# Patient Record
Sex: FEMALE | Race: WHITE | NOT HISPANIC OR LATINO | URBAN - METROPOLITAN AREA
[De-identification: names, ages, dates, MRNs, and addresses within clinical notes are randomized per-mention and may not be internally consistent; named-entity substitution may affect disease eponyms.]

---

## 2016-11-04 RX ADMIN — HYDROMORPHONE HYDROCHLORIDE 0.5 MILLIGRAM(S): 2 INJECTION INTRAMUSCULAR; INTRAVENOUS; SUBCUTANEOUS at 09:30

## 2017-03-28 ENCOUNTER — INPATIENT (INPATIENT)
Facility: HOSPITAL | Age: 32
LOS: 0 days | Discharge: HOME | DRG: 103 | End: 2017-03-29
Attending: FAMILY MEDICINE | Admitting: HOSPITALIST
Payer: COMMERCIAL

## 2017-03-28 VITALS
SYSTOLIC BLOOD PRESSURE: 118 MMHG | TEMPERATURE: 98 F | OXYGEN SATURATION: 98 % | RESPIRATION RATE: 12 BRPM | DIASTOLIC BLOOD PRESSURE: 87 MMHG | HEART RATE: 106 BPM | HEIGHT: 71 IN | WEIGHT: 197.98 LBS

## 2017-03-28 DIAGNOSIS — R20.0 ANESTHESIA OF SKIN: ICD-10-CM

## 2017-03-28 DIAGNOSIS — E03.9 HYPOTHYROIDISM, UNSPECIFIED: ICD-10-CM

## 2017-03-28 DIAGNOSIS — D72.829 ELEVATED WHITE BLOOD CELL COUNT, UNSPECIFIED: ICD-10-CM

## 2017-03-28 DIAGNOSIS — Z98.89 OTHER SPECIFIED POSTPROCEDURAL STATES: Chronic | ICD-10-CM

## 2017-03-28 DIAGNOSIS — Z98.1 ARTHRODESIS STATUS: Chronic | ICD-10-CM

## 2017-03-28 DIAGNOSIS — F31.9 BIPOLAR DISORDER, UNSPECIFIED: ICD-10-CM

## 2017-03-28 DIAGNOSIS — M54.9 DORSALGIA, UNSPECIFIED: ICD-10-CM

## 2017-03-28 DIAGNOSIS — R20.2 PARESTHESIA OF SKIN: ICD-10-CM

## 2017-03-28 DIAGNOSIS — Z41.8 ENCOUNTER FOR OTHER PROCEDURES FOR PURPOSES OTHER THAN REMEDYING HEALTH STATE: ICD-10-CM

## 2017-03-28 LAB
ALBUMIN SERPL ELPH-MCNC: 3.5 G/DL — SIGNIFICANT CHANGE UP (ref 3.3–5)
ALP SERPL-CCNC: 40 U/L — SIGNIFICANT CHANGE UP (ref 40–120)
ALT FLD-CCNC: 62 U/L — SIGNIFICANT CHANGE UP (ref 12–78)
AMYLASE P1 CFR SERPL: 28 U/L — SIGNIFICANT CHANGE UP (ref 25–115)
ANION GAP SERPL CALC-SCNC: 13 MMOL/L — SIGNIFICANT CHANGE UP (ref 5–17)
AST SERPL-CCNC: 77 U/L — HIGH (ref 15–37)
BASOPHILS # BLD AUTO: 0 K/UL — SIGNIFICANT CHANGE UP (ref 0–0.2)
BASOPHILS NFR BLD AUTO: 0.2 % — SIGNIFICANT CHANGE UP (ref 0–2)
BILIRUB SERPL-MCNC: 0.4 MG/DL — SIGNIFICANT CHANGE UP (ref 0.2–1.2)
BUN SERPL-MCNC: 15 MG/DL — SIGNIFICANT CHANGE UP (ref 7–23)
CALCIUM SERPL-MCNC: 9.4 MG/DL — SIGNIFICANT CHANGE UP (ref 8.5–10.1)
CHLORIDE SERPL-SCNC: 113 MMOL/L — HIGH (ref 96–108)
CO2 SERPL-SCNC: 15 MMOL/L — LOW (ref 22–31)
CREAT SERPL-MCNC: 0.85 MG/DL — SIGNIFICANT CHANGE UP (ref 0.5–1.3)
EOSINOPHIL # BLD AUTO: 0 K/UL — SIGNIFICANT CHANGE UP (ref 0–0.5)
EOSINOPHIL NFR BLD AUTO: 0 % — SIGNIFICANT CHANGE UP (ref 0–6)
ERYTHROCYTE [SEDIMENTATION RATE] IN BLOOD: 13 MM/HR — SIGNIFICANT CHANGE UP (ref 0–15)
GLUCOSE SERPL-MCNC: 110 MG/DL — HIGH (ref 70–99)
HCG SERPL-ACNC: <1 MIU/ML — SIGNIFICANT CHANGE UP
HCT VFR BLD CALC: 40.7 % — SIGNIFICANT CHANGE UP (ref 34.5–45)
HGB BLD-MCNC: 13.7 G/DL — SIGNIFICANT CHANGE UP (ref 11.5–15.5)
LACTATE SERPL-SCNC: 1.5 MMOL/L — SIGNIFICANT CHANGE UP (ref 0.7–2)
LIDOCAIN IGE QN: 91 U/L — SIGNIFICANT CHANGE UP (ref 73–393)
LYMPHOCYTES # BLD AUTO: 1.6 K/UL — SIGNIFICANT CHANGE UP (ref 1–3.3)
LYMPHOCYTES # BLD AUTO: 8 % — LOW (ref 13–44)
MCHC RBC-ENTMCNC: 30.4 PG — SIGNIFICANT CHANGE UP (ref 27–34)
MCHC RBC-ENTMCNC: 33.7 GM/DL — SIGNIFICANT CHANGE UP (ref 32–36)
MCV RBC AUTO: 90 FL — SIGNIFICANT CHANGE UP (ref 80–100)
MONOCYTES # BLD AUTO: 1.2 K/UL — HIGH (ref 0–0.9)
MONOCYTES NFR BLD AUTO: 6.2 % — SIGNIFICANT CHANGE UP (ref 1–9)
NEUTROPHILS # BLD AUTO: 17 K/UL — HIGH (ref 1.8–7.4)
NEUTROPHILS NFR BLD AUTO: 85.6 % — HIGH (ref 43–77)
PLATELET # BLD AUTO: 208 K/UL — SIGNIFICANT CHANGE UP (ref 150–400)
POTASSIUM SERPL-MCNC: 3.7 MMOL/L — SIGNIFICANT CHANGE UP (ref 3.5–5.3)
POTASSIUM SERPL-SCNC: 3.7 MMOL/L — SIGNIFICANT CHANGE UP (ref 3.5–5.3)
PROT SERPL-MCNC: 7.2 G/DL — SIGNIFICANT CHANGE UP (ref 6–8.3)
RBC # BLD: 4.52 M/UL — SIGNIFICANT CHANGE UP (ref 3.8–5.2)
RBC # FLD: 12.6 % — SIGNIFICANT CHANGE UP (ref 10.3–14.5)
SODIUM SERPL-SCNC: 141 MMOL/L — SIGNIFICANT CHANGE UP (ref 135–145)
WBC # BLD: 19.9 K/UL — HIGH (ref 3.8–10.5)
WBC # FLD AUTO: 19.9 K/UL — HIGH (ref 3.8–10.5)

## 2017-03-28 PROCEDURE — 99285 EMERGENCY DEPT VISIT HI MDM: CPT

## 2017-03-28 PROCEDURE — 99223 1ST HOSP IP/OBS HIGH 75: CPT | Mod: AI,GC

## 2017-03-28 RX ORDER — ASPIRIN/CALCIUM CARB/MAGNESIUM 324 MG
325 TABLET ORAL ONCE
Qty: 0 | Refills: 0 | Status: COMPLETED | OUTPATIENT
Start: 2017-03-28 | End: 2017-03-28

## 2017-03-28 RX ORDER — VILAZODONE HYDROCHLORIDE 20 MG/1
40 TABLET, FILM COATED ORAL DAILY
Qty: 0 | Refills: 0 | Status: DISCONTINUED | OUTPATIENT
Start: 2017-03-28 | End: 2017-03-29

## 2017-03-28 RX ORDER — FLUTICASONE PROPIONATE AND SALMETEROL 50; 250 UG/1; UG/1
1 POWDER ORAL; RESPIRATORY (INHALATION)
Qty: 0 | Refills: 0 | Status: DISCONTINUED | OUTPATIENT
Start: 2017-03-28 | End: 2017-03-29

## 2017-03-28 RX ORDER — ASPIRIN/CALCIUM CARB/MAGNESIUM 324 MG
81 TABLET ORAL DAILY
Qty: 0 | Refills: 0 | Status: DISCONTINUED | OUTPATIENT
Start: 2017-03-29 | End: 2017-03-29

## 2017-03-28 RX ORDER — SODIUM CHLORIDE 9 MG/ML
1000 INJECTION INTRAMUSCULAR; INTRAVENOUS; SUBCUTANEOUS
Qty: 0 | Refills: 0 | Status: DISCONTINUED | OUTPATIENT
Start: 2017-03-28 | End: 2017-03-29

## 2017-03-28 RX ORDER — TOPIRAMATE 25 MG
200 TABLET ORAL
Qty: 0 | Refills: 0 | Status: DISCONTINUED | OUTPATIENT
Start: 2017-03-28 | End: 2017-03-29

## 2017-03-28 RX ORDER — HYDROMORPHONE HYDROCHLORIDE 2 MG/ML
8 INJECTION INTRAMUSCULAR; INTRAVENOUS; SUBCUTANEOUS EVERY 4 HOURS
Qty: 0 | Refills: 0 | Status: DISCONTINUED | OUTPATIENT
Start: 2017-03-28 | End: 2017-03-29

## 2017-03-28 RX ORDER — METOCLOPRAMIDE HCL 10 MG
10 TABLET ORAL ONCE
Qty: 0 | Refills: 0 | Status: DISCONTINUED | OUTPATIENT
Start: 2017-03-28 | End: 2017-03-28

## 2017-03-28 RX ORDER — CLONAZEPAM 1 MG
2 TABLET ORAL
Qty: 0 | Refills: 0 | Status: DISCONTINUED | OUTPATIENT
Start: 2017-03-28 | End: 2017-03-29

## 2017-03-28 RX ORDER — TOPIRAMATE 25 MG
400 TABLET ORAL
Qty: 0 | Refills: 0 | Status: DISCONTINUED | OUTPATIENT
Start: 2017-03-28 | End: 2017-03-28

## 2017-03-28 RX ORDER — SODIUM CHLORIDE 9 MG/ML
3 INJECTION INTRAMUSCULAR; INTRAVENOUS; SUBCUTANEOUS ONCE
Qty: 0 | Refills: 0 | Status: COMPLETED | OUTPATIENT
Start: 2017-03-28 | End: 2017-03-28

## 2017-03-28 RX ORDER — ENOXAPARIN SODIUM 100 MG/ML
40 INJECTION SUBCUTANEOUS DAILY
Qty: 0 | Refills: 0 | Status: DISCONTINUED | OUTPATIENT
Start: 2017-03-28 | End: 2017-03-29

## 2017-03-28 RX ORDER — BUPROPION HYDROCHLORIDE 150 MG/1
200 TABLET, EXTENDED RELEASE ORAL
Qty: 0 | Refills: 0 | Status: DISCONTINUED | OUTPATIENT
Start: 2017-03-28 | End: 2017-03-29

## 2017-03-28 RX ORDER — LEVOTHYROXINE SODIUM 125 MCG
150 TABLET ORAL DAILY
Qty: 0 | Refills: 0 | Status: DISCONTINUED | OUTPATIENT
Start: 2017-03-28 | End: 2017-03-29

## 2017-03-28 RX ORDER — SODIUM CHLORIDE 9 MG/ML
1000 INJECTION INTRAMUSCULAR; INTRAVENOUS; SUBCUTANEOUS ONCE
Qty: 0 | Refills: 0 | Status: COMPLETED | OUTPATIENT
Start: 2017-03-28 | End: 2017-03-28

## 2017-03-28 RX ORDER — MORPHINE SULFATE 50 MG/1
100 CAPSULE, EXTENDED RELEASE ORAL EVERY 8 HOURS
Qty: 0 | Refills: 0 | Status: DISCONTINUED | OUTPATIENT
Start: 2017-03-28 | End: 2017-03-29

## 2017-03-28 RX ORDER — METOCLOPRAMIDE HCL 10 MG
10 TABLET ORAL ONCE
Qty: 0 | Refills: 0 | Status: COMPLETED | OUTPATIENT
Start: 2017-03-28 | End: 2017-03-28

## 2017-03-28 RX ORDER — LAMOTRIGINE 25 MG/1
100 TABLET, ORALLY DISINTEGRATING ORAL EVERY 8 HOURS
Qty: 0 | Refills: 0 | Status: DISCONTINUED | OUTPATIENT
Start: 2017-03-28 | End: 2017-03-29

## 2017-03-28 RX ORDER — CYCLOBENZAPRINE HYDROCHLORIDE 10 MG/1
10 TABLET, FILM COATED ORAL THREE TIMES A DAY
Qty: 0 | Refills: 0 | Status: DISCONTINUED | OUTPATIENT
Start: 2017-03-28 | End: 2017-03-29

## 2017-03-28 RX ORDER — SERTRALINE 25 MG/1
50 TABLET, FILM COATED ORAL DAILY
Qty: 0 | Refills: 0 | Status: DISCONTINUED | OUTPATIENT
Start: 2017-03-28 | End: 2017-03-29

## 2017-03-28 RX ORDER — DIPHENHYDRAMINE HCL 50 MG
50 CAPSULE ORAL ONCE
Qty: 0 | Refills: 0 | Status: COMPLETED | OUTPATIENT
Start: 2017-03-28 | End: 2017-03-28

## 2017-03-28 RX ORDER — RISPERIDONE 4 MG/1
4 TABLET ORAL AT BEDTIME
Qty: 0 | Refills: 0 | Status: DISCONTINUED | OUTPATIENT
Start: 2017-03-28 | End: 2017-03-29

## 2017-03-28 RX ADMIN — Medication 325 MILLIGRAM(S): at 18:27

## 2017-03-28 RX ADMIN — LAMOTRIGINE 100 MILLIGRAM(S): 25 TABLET, ORALLY DISINTEGRATING ORAL at 22:49

## 2017-03-28 RX ADMIN — Medication 10 MILLIGRAM(S): at 16:38

## 2017-03-28 RX ADMIN — SODIUM CHLORIDE 1000 MILLILITER(S): 9 INJECTION INTRAMUSCULAR; INTRAVENOUS; SUBCUTANEOUS at 16:42

## 2017-03-28 RX ADMIN — Medication 50 MILLIGRAM(S): at 16:38

## 2017-03-28 RX ADMIN — SODIUM CHLORIDE 50 MILLILITER(S): 9 INJECTION INTRAMUSCULAR; INTRAVENOUS; SUBCUTANEOUS at 21:45

## 2017-03-28 RX ADMIN — RISPERIDONE 4 MILLIGRAM(S): 4 TABLET ORAL at 22:49

## 2017-03-28 RX ADMIN — SODIUM CHLORIDE 3 MILLILITER(S): 9 INJECTION INTRAMUSCULAR; INTRAVENOUS; SUBCUTANEOUS at 16:46

## 2017-03-28 RX ADMIN — Medication 2 MILLIGRAM(S): at 23:12

## 2017-03-28 RX ADMIN — Medication 400 MILLIGRAM(S): at 22:49

## 2017-03-28 NOTE — H&P ADULT. - NEGATIVE GENERAL GENITOURINARY SYMPTOMS
no flank pain L/no renal colic/no urine discoloration/no bladder infections/no incontinence/no hematuria/no flank pain R

## 2017-03-28 NOTE — H&P ADULT. - HISTORY OF PRESENT ILLNESS
Pt is a 33 yo female with PMHX of Bipolar disorder, hypothyroidism s/p leminectomy and spinal fusion in 2016 presents to the ED with a complaining  of left sided numbness. Pt reports that symptoms first began on Saturday while she was sleeping.  She reports that at that time she had diffuse facial, UE, and LE left sided numbness with associated weakneness. Patients admits to severe weakness that she needed to use a crutch to aid her in walking.  She reports some improvement in her lower ext numbness but none in her facial region or UE.  She called her Dr. Bowden at that time and was told to follow up at the office. Yesterday she followed with Dr. Bowden in his office and was started on Medrol-dose pack and flexeril . Pt has underwent a lumbar fusion in the past and a cervical fusion as well.  Pt reports that Dr. Bowden believed that symptoms were related to a spasm and gave her several steroid injections in her cervical region and gluteal region.  Pt reports no improvement in symptoms. Pt denies similar symptoms in the past.  Denies fever, chills, N/V/D/C, CP, SOB, abd pain.  Denies loss of bowel or bladder function.  Pt denies HA, visual changes.     In the ED, WBC 19.9, Ct Cervical spine showed Normal alignment in the sagittal plane. Convexity to the right may reflect muscle spasm. Status post ACDF surgery at C3-C5. Hardware intact. No evidence of hardware failure. No fracture or subluxation. Mild chronic/degenerative loss of height of C3-C5. Facet joints intact. Foramen magnum region unremarkable .Paraspinal soft tissues unremarkable. CT head showed on contrast cranial CT, routine fashion, skull base to vertex. The ventricles normal in size and configuration for age.  No attenuation abnormalities noted. There is no bleed,  infarct, edema or pathologic calcification.  No mass effect. The visualized portions sinuses are clear.  Bony calvarium intact. Ashleigh was evaluated by Dr. Phillips in the ED.

## 2017-03-28 NOTE — H&P ADULT. - NEUROLOGICAL DETAILS
alert and oriented x 3/strength decreased/responds to verbal commands/no spontaneous movement/responds to pain

## 2017-03-28 NOTE — H&P ADULT. - ASSESSMENT
Pt is a 33 yo female with PMHX of Bipolar disorder, hypothyroidism s/p laminectomy and spinal fusion in 2016 presents to the ED with a complaining  of left sided numbness admittted for left sided numbness, r/o CVA.

## 2017-03-28 NOTE — H&P ADULT. - NEGATIVE NEUROLOGICAL SYMPTOMS
no headache/no transient paralysis/no paresthesias/no generalized seizures/no vertigo/no syncope/no tremors

## 2017-03-28 NOTE — ED ADULT NURSE NOTE - ED STAT RN HANDOFF DETAILS
hand off report received from Ed KEENE RN. pt awaiting admitting orders. VSS. denies pain and discomfort at this time.

## 2017-03-28 NOTE — H&P ADULT. - ATTENDING COMMENTS
pt is 32 female s/p c and l spine fusion sugery was admitted sec to lt sided numbness and weakness evaluated by neuro  in er   CT head show no acute events   plan is for MRI   frequent neuro checks   fall precaution   dvt ppx

## 2017-03-28 NOTE — H&P ADULT. - PROBLEM SELECTOR PLAN 3
-Continue Medrol dose pack. Patient was stared on on 3/25 by dr. Bowden, continue Percocet PRN for pain, Hold dilaudid, morphine and baclofen

## 2017-03-28 NOTE — ED PROVIDER NOTE - CHPI ED SYMPTOMS NEG
no loss of consciousness/no confusion/no vomiting/no nausea/no change in level of consciousness/no dizziness/no fever/no blurred vision

## 2017-03-28 NOTE — H&P ADULT. - PROBLEM SELECTOR PLAN 1
- with mild weakness of L upper extremity, r/o CVA  -admit to [facility]  - f/u MRI brain and C-spine,  neuro check Q6hrs  -f/u Neuro Dr. Alan juarez appreciated. - with mild weakness of L upper extremity, r/o CVA  -admit to [facility]  - f/u MRI brain and C-spine,  neuro check Q6hrs  start asa 81 mg qd   -f/u Neuro Dr. Phillips recs appreciated.

## 2017-03-28 NOTE — H&P ADULT. - NEGATIVE ENMT SYMPTOMS
no nasal obstruction/no hearing difficulty/no ear pain/no nasal congestion/no tinnitus/no vertigo/no sinus symptoms/no nasal discharge

## 2017-03-28 NOTE — ED PROVIDER NOTE - CARE PLAN
Principal Discharge DX:	Paresthesia  Instructions for follow-up, activity and diet:	admit  Secondary Diagnosis:	Bipolar 1 disorder  Secondary Diagnosis:	Weakness

## 2017-03-28 NOTE — H&P ADULT. - RS GEN PE MLT RESP DETAILS PC
respirations non-labored/no chest wall tenderness/good air movement/no intercostal retractions/no rales/no rhonchi/no wheezes/clear to auscultation bilaterally

## 2017-03-28 NOTE — ED PROVIDER NOTE - OBJECTIVE STATEMENT
Pt is a 31 yo female who presents to the ED with a cc of left sided numbness.  Pt reports that symptoms first began on Saturday.  She reports that at that time she had diffuse facial, UE, and LE left sided numbness.  it was so severe she needed to use a crutch to aid her in walking.  She reports some improvement in her lower ext numbness but none in her facial region or UE.  She did not see anyone at the time for this.  Yesterday she followed with Dr. Bowden in his office.  Pt has underwent a lumbar fusion in the past and a cervical fusion as well.  Pt reports that Dr. Bowden believed that symptoms were related to a spasm and gave her several steroid injections in her cervical region and gluteal region.  Pt reports no improvement in symptoms. Pt denies similar symptoms in the past.  Denies fever, chills, N/V/D/C, CP, SOB, abd pain.  Denies loss of bowel or bladder function.  Pt denies HA, visual changes

## 2017-03-28 NOTE — H&P ADULT. - NEGATIVE GASTROINTESTINAL SYMPTOMS
no abdominal pain/no change in bowel habits/no flatulence/no nausea/no constipation/no diarrhea/no vomiting

## 2017-03-29 VITALS
OXYGEN SATURATION: 98 % | RESPIRATION RATE: 17 BRPM | SYSTOLIC BLOOD PRESSURE: 104 MMHG | HEART RATE: 83 BPM | DIASTOLIC BLOOD PRESSURE: 70 MMHG | TEMPERATURE: 98 F

## 2017-03-29 DIAGNOSIS — D72.829 ELEVATED WHITE BLOOD CELL COUNT, UNSPECIFIED: ICD-10-CM

## 2017-03-29 DIAGNOSIS — E03.9 HYPOTHYROIDISM, UNSPECIFIED: ICD-10-CM

## 2017-03-29 DIAGNOSIS — M54.9 DORSALGIA, UNSPECIFIED: ICD-10-CM

## 2017-03-29 DIAGNOSIS — R20.2 PARESTHESIA OF SKIN: ICD-10-CM

## 2017-03-29 LAB
ALBUMIN SERPL ELPH-MCNC: 3.3 G/DL — SIGNIFICANT CHANGE UP (ref 3.3–5)
ALP SERPL-CCNC: 37 U/L — LOW (ref 40–120)
ALT FLD-CCNC: 48 U/L — SIGNIFICANT CHANGE UP (ref 12–78)
ANION GAP SERPL CALC-SCNC: 13 MMOL/L — SIGNIFICANT CHANGE UP (ref 5–17)
AST SERPL-CCNC: 39 U/L — HIGH (ref 15–37)
BILIRUB SERPL-MCNC: 0.4 MG/DL — SIGNIFICANT CHANGE UP (ref 0.2–1.2)
BUN SERPL-MCNC: 14 MG/DL — SIGNIFICANT CHANGE UP (ref 7–23)
CALCIUM SERPL-MCNC: 8.5 MG/DL — SIGNIFICANT CHANGE UP (ref 8.5–10.1)
CHLORIDE SERPL-SCNC: 112 MMOL/L — HIGH (ref 96–108)
CO2 SERPL-SCNC: 16 MMOL/L — LOW (ref 22–31)
CREAT SERPL-MCNC: 0.79 MG/DL — SIGNIFICANT CHANGE UP (ref 0.5–1.3)
CRP SERPL-MCNC: 0.24 MG/DL — SIGNIFICANT CHANGE UP (ref 0–0.4)
GLUCOSE SERPL-MCNC: 117 MG/DL — HIGH (ref 70–99)
HCT VFR BLD CALC: 40 % — SIGNIFICANT CHANGE UP (ref 34.5–45)
HGB BLD-MCNC: 13.2 G/DL — SIGNIFICANT CHANGE UP (ref 11.5–15.5)
MCHC RBC-ENTMCNC: 29.8 PG — SIGNIFICANT CHANGE UP (ref 27–34)
MCHC RBC-ENTMCNC: 32.9 GM/DL — SIGNIFICANT CHANGE UP (ref 32–36)
MCV RBC AUTO: 90.3 FL — SIGNIFICANT CHANGE UP (ref 80–100)
PLATELET # BLD AUTO: 172 K/UL — SIGNIFICANT CHANGE UP (ref 150–400)
POTASSIUM SERPL-MCNC: 3.4 MMOL/L — LOW (ref 3.5–5.3)
POTASSIUM SERPL-SCNC: 3.4 MMOL/L — LOW (ref 3.5–5.3)
PROT SERPL-MCNC: 6.1 G/DL — SIGNIFICANT CHANGE UP (ref 6–8.3)
RBC # BLD: 4.43 M/UL — SIGNIFICANT CHANGE UP (ref 3.8–5.2)
RBC # FLD: 12.5 % — SIGNIFICANT CHANGE UP (ref 10.3–14.5)
SODIUM SERPL-SCNC: 141 MMOL/L — SIGNIFICANT CHANGE UP (ref 135–145)
WBC # BLD: 12.6 K/UL — HIGH (ref 3.8–10.5)
WBC # FLD AUTO: 12.6 K/UL — HIGH (ref 3.8–10.5)

## 2017-03-29 PROCEDURE — 82150 ASSAY OF AMYLASE: CPT

## 2017-03-29 PROCEDURE — 70450 CT HEAD/BRAIN W/O DYE: CPT

## 2017-03-29 PROCEDURE — 96372 THER/PROPH/DIAG INJ SC/IM: CPT | Mod: 59

## 2017-03-29 PROCEDURE — 83690 ASSAY OF LIPASE: CPT

## 2017-03-29 PROCEDURE — 80053 COMPREHEN METABOLIC PANEL: CPT

## 2017-03-29 PROCEDURE — 83605 ASSAY OF LACTIC ACID: CPT

## 2017-03-29 PROCEDURE — 99239 HOSP IP/OBS DSCHRG MGMT >30: CPT

## 2017-03-29 PROCEDURE — 86140 C-REACTIVE PROTEIN: CPT

## 2017-03-29 PROCEDURE — 72156 MRI NECK SPINE W/O & W/DYE: CPT

## 2017-03-29 PROCEDURE — 96374 THER/PROPH/DIAG INJ IV PUSH: CPT | Mod: 59

## 2017-03-29 PROCEDURE — 84702 CHORIONIC GONADOTROPIN TEST: CPT

## 2017-03-29 PROCEDURE — 96375 TX/PRO/DX INJ NEW DRUG ADDON: CPT

## 2017-03-29 PROCEDURE — 70553 MRI BRAIN STEM W/O & W/DYE: CPT

## 2017-03-29 PROCEDURE — 90792 PSYCH DIAG EVAL W/MED SRVCS: CPT

## 2017-03-29 PROCEDURE — 85652 RBC SED RATE AUTOMATED: CPT

## 2017-03-29 PROCEDURE — 85027 COMPLETE CBC AUTOMATED: CPT

## 2017-03-29 PROCEDURE — 70553 MRI BRAIN STEM W/O & W/DYE: CPT | Mod: 26

## 2017-03-29 PROCEDURE — 72156 MRI NECK SPINE W/O & W/DYE: CPT | Mod: 26

## 2017-03-29 PROCEDURE — 84443 ASSAY THYROID STIM HORMONE: CPT

## 2017-03-29 PROCEDURE — 72125 CT NECK SPINE W/O DYE: CPT

## 2017-03-29 PROCEDURE — 99285 EMERGENCY DEPT VISIT HI MDM: CPT | Mod: 25

## 2017-03-29 PROCEDURE — A9579: CPT

## 2017-03-29 RX ORDER — MORPHINE SULFATE 50 MG/1
30 CAPSULE, EXTENDED RELEASE ORAL ONCE
Qty: 0 | Refills: 0 | Status: DISCONTINUED | OUTPATIENT
Start: 2017-03-29 | End: 2017-03-29

## 2017-03-29 RX ADMIN — MORPHINE SULFATE 30 MILLIGRAM(S): 50 CAPSULE, EXTENDED RELEASE ORAL at 01:24

## 2017-03-29 RX ADMIN — MORPHINE SULFATE 30 MILLIGRAM(S): 50 CAPSULE, EXTENDED RELEASE ORAL at 02:24

## 2017-03-29 RX ADMIN — Medication 2 MILLIGRAM(S): at 05:57

## 2017-03-29 RX ADMIN — CYCLOBENZAPRINE HYDROCHLORIDE 10 MILLIGRAM(S): 10 TABLET, FILM COATED ORAL at 11:46

## 2017-03-29 RX ADMIN — Medication 4 MILLIGRAM(S): at 05:57

## 2017-03-29 RX ADMIN — Medication 4 MILLIGRAM(S): at 12:44

## 2017-03-29 RX ADMIN — Medication 150 MICROGRAM(S): at 05:57

## 2017-03-29 RX ADMIN — CYCLOBENZAPRINE HYDROCHLORIDE 10 MILLIGRAM(S): 10 TABLET, FILM COATED ORAL at 01:25

## 2017-03-29 RX ADMIN — SERTRALINE 50 MILLIGRAM(S): 25 TABLET, FILM COATED ORAL at 11:46

## 2017-03-29 RX ADMIN — Medication 200 MILLIGRAM(S): at 05:57

## 2017-03-29 RX ADMIN — Medication 2 MILLIGRAM(S): at 11:46

## 2017-03-29 RX ADMIN — BUPROPION HYDROCHLORIDE 200 MILLIGRAM(S): 150 TABLET, EXTENDED RELEASE ORAL at 05:58

## 2017-03-29 RX ADMIN — LAMOTRIGINE 100 MILLIGRAM(S): 25 TABLET, ORALLY DISINTEGRATING ORAL at 05:57

## 2017-03-29 RX ADMIN — Medication 81 MILLIGRAM(S): at 11:46

## 2017-03-29 RX ADMIN — VILAZODONE HYDROCHLORIDE 40 MILLIGRAM(S): 20 TABLET, FILM COATED ORAL at 11:46

## 2017-03-29 RX ADMIN — ENOXAPARIN SODIUM 40 MILLIGRAM(S): 100 INJECTION SUBCUTANEOUS at 11:48

## 2017-03-29 RX ADMIN — LAMOTRIGINE 100 MILLIGRAM(S): 25 TABLET, ORALLY DISINTEGRATING ORAL at 12:44

## 2017-03-29 NOTE — PROGRESS NOTE ADULT - SUBJECTIVE AND OBJECTIVE BOX
Neurology Follow up note    ABELARDO [id number]    HPI:  Pt is a 33 yo female with PMHX of Bipolar disorder, hypothyroidism s/p leminectomy and spinal fusion in 2016 presents to the ED with a complaining  of left sided numbness. Pt reports that symptoms first began on Saturday while she was sleeping.  She reports that at that time she had diffuse facial, UE, and LE left sided numbness with associated weakneness. Patients admits to severe weakness that she needed to use a crutch to aid her in walking.  She reports some improvement in her lower ext numbness but none in her facial region or UE.  She called her Dr. Bowden at that time and was told to follow up at the office. Yesterday she followed with Dr. Bowden in his office and was started on Medrol-dose pack and flexeril . Pt has underwent a lumbar fusion in the past and a cervical fusion as well.  Pt reports that Dr. Bowden believed that symptoms were related to a spasm and gave her several steroid injections in her cervical region and gluteal region.  Pt reports no improvement in symptoms. Pt denies similar symptoms in the past.  Denies fever, chills, N/V/D/C, CP, SOB, abd pain.  Denies loss of bowel or bladder function.  Pt denies HA, visual changes.     In the ED, WBC 19.9, Ct Cervical spine showed Normal alignment in the sagittal plane. Convexity to the right may reflect muscle spasm. Status post ACDF surgery at C3-C5. Hardware intact. No evidence of hardware failure. No fracture or subluxation. Mild chronic/degenerative loss of height of C3-C5. Facet joints intact. Foramen magnum region unremarkable .Paraspinal soft tissues unremarkable. CT head showed on contrast cranial CT, routine fashion, skull base to vertex. The ventricles normal in size and configuration for age.  No attenuation abnormalities noted. There is no bleed,  infarct, edema or pathologic calcification.  No mass effect. The visualized portions sinuses are clear.  Bony calvarium intact. Patietn was evaluated by Dr. Phillips in the ED. (28 Mar 2017 19:12)      Interval History - She is still c/o numbness and weakness left arm.  HA.    Patient is seen, chart was reviewed and case was discussed with the treatment team.  Pt is not in any distress.   Lying on bed comfortably.   No events reported overnight.   No clinical seizure was reported.  Sitting on chair bed comfortably.    is at bedside.    Vital Signs Last 24 Hrs  T(C): 36.8, Max: 37.4 (03-28 @ 19:10)  T(F): 98.2, Max: 99.3 (03-28 @ 19:10)  HR: 99 (65 - 106)  BP: 101/69 (100/64 - 118/87)  BP(mean): --  RR: 16 (12 - 16)  SpO2: 96% (96% - 100%)    Height (cm): 180.3 (03-28 @ 15:28)  Weight (kg): 89.8 (03-28 @ 15:28)  BMI (kg/m2): 27.6 (03-28 @ 15:28)    REVIEW OF SYSTEMS:    Constitutional: No fever, weight loss or fatigue  Eyes: No eye pain, visual disturbances, or discharge  ENT:  No difficulty hearing, tinnitus, vertigo; No sinus or throat pain  Neck: No pain or stiffness  Respiratory: No cough, wheezing, chills or hemoptysis  Cardiovascular: No chest pain, palpitations, shortness of breath, dizziness or leg swelling  Gastrointestinal: No abdominal or epigastric pain. No nausea, vomiting or hematemesis; No diarrhea or constipation. No melena or hematochezia.  Genitourinary: No dysuria, frequency, hematuria or incontinence  Neurological:, memory loss, loss of strength, numbness or tremors  Psychiatric: No depression, anxiety, mood swings or difficulty sleeping  Musculoskeletal: No joint pain or swelling; No muscle, back or extremity pain  Skin: No itching, burning, rashes or lesions   Lymph Nodes: No enlarged glands  Endocrine: No heat or cold intolerance; No hair loss, No h/o diabetes or thyroid dysfunction  Allergy and Immunologic: No hives or eczema    On Neurological Examination:    Mental Status - Pt is alert, awake, oriented X3. Higher functions are intact Follows commands well and able to answer questions appropriately.Mood and affect  normal    Speech -  Normal. Slurred. Pt has no aphasia.    Cranial Nerves - Pupils 3 mm equal and reactive to light, extraocular eye movements intact. Pt has no visual field deficit.  Pt has no right left facial asymmetry. Facial sensation is intact.Tongue - is in midline.    Motor Exam - 5/5 all over,except left arm 3/5. No drift. No shaking or tremors.  Muscle tone - is normal all over. Moves all extremities equally. No asymmetry is seen.      Sensory Exam - subjective dec. sensation of left arm joint position and vibration are intact on either side. Pt withdraws all extremities equally on stimulation. No asymmetry seen. No complaints of tingling, numbness.    Gait - Able to stand and walk unassisted. Pt is able to stand up with holding my hands and is able to walk for few feet around the bed. Not falling to either side.    Deep tendon Reflexes - 2 plus all over.    Coordination - Fine finger movements are normal on both sides. Finger to nose is also normal on both sides.       Romberg - Negative.    Neck Supple -  Yes.     MEDICATIONS    aspirin enteric coated 81milliGRAM(s) Oral daily  enoxaparin Injectable 40milliGRAM(s) SubCutaneous daily  sodium chloride 0.9%. 1000milliLiter(s) IV Continuous <Continuous>  clonazePAM Tablet 2milliGRAM(s) Oral four times a day  lamoTRIgine 100milliGRAM(s) Oral every 8 hours  sertraline 50milliGRAM(s) Oral daily  risperiDONE   Tablet 4milliGRAM(s) Oral at bedtime  buPROPion SR 200milliGRAM(s) Oral two times a day  levothyroxine 150MICROGram(s) Oral daily  fluticasone / salmeterol 250-50 MICROgram(s) Diskus 1Dose(s) Inhalation two times a day  methylPREDNISolone 4milliGRAM(s) Oral before breakfast  methylPREDNISolone 4milliGRAM(s) Oral after lunch  methylPREDNISolone 4milliGRAM(s) Oral at bedtime  vilaZODone 40milliGRAM(s) Oral daily  cyclobenzaprine 10milliGRAM(s) Oral three times a day PRN  topiramate 200milliGRAM(s) Oral two times a day      Allergies    penicillin (Unknown)  sulfa drugs (Unknown)    Intolerances        LABS:  CBC Full  -  ( 29 Mar 2017 10:59 )  WBC Count : 12.6 K/uL  Hemoglobin : 13.2 g/dL  Hematocrit : 40.0 %  Platelet Count - Automated : 172 K/uL  Mean Cell Volume : 90.3 fl  Mean Cell Hemoglobin : 29.8 pg  Mean Cell Hemoglobin Concentration : 32.9 gm/dL  Auto Neutrophil # : x  Auto Lymphocyte # : x  Auto Monocyte # : x  Auto Eosinophil # : x  Auto Basophil # : x  Auto Neutrophil % : x  Auto Lymphocyte % : x  Auto Monocyte % : x  Auto Eosinophil % : x  Auto Basophil % : x      29 Mar 2017 10:59    141    |  112    |  14     ----------------------------<  117    3.4     |  16     |  0.79   FINDINGS:MRI OF HEAD.  No abnormal leptomeningeal or parenchymal enhancement.  There is no abnormal restricted diffusion to suggest acute infarction.   Normal signal is demonstrated throughout the brain parenchyma. Normal T2   flow-voids are seen within  the intracranial vasculature. The lateral   ventricles and cortical sulci are normal in size and configuration. There   is no mass, mass effect, or extra-axial fluid collection. There is no   susceptibility artifact to suggest hemorrhage. Midline structures are   normal.  The visualized paranasal sinuses, mastoid air cells and orbits   are unremarkable.      IMPRESSION: No acute infarction.          Ca    8.5        29 Mar 2017 10:59    TPro  6.1    /  Alb  3.3    /  TBili  0.4    /  DBili  x      /  AST  39     /  ALT  48     /  AlkPhos  37     29 Mar 2017 10:59    Hemoglobin A1C:     Vitamin B12   Noncontrast CT cervical spine.        Normal alignment in the sagittal plane. Convexity to the right may   reflect muscle spasm. Status post ACDF surgery at C3-C5. Hardware intact.   No evidence of hardware failure. No fracture or subluxation. Mild   chronic/degenerative loss of height of C3-C5. Facet joints intact.   Foramen magnum region unremarkable.  Paraspinal soft tissues unremarkable.    Impression: Status post ACDF. No fracture or traumatic listhesis.        RADIOLOGY    ASSESSMENT AND PLAN:          Physical therapy evaluation.  OOB to chair/ambulation with assistance only.  Advanced care planning was discussed with family.  Pain is accessed and addressed.  Pt was screened for signs of clinical depression. Appropriate referral made.  Risk of falls accessed. Fall prevention and plan of care was discussed with family.  Pt is screened for tobacco and alcohol use. Counseling was done for smoking and alcohol cessation.  Use of narcotic pain meds was dicussed. Pt is advised to use narcotic meds wisely and to refrain from over using them.  Plan of care was discussed with family. Questions answered.  Would continue to follow.    Rec; spine surgery and psych evaluation.

## 2017-03-29 NOTE — PROGRESS NOTE ADULT - ASSESSMENT
31 yo female with PMHX of Bipolar disorder, hypothyroidism s/p leminectomy and spinal fusion in 2016 presents to the ED with a complaining  of left sided numbness. Admitted for left sided paresthesias r/o CVA. 33 yo female with PMHX of Bipolar disorder, hypothyroidism s/p leminectomy and spinal fusion in 2016 presents to the ED with a complaining of left sided numbness. Admitted for left sided paresthesias r/o CVA.

## 2017-03-29 NOTE — CONSULT NOTE ADULT - ASSESSMENT
31 yo F w/ Atypical migraines and LUE Numbness  pain control  steroid pack  follow up with Dr. Bowden  No acute orthopedic intervention neededa t this time.

## 2017-03-29 NOTE — PROGRESS NOTE ADULT - ATTENDING COMMENTS
Patient with slight improvement in symptoms, but persistent parasthesia. MRI head negative, f/u MRI cervical spine. Psych consulted to r/o possible conversion disorder/other psychiatric component to this presentation.

## 2017-03-29 NOTE — PROGRESS NOTE ADULT - PROBLEM SELECTOR PLAN 3
Continue Medrol dose pack  Patient was stared on on 3/25 by Dr. Bowden  Continue Percocet PRN for pain  Continue flexeril Continue Medrol dose pack  Patient was stared on 3/25 by Dr. Bowden  Continue Percocet PRN for pain  Continue flexeril

## 2017-03-29 NOTE — DISCHARGE NOTE ADULT - CARE PLAN
Principal Discharge DX:	Atypical migraine  Goal:	Prevent recurrence  Instructions for follow-up, activity and diet:	CT head/MRI head and cspine negative for structural cause for paresthesias/weakness.  Please follow up with Neurologist Dr. Phillips within 1 week of discharge.  Fall prevention and plan of care was discussed with patient.  Use of narcotic pain meds was dicussed, advised to use narcotic meds wisely and to refrain from over using them.  Follow up with PCP within 1 week of discharge.  Secondary Diagnosis:	Bipolar 1 disorder  Instructions for follow-up, activity and diet:	Continue home medications.  Please follow up with your PCP within 1 week of discharge.  Secondary Diagnosis:	Hypothyroidism, unspecified type  Instructions for follow-up, activity and diet:	Continue home medications.  Please follow up with your PCP within 1 week of discharge.  Secondary Diagnosis:	Back pain, unspecified back location, unspecified back pain laterality, unspecified chronicity  Instructions for follow-up, activity and diet:	Continue home medications.  Please follow up with your PCP within 1 week of discharge. Principal Discharge DX:	Atypical migraine  Goal:	Prevent recurrence  Instructions for follow-up, activity and diet:	CT head/MRI head and c-spine negative for structural cause for paresthesias/weakness.  Please follow up with Neurologist Dr. Phillips within 1 week of discharge.  Fall prevention and plan of care was discussed with patient.  Use of narcotic pain meds was dicussed, advised to use narcotic meds wisely and to refrain from over using them.  Follow up with PCP within 1 week of discharge.  Secondary Diagnosis:	Bipolar 1 disorder  Instructions for follow-up, activity and diet:	Continue home medications.  Please follow up with your psychiatrist within 1 week of discharge.  Secondary Diagnosis:	Hypothyroidism, unspecified type  Instructions for follow-up, activity and diet:	Continue home medications.  Please follow up with your PCP within 1 week of discharge.  Secondary Diagnosis:	Back pain, unspecified back location, unspecified back pain laterality, unspecified chronicity  Instructions for follow-up, activity and diet:	Continue home medications.  Complete medrol dose pack as prescribed by Dr. Bowden.  Follow up with Dr. Dupont Friday as already scheduled.

## 2017-03-29 NOTE — DISCHARGE NOTE ADULT - MEDICATION SUMMARY - MEDICATIONS TO TAKE
I will START or STAY ON the medications listed below when I get home from the hospital:    Medrol Dosepak 4 mg oral tablet  -- 4 milligram(s) by mouth once a day  -- Indication: For Back pain    morphine 100 mg/12 hours oral tablet, extended release  -- 1 tab(s) by mouth every 8 hours  -- Indication: For Back pain    Dilaudid 8 mg oral tablet  -- 1 tab(s) by mouth every 4 hours  -- Indication: For Back pain    LaMICtal 100 mg oral tablet  --  by mouth 3 times a day  -- Indication: For Bipolar 1 disorder    KlonoPIN 2 mg oral tablet  -- 1 tab(s) by mouth 4 times a day  -- Indication: For Bipolar 1 disorder    Topamax 200 mg oral tablet  -- 1 tab(s) by mouth 2 times a day  -- Indication: For Bipolar 1 disorder    Viibryd 40 mg oral tablet  -- 1 tab(s) by mouth once a day (in the morning)  -- Indication: For Bipolar 1 disorder    sertraline 50 mg oral tablet  -- 1 tab(s) by mouth once a day  -- Indication: For Bipolar 1 disorder    Depo-Provera Contraceptive 150 mg/mL intramuscular suspension  --  intramuscular once every 3month   -- Indication: For contraception    RisperDAL 4 mg oral tablet  -- 2 tab(s) by mouth once (at bedtime)  -- Indication: For Bipolar 1 disorder    Symbicort 160 mcg-4.5 mcg/inh inhalation aerosol  -- 2 puff(s) inhaled 2 times a day  -- Indication: For asthma    Flexeril  -- 10 milligram(s) by mouth 3 times a day  -- Indication: For Back pain    buPROPion 24 hour extended release  -- 200 milligram(s) by mouth 2 times a day  -- Indication: For smoking cessation    levothyroxine 150 mcg (0.15 mg) oral tablet  -- 1 tab(s) by mouth once a day  -- Indication: For Hypothyroid

## 2017-03-29 NOTE — DISCHARGE NOTE ADULT - PROVIDER TOKENS
TOKEN:'[id number]' TOKEN:'[id number]:'[id number]' TOKEN:'[id number]:'[id number]

## 2017-03-29 NOTE — PROGRESS NOTE ADULT - SUBJECTIVE AND OBJECTIVE BOX
HPI:  Pt is a 33 yo female with PMHX of Bipolar disorder, hypothyroidism s/p leminectomy and spinal fusion in 2016 presents to the ED with a complaining  of left sided numbness. Admitted for left sided parasthesias r/o CVA. Parasthesias persisting. No acute events overnight.     REVIEW OF SYSTEMS:    CONSTITUTIONAL: No weakness, fevers or chills  EYES/ENT: No visual changes, no throat pain   RESPIRATORY: No cough, wheezing, hemoptysis; No shortness of breath  CARDIOVASCULAR: No chest pain or palpitations  GASTROINTESTINAL: No abdominal, nausea, vomiting, or hematemesis; No diarrhea or constipation. No melena or hematochezia.  GENITOURINARY: No dysuria, frequency or hematuria  NEUROLOGICAL: Still reporting decreased sensation of left face/lips, and decreased strength of left upper ext.  SKIN: No itching, burning, rashes, or lesions   All other review of systems is negative unless indicated above.  T(C): 36.8, Max: 37.4 (03-28 @ 19:10)  HR: 99 (65 - 106)  BP: 101/69 (100/64 - 118/87)  RR: 16 (12 - 16)  SpO2: 96% (96% - 100%)  Wt(kg): --    PHYSICAL EXAM:     GENERAL: no acute distress  HEENT: NC/AT, EOMI, neck supple, MMM  RESPIRATORY: LCTAB/L, no rhonchi, rales, or wheezing  CARDIOVASCULAR: RRR, no murmurs, gallops, rubs  ABDOMINAL: soft, non-tender, non-distended, positive bowel sounds   EXTREMITIES: no clubbing, cyanosis, or edema  NEUROLOGICAL: alert and oriented x 3, non-focal  SKIN: no rashes or lesions   MUSCULOSKELETAL: decreased motor strength leg upper exteriority 4/5  decreased sensation of left upper lips and lower lips.                             13.7   19.9  )-----------( 208      ( 28 Mar 2017 16:11 )             40.7     28 Mar 2017 16:11    141    |  113    |  15     ----------------------------<  110    3.7     |  15     |  0.85     Ca    9.4        28 Mar 2017 16:11    TPro  7.2    /  Alb  3.5    /  TBili  0.4    /  DBili  x      /  AST  77     /  ALT  62     /  AlkPhos  40     28 Mar 2017 16:11        MEDICATIONS  (STANDING):  aspirin enteric coated 81milliGRAM(s) Oral daily  enoxaparin Injectable 40milliGRAM(s) SubCutaneous daily  sodium chloride 0.9%. 1000milliLiter(s) IV Continuous <Continuous>  clonazePAM Tablet 2milliGRAM(s) Oral four times a day  lamoTRIgine 100milliGRAM(s) Oral every 8 hours  sertraline 50milliGRAM(s) Oral daily  risperiDONE   Tablet 4milliGRAM(s) Oral at bedtime  buPROPion SR 200milliGRAM(s) Oral two times a day  levothyroxine 150MICROGram(s) Oral daily  fluticasone / salmeterol 250-50 MICROgram(s) Diskus 1Dose(s) Inhalation two times a day  methylPREDNISolone 4milliGRAM(s) Oral before breakfast  methylPREDNISolone 4milliGRAM(s) Oral after lunch  methylPREDNISolone 4milliGRAM(s) Oral at bedtime  vilaZODone 40milliGRAM(s) Oral daily  topiramate 200milliGRAM(s) Oral two times a day HPI:  Pt is a 31 yo female with PMHX of Bipolar disorder, hypothyroidism s/p leminectomy and spinal fusion in 2016 presents to the ED with a complaining of left sided numbness. Admitted for left sided parasthesias r/o CVA. Parasthesia and left arm weakness persisting. No acute events overnight. No other complaints.    REVIEW OF SYSTEMS:    CONSTITUTIONAL: No weakness, fevers or chills  EYES/ENT: No visual changes, no throat pain   RESPIRATORY: No cough, wheezing, hemoptysis; No shortness of breath  CARDIOVASCULAR: No chest pain or palpitations  GASTROINTESTINAL: No abdominal, nausea, vomiting, or hematemesis; No diarrhea or constipation. No melena or hematochezia.  GENITOURINARY: No dysuria, frequency or hematuria  NEUROLOGICAL: Still reporting decreased sensation of left face/lips, and decreased strength of left upper ext.  SKIN: No itching, burning, rashes, or lesions   All other review of systems is negative unless indicated above.    VITAL SIGNS:     T(C): 36.8, Max: 37.4 (03-28 @ 19:10)  HR: 99 (65 - 106)  BP: 101/69 (100/64 - 118/87)  RR: 16 (12 - 16)  SpO2: 96% (96% - 100%)  Wt(kg): --    PHYSICAL EXAM:     GENERAL: no acute distress  HEENT: NC/AT, EOMI, neck supple, MMM  RESPIRATORY: LCTAB/L, no rhonchi, rales, or wheezing  CARDIOVASCULAR: RRR, no murmurs, gallops, rubs  ABDOMINAL: soft, non-tender, non-distended, positive bowel sounds   EXTREMITIES: no clubbing, cyanosis, or edema  NEUROLOGICAL: alert and oriented x 3, non-focal  SKIN: no rashes or lesions   MUSCULOSKELETAL: decreased motor strength leg upper exteriority 3/5  decreased sensation of left upper lips and lower lips.     LABS:                           13.2   12.6  )-----------( 172      ( 29 Mar 2017 10:59 )             40.0   29 Mar 2017 10:59    141    |  112    |  14     ----------------------------<  117    3.4     |  16     |  0.79     Ca    8.5        29 Mar 2017 10:59    TPro  6.1    /  Alb  3.3    /  TBili  0.4    /  DBili  x      /  AST  39     /  ALT  48     /  AlkPhos  37     29 Mar 2017 10:59                              MEDICATIONS  (STANDING):  aspirin enteric coated 81milliGRAM(s) Oral daily  enoxaparin Injectable 40milliGRAM(s) SubCutaneous daily  sodium chloride 0.9%. 1000milliLiter(s) IV Continuous <Continuous>  clonazePAM Tablet 2milliGRAM(s) Oral four times a day  lamoTRIgine 100milliGRAM(s) Oral every 8 hours  sertraline 50milliGRAM(s) Oral daily  risperiDONE   Tablet 4milliGRAM(s) Oral at bedtime  buPROPion SR 200milliGRAM(s) Oral two times a day  levothyroxine 150MICROGram(s) Oral daily  fluticasone / salmeterol 250-50 MICROgram(s) Diskus 1Dose(s) Inhalation two times a day  methylPREDNISolone 4milliGRAM(s) Oral before breakfast  methylPREDNISolone 4milliGRAM(s) Oral after lunch  methylPREDNISolone 4milliGRAM(s) Oral at bedtime  vilaZODone 40milliGRAM(s) Oral daily  topiramate 200milliGRAM(s) Oral two times a day

## 2017-03-29 NOTE — PROGRESS NOTE ADULT - PROBLEM SELECTOR PLAN 2
likely reactive, patient is also recent started on Medrol pack  Trend WBC  Afebrile Likely reactive, patient is also recent started on Medrol pack  Trend WBC  Afebrile

## 2017-03-29 NOTE — DISCHARGE NOTE ADULT - HOSPITAL COURSE
Patient was a 31 yo female with PMHX of Bipolar disorder, hypothyroidism s/p leminectomy and spinal fusion in 2016 presents to the ED with a complaining  of left sided numbness. Patient reported that symptoms first began on Saturday while she was sleeping.  She reported that at that time she had diffuse facial, UE, and LE left sided numbness with associated weakneness. Patients admitted to severe weakness that she needed to use a crutch to aid her in walking.  She reported some improvement in her lower ext numbness but none in her facial region or UE.  She called her Dr. Bowden at that time and was told to follow up at the office. Yesterday she followed with Dr. Bowden in his office and was started on Medrol-dose pack and flexeril . Pt had underwent a lumbar fusion in the past and a cervical fusion as well.  Pt reported that Dr. Bowden believed that symptoms were related to a spasm and gave her several steroid injections in her cervical region and gluteal region.  Pt reported no improvement in symptoms. Pt denied similar symptoms in the past.  Denies fever, chills, N/V/D/C, CP, SOB, abd pain.  Denied loss of bowel or bladder function.  Pt denied HA, visual changes.     In the ED, WBC 19.9, CT  Cervical spine showed Normal alignment in the sagittal plane. Convexity to the right may reflect muscle spasm. Status post ACDF surgery at C3-C5. Hardware intact. No evidence of hardware failure. No fracture or subluxation. Mild chronic/degenerative loss of height of C3-C5. Facet joints intact. Foramen magnum region unremarkable .Paraspinal soft tissues unremarkable. CT head showed on contrast cranial CT, routine fashion, skull base to vertex. The ventricles normal in size and configuration for age.  No attenuation abnormalities noted. There is no bleed,  infarct, edema or pathologic calcification.  No mass effect. The visualized portions sinuses are clear.  Bony calvarium intact. Patient was evaluated by Dr. Phillips in the ED.     MRI brain negative for acute infarct. Psych to see patient to evaluate for psychogenic cause for paresthesias.    MRI cervical spine: Postoperative changes related to anterior cervical discectomy, fusion instrumentation C4-5 and C5-6 without pseudoarthrosis. Mild bulge and endplate hypertrophy and uncovertebral arthrosis and mild bilateral foraminal stenosis.    As per neurology, paresthesias are 2/2 to atypical migraines. Patient wants to go home as there was no structural cause for paresthesias found on both MRI and CT imaging studies. Patient eligible for discharge on all home medications and outpatient neurology follow up. Patient was a 31 yo female with PMHX of Bipolar disorder, hypothyroidism s/p leminectomy and spinal fusion in 2016 presents to the ED with a complaining  of left sided numbness. Patient reported that symptoms first began on Saturday while she was sleeping.  She reported that at that time she had diffuse facial, UE, and LE left sided numbness with associated weakneness. Patients admitted to severe weakness that she needed to use a crutch to aid her in walking.  She reported some improvement in her lower ext numbness but none in her facial region or UE.  She called her Dr. Bowden at that time and was told to follow up at the office. Yesterday she followed with Dr. Bowden in his office and was started on Medrol-dose pack and flexeril . Pt had underwent a lumbar fusion in the past and a cervical fusion as well.  Pt reported that Dr. Bowden believed that symptoms were related to a spasm and gave her several steroid injections in her cervical region and gluteal region.  Pt reported no improvement in symptoms. Pt denied similar symptoms in the past.  Denies fever, chills, N/V/D/C, CP, SOB, abd pain.  Denied loss of bowel or bladder function.  Pt denied HA, visual changes.     In the ED, WBC 19.9, CT  Cervical spine showed Normal alignment in the sagittal plane. Convexity to the right may reflect muscle spasm. Status post ACDF surgery at C3-C5. Hardware intact. No evidence of hardware failure. No fracture or subluxation. Mild chronic/degenerative loss of height of C3-C5. Facet joints intact. Foramen magnum region unremarkable .Paraspinal soft tissues unremarkable. CT head showed on contrast cranial CT, routine fashion, skull base to vertex. The ventricles normal in size and configuration for age.  No attenuation abnormalities noted. There is no bleed,  infarct, edema or pathologic calcification.  No mass effect. The visualized portions sinuses are clear.  Bony calvarium intact. Patient was evaluated by Dr. Phillips in the ED.     MRI brain negative for acute infarct. Psych to see patient to evaluate for psychogenic cause for paresthesias.    MRI cervical spine: Postoperative changes related to anterior cervical discectomy, fusion instrumentation C4-5 and C5-6 without pseudoarthrosis. Mild bulge and endplate hypertrophy and uncovertebral arthrosis and mild bilateral foraminal stenosis.    As per neurology, paresthesias are likely 2/2 to atypical migraines. Patient wants to go home as there was no structural cause for paresthesias found on both MRI and CT imaging studies. Patient was seen and evaluated by Ortho and case was discussed with Dr. Bowden via telephone by Orthopedics resident.     Patient cleared for discharge on all home medications and outpatient neurology follow up, orthopedics follow up, and follow up with her psychiatrist.     PCP office notified and d/c summary to be faxed over.

## 2017-03-29 NOTE — PROGRESS NOTE ADULT - PROBLEM SELECTOR PLAN 1
Paresthesia with mild weakness of L upper extremity, r/o CVA  f/u MRI brain and C-spine,  neuro check Q6hrs  Continue ASA 81 mg daily  f/u Neuro Dr. Alan juarez Paresthesia with mild weakness of L upper extremity, r/o CVA  MRI brain negative for acute infarct.  Neuro check Q6hrs  Continue ASA 81 mg daily  f/u Neuro Dr. Alan juarez  f/u AM labs Paresthesia with weakness of L upper extremity, r/o CVA  MRI brain negative for acute infarct.  Neuro check Q6hrs  Continue ASA 81 mg daily  f/u Neuro Dr. Phillips recs  f/u AM labs  Psych consult to r/o possible psychiatric component of presentation

## 2017-03-29 NOTE — CONSULT NOTE ADULT - CONSULT REASON
facial/upper extremity numbness
Patient has a history of bipolar and has atypical neurological symptoms

## 2017-03-29 NOTE — DISCHARGE NOTE ADULT - CARE PROVIDER_API CALL
Rachel Phillips ([facility]), Neurology  [address] [place] [address]  Phone: [phone]  Fax: [phone] Rachel Phillips ([facility]), Neurology  [address] [place] [address]  Phone: [phone]  Fax: [phone]    Dinesh Thomas (), Family Medicine  [address] [place] [address]  Phone: [phone]  Fax: [phone] Rachel Phillips ([facility]), Neurology  [address] [place] [address]  Phone: [phone]  Fax: [phone]    Dinesh Thomas (), Family Medicine  [address] [facility] [address]  [place] [address]  Phone: [phone]  Fax: [phone]    Preet Bowden), Orthopaedic Surgery  [address] [place] [address]  Phone: [phone]  Fax: [phone]

## 2017-03-29 NOTE — DISCHARGE NOTE ADULT - PLAN OF CARE
Prevent recurrence CT head/MRI head and cspine negative for structural cause for paresthesias/weakness.  Please follow up with Neurologist Dr. Phillips within 1 week of discharge.  Fall prevention and plan of care was discussed with patient.  Use of narcotic pain meds was dicussed, advised to use narcotic meds wisely and to refrain from over using them.  Follow up with PCP within 1 week of discharge. Continue home medications.  Please follow up with your PCP within 1 week of discharge. CT head/MRI head and c-spine negative for structural cause for paresthesias/weakness.  Please follow up with Neurologist Dr. Phillips within 1 week of discharge.  Fall prevention and plan of care was discussed with patient.  Use of narcotic pain meds was dicussed, advised to use narcotic meds wisely and to refrain from over using them.  Follow up with PCP within 1 week of discharge. Continue home medications.  Please follow up with your psychiatrist within 1 week of discharge. Continue home medications.  Complete medrol dose pack as prescribed by Dr. Bowden.  Follow up with Dr. Dupont Friday as already scheduled.

## 2017-03-29 NOTE — DISCHARGE NOTE ADULT - SECONDARY DIAGNOSIS.
Bipolar 1 disorder Hypothyroidism, unspecified type Back pain, unspecified back location, unspecified back pain laterality, unspecified chronicity

## 2017-03-29 NOTE — DISCHARGE NOTE ADULT - PATIENT PORTAL LINK FT
“You can access the Follow[facility] Patient Portal, offered by [facility], by registering with the following website: [email or web address]”

## 2017-03-29 NOTE — CONSULT NOTE ADULT - SUBJECTIVE AND OBJECTIVE BOX
32yFemale admitted to med/surg with following history:  HPI:  Pt is a 33 yo female with PMHX of Bipolar disorder, hypothyroidism s/p leminectomy and spinal fusion in 2016 presents to the ED with a complaining  of left sided numbness. Pt reports that symptoms first began on Saturday while she was sleeping.  She reports that at that time she had diffuse facial, UE, and LE left sided numbness with associated weakneness. Patients admits to severe weakness that she needed to use a crutch to aid her in walking.  She reports some improvement in her lower ext numbness but none in her facial region or UE.  She called her Dr. Bowden at that time and was told to follow up at the office. Yesterday she followed with Dr. Bowden in his office and was started on Medrol-dose pack and flexeril . Pt has underwent a lumbar fusion in the past and a cervical fusion as well.  Pt reports that Dr. Bowden believed that symptoms were related to a spasm and gave her several steroid injections in her cervical region and gluteal region.  Pt reports no improvement in symptoms. Pt denies similar symptoms in the past.  Denies fever, chills, N/V/D/C, CP, SOB, abd pain.  Denies loss of bowel or bladder function.  Pt denies HA, visual changes.     In the ED, WBC 19.9, Ct Cervical spine showed Normal alignment in the sagittal plane. Convexity to the right may reflect muscle spasm. Status post ACDF surgery at C3-C5. Hardware intact. No evidence of hardware failure. No fracture or subluxation. Mild chronic/degenerative loss of height of C3-C5. Facet joints intact. Foramen magnum region unremarkable .Paraspinal soft tissues unremarkable. CT head showed on contrast cranial CT, routine fashion, skull base to vertex. The ventricles normal in size and configuration for age.  No attenuation abnormalities noted. There is no bleed,  infarct, edema or pathologic calcification.  No mass effect. The visualized portions sinuses are clear.  Bony calvarium intact. Patietn was evaluated by Dr. Phillips in the ED. (28 Mar 2017 19:12)      Psych HPI: Patient at this time denies acute symptoms of psychosis, candie or depression. She denies recent stressors.    Past Psych Hx: Two hospitalizations for "candie" in 2000 and 2002. Currently follows-up with Dr. Whittaker at [facility].    PAST MEDICAL & SURGICAL HISTORY:  Hypothyroid  Bipolar 1 disorder  History of spinal fusion  History of laminectomy      Allergies    penicillin (Unknown)  sulfa drugs (Unknown)    Intolerances      MEDICATIONS  (STANDING):  aspirin enteric coated 81milliGRAM(s) Oral daily  enoxaparin Injectable 40milliGRAM(s) SubCutaneous daily  sodium chloride 0.9%. 1000milliLiter(s) IV Continuous <Continuous>  clonazePAM Tablet 2milliGRAM(s) Oral four times a day  lamoTRIgine 100milliGRAM(s) Oral every 8 hours  sertraline 50milliGRAM(s) Oral daily  risperiDONE   Tablet 4milliGRAM(s) Oral at bedtime  buPROPion SR 200milliGRAM(s) Oral two times a day  levothyroxine 150MICROGram(s) Oral daily  fluticasone / salmeterol 250-50 MICROgram(s) Diskus 1Dose(s) Inhalation two times a day  methylPREDNISolone 4milliGRAM(s) Oral before breakfast  methylPREDNISolone 4milliGRAM(s) Oral at bedtime  vilaZODone 40milliGRAM(s) Oral daily  topiramate 200milliGRAM(s) Oral two times a day    MEDICATIONS  (PRN):  cyclobenzaprine 10milliGRAM(s) Oral three times a day PRN Muscle Spasm      SH: Lives in [place] with both biological parents, disabled for the last 3 years due to back pain    Sub Abuse Hx: Denies    FPH: Denies    ROS: Psych: See HPI.  All other systems negative.                          13.2   12.6  )-----------( 172      ( 29 Mar 2017 10:59 )             40.0   29 Mar 2017 10:59    141    |  112    |  14     ----------------------------<  117    3.4     |  16     |  0.79     Ca    8.5        29 Mar 2017 10:59    TPro  6.1    /  Alb  3.3    /  TBili  0.4    /  DBili  x      /  AST  39     /  ALT  48     /  AlkPhos  37     29 Mar 2017 10:59    TSH:     Utox:  Imaging:  Other Tests:    Old Records reviewed: Yes    Collateral: Mother present at bedside    EXAM:  Vital Signs Last 24 Hrs  T(C): 36.8, Max: 37.4 (03-28 @ 19:10)  T(F): 98.3, Max: 99.3 (03-28 @ 19:10)  HR: 83 (65 - 106)  BP: 104/70 (100/64 - 118/87)  BP(mean): --  RR: 17 (12 - 17)  SpO2: 98% (96% - 100%)  Gen Appearance: Well groomed, good hygiene  Gait/Station/Muscle Tone: WNL  Abnl Movements: None  Speech: Normoproductive, relevant, goal-oriented  TP; No abnormalitites  Associations: WNL  TC: WNL  Mood: Somewhat anxious  Affect: Congruent  Consciousness/orientation: WNL  Memory:   Recent: Intact    Remote: Intact  Attention/Concentration: WNL  Language: WNL  Fund of Knowledge: Average  Insight: Fair  Judgment: Fair    Suicide Risk Assessment: Patient is monitored by her mother at home    IMP:  Borderline female struggling with limb pain  DX: Borderline personality disorder, Bipolar Disorder    REC: Continue current treatment  Observation Status:  Additional Work-up:  Medication Recs:  Follow-up: Follow-up at outpatient clinic [facility]
33 yo Female with Left upper extremity subjective numbness and left sided facial numbness x 3 days. Pt seen in office by Dr. Bowden on 3/27 and was started on medrol dose pack. Patient has had multiple surgical operations on her lumbar spine in 2015 and had a cervical fusion in 2016. Pt Denieshas chronic neck and back pain. Denies trauma or accidents, denies changes in bowel or bladder function. States pain is non radiating. Denies Fever/Chills/night sweats. Deneis neck pain or upper extremity issues at this time, no other orthopedic complaints at this time.    PMHx: Bipolar, Herniated Disc, Migrains   PSHx: ACDF, Lumbar PSF  Allergies: Penicillin, Sulfa  Imaging: CT C Spine: Convexity, likely muscle spasms, CTH: negative    AVSS  Gen: NAD, AAOx3    Spine:  negative SLR test, able to SLR B/L, negative log roll, no TTP along axial spine    [employer]  R: C5- 5/5  C6- 5/5  C7-5/5  C8-5/5    L: C5- 4/5  C6- 5/5  C7-5/5  C8-5/5      [employer]  R: L2- 5/5  L3- 5/5  L4-5/5  L5-5/5  S1-5/5   L: L2- 5/5  L3- 5/5  L4-5/5  L5-5/5  S1-5/5     Numbness LUE globally per patient, SILT L2-3 B/L  DTR 2/4, no clonus, no UMN signs  +DP/PT Pulses  Compartments soft  No calf TTP B/L

## 2017-03-31 DIAGNOSIS — F31.9 BIPOLAR DISORDER, UNSPECIFIED: ICD-10-CM

## 2017-03-31 DIAGNOSIS — M48.00 SPINAL STENOSIS, SITE UNSPECIFIED: ICD-10-CM

## 2017-03-31 DIAGNOSIS — Z88.2 ALLERGY STATUS TO SULFONAMIDES: ICD-10-CM

## 2017-03-31 DIAGNOSIS — Z98.1 ARTHRODESIS STATUS: ICD-10-CM

## 2017-03-31 DIAGNOSIS — R53.1 WEAKNESS: ICD-10-CM

## 2017-03-31 DIAGNOSIS — D72.829 ELEVATED WHITE BLOOD CELL COUNT, UNSPECIFIED: ICD-10-CM

## 2017-03-31 DIAGNOSIS — M54.9 DORSALGIA, UNSPECIFIED: ICD-10-CM

## 2017-03-31 DIAGNOSIS — F60.3 BORDERLINE PERSONALITY DISORDER: ICD-10-CM

## 2017-03-31 DIAGNOSIS — G43.909 MIGRAINE, UNSPECIFIED, NOT INTRACTABLE, WITHOUT STATUS MIGRAINOSUS: ICD-10-CM

## 2017-03-31 DIAGNOSIS — Z88.0 ALLERGY STATUS TO PENICILLIN: ICD-10-CM

## 2017-03-31 DIAGNOSIS — E03.9 HYPOTHYROIDISM, UNSPECIFIED: ICD-10-CM

## 2017-03-31 DIAGNOSIS — E66.9 OBESITY, UNSPECIFIED: ICD-10-CM

## 2017-04-03 ENCOUNTER — OUTPATIENT (OUTPATIENT)
Dept: OUTPATIENT SERVICES | Facility: HOSPITAL | Age: 32
LOS: 1 days | End: 2017-04-03

## 2017-04-03 VITALS
DIASTOLIC BLOOD PRESSURE: 72 MMHG | HEART RATE: 108 BPM | RESPIRATION RATE: 16 BRPM | TEMPERATURE: 98 F | HEIGHT: 71 IN | WEIGHT: 210.98 LBS | SYSTOLIC BLOOD PRESSURE: 102 MMHG

## 2017-04-03 DIAGNOSIS — M48.02 SPINAL STENOSIS, CERVICAL REGION: ICD-10-CM

## 2017-04-03 DIAGNOSIS — Z98.89 OTHER SPECIFIED POSTPROCEDURAL STATES: Chronic | ICD-10-CM

## 2017-04-03 DIAGNOSIS — Z01.818 ENCOUNTER FOR OTHER PREPROCEDURAL EXAMINATION: ICD-10-CM

## 2017-04-03 DIAGNOSIS — Z98.1 ARTHRODESIS STATUS: Chronic | ICD-10-CM

## 2017-04-03 LAB
ANION GAP SERPL CALC-SCNC: 11 MMOL/L — SIGNIFICANT CHANGE UP (ref 5–17)
APTT BLD: 31.5 SEC — SIGNIFICANT CHANGE UP (ref 27.5–37.4)
BUN SERPL-MCNC: 18 MG/DL — SIGNIFICANT CHANGE UP (ref 7–23)
CALCIUM SERPL-MCNC: 9 MG/DL — SIGNIFICANT CHANGE UP (ref 8.5–10.1)
CHLORIDE SERPL-SCNC: 111 MMOL/L — HIGH (ref 96–108)
CO2 SERPL-SCNC: 18 MMOL/L — LOW (ref 22–31)
CREAT SERPL-MCNC: 0.84 MG/DL — SIGNIFICANT CHANGE UP (ref 0.5–1.3)
GLUCOSE SERPL-MCNC: 81 MG/DL — SIGNIFICANT CHANGE UP (ref 70–99)
HCG UR QL: NEGATIVE — SIGNIFICANT CHANGE UP
HCT VFR BLD CALC: 44.1 % — SIGNIFICANT CHANGE UP (ref 34.5–45)
HGB BLD-MCNC: 14.5 G/DL — SIGNIFICANT CHANGE UP (ref 11.5–15.5)
INR BLD: 1 RATIO — SIGNIFICANT CHANGE UP (ref 0.88–1.16)
MCHC RBC-ENTMCNC: 30.1 PG — SIGNIFICANT CHANGE UP (ref 27–34)
MCHC RBC-ENTMCNC: 32.8 GM/DL — SIGNIFICANT CHANGE UP (ref 32–36)
MCV RBC AUTO: 91.8 FL — SIGNIFICANT CHANGE UP (ref 80–100)
PLATELET # BLD AUTO: 185 K/UL — SIGNIFICANT CHANGE UP (ref 150–400)
POTASSIUM SERPL-MCNC: 3.4 MMOL/L — LOW (ref 3.5–5.3)
POTASSIUM SERPL-SCNC: 3.4 MMOL/L — LOW (ref 3.5–5.3)
PROTHROM AB SERPL-ACNC: 10.9 SEC — SIGNIFICANT CHANGE UP (ref 9.8–12.7)
RBC # BLD: 4.81 M/UL — SIGNIFICANT CHANGE UP (ref 3.8–5.2)
RBC # FLD: 12.7 % — SIGNIFICANT CHANGE UP (ref 10.3–14.5)
SODIUM SERPL-SCNC: 140 MMOL/L — SIGNIFICANT CHANGE UP (ref 135–145)
WBC # BLD: 14.3 K/UL — HIGH (ref 3.8–10.5)
WBC # FLD AUTO: 14.3 K/UL — HIGH (ref 3.8–10.5)

## 2017-04-03 NOTE — H&P PST ADULT - PSYCHIATRIC COMMENTS
gets anxious while in the hospital , -118, refused EKG. "says it happens when she is in the hospital. also forgot to take klonopin today. bipolar disorder, on multiple meds, c/o anxiety while in the hospital or ER

## 2017-04-03 NOTE — H&P PST ADULT - NSANTHOSAYNRD_GEN_A_CORE
No. LAURYN screening performed.  STOP BANG Legend: 0-2 = LOW Risk; 3-4 = INTERMEDIATE Risk; 5-8 = HIGH Risk

## 2017-04-03 NOTE — H&P PST ADULT - ASSESSMENT
31 y/o female with PMH of bipolar disorder and hypothyroidism, with neck and back injuries, back and neck surgery done in the past, now for C4-6 posterior cervical laminecetomy and instrumented fusion utilizing fluro. pre op testing today. medical eval advised. Gets anxious while in the hospital setting.

## 2017-04-03 NOTE — H&P PST ADULT - HISTORY OF PRESENT ILLNESS
Pt is a 33 yo female with PMHX of Bipolar disorder, hypothyroidism s/p leminectomy and spinal fusion in 2016 presents to PST with neck pain and left arm decreased mobility for few weeks, had steroid injections in the past. Was seen by Dr Jain and was in the ER a week ago for pain in the neck and left side arm numbness. Had a shoulder MRI , also going for an EMG today. Had low back and neck surgery in the past. Is scheduled for 2nd neck surgery next week. Pre op testing today.

## 2017-04-05 RX ORDER — SENNA PLUS 8.6 MG/1
2 TABLET ORAL AT BEDTIME
Qty: 0 | Refills: 0 | Status: DISCONTINUED | OUTPATIENT
Start: 2017-04-06 | End: 2017-04-08

## 2017-04-05 RX ORDER — DOCUSATE SODIUM 100 MG
100 CAPSULE ORAL THREE TIMES A DAY
Qty: 0 | Refills: 0 | Status: DISCONTINUED | OUTPATIENT
Start: 2017-04-06 | End: 2017-04-08

## 2017-04-05 RX ORDER — ONDANSETRON 8 MG/1
4 TABLET, FILM COATED ORAL EVERY 6 HOURS
Qty: 0 | Refills: 0 | Status: DISCONTINUED | OUTPATIENT
Start: 2017-04-06 | End: 2017-04-08

## 2017-04-05 RX ORDER — CYCLOBENZAPRINE HYDROCHLORIDE 10 MG/1
10 TABLET, FILM COATED ORAL EVERY 8 HOURS
Qty: 0 | Refills: 0 | Status: DISCONTINUED | OUTPATIENT
Start: 2017-04-06 | End: 2017-04-08

## 2017-04-05 RX ORDER — SODIUM CHLORIDE 9 MG/ML
1000 INJECTION, SOLUTION INTRAVENOUS
Qty: 0 | Refills: 0 | Status: DISCONTINUED | OUTPATIENT
Start: 2017-04-06 | End: 2017-04-07

## 2017-04-05 RX ORDER — MAGNESIUM HYDROXIDE 400 MG/1
30 TABLET, CHEWABLE ORAL EVERY 12 HOURS
Qty: 0 | Refills: 0 | Status: DISCONTINUED | OUTPATIENT
Start: 2017-04-06 | End: 2017-04-08

## 2017-04-05 RX ORDER — ACETAMINOPHEN 500 MG
650 TABLET ORAL EVERY 6 HOURS
Qty: 0 | Refills: 0 | Status: DISCONTINUED | OUTPATIENT
Start: 2017-04-06 | End: 2017-04-08

## 2017-04-06 ENCOUNTER — TRANSCRIPTION ENCOUNTER (OUTPATIENT)
Age: 32
End: 2017-04-06

## 2017-04-06 ENCOUNTER — INPATIENT (INPATIENT)
Facility: HOSPITAL | Age: 32
LOS: 1 days | Discharge: HOME | DRG: 472 | End: 2017-04-08
Attending: ORTHOPAEDIC SURGERY | Admitting: ORTHOPAEDIC SURGERY
Payer: MEDICARE

## 2017-04-06 VITALS
TEMPERATURE: 98 F | DIASTOLIC BLOOD PRESSURE: 78 MMHG | WEIGHT: 210.98 LBS | HEART RATE: 112 BPM | HEIGHT: 70.98 IN | SYSTOLIC BLOOD PRESSURE: 131 MMHG | OXYGEN SATURATION: 98 % | RESPIRATION RATE: 16 BRPM

## 2017-04-06 DIAGNOSIS — Z98.1 ARTHRODESIS STATUS: ICD-10-CM

## 2017-04-06 DIAGNOSIS — M48.02 SPINAL STENOSIS, CERVICAL REGION: ICD-10-CM

## 2017-04-06 DIAGNOSIS — J45.909 UNSPECIFIED ASTHMA, UNCOMPLICATED: ICD-10-CM

## 2017-04-06 DIAGNOSIS — Z98.1 ARTHRODESIS STATUS: Chronic | ICD-10-CM

## 2017-04-06 DIAGNOSIS — F31.9 BIPOLAR DISORDER, UNSPECIFIED: ICD-10-CM

## 2017-04-06 DIAGNOSIS — Z98.89 OTHER SPECIFIED POSTPROCEDURAL STATES: Chronic | ICD-10-CM

## 2017-04-06 DIAGNOSIS — E03.9 HYPOTHYROIDISM, UNSPECIFIED: ICD-10-CM

## 2017-04-06 LAB
ANION GAP SERPL CALC-SCNC: 12 MMOL/L — SIGNIFICANT CHANGE UP (ref 5–17)
BASOPHILS # BLD AUTO: 0 K/UL — SIGNIFICANT CHANGE UP (ref 0–0.2)
BASOPHILS NFR BLD AUTO: 0.2 % — SIGNIFICANT CHANGE UP (ref 0–2)
BUN SERPL-MCNC: 15 MG/DL — SIGNIFICANT CHANGE UP (ref 7–23)
CALCIUM SERPL-MCNC: 8.4 MG/DL — LOW (ref 8.5–10.1)
CHLORIDE SERPL-SCNC: 111 MMOL/L — HIGH (ref 96–108)
CO2 SERPL-SCNC: 18 MMOL/L — LOW (ref 22–31)
CREAT SERPL-MCNC: 0.83 MG/DL — SIGNIFICANT CHANGE UP (ref 0.5–1.3)
EOSINOPHIL # BLD AUTO: 0 K/UL — SIGNIFICANT CHANGE UP (ref 0–0.5)
EOSINOPHIL NFR BLD AUTO: 0 % — SIGNIFICANT CHANGE UP (ref 0–6)
GLUCOSE SERPL-MCNC: 134 MG/DL — HIGH (ref 70–99)
HCT VFR BLD CALC: 39.3 % — SIGNIFICANT CHANGE UP (ref 34.5–45)
HGB BLD-MCNC: 13.2 G/DL — SIGNIFICANT CHANGE UP (ref 11.5–15.5)
LYMPHOCYTES # BLD AUTO: 0.5 K/UL — LOW (ref 1–3.3)
LYMPHOCYTES # BLD AUTO: 3.8 % — LOW (ref 13–44)
MCHC RBC-ENTMCNC: 31.3 PG — SIGNIFICANT CHANGE UP (ref 27–34)
MCHC RBC-ENTMCNC: 33.5 GM/DL — SIGNIFICANT CHANGE UP (ref 32–36)
MCV RBC AUTO: 93.5 FL — SIGNIFICANT CHANGE UP (ref 80–100)
MONOCYTES # BLD AUTO: 0.6 K/UL — SIGNIFICANT CHANGE UP (ref 0–0.9)
MONOCYTES NFR BLD AUTO: 4.4 % — SIGNIFICANT CHANGE UP (ref 1–9)
NEUTROPHILS # BLD AUTO: 13.2 K/UL — HIGH (ref 1.8–7.4)
NEUTROPHILS NFR BLD AUTO: 91.6 % — HIGH (ref 43–77)
PLATELET # BLD AUTO: 148 K/UL — LOW (ref 150–400)
POTASSIUM SERPL-MCNC: 3.8 MMOL/L — SIGNIFICANT CHANGE UP (ref 3.5–5.3)
POTASSIUM SERPL-SCNC: 3.8 MMOL/L — SIGNIFICANT CHANGE UP (ref 3.5–5.3)
RBC # BLD: 4.2 M/UL — SIGNIFICANT CHANGE UP (ref 3.8–5.2)
RBC # FLD: 12.3 % — SIGNIFICANT CHANGE UP (ref 10.3–14.5)
SODIUM SERPL-SCNC: 141 MMOL/L — SIGNIFICANT CHANGE UP (ref 135–145)
WBC # BLD: 14.4 K/UL — HIGH (ref 3.8–10.5)
WBC # FLD AUTO: 14.4 K/UL — HIGH (ref 3.8–10.5)

## 2017-04-06 RX ORDER — HYDROMORPHONE HYDROCHLORIDE 2 MG/ML
30 INJECTION INTRAMUSCULAR; INTRAVENOUS; SUBCUTANEOUS
Qty: 0 | Refills: 0 | Status: DISCONTINUED | OUTPATIENT
Start: 2017-04-06 | End: 2017-04-08

## 2017-04-06 RX ORDER — HYDROMORPHONE HYDROCHLORIDE 2 MG/ML
1 INJECTION INTRAMUSCULAR; INTRAVENOUS; SUBCUTANEOUS
Qty: 0 | Refills: 0 | Status: DISCONTINUED | OUTPATIENT
Start: 2017-04-06 | End: 2017-04-08

## 2017-04-06 RX ORDER — BUPROPION HYDROCHLORIDE 150 MG/1
400 TABLET, EXTENDED RELEASE ORAL DAILY
Qty: 0 | Refills: 0 | Status: DISCONTINUED | OUTPATIENT
Start: 2017-04-06 | End: 2017-04-08

## 2017-04-06 RX ORDER — SODIUM CHLORIDE 9 MG/ML
1000 INJECTION, SOLUTION INTRAVENOUS
Qty: 0 | Refills: 0 | Status: DISCONTINUED | OUTPATIENT
Start: 2017-04-06 | End: 2017-04-06

## 2017-04-06 RX ORDER — ONDANSETRON 8 MG/1
4 TABLET, FILM COATED ORAL ONCE
Qty: 0 | Refills: 0 | Status: DISCONTINUED | OUTPATIENT
Start: 2017-04-06 | End: 2017-04-06

## 2017-04-06 RX ORDER — RISPERIDONE 4 MG/1
4 TABLET ORAL AT BEDTIME
Qty: 0 | Refills: 0 | Status: DISCONTINUED | OUTPATIENT
Start: 2017-04-06 | End: 2017-04-06

## 2017-04-06 RX ORDER — LAMOTRIGINE 25 MG/1
100 TABLET, ORALLY DISINTEGRATING ORAL EVERY 8 HOURS
Qty: 0 | Refills: 0 | Status: DISCONTINUED | OUTPATIENT
Start: 2017-04-06 | End: 2017-04-08

## 2017-04-06 RX ORDER — BUDESONIDE AND FORMOTEROL FUMARATE DIHYDRATE 160; 4.5 UG/1; UG/1
1 AEROSOL RESPIRATORY (INHALATION)
Qty: 0 | Refills: 0 | Status: DISCONTINUED | OUTPATIENT
Start: 2017-04-06 | End: 2017-04-08

## 2017-04-06 RX ORDER — TIOTROPIUM BROMIDE 18 UG/1
1 CAPSULE ORAL; RESPIRATORY (INHALATION) DAILY
Qty: 0 | Refills: 0 | Status: DISCONTINUED | OUTPATIENT
Start: 2017-04-06 | End: 2017-04-08

## 2017-04-06 RX ORDER — ALBUTEROL 90 UG/1
2 AEROSOL, METERED ORAL EVERY 6 HOURS
Qty: 0 | Refills: 0 | Status: DISCONTINUED | OUTPATIENT
Start: 2017-04-06 | End: 2017-04-08

## 2017-04-06 RX ORDER — DEXAMETHASONE 0.5 MG/5ML
10 ELIXIR ORAL EVERY 8 HOURS
Qty: 0 | Refills: 0 | Status: DISCONTINUED | OUTPATIENT
Start: 2017-04-06 | End: 2017-04-06

## 2017-04-06 RX ORDER — LEVOTHYROXINE SODIUM 125 MCG
150 TABLET ORAL DAILY
Qty: 0 | Refills: 0 | Status: DISCONTINUED | OUTPATIENT
Start: 2017-04-06 | End: 2017-04-08

## 2017-04-06 RX ORDER — TOPIRAMATE 25 MG
200 TABLET ORAL
Qty: 0 | Refills: 0 | Status: DISCONTINUED | OUTPATIENT
Start: 2017-04-06 | End: 2017-04-08

## 2017-04-06 RX ORDER — DEXAMETHASONE 0.5 MG/5ML
10 ELIXIR ORAL EVERY 8 HOURS
Qty: 0 | Refills: 0 | Status: COMPLETED | OUTPATIENT
Start: 2017-04-06 | End: 2017-04-07

## 2017-04-06 RX ORDER — VILAZODONE HYDROCHLORIDE 20 MG/1
40 TABLET, FILM COATED ORAL DAILY
Qty: 0 | Refills: 0 | Status: DISCONTINUED | OUTPATIENT
Start: 2017-04-06 | End: 2017-04-08

## 2017-04-06 RX ORDER — CLONAZEPAM 1 MG
2 TABLET ORAL
Qty: 0 | Refills: 0 | Status: DISCONTINUED | OUTPATIENT
Start: 2017-04-06 | End: 2017-04-08

## 2017-04-06 RX ORDER — RISPERIDONE 4 MG/1
8 TABLET ORAL AT BEDTIME
Qty: 0 | Refills: 0 | Status: DISCONTINUED | OUTPATIENT
Start: 2017-04-06 | End: 2017-04-08

## 2017-04-06 RX ORDER — HYDROMORPHONE HYDROCHLORIDE 2 MG/ML
0.5 INJECTION INTRAMUSCULAR; INTRAVENOUS; SUBCUTANEOUS
Qty: 0 | Refills: 0 | Status: DISCONTINUED | OUTPATIENT
Start: 2017-04-06 | End: 2017-04-06

## 2017-04-06 RX ORDER — SERTRALINE 25 MG/1
50 TABLET, FILM COATED ORAL DAILY
Qty: 0 | Refills: 0 | Status: DISCONTINUED | OUTPATIENT
Start: 2017-04-06 | End: 2017-04-08

## 2017-04-06 RX ORDER — FLUTICASONE PROPIONATE 50 MCG
1 SPRAY, SUSPENSION NASAL
Qty: 0 | Refills: 0 | Status: DISCONTINUED | OUTPATIENT
Start: 2017-04-06 | End: 2017-04-08

## 2017-04-06 RX ORDER — HYDROMORPHONE HYDROCHLORIDE 2 MG/ML
30 INJECTION INTRAMUSCULAR; INTRAVENOUS; SUBCUTANEOUS
Qty: 0 | Refills: 0 | Status: DISCONTINUED | OUTPATIENT
Start: 2017-04-06 | End: 2017-04-06

## 2017-04-06 RX ORDER — BUPIVACAINE 13.3 MG/ML
20 INJECTION, SUSPENSION, LIPOSOMAL INFILTRATION ONCE
Qty: 0 | Refills: 0 | Status: COMPLETED | OUTPATIENT
Start: 2017-04-06 | End: 2017-04-06

## 2017-04-06 RX ADMIN — SERTRALINE 50 MILLIGRAM(S): 25 TABLET, FILM COATED ORAL at 16:49

## 2017-04-06 RX ADMIN — HYDROMORPHONE HYDROCHLORIDE 1 MILLIGRAM(S): 2 INJECTION INTRAMUSCULAR; INTRAVENOUS; SUBCUTANEOUS at 13:29

## 2017-04-06 RX ADMIN — Medication 200 MILLIGRAM(S): at 18:22

## 2017-04-06 RX ADMIN — Medication 10 MILLIGRAM(S): at 16:49

## 2017-04-06 RX ADMIN — HYDROMORPHONE HYDROCHLORIDE 1 MILLIGRAM(S): 2 INJECTION INTRAMUSCULAR; INTRAVENOUS; SUBCUTANEOUS at 12:49

## 2017-04-06 RX ADMIN — HYDROMORPHONE HYDROCHLORIDE 0.5 MILLIGRAM(S): 2 INJECTION INTRAMUSCULAR; INTRAVENOUS; SUBCUTANEOUS at 12:06

## 2017-04-06 RX ADMIN — HYDROMORPHONE HYDROCHLORIDE 30 MILLILITER(S): 2 INJECTION INTRAMUSCULAR; INTRAVENOUS; SUBCUTANEOUS at 14:29

## 2017-04-06 RX ADMIN — HYDROMORPHONE HYDROCHLORIDE 0.5 MILLIGRAM(S): 2 INJECTION INTRAMUSCULAR; INTRAVENOUS; SUBCUTANEOUS at 12:16

## 2017-04-06 RX ADMIN — CYCLOBENZAPRINE HYDROCHLORIDE 10 MILLIGRAM(S): 10 TABLET, FILM COATED ORAL at 21:45

## 2017-04-06 RX ADMIN — SODIUM CHLORIDE 75 MILLILITER(S): 9 INJECTION, SOLUTION INTRAVENOUS at 11:54

## 2017-04-06 RX ADMIN — LAMOTRIGINE 100 MILLIGRAM(S): 25 TABLET, ORALLY DISINTEGRATING ORAL at 16:49

## 2017-04-06 RX ADMIN — HYDROMORPHONE HYDROCHLORIDE 30 MILLILITER(S): 2 INJECTION INTRAMUSCULAR; INTRAVENOUS; SUBCUTANEOUS at 19:39

## 2017-04-06 RX ADMIN — HYDROMORPHONE HYDROCHLORIDE 0.5 MILLIGRAM(S): 2 INJECTION INTRAMUSCULAR; INTRAVENOUS; SUBCUTANEOUS at 12:27

## 2017-04-06 RX ADMIN — Medication 100 MILLIGRAM(S): at 08:50

## 2017-04-06 RX ADMIN — HYDROMORPHONE HYDROCHLORIDE 0.5 MILLIGRAM(S): 2 INJECTION INTRAMUSCULAR; INTRAVENOUS; SUBCUTANEOUS at 12:04

## 2017-04-06 RX ADMIN — Medication 10 MILLIGRAM(S): at 21:45

## 2017-04-06 RX ADMIN — Medication 200 MILLIGRAM(S): at 11:54

## 2017-04-06 RX ADMIN — HYDROMORPHONE HYDROCHLORIDE 0.5 MILLIGRAM(S): 2 INJECTION INTRAMUSCULAR; INTRAVENOUS; SUBCUTANEOUS at 12:17

## 2017-04-06 RX ADMIN — RISPERIDONE 8 MILLIGRAM(S): 4 TABLET ORAL at 21:45

## 2017-04-06 RX ADMIN — Medication 2 MILLIGRAM(S): at 18:22

## 2017-04-06 RX ADMIN — HYDROMORPHONE HYDROCHLORIDE 1 MILLIGRAM(S): 2 INJECTION INTRAMUSCULAR; INTRAVENOUS; SUBCUTANEOUS at 13:04

## 2017-04-06 RX ADMIN — SENNA PLUS 2 TABLET(S): 8.6 TABLET ORAL at 21:45

## 2017-04-06 RX ADMIN — HYDROMORPHONE HYDROCHLORIDE 0.5 MILLIGRAM(S): 2 INJECTION INTRAMUSCULAR; INTRAVENOUS; SUBCUTANEOUS at 11:54

## 2017-04-06 RX ADMIN — LAMOTRIGINE 100 MILLIGRAM(S): 25 TABLET, ORALLY DISINTEGRATING ORAL at 21:45

## 2017-04-06 RX ADMIN — Medication 100 MILLIGRAM(S): at 17:30

## 2017-04-06 RX ADMIN — HYDROMORPHONE HYDROCHLORIDE 30 MILLILITER(S): 2 INJECTION INTRAMUSCULAR; INTRAVENOUS; SUBCUTANEOUS at 17:38

## 2017-04-06 RX ADMIN — HYDROMORPHONE HYDROCHLORIDE 0.5 MILLIGRAM(S): 2 INJECTION INTRAMUSCULAR; INTRAVENOUS; SUBCUTANEOUS at 12:37

## 2017-04-06 RX ADMIN — HYDROMORPHONE HYDROCHLORIDE 1 MILLIGRAM(S): 2 INJECTION INTRAMUSCULAR; INTRAVENOUS; SUBCUTANEOUS at 13:14

## 2017-04-06 RX ADMIN — Medication 100 MILLIGRAM(S): at 21:45

## 2017-04-06 RX ADMIN — HYDROMORPHONE HYDROCHLORIDE 30 MILLILITER(S): 2 INJECTION INTRAMUSCULAR; INTRAVENOUS; SUBCUTANEOUS at 14:04

## 2017-04-06 NOTE — DISCHARGE NOTE ADULT - CARE PLAN
Principal Discharge DX:	History of spinal fusion  Goal:	reduce pain, return to ADLs  Instructions for follow-up, activity and diet:	1.	Pain Control  2.	Walking with full weight bearing as tolerated, with assistive devices (walker/Cane as Needed)  3.	PT as needed  4.	Follow up with Dr. Bowden as outpatient in 7-10 days after discharge from the hospital or rehab. Call office for appointment.  5.	Keep dressing clean and dry. Remove on Post Op Day three.  6.	No baths/hot tubs or soaks.

## 2017-04-06 NOTE — DISCHARGE NOTE ADULT - NS AS ACTIVITY OBS
Showering allowed/No Heavy lifting/straining/Stairs allowed/Walking-Outdoors allowed/Walking-Indoors allowed/no bending, lifting, or twisting

## 2017-04-06 NOTE — PROGRESS NOTE ADULT - ASSESSMENT
32F s/p PSF C4-6 POD0  Analgesia - PCA per anesthesia  DVT ppx  WBAT  PT/OT  Abx while drains in place  DC planning

## 2017-04-06 NOTE — CONSULT NOTE ADULT - SUBJECTIVE AND OBJECTIVE BOX
Patient is a 32y old  Female who presents with a chief complaint of PSF C4-6 (06 Apr 2017 18:21)  feeling well, currently on PCA      INTERVAL HPI/OVERNIGHT EVENTS: no other complaints  T(C): 36.7, Max: 36.7 (04-06 @ 11:31)  HR: 105 (80 - 112)  BP: 100/64 (100/64 - 132/60)  RR: 17 (16 - 18)  SpO2: 100% (96% - 100%)  Wt(kg): --  I&O's Summary    I & Os for current day (as of 06 Apr 2017 18:27)  =============================================  IN: 224 ml / OUT: 200 ml / NET: 24 ml      PAST MEDICAL & SURGICAL HISTORY:  Low back pain  Hypothyroid  Bipolar 1 disorder  History of spinal fusion  History of laminectomy      SOCIAL HISTORY  Alcohol:  Tobacco:  Illicit substance use:      FAMILY HISTORY: non contrib      LABS:                        13.2   14.4  )-----------( 148      ( 06 Apr 2017 12:22 )             39.3     04-06    141  |  111<H>  |  15  ----------------------------<  134<H>  3.8   |  18<L>  |  0.83    Ca    8.4<L>      06 Apr 2017 12:22            MEDICATIONS  (STANDING):  lactated ringers. 1000milliLiter(s) IV Continuous <Continuous>  lactated ringers. 1000milliLiter(s) IV Continuous <Continuous>  cyclobenzaprine 10milliGRAM(s) Oral every 8 hours  docusate sodium 100milliGRAM(s) Oral three times a day  senna 2Tablet(s) Oral at bedtime  clindamycin IVPB  IV Intermittent   clindamycin IVPB 900milliGRAM(s) IV Intermittent every 8 hours  clonazePAM Tablet 2milliGRAM(s) Oral four times a day  topiramate 200milliGRAM(s) Oral two times a day  sertraline 50milliGRAM(s) Oral daily  risperiDONE   Tablet 4milliGRAM(s) Oral at bedtime  lamoTRIgine 100milliGRAM(s) Oral every 8 hours  dexamethasone   Concentrate 10milliGRAM(s) Oral every 8 hours  HYDROmorphone PCA (1 mG/mL) 30milliLiter(s) PCA Continuous PCA Continuous  fluticasone propionate 50 MICROgram(s)/spray Nasal Spray 1Spray(s) Both Nostrils two times a day  tiotropium 18 MICROgram(s) Capsule 1Capsule(s) Inhalation daily  buDESOnide 160 MICROgram(s)/formoterol 4.5 MICROgram(s) Inhaler 1Puff(s) Inhalation two times a day    MEDICATIONS  (PRN):  acetaminophen   Tablet 650milliGRAM(s) Oral every 6 hours PRN For Temp greater than 38 C (100.4 F) and headache  ondansetron Injectable 4milliGRAM(s) IV Push every 6 hours PRN Nausea  magnesium hydroxide Suspension 30milliLiter(s) Oral every 12 hours PRN Constipation  ALBUTerol    90 MICROgram(s) HFA Inhaler 2Puff(s) Inhalation every 6 hours PRN Shortness of Breath  HYDROmorphone  Injectable 1milliGRAM(s) IV Push every 10 minutes PRN Severe Pain (7 - 10)      REVIEW OF SYSTEMS:  CONSTITUTIONAL: No fever, weight loss, or fatigue  EYES: No eye pain, visual disturbances, or discharge  ENMT:  No difficulty hearing, tinnitus, vertigo; No sinus or throat pain  NECK: No pain or stiffness  RESPIRATORY: No cough, wheezing, chills or hemoptysis; No shortness of breath  CARDIOVASCULAR: No chest pain, palpitations, dizziness, or leg swelling  GASTROINTESTINAL: No abdominal or epigastric pain. No nausea, vomiting, or hematemesis; No diarrhea or constipation. No melena or hematochezia.  GENITOURINARY: No dysuria, frequency, hematuria, or incontinence  NEUROLOGICAL: No headaches, memory loss, loss of strength, numbness, or tremors  SKIN: No itching, burning, rashes, or lesions   LYMPH NODES: No enlarged glands  ENDOCRINE: No heat or cold intolerance; No hair loss  MUSCULOSKELETAL: No joint pain or swelling; No muscle, back, or extremity pain  PSYCHIATRIC: No depression, anxiety, mood swings, or difficulty sleeping  HEME/LYMPH: No easy bruising, or bleeding gums  ALLERY AND IMMUNOLOGIC: No hives or eczema    RADIOLOGY & ADDITIONAL TESTS:    Imaging Personally Reviewed:  [ ] YES  [ ] NO    Consultant(s) Notes Reviewed:  [ ] YES  [ ] NO    PHYSICAL EXAM:  GENERAL: NAD, well-groomed, well-developed  HEAD:  Atraumatic, Normocephalic  EYES: EOMI, PERRLA, conjunctiva and sclera clear  ENMT: No tonsillar erythema, exudates, or enlargement; Moist mucous membranes, Good dentition, No lesions  NECK: Supple, No JVD, Normal thyroid  NERVOUS SYSTEM:  Alert & Oriented X3, Good concentration; Motor Strength 5/5 B/L upper and lower extremities; DTRs 2+ intact and symmetric  CHEST/LUNG: Clear to percussion bilaterally; No rales, rhonchi, wheezing, or rubs  HEART: Regular rate and rhythm; No murmurs, rubs, or gallops  ABDOMEN: Soft, Nontender, Nondistended; Bowel sounds present  EXTREMITIES:  2+ Peripheral Pulses, No clubbing, cyanosis, or edema  LYMPH: No lymphadenopathy noted  SKIN: No rashes or lesions    Care Discussed with Consultants/Other Providers [ ] YES  [ ] NO

## 2017-04-06 NOTE — DISCHARGE NOTE ADULT - PATIENT PORTAL LINK FT
“You can access the [facility] Patient Portal, offered by [facility], by registering with the following website: [email or web address]”

## 2017-04-06 NOTE — PROGRESS NOTE ADULT - SUBJECTIVE AND OBJECTIVE BOX
Pt S&E. Pt states pain is poorly controlled w/ PCA.  AVSS  Gen: NAD  Spine:  Dsg c/d/i  SILT C5-T1 & L2-S1, w/ paresthesia to L C8  Moving all extremities  Distal pulses intact  Soft compartments, - calf ttp

## 2017-04-06 NOTE — DISCHARGE NOTE ADULT - HOSPITAL COURSE
The patient is a 32 year old female status post PSF of  C4-6 after failing outpatient non-operative conservative management for intractable back pain/neck pain with radiculopathy. The patient presented to [facility] after being medically optimized for an elective surgical procedure. The patient was taken to the operating room on date mentioned above. Prophylactic antibiotics were started before the procedure and while his drains remained in place.  There were no complications during the procedure and patient tolerated the procedure well.  The patient was transferred to recovery room in stable condition and subsequently to the surgical floor.  Patient was given SCD’s for DVT prophylaxis.  All necessary home medications were continued.  The patient received physical therapy daily and daily labs were followed.  The dressing was kept clean, dry, and intact. The drain was removed when output was appropriately decreased. The rest of the hospital stay was unremarkable.

## 2017-04-06 NOTE — DISCHARGE NOTE ADULT - PLAN OF CARE
reduce pain, return to ADLs 1.	Pain Control  2.	Walking with full weight bearing as tolerated, with assistive devices (walker/Cane as Needed)  3.	PT as needed  4.	Follow up with Dr. Bowden as outpatient in 7-10 days after discharge from the hospital or rehab. Call office for appointment.  5.	Keep dressing clean and dry. Remove on Post Op Day three.  6.	No baths/hot tubs or soaks.

## 2017-04-06 NOTE — DISCHARGE NOTE ADULT - CARE PROVIDER_API CALL
Preet Bowden), Orthopaedic Surgery  [address] [place] [address]  Phone: [phone]  Fax: [phone]

## 2017-04-07 LAB
ANION GAP SERPL CALC-SCNC: 11 MMOL/L — SIGNIFICANT CHANGE UP (ref 5–17)
BASOPHILS # BLD AUTO: 0 K/UL — SIGNIFICANT CHANGE UP (ref 0–0.2)
BASOPHILS NFR BLD AUTO: 0.3 % — SIGNIFICANT CHANGE UP (ref 0–2)
BUN SERPL-MCNC: 9 MG/DL — SIGNIFICANT CHANGE UP (ref 7–23)
CALCIUM SERPL-MCNC: 8.5 MG/DL — SIGNIFICANT CHANGE UP (ref 8.5–10.1)
CHLORIDE SERPL-SCNC: 107 MMOL/L — SIGNIFICANT CHANGE UP (ref 96–108)
CO2 SERPL-SCNC: 22 MMOL/L — SIGNIFICANT CHANGE UP (ref 22–31)
CREAT SERPL-MCNC: 0.78 MG/DL — SIGNIFICANT CHANGE UP (ref 0.5–1.3)
EOSINOPHIL # BLD AUTO: 0 K/UL — SIGNIFICANT CHANGE UP (ref 0–0.5)
EOSINOPHIL NFR BLD AUTO: 0 % — SIGNIFICANT CHANGE UP (ref 0–6)
GLUCOSE SERPL-MCNC: 135 MG/DL — HIGH (ref 70–99)
HCT VFR BLD CALC: 38.1 % — SIGNIFICANT CHANGE UP (ref 34.5–45)
HGB BLD-MCNC: 12.8 G/DL — SIGNIFICANT CHANGE UP (ref 11.5–15.5)
LYMPHOCYTES # BLD AUTO: 0.6 K/UL — LOW (ref 1–3.3)
LYMPHOCYTES # BLD AUTO: 3.6 % — LOW (ref 13–44)
MCHC RBC-ENTMCNC: 31.3 PG — SIGNIFICANT CHANGE UP (ref 27–34)
MCHC RBC-ENTMCNC: 33.5 GM/DL — SIGNIFICANT CHANGE UP (ref 32–36)
MCV RBC AUTO: 93.4 FL — SIGNIFICANT CHANGE UP (ref 80–100)
MONOCYTES # BLD AUTO: 1.2 K/UL — HIGH (ref 0–0.9)
MONOCYTES NFR BLD AUTO: 6.9 % — SIGNIFICANT CHANGE UP (ref 1–9)
NEUTROPHILS # BLD AUTO: 15.8 K/UL — HIGH (ref 1.8–7.4)
NEUTROPHILS NFR BLD AUTO: 89.3 % — HIGH (ref 43–77)
PLATELET # BLD AUTO: 128 K/UL — LOW (ref 150–400)
POTASSIUM SERPL-MCNC: 3.8 MMOL/L — SIGNIFICANT CHANGE UP (ref 3.5–5.3)
POTASSIUM SERPL-SCNC: 3.8 MMOL/L — SIGNIFICANT CHANGE UP (ref 3.5–5.3)
RBC # BLD: 4.08 M/UL — SIGNIFICANT CHANGE UP (ref 3.8–5.2)
RBC # FLD: 12.4 % — SIGNIFICANT CHANGE UP (ref 10.3–14.5)
SODIUM SERPL-SCNC: 140 MMOL/L — SIGNIFICANT CHANGE UP (ref 135–145)
WBC # BLD: 17.7 K/UL — HIGH (ref 3.8–10.5)
WBC # FLD AUTO: 17.7 K/UL — HIGH (ref 3.8–10.5)

## 2017-04-07 PROCEDURE — 93010 ELECTROCARDIOGRAM REPORT: CPT

## 2017-04-07 RX ORDER — SODIUM CHLORIDE 9 MG/ML
1000 INJECTION, SOLUTION INTRAVENOUS
Qty: 0 | Refills: 0 | Status: DISCONTINUED | OUTPATIENT
Start: 2017-04-07 | End: 2017-04-08

## 2017-04-07 RX ORDER — HYDROMORPHONE HYDROCHLORIDE 2 MG/ML
0.5 INJECTION INTRAMUSCULAR; INTRAVENOUS; SUBCUTANEOUS
Qty: 0 | Refills: 0 | Status: DISCONTINUED | OUTPATIENT
Start: 2017-04-07 | End: 2017-04-08

## 2017-04-07 RX ORDER — METOPROLOL TARTRATE 50 MG
25 TABLET ORAL
Qty: 0 | Refills: 0 | Status: DISCONTINUED | OUTPATIENT
Start: 2017-04-07 | End: 2017-04-08

## 2017-04-07 RX ORDER — NALOXONE HYDROCHLORIDE 4 MG/.1ML
0.1 SPRAY NASAL
Qty: 0 | Refills: 0 | Status: DISCONTINUED | OUTPATIENT
Start: 2017-04-07 | End: 2017-04-08

## 2017-04-07 RX ORDER — METOPROLOL TARTRATE 50 MG
25 TABLET ORAL
Qty: 0 | Refills: 0 | Status: DISCONTINUED | OUTPATIENT
Start: 2017-04-07 | End: 2017-04-07

## 2017-04-07 RX ADMIN — Medication 10 MILLIGRAM(S): at 05:19

## 2017-04-07 RX ADMIN — HYDROMORPHONE HYDROCHLORIDE 30 MILLILITER(S): 2 INJECTION INTRAMUSCULAR; INTRAVENOUS; SUBCUTANEOUS at 16:02

## 2017-04-07 RX ADMIN — BUDESONIDE AND FORMOTEROL FUMARATE DIHYDRATE 1 PUFF(S): 160; 4.5 AEROSOL RESPIRATORY (INHALATION) at 06:55

## 2017-04-07 RX ADMIN — HYDROMORPHONE HYDROCHLORIDE 0.5 MILLIGRAM(S): 2 INJECTION INTRAMUSCULAR; INTRAVENOUS; SUBCUTANEOUS at 17:20

## 2017-04-07 RX ADMIN — BUPROPION HYDROCHLORIDE 400 MILLIGRAM(S): 150 TABLET, EXTENDED RELEASE ORAL at 11:23

## 2017-04-07 RX ADMIN — HYDROMORPHONE HYDROCHLORIDE 0.5 MILLIGRAM(S): 2 INJECTION INTRAMUSCULAR; INTRAVENOUS; SUBCUTANEOUS at 11:18

## 2017-04-07 RX ADMIN — Medication 2 MILLIGRAM(S): at 11:31

## 2017-04-07 RX ADMIN — RISPERIDONE 8 MILLIGRAM(S): 4 TABLET ORAL at 21:31

## 2017-04-07 RX ADMIN — Medication 100 MILLIGRAM(S): at 07:36

## 2017-04-07 RX ADMIN — HYDROMORPHONE HYDROCHLORIDE 30 MILLILITER(S): 2 INJECTION INTRAMUSCULAR; INTRAVENOUS; SUBCUTANEOUS at 07:15

## 2017-04-07 RX ADMIN — Medication 100 MILLIGRAM(S): at 21:30

## 2017-04-07 RX ADMIN — CYCLOBENZAPRINE HYDROCHLORIDE 10 MILLIGRAM(S): 10 TABLET, FILM COATED ORAL at 21:30

## 2017-04-07 RX ADMIN — Medication 2 MILLIGRAM(S): at 00:00

## 2017-04-07 RX ADMIN — Medication 100 MILLIGRAM(S): at 05:20

## 2017-04-07 RX ADMIN — Medication 100 MILLIGRAM(S): at 17:22

## 2017-04-07 RX ADMIN — Medication 100 MILLIGRAM(S): at 14:01

## 2017-04-07 RX ADMIN — LAMOTRIGINE 100 MILLIGRAM(S): 25 TABLET, ORALLY DISINTEGRATING ORAL at 21:30

## 2017-04-07 RX ADMIN — LAMOTRIGINE 100 MILLIGRAM(S): 25 TABLET, ORALLY DISINTEGRATING ORAL at 05:20

## 2017-04-07 RX ADMIN — HYDROMORPHONE HYDROCHLORIDE 0.5 MILLIGRAM(S): 2 INJECTION INTRAMUSCULAR; INTRAVENOUS; SUBCUTANEOUS at 23:50

## 2017-04-07 RX ADMIN — HYDROMORPHONE HYDROCHLORIDE 0.5 MILLIGRAM(S): 2 INJECTION INTRAMUSCULAR; INTRAVENOUS; SUBCUTANEOUS at 20:40

## 2017-04-07 RX ADMIN — Medication 200 MILLIGRAM(S): at 05:20

## 2017-04-07 RX ADMIN — HYDROMORPHONE HYDROCHLORIDE 0.5 MILLIGRAM(S): 2 INJECTION INTRAMUSCULAR; INTRAVENOUS; SUBCUTANEOUS at 08:52

## 2017-04-07 RX ADMIN — Medication 25 MILLIGRAM(S): at 18:55

## 2017-04-07 RX ADMIN — Medication 200 MILLIGRAM(S): at 17:23

## 2017-04-07 RX ADMIN — Medication 1 SPRAY(S): at 17:23

## 2017-04-07 RX ADMIN — Medication 150 MICROGRAM(S): at 05:20

## 2017-04-07 RX ADMIN — SENNA PLUS 2 TABLET(S): 8.6 TABLET ORAL at 21:30

## 2017-04-07 RX ADMIN — CYCLOBENZAPRINE HYDROCHLORIDE 10 MILLIGRAM(S): 10 TABLET, FILM COATED ORAL at 14:01

## 2017-04-07 RX ADMIN — CYCLOBENZAPRINE HYDROCHLORIDE 10 MILLIGRAM(S): 10 TABLET, FILM COATED ORAL at 05:20

## 2017-04-07 RX ADMIN — Medication 2 MILLIGRAM(S): at 05:20

## 2017-04-07 RX ADMIN — TIOTROPIUM BROMIDE 1 CAPSULE(S): 18 CAPSULE ORAL; RESPIRATORY (INHALATION) at 06:55

## 2017-04-07 RX ADMIN — Medication 100 MILLIGRAM(S): at 00:02

## 2017-04-07 RX ADMIN — Medication 2 MILLIGRAM(S): at 17:23

## 2017-04-07 RX ADMIN — LAMOTRIGINE 100 MILLIGRAM(S): 25 TABLET, ORALLY DISINTEGRATING ORAL at 14:01

## 2017-04-07 RX ADMIN — HYDROMORPHONE HYDROCHLORIDE 30 MILLILITER(S): 2 INJECTION INTRAMUSCULAR; INTRAVENOUS; SUBCUTANEOUS at 19:37

## 2017-04-07 RX ADMIN — SERTRALINE 50 MILLIGRAM(S): 25 TABLET, FILM COATED ORAL at 11:22

## 2017-04-07 RX ADMIN — Medication 1 SPRAY(S): at 05:19

## 2017-04-07 NOTE — PROGRESS NOTE ADULT - SUBJECTIVE AND OBJECTIVE BOX
Post Op Day _1__ of ANESTHESIA PAIN MANAGEMENT    SUBJECTIVE: Patient complains of significant pain.  Demand dose PCA increased yesterday to 0.5mg.  		  OBJECTIVE:   Pain Score:  10 /10  Therapy:	[X ] IV PCA	[ ] Epidural   [ ] s/p Spinal Opioid	[ ] Peripheral nerve block  	  Vital Signs Last 24 Hrs  T(C): 36.9, Max: 37.1 (04-06 @ 20:21)  T(F): 98.4, Max: 98.7 (04-06 @ 20:21)  HR: 92 (80 - 122)  BP: 102/64 (100/64 - 132/60)  BP(mean): --  RR: 17 (16 - 18)  SpO2: 99% (96% - 100%)    (X ) Alert & Oriented     ( ) No motor/sensory block     ( ) Nausea     ( ) Pruritis     ( ) Headache    ( ) Catheter Site Unremarkable    Assessment of Catheter Site:	[ ] Intact		[ ] Other:    ( ) Further Pain Rx Plan:  Breakthrough bolus dose ordered prn for PCA.    COMMENTS:

## 2017-04-07 NOTE — PROGRESS NOTE ADULT - SUBJECTIVE AND OBJECTIVE BOX
pt seen and examined  AVSS NAD    Cspine; Dressing CDI. Flat, no palpable collections.  drains ss    A/P: Continue to monitor drain outputs  antibiotics while drains in place  will follow

## 2017-04-07 NOTE — PROGRESS NOTE ADULT - SUBJECTIVE AND OBJECTIVE BOX
pt seen and examined heart rate rapiod 120's   pt statesd the pain is extenxive and us requesting a bolus for the pca.  pt s/p spinmal surgery.  will addd lopressor to regimen.

## 2017-04-07 NOTE — PROVIDER CONTACT NOTE (OTHER) - SITUATION
pt HR is 118 to 120bpm at rest and pain is  7 1/2. No complaints of chest or shortness of breath and pt stated that her regular heart rate is usually 125bpm.

## 2017-04-07 NOTE — PROGRESS NOTE ADULT - SUBJECTIVE AND OBJECTIVE BOX
Pt S&E. Pain controlled. No acute events overnight.  AVSS  Gen: NAD  Spine:  Dsg c/d/i, +HMV/LILIANA  SILT C5-T1 & L2-S1  Moving all extremities  Distal pulses intact  Soft compartments, - calf ttp

## 2017-04-07 NOTE — PROGRESS NOTE ADULT - SUBJECTIVE AND OBJECTIVE BOX
DEPARTMENT OF ANESTHESIA  POST ANESTHETIC EVALUATION    The Patient was interviewed and evaluated    Vital Signs Last 24 Hrs  T(C): 36.9, Max: 37.1 (04-06 @ 20:21)  T(F): 98.4, Max: 98.7 (04-06 @ 20:21)  HR: 92 (80 - 122)  BP: 102/64 (100/64 - 132/60)  BP(mean): --  RR: 17 (16 - 18)  SpO2: 99% (96% - 100%)    Evaluation:      (X ) No apparent complications or complaints regarding anesthesia care at this time  (X ) All questions were answered    Condition:  (X ) Stable      ( ) Guarded      ( ) Critical    Recommendations:  (X ) None     ( ) Other:

## 2017-04-07 NOTE — CHART NOTE - NSCHARTNOTEFT_GEN_A_CORE
Called by RN for HR in 120's.Patient s/p anterior cervical fusion surgery yesterday. POD #1. Patient seen and examined bedside. Denies chest pain , SOB. Complains of pain 8/10, On PCA dialudid pump. Repeat vitals - 112/78 , HR-118 , RR- 18 , So2- 94 RA. EKG ordered- sinus tachycardia @112.  Remote tele with pulse ox ordered. will monitor. Dr.Perlman aware. Called by RN for HR in 120's.Patient s/p anterior cervical fusion surgery yesterday. POD #1. Patient seen and examined bedside. Denies chest pain , SOB, palpitations, nausea , vomiting, calf pain, abdominal pain. Complains of neck pain 8/10, On PCA dialudid pump.   Repeat vitals - 112/78 , HR-118 , RR- 18 , So2- 94 RA. EKG ordered- sinus tachycardia @112.  Remote tele with pulse ox ordered. Will monitor. Dr.Perlman aware. Called by RN for HR in 120's.Patient s/p  cervical fusion surgery yesterday. POD #1. Patient seen and examined bedside. Denies chest pain , SOB, palpitations, nausea , vomiting, calf pain, abdominal pain. Complains of neck pain 8/10, On PCA dialudid pump.   Repeat vitals - 112/78 , HR-118 , RR- 18 , So2- 94 RA. EKG ordered- sinus tachycardia @112.  Neuro- Patient drowsy with the pain medication but AAO*3  HEENT- on cervical collar  Lung clear   CVS - s1s2 normal  Remote tele with pulse ox ordered. Will monitor. Dr.Perlman aware.

## 2017-04-08 VITALS
HEART RATE: 93 BPM | SYSTOLIC BLOOD PRESSURE: 101 MMHG | OXYGEN SATURATION: 96 % | RESPIRATION RATE: 16 BRPM | DIASTOLIC BLOOD PRESSURE: 68 MMHG | TEMPERATURE: 99 F

## 2017-04-08 DIAGNOSIS — R09.02 HYPOXEMIA: ICD-10-CM

## 2017-04-08 DIAGNOSIS — R00.0 TACHYCARDIA, UNSPECIFIED: ICD-10-CM

## 2017-04-08 LAB
ANION GAP SERPL CALC-SCNC: 9 MMOL/L — SIGNIFICANT CHANGE UP (ref 5–17)
BASOPHILS # BLD AUTO: 0 K/UL — SIGNIFICANT CHANGE UP (ref 0–0.2)
BASOPHILS NFR BLD AUTO: 0.3 % — SIGNIFICANT CHANGE UP (ref 0–2)
BUN SERPL-MCNC: 10 MG/DL — SIGNIFICANT CHANGE UP (ref 7–23)
CALCIUM SERPL-MCNC: 8.4 MG/DL — LOW (ref 8.5–10.1)
CHLORIDE SERPL-SCNC: 103 MMOL/L — SIGNIFICANT CHANGE UP (ref 96–108)
CO2 SERPL-SCNC: 25 MMOL/L — SIGNIFICANT CHANGE UP (ref 22–31)
CREAT SERPL-MCNC: 0.75 MG/DL — SIGNIFICANT CHANGE UP (ref 0.5–1.3)
EOSINOPHIL # BLD AUTO: 0.1 K/UL — SIGNIFICANT CHANGE UP (ref 0–0.5)
EOSINOPHIL NFR BLD AUTO: 0.7 % — SIGNIFICANT CHANGE UP (ref 0–6)
GLUCOSE SERPL-MCNC: 88 MG/DL — SIGNIFICANT CHANGE UP (ref 70–99)
HCT VFR BLD CALC: 37.1 % — SIGNIFICANT CHANGE UP (ref 34.5–45)
HGB BLD-MCNC: 12.2 G/DL — SIGNIFICANT CHANGE UP (ref 11.5–15.5)
LYMPHOCYTES # BLD AUTO: 1.2 K/UL — SIGNIFICANT CHANGE UP (ref 1–3.3)
LYMPHOCYTES # BLD AUTO: 12.4 % — LOW (ref 13–44)
MCHC RBC-ENTMCNC: 31.3 PG — SIGNIFICANT CHANGE UP (ref 27–34)
MCHC RBC-ENTMCNC: 33 GM/DL — SIGNIFICANT CHANGE UP (ref 32–36)
MCV RBC AUTO: 95 FL — SIGNIFICANT CHANGE UP (ref 80–100)
MONOCYTES # BLD AUTO: 1 K/UL — HIGH (ref 0–0.9)
MONOCYTES NFR BLD AUTO: 9.7 % — HIGH (ref 1–9)
NEUTROPHILS # BLD AUTO: 7.6 K/UL — HIGH (ref 1.8–7.4)
NEUTROPHILS NFR BLD AUTO: 76.9 % — SIGNIFICANT CHANGE UP (ref 43–77)
PLATELET # BLD AUTO: 92 K/UL — LOW (ref 150–400)
POTASSIUM SERPL-MCNC: 3.5 MMOL/L — SIGNIFICANT CHANGE UP (ref 3.5–5.3)
POTASSIUM SERPL-SCNC: 3.5 MMOL/L — SIGNIFICANT CHANGE UP (ref 3.5–5.3)
RBC # BLD: 3.9 M/UL — SIGNIFICANT CHANGE UP (ref 3.8–5.2)
RBC # FLD: 13.1 % — SIGNIFICANT CHANGE UP (ref 10.3–14.5)
SODIUM SERPL-SCNC: 137 MMOL/L — SIGNIFICANT CHANGE UP (ref 135–145)
WBC # BLD: 9.9 K/UL — SIGNIFICANT CHANGE UP (ref 3.8–10.5)
WBC # FLD AUTO: 9.9 K/UL — SIGNIFICANT CHANGE UP (ref 3.8–10.5)

## 2017-04-08 PROCEDURE — 81025 URINE PREGNANCY TEST: CPT

## 2017-04-08 PROCEDURE — 85027 COMPLETE CBC AUTOMATED: CPT

## 2017-04-08 PROCEDURE — 71275 CT ANGIOGRAPHY CHEST: CPT

## 2017-04-08 PROCEDURE — 80048 BASIC METABOLIC PNL TOTAL CA: CPT

## 2017-04-08 PROCEDURE — C1889: CPT

## 2017-04-08 PROCEDURE — C1713: CPT

## 2017-04-08 PROCEDURE — 76000 FLUOROSCOPY <1 HR PHYS/QHP: CPT

## 2017-04-08 PROCEDURE — 85730 THROMBOPLASTIN TIME PARTIAL: CPT

## 2017-04-08 PROCEDURE — 85610 PROTHROMBIN TIME: CPT

## 2017-04-08 PROCEDURE — G0463: CPT

## 2017-04-08 PROCEDURE — 93005 ELECTROCARDIOGRAM TRACING: CPT

## 2017-04-08 PROCEDURE — 86900 BLOOD TYPING SEROLOGIC ABO: CPT

## 2017-04-08 PROCEDURE — 86901 BLOOD TYPING SEROLOGIC RH(D): CPT

## 2017-04-08 PROCEDURE — 86850 RBC ANTIBODY SCREEN: CPT

## 2017-04-08 PROCEDURE — 94640 AIRWAY INHALATION TREATMENT: CPT

## 2017-04-08 PROCEDURE — 97162 PT EVAL MOD COMPLEX 30 MIN: CPT

## 2017-04-08 PROCEDURE — 86920 COMPATIBILITY TEST SPIN: CPT

## 2017-04-08 PROCEDURE — 71275 CT ANGIOGRAPHY CHEST: CPT | Mod: 26

## 2017-04-08 RX ORDER — CLONAZEPAM 1 MG
2 TABLET ORAL ONCE
Qty: 0 | Refills: 0 | Status: DISCONTINUED | OUTPATIENT
Start: 2017-04-08 | End: 2017-04-08

## 2017-04-08 RX ORDER — IPRATROPIUM/ALBUTEROL SULFATE 18-103MCG
3 AEROSOL WITH ADAPTER (GRAM) INHALATION EVERY 6 HOURS
Qty: 0 | Refills: 0 | Status: DISCONTINUED | OUTPATIENT
Start: 2017-04-08 | End: 2017-04-08

## 2017-04-08 RX ORDER — ALBUTEROL 90 UG/1
2.5 AEROSOL, METERED ORAL
Qty: 0 | Refills: 0 | Status: DISCONTINUED | OUTPATIENT
Start: 2017-04-08 | End: 2017-04-08

## 2017-04-08 RX ORDER — DOCUSATE SODIUM 100 MG
1 CAPSULE ORAL
Qty: 21 | Refills: 0 | OUTPATIENT
Start: 2017-04-08 | End: 2017-04-15

## 2017-04-08 RX ORDER — MORPHINE SULFATE 50 MG/1
100 CAPSULE, EXTENDED RELEASE ORAL THREE TIMES A DAY
Qty: 0 | Refills: 0 | Status: DISCONTINUED | OUTPATIENT
Start: 2017-04-08 | End: 2017-04-08

## 2017-04-08 RX ORDER — MORPHINE SULFATE 50 MG/1
105 CAPSULE, EXTENDED RELEASE ORAL THREE TIMES A DAY
Qty: 0 | Refills: 0 | Status: DISCONTINUED | OUTPATIENT
Start: 2017-04-08 | End: 2017-04-08

## 2017-04-08 RX ORDER — FENTANYL CITRATE 50 UG/ML
1 INJECTION INTRAVENOUS
Qty: 0 | Refills: 0 | Status: DISCONTINUED | OUTPATIENT
Start: 2017-04-08 | End: 2017-04-08

## 2017-04-08 RX ORDER — ACETAMINOPHEN 500 MG
1000 TABLET ORAL ONCE
Qty: 0 | Refills: 0 | Status: COMPLETED | OUTPATIENT
Start: 2017-04-08 | End: 2017-04-08

## 2017-04-08 RX ORDER — CYCLOBENZAPRINE HYDROCHLORIDE 10 MG/1
1 TABLET, FILM COATED ORAL
Qty: 21 | Refills: 0 | OUTPATIENT
Start: 2017-04-08 | End: 2017-04-15

## 2017-04-08 RX ORDER — MONTELUKAST 4 MG/1
10 TABLET, CHEWABLE ORAL AT BEDTIME
Qty: 0 | Refills: 0 | Status: DISCONTINUED | OUTPATIENT
Start: 2017-04-08 | End: 2017-04-08

## 2017-04-08 RX ADMIN — Medication 1 SPRAY(S): at 17:32

## 2017-04-08 RX ADMIN — Medication 100 MILLIGRAM(S): at 14:25

## 2017-04-08 RX ADMIN — CYCLOBENZAPRINE HYDROCHLORIDE 10 MILLIGRAM(S): 10 TABLET, FILM COATED ORAL at 14:25

## 2017-04-08 RX ADMIN — Medication 400 MILLIGRAM(S): at 15:52

## 2017-04-08 RX ADMIN — Medication 2 MILLIGRAM(S): at 12:05

## 2017-04-08 RX ADMIN — Medication 100 MILLIGRAM(S): at 00:20

## 2017-04-08 RX ADMIN — Medication 25 MILLIGRAM(S): at 06:43

## 2017-04-08 RX ADMIN — CYCLOBENZAPRINE HYDROCHLORIDE 10 MILLIGRAM(S): 10 TABLET, FILM COATED ORAL at 06:42

## 2017-04-08 RX ADMIN — Medication 1000 MILLIGRAM(S): at 12:00

## 2017-04-08 RX ADMIN — TIOTROPIUM BROMIDE 1 CAPSULE(S): 18 CAPSULE ORAL; RESPIRATORY (INHALATION) at 10:28

## 2017-04-08 RX ADMIN — Medication 2 MILLIGRAM(S): at 03:05

## 2017-04-08 RX ADMIN — HYDROMORPHONE HYDROCHLORIDE 30 MILLILITER(S): 2 INJECTION INTRAMUSCULAR; INTRAVENOUS; SUBCUTANEOUS at 07:19

## 2017-04-08 RX ADMIN — Medication 150 MICROGRAM(S): at 06:43

## 2017-04-08 RX ADMIN — Medication 2 MILLIGRAM(S): at 17:32

## 2017-04-08 RX ADMIN — LAMOTRIGINE 100 MILLIGRAM(S): 25 TABLET, ORALLY DISINTEGRATING ORAL at 06:43

## 2017-04-08 RX ADMIN — MORPHINE SULFATE 100 MILLIGRAM(S): 50 CAPSULE, EXTENDED RELEASE ORAL at 14:25

## 2017-04-08 RX ADMIN — FENTANYL CITRATE 1 PATCH: 50 INJECTION INTRAVENOUS at 09:16

## 2017-04-08 RX ADMIN — Medication 1000 MILLIGRAM(S): at 16:26

## 2017-04-08 RX ADMIN — Medication 1 SPRAY(S): at 06:43

## 2017-04-08 RX ADMIN — Medication 200 MILLIGRAM(S): at 06:43

## 2017-04-08 RX ADMIN — BUDESONIDE AND FORMOTEROL FUMARATE DIHYDRATE 1 PUFF(S): 160; 4.5 AEROSOL RESPIRATORY (INHALATION) at 10:28

## 2017-04-08 RX ADMIN — Medication 100 MILLIGRAM(S): at 08:49

## 2017-04-08 RX ADMIN — HYDROMORPHONE HYDROCHLORIDE 0.5 MILLIGRAM(S): 2 INJECTION INTRAMUSCULAR; INTRAVENOUS; SUBCUTANEOUS at 07:21

## 2017-04-08 RX ADMIN — Medication 100 MILLIGRAM(S): at 06:43

## 2017-04-08 RX ADMIN — BUPROPION HYDROCHLORIDE 400 MILLIGRAM(S): 150 TABLET, EXTENDED RELEASE ORAL at 12:05

## 2017-04-08 RX ADMIN — Medication 100 MILLIGRAM(S): at 16:28

## 2017-04-08 RX ADMIN — Medication 400 MILLIGRAM(S): at 10:59

## 2017-04-08 NOTE — PROGRESS NOTE ADULT - PROBLEM SELECTOR PLAN 5
mulitfactorial pain post op   dw dr prado not likely pe mulitfactorial pain post op, mild asthma  dw dr prado not likely pe  will defer vq scan

## 2017-04-08 NOTE — PROGRESS NOTE ADULT - SUBJECTIVE AND OBJECTIVE BOX
Post Op Day 2 of Anesthesia Pain Management Service    SUBJECTIVE: No complaints. Mother present  		  OBJECTIVE:   Pain Score: <5  /10  Therapy:	[x ] IV PCA	[ ] Epidural   [ ] s/p Spinal Opioid	[ ] Peripheral nerve block  	  Vital Signs Last 24 Hrs  T(C): 37.1, Max: 37.3 (04-07 @ 16:05)  T(F): 98.7, Max: 99.2 (04-08 @ 04:30)  HR: 93 (87 - 101)  BP: 101/68 (101/68 - 115/74)  BP(mean): --  RR: 16 (16 - 17)  SpO2: 96% (96% - 97%)    ( x) Alert & Oriented     ( x) No motor/sensory block     ( -) Nausea     (- ) Pruritis     (- ) Headache    ( ) Catheter Site Unremarkable    Assessment of Catheter Site:	[ ] Intact		[ ] Other:    ( ) Further Pain Rx Plan:  Oral Pain Medications    COMMENTS: Patient was sleepy this morning. Discussed with nurse and resident. IV PCA discontinued. Patient awake and alert at present.      ANTICOAGULATION STATUS:

## 2017-04-08 NOTE — CHART NOTE - NSCHARTNOTEFT_GEN_A_CORE
Consult dictated    Impression:    Moderate Intermittent Asthma with allergic component  Hypoxemia probably related to post-op atelectasis  Bipolar Disorder  S/P Cervical Spine Surgery  No evidence of thromboembolic disease    Plan:    Incentive Spirometry  Albuterol by nebulizer PRN  Advair  Pain Control   DVT Prophyaxis

## 2017-04-08 NOTE — PROGRESS NOTE ADULT - SUBJECTIVE AND OBJECTIVE BOX
Pt S&E. Pain controlled. Patient desatted over night, on O2 right now, patient appears very sedated on exam and can barely keep her eyes open. Belive 2/2 over medicating. CTA was Negative overnight.       Vital Signs Last 24 Hrs  T(C): 37.3, Max: 37.3 (04-07 @ 16:05)  T(F): 99.2, Max: 99.2 (04-08 @ 04:30)  HR: 87 (87 - 120)  BP: 105/71 (103/71 - 128/82)  BP(mean): --  RR: 16 (16 - 18)  SpO2: 97% (95% - 97%)      Spine:  Dsg c/d/i, +HMV/LILIANA  SILT C5-T1 & L2-S1  Moving all extremities  Distal pulses intact  Soft compartments, - calf ttp

## 2017-04-08 NOTE — PROGRESS NOTE ADULT - PROBLEM SELECTOR PLAN 4
secondary to dilaudid. secondary to dilaudid and atelectasis, clearly splinting in pain  less likely due to asthma  continue o2

## 2017-04-08 NOTE — PROGRESS NOTE ADULT - SUBJECTIVE AND OBJECTIVE BOX
Patient is a 32y old  Female who presents with a chief complaint of PSF C4-6 (06 Apr 2017 18:21)      INTERVAL HPI/OVERNIGHT EVENTS: hypoxic and tachycardic cta poor study. reviewed with radiologist no central pe    MEDICATIONS  (STANDING):  cyclobenzaprine 10milliGRAM(s) Oral every 8 hours  docusate sodium 100milliGRAM(s) Oral three times a day  senna 2Tablet(s) Oral at bedtime  clindamycin IVPB  IV Intermittent   clindamycin IVPB 900milliGRAM(s) IV Intermittent every 8 hours  clonazePAM Tablet 2milliGRAM(s) Oral four times a day  topiramate 200milliGRAM(s) Oral two times a day  sertraline 50milliGRAM(s) Oral daily  lamoTRIgine 100milliGRAM(s) Oral every 8 hours  fluticasone propionate 50 MICROgram(s)/spray Nasal Spray 1Spray(s) Both Nostrils two times a day  tiotropium 18 MICROgram(s) Capsule 1Capsule(s) Inhalation daily  buDESOnide 160 MICROgram(s)/formoterol 4.5 MICROgram(s) Inhaler 1Puff(s) Inhalation two times a day  risperiDONE   Tablet 8milliGRAM(s) Oral at bedtime  buPROPion SR 400milliGRAM(s) Oral daily  levothyroxine 150MICROGram(s) Oral daily  vilaZODone 40milliGRAM(s) Oral daily  lactated ringers. 1000milliLiter(s) IV Continuous <Continuous>  metoprolol 25milliGRAM(s) Oral two times a day  morphine ER Tablet 100milliGRAM(s) Oral three times a day  fentaNYL   Patch  25 MICROgram(s)/Hr. 1Patch Transdermal every 48 hours  montelukast 10milliGRAM(s) Oral at bedtime    MEDICATIONS  (PRN):  acetaminophen   Tablet 650milliGRAM(s) Oral every 6 hours PRN For Temp greater than 38 C (100.4 F) and headache  ondansetron Injectable 4milliGRAM(s) IV Push every 6 hours PRN Nausea  magnesium hydroxide Suspension 30milliLiter(s) Oral every 12 hours PRN Constipation  ALBUTerol    90 MICROgram(s) HFA Inhaler 2Puff(s) Inhalation every 6 hours PRN Shortness of Breath  HYDROmorphone  Injectable 1milliGRAM(s) IV Push every 10 minutes PRN Severe Pain (7 - 10)  ALBUTerol    0.083% 2.5milliGRAM(s) Nebulizer four times a day PRN Shortness of Breath      Allergies    latex (Urticaria; Rash)  penicillin (Unknown)  sulfa drugs (Unknown)            REVIEW OF SYSTEMS:  CONSTITUTIONAL: No fever, No chills,No fatigue,No myalgia,No Body ache  EYES: No eye pain, visual disturbances, or discharge  ENMT:  No ear pain, No nose bleed, No vertigo; No sinus or throat pain  NECK:post op pain  RESPIRATORY: No cough, wheezing, No  hemoptysis; No shortness of breath  CARDIOVASCULAR: No chest pain, palpitations, leg swelling  GASTROINTESTINAL: No abdominal or epigastric pain. No nausea, No vomiting; No diarrhea or constipation. [ ] BM  GENITOURINARY: No dysuria, No frequency, No urgency, No hematuria, or incontinence  NEUROLOGICAL: alert and oriented x 3,  No headaches, No dizziness, No numbness,  SKIN:   No itching, burning, rashes, or lesions   MUSCULOSKELETAL: No joint pain or swelling; No muscle pain, No back pain, No extremity pain  PSYCHIATRIC: No depression, anxiety, mood swings, or difficulty sleeping  ROS  [ ] Unable to obtain   REST OF REVIEW Of SYSTEM - [ ] Normal     Height (cm): 180.3 (04-06 @ 06:44), 180.3 (04-03 @ 11:29), 180.3 (03-28 @ 15:28)  Weight (kg): 95.7 (04-06 @ 06:44), 95.7 (04-03 @ 11:29), 89.8 (03-28 @ 15:28)  BMI (kg/m2): 29.4 (04-06 @ 06:44), 29.4 (04-03 @ 11:29), 27.6 (03-28 @ 15:28)  BSA (m2): 2.16 (04-06 @ 06:44), 2.16 (04-03 @ 11:29), 2.1 (03-28 @ 15:28)  Vital Signs Last 24 Hrs  T(C): 37.1, Max: 37.3 (04-07 @ 16:05)  T(F): 98.7, Max: 99.2 (04-08 @ 04:30)  HR: 93 (87 - 101)  BP: 101/68 (101/68 - 115/74)  BP(mean): --  RR: 16 (16 - 17)  SpO2: 96% (96% - 97%)  [ ] room air   [ ] 02    PHYSICAL EXAM:  GENERAL:  No acute distresss, well appearing, [ ] Agitated, [ ] Lethargy, [ ] confused   HEAD:  normal  ENMT: normal  NECK:  normal    NERVOUS SYSTEM:  Alert & Oriented X3, no focal deficits [ ]Confusion  [ ] Encephalopathic [ ] Sedated [ ] Other  CHEST/LUNG: Clear to auscultation upper lung fields [x ] decreased breath sounds at bases  [ ] wheezing   [ ] rhonchi  [ ] crackles  HEART:  Regular rate and rhythm, No murmurs, rubs, or gallops,  [ ] irregular   ABDOMEN:  soft, nontender, nondistended, positive bowel sounds   [ ] obese  EXTREMITIES: No clubbing, cyanosis or edema  SKIN: [ ] venous stasis skin changes    LABS:                        12.2   9.9   )-----------( 92       ( 08 Apr 2017 08:36 )             37.1     08 Apr 2017 08:36    137    |  103    |  10     ----------------------------<  88     3.5     |  25     |  0.75     Ca    8.4        08 Apr 2017 08:36            CAPILLARY BLOOD GLUCOSE    Cultures          RADIOLOGY & ADDITIONAL TESTS:      Care Discussed with [X] Consultants  [ ] Patient  [ ] Family  [X]   /   [ ] Other; RN  DVT prophylaxis [ ] lovenox   [ ] subq heparin  [ ] coumadin  [ ] venodynes [ ] ambulating frequently at how risk for vte and no pharm         or  mechanical prophylaxis required    [ ] other   Advanced directive:    [ ]pt has hcp     [ ] pt declined to assign hcp  Discussed with pt @ bedside Patient is a 32y old  Female who presents with a chief complaint of PSF C4-6 (06 Apr 2017 18:21)      INTERVAL HPI/OVERNIGHT EVENTS: hypoxic and tachycardic cta poor study. reviewed with radiologist no central pe. Pt is in pain and sedated with pain meds confused earlier difficult to arouse. now mentating more clearly after dilaudid drip d/c. denies chest pain or sob but does feel tightness in her chest same sensation she has when she has asthma exacerbation. has only seen a pulmonologist once and did not follow up.    MEDICATIONS  (STANDING):  cyclobenzaprine 10milliGRAM(s) Oral every 8 hours  docusate sodium 100milliGRAM(s) Oral three times a day  senna 2Tablet(s) Oral at bedtime  clindamycin IVPB  IV Intermittent   clindamycin IVPB 900milliGRAM(s) IV Intermittent every 8 hours  clonazePAM Tablet 2milliGRAM(s) Oral four times a day  topiramate 200milliGRAM(s) Oral two times a day  sertraline 50milliGRAM(s) Oral daily  lamoTRIgine 100milliGRAM(s) Oral every 8 hours  fluticasone propionate 50 MICROgram(s)/spray Nasal Spray 1Spray(s) Both Nostrils two times a day  tiotropium 18 MICROgram(s) Capsule 1Capsule(s) Inhalation daily  buDESOnide 160 MICROgram(s)/formoterol 4.5 MICROgram(s) Inhaler 1Puff(s) Inhalation two times a day  risperiDONE   Tablet 8milliGRAM(s) Oral at bedtime  buPROPion SR 400milliGRAM(s) Oral daily  levothyroxine 150MICROGram(s) Oral daily  vilaZODone 40milliGRAM(s) Oral daily  lactated ringers. 1000milliLiter(s) IV Continuous <Continuous>  metoprolol 25milliGRAM(s) Oral two times a day  morphine ER Tablet 100milliGRAM(s) Oral three times a day  fentaNYL   Patch  25 MICROgram(s)/Hr. 1Patch Transdermal every 48 hours  montelukast 10milliGRAM(s) Oral at bedtime    MEDICATIONS  (PRN):  acetaminophen   Tablet 650milliGRAM(s) Oral every 6 hours PRN For Temp greater than 38 C (100.4 F) and headache  ondansetron Injectable 4milliGRAM(s) IV Push every 6 hours PRN Nausea  magnesium hydroxide Suspension 30milliLiter(s) Oral every 12 hours PRN Constipation  ALBUTerol    90 MICROgram(s) HFA Inhaler 2Puff(s) Inhalation every 6 hours PRN Shortness of Breath  HYDROmorphone  Injectable 1milliGRAM(s) IV Push every 10 minutes PRN Severe Pain (7 - 10)  ALBUTerol    0.083% 2.5milliGRAM(s) Nebulizer four times a day PRN Shortness of Breath      Allergies    latex (Urticaria; Rash)  penicillin (Unknown)  sulfa drugs (Unknown)            REVIEW OF SYSTEMS:  CONSTITUTIONAL: No fever, No chills,No fatigue,No myalgia,No Body ache  EYES: No eye pain, visual disturbances, or discharge  ENMT:  No ear pain, No nose bleed, No vertigo; No sinus or throat pain  NECK:post op pain   RESPIRATORY: No cough, wheezing, No  hemoptysis; No shortness of breath chest tightness  CARDIOVASCULAR: No chest pain, palpitations, leg swelling   GASTROINTESTINAL: No abdominal or epigastric pain. No nausea, No vomiting; No diarrhea or constipation. [ ] BM  GENITOURINARY: No dysuria, No frequency, No urgency, No hematuria, or incontinence  NEUROLOGICAL: alert and oriented x 3,  No headaches, No dizziness, No numbness,  SKIN:   No itching, burning, rashes, or lesions   MUSCULOSKELETAL: No joint pain or swelling; No muscle pain, No back pain, No extremity pain  PSYCHIATRIC: No depression, anxiety, mood swings, or difficulty sleeping  ROS  [ ] Unable to obtain   REST OF REVIEW Of SYSTEM - see above    Height (cm): 180.3 (04-06 @ 06:44), 180.3 (04-03 @ 11:29), 180.3 (03-28 @ 15:28)  Weight (kg): 95.7 (04-06 @ 06:44), 95.7 (04-03 @ 11:29), 89.8 (03-28 @ 15:28)  BMI (kg/m2): 29.4 (04-06 @ 06:44), 29.4 (04-03 @ 11:29), 27.6 (03-28 @ 15:28)  BSA (m2): 2.16 (04-06 @ 06:44), 2.16 (04-03 @ 11:29), 2.1 (03-28 @ 15:28)  Vital Signs Last 24 Hrs  T(C): 37.1, Max: 37.3 (04-07 @ 16:05)  T(F): 98.7, Max: 99.2 (04-08 @ 04:30)  HR: 93 (87 - 101)  BP: 101/68 (101/68 - 115/74)  BP(mean): --  RR: 16 (16 - 17)  SpO2: 96% (96% - 97%)  [ ] room air   [ x] 02 3 liters    PHYSICAL EXAM:  GENERAL:  No acute distresss, well appearing, [ ] Agitated, [ ] Lethargy, [ ] confused   HEAD:  normal  ENMT: normal  NECK:  normal    NERVOUS SYSTEM:  Alert & Oriented X3, no focal deficits [ ]Confusion  [ ] Encephalopathic [ ] Sedated [ ] Other  CHEST/LUNG: Clear to auscultation upper lung fields [x ] decreased breath sounds at bases  [ ] wheezing   [ ] rhonchi  [ ] crackles  HEART:  Regular rate and rhythm, No murmurs, rubs, or gallops,  [ ] irregular   ABDOMEN:  soft, nontender, nondistended, positive bowel sounds   [ ] obese  EXTREMITIES: No clubbing, cyanosis or edema  SKIN: [ ] venous stasis skin changes    LABS:                        12.2   9.9   )-----------( 92       ( 08 Apr 2017 08:36 )             37.1     08 Apr 2017 08:36    137    |  103    |  10     ----------------------------<  88     3.5     |  25     |  0.75     Ca    8.4        08 Apr 2017 08:36            CAPILLARY BLOOD GLUCOSE    Cultures          RADIOLOGY & ADDITIONAL TESTS:      Care Discussed with [X] Consultants  [ ] Patient  [ ] Family  []   /   [ ] Other; RN  DVT prophylaxis [ ] lovenox   [ ] subq heparin  [ ] coumadin  [ ] venodynes [ ] ambulating frequently at how risk for vte and no pharm         or  mechanical prophylaxis required    [ ] other   Advanced directive:    [ ]pt has hcp     [ ] pt declined to assign hcp  Discussed with pt @ bedside

## 2017-04-08 NOTE — CHART NOTE - NSCHARTNOTEFT_GEN_A_CORE
Called by RN as telemetry technician informed her that when patient is off of 2L NC O2 she desaturates to 85% on RA. Patient seen and examined at beside. She only complains of post op pain and occasional cough. She denies any CP, SOB, abd pain, N/V.    Per chart review, patient was having episodes of tachycardia throughout the day. EKG was done showing sinus tachycardia. Patient states that she has been using oxygen throughout the day but denies every having episodes of SOB. Called and spoke with telemetry technician who states that she was not having such episodes earlier in the day.    T(C): 37.2, Max: 37.3 (04-07 @ 16:05)  HR: 92 (92 - 120)  BP: 108/74 (102/64 - 128/82)  RR: 16 (16 - 18)  SpO2: 97% (95% - 99%)  Wt(kg): --    Gen: AAO x3, NAD, cervical brace in place  Cardiac: RRR  Resp: CTA b/l, no w/r/r, non labored respirations  Abd: soft, NT/ND  Ext: no c/c/e    A/P: 31yo F s/p posterior spinal fusion on 4/6/16, now with episodes of tachycardia and desaturation on RA.   -discussed case with Dr Sylvester (medicine), will order STAT CTA chest to r/o PE  -spoke with patient and she is aware and amenable to test  -will follow up results Called by RN as telemetry technician informed her that when patient is off of 2L NC O2 she desaturates to 85% on RA. Patient seen and examined at beside. She only complains of post op pain and occasional cough. She denies any CP, SOB, abd pain, N/V.    Per chart review, patient was having episodes of tachycardia throughout the day. EKG was done showing sinus tachycardia. Patient states that she has been using oxygen throughout the day but denies every having episodes of SOB. Called and spoke with telemetry technician who states that she was not having such episodes earlier in the day.    T(C): 37.2, Max: 37.3 (04-07 @ 16:05)  HR: 92 (92 - 120)  BP: 108/74 (102/64 - 128/82)  RR: 16 (16 - 18)  SpO2: 97% (95% - 99%)  Wt(kg): --    Gen: AAO x3, NAD, cervical brace in place  Cardiac: RRR  Resp: CTA b/l, no w/r/r, non labored respirations  Abd: soft, NT/ND  Ext: no c/c/e    A/P: 33yo F s/p posterior spinal fusion on 4/6/16, now with episodes of tachycardia and desaturation on RA.   -discussed case with Dr Sylvester (medicine), will order STAT CTA chest to r/o PE  -spoke with patient and she is aware and amenable to test, she denies any history of DVT or PE in the past  -will follow up results Called by RN as telemetry technician informed her that when patient is off of 2L NC O2 she desaturates to 85% on RA. Patient seen and examined at beside. She only complains of post op pain and occasional cough. She denies any CP, SOB, abd pain, N/V.    Per chart review, patient was having episodes of tachycardia throughout the day. EKG was done showing sinus tachycardia. Patient states that she has been using oxygen throughout the day but denies every having episodes of SOB. Called and spoke with telemetry technician who states that she was not having such episodes earlier in the day.    T(C): 37.2, Max: 37.3 (04-07 @ 16:05)  HR: 92 (92 - 120)  BP: 108/74 (102/64 - 128/82)  RR: 16 (16 - 18)  SpO2: 97% (95% - 99%)  Wt(kg): --    Gen: AAO x3, NAD, cervical brace in place  Cardiac: RRR  Resp: CTA b/l, no w/r/r, non labored respirations  Abd: soft, NT/ND  Ext: no c/c/e    A/P: 31yo F s/p posterior spinal fusion on 4/6/16, now with episodes of tachycardia and desaturation on RA.   -discussed case with Dr Sylvester (medicine), will order STAT CTA chest to r/o PE  -spoke with patient and she is aware and amenable to test, she denies any history of DVT or PE in the past  -will follow up results    Addendum (4:16am): Called and discussed results of CTA with [facility] radiologist. Radiologist states that it was a poor study and she cannot rule out PE, recommends V/Q scan in AM. Called and spoke with Dr Sylvester (medicine), informed him of CTA results. Patient cannot receive anticoagulation at this time as she is POD#2 from posterior spinal fusion. Will order V/Q scan for AM and continue to follow. Call placed to Dr Bowden's service. Called by RN as telemetry technician informed her that when patient is off of 2L NC O2 she desaturates to 85% on RA. Patient seen and examined at beside. She only complains of post op pain and occasional cough. She denies any CP, SOB, abd pain, N/V.    Per chart review, patient was having episodes of tachycardia throughout the day. EKG was done showing sinus tachycardia. Patient states that she has been using oxygen throughout the day but denies every having episodes of SOB. Called and spoke with telemetry technician who states that she was not having such episodes earlier in the day.    T(C): 37.2, Max: 37.3 (04-07 @ 16:05)  HR: 92 (92 - 120)  BP: 108/74 (102/64 - 128/82)  RR: 16 (16 - 18)  SpO2: 97% (95% - 99%)  Wt(kg): --    Gen: AAO x3, NAD, cervical brace in place  Cardiac: RRR  Resp: CTA b/l, no w/r/r, non labored respirations  Abd: soft, NT/ND  Ext: no c/c/e    A/P: 31yo F s/p posterior spinal fusion on 4/6/16, now with episodes of tachycardia and desaturation on RA.   -discussed case with Dr Sylvester (medicine), will order STAT CTA chest to r/o PE  -spoke with patient and she is aware and amenable to test, she denies any history of DVT or PE in the past  -will follow up results    Addendum (4:16am): Called and discussed results of CTA with [facility] radiologist. Radiologist states that it was a poor study and she cannot rule out PE, recommends V/Q scan in AM. Called and spoke with Dr Sylvester (medicine), informed him of CTA results. Patient cannot receive anticoagulation at this time as she is POD#2 from posterior spinal fusion. Will order V/Q scan for AM and continue to follow. SCDs ordered. Call placed to Dr Bowden's service.

## 2017-04-08 NOTE — PROGRESS NOTE ADULT - ASSESSMENT
32F s/p PSF C4-6 POD 2  DC PCA, likely causing her desaturations patient appears over medicated at this time, will start on home dose of ms contin and add fentanyl patch per Dr. Bowden's Request  Abx while drains in place, HMV/LILIANA, Being followed by Plastic Surgery Team  Analgesia  DVT ppx  WBAT  PT/OT  DC planning

## 2017-04-08 NOTE — PROGRESS NOTE ADULT - PROBLEM SELECTOR PLAN 3
with mild exacerbation  added pulmicort, albuteraol and singulair  pulmonary consult appreciated and otto prado with exacerbation  added pulmicort, albuteraol and singulair  pulmonary consult appreciated and discussed at length with dr prado

## 2017-04-11 DIAGNOSIS — R09.02 HYPOXEMIA: ICD-10-CM

## 2017-04-11 DIAGNOSIS — F31.9 BIPOLAR DISORDER, UNSPECIFIED: ICD-10-CM

## 2017-04-11 DIAGNOSIS — Z91.040 LATEX ALLERGY STATUS: ICD-10-CM

## 2017-04-11 DIAGNOSIS — Z79.891 LONG TERM (CURRENT) USE OF OPIATE ANALGESIC: ICD-10-CM

## 2017-04-11 DIAGNOSIS — R00.0 TACHYCARDIA, UNSPECIFIED: ICD-10-CM

## 2017-04-11 DIAGNOSIS — Z79.51 LONG TERM (CURRENT) USE OF INHALED STEROIDS: ICD-10-CM
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
## 2017-04-11 DIAGNOSIS — E03.9 HYPOTHYROIDISM, UNSPECIFIED: ICD-10-CM

## 2017-04-11 DIAGNOSIS — J45.909 UNSPECIFIED ASTHMA, UNCOMPLICATED: ICD-10-CM
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
## 2017-04-11 DIAGNOSIS — M54.5 LOW BACK PAIN: ICD-10-CM

## 2017-04-11 DIAGNOSIS — Z88.2 ALLERGY STATUS TO SULFONAMIDES: ICD-10-CM

## 2017-04-11 DIAGNOSIS — M48.02 SPINAL STENOSIS, CERVICAL REGION: ICD-10-CM
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
## 2017-04-11 DIAGNOSIS — S12.9XXA FRACTURE OF NECK, UNSPECIFIED, INITIAL ENCOUNTER: ICD-10-CM

## 2017-04-11 DIAGNOSIS — Z88.0 ALLERGY STATUS TO PENICILLIN: ICD-10-CM

## 2017-06-02 PROBLEM — Z00.00 ENCOUNTER FOR PREVENTIVE HEALTH EXAMINATION: Status: ACTIVE | Noted: 2017-06-02

## 2017-06-22 ENCOUNTER — APPOINTMENT (OUTPATIENT)
Dept: CT IMAGING | Facility: CLINIC | Age: 32
End: 2017-06-22

## 2017-07-10 ENCOUNTER — APPOINTMENT (OUTPATIENT)
Dept: CT IMAGING | Facility: CLINIC | Age: 32
End: 2017-07-10

## 2017-08-08 ENCOUNTER — APPOINTMENT (OUTPATIENT)
Dept: CT IMAGING | Facility: IMAGING CENTER | Age: 32
End: 2017-08-08

## 2017-10-03 ENCOUNTER — INPATIENT (INPATIENT)
Facility: HOSPITAL | Age: 32
LOS: 37 days | Discharge: ANOTHER HOSPITAL | DRG: 901 | End: 2017-11-10
Attending: HOSPITALIST | Admitting: HOSPITALIST
Payer: MEDICARE

## 2017-10-03 VITALS
DIASTOLIC BLOOD PRESSURE: 91 MMHG | RESPIRATION RATE: 20 BRPM | HEART RATE: 144 BPM | SYSTOLIC BLOOD PRESSURE: 136 MMHG | HEIGHT: 71 IN | OXYGEN SATURATION: 93 % | TEMPERATURE: 101 F | WEIGHT: 199.96 LBS

## 2017-10-03 DIAGNOSIS — R60.0 LOCALIZED EDEMA: ICD-10-CM

## 2017-10-03 DIAGNOSIS — N17.0 ACUTE KIDNEY FAILURE WITH TUBULAR NECROSIS: ICD-10-CM
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
## 2017-10-03 DIAGNOSIS — J45.909 UNSPECIFIED ASTHMA, UNCOMPLICATED: ICD-10-CM

## 2017-10-03 DIAGNOSIS — T79.A21A TRAUMATIC COMPARTMENT SYNDROME OF RIGHT LOWER EXTREMITY, INITIAL ENCOUNTER: ICD-10-CM

## 2017-10-03 DIAGNOSIS — Z98.1 ARTHRODESIS STATUS: Chronic | ICD-10-CM

## 2017-10-03 DIAGNOSIS — T14.8XXA OTHER INJURY OF UNSPECIFIED BODY REGION, INITIAL ENCOUNTER: ICD-10-CM

## 2017-10-03 DIAGNOSIS — F19.19 OTHER PSYCHOACTIVE SUBSTANCE ABUSE WITH UNSPECIFIED PSYCHOACTIVE SUBSTANCE-INDUCED DISORDER: ICD-10-CM
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
## 2017-10-03 DIAGNOSIS — F31.9 BIPOLAR DISORDER, UNSPECIFIED: ICD-10-CM

## 2017-10-03 DIAGNOSIS — J95.821 ACUTE POSTPROCEDURAL RESPIRATORY FAILURE: ICD-10-CM

## 2017-10-03 DIAGNOSIS — Z98.89 OTHER SPECIFIED POSTPROCEDURAL STATES: Chronic | ICD-10-CM

## 2017-10-03 DIAGNOSIS — R50.9 FEVER, UNSPECIFIED: ICD-10-CM

## 2017-10-03 DIAGNOSIS — D64.9 ANEMIA, UNSPECIFIED: ICD-10-CM

## 2017-10-03 DIAGNOSIS — R59.9 ENLARGED LYMPH NODES, UNSPECIFIED: ICD-10-CM

## 2017-10-03 DIAGNOSIS — N17.9 ACUTE KIDNEY FAILURE, UNSPECIFIED: ICD-10-CM

## 2017-10-03 DIAGNOSIS — S81.801A UNSPECIFIED OPEN WOUND, RIGHT LOWER LEG, INITIAL ENCOUNTER: ICD-10-CM

## 2017-10-03 DIAGNOSIS — E03.9 HYPOTHYROIDISM, UNSPECIFIED: ICD-10-CM

## 2017-10-03 DIAGNOSIS — M62.82 RHABDOMYOLYSIS: ICD-10-CM

## 2017-10-03 DIAGNOSIS — S81.001A UNSPECIFIED OPEN WOUND, RIGHT KNEE, INITIAL ENCOUNTER: ICD-10-CM

## 2017-10-03 LAB
ALBUMIN SERPL ELPH-MCNC: 3.2 G/DL — LOW (ref 3.3–5)
ALP SERPL-CCNC: 58 U/L — SIGNIFICANT CHANGE UP (ref 40–120)
ALT FLD-CCNC: 304 U/L — HIGH (ref 12–78)
ANION GAP SERPL CALC-SCNC: 11 MMOL/L — SIGNIFICANT CHANGE UP (ref 5–17)
APTT BLD: 31.9 SEC — SIGNIFICANT CHANGE UP (ref 27.5–37.4)
AST SERPL-CCNC: 1377 U/L — HIGH (ref 15–37)
BILIRUB SERPL-MCNC: 0.9 MG/DL — SIGNIFICANT CHANGE UP (ref 0.2–1.2)
BUN SERPL-MCNC: 30 MG/DL — HIGH (ref 7–23)
CALCIUM SERPL-MCNC: 8.3 MG/DL — LOW (ref 8.5–10.1)
CHLORIDE SERPL-SCNC: 102 MMOL/L — SIGNIFICANT CHANGE UP (ref 96–108)
CO2 SERPL-SCNC: 18 MMOL/L — LOW (ref 22–31)
CREAT SERPL-MCNC: 2.3 MG/DL — HIGH (ref 0.5–1.3)
GLUCOSE SERPL-MCNC: 152 MG/DL — HIGH (ref 70–99)
HCG SERPL-ACNC: <1 MIU/ML — SIGNIFICANT CHANGE UP
HCT VFR BLD CALC: 47.6 % — HIGH (ref 34.5–45)
HGB BLD-MCNC: 15.6 G/DL — HIGH (ref 11.5–15.5)
INR BLD: 1.13 RATIO — SIGNIFICANT CHANGE UP (ref 0.88–1.16)
LACTATE SERPL-SCNC: 1.7 MMOL/L — SIGNIFICANT CHANGE UP (ref 0.7–2)
LYMPHOCYTES # BLD AUTO: 2 % — LOW (ref 13–44)
MCHC RBC-ENTMCNC: 30.1 PG — SIGNIFICANT CHANGE UP (ref 27–34)
MCHC RBC-ENTMCNC: 32.7 GM/DL — SIGNIFICANT CHANGE UP (ref 32–36)
MCV RBC AUTO: 92.1 FL — SIGNIFICANT CHANGE UP (ref 80–100)
MONOCYTES NFR BLD AUTO: 1 % — SIGNIFICANT CHANGE UP (ref 1–9)
NEUTROPHILS NFR BLD AUTO: 93 % — HIGH (ref 43–77)
NEUTS BAND # BLD: 4 % — SIGNIFICANT CHANGE UP (ref 0–8)
PLAT MORPH BLD: NORMAL — SIGNIFICANT CHANGE UP
PLATELET # BLD AUTO: 118 K/UL — LOW (ref 150–400)
POTASSIUM SERPL-MCNC: 4.6 MMOL/L — SIGNIFICANT CHANGE UP (ref 3.5–5.3)
POTASSIUM SERPL-SCNC: 4.6 MMOL/L — SIGNIFICANT CHANGE UP (ref 3.5–5.3)
PROT SERPL-MCNC: 6.5 G/DL — SIGNIFICANT CHANGE UP (ref 6–8.3)
PROTHROM AB SERPL-ACNC: 12.4 SEC — SIGNIFICANT CHANGE UP (ref 9.8–12.7)
RBC # BLD: 5.17 M/UL — SIGNIFICANT CHANGE UP (ref 3.8–5.2)
RBC # FLD: 13.1 % — SIGNIFICANT CHANGE UP (ref 10.3–14.5)
RBC BLD AUTO: SIGNIFICANT CHANGE UP
SODIUM SERPL-SCNC: 131 MMOL/L — LOW (ref 135–145)
WBC # BLD: 24.3 K/UL — HIGH (ref 3.8–10.5)
WBC # FLD AUTO: 24.3 K/UL — HIGH (ref 3.8–10.5)

## 2017-10-03 PROCEDURE — 93010 ELECTROCARDIOGRAM REPORT: CPT

## 2017-10-03 PROCEDURE — 93926 LOWER EXTREMITY STUDY: CPT | Mod: 26,RT

## 2017-10-03 PROCEDURE — 71010: CPT | Mod: 26

## 2017-10-03 PROCEDURE — 73590 X-RAY EXAM OF LOWER LEG: CPT | Mod: 26,RT

## 2017-10-03 PROCEDURE — 93970 EXTREMITY STUDY: CPT | Mod: 26

## 2017-10-03 PROCEDURE — 73552 X-RAY EXAM OF FEMUR 2/>: CPT | Mod: 26,RT

## 2017-10-03 RX ORDER — AZTREONAM 2 G
1000 VIAL (EA) INJECTION ONCE
Qty: 0 | Refills: 0 | Status: COMPLETED | OUTPATIENT
Start: 2017-10-03 | End: 2017-10-03

## 2017-10-03 RX ORDER — ACETAMINOPHEN 500 MG
650 TABLET ORAL ONCE
Qty: 0 | Refills: 0 | Status: COMPLETED | OUTPATIENT
Start: 2017-10-03 | End: 2017-10-03

## 2017-10-03 RX ORDER — SODIUM CHLORIDE 9 MG/ML
1000 INJECTION INTRAMUSCULAR; INTRAVENOUS; SUBCUTANEOUS
Qty: 0 | Refills: 0 | Status: COMPLETED | OUTPATIENT
Start: 2017-10-03 | End: 2017-10-03

## 2017-10-03 RX ADMIN — SODIUM CHLORIDE 1000 MILLILITER(S): 9 INJECTION INTRAMUSCULAR; INTRAVENOUS; SUBCUTANEOUS at 01:18

## 2017-10-03 RX ADMIN — Medication 50 MILLIGRAM(S): at 22:19

## 2017-10-03 RX ADMIN — Medication 650 MILLIGRAM(S): at 22:12

## 2017-10-03 RX ADMIN — SODIUM CHLORIDE 1000 MILLILITER(S): 9 INJECTION INTRAMUSCULAR; INTRAVENOUS; SUBCUTANEOUS at 22:12

## 2017-10-03 NOTE — ED PROVIDER NOTE - OBJECTIVE STATEMENT
pt bib ems for rle edema, numbness today. no ha, cp, sob, abd pain, d/n/v.  pmd - [facility]

## 2017-10-03 NOTE — ED PROVIDER NOTE - MUSCULOSKELETAL, MLM
RLE edema, tenderness, no palpable pulse + discoloration to foot RLE edema, firmness, no palpable pulse + cyanosis/discoloration to foot

## 2017-10-03 NOTE — ED PROVIDER NOTE - PROGRESS NOTE DETAILS
zohaib ([facility]) to see pt zohaib ([facility]) to take pt to or, requests admit pt to med. christine/w tres (med on call) and terese (hospitalist), they agree terese will admit pt

## 2017-10-03 NOTE — ED ADULT TRIAGE NOTE - CHIEF COMPLAINT QUOTE
patient c/o inability to feel right foot. Foot is cold to touch with purplish color to top of foot and toes. Unable to obtain pulses including with handheld doppler. Dr. Vega made aware at triage

## 2017-10-03 NOTE — ED PROVIDER NOTE - CARE PLAN
Principal Discharge DX:	Fever  Secondary Diagnosis:	Edema of right lower extremity Principal Discharge DX:	Fever  Secondary Diagnosis:	Edema of right lower extremity  Secondary Diagnosis:	Non-traumatic compartment syndrome of right lower extremity

## 2017-10-03 NOTE — CONSULT NOTE ADULT - SUBJECTIVE AND OBJECTIVE BOX
32y Female community ambulator presents c/o R leg swelling since this am. She took too much morphine and passed out, when she woke up this morning she had a swollen calf and a cool foot to the touch. She complains that her leg is numb, but does not have pain out of proportion at this time. However the reliability of the patient is questionable with regard to pain. Denies overt orthopedic trauma to the RLE.    PAST MEDICAL & SURGICAL HISTORY:  Low back pain  Hypothyroid  Bipolar 1 disorder  History of spinal fusion  History of laminectomy    MEDICATIONS  (STANDING):  aztreonam  IVPB 1000 milliGRAM(s) IV Intermittent once  sodium chloride 0.9% Bolus 1000 milliLiter(s) IV Bolus every 1 hour    Allergies    latex (Urticaria; Rash)  penicillin (Unknown)  sulfa drugs (Unknown)    Intolerances                          15.6   24.3  )-----------( 118      ( 03 Oct 2017 22:16 )             47.6     03 Oct 2017 22:16    131    |  102    |  30     ----------------------------<  152    4.6     |  18     |  2.30     Ca    8.3        03 Oct 2017 22:16    TPro  6.5    /  Alb  3.2    /  TBili  0.9    /  DBili  x      /  AST  1377   /  ALT  304    /  AlkPhos  58     03 Oct 2017 22:16    PT/INR - ( 03 Oct 2017 22:16 )   PT: 12.4 sec;   INR: 1.13 ratio         PTT - ( 03 Oct 2017 22:16 )  PTT:31.9 sec  Vital Signs Last 24 Hrs  T(C): 38.6 (10-03-17 @ 21:29), Max: 38.6 (10-03-17 @ 21:29)  T(F): 101.4 (10-03-17 @ 21:29), Max: 101.4 (10-03-17 @ 21:29)  HR: 144 (10-03-17 @ 21:29) (144 - 144)  BP: 136/91 (10-03-17 @ 21:29) (136/91 - 136/91)  BP(mean): --  RR: 20 (10-03-17 @ 21:29) (20 - 20)  SpO2: 93% (10-03-17 @ 21:29) (93% - 93%)    Imaging: XR R Femur/Tib/Fib/Ankle:     Physical Exam  Gen: NAD, AAOx3  RLE: Skin intact, +swelling over calf and distal thigh, DP/PT Pulses not palpable, foot is cold, cap refill >2seconds, no sensation distal to middle of calf, sensation intact proximal to knee    Secondary Survey: Full ROM of unaffected extremities, SILT globally, compartments soft, no bony TTP over bony prominences, no calf TTP, able to SLR with contralateral leg, no TTP along axial spine    A/P: 32y Female with _______  - 32y Female community ambulator presents c/o R leg swelling since this am. She took too much morphine and passed out, when she woke up this morning she had a swollen calf and a cool foot to the touch. She complains that her leg is numb, but does not have pain out of proportion at this time. However the reliability of the patient is questionable with regard to pain. Denies overt orthopedic trauma to the RLE.    PAST MEDICAL & SURGICAL HISTORY:  Low back pain  Hypothyroid  Bipolar 1 disorder  History of spinal fusion  History of laminectomy    MEDICATIONS  (STANDING):  aztreonam  IVPB 1000 milliGRAM(s) IV Intermittent once  sodium chloride 0.9% Bolus 1000 milliLiter(s) IV Bolus every 1 hour    Allergies    latex (Urticaria; Rash)  penicillin (Unknown)  sulfa drugs (Unknown)    Intolerances                          15.6   24.3  )-----------( 118      ( 03 Oct 2017 22:16 )             47.6     03 Oct 2017 22:16    131    |  102    |  30     ----------------------------<  152    4.6     |  18     |  2.30     Ca    8.3        03 Oct 2017 22:16    TPro  6.5    /  Alb  3.2    /  TBili  0.9    /  DBili  x      /  AST  1377   /  ALT  304    /  AlkPhos  58     03 Oct 2017 22:16    PT/INR - ( 03 Oct 2017 22:16 )   PT: 12.4 sec;   INR: 1.13 ratio         PTT - ( 03 Oct 2017 22:16 )  PTT:31.9 sec  Vital Signs Last 24 Hrs  T(C): 38.6 (10-03-17 @ 21:29), Max: 38.6 (10-03-17 @ 21:29)  T(F): 101.4 (10-03-17 @ 21:29), Max: 101.4 (10-03-17 @ 21:29)  HR: 144 (10-03-17 @ 21:29) (144 - 144)  BP: 136/91 (10-03-17 @ 21:29) (136/91 - 136/91)  BP(mean): --  RR: 20 (10-03-17 @ 21:29) (20 - 20)  SpO2: 93% (10-03-17 @ 21:29) (93% - 93%)    Imaging: XR R Femur/Tib/Fib/Ankle:     Physical Exam  Gen: NAD, AAOx3  RLE: Skin intact, +swelling over calf and distal thigh, DP/PT Pulses not palpable, foot is cold, cap refill >2seconds, no sensation distal to middle of calf, sensation intact proximal to knee    Secondary Survey: Full ROM of unaffected extremities, SILT globally, compartments soft, no bony TTP over bony prominences, no calf TTP, able to SLR with contralateral leg, no TTP along axial spine    A/P: 32y Female with possible compartment syndrome  -XRs negative, no orthopedic underlying etiology  -vascular notified, Dr Martines aware  -bety needle unavailable   -will discuss with attending 32y Female community ambulator presents c/o R leg swelling since this am. She took too much morphine and passed out, when she woke up this morning she had a swollen calf and a cool foot to the touch. She complains that her leg is numb, but does not have pain out of proportion at this time. However the reliability of the patient is questionable with regard to pain. Denies overt orthopedic trauma to the RLE.    PAST MEDICAL & SURGICAL HISTORY:  Low back pain  Hypothyroid  Bipolar 1 disorder  History of spinal fusion  History of laminectomy    MEDICATIONS  (STANDING):  aztreonam  IVPB 1000 milliGRAM(s) IV Intermittent once  sodium chloride 0.9% Bolus 1000 milliLiter(s) IV Bolus every 1 hour    Allergies    latex (Urticaria; Rash)  penicillin (Unknown)  sulfa drugs (Unknown)    Intolerances                          15.6   24.3  )-----------( 118      ( 03 Oct 2017 22:16 )             47.6     03 Oct 2017 22:16    131    |  102    |  30     ----------------------------<  152    4.6     |  18     |  2.30     Ca    8.3        03 Oct 2017 22:16    TPro  6.5    /  Alb  3.2    /  TBili  0.9    /  DBili  x      /  AST  1377   /  ALT  304    /  AlkPhos  58     03 Oct 2017 22:16    PT/INR - ( 03 Oct 2017 22:16 )   PT: 12.4 sec;   INR: 1.13 ratio         PTT - ( 03 Oct 2017 22:16 )  PTT:31.9 sec  Vital Signs Last 24 Hrs  T(C): 38.6 (10-03-17 @ 21:29), Max: 38.6 (10-03-17 @ 21:29)  T(F): 101.4 (10-03-17 @ 21:29), Max: 101.4 (10-03-17 @ 21:29)  HR: 144 (10-03-17 @ 21:29) (144 - 144)  BP: 136/91 (10-03-17 @ 21:29) (136/91 - 136/91)  BP(mean): --  RR: 20 (10-03-17 @ 21:29) (20 - 20)  SpO2: 93% (10-03-17 @ 21:29) (93% - 93%)    Imaging: XR R Femur/Tib/Fib/Ankle:     Physical Exam  Gen: NAD, AAOx3  RLE: Skin intact, +swelling over calf and distal thigh, DP/PT Pulses not palpable, foot is cold, cap refill >2seconds, no sensation distal to middle of calf, sensation intact proximal to knee    Secondary Survey: Full ROM of unaffected extremities, SILT globally, compartments soft, no bony TTP over bony prominences, no calf TTP, able to SLR with contralateral leg, no TTP along axial spine    A/P: 32y Female with likely compartment syndrome w/o underlying orthopedic injury  -XRs negative, no orthopedic underlying etiology  -vascular notified, Dr Martines aware  -bety needle unavailable   -Dr Martines to asses patient  -will discuss with attending 32y Female community ambulator presents c/o R leg swelling since this am. She took too much morphine and passed out, when she woke up this morning she had a swollen calf and a cool foot to the touch. She complains that her leg is numb, but does not have pain out of proportion at this time. However the reliability of the patient is questionable with regard to pain. Denies overt orthopedic trauma to the RLE.    PAST MEDICAL & SURGICAL HISTORY:  Low back pain  Hypothyroid  Bipolar 1 disorder  History of spinal fusion  History of laminectomy    MEDICATIONS  (STANDING):  aztreonam  IVPB 1000 milliGRAM(s) IV Intermittent once  sodium chloride 0.9% Bolus 1000 milliLiter(s) IV Bolus every 1 hour    Allergies    latex (Urticaria; Rash)  penicillin (Unknown)  sulfa drugs (Unknown)    Intolerances                          15.6   24.3  )-----------( 118      ( 03 Oct 2017 22:16 )             47.6     03 Oct 2017 22:16    131    |  102    |  30     ----------------------------<  152    4.6     |  18     |  2.30     Ca    8.3        03 Oct 2017 22:16    TPro  6.5    /  Alb  3.2    /  TBili  0.9    /  DBili  x      /  AST  1377   /  ALT  304    /  AlkPhos  58     03 Oct 2017 22:16    PT/INR - ( 03 Oct 2017 22:16 )   PT: 12.4 sec;   INR: 1.13 ratio         PTT - ( 03 Oct 2017 22:16 )  PTT:31.9 sec  Vital Signs Last 24 Hrs  T(C): 38.6 (10-03-17 @ 21:29), Max: 38.6 (10-03-17 @ 21:29)  T(F): 101.4 (10-03-17 @ 21:29), Max: 101.4 (10-03-17 @ 21:29)  HR: 144 (10-03-17 @ 21:29) (144 - 144)  BP: 136/91 (10-03-17 @ 21:29) (136/91 - 136/91)  BP(mean): --  RR: 20 (10-03-17 @ 21:29) (20 - 20)  SpO2: 93% (10-03-17 @ 21:29) (93% - 93%)    Imaging: XR R Femur/Tib/Fib/Ankle:     Physical Exam  Gen: NAD, AAOx3  RLE: Skin intact, +swelling over calf and distal thigh, DP/PT Pulses not palpable, foot is cold, cap refill >2seconds, no sensation distal to middle of calf, sensation intact proximal to knee    Secondary Survey: Full ROM of unaffected extremities, SILT globally, compartments soft, no bony TTP over bony prominences, no calf TTP, able to SLR with contralateral leg, no TTP along axial spine    A/P: 32y Female with likely compartment syndrome w/o underlying orthopedic injury  -XRs negative, no orthopedic underlying etiology  -vascular notified, Dr Martines aware  -bety needle unavailable at [facility]  -Dr Martines to asses patient  -will discuss with attending 32y Female community ambulator presents c/o R leg swelling since this am. She took too much morphine and passed out, when she woke up this morning she had a swollen calf and a cool foot to the touch. She complains that her leg is numb, but does not have pain out of proportion at this time. However the reliability of the patient is questionable with regard to pain. Denies overt orthopedic trauma to the RLE.    PAST MEDICAL & SURGICAL HISTORY:  Low back pain  Hypothyroid  Bipolar 1 disorder  History of spinal fusion  History of laminectomy    MEDICATIONS  (STANDING):  aztreonam  IVPB 1000 milliGRAM(s) IV Intermittent once  sodium chloride 0.9% Bolus 1000 milliLiter(s) IV Bolus every 1 hour    Allergies    latex (Urticaria; Rash)  penicillin (Unknown)  sulfa drugs (Unknown)    Intolerances                          15.6   24.3  )-----------( 118      ( 03 Oct 2017 22:16 )             47.6     03 Oct 2017 22:16    131    |  102    |  30     ----------------------------<  152    4.6     |  18     |  2.30     Ca    8.3        03 Oct 2017 22:16    TPro  6.5    /  Alb  3.2    /  TBili  0.9    /  DBili  x      /  AST  1377   /  ALT  304    /  AlkPhos  58     03 Oct 2017 22:16    PT/INR - ( 03 Oct 2017 22:16 )   PT: 12.4 sec;   INR: 1.13 ratio         PTT - ( 03 Oct 2017 22:16 )  PTT:31.9 sec  Vital Signs Last 24 Hrs  T(C): 38.6 (10-03-17 @ 21:29), Max: 38.6 (10-03-17 @ 21:29)  T(F): 101.4 (10-03-17 @ 21:29), Max: 101.4 (10-03-17 @ 21:29)  HR: 144 (10-03-17 @ 21:29) (144 - 144)  BP: 136/91 (10-03-17 @ 21:29) (136/91 - 136/91)  BP(mean): --  RR: 20 (10-03-17 @ 21:29) (20 - 20)  SpO2: 93% (10-03-17 @ 21:29) (93% - 93%)    Imaging: XR R Femur/Tib/Fib/Ankle:     Physical Exam  Gen: NAD, AAOx3  RLE: Skin intact, +swelling over calf and distal thigh, DP/PT Pulses not palpable, foot is cold, cap refill >2seconds, no sensation distal to middle of calf, sensation intact proximal to knee    Secondary Survey: In boot RLE, Full ROM of unaffected extremities, SILT globally, compartments soft, no bony TTP over bony prominences, no calf TTP, able to SLR with contralateral leg, no TTP along axial spine    A/P: 32y Female with likely compartment syndrome w/o underlying orthopedic injury  -XRs negative, no orthopedic underlying etiology  -vascular notified, Dr Martines aware  -bety needle unavailable at [facility]  -Dr Martines to asses patient  -will discuss with attending

## 2017-10-04 ENCOUNTER — TRANSCRIPTION ENCOUNTER (OUTPATIENT)
Age: 32
End: 2017-10-04

## 2017-10-04 DIAGNOSIS — R50.9 FEVER, UNSPECIFIED: ICD-10-CM

## 2017-10-04 DIAGNOSIS — J95.821 ACUTE POSTPROCEDURAL RESPIRATORY FAILURE: ICD-10-CM

## 2017-10-04 DIAGNOSIS — F31.9 BIPOLAR DISORDER, UNSPECIFIED: ICD-10-CM

## 2017-10-04 DIAGNOSIS — M62.82 RHABDOMYOLYSIS: ICD-10-CM

## 2017-10-04 DIAGNOSIS — T79.A21A TRAUMATIC COMPARTMENT SYNDROME OF RIGHT LOWER EXTREMITY, INITIAL ENCOUNTER: ICD-10-CM

## 2017-10-04 DIAGNOSIS — M54.9 DORSALGIA, UNSPECIFIED: ICD-10-CM

## 2017-10-04 DIAGNOSIS — R74.0 NONSPECIFIC ELEVATION OF LEVELS OF TRANSAMINASE AND LACTIC ACID DEHYDROGENASE [LDH]: ICD-10-CM

## 2017-10-04 DIAGNOSIS — E03.9 HYPOTHYROIDISM, UNSPECIFIED: ICD-10-CM

## 2017-10-04 LAB
ANION GAP SERPL CALC-SCNC: 10 MMOL/L — SIGNIFICANT CHANGE UP (ref 5–17)
ANION GAP SERPL CALC-SCNC: 13 MMOL/L — SIGNIFICANT CHANGE UP (ref 5–17)
APAP SERPL-MCNC: 7 UG/ML — LOW (ref 10–30)
APPEARANCE UR: CLEAR — SIGNIFICANT CHANGE UP
BACTERIA # UR AUTO: ABNORMAL
BASE EXCESS BLDA CALC-SCNC: -4.1 MMOL/L — LOW (ref -2–2)
BASE EXCESS BLDA CALC-SCNC: -9.2 MMOL/L — LOW (ref -2–2)
BILIRUB UR-MCNC: NEGATIVE — SIGNIFICANT CHANGE UP
BLOOD GAS COMMENTS ARTERIAL: SIGNIFICANT CHANGE UP
BUN SERPL-MCNC: 38 MG/DL — HIGH (ref 7–23)
BUN SERPL-MCNC: 38 MG/DL — HIGH (ref 7–23)
CALCIUM SERPL-MCNC: 7.1 MG/DL — LOW (ref 8.5–10.1)
CALCIUM SERPL-MCNC: 7.3 MG/DL — LOW (ref 8.5–10.1)
CHLORIDE SERPL-SCNC: 101 MMOL/L — SIGNIFICANT CHANGE UP (ref 96–108)
CHLORIDE SERPL-SCNC: 102 MMOL/L — SIGNIFICANT CHANGE UP (ref 96–108)
CK SERPL-CCNC: SIGNIFICANT CHANGE UP U/L (ref 26–192)
CK SERPL-CCNC: SIGNIFICANT CHANGE UP U/L (ref 26–192)
CO2 SERPL-SCNC: 20 MMOL/L — LOW (ref 22–31)
CO2 SERPL-SCNC: 23 MMOL/L — SIGNIFICANT CHANGE UP (ref 22–31)
COLOR SPEC: YELLOW — SIGNIFICANT CHANGE UP
COMMENT - URINE: SIGNIFICANT CHANGE UP
CREAT SERPL-MCNC: 3.7 MG/DL — HIGH (ref 0.5–1.3)
CREAT SERPL-MCNC: 4 MG/DL — HIGH (ref 0.5–1.3)
DIFF PNL FLD: ABNORMAL
EPI CELLS # UR: SIGNIFICANT CHANGE UP
GLUCOSE SERPL-MCNC: 174 MG/DL — HIGH (ref 70–99)
GLUCOSE SERPL-MCNC: 195 MG/DL — HIGH (ref 70–99)
GLUCOSE UR QL: 50 MG/DL
HCO3 BLDA-SCNC: 17 MMOL/L — LOW (ref 23–27)
HCO3 BLDA-SCNC: 22 MMOL/L — LOW (ref 23–27)
HCT VFR BLD CALC: 38.4 % — SIGNIFICANT CHANGE UP (ref 34.5–45)
HGB BLD-MCNC: 12.5 G/DL — SIGNIFICANT CHANGE UP (ref 11.5–15.5)
HOROWITZ INDEX BLDA+IHG-RTO: 30 — SIGNIFICANT CHANGE UP
HOROWITZ INDEX BLDA+IHG-RTO: 50 — SIGNIFICANT CHANGE UP
KETONES UR-MCNC: NEGATIVE — SIGNIFICANT CHANGE UP
LACTATE SERPL-SCNC: 1.7 MMOL/L — SIGNIFICANT CHANGE UP (ref 0.7–2)
LEUKOCYTE ESTERASE UR-ACNC: NEGATIVE — SIGNIFICANT CHANGE UP
MAGNESIUM SERPL-MCNC: 2 MG/DL — SIGNIFICANT CHANGE UP (ref 1.6–2.6)
MAGNESIUM SERPL-MCNC: 2.1 MG/DL — SIGNIFICANT CHANGE UP (ref 1.6–2.6)
MCHC RBC-ENTMCNC: 29.9 PG — SIGNIFICANT CHANGE UP (ref 27–34)
MCHC RBC-ENTMCNC: 32.6 GM/DL — SIGNIFICANT CHANGE UP (ref 32–36)
MCV RBC AUTO: 91.6 FL — SIGNIFICANT CHANGE UP (ref 80–100)
MONOCYTES NFR BLD AUTO: 6 % — SIGNIFICANT CHANGE UP (ref 1–9)
NEUTROPHILS NFR BLD AUTO: 80 % — HIGH (ref 43–77)
NITRITE UR-MCNC: NEGATIVE — SIGNIFICANT CHANGE UP
PCO2 BLDA: 27 MMHG — LOW (ref 32–46)
PCO2 BLDA: 34 MMHG — SIGNIFICANT CHANGE UP (ref 32–46)
PCP SPEC-MCNC: SIGNIFICANT CHANGE UP
PH BLDA: 7.29 — LOW (ref 7.35–7.45)
PH BLDA: 7.46 — HIGH (ref 7.35–7.45)
PH UR: 5 — SIGNIFICANT CHANGE UP (ref 5–8)
PHOSPHATE SERPL-MCNC: 4 MG/DL — SIGNIFICANT CHANGE UP (ref 2.5–4.5)
PHOSPHATE SERPL-MCNC: 6.2 MG/DL — HIGH (ref 2.5–4.5)
PLATELET # BLD AUTO: 107 K/UL — LOW (ref 150–400)
PO2 BLDA: 153 MMHG — HIGH (ref 74–108)
PO2 BLDA: 99 MMHG — SIGNIFICANT CHANGE UP (ref 74–108)
POTASSIUM SERPL-MCNC: 4.5 MMOL/L — SIGNIFICANT CHANGE UP (ref 3.5–5.3)
POTASSIUM SERPL-MCNC: 4.6 MMOL/L — SIGNIFICANT CHANGE UP (ref 3.5–5.3)
POTASSIUM SERPL-SCNC: 4.5 MMOL/L — SIGNIFICANT CHANGE UP (ref 3.5–5.3)
POTASSIUM SERPL-SCNC: 4.6 MMOL/L — SIGNIFICANT CHANGE UP (ref 3.5–5.3)
PROT UR-MCNC: 75 MG/DL
RBC # BLD: 4.19 M/UL — SIGNIFICANT CHANGE UP (ref 3.8–5.2)
RBC # FLD: 13.1 % — SIGNIFICANT CHANGE UP (ref 10.3–14.5)
RBC CASTS # UR COMP ASSIST: ABNORMAL /HPF (ref 0–4)
SALICYLATES SERPL-MCNC: <1.7 MG/DL — LOW (ref 2.8–20)
SAO2 % BLDA: 98 % — HIGH (ref 92–96)
SAO2 % BLDA: 98 % — HIGH (ref 92–96)
SODIUM SERPL-SCNC: 134 MMOL/L — LOW (ref 135–145)
SODIUM SERPL-SCNC: 135 MMOL/L — SIGNIFICANT CHANGE UP (ref 135–145)
SP GR SPEC: 1.01 — SIGNIFICANT CHANGE UP (ref 1.01–1.02)
UROBILINOGEN FLD QL: NEGATIVE — SIGNIFICANT CHANGE UP
WBC # BLD: 26.4 K/UL — HIGH (ref 3.8–10.5)
WBC # FLD AUTO: 26.4 K/UL — HIGH (ref 3.8–10.5)
WBC UR QL: SIGNIFICANT CHANGE UP

## 2017-10-04 PROCEDURE — 71010: CPT | Mod: 26,77

## 2017-10-04 PROCEDURE — 99223 1ST HOSP IP/OBS HIGH 75: CPT | Mod: AI,GC

## 2017-10-04 PROCEDURE — 71010: CPT | Mod: 26

## 2017-10-04 PROCEDURE — 99291 CRITICAL CARE FIRST HOUR: CPT

## 2017-10-04 PROCEDURE — 93010 ELECTROCARDIOGRAM REPORT: CPT

## 2017-10-04 PROCEDURE — 12345: CPT | Mod: NC

## 2017-10-04 RX ORDER — SODIUM BICARBONATE 1 MEQ/ML
50 SYRINGE (ML) INTRAVENOUS
Qty: 0 | Refills: 0 | Status: COMPLETED | OUTPATIENT
Start: 2017-10-04 | End: 2017-10-04

## 2017-10-04 RX ORDER — PROPOFOL 10 MG/ML
30 INJECTION, EMULSION INTRAVENOUS
Qty: 1000 | Refills: 0 | Status: DISCONTINUED | OUTPATIENT
Start: 2017-10-04 | End: 2017-10-04

## 2017-10-04 RX ORDER — RISPERIDONE 4 MG/1
4 TABLET ORAL AT BEDTIME
Qty: 0 | Refills: 0 | Status: DISCONTINUED | OUTPATIENT
Start: 2017-10-04 | End: 2017-10-04

## 2017-10-04 RX ORDER — AZTREONAM 2 G
1000 VIAL (EA) INJECTION EVERY 6 HOURS
Qty: 0 | Refills: 0 | Status: DISCONTINUED | OUTPATIENT
Start: 2017-10-04 | End: 2017-10-04

## 2017-10-04 RX ORDER — SODIUM CHLORIDE 9 MG/ML
1000 INJECTION, SOLUTION INTRAVENOUS
Qty: 0 | Refills: 0 | Status: DISCONTINUED | OUTPATIENT
Start: 2017-10-04 | End: 2017-10-04

## 2017-10-04 RX ORDER — BUDESONIDE AND FORMOTEROL FUMARATE DIHYDRATE 160; 4.5 UG/1; UG/1
2 AEROSOL RESPIRATORY (INHALATION)
Qty: 0 | Refills: 0 | Status: DISCONTINUED | OUTPATIENT
Start: 2017-10-04 | End: 2017-10-04

## 2017-10-04 RX ORDER — RISPERIDONE 4 MG/1
8 TABLET ORAL AT BEDTIME
Qty: 0 | Refills: 0 | Status: DISCONTINUED | OUTPATIENT
Start: 2017-10-04 | End: 2017-10-06

## 2017-10-04 RX ORDER — AZTREONAM 2 G
500 VIAL (EA) INJECTION ONCE
Qty: 0 | Refills: 0 | Status: COMPLETED | OUTPATIENT
Start: 2017-10-04 | End: 2017-10-04

## 2017-10-04 RX ORDER — TOPIRAMATE 25 MG
200 TABLET ORAL
Qty: 0 | Refills: 0 | Status: DISCONTINUED | OUTPATIENT
Start: 2017-10-04 | End: 2017-10-04

## 2017-10-04 RX ORDER — CLONAZEPAM 1 MG
2 TABLET ORAL EVERY 6 HOURS
Qty: 0 | Refills: 0 | Status: DISCONTINUED | OUTPATIENT
Start: 2017-10-04 | End: 2017-10-06

## 2017-10-04 RX ORDER — BUPROPION HYDROCHLORIDE 150 MG/1
300 TABLET, EXTENDED RELEASE ORAL DAILY
Qty: 0 | Refills: 0 | Status: DISCONTINUED | OUTPATIENT
Start: 2017-10-04 | End: 2017-10-06

## 2017-10-04 RX ORDER — ACETAMINOPHEN 500 MG
650 TABLET ORAL EVERY 6 HOURS
Qty: 0 | Refills: 0 | Status: DISCONTINUED | OUTPATIENT
Start: 2017-10-06 | End: 2017-10-27

## 2017-10-04 RX ORDER — ALBUMIN HUMAN 25 %
50 VIAL (ML) INTRAVENOUS EVERY 6 HOURS
Qty: 0 | Refills: 0 | Status: COMPLETED | OUTPATIENT
Start: 2017-10-04 | End: 2017-10-04

## 2017-10-04 RX ORDER — INSULIN HUMAN 100 [IU]/ML
10 INJECTION, SOLUTION SUBCUTANEOUS ONCE
Qty: 0 | Refills: 0 | Status: DISCONTINUED | OUTPATIENT
Start: 2017-10-04 | End: 2017-10-04

## 2017-10-04 RX ORDER — HYDROMORPHONE HYDROCHLORIDE 2 MG/ML
2 INJECTION INTRAMUSCULAR; INTRAVENOUS; SUBCUTANEOUS ONCE
Qty: 0 | Refills: 0 | Status: DISCONTINUED | OUTPATIENT
Start: 2017-10-04 | End: 2017-10-04

## 2017-10-04 RX ORDER — CHLORHEXIDINE GLUCONATE 213 G/1000ML
15 SOLUTION TOPICAL EVERY 12 HOURS
Qty: 0 | Refills: 0 | Status: DISCONTINUED | OUTPATIENT
Start: 2017-10-04 | End: 2017-10-05

## 2017-10-04 RX ORDER — INSULIN HUMAN 100 [IU]/ML
10 INJECTION, SOLUTION SUBCUTANEOUS ONCE
Qty: 0 | Refills: 0 | Status: COMPLETED | OUTPATIENT
Start: 2017-10-04 | End: 2017-10-04

## 2017-10-04 RX ORDER — CLONAZEPAM 1 MG
2 TABLET ORAL EVERY 6 HOURS
Qty: 0 | Refills: 0 | Status: DISCONTINUED | OUTPATIENT
Start: 2017-10-04 | End: 2017-10-04

## 2017-10-04 RX ORDER — AZTREONAM 2 G
VIAL (EA) INJECTION
Qty: 0 | Refills: 0 | Status: DISCONTINUED | OUTPATIENT
Start: 2017-10-04 | End: 2017-10-04

## 2017-10-04 RX ORDER — DEXTROSE 50 % IN WATER 50 %
50 SYRINGE (ML) INTRAVENOUS ONCE
Qty: 0 | Refills: 0 | Status: COMPLETED | OUTPATIENT
Start: 2017-10-04 | End: 2017-10-04

## 2017-10-04 RX ORDER — ALBUTEROL 90 UG/1
2.5 AEROSOL, METERED ORAL ONCE
Qty: 0 | Refills: 0 | Status: COMPLETED | OUTPATIENT
Start: 2017-10-04 | End: 2017-10-04

## 2017-10-04 RX ORDER — LEVOTHYROXINE SODIUM 125 MCG
150 TABLET ORAL DAILY
Qty: 0 | Refills: 0 | Status: DISCONTINUED | OUTPATIENT
Start: 2017-10-04 | End: 2017-10-04

## 2017-10-04 RX ORDER — FENTANYL CITRATE 50 UG/ML
50 INJECTION INTRAVENOUS
Qty: 0 | Refills: 0 | Status: DISCONTINUED | OUTPATIENT
Start: 2017-10-04 | End: 2017-10-04

## 2017-10-04 RX ORDER — LAMOTRIGINE 25 MG/1
100 TABLET, ORALLY DISINTEGRATING ORAL EVERY 8 HOURS
Qty: 0 | Refills: 0 | Status: DISCONTINUED | OUTPATIENT
Start: 2017-10-04 | End: 2017-10-06

## 2017-10-04 RX ORDER — SERTRALINE 25 MG/1
50 TABLET, FILM COATED ORAL DAILY
Qty: 0 | Refills: 0 | Status: DISCONTINUED | OUTPATIENT
Start: 2017-10-04 | End: 2017-10-04

## 2017-10-04 RX ORDER — SERTRALINE 25 MG/1
50 TABLET, FILM COATED ORAL DAILY
Qty: 0 | Refills: 0 | Status: DISCONTINUED | OUTPATIENT
Start: 2017-10-04 | End: 2017-10-06

## 2017-10-04 RX ORDER — FUROSEMIDE 40 MG
60 TABLET ORAL ONCE
Qty: 0 | Refills: 0 | Status: COMPLETED | OUTPATIENT
Start: 2017-10-04 | End: 2017-10-04

## 2017-10-04 RX ORDER — SODIUM CHLORIDE 9 MG/ML
1000 INJECTION, SOLUTION INTRAVENOUS
Qty: 0 | Refills: 0 | Status: DISCONTINUED | OUTPATIENT
Start: 2017-10-04 | End: 2017-10-06

## 2017-10-04 RX ORDER — RISPERIDONE 4 MG/1
4 TABLET ORAL
Qty: 0 | Refills: 0 | Status: DISCONTINUED | OUTPATIENT
Start: 2017-10-04 | End: 2017-10-06

## 2017-10-04 RX ORDER — FENTANYL CITRATE 50 UG/ML
100 INJECTION INTRAVENOUS ONCE
Qty: 0 | Refills: 0 | Status: DISCONTINUED | OUTPATIENT
Start: 2017-10-04 | End: 2017-10-04

## 2017-10-04 RX ORDER — AZTREONAM 2 G
500 VIAL (EA) INJECTION EVERY 8 HOURS
Qty: 0 | Refills: 0 | Status: DISCONTINUED | OUTPATIENT
Start: 2017-10-04 | End: 2017-10-06

## 2017-10-04 RX ORDER — LAMOTRIGINE 25 MG/1
100 TABLET, ORALLY DISINTEGRATING ORAL EVERY 8 HOURS
Qty: 0 | Refills: 0 | Status: DISCONTINUED | OUTPATIENT
Start: 2017-10-04 | End: 2017-10-04

## 2017-10-04 RX ORDER — ALBUTEROL 90 UG/1
2 AEROSOL, METERED ORAL EVERY 6 HOURS
Qty: 0 | Refills: 0 | Status: DISCONTINUED | OUTPATIENT
Start: 2017-10-04 | End: 2017-10-04

## 2017-10-04 RX ORDER — LEVOTHYROXINE SODIUM 125 MCG
75 TABLET ORAL DAILY
Qty: 0 | Refills: 0 | Status: DISCONTINUED | OUTPATIENT
Start: 2017-10-04 | End: 2017-10-05

## 2017-10-04 RX ORDER — HYDROMORPHONE HYDROCHLORIDE 2 MG/ML
2 INJECTION INTRAMUSCULAR; INTRAVENOUS; SUBCUTANEOUS EVERY 4 HOURS
Qty: 0 | Refills: 0 | Status: DISCONTINUED | OUTPATIENT
Start: 2017-10-04 | End: 2017-10-06

## 2017-10-04 RX ORDER — TOPIRAMATE 25 MG
400 TABLET ORAL
Qty: 0 | Refills: 0 | Status: DISCONTINUED | OUTPATIENT
Start: 2017-10-04 | End: 2017-10-06

## 2017-10-04 RX ADMIN — Medication 60 MILLIGRAM(S): at 16:10

## 2017-10-04 RX ADMIN — Medication 50 MILLILITER(S): at 15:19

## 2017-10-04 RX ADMIN — HYDROMORPHONE HYDROCHLORIDE 2 MILLIGRAM(S): 2 INJECTION INTRAMUSCULAR; INTRAVENOUS; SUBCUTANEOUS at 21:55

## 2017-10-04 RX ADMIN — HYDROMORPHONE HYDROCHLORIDE 2 MILLIGRAM(S): 2 INJECTION INTRAMUSCULAR; INTRAVENOUS; SUBCUTANEOUS at 05:49

## 2017-10-04 RX ADMIN — INSULIN HUMAN 10 UNIT(S): 100 INJECTION, SOLUTION SUBCUTANEOUS at 09:10

## 2017-10-04 RX ADMIN — FENTANYL CITRATE 50 MICROGRAM(S): 50 INJECTION INTRAVENOUS at 15:59

## 2017-10-04 RX ADMIN — FENTANYL CITRATE 50 MICROGRAM(S): 50 INJECTION INTRAVENOUS at 16:19

## 2017-10-04 RX ADMIN — LAMOTRIGINE 100 MILLIGRAM(S): 25 TABLET, ORALLY DISINTEGRATING ORAL at 21:32

## 2017-10-04 RX ADMIN — Medication 50 MILLILITER(S): at 18:16

## 2017-10-04 RX ADMIN — Medication 400 MILLIGRAM(S): at 18:33

## 2017-10-04 RX ADMIN — Medication 50 MILLILITER(S): at 08:56

## 2017-10-04 RX ADMIN — SODIUM CHLORIDE 150 MILLILITER(S): 9 INJECTION, SOLUTION INTRAVENOUS at 05:50

## 2017-10-04 RX ADMIN — Medication 75 MICROGRAM(S): at 05:49

## 2017-10-04 RX ADMIN — Medication 2 MILLIGRAM(S): at 19:10

## 2017-10-04 RX ADMIN — Medication 50 MILLIEQUIVALENT(S): at 09:12

## 2017-10-04 RX ADMIN — FENTANYL CITRATE 50 MICROGRAM(S): 50 INJECTION INTRAVENOUS at 16:45

## 2017-10-04 RX ADMIN — HYDROMORPHONE HYDROCHLORIDE 2 MILLIGRAM(S): 2 INJECTION INTRAMUSCULAR; INTRAVENOUS; SUBCUTANEOUS at 17:40

## 2017-10-04 RX ADMIN — Medication 100 MILLIGRAM(S): at 09:12

## 2017-10-04 RX ADMIN — SODIUM CHLORIDE 125 MILLILITER(S): 9 INJECTION, SOLUTION INTRAVENOUS at 15:38

## 2017-10-04 RX ADMIN — BUPROPION HYDROCHLORIDE 300 MILLIGRAM(S): 150 TABLET, EXTENDED RELEASE ORAL at 18:14

## 2017-10-04 RX ADMIN — Medication 50 MILLIGRAM(S): at 16:58

## 2017-10-04 RX ADMIN — Medication 50 MILLIEQUIVALENT(S): at 12:05

## 2017-10-04 RX ADMIN — ALBUTEROL 2.5 MILLIGRAM(S): 90 AEROSOL, METERED ORAL at 09:24

## 2017-10-04 RX ADMIN — HYDROMORPHONE HYDROCHLORIDE 2 MILLIGRAM(S): 2 INJECTION INTRAMUSCULAR; INTRAVENOUS; SUBCUTANEOUS at 06:36

## 2017-10-04 RX ADMIN — SODIUM CHLORIDE 75 MILLILITER(S): 9 INJECTION, SOLUTION INTRAVENOUS at 09:12

## 2017-10-04 RX ADMIN — FENTANYL CITRATE 50 MICROGRAM(S): 50 INJECTION INTRAVENOUS at 16:15

## 2017-10-04 RX ADMIN — Medication 50 MILLIGRAM(S): at 21:32

## 2017-10-04 RX ADMIN — PROPOFOL 16.33 MICROGRAM(S)/KG/MIN: 10 INJECTION, EMULSION INTRAVENOUS at 10:30

## 2017-10-04 RX ADMIN — HYDROMORPHONE HYDROCHLORIDE 2 MILLIGRAM(S): 2 INJECTION INTRAMUSCULAR; INTRAVENOUS; SUBCUTANEOUS at 17:25

## 2017-10-04 RX ADMIN — PROPOFOL 16.33 MICROGRAM(S)/KG/MIN: 10 INJECTION, EMULSION INTRAVENOUS at 05:50

## 2017-10-04 RX ADMIN — SERTRALINE 50 MILLIGRAM(S): 25 TABLET, FILM COATED ORAL at 18:14

## 2017-10-04 RX ADMIN — Medication 100 MILLIGRAM(S): at 18:00

## 2017-10-04 RX ADMIN — RISPERIDONE 4 MILLIGRAM(S): 4 TABLET ORAL at 18:14

## 2017-10-04 RX ADMIN — RISPERIDONE 8 MILLIGRAM(S): 4 TABLET ORAL at 21:32

## 2017-10-04 RX ADMIN — SODIUM CHLORIDE 150 MILLILITER(S): 9 INJECTION, SOLUTION INTRAVENOUS at 04:23

## 2017-10-04 RX ADMIN — CHLORHEXIDINE GLUCONATE 15 MILLILITER(S): 213 SOLUTION TOPICAL at 05:49

## 2017-10-04 RX ADMIN — HYDROMORPHONE HYDROCHLORIDE 2 MILLIGRAM(S): 2 INJECTION INTRAMUSCULAR; INTRAVENOUS; SUBCUTANEOUS at 21:39

## 2017-10-04 NOTE — PROGRESS NOTE ADULT - PROBLEM SELECTOR PLAN 3
- Worsening GHAZAL 2/2 ATN from rhabdomyolysis, strict I/O's, on bicarb gtt,     monitor BMP/Mg/Phos, resolved hyperkalemia, f/up renal

## 2017-10-04 NOTE — PROGRESS NOTE ADULT - SUBJECTIVE AND OBJECTIVE BOX
Interval events: admitted overnight with right leg compartment syndrome and ischemic leg, acute rhabdomyolysis, GHAZAL 2/2 ATN, and leukocoytosis with bandemia s/p emergent fasciotomy of the 4 leg compartments (anterior, lateral, superficial posterior, and deep posterior) and the lateral thigh muscles. The muscles within all 4 compartments noted to be edematous and ischemic.  Upon opening compartments, perfusion visibly improved through all muscles of the lower leg. Right lateral thigh incision 17cm, right lateral calf incision 28 cm, right medial calf incision 45 cm. She has pulses on bedside ultrasound today.    Review of Systems: unable to obtain given intubated and sedated    T(F): 100 (10-04-17 @ 12:13), Max: 101.4 (10-03-17 @ 21:29)  HR: 130 (10-04-17 @ 15:53) (118 - 144)  BP: 95/60 (10-04-17 @ 15:30) (94/60 - 150/87)  RR: 25 (10-04-17 @ 15:30) (10 - 51)  SpO2: 98% (10-04-17 @ 15:53) (93% - 100%)    Mode: CPAP with PS, FiO2: 30, PEEP: 5    I&O's Summary    03 Oct 2017 07:01  -  04 Oct 2017 07:00  --------------------------------------------------------  IN: 644.2 mL / OUT: 100 mL / NET: 544.2 mL    Physical Exam:     Gen: intubated and sedated  Neuro: opens eyes, follows commands, PERRL, moving all extremities  HEENT: NC/AT; EOMI; MMM, +ET tube  CV: normal S1 & S2; regular rhythm, tachycardic  Pulm: clear to auscultation bilaterally   GI: soft; NT/ND  Ext: right leg wrapped, edema of foot, +pulses with ultrasound; L leg with trace edema, +cast for achilles tendonitis  Skin: warm, well perfused    Meds:  clindamycin IVPB 900 milliGRAM(s) IV Intermittent every 8 hours  furosemide   Injectable 60 milliGRAM(s) IV Push once  levothyroxine Injectable 75 MICROGram(s) IV Push daily  clonazePAM Tablet 2 milliGRAM(s) Oral every 6 hours  HYDROmorphone  Injectable 2 milliGRAM(s) IV Push every 4 hours PRN  lamoTRIgine 100 milliGRAM(s) Oral every 8 hours  propofol Infusion 30 MICROgram(s)/kG/Min IV Continuous <Continuous>  sertraline 50 milliGRAM(s) Oral daily  albumin human 25% IVPB 50 milliLiter(s) IV Intermittent every 6 hours  dextrose 5% + sodium chloride 0.45% 1000 milliLiter(s) IV Continuous <Continuous>  chlorhexidine 0.12% Liquid 15 milliLiter(s) Swish and Spit every 12 hours                        12.5   26.4  )-----------( 107      ( 04 Oct 2017 07:36 )             38.4     Bands 14.0    135  |  102  |  38  ----------------------<  195  4.6   |  23  |  3.70    Ca    7.1<L>      04 Oct 2017 12:46  Phos  6.2     10-  Mg     2.0     10-    TPro  4.8<L>  /  Alb  2.2<L>  /  TBili  1.0  /  DBili  x   /  AST  968<H>  /  ALT  245<H>  /  AlkPhos  42  10-    Lactate 1.7           10- @ 12:46    Lactate 1.7           10 @ 22:16      CARDIAC MARKERS ( 04 Oct 2017 12:46 )  x     / x     / 61062 U/L / x     / x      CARDIAC MARKERS ( 04 Oct 2017 06:41 )  x     / x     / 33410 U/L / x     / x          PT/INR - ( 03 Oct 2017 22:16 )   PT: 12.4 sec;   INR: 1.13 ratio         PTT - ( 03 Oct 2017 22:16 )  PTT:31.9 sec  Urinalysis Basic - ( 04 Oct 2017 06:51 )    Color: Yellow / Appearance: Clear / S.010 / pH: x  Gluc: x / Ketone: Negative  / Bili: Negative / Urobili: Negative   Blood: x / Protein: 75 mg/dL / Nitrite: Negative   Leuk Esterase: Negative / RBC: 3-5 /HPF / WBC 0-2   Sq Epi: x / Non Sq Epi: Few / Bacteria: Occasional    ABG - ( 04 Oct 2017 05:35 )  pH: 7.29  /  pCO2: 34    /  pO2: 153   / HCO3: 17    / Base Excess: -9.2  /  SaO2: 98        CXR: clear lungs, no pleff, no consolidation, no ptx, +OG tube in stomach, ET tube above gaurang    CENTRAL LINE: N    COLE: Y     (Remove: N)    A-LINE: Y     DATE INSERTED: 10/4)    REMOVE: N    GLOBAL ISSUE/BEST PRACTICE:  Analgesia: yes  Sedation: yes  HOB elevation: yes  Stress ulcer prophylaxis: n/a  VTE prophylaxis: scd  Glycemic control:  yes  Nutrition: yes    CODE STATUS: full

## 2017-10-04 NOTE — PROGRESS NOTE ADULT - PROBLEM SELECTOR PLAN 2
Maintain CMV with lung protective setting   Vent bundle in place   Poss OR return Maintain CMV with lung protective setting   Vent bundle in place   Post op pain Mgt will likely be an issue given high degree of narc dependance   takes dilaudid 8mg po QID and Morphine as well

## 2017-10-04 NOTE — PROGRESS NOTE ADULT - SUBJECTIVE AND OBJECTIVE BOX
Patient is a 32y old  Female who presents with a chief complaint of RLE edema and numbness (04 Oct 2017 01:13) s/p Rt leg fasciotomy 2/2 compartment syndrome. Extubated today and clinically doing well on NC. Not on pressors.    PAST MEDICAL & SURGICAL HISTORY:  Low back pain  Hypothyroid  Bipolar 1 disorder  History of spinal fusion  History of laminectomy      BRIEF HOSPITAL COURSE:    Review of Systems:                                                    All other ROS are negative.    ICU Vital Signs Last 24 Hrs  T(C): 37.2 (04 Oct 2017 20:01), Max: 37.8 (04 Oct 2017 11:47)  T(F): 99 (04 Oct 2017 20:01), Max: 100 (04 Oct 2017 11:47)  HR: 123 (04 Oct 2017 21:00) (118 - 137)  BP: 108/56 (04 Oct 2017 21:00) (91/55 - 150/87)  BP(mean): 76 (04 Oct 2017 21:00) (68 - 91)  ABP: 107/72 (04 Oct 2017 16:30) (98/70 - 156/93)  ABP(mean): 84 (04 Oct 2017 16:30) (81 - 112)  RR: 26 (04 Oct 2017 21:00) (10 - 51)  SpO2: 99% (04 Oct 2017 21:00) (95% - 100%)    Physical Examination:    General: alert, awake, good spirits    HEENT: atraumatic, pupils equal and reactive     PULM: CTA b/l    CVS: s1s2 RRR    ABD: soft +BS nt nd     EXT: Rt LE dressing drainage noted on pillow, pink and warm,  DP/PT pulses palpated, moves toes on command    Neuro:    ABG - ( 04 Oct 2017 16:48 )  pH: 7.46  /  pCO2: 27    /  pO2: 99    / HCO3: 22    / Base Excess: -4.1  /  SaO2: 98                LABS:                        12.5   26.4  )-----------( 107      ( 04 Oct 2017 07:36 )             38.4     10-04    134<L>  |  101  |  38<H>  ----------------------------<  174<H>  4.5   |  20<L>  |  4.00<H>    Ca    7.3<L>      04 Oct 2017 17:13  Phos  4.0     10-  Mg     2.1     10-04    TPro  4.8<L>  /  Alb  2.2<L>  /  TBili  1.0  /  DBili  x   /  AST  968<H>  /  ALT  245<H>  /  AlkPhos  42  10-04      CARDIAC MARKERS ( 04 Oct 2017 12:46 )  x     / x     / 00032 U/L / x     / x      CARDIAC MARKERS ( 04 Oct 2017 06:41 )  x     / x     / 74365 U/L / x     / x          CAPILLARY BLOOD GLUCOSE          PT/INR - ( 03 Oct 2017 22:16 )   PT: 12.4 sec;   INR: 1.13 ratio         PTT - ( 03 Oct 2017 22:16 )  PTT:31.9 sec  Urinalysis Basic - ( 04 Oct 2017 06:51 )    Color: Yellow / Appearance: Clear / S.010 / pH: x  Gluc: x / Ketone: Negative  / Bili: Negative / Urobili: Negative   Blood: x / Protein: 75 mg/dL / Nitrite: Negative   Leuk Esterase: Negative / RBC: 3-5 /HPF / WBC 0-2   Sq Epi: x / Non Sq Epi: Few / Bacteria: Occasional      CULTURES:      Medications:  aztreonam  IVPB 500 milliGRAM(s) IV Intermittent every 8 hours  clindamycin IVPB 900 milliGRAM(s) IV Intermittent every 8 hours        buPROPion XL . 300 milliGRAM(s) Oral daily  clonazePAM Tablet 2 milliGRAM(s) Oral every 6 hours PRN  HYDROmorphone  Injectable 2 milliGRAM(s) IV Push every 4 hours PRN  lamoTRIgine 100 milliGRAM(s) Oral every 8 hours  risperiDONE   Tablet 4 milliGRAM(s) Oral <User Schedule>  risperiDONE   Tablet 8 milliGRAM(s) Oral at bedtime  sertraline 50 milliGRAM(s) Oral daily  topiramate 400 milliGRAM(s) Oral two times a day            levothyroxine Injectable 75 MICROGram(s) IV Push daily    dextrose 5% + sodium chloride 0.45% 1000 milliLiter(s) IV Continuous <Continuous>      chlorhexidine 0.12% Liquid 15 milliLiter(s) Swish and Spit every 12 hours        RADIOLOGY/IMAGING/ECHO    Critical care point of care ultrasound:    Assessment/Plan:  32F PMH Asthma, Bipolar disorder, hypothyroidism, chronic low back pain s/p laminectomy and spinal fusion, opioid-dependent found to have ischemic leg due to acute compartment syndrome of right leg with acute rhabdomyolysis, GHAZAL 2/2 ATN, and leukocytosis with bandemia s/p emergent fasciotomy  edematous and ischemic with improved perfusion upon opening the compartments. Extubated today and with oliguric renal failure.    Cv-BP stable, not on vasopressors   Pulm-s/p extubation today, stable on nc, keep o2S>94%  GI-tolerating reg diet   Renal-GHAZAL 2/2 rhabdo, strict I/O's, cont Bicarb drip, hyperkalemia improved, Renal following   ID-cont Aztreonam and Clinda   ICU-DVT prophylaxis on left leg, cautious w/ AC due drainage of rt leg              Critical Care time: 35min   (Reviewing data, imaging, discussing with multidisciplinary team, non inclusive of procedures, discussing goals of care with patient/family)

## 2017-10-04 NOTE — PROGRESS NOTE ADULT - SUBJECTIVE AND OBJECTIVE BOX
The patient was interviewed and evaluated  32y Female    Vital Signs Last 24 Hrs  T(C): 37.1 (04 Oct 2017 07:59), Max: 38.6 (03 Oct 2017 21:29)  T(F): 98.7 (04 Oct 2017 07:59), Max: 101.4 (03 Oct 2017 21:29)  HR: 122 (04 Oct 2017 08:00) (118 - 144)  BP: 98/69 (04 Oct 2017 08:00) (95/65 - 150/87)  BP(mean): 77 (04 Oct 2017 08:00) (76 - 91)  RR: 22 (04 Oct 2017 08:00) (10 - 51)  SpO2: 100% (04 Oct 2017 08:00) (93% - 100%)    Pt seen, doing well, no anesthesia complications or complaints noted or reported.   No Nausea Patient intubated, considering extubation today    No additional recommendations.     Pain well controlled

## 2017-10-04 NOTE — H&P ADULT - ATTENDING COMMENTS
Pt is a 33 yo female with PMHX of Bipolar disorder, hypothyroidism s/p laminectomy and spinal fusion in 2016 admitted for Right LE compartment syndrome.  Vascular consulted, emergent OR.  Possible ICU management afterwards.  Hospitalists will continue to follow patient after procedure.  Continue IV abx.

## 2017-10-04 NOTE — PROGRESS NOTE ADULT - SUBJECTIVE AND OBJECTIVE BOX
Patient is a 32y old  Female who presents with a chief complaint of RLE edema and numbness (04 Oct 2017 01:13)      BRIEF HOSPITAL COURSE: 32 year old female PMHx.  Bipolar Do, Hypothyroid, Asthma, Chronic Back pain, Narc dependant.  S/P laminectomy and spinal fusion 2016, recent epidural injection.  Presented to ED via ambulance for right leg swelling, and numbness.  Per pt's mother the patient had taken a large dose of morphine for back pain and had been lethergic and poorly arrousable for some time and was seen kneeling at her bed in the morning then noted lying on bathroom floor for an undetermined amount of time.  Suymptoms began poss 18 hours PTA and the mother finially convinced her to go to the hospital.    Events last 24 hours: In ED R LE noted to have, No pain, but pallor,  pulselessness,  poikilothermia, and parasthenia.  Now s/p emergent Lower extremity fasciotomy for compartment syndrome.  Also noted to have GHAZAL, transaminitis and leukocytosis     PAST MEDICAL & SURGICAL HISTORY:  Low back pain  Hypothyroid  Bipolar 1 disorder  History of spinal fusion  History of laminectomy      Review of Systems: unable to access       Medications:  clindamycin IVPB 900 milliGRAM(s) IV Intermittent every 8 hours  HYDROmorphone  Injectable 2 milliGRAM(s) IV Push every 4 hours PRN  propofol Infusion 30 MICROgram(s)/kG/Min IV Continuous <Continuous>  levothyroxine Injectable 75 MICROGram(s) IV Push daily  lactated ringers. 1000 milliLiter(s) IV Continuous <Continuous>  chlorhexidine 0.12% Liquid 15 milliLiter(s) Swish and Spit every 12 hours        Mode: AC/ CMV (Assist Control/ Continuous Mandatory Ventilation)  RR (machine): 12  TV (machine): 450  FiO2: 50  PEEP: 5  ITime: 1  MAP: 12  PIP: 21      ICU Vital Signs Last 24 Hrs  T(C): 37.4 (04 Oct 2017 04:35), Max: 38.6 (03 Oct 2017 21:29)  T(F): 99.3 (04 Oct 2017 04:35), Max: 101.4 (03 Oct 2017 21:29)  HR: 121 (04 Oct 2017 05:00) (118 - 144)  BP: 112/68 (04 Oct 2017 05:00) (110/65 - 150/87)  BP(mean): 77 (04 Oct 2017 05:00) (77 - 91)  RR: 51 (04 Oct 2017 05:00) (10 - 51)  SpO2: 100% (04 Oct 2017 05:00) (93% - 100%)      ABG - ( 04 Oct 2017 05:35 )  pH: 7.29  /  pCO2: 34    /  pO2: 153   / HCO3: 17    / Base Excess: -9.2  /  SaO2: 98                  I&O's Detail    03 Oct 2017 07:01  -  04 Oct 2017 05:53  --------------------------------------------------------  IN:    lactated ringers.: 150 mL  Total IN: 150 mL    OUT:    Indwelling Catheter - Urethral: 50 mL  Total OUT: 50 mL    Total NET: 100 mL            LABS:                        15.6   24.3  )-----------( 118      ( 03 Oct 2017 22:16 )             47.6     10-03    131<L>  |  102  |  30<H>  ----------------------------<  152<H>  4.6   |  18<L>  |  2.30<H>    Ca    8.3<L>      03 Oct 2017 22:16    TPro  6.5  /  Alb  3.2<L>  /  TBili  0.9  /  DBili  x   /  AST  1377<H>  /  ALT  304<H>  /  AlkPhos  58  10-03          CAPILLARY BLOOD GLUCOSE        PT/INR - ( 03 Oct 2017 22:16 )   PT: 12.4 sec;   INR: 1.13 ratio         PTT - ( 03 Oct 2017 22:16 )  PTT:31.9 sec    CULTURES:          Physical Examination:    General: Sedated on Vent RASS -2,     HEENT: Pupils equal, reactive to light.  Symmetric. No JVD    PULM: Clear to auscultation bilaterally, no significant sputum production    CVS: Regular rate and rhythm, no murmurs, rubs, or gallops    ABD: Soft, nondistended, nontender, normoactive bowel sounds, no masses    EXT: R LE dressing wrap thigh to ankle,  R heel dusky with plantar aspect also dusky,  Cap refil 4 sec at  Hallux.  L Foot in cam boot ( recent injury Unk Type)  No Pulses to R LE,  L Bounding pulsed AT/DP    SKIN: Warm and well perfused, no rashes noted.          RADIOLOGY:   < from: US Duplex Venous Lower Ext Complete, Bilateral (10.03.17 @ 23:04) >  EXAM:  US DPLX LWR EXT VEINS COMPL BI                            PROCEDURE DATE:  10/03/2017          INTERPRETATION:  CLINICAL INFORMATION: Right lower extremity edema. Cold   right lower extremity. Rule out DVT    COMPARISON: Bilateral lower extremity duplex of 5/4/2016.    TECHNIQUE: Duplex sonography of the BILATERAL LOWER extremities with   color and spectral Doppler, with and without compression.      FINDINGS:    Right lower extremity:    Limited evaluation of the right lower extremity secondary to overlying   edema. The right common femoral and proximal and mid femoral veins are   patent and compressible demonstrating spontaneous and phasic venous blood   flow. The remaining distal venous structures within the right leg are not   visualized.    Left lower extremity:    There is normal compressibility of the left common femoral, femoral and   popliteal veins. No calf vein thrombosis is detected.    Doppler examination shows normal spontaneous and phasic flow.    IMPRESSION:     Extremely limited evaluation of the right lower extremity with   nonvisualization of the venous structures beyond the mid right femoral   vein. No evidence of DVT within the right common femoral, proximal and   mid femoral veins.    No evidence of leftlower extremity deep venous thrombosis.    Repeat examination may be obtained as clinically warranted.    The vascular technologist discussed the limitations of the examination   with Dr. Tyler at the time of completion.     < end of copied text >        CRITICAL CARE TIME SPENT: 45 minutes   (Reviewing data, imaging, discussing with multidisciplinary team, non inclusive of procedures, discussing goals of care with patient/family)

## 2017-10-04 NOTE — H&P ADULT - PROBLEM SELECTOR PLAN 2
-Most likely 2/2 compartment syndrome, Tylenol given in ED and pt became afebrile  -Tylenol prn for fevers

## 2017-10-04 NOTE — PROGRESS NOTE ADULT - ASSESSMENT
32F PMH Asthma, Bipolar disorder, hypothyroidism, chronic low back pain s/p laminectomy and spinal fusion, opioid-dependent presents with right leg swelling, numbness, pallor, poikilothermia, and pulselessness, found to have ischemic leg due to acute compartment syndrome of right leg with acute rhabdomyolysis, GHAZAL 2/2 ATN, and leukocytosis with bandemia s/p emergent fasciotomy of the 4 leg compartments (anterior, lateral, superficial posterior, and deep posterior) and the lateral thigh muscles. The muscles were noted to be edematous and ischemic with improved perfusion upon opening the compartments. Now with worsening GHAZAL, hyperkalemia, hyperphosphatemia, oliguria, and sinus tachycardia.    - Strong pulse present on ultrasound in DP & PT arteries on right  - F/up vascular surgery  - Wean off propofol, continue with pain control with dilaudid prn, klonopin prn, lamictal, risperdal, sertraline, topamax  - Will need psychiatry and pan management consults once extubated  - HD stable, can d/c a-line  - Doing well on SBT, will extubate once more awake and alert, off propofol  - Keep NPO x meds until extubation, then assess swallow  - GHAZAL 2/2 ATN from rhabdomyolysis, strict I/O's, on bicarb gtt, monitor BMP/Mg/Phos, resolved hyperkalemia, f/up renal  - Continue with clindamycin and aztreonam for now  - DVT ppx with SCD's  - C/w synthroid  - Prognosis guarded, needs plastics eval as may need skin graft for right leg, discussed with mother at bedside  CC time spent: 50 min

## 2017-10-04 NOTE — PROGRESS NOTE ADULT - PROBLEM SELECTOR PLAN 1
S/p emergent Fasciotomy   Surgery following S/p emergent Fasciotomy   Surgery following may require re-op AMP ?

## 2017-10-04 NOTE — PROGRESS NOTE ADULT - ASSESSMENT
32 year old female PMHx.  Bipolar Do, Hypothyroid, Asthma, Chronic Back pain, Narc dependant.  1) Compartment Syndrome requiring emergent fasciotomy, 2) Post-op respiratory failure 3) GHAZAL from presumed Rhabdomyolysis 4) Transaminitis likely from same 5) Leukocytosis probably reactive

## 2017-10-04 NOTE — PROGRESS NOTE ADULT - SUBJECTIVE AND OBJECTIVE BOX
Patient is a 32y old  Female who presents with a chief complaint of RLE edema and numbness (04 Oct 2017 01:13)      INTERVAL HPI: Pt seen and examined in ICU. vented and sedated  OVERNIGHT EVENTS:  T(F): 100 (10-04-17 @ 12:13), Max: 101.4 (10-03-17 @ 21:29)  HR: 125 (10-04-17 @ 17:00) (118 - 144)  BP: 95/60 (10-04-17 @ 15:30) (94/60 - 150/87)  RR: 25 (10-04-17 @ 15:30) (10 - 51)  SpO2: 98% (10-04-17 @ 17:00) (93% - 100%)  Wt(kg): --  I&O's Summary    03 Oct 2017 07:01  -  04 Oct 2017 07:00  --------------------------------------------------------  IN: 644.2 mL / OUT: 100 mL / NET: 544.2 mL    04 Oct 2017 07:01  -  04 Oct 2017 17:32  --------------------------------------------------------  IN: 836 mL / OUT: 240 mL / NET: 596 mL        REVIEW OF SYSTEM:    vented and sedated    PHYSICAL EXAM:  GENERAL: NAD, well-developed, on vent  HEAD:  Atraumatic, Normocephalic  HEART: Regular rate and rhythm; No murmurs, rubs, or gallops  RESPIRATORY: CTA B/L, No wheezing / rhonchi  ABDOMEN: Soft, Nontender, Nondistended; Bowel sounds present  NEUROLOGY: sedated  EXTREMITIES:  2+ Peripheral Pulses, RLE wrapped with ace bandage, tender.  No clubbing, cyanosis, or edema  SKIN: warm, dry, normal color, no rash or abnormal lesions        LABS:                        12.5   26.4  )-----------( 107      ( 04 Oct 2017 07:36 )             38.4     10-04    135  |  102  |  38<H>  ----------------------------<  195<H>  4.6   |  23  |  3.70<H>    Ca    7.1<L>      04 Oct 2017 12:46  Phos  6.2     10-04  Mg     2.0     10-04    TPro  4.8<L>  /  Alb  2.2<L>  /  TBili  1.0  /  DBili  x   /  AST  968<H>  /  ALT  245<H>  /  AlkPhos  42  10-04    PT/INR - ( 03 Oct 2017 22:16 )   PT: 12.4 sec;   INR: 1.13 ratio         PTT - ( 03 Oct 2017 22:16 )  PTT:31.9 sec  Urinalysis Basic - ( 04 Oct 2017 06:51 )    Color: Yellow / Appearance: Clear / S.010 / pH: x  Gluc: x / Ketone: Negative  / Bili: Negative / Urobili: Negative   Blood: x / Protein: 75 mg/dL / Nitrite: Negative   Leuk Esterase: Negative / RBC: 3-5 /HPF / WBC 0-2   Sq Epi: x / Non Sq Epi: Few / Bacteria: Occasional      CAPILLARY BLOOD GLUCOSE        ABG - ( 04 Oct 2017 16:48 )  pH: 7.46  /  pCO2: 27    /  pO2: 99    / HCO3: 22    / Base Excess: -4.1  /  SaO2: 98                        MEDICATIONS  (STANDING):  albumin human 25% IVPB 50 milliLiter(s) IV Intermittent every 6 hours  aztreonam  IVPB 500 milliGRAM(s) IV Intermittent every 8 hours  buPROPion XL . 300 milliGRAM(s) Oral daily  chlorhexidine 0.12% Liquid 15 milliLiter(s) Swish and Spit every 12 hours  clindamycin IVPB 900 milliGRAM(s) IV Intermittent every 8 hours  dextrose 5% + sodium chloride 0.45% 1000 milliLiter(s) (125 mL/Hr) IV Continuous <Continuous>  lamoTRIgine 100 milliGRAM(s) Oral every 8 hours  levothyroxine Injectable 75 MICROGram(s) IV Push daily  risperiDONE   Tablet 4 milliGRAM(s) Oral <User Schedule>  risperiDONE   Tablet 8 milliGRAM(s) Oral at bedtime  sertraline 50 milliGRAM(s) Oral daily  topiramate 200 milliGRAM(s) Oral two times a day    MEDICATIONS  (PRN):  clonazePAM Tablet 2 milliGRAM(s) Oral every 6 hours PRN anxiety  HYDROmorphone  Injectable 2 milliGRAM(s) IV Push every 4 hours PRN Moderate Pain (4 - 6)

## 2017-10-04 NOTE — CONSULT NOTE ADULT - SUBJECTIVE AND OBJECTIVE BOX
[facility] Consultation    CHIEF COMPLAINT: Patient is a 32y old  Female who presents with a chief complaint of RLE edema and numbness (04 Oct 2017 01:13)      HPI:  Pt is a 33 yo female with PMHX of Bipolar disorder, hypothyroidism, chronic low back pain on morphine s/p laminectomy and spinal fusion in 2016 who is BIB EMS for right leg swelling and numbness. Patient is a poor historian as she is on many pain medicines and is currently lethargic, therefore much info provided based on hospital staff and charts. She states that 18 hours prior to arrival, she fell on the floor at her house after taking too much morphine and was lying on floor for 8 hours. She woke up to numbness, swelling, and cool temperature of her right leg, from below the knee onwards. Patient denies any current SOB, CP, f/c, denies head trauma.     In the ED, vitals were Temp 101.4F /91  RR 20  SpO2 93% RA. Significant labs are WBS 24.3, Hb 15.6, Hmt 47.6,  Platelets 118, neutr 93%, sodium 131, BUN 30,  Cr 2.30, glucose 152 AST 1377, , GFR 27, lactate 1.7. Doppler US RLE showed Extremely limited evaluation of the right lower extremity with nonvisualization of the venous structures beyond the mid right femoral vein. No evidence of DVT within the right common femoral, proximal and   mid femoral veins. No evidence of left lower extremity deep venous thrombosis. XR of right tibia and XR of R femur showed no acute fractures. Arterial Doppler of RLE pending. EKG showed sinus tach of 140, with right superior axis deviation. Patient started on aztreonam, Tylenol given for fever, patient made NPO, Dr. Martines consulted for emergent surgery. (04 Oct 2017 01:13)    She is now intubated in the intensive care unit status post fasciotomy for compartment syndrome. Her mother is in attendance. She has had no new cardiac events overnight.      PAST MEDICAL & SURGICAL HISTORY:  Low back pain  Hypothyroid  Bipolar 1 disorder  History of spinal fusion  History of laminectomy      SOCIAL HISTORY: no tobacco    FAMILY HISTORY: no premature CAD  No pertinent family history in first degree relatives      MEDICATIONS  (STANDING):  ALBUTerol    0.083%. 2.5 milliGRAM(s) Nebulizer once  ALBUTerol    0.083%. 2.5 milliGRAM(s) Nebulizer once  ALBUTerol    0.083%. 2.5 milliGRAM(s) Nebulizer once  chlorhexidine 0.12% Liquid 15 milliLiter(s) Swish and Spit every 12 hours  clindamycin IVPB 900 milliGRAM(s) IV Intermittent every 8 hours  dextrose 5% 1000 milliLiter(s) (75 mL/Hr) IV Continuous <Continuous>  dextrose 50% Injectable 50 milliLiter(s) IV Push once  insulin regular  human recombinant. 10 Unit(s) SubCutaneous once  levothyroxine Injectable 75 MICROGram(s) IV Push daily  propofol Infusion 30 MICROgram(s)/kG/Min (16.326 mL/Hr) IV Continuous <Continuous>  sodium bicarbonate  Injectable 50 milliEquivalent(s) IV Push every 2 hours    MEDICATIONS  (PRN):  HYDROmorphone  Injectable 2 milliGRAM(s) IV Push every 4 hours PRN Moderate Pain (4 - 6)      Allergies    latex (Urticaria; Rash)  penicillin (Unknown)  sulfa drugs (Unknown)    Intolerances        REVIEW OF SYSTEMS: unobtainable    VITAL SIGNS:   Vital Signs Last 24 Hrs  T(C): 37.1 (04 Oct 2017 07:59), Max: 38.6 (03 Oct 2017 21:29)  T(F): 98.7 (04 Oct 2017 07:59), Max: 101.4 (03 Oct 2017 21:29)  HR: 122 (04 Oct 2017 08:00) (118 - 144)  BP: 98/69 (04 Oct 2017 08:00) (95/65 - 150/87)  BP(mean): 77 (04 Oct 2017 08:00) (76 - 91)  RR: 22 (04 Oct 2017 08:00) (10 - 51)  SpO2: 100% (04 Oct 2017 08:00) (93% - 100%)    I&O's Summary    03 Oct 2017 07:01  -  04 Oct 2017 07:00  --------------------------------------------------------  IN: 644.2 mL / OUT: 100 mL / NET: 544.2 mL    04 Oct 2017 07:01  -  04 Oct 2017 08:31  --------------------------------------------------------  IN: 167 mL / OUT: 25 mL / NET: 142 mL        PHYSICAL EXAM:    Constitutional: sedated  Eyes:   Pupils round, no lesions  ENMT: no exudate or erythema  Pulmonary: breath sounds are clear bilaterally, No wheezing, rales or rhonchi  Cardiovascular: PMI not palpable non-displaced Regular S1 and S2, no murmurs, rubs, gallops or clicks  Gastrointestinal: Bowel Sounds present, soft, nontender.   Lymph: No peripheral edema. No cervical lymphadenopathy.  Neurological:  no focal deficits  Skin: No rashes No cyanosis.  Psych:  cannot assess    LABS: All Labs Reviewed:                        12.5   26.4  )-----------( 107      ( 04 Oct 2017 07:36 )             38.4                         15.6   24.3  )-----------( 118      ( 03 Oct 2017 22:16 )             47.6     04 Oct 2017 06:41    132    |  104    |  33     ----------------------------<  217    6.1     |  16     |  2.80   03 Oct 2017 22:16    131    |  102    |  30     ----------------------------<  152    4.6     |  18     |  2.30     Ca    7.0        04 Oct 2017 06:41  Ca    8.3        03 Oct 2017 22:16  Phos  6.1       04 Oct 2017 06:41  Mg     2.0       04 Oct 2017 06:41    TPro  4.8    /  Alb  2.2    /  TBili  1.0    /  DBili  x      /  AST  968    /  ALT  245    /  AlkPhos  42     04 Oct 2017 06:41  TPro  6.5    /  Alb  3.2    /  TBili  0.9    /  DBili  x      /  AST  1377   /  ALT  304    /  AlkPhos  58     03 Oct 2017 22:16    PT/INR - ( 03 Oct 2017 22:16 )   PT: 12.4 sec;   INR: 1.13 ratio         PTT - ( 03 Oct 2017 22:16 )  PTT:31.9 sec              RADIOLOGY/EKG:    Electrocardiography reveals sinus rhythm with no significant abnormalities    < from: Xray Chest 1 View AP/PA (10.04.17 @ 04:28) >    EXAM:  CHEST 1 VIEW                            PROCEDURE DATE:  10/04/2017          INTERPRETATION:  AP supine chest on October 4 4:11 AM. Patient was   intubated from the OR.    Poor inspiration crowds the chest. Heart is magnified by technique. Clips   in the right upper quadrant noted.    There are atelectatic areas off the right hilum and off the left heart   border which are new since October 3.    In addition endotracheal tube is now in good position.    IMPRESSION: Bilateral atelectasis.Intubation. Patient has fever.                ROBERTA HENDRICKS M.D., ATTENDING RADIOLOGIST  This document has been electronically signed. Oct  4 2017  8:08AM                < end of copied text >

## 2017-10-04 NOTE — ED ADULT NURSE REASSESSMENT NOTE - NS ED NURSE REASSESS COMMENT FT1
pt reavaluated and admitted to tele pt transfer to OR and report given to nurse  and transfer  in no distress

## 2017-10-04 NOTE — PROGRESS NOTE ADULT - SUBJECTIVE AND OBJECTIVE BOX
Patient is a 32y old  Female who presents with a chief complaint of RLE edema and numbness (04 Oct 2017 01:13)      Vascular Surgery Progress Note    Interval HPI: S/P fasciotomies of right leg. The patient remains intubated,. The muscle groups of the right calf and thigh remain viable. The right foot is cool but much warmer than her preop state with a right DP doppler signal.    Medications:  clindamycin IVPB 900      Allergies:  Allergies    latex (Urticaria; Rash)  penicillin (Unknown)  sulfa drugs (Unknown)    Intolerances        Vital Signs Last 24 Hrs  T(C): 37.4 (04 Oct 2017 04:35), Max: 38.6 (03 Oct 2017 21:29)  T(F): 99.3 (04 Oct 2017 04:35), Max: 101.4 (03 Oct 2017 21:29)  HR: 120 (04 Oct 2017 07:00) (118 - 144)  BP: 111/72 (04 Oct 2017 06:00) (110/65 - 150/87)  BP(mean): 85 (04 Oct 2017 06:00) (77 - 91)  RR: 22 (04 Oct 2017 07:00) (10 - 51)  SpO2: 100% (04 Oct 2017 07:00) (93% - 100%)  I&O's Summary    03 Oct 2017 07:01  -  04 Oct 2017 07:00  --------------------------------------------------------  IN: 627.2 mL / OUT: 100 mL / NET: 527.2 mL        Physical Exam:  Gen: remains intubated  Heart: regular at 120/ min.  Lungs: clear  Right leg wounds are viable with a cool right foot but a right DP doppler signal       LABS:                        15.6   24.3  )-----------( 118      ( 03 Oct 2017 22:16 )             47.6     10-03    131<L>  |  102  |  30<H>  ----------------------------<  152<H>  4.6   |  18<L>  |  2.30<H>    Ca    8.3<L>      03 Oct 2017 22:16    TPro  6.5  /  Alb  3.2<L>  /  TBili  0.9  /  DBili  x   /  AST  1377<H>  /  ALT  304<H>  /  AlkPhos  58  10-03    PT/INR - ( 03 Oct 2017 22:16 )   PT: 12.4 sec;   INR: 1.13 ratio         PTT - ( 03 Oct 2017 22:16 )  PTT:31.9 sec

## 2017-10-04 NOTE — H&P ADULT - ASSESSMENT
Pt is a 33 yo female with PMHX of Bipolar disorder, hypothyroidism s/p laminectomy and spinal fusion in 2016 who is BIB EMS for right leg swelling and numbness. Admitted for Right LE compartment syndrome.

## 2017-10-04 NOTE — PROGRESS NOTE ADULT - ASSESSMENT
S/p right lower  extremity fasciotomy. Will attempt to wean from respirator today. Hold AC therapy today. Will obtain a plastic consult with Dr. Culver  She will probably need stsg skin graft in 1-2 weeks.

## 2017-10-04 NOTE — H&P ADULT - NSHPPHYSICALEXAM_GEN_ALL_CORE
Vitals: Temp 99.6F /80  RR 18 SpO2 95% RA  Physical Exam:  General: crying, very anxious, severe distress, obese white female, lethargic  HEENT: NCAT, PERRLA, EOMI bl, moist mucous membranes   Neck: Supple, nontender, no mass  Neurology: lethargic  Respiratory: CTA B/L, No W/R/R  CV: RRR, +S1/S2, no murmurs, rubs or gallops  Abdominal: Soft, NT, ND +BSx4, large panus  Extremities: + loss of sensation and motor function below the right knee, no DP palpable on right, + cold RLE below the knee, 0/5 MS of Right L4,L5,S1  MSK:  0/5 MS of Right L4, L5, S1,Normal ROM of LLE, +boot in place for left, , 2+ non pitting edema of RLE  Skin: cool to touch of RLE below the knee, + taunt skin RLE below knee

## 2017-10-04 NOTE — BRIEF OPERATIVE NOTE - PROCEDURE
<<-----Click on this checkbox to enter Procedure Fasciotomy of extremity  10/04/2017  Right lower extremity (lower leg 4-compartment fasciotomy) and exploration of right lateral thigh muscle  Active  MTAURIELLO

## 2017-10-04 NOTE — H&P ADULT - PROBLEM SELECTOR PLAN 1
-Admit to Tele   -Most likely compartment syndrome of the right lower extremity below the knee without any orthopedic injury  -Vascular surgery (Bobbi) consulted and will take patient to OR emergently for fasciotomy and possible RLE amputation  -Ortho saw patient and stated patient has no acute fractures at this time  -Possible ICU admission, to be discussed after surgery for placement.   -Hold all home meds for OR procedure  -Med rec was updated after verifying all meds with [facility] pharmacy

## 2017-10-04 NOTE — H&P ADULT - HISTORY OF PRESENT ILLNESS
Pt is a 33 yo female with PMHX of Bipolar disorder, hypothyroidism s/p laminectomy and spinal fusion in 2016 who is BIB EMS for right leg swelling and numbness. Patient is a poor historian as she is on many pain medicines and is currently lethargic, therefore much info provided based on hospital staff and charts. She states that 18 hours prior to arrival, she fell on the floor at her house after taking too much morphine. She woke up to numbness, swelling, and cool temperature of her right leg, from below the knee onwards. Patient denies any current SOB, CP, f/c.     In the ED, vitals were Temp 101.4F /91  RR 20  SpO2 93% RA. Significant labs are WBS 24.3, Hb 15.6, Hmt 47.6,  Platelets 118, neutr 93%, sodium 131, BUN 30,  Cr 2.30, glucose 152 AST 1377, , GFR 27, lactate 1.7. Doppler US RLE showed Extremely limited evaluation of the right lower extremity with nonvisualization of the venous structures beyond the mid right femoral vein. No evidence of DVT within the right common femoral, proximal and   mid femoral veins. No evidence of left lower extremity deep venous thrombosis. XR of right tibia and XR of R femur showed no acute fractures. Arterial Doppler of RLE pending. EKG showed sinus tach of 140, with right superior axis deviation. Patient started on aztreonam, Tylenol given for fever, patient made NPO, Dr. Martines consulted for emergent surgery. Pt is a 33 yo female with PMHX of Bipolar disorder, hypothyroidism, chronic low back pain on morphine s/p laminectomy and spinal fusion in 2016 who is BIB EMS for right leg swelling and numbness. Patient is a poor historian as she is on many pain medicines and is currently lethargic, therefore much info provided based on hospital staff and charts. She states that 18 hours prior to arrival, she fell on the floor at her house after taking too much morphine and was lying on floor for 8 hours. She woke up to numbness, swelling, and cool temperature of her right leg, from below the knee onwards. Patient denies any current SOB, CP, f/c, denies head trauma.     In the ED, vitals were Temp 101.4F /91  RR 20  SpO2 93% RA. Significant labs are WBS 24.3, Hb 15.6, Hmt 47.6,  Platelets 118, neutr 93%, sodium 131, BUN 30,  Cr 2.30, glucose 152 AST 1377, , GFR 27, lactate 1.7. Doppler US RLE showed Extremely limited evaluation of the right lower extremity with nonvisualization of the venous structures beyond the mid right femoral vein. No evidence of DVT within the right common femoral, proximal and   mid femoral veins. No evidence of left lower extremity deep venous thrombosis. XR of right tibia and XR of R femur showed no acute fractures. Arterial Doppler of RLE pending. EKG showed sinus tach of 140, with right superior axis deviation. Patient started on aztreonam, Tylenol given for fever, patient made NPO, Dr. Martines consulted for emergent surgery.

## 2017-10-04 NOTE — BRIEF OPERATIVE NOTE - PRE-OP DX
Compartment syndrome of right lower extremity, initial encounter  10/04/2017    Active  Manfred Delcid  Ischemic leg  10/04/2017  right leg and foot  Active  Manfred Delcid

## 2017-10-04 NOTE — CONSULT NOTE ADULT - SUBJECTIVE AND OBJECTIVE BOX
Patient is a 32y old  Female who presents with a chief complaint of RLE edema and numbness (04 Oct 2017 01:13)    HPI:  Pt is a 33 yo female with PMHX of Bipolar disorder, hypothyroidism, chronic low back pain on morphine s/p laminectomy and spinal fusion in 2016 who is BIB EMS for right leg swelling and numbness. Patient is a poor historian as she is on many pain medicines and is currently lethargic, therefore much info provided based on hospital staff and charts. She states that 18 hours prior to arrival, she fell on the floor at her house after taking too much morphine and was lying on floor for 8 hours. She woke up to numbness, swelling, and cool temperature of her right leg, from below the knee onwards. Patient denies any current SOB, CP, f/c, denies head trauma.     In the ED, vitals were Temp 101.4F /91  RR 20  SpO2 93% RA. Significant labs are WBS 24.3, Hb 15.6, Hmt 47.6,  Platelets 118, neutr 93%, sodium 131, BUN 30,  Cr 2.30, glucose 152 AST 1377, , GFR 27, lactate 1.7. Doppler US RLE showed Extremely limited evaluation of the right lower extremity with nonvisualization of the venous structures beyond the mid right femoral vein. No evidence of DVT within the right common femoral, proximal and   mid femoral veins. No evidence of left lower extremity deep venous thrombosis. XR of right tibia and XR of R femur showed no acute fractures. Arterial Doppler of RLE pending. EKG showed sinus tach of 140, with right superior axis deviation. Patient started on aztreonam, Tylenol given for fever, patient made NPO, Dr. Martines consulted for emergent surgery. (04 Oct 2017 01:13)    Renal consult called for GHAZAL. Pt now s/p surgery for compartment syndrome. on Vent support. On IVF with bicarb for rhabdo. Poor urine output.       PAST MEDICAL HISTORY:  Low back pain  Hypothyroid  Bipolar 1 disorder      PAST SURGICAL HISTORY:  History of spinal fusion  History of laminectomy      FAMILY HISTORY:  No pertinent family history in first degree relatives      SOCIAL HISTORY: unable to obtain    Allergies    latex (Urticaria; Rash)  penicillin (Unknown)  sulfa drugs (Unknown)    Home Medications:   * Patient Currently Takes Medications as of 03-Oct-2017 21:40 documented in Structured Notes  · 	Commode: 1 Commode, MYRIAM: 99, ICD 10: M96.0 Height: 180 cm, Weight: 95.7kg., Last Dose Taken:    · 	Rolling Walker: 1 Rolling Walker, MYRIAM: 99, ICD 10: M96.0 Height: 180 cm, Weight: 95.7kg., Last Dose Taken:    · 	LaMICtal 100 mg oral tablet:  orally 3 times a day, Last Dose Taken:    · 	Dilaudid 8 mg oral tablet: 1 tab(s) orally every 4 hours, Last Dose Taken:    · 	levothyroxine 150 mcg (0.15 mg) oral tablet: 1 tab(s) orally once a day, Last Dose Taken:    · 	Spiriva Respimat 2.5 mcg/inh inhalation aerosol: 2 puff(s) inhaled once a day, Last Dose Taken:    · 	Flonase 50 mcg/inh nasal spray:  nasal twice daily, Last Dose Taken:    · 	ipratropium:  inhaled , nasal solution daily, Last Dose Taken:    · 	ProAir HFA 90 mcg/inh inhalation aerosol: 2 puff(s) inhaled 4 times a day, Last Dose Taken:    · 	buPROPion 24 hour extended release: 200 milligram(s) 2 pill am, Last Dose Taken:    · 	RisperDAL 4 mg oral tablet: 2 tab(s) orally once (at bedtime), Last Dose Taken:    · 	sertraline 50 mg oral tablet: 1 tab(s) orally once a day, Last Dose Taken:    · 	Symbicort 160 mcg-4.5 mcg/inh inhalation aerosol: 2 puff(s) inhaled 2 times a day, Last Dose Taken:    · 	Depo-Provera Contraceptive 150 mg/mL intramuscular suspension:  intramuscular once every 3month , Last Dose Taken:    · 	KlonoPIN 2 mg oral tablet: 1 tab(s) orally 4 times a day, Last Dose Taken:    · 	morphine 100 mg/12 hours oral tablet, extended release: 1 tab(s) orally every 8 hours, Last Dose Taken:    · 	Viibryd 40 mg oral tablet: 1 tab(s) orally once a day (in the morning), Last Dose Taken:    · 	Topamax 200 mg oral tablet: 1 tab(s) orally 2 times a day, Last Dose Taken:        MEDICATIONS  (STANDING):  chlorhexidine 0.12% Liquid 15 milliLiter(s) Swish and Spit every 12 hours  clindamycin IVPB 900 milliGRAM(s) IV Intermittent every 8 hours  dextrose 5% 1000 milliLiter(s) (75 mL/Hr) IV Continuous <Continuous>  levothyroxine Injectable 75 MICROGram(s) IV Push daily  propofol Infusion 30 MICROgram(s)/kG/Min (16.326 mL/Hr) IV Continuous <Continuous>    MEDICATIONS  (PRN):  HYDROmorphone  Injectable 2 milliGRAM(s) IV Push every 4 hours PRN Moderate Pain (4 - 6)      REVIEW OF SYSTEMS:  unable to obtain, on vent    T(F): 100 (10-04-17 @ 12:13), Max: 101.4 (10-03-17 @ 21:29)  HR: 125 (10-04-17 @ 14:03) (118 - 144)  BP: 106/63 (10-04-17 @ 14:00) (94/60 - 150/87)  RR: 23 (10-04-17 @ 14:00) (10 - 51)  SpO2: 98% (10-04-17 @ 14:03) (93% - 100%)  Wt(kg): --    PHYSICAL EXAM:  General: on vent  Respiratory: b/l air entry  Cardiovascular: S1 S2  Gastrointestinal: soft  Extremities: Rt leg surgery, Left leg boot    Mode: AC/ CMV (Assist Control/ Continuous Mandatory Ventilation)  RR (machine): 12  TV (machine): 450  FiO2: 30  PEEP: 5  ITime: 1  MAP: 9  PIP: 21      10-04    135  |  102  |  38<H>  ----------------------------<  195<H>  4.6   |  23  |  3.70<H>    Ca    7.1<L>      04 Oct 2017 12:46  Phos  6.2     10-04  Mg     2.0     10-04    TPro  4.8<L>  /  Alb  2.2<L>  /  TBili  1.0  /  DBili  x   /  AST  968<H>  /  ALT  245<H>  /  AlkPhos  42  10-04                          12.5   26.4  )-----------( 107      ( 04 Oct 2017 07:36 )             38.4       Potassium, Serum: 4.6 mmol/L (10-04 @ 12:46)  Blood Urea Nitrogen, Serum: 38 mg/dL (10-04 @ 12:46)  Calcium, Total Serum: 7.1 mg/dL (10-04 @ 12:46)  Hemoglobin: 12.5 g/dL (10-04 @ 07:36)      Creatinine, Serum: 3.70 (10-04 @ 12:46)  Creatinine, Serum: 2.80 (10-04 @ 06:41)  Creatinine, Serum: 2.30 (10-03 @ 22:16)      Urinalysis Basic - ( 04 Oct 2017 06:51 )    Color: Yellow / Appearance: Clear / S.010 / pH: x  Gluc: x / Ketone: Negative  / Bili: Negative / Urobili: Negative   Blood: x / Protein: 75 mg/dL / Nitrite: Negative   Leuk Esterase: Negative / RBC: 3-5 /HPF / WBC 0-2   Sq Epi: x / Non Sq Epi: Few / Bacteria: Occasional      LIVER FUNCTIONS - ( 04 Oct 2017 06:41 )  Alb: 2.2 g/dL / Pro: 4.8 g/dL / ALK PHOS: 42 U/L / ALT: 245 U/L / AST: 968 U/L / GGT: x           CARDIAC MARKERS ( 04 Oct 2017 06:41 )  x     / x     / 24094 U/L / x     / x          Creatine Kinase, Serum: 01310 U/L (10-04-17 @ 06:41)          ABG - ( 04 Oct 2017 05:35 )  pH: 7.29  /  pCO2: 34    /  pO2: 153   / HCO3: 17    / Base Excess: -9.2  /  SaO2: 98                I&O's Detail    03 Oct 2017 07:01  -  04 Oct 2017 07:00  --------------------------------------------------------  IN:    lactated ringers.: 150 mL    lactated ringers.: 450 mL    propofol Infusion: 44.2 mL  Total IN: 644.2 mL    OUT:    Indwelling Catheter - Urethral: 100 mL  Total OUT: 100 mL    Total NET: 544.2 mL      04 Oct 2017 07:01  -  04 Oct 2017 14:10  --------------------------------------------------------  IN:    dextrose 5%: 450 mL    lactated ringers.: 150 mL    propofol Infusion: 119 mL    Solution: 50 mL  Total IN: 769 mL    OUT:    Indwelling Catheter - Urethral: 200 mL  Total OUT: 200 mL    Total NET: 569 mL    Creatine Kinase, Serum (10.04.17 @ 06:41)    Creatine Kinase, Serum: 13848 U/L

## 2017-10-04 NOTE — CONSULT NOTE ADULT - SUBJECTIVE AND OBJECTIVE BOX
History of Present Illness:  32y Female with a history of chronic low back pain on Morphine presents to the ER with a cold, numb markedly swollen (tense) right leg that started 18 hours ago after sleeping on her leg for 8 hours. In the ER she was found to have a temp of 102 coupled with a leukocytosis and elevated creatinine and elevated LFTS. Venous doppler showed no DVT above the right knee.  Arterial doppler showed no flow below the right knee. I was requested to evaluate her LE circulation. She gives no prior hx of claudication or pedal rest pain.    PAST MEDICAL & SURGICAL HISTORY:  Low back pain  Hypothyroid  Bipolar 1 disorder  History of spinal fusion  History of laminectomy      Allergies    latex (Urticaria; Rash)  penicillin (Unknown)  sulfa drugs (Unknown)    Intolerances        MEDICATIONS  (STANDING):  aztreonam  IVPB 1000 milliGRAM(s) IV Intermittent once  sodium chloride 0.9% Bolus 1000 milliLiter(s) IV Bolus every 1 hour    MEDICATIONS  (PRN):      Social History:  Smoking History: denies  Alcohol Use: denies.    REVIEW OF SYSTEMS:  CONSTITUTIONAL: +++ fever  EYES/ENT: No visual changes;  No vertigo or throat pain   NECK: No pain or stiffness  RESPIRATORY: No cough, wheezing, hemoptysis; No shortness of breath  CARDIOVASCULAR: No chest pain or palpitations  GASTROINTESTINAL: No abdominal or epigastric pain. No nausea, vomiting, or hematemesis; No diarrhea or constipation. No melena or hematochezia.  GENITOURINARY: No dysuria, frequency or hematuria  NEUROLOGICAL:  +++ numbness right foot and right leg  SKIN:  erythema and blisters of right posterior calf and popliteal space   Vascular:  No lower extremity claudication, pedal rest pain or digital ulcers  All other review of systems is negative unless indicated above.    PHYSICAL EXAM:  General:  On exam, the patient is alert  Female  with a tense ,numb right foot and leg  Vital Signs Last 24 Hrs  T(C): 38.6 (03 Oct 2017 21:29), Max: 38.6 (03 Oct 2017 21:29)  T(F): 101.4 (03 Oct 2017 21:29), Max: 101.4 (03 Oct 2017 21:29)  HR: 144 (03 Oct 2017 21:29) (144 - 144)  BP: 136/91 (03 Oct 2017 21:29) (136/91 - 136/91)  BP(mean): --  RR: 20 (03 Oct 2017 21:29) (20 - 20)  SpO2: 93% (03 Oct 2017 21:29) (93% - 93%)    Neck:  4+/4+ bilateral carotid pulses; no carotid bruit, no palpable cervical masses.  Heart:  Regular, no murmurs, rubs or gallops.    Lungs:  Clear to auscultation.    Chest:  No chest wall deformities  Symmetrical chest expansion.   Abdomen: Soft and nontender.  No palpable masses.  No rebound, guarding or rigidity.  Extremities: right foot and calf are cold, numb and tense with absent infrainguinal pulses. There are no digital ulcers or heel decubiti.  The calf and thigh muscles of the left leg are soft and nontender.  There are no palpable cords. .  Nitin's sign  is negative in left leg and equivocal in the right leg.  There is marked edema and tension of the right calf  coupled with erythema and bullae of the right posterior calf and popliteal space.   On examination of the peripheral pulses:  Left leg femoral pulse is 4/4   , popliteal pulse is 4/4   ,PT Pulse is 4/4   , DP Pulse is 4/4   Right leg femoral pulse is 4/4   ,popliteal pulse is 0   , PT Pulse is 0  , DP Pulse is 0  Neurological:  The right foot is insensate with decreased motor function ,                          15.6   24.3  )-----------( 118      ( 03 Oct 2017 22:16 )             47.6     10-03    131<L>  |  102  |  30<H>  ----------------------------<  152<H>  4.6   |  18<L>  |  2.30<H>    Ca    8.3<L>      03 Oct 2017 22:16    TPro  6.5  /  Alb  3.2<L>  /  TBili  0.9  /  DBili  x   /  AST  1377<H>  /  ALT  304<H>  /  AlkPhos  58  10-03        PT/INR - ( 03 Oct 2017 22:16 )   PT: 12.4 sec;   INR: 1.13 ratio         PTT - ( 03 Oct 2017 22:16 )  PTT:31.9 sec    Radiology:

## 2017-10-04 NOTE — DIETITIAN INITIAL EVALUATION ADULT. - PROBLEM SELECTOR PLAN 1
-Admit to Tele   -Most likely compartment syndrome of the right lower extremity below the knee without any orthopedic injury  -Vascular surgery (Bobbi) consulted and will take patient to OR emergently for fasciotomy and possible RLE amputation  -Ortho saw patient and stated patient has no acute fractures at this time  -Possible ICU admission, to be discussed after surgery for placement.   -Hold all home meds for OR procedure  -Med rec was updated after verifying all meds with [facility] pharmacy

## 2017-10-04 NOTE — PROGRESS NOTE ADULT - ASSESSMENT
32F PMH Asthma, Bipolar disorder, hypothyroidism, chronic low back pain s/p laminectomy and spinal fusion, opioid-dependent presents with right leg swelling, numbness, pallor, poikilothermia, and pulselessness, found to have ischemic leg due to acute compartment syndrome of right leg with acute rhabdomyolysis, GHAZAL 2/2 ATN, and leukocytosis with bandemia s/p emergent fasciotomy of the 4 leg compartments (anterior, lateral, superficial posterior, and deep posterior) and the lateral thigh muscles

## 2017-10-04 NOTE — H&P ADULT - NSHPREVIEWOFSYSTEMS_GEN_ALL_CORE
Constitutional: denies fever, chills, diaphoresis   HEENT: denies blurry vision, difficulty hearing  Respiratory: denies SOB, BENNETT, cough, sputum production, wheezing, hemoptysis  Cardiovascular: denies CP, palpitations, edema  Gastrointestinal: denies nausea, vomiting, diarrhea, constipation, abdominal pain, melena, hematochezia   Genitourinary: denies dysuria, frequency, urgency, hematuria   Skin/Breast: denies rash, itching  Musculoskeletal: admits to loss of sensation of RLE below knee, + swelling of RLE, + loss of motor fx of RLE  Neurologic: denies headache, weakness, dizziness, +RLE paresthesias   Psychiatric: admits feeling anxious

## 2017-10-05 LAB
ALBUMIN SERPL ELPH-MCNC: 2.1 G/DL — LOW (ref 3.3–5)
ALP SERPL-CCNC: 34 U/L — LOW (ref 40–120)
ALT FLD-CCNC: 146 U/L — HIGH (ref 12–78)
ANION GAP SERPL CALC-SCNC: 12 MMOL/L — SIGNIFICANT CHANGE UP (ref 5–17)
ANION GAP SERPL CALC-SCNC: 13 MMOL/L — SIGNIFICANT CHANGE UP (ref 5–17)
AST SERPL-CCNC: 365 U/L — HIGH (ref 15–37)
BILIRUB SERPL-MCNC: 0.5 MG/DL — SIGNIFICANT CHANGE UP (ref 0.2–1.2)
BUN SERPL-MCNC: 47 MG/DL — HIGH (ref 7–23)
BUN SERPL-MCNC: 49 MG/DL — HIGH (ref 7–23)
CALCIUM SERPL-MCNC: 7 MG/DL — LOW (ref 8.5–10.1)
CALCIUM SERPL-MCNC: 7.3 MG/DL — LOW (ref 8.5–10.1)
CHLORIDE SERPL-SCNC: 95 MMOL/L — LOW (ref 96–108)
CHLORIDE SERPL-SCNC: 95 MMOL/L — LOW (ref 96–108)
CK SERPL-CCNC: CRITICAL HIGH U/L (ref 26–192)
CO2 SERPL-SCNC: 23 MMOL/L — SIGNIFICANT CHANGE UP (ref 22–31)
CO2 SERPL-SCNC: 24 MMOL/L — SIGNIFICANT CHANGE UP (ref 22–31)
CREAT SERPL-MCNC: 5.2 MG/DL — HIGH (ref 0.5–1.3)
CREAT SERPL-MCNC: 5.6 MG/DL — HIGH (ref 0.5–1.3)
CULTURE RESULTS: NO GROWTH — SIGNIFICANT CHANGE UP
GLUCOSE SERPL-MCNC: 153 MG/DL — HIGH (ref 70–99)
GLUCOSE SERPL-MCNC: 155 MG/DL — HIGH (ref 70–99)
HCT VFR BLD CALC: 30.9 % — LOW (ref 34.5–45)
HGB BLD-MCNC: 10 G/DL — LOW (ref 11.5–15.5)
MAGNESIUM SERPL-MCNC: 2.2 MG/DL — SIGNIFICANT CHANGE UP (ref 1.6–2.6)
MAGNESIUM SERPL-MCNC: 2.2 MG/DL — SIGNIFICANT CHANGE UP (ref 1.6–2.6)
MCHC RBC-ENTMCNC: 30 PG — SIGNIFICANT CHANGE UP (ref 27–34)
MCHC RBC-ENTMCNC: 32.5 GM/DL — SIGNIFICANT CHANGE UP (ref 32–36)
MCV RBC AUTO: 92.4 FL — SIGNIFICANT CHANGE UP (ref 80–100)
PHOSPHATE SERPL-MCNC: 7.9 MG/DL — HIGH (ref 2.5–4.5)
PHOSPHATE SERPL-MCNC: 8.2 MG/DL — HIGH (ref 2.5–4.5)
PLATELET # BLD AUTO: 88 K/UL — LOW (ref 150–400)
POTASSIUM SERPL-MCNC: 4.6 MMOL/L — SIGNIFICANT CHANGE UP (ref 3.5–5.3)
POTASSIUM SERPL-MCNC: 5 MMOL/L — SIGNIFICANT CHANGE UP (ref 3.5–5.3)
POTASSIUM SERPL-SCNC: 4.6 MMOL/L — SIGNIFICANT CHANGE UP (ref 3.5–5.3)
POTASSIUM SERPL-SCNC: 5 MMOL/L — SIGNIFICANT CHANGE UP (ref 3.5–5.3)
PROT SERPL-MCNC: 4.7 G/DL — LOW (ref 6–8.3)
RBC # BLD: 3.34 M/UL — LOW (ref 3.8–5.2)
RBC # FLD: 13.4 % — SIGNIFICANT CHANGE UP (ref 10.3–14.5)
SODIUM SERPL-SCNC: 130 MMOL/L — LOW (ref 135–145)
SODIUM SERPL-SCNC: 132 MMOL/L — LOW (ref 135–145)
SPECIMEN SOURCE: SIGNIFICANT CHANGE UP
WBC # BLD: 17.5 K/UL — HIGH (ref 3.8–10.5)
WBC # FLD AUTO: 17.5 K/UL — HIGH (ref 3.8–10.5)

## 2017-10-05 PROCEDURE — 99233 SBSQ HOSP IP/OBS HIGH 50: CPT | Mod: GC

## 2017-10-05 PROCEDURE — 99233 SBSQ HOSP IP/OBS HIGH 50: CPT

## 2017-10-05 PROCEDURE — 93306 TTE W/DOPPLER COMPLETE: CPT | Mod: 26

## 2017-10-05 PROCEDURE — 76775 US EXAM ABDO BACK WALL LIM: CPT | Mod: 26

## 2017-10-05 PROCEDURE — 99291 CRITICAL CARE FIRST HOUR: CPT

## 2017-10-05 RX ORDER — HEPARIN SODIUM 5000 [USP'U]/ML
5000 INJECTION INTRAVENOUS; SUBCUTANEOUS EVERY 8 HOURS
Qty: 0 | Refills: 0 | Status: DISCONTINUED | OUTPATIENT
Start: 2017-10-05 | End: 2017-10-06

## 2017-10-05 RX ORDER — SEVELAMER CARBONATE 2400 MG/1
800 POWDER, FOR SUSPENSION ORAL
Qty: 0 | Refills: 0 | Status: DISCONTINUED | OUTPATIENT
Start: 2017-10-05 | End: 2017-10-05

## 2017-10-05 RX ORDER — FLUTICASONE PROPIONATE 50 MCG
1 SPRAY, SUSPENSION NASAL
Qty: 0 | Refills: 0 | Status: DISCONTINUED | OUTPATIENT
Start: 2017-10-05 | End: 2017-10-06

## 2017-10-05 RX ORDER — SEVELAMER CARBONATE 2400 MG/1
800 POWDER, FOR SUSPENSION ORAL
Qty: 0 | Refills: 0 | Status: DISCONTINUED | OUTPATIENT
Start: 2017-10-05 | End: 2017-10-06

## 2017-10-05 RX ORDER — HYDROMORPHONE HYDROCHLORIDE 2 MG/ML
1 INJECTION INTRAMUSCULAR; INTRAVENOUS; SUBCUTANEOUS
Qty: 0 | Refills: 0 | Status: DISCONTINUED | OUTPATIENT
Start: 2017-10-05 | End: 2017-10-06

## 2017-10-05 RX ORDER — LEVOTHYROXINE SODIUM 125 MCG
150 TABLET ORAL DAILY
Qty: 0 | Refills: 0 | Status: DISCONTINUED | OUTPATIENT
Start: 2017-10-05 | End: 2017-10-06

## 2017-10-05 RX ADMIN — SODIUM CHLORIDE 75 MILLILITER(S): 9 INJECTION, SOLUTION INTRAVENOUS at 17:00

## 2017-10-05 RX ADMIN — Medication 100 MILLIGRAM(S): at 02:02

## 2017-10-05 RX ADMIN — HYDROMORPHONE HYDROCHLORIDE 2 MILLIGRAM(S): 2 INJECTION INTRAMUSCULAR; INTRAVENOUS; SUBCUTANEOUS at 06:55

## 2017-10-05 RX ADMIN — Medication 1 SPRAY(S): at 18:32

## 2017-10-05 RX ADMIN — Medication 2 MILLIGRAM(S): at 02:54

## 2017-10-05 RX ADMIN — HYDROMORPHONE HYDROCHLORIDE 2 MILLIGRAM(S): 2 INJECTION INTRAMUSCULAR; INTRAVENOUS; SUBCUTANEOUS at 21:00

## 2017-10-05 RX ADMIN — BUPROPION HYDROCHLORIDE 300 MILLIGRAM(S): 150 TABLET, EXTENDED RELEASE ORAL at 11:09

## 2017-10-05 RX ADMIN — SERTRALINE 50 MILLIGRAM(S): 25 TABLET, FILM COATED ORAL at 11:09

## 2017-10-05 RX ADMIN — SEVELAMER CARBONATE 800 MILLIGRAM(S): 2400 POWDER, FOR SUSPENSION ORAL at 17:05

## 2017-10-05 RX ADMIN — LAMOTRIGINE 100 MILLIGRAM(S): 25 TABLET, ORALLY DISINTEGRATING ORAL at 06:13

## 2017-10-05 RX ADMIN — HYDROMORPHONE HYDROCHLORIDE 2 MILLIGRAM(S): 2 INJECTION INTRAMUSCULAR; INTRAVENOUS; SUBCUTANEOUS at 15:30

## 2017-10-05 RX ADMIN — HYDROMORPHONE HYDROCHLORIDE 2 MILLIGRAM(S): 2 INJECTION INTRAMUSCULAR; INTRAVENOUS; SUBCUTANEOUS at 02:37

## 2017-10-05 RX ADMIN — Medication 2 MILLIGRAM(S): at 18:31

## 2017-10-05 RX ADMIN — HYDROMORPHONE HYDROCHLORIDE 2 MILLIGRAM(S): 2 INJECTION INTRAMUSCULAR; INTRAVENOUS; SUBCUTANEOUS at 02:45

## 2017-10-05 RX ADMIN — Medication 400 MILLIGRAM(S): at 06:13

## 2017-10-05 RX ADMIN — HEPARIN SODIUM 5000 UNIT(S): 5000 INJECTION INTRAVENOUS; SUBCUTANEOUS at 21:02

## 2017-10-05 RX ADMIN — Medication 50 MILLIGRAM(S): at 13:00

## 2017-10-05 RX ADMIN — SEVELAMER CARBONATE 800 MILLIGRAM(S): 2400 POWDER, FOR SUSPENSION ORAL at 12:17

## 2017-10-05 RX ADMIN — HYDROMORPHONE HYDROCHLORIDE 2 MILLIGRAM(S): 2 INJECTION INTRAMUSCULAR; INTRAVENOUS; SUBCUTANEOUS at 15:18

## 2017-10-05 RX ADMIN — HYDROMORPHONE HYDROCHLORIDE 2 MILLIGRAM(S): 2 INJECTION INTRAMUSCULAR; INTRAVENOUS; SUBCUTANEOUS at 20:44

## 2017-10-05 RX ADMIN — LAMOTRIGINE 100 MILLIGRAM(S): 25 TABLET, ORALLY DISINTEGRATING ORAL at 13:00

## 2017-10-05 RX ADMIN — RISPERIDONE 4 MILLIGRAM(S): 4 TABLET ORAL at 09:22

## 2017-10-05 RX ADMIN — Medication 50 MILLIGRAM(S): at 06:13

## 2017-10-05 RX ADMIN — HYDROMORPHONE HYDROCHLORIDE 2 MILLIGRAM(S): 2 INJECTION INTRAMUSCULAR; INTRAVENOUS; SUBCUTANEOUS at 11:20

## 2017-10-05 RX ADMIN — Medication 75 MICROGRAM(S): at 06:13

## 2017-10-05 RX ADMIN — HYDROMORPHONE HYDROCHLORIDE 2 MILLIGRAM(S): 2 INJECTION INTRAMUSCULAR; INTRAVENOUS; SUBCUTANEOUS at 06:34

## 2017-10-05 RX ADMIN — HYDROMORPHONE HYDROCHLORIDE 2 MILLIGRAM(S): 2 INJECTION INTRAMUSCULAR; INTRAVENOUS; SUBCUTANEOUS at 11:09

## 2017-10-05 RX ADMIN — Medication 50 MILLIGRAM(S): at 21:01

## 2017-10-05 RX ADMIN — LAMOTRIGINE 100 MILLIGRAM(S): 25 TABLET, ORALLY DISINTEGRATING ORAL at 21:02

## 2017-10-05 RX ADMIN — RISPERIDONE 8 MILLIGRAM(S): 4 TABLET ORAL at 21:02

## 2017-10-05 RX ADMIN — HYDROMORPHONE HYDROCHLORIDE 1 MILLIGRAM(S): 2 INJECTION INTRAMUSCULAR; INTRAVENOUS; SUBCUTANEOUS at 17:07

## 2017-10-05 RX ADMIN — Medication 100 MILLIGRAM(S): at 17:07

## 2017-10-05 RX ADMIN — HYDROMORPHONE HYDROCHLORIDE 1 MILLIGRAM(S): 2 INJECTION INTRAMUSCULAR; INTRAVENOUS; SUBCUTANEOUS at 17:20

## 2017-10-05 RX ADMIN — Medication 100 MILLIGRAM(S): at 09:23

## 2017-10-05 RX ADMIN — Medication 400 MILLIGRAM(S): at 17:05

## 2017-10-05 RX ADMIN — Medication 2 MILLIGRAM(S): at 12:17

## 2017-10-05 RX ADMIN — SODIUM CHLORIDE 125 MILLILITER(S): 9 INJECTION, SOLUTION INTRAVENOUS at 02:02

## 2017-10-05 NOTE — PROGRESS NOTE ADULT - PROBLEM SELECTOR PLAN 1
Related to compression of leg for extended period of time. S/p emergent fasciotomy in all 4 quadrants. DP and PT pulses have returned. Neurovascular checks. Pain control PRN. Will go to OR for wound vac placement tomorrow. Continue antibiotic therapy with aztreonam and clindamycin.

## 2017-10-05 NOTE — PROGRESS NOTE ADULT - SUBJECTIVE AND OBJECTIVE BOX
[facility]    Bonnie Hernandez, Keena, April, Ale, Alejandro, Carissa      [phone]    CHIEF COMPLAINT: Patient is a 32y old  Female who presents with a chief complaint of RLE edema and numbness (04 Oct 2017 01:13)      Follow Up: s/p fasciotomy for compartment syndrome, persistent st    Interim history: events noted, no new compl    MEDICATIONS  (STANDING):  aztreonam  IVPB 500 milliGRAM(s) IV Intermittent every 8 hours  buPROPion XL . 300 milliGRAM(s) Oral daily  chlorhexidine 0.12% Liquid 15 milliLiter(s) Swish and Spit every 12 hours  clindamycin IVPB 900 milliGRAM(s) IV Intermittent every 8 hours  dextrose 5% + sodium chloride 0.45% 1000 milliLiter(s) (125 mL/Hr) IV Continuous <Continuous>  lamoTRIgine 100 milliGRAM(s) Oral every 8 hours  levothyroxine Injectable 75 MICROGram(s) IV Push daily  risperiDONE   Tablet 4 milliGRAM(s) Oral <User Schedule>  risperiDONE   Tablet 8 milliGRAM(s) Oral at bedtime  sertraline 50 milliGRAM(s) Oral daily  topiramate 400 milliGRAM(s) Oral two times a day    MEDICATIONS  (PRN):  clonazePAM Tablet 2 milliGRAM(s) Oral every 6 hours PRN anxiety  HYDROmorphone  Injectable 2 milliGRAM(s) IV Push every 4 hours PRN Moderate Pain (4 - 6)      REVIEW OF SYSTEMS:  eye, ent, GI, , allergic, dermatologic, musculoskeletal and neurologic are negative except as described above    Vital Signs Last 24 Hrs  T(C): 36.6 (05 Oct 2017 05:14), Max: 37.8 (04 Oct 2017 11:47)  T(F): 97.8 (05 Oct 2017 05:14), Max: 100 (04 Oct 2017 11:47)  HR: 111 (05 Oct 2017 06:00) (111 - 137)  BP: 112/63 (05 Oct 2017 06:00) (91/55 - 120/53)  BP(mean): 81 (05 Oct 2017 06:00) (68 - 94)  RR: 26 (05 Oct 2017 06:00) (19 - 30)  SpO2: 99% (05 Oct 2017 06:00) (96% - 100%)    I&O's Summary    03 Oct 2017 07:01  -  04 Oct 2017 07:00  --------------------------------------------------------  IN: 644.2 mL / OUT: 100 mL / NET: 544.2 mL    04 Oct 2017 07:01  -  05 Oct 2017 06:12  --------------------------------------------------------  IN: 4768 mL / OUT: 990 mL / NET: 3778 mL        Telemetry past 24h: st    PHYSICAL EXAM:    Constitutional: well-nourished, well-developed, NAD   HEENT:  MMM, sclerae anicteric, conjunctivae clear, no oral cyanosis.  Pulmonary: Non-labored, breath sounds are clear bilaterally, No wheezing, rales or rhonchi  Cardiovascular: Regular, S1 and S2. tachy,  No murmur.  No rubs, gallops or clicks  Gastrointestinal: Bowel Sounds present, soft, nontender.   Lymph: No peripheral edema.   Neurological: Alert, no focal deficits  Skin: No rashes.  Psych:  Mood & affect appropriate    LABS: All Labs Reviewed:                        12.5   26.4  )-----------( 107      ( 04 Oct 2017 07:36 )             38.4                         15.6   24.3  )-----------( 118      ( 03 Oct 2017 22:16 )             47.6     04 Oct 2017 17:13    134    |  101    |  38     ----------------------------<  174    4.5     |  20     |  4.00   04 Oct 2017 12:46    135    |  102    |  38     ----------------------------<  195    4.6     |  23     |  3.70   04 Oct 2017 06:41    132    |  104    |  33     ----------------------------<  217    6.1     |  16     |  2.80     Ca    7.3        04 Oct 2017 17:13  Ca    7.1        04 Oct 2017 12:46  Ca    7.0        04 Oct 2017 06:41  Phos  4.0       04 Oct 2017 17:13  Phos  6.2       04 Oct 2017 12:46  Phos  6.1       04 Oct 2017 06:41  Mg     2.1       04 Oct 2017 17:13  Mg     2.0       04 Oct 2017 12:46  Mg     2.0       04 Oct 2017 06:41    TPro  4.8    /  Alb  2.2    /  TBili  1.0    /  DBili  x      /  AST  968    /  ALT  245    /  AlkPhos  42     04 Oct 2017 06:41  TPro  6.5    /  Alb  3.2    /  TBili  0.9    /  DBili  x      /  AST  1377   /  ALT  304    /  AlkPhos  58     03 Oct 2017 22:16    PT/INR - ( 03 Oct 2017 22:16 )   PT: 12.4 sec;   INR: 1.13 ratio         PTT - ( 03 Oct 2017 22:16 )  PTT:31.9 sec  CARDIAC MARKERS ( 04 Oct 2017 12:46 )  x     / x     / 14498 U/L / x     / x      CARDIAC MARKERS ( 04 Oct 2017 06:41 )  x     / x     / 14246 U/L / x     / x          Blood Culture:         RADIOLOGY:    EKG:    Echo:

## 2017-10-05 NOTE — PROGRESS NOTE ADULT - ASSESSMENT
32-year-old female now status post right lower extremity  fasciotomy for compartment syndrome. Pt on vent support.     ·	GHAZAL, ATN Septic / ischemic, Rhabdomyolysis  ·	s/p surgery for compartment syndrome  ·	Hyperphosphatemia    Continue IV hydration and bicarb. Improving CPK trend. Get renal sonogram. Will add phoslo x 1-2 days. Avoid nephrotoxic meds as possible. Will follow electrolytes and renal function trend. Monitor UO. D/w ICU staff. Suregry follow up. Further recommendations pending clinical course.

## 2017-10-05 NOTE — PROGRESS NOTE ADULT - SUBJECTIVE AND OBJECTIVE BOX
Patient is a 32y old  Female who presents with a chief complaint of RLE edema and numbness (04 Oct 2017 01:13)  POD #1 s/p 4 compartment fasciotomies of right leg    Vascular Surgery Progress Note    Interval HPI:  Patient was extubated yesterday. Her right leg and foot remains viable however she has a insensate foot with severe motor weakness. This is unchanged from preop state. She denies pain.    Medications:  aztreonam  IVPB 500  clindamycin IVPB 900      Allergies:  Allergies    latex (Urticaria; Rash)  penicillin (Unknown)  sulfa drugs (Unknown)    Intolerances        Vital Signs Last 24 Hrs  T(C): 37.3 (05 Oct 2017 15:40), Max: 37.3 (05 Oct 2017 15:40)  T(F): 99.1 (05 Oct 2017 15:40), Max: 99.1 (05 Oct 2017 15:40)  HR: 120 (05 Oct 2017 15:00) (111 - 137)  BP: 127/60 (05 Oct 2017 15:00) (91/55 - 127/60)  BP(mean): 87 (05 Oct 2017 15:00) (68 - 94)  RR: 24 (05 Oct 2017 15:00) (18 - 30)  SpO2: 99% (05 Oct 2017 15:00) (96% - 100%)    I&O's Summary    04 Oct 2017 07:01  -  05 Oct 2017 07:00  --------------------------------------------------------  IN: 5058 mL / OUT: 1030 mL / NET: 4028 mL    05 Oct 2017 07:01  -  05 Oct 2017 16:16  --------------------------------------------------------  IN: 2050 mL / OUT: 330 mL / NET: 1720 mL        Physical Exam:  Gen: NAD, A&Ox3  HEART: regular  LUNGS: clear  Right leg: The open right leg wounds are clean with all muscle groups remaining  viable. The right foot is warm, pink and viable with a strong right DP doppler signal. Moderate postop edema of right foot.  Neurological: insensate right foot with motor paralysis     LABS:                        10.0   17.5  )-----------( 88       ( 05 Oct 2017 07:08 )             30.9     10-05    132<L>  |  95<L>  |  47<H>  ----------------------------<  155<H>  5.0   |  24  |  5.20<H>    Ca    7.3<L>      05 Oct 2017 07:08  Phos  7.9     10-05  Mg     2.2     10-05    TPro  4.7<L>  /  Alb  2.1<L>  /  TBili  0.5  /  DBili  x   /  AST  365<H>  /  ALT  146<H>  /  AlkPhos  34<L>  10-05    PT/INR - ( 03 Oct 2017 22:16 )   PT: 12.4 sec;   INR: 1.13 ratio         PTT - ( 03 Oct 2017 22:16 )  PTT:31.9 sec

## 2017-10-05 NOTE — PROGRESS NOTE ADULT - SUBJECTIVE AND OBJECTIVE BOX
Patient is a 32y old  Female who presents with a chief complaint of RLE edema and numbness (04 Oct 2017 01:13)      BRIEF HOSPITAL COURSE: ***    Events last 24 hours: ***    PAST MEDICAL & SURGICAL HISTORY:  Low back pain  Hypothyroid  Bipolar 1 disorder  History of spinal fusion  History of laminectomy      Review of Systems:  CONSTITUTIONAL: No fever, chills, or fatigue  EYES: No eye pain, visual disturbances, or discharge  ENMT:  No difficulty hearing, tinnitus, vertigo; No sinus or throat pain  NECK: No pain or stiffness  RESPIRATORY: No cough, wheezing, chills or hemoptysis; No shortness of breath  CARDIOVASCULAR: No chest pain, palpitations, dizziness, or leg swelling  GASTROINTESTINAL: No abdominal or epigastric pain. No nausea, vomiting, or hematemesis; No diarrhea or constipation. No melena or hematochezia.  GENITOURINARY: No dysuria, frequency, hematuria, or incontinence  NEUROLOGICAL: No headaches, memory loss, loss of strength, numbness, or tremors  SKIN: No itching, burning, rashes, or lesions   MUSCULOSKELETAL: No joint pain or swelling; No muscle, back, or extremity pain  PSYCHIATRIC: No depression, anxiety, mood swings, or difficulty sleeping      Medications:  aztreonam  IVPB 500 milliGRAM(s) IV Intermittent every 8 hours  clindamycin IVPB 900 milliGRAM(s) IV Intermittent every 8 hours        buPROPion XL . 300 milliGRAM(s) Oral daily  clonazePAM Tablet 2 milliGRAM(s) Oral every 6 hours PRN  HYDROmorphone  Injectable 1 milliGRAM(s) IV Push every 3 hours PRN  HYDROmorphone  Injectable 2 milliGRAM(s) IV Push every 4 hours PRN  lamoTRIgine 100 milliGRAM(s) Oral every 8 hours  risperiDONE   Tablet 4 milliGRAM(s) Oral <User Schedule>  risperiDONE   Tablet 8 milliGRAM(s) Oral at bedtime  sertraline 50 milliGRAM(s) Oral daily  topiramate 400 milliGRAM(s) Oral two times a day      heparin  Injectable 5000 Unit(s) SubCutaneous every 8 hours        levothyroxine 150 MICROGram(s) Oral daily    dextrose 5% + sodium chloride 0.45% 1000 milliLiter(s) IV Continuous <Continuous>      fluticasone propionate 50 MICROgram(s)/spray Nasal Spray 1 Spray(s) Both Nostrils two times a day    sevelamer carbonate 800 milliGRAM(s) Oral three times a day with meals          ICU Vital Signs Last 24 Hrs  T(C): 36.9 (05 Oct 2017 20:33), Max: 37.3 (05 Oct 2017 15:40)  T(F): 98.5 (05 Oct 2017 20:33), Max: 99.1 (05 Oct 2017 15:40)  HR: 100 (05 Oct 2017 22:00) (100 - 128)  BP: 115/57 (05 Oct 2017 22:00) (93/75 - 140/67)  BP(mean): 70 (05 Oct 2017 22:00) (70 - 96)  ABP: --  ABP(mean): --  RR: 26 (05 Oct 2017 22:00) (14 - 27)  SpO2: 93% (05 Oct 2017 22:00) (93% - 100%)      ABG - ( 04 Oct 2017 16:48 )  pH: 7.46  /  pCO2: 27    /  pO2: 99    / HCO3: 22    / Base Excess: -4.1  /  SaO2: 98                  I&O's Detail    04 Oct 2017 07:01  -  05 Oct 2017 07:00  --------------------------------------------------------  IN:    Albumin 25%: 100 mL    dextrose 5%: 450 mL    dextrose 5% + sodium chloride 0.45%: 2125 mL    lactated ringers.: 150 mL    Oral Fluid: 1780 mL    propofol Infusion: 153 mL    Solution: 150 mL    Solution: 150 mL  Total IN: 5058 mL    OUT:    Indwelling Catheter - Urethral: 1030 mL  Total OUT: 1030 mL    Total NET: 4028 mL      05 Oct 2017 07:01  -  05 Oct 2017 23:24  --------------------------------------------------------  IN:    dextrose 5% + sodium chloride 0.45%: 1600 mL    Oral Fluid: 1440 mL    Solution: 100 mL    Solution: 100 mL  Total IN: 3240 mL    OUT:    Indwelling Catheter - Urethral: 1120 mL  Total OUT: 1120 mL    Total NET: 2120 mL            LABS:                        10.0   17.5  )-----------( 88       ( 05 Oct 2017 07:08 )             30.9     10-05    130<L>  |  95<L>  |  49<H>  ----------------------------<  153<H>  4.6   |  23  |  5.60<H>    Ca    7.0<L>      05 Oct 2017 17:09  Phos  8.2     10-05  Mg     2.2     10-05    TPro  4.7<L>  /  Alb  2.1<L>  /  TBili  0.5  /  DBili  x   /  AST  365<H>  /  ALT  146<H>  /  AlkPhos  34<L>  10-05      CARDIAC MARKERS ( 05 Oct 2017 07:08 )  x     / x     / 75416 U/L / x     / x      CARDIAC MARKERS ( 04 Oct 2017 12:46 )  x     / x     / 25821 U/L / x     / x      CARDIAC MARKERS ( 04 Oct 2017 06:41 )  x     / x     / 49698 U/L / x     / x          CAPILLARY BLOOD GLUCOSE          Urinalysis Basic - ( 04 Oct 2017 06:51 )    Color: Yellow / Appearance: Clear / S.010 / pH: x  Gluc: x / Ketone: Negative  / Bili: Negative / Urobili: Negative   Blood: x / Protein: 75 mg/dL / Nitrite: Negative   Leuk Esterase: Negative / RBC: 3-5 /HPF / WBC 0-2   Sq Epi: x / Non Sq Epi: Few / Bacteria: Occasional      CULTURES:  Culture Results:   No growth to date. (10-04-17 @ 10:15)  Culture Results:   No growth to date. (10-04-17 @ 10:15)  Culture Results:   No growth (10-04-17 @ 09:59)      Physical Examination:    General: No acute distress.  Alert, oriented, interactive, nonfocal    HEENT: Pupils equal, reactive to light.  Symmetric.    PULM: Clear to auscultation bilaterally, no significant sputum production    CVS: Regular rate and rhythm, no murmurs, rubs, or gallops    ABD: Soft, nondistended, nontender, normoactive bowel sounds, no masses    EXT: No edema, nontender    SKIN: Warm and well perfused, no rashes noted.    NEURO: A&Ox3, strength 5/5 all extremities, cranial nerves grossly intact, no focal deficits    RADIOLOGY: ***    CRITICAL CARE TIME SPENT: ***  Evaluating/treating patient, reviewing data/labs/imaging, discussing case with multidisciplinary team, discussing plan/goals of care with patient/family. Non-inclusive of procedure time. Patient is a 32y old  Female who presents with a chief complaint of RLE edema and numbness (04 Oct 2017 01:13)      BRIEF HOSPITAL COURSE: 31 y/o F with a h/o bipolar disorder, hypothyroid, chronic low back pain (s/p spinal fusion, laminectomy, on extensive pain management regimen), admitted on  with lethargy, right leg swelling and numbness. As per report, patient had become lethargic from morphine, fell onto floor at home and remained there for 8 hours. When she awoke she noticed numbness, swelling, and cool temperature of her right leg, from below the knee onwards. Noted to lack DP and PT pulses. Found to have acute compartment syndrome and taken to OR for emergent fasciotomy in all 4 quadrants of right LE. Hospital course complicated by post-op respiratory failure, GHAZAL from rhabdomyolysis (initial CPK: 39K), transaminitis. Extubated on .    Events last 24 hours: Patient resting comfortably, c/o of some right LE pain earlier. (+)DP, PT pulses. Hemodynamically stable. SaO2: 95% on room air. Scheduled for wound vac placement tomorrow in OR. Urine output improving.    PAST MEDICAL & SURGICAL HISTORY:  Low back pain  Hypothyroid  Bipolar 1 disorder  History of spinal fusion  History of laminectomy      Review of Systems:  CONSTITUTIONAL: No fever, chills, or (+)fatigue  EYES: No eye pain, visual disturbances, or discharge  ENMT:  No difficulty hearing, tinnitus, vertigo; No sinus or throat pain  NECK: No pain or stiffness  RESPIRATORY: No cough, wheezing, chills or hemoptysis; No shortness of breath  CARDIOVASCULAR: No chest pain, palpitations, dizziness, or leg swelling  GASTROINTESTINAL: No abdominal or epigastric pain. No nausea, vomiting, or hematemesis; No diarrhea or constipation. No melena or hematochezia.  GENITOURINARY: No dysuria, frequency, hematuria, or incontinence  NEUROLOGICAL: No headaches, memory loss, loss of strength, numbness, or tremors  SKIN: No itching, burning, rashes, or lesions   MUSCULOSKELETAL: No joint pain or swelling; No muscle, back pain, (+) RLE pain  PSYCHIATRIC: No depression, anxiety, mood swings, or difficulty sleeping      Medications:  aztreonam  IVPB 500 milliGRAM(s) IV Intermittent every 8 hours  clindamycin IVPB 900 milliGRAM(s) IV Intermittent every 8 hours  buPROPion XL . 300 milliGRAM(s) Oral daily  clonazePAM Tablet 2 milliGRAM(s) Oral every 6 hours PRN  HYDROmorphone  Injectable 1 milliGRAM(s) IV Push every 3 hours PRN  HYDROmorphone  Injectable 2 milliGRAM(s) IV Push every 4 hours PRN  lamoTRIgine 100 milliGRAM(s) Oral every 8 hours  risperiDONE   Tablet 4 milliGRAM(s) Oral <User Schedule>  risperiDONE   Tablet 8 milliGRAM(s) Oral at bedtime  sertraline 50 milliGRAM(s) Oral daily  topiramate 400 milliGRAM(s) Oral two times a day  heparin  Injectable 5000 Unit(s) SubCutaneous every 8 hours  levothyroxine 150 MICROGram(s) Oral daily  dextrose 5% + sodium chloride 0.45% 1000 milliLiter(s) IV Continuous <Continuous>  fluticasone propionate 50 MICROgram(s)/spray Nasal Spray 1 Spray(s) Both Nostrils two times a day  sevelamer carbonate 800 milliGRAM(s) Oral three times a day with meals          ICU Vital Signs Last 24 Hrs  T(C): 36.9 (05 Oct 2017 20:33), Max: 37.3 (05 Oct 2017 15:40)  T(F): 98.5 (05 Oct 2017 20:33), Max: 99.1 (05 Oct 2017 15:40)  HR: 100 (05 Oct 2017 22:00) (100 - 128)  BP: 115/57 (05 Oct 2017 22:00) (93/75 - 140/67)  BP(mean): 70 (05 Oct 2017 22:00) (70 - 96)  ABP: --  ABP(mean): --  RR: 26 (05 Oct 2017 22:00) (14 - 27)  SpO2: 93% (05 Oct 2017 22:00) (93% - 100%)      ABG - ( 04 Oct 2017 16:48 )  pH: 7.46  /  pCO2: 27    /  pO2: 99    / HCO3: 22    / Base Excess: -4.1  /  SaO2: 98                  I&O's Detail    04 Oct 2017 07:01  -  05 Oct 2017 07:00  --------------------------------------------------------  IN:    Albumin 25%: 100 mL    dextrose 5%: 450 mL    dextrose 5% + sodium chloride 0.45%: 2125 mL    lactated ringers.: 150 mL    Oral Fluid: 1780 mL    propofol Infusion: 153 mL    Solution: 150 mL    Solution: 150 mL  Total IN: 5058 mL    OUT:    Indwelling Catheter - Urethral: 1030 mL  Total OUT: 1030 mL    Total NET: 4028 mL      05 Oct 2017 07:01  -  05 Oct 2017 23:24  --------------------------------------------------------  IN:    dextrose 5% + sodium chloride 0.45%: 1600 mL    Oral Fluid: 1440 mL    Solution: 100 mL    Solution: 100 mL  Total IN: 3240 mL    OUT:    Indwelling Catheter - Urethral: 1120 mL  Total OUT: 1120 mL    Total NET: 2120 mL            LABS:                        10.0   17.5  )-----------( 88       ( 05 Oct 2017 07:08 )             30.9     10-05    130<L>  |  95<L>  |  49<H>  ----------------------------<  153<H>  4.6   |  23  |  5.60<H>    Ca    7.0<L>      05 Oct 2017 17:09  Phos  8.2     10-05  Mg     2.2     10-05    TPro  4.7<L>  /  Alb  2.1<L>  /  TBili  0.5  /  DBili  x   /  AST  365<H>  /  ALT  146<H>  /  AlkPhos  34<L>  10-05      CARDIAC MARKERS ( 05 Oct 2017 07:08 )  x     / x     / 08247 U/L / x     / x      CARDIAC MARKERS ( 04 Oct 2017 12:46 )  x     / x     / 00736 U/L / x     / x      CARDIAC MARKERS ( 04 Oct 2017 06:41 )  x     / x     / 06153 U/L / x     / x          CAPILLARY BLOOD GLUCOSE          Urinalysis Basic - ( 04 Oct 2017 06:51 )    Color: Yellow / Appearance: Clear / S.010 / pH: x  Gluc: x / Ketone: Negative  / Bili: Negative / Urobili: Negative   Blood: x / Protein: 75 mg/dL / Nitrite: Negative   Leuk Esterase: Negative / RBC: 3-5 /HPF / WBC 0-2   Sq Epi: x / Non Sq Epi: Few / Bacteria: Occasional      CULTURES:  Culture Results:   No growth to date. (10-04-17 @ 10:15)  Culture Results:   No growth to date. (10-04-17 @ 10:15)  Culture Results:   No growth (10-04-17 @ 09:59)      Physical Examination:    General: No acute distress.  Alert, oriented, interactive, nonfocal    HEENT: Pupils equal, reactive to light.  Symmetric.    PULM: Clear to auscultation bilaterally, no significant sputum production    CVS: Regular rate and rhythm, no murmurs, rubs, or gallops    ABD: Soft, nondistended, nontender, normoactive bowel sounds, no masses    EXT: trace right foot edema, (+)DP,PT pulses b/l, RLE dressing clean and intact, 1/5 strength    SKIN: Warm and well perfused, no rashes noted.    NEURO: A&Ox3, cranial nerves grossly intact, no focal deficits    RADIOLOGY: < from: US Renal (10.05.17 @ 15:15) >    IMPRESSION:    No focal renal pathology.  Nondiagnostic evaluation of the nondistended bladder      CRITICAL CARE TIME SPENT: 37 mins  Evaluating/treating patient, reviewing data/labs/imaging, discussing case with multidisciplinary team, discussing plan/goals of care with patient/family. Non-inclusive of procedure time. Patient is a 32y old  Female who presents with a chief complaint of RLE edema and numbness (04 Oct 2017 01:13)      BRIEF HOSPITAL COURSE: 31 y/o F with a h/o bipolar disorder, hypothyroid, chronic low back pain (s/p spinal fusion, laminectomy, on extensive pain management regimen), admitted on  with lethargy, right leg swelling and numbness. As per report, patient had become lethargic from morphine, fell onto floor at home and remained there for 8 hours. When she awoke she noticed numbness, swelling, and cool temperature of her right leg, from below the knee onwards. Noted to lack DP and PT pulses. Found to have acute compartment syndrome and taken to OR for emergent fasciotomy in all 4 quadrants of right LE. Hospital course complicated by post-op respiratory failure, GHAZAL from rhabdomyolysis (initial CPK: 39K), transaminitis. Extubated on .    Events last 24 hours: Patient resting comfortably, c/o of some right LE pain earlier. (+)DP, PT pulses. Hemodynamically stable. SaO2: 95% on room air. Scheduled for wound vac placement tomorrow in OR. Urine output improving.    PAST MEDICAL & SURGICAL HISTORY:  Low back pain  Hypothyroid  Bipolar 1 disorder  History of spinal fusion  History of laminectomy      Review of Systems:  CONSTITUTIONAL: No fever, chills, or (+)fatigue  EYES: No eye pain, visual disturbances, or discharge  ENMT:  No difficulty hearing, tinnitus, vertigo; No sinus or throat pain  NECK: No pain or stiffness  RESPIRATORY: No cough, wheezing, chills or hemoptysis; No shortness of breath  CARDIOVASCULAR: No chest pain, palpitations, dizziness, or leg swelling  GASTROINTESTINAL: No abdominal or epigastric pain. No nausea, vomiting, or hematemesis; No diarrhea or constipation. No melena or hematochezia.  GENITOURINARY: No dysuria, frequency, hematuria, or incontinence  NEUROLOGICAL: No headaches, memory loss, loss of strength, numbness, or tremors  SKIN: No itching, burning, rashes, or lesions   MUSCULOSKELETAL: No joint pain or swelling; No muscle, back pain, (+) RLE pain  PSYCHIATRIC: No depression, anxiety, mood swings, or difficulty sleeping      Medications:  aztreonam  IVPB 500 milliGRAM(s) IV Intermittent every 8 hours  clindamycin IVPB 900 milliGRAM(s) IV Intermittent every 8 hours  buPROPion XL . 300 milliGRAM(s) Oral daily  clonazePAM Tablet 2 milliGRAM(s) Oral every 6 hours PRN  HYDROmorphone  Injectable 1 milliGRAM(s) IV Push every 3 hours PRN  HYDROmorphone  Injectable 2 milliGRAM(s) IV Push every 4 hours PRN  lamoTRIgine 100 milliGRAM(s) Oral every 8 hours  risperiDONE   Tablet 4 milliGRAM(s) Oral <User Schedule>  risperiDONE   Tablet 8 milliGRAM(s) Oral at bedtime  sertraline 50 milliGRAM(s) Oral daily  topiramate 400 milliGRAM(s) Oral two times a day  heparin  Injectable 5000 Unit(s) SubCutaneous every 8 hours  levothyroxine 150 MICROGram(s) Oral daily  dextrose 5% + sodium chloride 0.45% 1000 milliLiter(s) IV Continuous <Continuous>  fluticasone propionate 50 MICROgram(s)/spray Nasal Spray 1 Spray(s) Both Nostrils two times a day  sevelamer carbonate 800 milliGRAM(s) Oral three times a day with meals          ICU Vital Signs Last 24 Hrs  T(C): 36.9 (05 Oct 2017 20:33), Max: 37.3 (05 Oct 2017 15:40)  T(F): 98.5 (05 Oct 2017 20:33), Max: 99.1 (05 Oct 2017 15:40)  HR: 100 (05 Oct 2017 22:00) (100 - 128)  BP: 115/57 (05 Oct 2017 22:00) (93/75 - 140/67)  BP(mean): 70 (05 Oct 2017 22:00) (70 - 96)  ABP: --  ABP(mean): --  RR: 26 (05 Oct 2017 22:00) (14 - 27)  SpO2: 93% (05 Oct 2017 22:00) (93% - 100%)      ABG - ( 04 Oct 2017 16:48 )  pH: 7.46  /  pCO2: 27    /  pO2: 99    / HCO3: 22    / Base Excess: -4.1  /  SaO2: 98                  I&O's Detail    04 Oct 2017 07:01  -  05 Oct 2017 07:00  --------------------------------------------------------  IN:    Albumin 25%: 100 mL    dextrose 5%: 450 mL    dextrose 5% + sodium chloride 0.45%: 2125 mL    lactated ringers.: 150 mL    Oral Fluid: 1780 mL    propofol Infusion: 153 mL    Solution: 150 mL    Solution: 150 mL  Total IN: 5058 mL    OUT:    Indwelling Catheter - Urethral: 1030 mL  Total OUT: 1030 mL    Total NET: 4028 mL      05 Oct 2017 07:01  -  05 Oct 2017 23:24  --------------------------------------------------------  IN:    dextrose 5% + sodium chloride 0.45%: 1600 mL    Oral Fluid: 1440 mL    Solution: 100 mL    Solution: 100 mL  Total IN: 3240 mL    OUT:    Indwelling Catheter - Urethral: 1120 mL  Total OUT: 1120 mL    Total NET: 2120 mL            LABS:                        10.0   17.5  )-----------( 88       ( 05 Oct 2017 07:08 )             30.9     10-05    130<L>  |  95<L>  |  49<H>  ----------------------------<  153<H>  4.6   |  23  |  5.60<H>    Ca    7.0<L>      05 Oct 2017 17:09  Phos  8.2     10-05  Mg     2.2     10-05    TPro  4.7<L>  /  Alb  2.1<L>  /  TBili  0.5  /  DBili  x   /  AST  365<H>  /  ALT  146<H>  /  AlkPhos  34<L>  10-05      CARDIAC MARKERS ( 05 Oct 2017 07:08 )  x     / x     / 03558 U/L / x     / x      CARDIAC MARKERS ( 04 Oct 2017 12:46 )  x     / x     / 27988 U/L / x     / x      CARDIAC MARKERS ( 04 Oct 2017 06:41 )  x     / x     / 44278 U/L / x     / x          CAPILLARY BLOOD GLUCOSE          Urinalysis Basic - ( 04 Oct 2017 06:51 )    Color: Yellow / Appearance: Clear / S.010 / pH: x  Gluc: x / Ketone: Negative  / Bili: Negative / Urobili: Negative   Blood: x / Protein: 75 mg/dL / Nitrite: Negative   Leuk Esterase: Negative / RBC: 3-5 /HPF / WBC 0-2   Sq Epi: x / Non Sq Epi: Few / Bacteria: Occasional      CULTURES:  Culture Results:   No growth to date. (10-04-17 @ 10:15)  Culture Results:   No growth to date. (10-04-17 @ 10:15)  Culture Results:   No growth (10-04-17 @ 09:59)      Physical Examination:    General: No acute distress.  Alert, oriented, interactive, nonfocal    HEENT: Pupils equal, reactive to light.  Symmetric.    PULM: Clear to auscultation bilaterally, no significant sputum production    CVS: Regular rate and rhythm, no murmurs, rubs, or gallops    ABD: Soft, nondistended, nontender, normoactive bowel sounds, no masses    EXT: trace right foot edema, (+)DP,PT pulses b/l, RLE dressing clean and intact- serosanguinous drainage, 1/5 strength    SKIN: Warm and well perfused, no rashes noted.    NEURO: A&Ox3, cranial nerves grossly intact, no focal deficits    RADIOLOGY: < from: US Renal (10.05.17 @ 15:15) >    IMPRESSION:    No focal renal pathology.  Nondiagnostic evaluation of the nondistended bladder      CRITICAL CARE TIME SPENT: 37 mins  Evaluating/treating patient, reviewing data/labs/imaging, discussing case with multidisciplinary team, discussing plan/goals of care with patient/family. Non-inclusive of procedure time.

## 2017-10-05 NOTE — PROGRESS NOTE ADULT - PROBLEM SELECTOR PLAN 2
Resolved, now s/p extubation. Tolerating room air. No work of breathing, SaO2: 95%. Will apply supplemental O2 PRN to maintain SaO2 > 92%. Keep HOB elevated > 30 degrees. Incentive spirometer.

## 2017-10-05 NOTE — PROGRESS NOTE ADULT - ASSESSMENT
33 y/o F with a h/o bipolar disorder, hypothyroid, chronic low back pain (s/p spinal fusion, laminectomy, on extensive pain management regimen), with acute compartment syndrome of right LE,  post-op respiratory failure, GHAZAL from rhabdomyolysis, transaminitis.

## 2017-10-05 NOTE — PROGRESS NOTE ADULT - ASSESSMENT
The patient is scheduled to go to OR tomorrow for application of wound VACS by Dr. Isabel.   May start DVT prophylaxis with low dose Lovenox. Dr. Tavares to see patient for rhabdo induced renal dysfunction. The patient and mother were given informed consent.

## 2017-10-05 NOTE — PROGRESS NOTE ADULT - PROBLEM SELECTOR PLAN 3
- Worsening GHAZAL 2/2 ATN from rhabdomyolysis, strict I/O's,     monitor BMP/Mg/Phos, resolved hyperkalemia, f/up renal

## 2017-10-05 NOTE — PROGRESS NOTE ADULT - ASSESSMENT
32-year-old female now status post right lower extremity  fasciotomy for prolonged compression after a fall with lethargy. There is no evidence for an acute atherothrombotic or embolic process. No history of cardiac disease and remains in sinus rhythm.      -there is no evidence of acute ischemia.  -there is no evidence for meaningful  volume overload.  -remains persistently tachycardic, likely reactive in the setting of severe illness  -follow creatinine, likely aris  -monitor electrolytes, keep k>4, Mg>2  -for echo  -cardiovascular status is otherwise without acute change.  -abx  -surgical followup  -will follow    The patient is at risk of abrupt decompensation.  35 minutes of critical care time was spent with this patient.

## 2017-10-05 NOTE — PROGRESS NOTE ADULT - PROBLEM SELECTOR PLAN 3
Likely related to rhabdo. BUN/Cr continuing to increase, however, urine output is improving. Continue to trend BUN/Cr, monitor lytes. Continue IVF hydration with bicarb additive. Phosphorus level continues to increase as well- continue sevelamer. Avoid nephrotoxic agents. Likely related to rhabdo. BUN/Cr continuing to increase, however, urine output is improving. Continue to trend BUN/Cr, monitor lytes. Continue IVF hydration with bicarb additive. Phosphorus level continues to increase as well- continue sevelamer. Avoid nephrotoxic agents. Renal US normal.

## 2017-10-05 NOTE — PROGRESS NOTE ADULT - ATTENDING COMMENTS
32F PMH Asthma, Bipolar disorder, hypothyroidism, chronic low back pain s/p laminectomy and spinal fusion, opioid-dependent presents with right leg swelling, numbness, pallor, poikilothermia, and pulselessness, found to have ischemic right leg due to acute compartment syndrome with acute rhabdomyolysis, GHAZAL 2/2 ATN, and leukocytosis with bandemia s/p emergent fasciotomy of the 4 leg compartments (anterior, lateral, superficial posterior, and deep posterior) and the lateral thigh muscles. The muscles were noted to be edematous and ischemic with improved perfusion upon opening the compartments. Now with worsening GHAZAL, hyperkalemia, hyperphosphatemia,  and sinus tachycardia.    - Continues to have a strong pulse present on ultrasound in DP & PT arteries on right  - F/up vascular surgery, s/p plastic surgery eval today, plan for wound vac placement in OR tomorrow with conscious sedation  - Continue with dilaudid for pain control, hold morphine while in GHAZAL  - Continue Clonazepam prn, lamotrigine, risperidone, risperidone, sertraline, and topiramata  - HD stable, reports history of sinus tachycardia  - On room air, no hypoxia, extubated yesterday without difficulty  - Regular diet as tolerated  - GHAZAL 2/2 ATN from rhabdomyolysis, strict I/O's, on bicarb gtt, monitor BMP/Mg/Phos, resolved hyperkalemia, f/up renal, start sevelamer  - Continue with clindamycin and aztreonam for now until wound vac in place  - DVT ppx with SCD's  - C/w synthroid  - Discussed with mother at bedside, full code  CC time spent: 35 min

## 2017-10-05 NOTE — PROGRESS NOTE ADULT - PROBLEM SELECTOR PLAN 1
compartment syndrome of the right lower extremity   -s/p emergent fasciotomy by Vascular surgery (Bobbi)   -pt is extubated and doing well.  -has leucocytosis with bandemia. f/u with blood cx.  C/w IV aztreonam and clinadamycin.  -monitor labs

## 2017-10-05 NOTE — PROGRESS NOTE ADULT - SUBJECTIVE AND OBJECTIVE BOX
24 hour events:     Review of Systems:  Constitutional: No fever, chills, fatigue  Neuro: No headache, numbness, weakness  Resp: No cough, wheezing, shortness of breath  CVS: No chest pain, palpitations, leg swelling  GI: No abdominal pain, nausea, vomiting, diarrhea   : No dysuria, frequency, incontinence  Skin: No itching, burning, rashes, or lesions   Msk: No joint pain or swelling  Psych: No depression, anxiety, mood swings    T(F): 97.8 (10-05-17 @ 05:14), Max: 100 (10-04-17 @ 11:47)  HR: 117 (10-05-17 @ 07:00) (111 - 137)  BP: 108/65 (10-05-17 @ 07:00) (91/55 - 120/53)  RR: 26 (10-05-17 @ 07:00) (19 - 30)  SpO2: 99% (10-05-17 @ 07:00) (96% - 100%)  Wt(kg): --    Mode: CPAP with PS, FiO2: 30, PEEP: 5    CAPILLARY BLOOD GLUCOSE          I&O's Summary    04 Oct 2017 07:01  -  05 Oct 2017 07:00  --------------------------------------------------------  IN: 5058 mL / OUT: 1030 mL / NET: 4028 mL        Physical Exam:   Gen:  Neuro:  HEENT:  Resp:  CVS:  Abd:  Ext:  Skin:    Meds:  aztreonam  IVPB IV Intermittent  clindamycin IVPB IV Intermittent      levothyroxine Injectable IV Push      buPROPion XL . Oral  clonazePAM Tablet Oral PRN  HYDROmorphone  Injectable IV Push PRN  lamoTRIgine Oral  risperiDONE   Tablet Oral  risperiDONE   Tablet Oral  sertraline Oral  topiramate Oral            dextrose 5% + sodium chloride 0.45% IV Continuous                                  10.0   17.5  )-----------( 88       ( 05 Oct 2017 07:08 )             30.9       10-05    132<L>  |  95<L>  |  47<H>  ----------------------------<  155<H>  5.0   |  24  |  5.20<H>    Ca    7.3<L>      05 Oct 2017 07:08  Phos  4.0     10-  Mg     2.1     10-    TPro  4.7<L>  /  Alb  2.1<L>  /  TBili  0.5  /  DBili  x   /  AST  365<H>  /  ALT  146<H>  /  AlkPhos  34<L>  10-    Lactate 1.7           10-04 @ 12:46      CARDIAC MARKERS ( 04 Oct 2017 12:46 )  x     / x     / 97733 U/L / x     / x      CARDIAC MARKERS ( 04 Oct 2017 06:41 )  x     / x     / 42762 U/L / x     / x          PT/INR - ( 03 Oct 2017 22:16 )   PT: 12.4 sec;   INR: 1.13 ratio         PTT - ( 03 Oct 2017 22:16 )  PTT:31.9 sec  Urinalysis Basic - ( 04 Oct 2017 06:51 )    Color: Yellow / Appearance: Clear / S.010 / pH: x  Gluc: x / Ketone: Negative  / Bili: Negative / Urobili: Negative   Blood: x / Protein: 75 mg/dL / Nitrite: Negative   Leuk Esterase: Negative / RBC: 3-5 /HPF / WBC 0-2   Sq Epi: x / Non Sq Epi: Few / Bacteria: Occasional                  Radiology: ***    Bedside ultrasound: ***    CENTRAL LINE: N/Y          DATE INSERTED:              REMOVE: Y/N  COLE: N/Y                       DATE INSERTED:              REMOVE: Y/N  A-LINE: N/Y                       DATE INSERTED:              REMOVE: Y/N    GLOBAL ISSUE/BEST PRACTICE:  Analgesia:  Sedation:  CAM-ICU:   HOB elevation: yes  Stress ulcer prophylaxis:  VTE prophylaxis:  Glycemic control:  Nutrition:    CODE STATUS: *** 24 hour events: Pt extubated yesterday, tolerated well. No acute overnight events.    Review of Systems:  Constitutional: No fever, chills, fatigue  Neuro: No headache, numbness, weakness  Resp: No cough, wheezing, shortness of breath  CVS: No chest pain, palpitations, leg swelling  GI: No abdominal pain, nausea, vomiting, diarrhea   : No dysuria, frequency, incontinence  Skin: No itching, burning, rashes, or lesions   Msk: Mild pain RLE, no numbness/tingling  Psych: No depression, anxiety, mood swings    T(F): 97.8 (10-05-17 @ 05:14), Max: 100 (10-04-17 @ 11:47)  HR: 117 (10-05-17 @ 07:00) (111 - 137)  BP: 108/65 (10-05-17 @ 07:00) (91/55 - 120/53)  RR: 26 (10-05-17 @ 07:00) (19 - 30)  SpO2: 99% (10-05-17 @ 07:00) (96% - 100%)  Wt(kg): --    Mode: CPAP with PS, FiO2: 30, PEEP: 5    CAPILLARY BLOOD GLUCOSE          I&O's Summary    04 Oct 2017 07:01  -  05 Oct 2017 07:00  --------------------------------------------------------  IN: 5058 mL / OUT: 1030 mL / NET: 4028 mL        Physical Exam:   Gen: NAD, resting comfortably  Neuro: AAOx3  HEENT: NC/AT  Resp: CTA b/l  CVS: Tachycardic, +s1/s2  Abd: Soft, nt/nd, +bs  Ext: +pulses RLE with doppler, mild edema R foot, SILT, 1/5 strength EHL/FHL/GS/TA RLE  Skin: warm, well perfused    Meds:  aztreonam  IVPB IV Intermittent  clindamycin IVPB IV Intermittent      levothyroxine Injectable IV Push      buPROPion XL . Oral  clonazePAM Tablet Oral PRN  HYDROmorphone  Injectable IV Push PRN  lamoTRIgine Oral  risperiDONE   Tablet Oral  risperiDONE   Tablet Oral  sertraline Oral  topiramate Oral            dextrose 5% + sodium chloride 0.45% IV Continuous                                  10.0   17.5  )-----------( 88       ( 05 Oct 2017 07:08 )             30.9       10-    132<L>  |  95<L>  |  47<H>  ----------------------------<  155<H>  5.0   |  24  |  5.20<H>    Ca    7.3<L>      05 Oct 2017 07:08  Phos  4.0     10-  Mg     2.1     10    TPro  4.7<L>  /  Alb  2.1<L>  /  TBili  0.5  /  DBili  x   /  AST  365<H>  /  ALT  146<H>  /  AlkPhos  34<L>  10    Lactate 1.7           10 @ 12:46      CARDIAC MARKERS ( 04 Oct 2017 12:46 )  x     / x     / 20672 U/L / x     / x      CARDIAC MARKERS ( 04 Oct 2017 06:41 )  x     / x     / 15744 U/L / x     / x          PT/INR - ( 03 Oct 2017 22:16 )   PT: 12.4 sec;   INR: 1.13 ratio         PTT - ( 03 Oct 2017 22:16 )  PTT:31.9 sec  Urinalysis Basic - ( 04 Oct 2017 06:51 )    Color: Yellow / Appearance: Clear / S.010 / pH: x  Gluc: x / Ketone: Negative  / Bili: Negative / Urobili: Negative   Blood: x / Protein: 75 mg/dL / Nitrite: Negative   Leuk Esterase: Negative / RBC: 3-5 /HPF / WBC 0-2   Sq Epi: x / Non Sq Epi: Few / Bacteria: Occasional                CENTRAL LINE: N  COLE: Y                       REMOVE: N  A-LINE: N    GLOBAL ISSUE/BEST PRACTICE:  Analgesia:Y  Sedation:N  CAM-ICU:   HOB elevation: yes  Stress ulcer prophylaxis:Y  VTE prophylaxis:Y  Glycemic control:Y  Nutrition:Y    CODE STATUS: Full 24 hour events: Pt extubated yesterday, tolerated well. No acute overnight events.    Review of Systems:  Constitutional: No fever, chills, fatigue  Neuro: No headache, numbness, weakness  Resp: No cough, wheezing, shortness of breath  CVS: No chest pain, palpitations, leg swelling  GI: No abdominal pain, nausea, vomiting, diarrhea   : No dysuria, frequency, incontinence  Skin: No itching, burning, rashes, or lesions   Msk: Mild pain RLE, no numbness/tingling  Psych: No depression, anxiety, mood swings    T(F): 97.8 (10-05-17 @ 05:14), Max: 100 (10-04-17 @ 11:47)  HR: 117 (10-05-17 @ 07:00) (111 - 137)  BP: 108/65 (10-05-17 @ 07:00) (91/55 - 120/53)  RR: 26 (10-05-17 @ 07:00) (19 - 30)  SpO2: 99% (10-05-17 @ 07:00) (96% - 100%)    I&O's Summary    04 Oct 2017 07:01  -  05 Oct 2017 07:00  --------------------------------------------------------  IN: 5058 mL / OUT: 1030 mL / NET: 4028 mL    Physical Exam:   Gen: NAD, resting comfortably  Neuro: AAOx3  HEENT: NC/AT  Resp: CTA b/l  CVS: Tachycardic, +s1/s2  Abd: Soft, nt/nd, +bs  Ext: +pulses RLE with doppler, mild edema R foot, SILT, 1/5 strength EHL/FHL/GS/TA RLE  Skin: warm, well perfused    Meds:  aztreonam  IVPB IV Intermittent  clindamycin IVPB IV Intermittent  levothyroxine Injectable IV Push  buPROPion XL . Oral  clonazePAM Tablet Oral PRN  HYDROmorphone  Injectable IV Push PRN  lamoTRIgine Oral  risperiDONE   Tablet Oral  risperiDONE   Tablet Oral  sertraline Oral  topiramate Oral  dextrose 5% + sodium chloride 0.45% IV Continuous                          10.0   17.5  )-----------( 88       ( 05 Oct 2017 07:08 )             30.9     132  |  95  |  47<H>  ----------------------------<  155<H>  5.0   |  24  |  5.20<H>    Ca    7.3<L>      05 Oct 2017 07:08  Phos  4.0     10-  Mg     2.1     10-    TPro  4.7<L>  /  Alb  2.1<L>  /  TBili  0.5  /  DBili  x   /  AST  365<H>  /  ALT  146<H>  /  AlkPhos  34<L>  10-    Lactate 1.7           10 @ 12:46      CARDIAC MARKERS ( 04 Oct 2017 12:46 )  x     / x     / 65848 U/L / x     / x      CARDIAC MARKERS ( 04 Oct 2017 06:41 )  x     / x     / 06016 U/L / x     / x          PT/INR - ( 03 Oct 2017 22:16 )   PT: 12.4 sec;   INR: 1.13 ratio         PTT - ( 03 Oct 2017 22:16 )  PTT:31.9 sec  Urinalysis Basic - ( 04 Oct 2017 06:51 )    Color: Yellow / Appearance: Clear / S.010 / pH: x  Gluc: x / Ketone: Negative  / Bili: Negative / Urobili: Negative   Blood: x / Protein: 75 mg/dL / Nitrite: Negative   Leuk Esterase: Negative / RBC: 3-5 /HPF / WBC 0-2   Sq Epi: x / Non Sq Epi: Few / Bacteria: Occasional      CENTRAL LINE: N  COLE: Y                       REMOVE: N  A-LINE: N    GLOBAL ISSUE/BEST PRACTICE:  Analgesia:Y  Sedation:N  CAM-ICU:   HOB elevation: yes  Stress ulcer prophylaxis:Y  VTE prophylaxis:Y  Glycemic control:Y  Nutrition:Y    CODE STATUS: Full

## 2017-10-05 NOTE — PROGRESS NOTE ADULT - SUBJECTIVE AND OBJECTIVE BOX
The patient was interviewed and evaluated  32y Female    T(C): 36.9 (10-05-17 @ 08:01), Max: 37.8 (10-04-17 @ 11:47)  HR: 118 (10-05-17 @ 08:00) (111 - 137)  BP: 116/71 (10-05-17 @ 08:00) (91/55 - 120/53)  RR: 24 (10-05-17 @ 08:00) (19 - 30)  SpO2: 99% (10-05-17 @ 08:00) (96% - 100%)  Wt(kg): --    Pt seen, doing well, no anesthesia complications or complaints noted or reported.   No Nausea    All questions answered    No additional recommendations.     Pain well controlled

## 2017-10-05 NOTE — PROGRESS NOTE ADULT - SUBJECTIVE AND OBJECTIVE BOX
Patient is a 32y old  Female who presents with a chief complaint of RLE edema and numbness (04 Oct 2017 01:13)      Patient seen in follow up for GHAZAL, ATN, Rhabdomyolysis. Pt off vent. UO improved.     PAST MEDICAL HISTORY:  Low back pain  Hypothyroid  Bipolar 1 disorder    MEDICATIONS  (STANDING):  aztreonam  IVPB 500 milliGRAM(s) IV Intermittent every 8 hours  buPROPion XL . 300 milliGRAM(s) Oral daily  clindamycin IVPB 900 milliGRAM(s) IV Intermittent every 8 hours  dextrose 5% + sodium chloride 0.45% 1000 milliLiter(s) (125 mL/Hr) IV Continuous <Continuous>  lamoTRIgine 100 milliGRAM(s) Oral every 8 hours  levothyroxine Injectable 75 MICROGram(s) IV Push daily  risperiDONE   Tablet 4 milliGRAM(s) Oral <User Schedule>  risperiDONE   Tablet 8 milliGRAM(s) Oral at bedtime  sertraline 50 milliGRAM(s) Oral daily  topiramate 400 milliGRAM(s) Oral two times a day    MEDICATIONS  (PRN):  clonazePAM Tablet 2 milliGRAM(s) Oral every 6 hours PRN anxiety  HYDROmorphone  Injectable 2 milliGRAM(s) IV Push every 4 hours PRN Moderate Pain (4 - 6)    T(C): 36.9 (10-05-17 @ 08:01), Max: 37.8 (10-04-17 @ 11:47)  HR: 128 (10-05-17 @ 09:00) (111 - 137)  BP: 104/76 (10-05-17 @ 09:00) (91/55 - 150/87)  RR: 19 (10-05-17 @ 09:00) (10 - 51)  SpO2: 100% (10-05-17 @ 09:00) (95% - 100%)  Wt(kg): --  I&O's Detail    04 Oct 2017 07:01  -  05 Oct 2017 07:00  --------------------------------------------------------  IN:    Albumin 25%: 100 mL    dextrose 5%: 450 mL    dextrose 5% + sodium chloride 0.45%: 2125 mL    lactated ringers.: 150 mL    Oral Fluid: 1780 mL    propofol Infusion: 153 mL    Solution: 150 mL    Solution: 150 mL  Total IN: 5058 mL    OUT:    Indwelling Catheter - Urethral: 1030 mL  Total OUT: 1030 mL    Total NET: 4028 mL      05 Oct 2017 07:01  -  05 Oct 2017 10:01  --------------------------------------------------------  IN:    dextrose 5% + sodium chloride 0.45%: 250 mL  Total IN: 250 mL    OUT:    Indwelling Catheter - Urethral: 100 mL  Total OUT: 100 mL    Total NET: 150 mL          PHYSICAL EXAM:  General: NAD  Respiratory: b/l air entry  Cardiovascular: S1 S2  Gastrointestinal: soft  Extremities:  Rt leg dressing                          10.0   17.5  )-----------( 88       ( 05 Oct 2017 07:08 )             30.9     10-05    132<L>  |  95<L>  |  47<H>  ----------------------------<  155<H>  5.0   |  24  |  5.20<H>    Ca    7.3<L>      05 Oct 2017 07:08  Phos  7.9     10-05  Mg     2.2     10-05    TPro  4.7<L>  /  Alb  2.1<L>  /  TBili  0.5  /  DBili  x   /  AST  365<H>  /  ALT  146<H>  /  AlkPhos  34<L>  10-05    CARDIAC MARKERS ( 05 Oct 2017 07:08 )  x     / x     / 17182 U/L / x     / x      CARDIAC MARKERS ( 04 Oct 2017 12:46 )  x     / x     / 51824 U/L / x     / x      CARDIAC MARKERS ( 04 Oct 2017 06:41 )  x     / x     / 86568 U/L / x     / x          LIVER FUNCTIONS - ( 05 Oct 2017 07:08 )  Alb: 2.1 g/dL / Pro: 4.7 g/dL / ALK PHOS: 34 U/L / ALT: 146 U/L / AST: 365 U/L / GGT: x           Urinalysis Basic - ( 04 Oct 2017 06:51 )    Color: Yellow / Appearance: Clear / S.010 / pH: x  Gluc: x / Ketone: Negative  / Bili: Negative / Urobili: Negative   Blood: x / Protein: 75 mg/dL / Nitrite: Negative   Leuk Esterase: Negative / RBC: 3-5 /HPF / WBC 0-2   Sq Epi: x / Non Sq Epi: Few / Bacteria: Occasional      ABG - ( 04 Oct 2017 16:48 )  pH: 7.46  /  pCO2: 27    /  pO2: 99    / HCO3: 22    / Base Excess: -4.1  /  SaO2: 98                Sodium, Serum: 132 (10-05 @ 07:08)  Sodium, Serum: 134 (10-04 @ 17:13)  Sodium, Serum: 135 (10-04 @ 12:46)  Sodium, Serum: 132 (10-04 @ 06:41)    Creatinine, Serum: 5.20 (10-05 @ 07:08)  Creatinine, Serum: 4.00 (10-04 @ 17:13)  Creatinine, Serum: 3.70 (10-04 @ 12:46)  Creatinine, Serum: 2.80 (10-04 @ 06:41)  Creatinine, Serum: 2.30 (10-03 @ 22:16)    Potassium, Serum: 5.0 (10-05 @ 07:08)  Potassium, Serum: 4.5 (10-04 @ 17:13)  Potassium, Serum: 4.6 (10-04 @ 12:46)  Potassium, Serum: 6.1 (10-04 @ 06:41)    Hemoglobin: 10.0 (10-05 @ 07:08)  Hemoglobin: 12.5 (10-04 @ 07:36)  Hemoglobin: 15.6 (10-03 @ 22:16)    < from: Xray Chest 1 View AP/PA (10.04.17 @ 04:28) >    Creatine Kinase, Serum in AM (10.05.17 @ 07:08)    Creatine Kinase, Serum: 32556 U/L    Creatine Kinase, Serum (10.04.17 @ 12:46)    Creatine Kinase, Serum: 03556 U/L        EXAM:  CHEST 1 VIEW                            PROCEDURE DATE:  10/04/2017          INTERPRETATION:  AP supine chest on  4:11 AM. Patient was   intubated from the OR.    Poor inspiration crowds the chest. Heart is magnified by technique. Clips   in the right upper quadrant noted.    There are atelectatic areas off the right hilum and off the left heart   border which are new since October 3.    In addition endotracheal tube is now in good position.    IMPRESSION: Bilateral atelectasis.Intubation. Patient has fever.    < end of copied text >

## 2017-10-05 NOTE — PROGRESS NOTE ADULT - ASSESSMENT
32F PMH Asthma, Bipolar disorder, hypothyroidism, chronic low back pain s/p laminectomy and spinal fusion, opioid-dependent presents with right leg swelling, numbness, pallor, poikilothermia, and pulselessness, found to have ischemic leg due to acute compartment syndrome of right leg with acute rhabdomyolysis, GHAZAL 2/2 ATN, and leukocytosis with bandemia s/p emergent fasciotomy of the 4 leg compartments (anterior, lateral, superficial posterior, and deep posterior) and the lateral thigh muscles. The muscles were noted to be edematous and ischemic with improved perfusion upon opening the compartments. Now with worsening GHAZAL, hyperkalemia, hyperphosphatemia, oliguria, and sinus tachycardia.    Neuro - Cont pain control with dilaudid PRN, cont klonopin, lamictal, risperdal, sertraline, topamax  CV - Sinus tachycardia, HD stable  Resp - Tolerated extubation yesterday, O2 sat good on nasal cannula  GI - Tolerating diet  Renal - BUN/Cr increasing 2/2 ATN from rhabdomyolysis, monitor I/Os, hyperkalemia resolved 32F PMH Asthma, Bipolar disorder, hypothyroidism, chronic low back pain s/p laminectomy and spinal fusion, opioid-dependent presents with right leg swelling, numbness, pallor, poikilothermia, and pulselessness, found to have ischemic leg due to acute compartment syndrome of right leg with acute rhabdomyolysis, GHAZAL 2/2 ATN, and leukocytosis with bandemia s/p emergent fasciotomy of the 4 leg compartments (anterior, lateral, superficial posterior, and deep posterior) and the lateral thigh muscles. The muscles were noted to be edematous and ischemic with improved perfusion upon opening the compartments. Now with worsening GHAZAL, hyperkalemia, hyperphosphatemia, oliguria, and sinus tachycardia. Dressing RLE was changed today with Plastics (Dr. Isabel)    Neuro - Cont pain control with dilaudid PRN, cont klonopin, lamictal, risperdal, sertraline, topamax  CV - Sinus tachycardia, HD stable  Resp - Tolerated extubation yesterday, O2 sat good on nasal cannula  GI - Tolerating diet  Renal - BUN/Cr increasing 2/2 ATN from rhabdomyolysis, monitor I/Os, hyperkalemia resolved  Endo - Cont synthroid  ID - Cont clindamycin/aztreonam  Heme - DVT ppx with SCDs  Dispo - Play for OR tomorrow for wound vac placement with plastics, NPO after midnight

## 2017-10-05 NOTE — PROGRESS NOTE ADULT - SUBJECTIVE AND OBJECTIVE BOX
Patient is a 32y old  Female who presents with a chief complaint of RLE edema and numbness (04 Oct 2017 01:13)Admitted for Compartment syndrome, s/p   Fasciotomy    INTERVAL HPI: Pt extubated yesterday, tolerated well. No acute overnight events. Pt seen and examined, c/o pain in RLE. eating well.    OVERNIGHT EVENTS: none  T(F): 99.1 (10-05-17 @ 15:40), Max: 99.1 (10-05-17 @ 15:40)  HR: 120 (10-05-17 @ 15:00) (111 - 137)  BP: 127/60 (10-05-17 @ 15:00) (91/55 - 127/60)  RR: 24 (10-05-17 @ 15:00) (18 - 30)  SpO2: 99% (10-05-17 @ 15:00) (96% - 100%)  Wt(kg): --  I&O's Summary    04 Oct 2017 07:  -  05 Oct 2017 07:00  --------------------------------------------------------  IN: 5058 mL / OUT: 1030 mL / NET: 4028 mL    05 Oct 2017 07:  -  05 Oct 2017 16:25  --------------------------------------------------------  IN: 2050 mL / OUT: 330 mL / NET: 1720 mL        REVIEW OF SYSTEM:    Constitutional: No fever, chills, fatigue  Neuro: No headache, numbness, weakness  Resp: No cough, wheezing, shortness of breath  CVS: No chest pain, palpitations, leg swelling  GI: No abdominal pain, nausea, vomiting, diarrhea   : No dysuria, frequency, incontinence  Skin: No itching, burning, rashes, or lesions   Msk: pain in RLE  Psych: No depression, anxiety, mood swings          PHYSICAL EXAM:  GENERAL: NAD, well-developed  HEAD:  Atraumatic, Normocephalic  EYES: EOMI, PERRLA, conjunctiva and sclera clear  ENMT: No tonsillar erythema, exudates, or enlargement; Moist mucous membranes,   NECK: Supple, No JVD, Normal thyroid  HEART: Regular rate and rhythm; No murmurs, rubs, or gallops  RESPIRATORY: CTA B/L, No wheezing / rhonchi  ABDOMEN: Soft, Nontender, Nondistended; Bowel sounds present  NEUROLOGY: A&Ox3, nonfocal, not able to move RLE  EXTREMITIES:  +pulses RLE with doppler, mild edema R foot, LLE is moving + pulse  Skin: warm, well perfused    SKIN: warm, dry, normal color, no rash or abnormal lesions        LABS:                        10.0   17.5  )-----------( 88       ( 05 Oct 2017 07:08 )             30.9     10-05    132<L>  |  95<L>  |  47<H>  ----------------------------<  155<H>  5.0   |  24  |  5.20<H>    Ca    7.3<L>      05 Oct 2017 07:08  Phos  7.9     10-05  Mg     2.2     10-05    TPro  4.7<L>  /  Alb  2.1<L>  /  TBili  0.5  /  DBili  x   /  AST  365<H>  /  ALT  146<H>  /  AlkPhos  34<L>  10-05    PT/INR - ( 03 Oct 2017 22:16 )   PT: 12.4 sec;   INR: 1.13 ratio         PTT - ( 03 Oct 2017 22:16 )  PTT:31.9 sec  Urinalysis Basic - ( 04 Oct 2017 06:51 )    Color: Yellow / Appearance: Clear / S.010 / pH: x  Gluc: x / Ketone: Negative  / Bili: Negative / Urobili: Negative   Blood: x / Protein: 75 mg/dL / Nitrite: Negative   Leuk Esterase: Negative / RBC: 3-5 /HPF / WBC 0-2   Sq Epi: x / Non Sq Epi: Few / Bacteria: Occasional      CAPILLARY BLOOD GLUCOSE        ABG - ( 04 Oct 2017 16:48 )  pH: 7.46  /  pCO2: 27    /  pO2: 99    / HCO3: 22    / Base Excess: -4.1  /  SaO2: 98                10-04 @ 10:15   No growth to date.  --  --  10-04 @ 09:59   No growth  --  --          MEDICATIONS  (STANDING):  aztreonam  IVPB 500 milliGRAM(s) IV Intermittent every 8 hours  buPROPion XL . 300 milliGRAM(s) Oral daily  clindamycin IVPB 900 milliGRAM(s) IV Intermittent every 8 hours  dextrose 5% + sodium chloride 0.45% 1000 milliLiter(s) (125 mL/Hr) IV Continuous <Continuous>  lamoTRIgine 100 milliGRAM(s) Oral every 8 hours  levothyroxine Injectable 75 MICROGram(s) IV Push daily  risperiDONE   Tablet 4 milliGRAM(s) Oral <User Schedule>  risperiDONE   Tablet 8 milliGRAM(s) Oral at bedtime  sertraline 50 milliGRAM(s) Oral daily  sevelamer carbonate 800 milliGRAM(s) Oral three times a day with meals  topiramate 400 milliGRAM(s) Oral two times a day    MEDICATIONS  (PRN):  clonazePAM Tablet 2 milliGRAM(s) Oral every 6 hours PRN anxiety  HYDROmorphone  Injectable 2 milliGRAM(s) IV Push every 4 hours PRN Moderate Pain (4 - 6)  HYDROmorphone  Injectable 1 milliGRAM(s) IV Push every 3 hours PRN breakthrough pain

## 2017-10-06 DIAGNOSIS — M62.82 RHABDOMYOLYSIS: ICD-10-CM

## 2017-10-06 DIAGNOSIS — E03.9 HYPOTHYROIDISM, UNSPECIFIED: ICD-10-CM

## 2017-10-06 DIAGNOSIS — N17.9 ACUTE KIDNEY FAILURE, UNSPECIFIED: ICD-10-CM

## 2017-10-06 LAB
ALBUMIN SERPL ELPH-MCNC: 1.7 G/DL — LOW (ref 3.3–5)
ALP SERPL-CCNC: 37 U/L — LOW (ref 40–120)
ALT FLD-CCNC: 100 U/L — HIGH (ref 12–78)
ANION GAP SERPL CALC-SCNC: 11 MMOL/L — SIGNIFICANT CHANGE UP (ref 5–17)
APTT BLD: 28.8 SEC — SIGNIFICANT CHANGE UP (ref 27.5–37.4)
AST SERPL-CCNC: 215 U/L — HIGH (ref 15–37)
BILIRUB SERPL-MCNC: 0.3 MG/DL — SIGNIFICANT CHANGE UP (ref 0.2–1.2)
BUN SERPL-MCNC: 58 MG/DL — HIGH (ref 7–23)
CALCIUM SERPL-MCNC: 7.2 MG/DL — LOW (ref 8.5–10.1)
CHLORIDE SERPL-SCNC: 95 MMOL/L — LOW (ref 96–108)
CK SERPL-CCNC: 7889 U/L — HIGH (ref 26–192)
CO2 SERPL-SCNC: 25 MMOL/L — SIGNIFICANT CHANGE UP (ref 22–31)
CREAT SERPL-MCNC: 6.2 MG/DL — HIGH (ref 0.5–1.3)
GLUCOSE SERPL-MCNC: 123 MG/DL — HIGH (ref 70–99)
HCT VFR BLD CALC: 27.5 % — LOW (ref 34.5–45)
HGB BLD-MCNC: 8.8 G/DL — LOW (ref 11.5–15.5)
INR BLD: 1.13 RATIO — SIGNIFICANT CHANGE UP (ref 0.88–1.16)
MAGNESIUM SERPL-MCNC: 2.2 MG/DL — SIGNIFICANT CHANGE UP (ref 1.6–2.6)
MCHC RBC-ENTMCNC: 29.6 PG — SIGNIFICANT CHANGE UP (ref 27–34)
MCHC RBC-ENTMCNC: 32.2 GM/DL — SIGNIFICANT CHANGE UP (ref 32–36)
MCV RBC AUTO: 92.1 FL — SIGNIFICANT CHANGE UP (ref 80–100)
PHOSPHATE SERPL-MCNC: 8.3 MG/DL — SIGNIFICANT CHANGE UP (ref 2.5–4.5)
PLATELET # BLD AUTO: 91 K/UL — LOW (ref 150–400)
POTASSIUM SERPL-MCNC: 4.8 MMOL/L — SIGNIFICANT CHANGE UP (ref 3.5–5.3)
POTASSIUM SERPL-SCNC: 4.8 MMOL/L — SIGNIFICANT CHANGE UP (ref 3.5–5.3)
PROT SERPL-MCNC: 4.7 G/DL — LOW (ref 6–8.3)
PROTHROM AB SERPL-ACNC: 12.4 SEC — SIGNIFICANT CHANGE UP (ref 9.8–12.7)
RBC # BLD: 2.98 M/UL — LOW (ref 3.8–5.2)
RBC # FLD: 13.5 % — SIGNIFICANT CHANGE UP (ref 10.3–14.5)
SODIUM SERPL-SCNC: 131 MMOL/L — LOW (ref 135–145)
WBC # BLD: 11.3 K/UL — HIGH (ref 3.8–10.5)
WBC # FLD AUTO: 11.3 K/UL — HIGH (ref 3.8–10.5)

## 2017-10-06 PROCEDURE — 99291 CRITICAL CARE FIRST HOUR: CPT

## 2017-10-06 PROCEDURE — 99233 SBSQ HOSP IP/OBS HIGH 50: CPT

## 2017-10-06 PROCEDURE — 99233 SBSQ HOSP IP/OBS HIGH 50: CPT | Mod: GC

## 2017-10-06 RX ORDER — FLUTICASONE PROPIONATE 50 MCG
1 SPRAY, SUSPENSION NASAL
Qty: 0 | Refills: 0 | Status: DISCONTINUED | OUTPATIENT
Start: 2017-10-06 | End: 2017-10-27

## 2017-10-06 RX ORDER — CLONAZEPAM 1 MG
2 TABLET ORAL EVERY 6 HOURS
Qty: 0 | Refills: 0 | Status: DISCONTINUED | OUTPATIENT
Start: 2017-10-06 | End: 2017-10-10

## 2017-10-06 RX ORDER — HEPARIN SODIUM 5000 [USP'U]/ML
5000 INJECTION INTRAVENOUS; SUBCUTANEOUS EVERY 8 HOURS
Qty: 0 | Refills: 0 | Status: DISCONTINUED | OUTPATIENT
Start: 2017-10-06 | End: 2017-10-26

## 2017-10-06 RX ORDER — HYDROMORPHONE HYDROCHLORIDE 2 MG/ML
1 INJECTION INTRAMUSCULAR; INTRAVENOUS; SUBCUTANEOUS
Qty: 0 | Refills: 0 | Status: DISCONTINUED | OUTPATIENT
Start: 2017-10-06 | End: 2017-10-06

## 2017-10-06 RX ORDER — OXYCODONE HYDROCHLORIDE 5 MG/1
60 TABLET ORAL EVERY 8 HOURS
Qty: 0 | Refills: 0 | Status: DISCONTINUED | OUTPATIENT
Start: 2017-10-06 | End: 2017-10-06

## 2017-10-06 RX ORDER — SODIUM CHLORIDE 9 MG/ML
1000 INJECTION, SOLUTION INTRAVENOUS
Qty: 0 | Refills: 0 | Status: DISCONTINUED | OUTPATIENT
Start: 2017-10-06 | End: 2017-10-06

## 2017-10-06 RX ORDER — LAMOTRIGINE 25 MG/1
100 TABLET, ORALLY DISINTEGRATING ORAL EVERY 8 HOURS
Qty: 0 | Refills: 0 | Status: DISCONTINUED | OUTPATIENT
Start: 2017-10-06 | End: 2017-10-27

## 2017-10-06 RX ORDER — TOPIRAMATE 25 MG
400 TABLET ORAL
Qty: 0 | Refills: 0 | Status: DISCONTINUED | OUTPATIENT
Start: 2017-10-06 | End: 2017-10-27

## 2017-10-06 RX ORDER — SEVELAMER CARBONATE 2400 MG/1
800 POWDER, FOR SUSPENSION ORAL
Qty: 0 | Refills: 0 | Status: DISCONTINUED | OUTPATIENT
Start: 2017-10-06 | End: 2017-10-08

## 2017-10-06 RX ORDER — LEVOTHYROXINE SODIUM 125 MCG
150 TABLET ORAL DAILY
Qty: 0 | Refills: 0 | Status: DISCONTINUED | OUTPATIENT
Start: 2017-10-06 | End: 2017-10-27

## 2017-10-06 RX ORDER — SENNA PLUS 8.6 MG/1
2 TABLET ORAL AT BEDTIME
Qty: 0 | Refills: 0 | Status: DISCONTINUED | OUTPATIENT
Start: 2017-10-06 | End: 2017-10-27

## 2017-10-06 RX ORDER — POLYETHYLENE GLYCOL 3350 17 G/17G
17 POWDER, FOR SOLUTION ORAL DAILY
Qty: 0 | Refills: 0 | Status: DISCONTINUED | OUTPATIENT
Start: 2017-10-06 | End: 2017-10-07

## 2017-10-06 RX ORDER — HYDROMORPHONE HYDROCHLORIDE 2 MG/ML
2 INJECTION INTRAMUSCULAR; INTRAVENOUS; SUBCUTANEOUS EVERY 4 HOURS
Qty: 0 | Refills: 0 | Status: DISCONTINUED | OUTPATIENT
Start: 2017-10-06 | End: 2017-10-06

## 2017-10-06 RX ORDER — AZTREONAM 2 G
500 VIAL (EA) INJECTION EVERY 8 HOURS
Qty: 0 | Refills: 0 | Status: DISCONTINUED | OUTPATIENT
Start: 2017-10-06 | End: 2017-10-07

## 2017-10-06 RX ORDER — ONDANSETRON 8 MG/1
4 TABLET, FILM COATED ORAL ONCE
Qty: 0 | Refills: 0 | Status: COMPLETED | OUTPATIENT
Start: 2017-10-06 | End: 2017-10-13

## 2017-10-06 RX ORDER — DOCUSATE SODIUM 100 MG
100 CAPSULE ORAL
Qty: 0 | Refills: 0 | Status: DISCONTINUED | OUTPATIENT
Start: 2017-10-06 | End: 2017-10-19

## 2017-10-06 RX ORDER — RISPERIDONE 4 MG/1
4 TABLET ORAL
Qty: 0 | Refills: 0 | Status: DISCONTINUED | OUTPATIENT
Start: 2017-10-06 | End: 2017-10-10

## 2017-10-06 RX ORDER — OXYCODONE HYDROCHLORIDE 5 MG/1
60 TABLET ORAL EVERY 8 HOURS
Qty: 0 | Refills: 0 | Status: DISCONTINUED | OUTPATIENT
Start: 2017-10-06 | End: 2017-10-13

## 2017-10-06 RX ORDER — HYDROMORPHONE HYDROCHLORIDE 2 MG/ML
2 INJECTION INTRAMUSCULAR; INTRAVENOUS; SUBCUTANEOUS
Qty: 0 | Refills: 0 | Status: DISCONTINUED | OUTPATIENT
Start: 2017-10-06 | End: 2017-10-08

## 2017-10-06 RX ORDER — ACETAMINOPHEN 500 MG
650 TABLET ORAL EVERY 6 HOURS
Qty: 0 | Refills: 0 | Status: DISCONTINUED | OUTPATIENT
Start: 2017-10-06 | End: 2017-10-08

## 2017-10-06 RX ORDER — HYDROMORPHONE HYDROCHLORIDE 2 MG/ML
3 INJECTION INTRAMUSCULAR; INTRAVENOUS; SUBCUTANEOUS
Qty: 0 | Refills: 0 | Status: DISCONTINUED | OUTPATIENT
Start: 2017-10-06 | End: 2017-10-08

## 2017-10-06 RX ORDER — BUPROPION HYDROCHLORIDE 150 MG/1
300 TABLET, EXTENDED RELEASE ORAL DAILY
Qty: 0 | Refills: 0 | Status: DISCONTINUED | OUTPATIENT
Start: 2017-10-06 | End: 2017-10-27

## 2017-10-06 RX ORDER — SERTRALINE 25 MG/1
50 TABLET, FILM COATED ORAL DAILY
Qty: 0 | Refills: 0 | Status: DISCONTINUED | OUTPATIENT
Start: 2017-10-06 | End: 2017-10-27

## 2017-10-06 RX ORDER — RISPERIDONE 4 MG/1
8 TABLET ORAL AT BEDTIME
Qty: 0 | Refills: 0 | Status: DISCONTINUED | OUTPATIENT
Start: 2017-10-06 | End: 2017-10-27

## 2017-10-06 RX ORDER — SODIUM CHLORIDE 9 MG/ML
1000 INJECTION, SOLUTION INTRAVENOUS
Qty: 0 | Refills: 0 | Status: DISCONTINUED | OUTPATIENT
Start: 2017-10-06 | End: 2017-10-07

## 2017-10-06 RX ADMIN — HYDROMORPHONE HYDROCHLORIDE 2 MILLIGRAM(S): 2 INJECTION INTRAMUSCULAR; INTRAVENOUS; SUBCUTANEOUS at 03:30

## 2017-10-06 RX ADMIN — Medication 400 MILLIGRAM(S): at 05:30

## 2017-10-06 RX ADMIN — Medication 1 SPRAY(S): at 05:30

## 2017-10-06 RX ADMIN — Medication 50 MILLIGRAM(S): at 13:51

## 2017-10-06 RX ADMIN — SODIUM CHLORIDE 75 MILLILITER(S): 9 INJECTION, SOLUTION INTRAVENOUS at 11:00

## 2017-10-06 RX ADMIN — Medication 2 MILLIGRAM(S): at 03:09

## 2017-10-06 RX ADMIN — HYDROMORPHONE HYDROCHLORIDE 1 MILLIGRAM(S): 2 INJECTION INTRAMUSCULAR; INTRAVENOUS; SUBCUTANEOUS at 04:20

## 2017-10-06 RX ADMIN — HYDROMORPHONE HYDROCHLORIDE 2 MILLIGRAM(S): 2 INJECTION INTRAMUSCULAR; INTRAVENOUS; SUBCUTANEOUS at 10:55

## 2017-10-06 RX ADMIN — HYDROMORPHONE HYDROCHLORIDE 1 MILLIGRAM(S): 2 INJECTION INTRAMUSCULAR; INTRAVENOUS; SUBCUTANEOUS at 17:53

## 2017-10-06 RX ADMIN — OXYCODONE HYDROCHLORIDE 60 MILLIGRAM(S): 5 TABLET ORAL at 13:50

## 2017-10-06 RX ADMIN — OXYCODONE HYDROCHLORIDE 60 MILLIGRAM(S): 5 TABLET ORAL at 14:50

## 2017-10-06 RX ADMIN — OXYCODONE HYDROCHLORIDE 60 MILLIGRAM(S): 5 TABLET ORAL at 22:08

## 2017-10-06 RX ADMIN — HYDROMORPHONE HYDROCHLORIDE 2 MILLIGRAM(S): 2 INJECTION INTRAMUSCULAR; INTRAVENOUS; SUBCUTANEOUS at 07:40

## 2017-10-06 RX ADMIN — HYDROMORPHONE HYDROCHLORIDE 2 MILLIGRAM(S): 2 INJECTION INTRAMUSCULAR; INTRAVENOUS; SUBCUTANEOUS at 20:02

## 2017-10-06 RX ADMIN — HEPARIN SODIUM 5000 UNIT(S): 5000 INJECTION INTRAVENOUS; SUBCUTANEOUS at 23:09

## 2017-10-06 RX ADMIN — OXYCODONE HYDROCHLORIDE 60 MILLIGRAM(S): 5 TABLET ORAL at 23:00

## 2017-10-06 RX ADMIN — SEVELAMER CARBONATE 800 MILLIGRAM(S): 2400 POWDER, FOR SUSPENSION ORAL at 18:53

## 2017-10-06 RX ADMIN — LAMOTRIGINE 100 MILLIGRAM(S): 25 TABLET, ORALLY DISINTEGRATING ORAL at 13:51

## 2017-10-06 RX ADMIN — Medication 400 MILLIGRAM(S): at 18:53

## 2017-10-06 RX ADMIN — HYDROMORPHONE HYDROCHLORIDE 1 MILLIGRAM(S): 2 INJECTION INTRAMUSCULAR; INTRAVENOUS; SUBCUTANEOUS at 14:39

## 2017-10-06 RX ADMIN — HYDROMORPHONE HYDROCHLORIDE 1 MILLIGRAM(S): 2 INJECTION INTRAMUSCULAR; INTRAVENOUS; SUBCUTANEOUS at 04:03

## 2017-10-06 RX ADMIN — RISPERIDONE 4 MILLIGRAM(S): 4 TABLET ORAL at 08:28

## 2017-10-06 RX ADMIN — HYDROMORPHONE HYDROCHLORIDE 2 MILLIGRAM(S): 2 INJECTION INTRAMUSCULAR; INTRAVENOUS; SUBCUTANEOUS at 21:00

## 2017-10-06 RX ADMIN — HYDROMORPHONE HYDROCHLORIDE 1 MILLIGRAM(S): 2 INJECTION INTRAMUSCULAR; INTRAVENOUS; SUBCUTANEOUS at 18:10

## 2017-10-06 RX ADMIN — LAMOTRIGINE 100 MILLIGRAM(S): 25 TABLET, ORALLY DISINTEGRATING ORAL at 23:09

## 2017-10-06 RX ADMIN — HYDROMORPHONE HYDROCHLORIDE 2 MILLIGRAM(S): 2 INJECTION INTRAMUSCULAR; INTRAVENOUS; SUBCUTANEOUS at 07:23

## 2017-10-06 RX ADMIN — BUPROPION HYDROCHLORIDE 300 MILLIGRAM(S): 150 TABLET, EXTENDED RELEASE ORAL at 12:10

## 2017-10-06 RX ADMIN — RISPERIDONE 8 MILLIGRAM(S): 4 TABLET ORAL at 23:08

## 2017-10-06 RX ADMIN — Medication 150 MICROGRAM(S): at 05:30

## 2017-10-06 RX ADMIN — HYDROMORPHONE HYDROCHLORIDE 2 MILLIGRAM(S): 2 INJECTION INTRAMUSCULAR; INTRAVENOUS; SUBCUTANEOUS at 10:38

## 2017-10-06 RX ADMIN — Medication 100 MILLIGRAM(S): at 01:20

## 2017-10-06 RX ADMIN — Medication 2 MILLIGRAM(S): at 09:36

## 2017-10-06 RX ADMIN — HYDROMORPHONE HYDROCHLORIDE 1 MILLIGRAM(S): 2 INJECTION INTRAMUSCULAR; INTRAVENOUS; SUBCUTANEOUS at 08:45

## 2017-10-06 RX ADMIN — HYDROMORPHONE HYDROCHLORIDE 1 MILLIGRAM(S): 2 INJECTION INTRAMUSCULAR; INTRAVENOUS; SUBCUTANEOUS at 08:28

## 2017-10-06 RX ADMIN — HYDROMORPHONE HYDROCHLORIDE 2 MILLIGRAM(S): 2 INJECTION INTRAMUSCULAR; INTRAVENOUS; SUBCUTANEOUS at 23:30

## 2017-10-06 RX ADMIN — Medication 100 MILLIGRAM(S): at 22:07

## 2017-10-06 RX ADMIN — Medication 100 MILLIGRAM(S): at 10:08

## 2017-10-06 RX ADMIN — Medication 50 MILLIGRAM(S): at 05:31

## 2017-10-06 RX ADMIN — Medication 1 SPRAY(S): at 18:56

## 2017-10-06 RX ADMIN — Medication 50 MILLIGRAM(S): at 22:07

## 2017-10-06 RX ADMIN — HYDROMORPHONE HYDROCHLORIDE 1 MILLIGRAM(S): 2 INJECTION INTRAMUSCULAR; INTRAVENOUS; SUBCUTANEOUS at 15:00

## 2017-10-06 RX ADMIN — LAMOTRIGINE 100 MILLIGRAM(S): 25 TABLET, ORALLY DISINTEGRATING ORAL at 05:30

## 2017-10-06 RX ADMIN — SERTRALINE 50 MILLIGRAM(S): 25 TABLET, FILM COATED ORAL at 12:10

## 2017-10-06 RX ADMIN — HYDROMORPHONE HYDROCHLORIDE 2 MILLIGRAM(S): 2 INJECTION INTRAMUSCULAR; INTRAVENOUS; SUBCUTANEOUS at 03:11

## 2017-10-06 NOTE — PROGRESS NOTE ADULT - SUBJECTIVE AND OBJECTIVE BOX
Patient is a 32y old  Female who presents with a chief complaint of RLE edema and numbness (04 Oct 2017 01:13)Admitted for Compartment syndrome, s/p Fasciotomy    INTERVAL HPI: Pt seen and examined in ICU. No acute overnight events. c/o pain in RLE. eating well. for wound vac placement today in OR    OVERNIGHT EVENTS: none  ICU Vital Signs Last 24 Hrs  T(C): 37.2 (06 Oct 2017 20:22), Max: 38 (06 Oct 2017 04:01)  T(F): 98.9 (06 Oct 2017 20:22), Max: 100.4 (06 Oct 2017 04:01)  HR: 114 (06 Oct 2017 20:00) (89 - 117)  BP: 117/69 (06 Oct 2017 20:00) (94/54 - 140/76)  BP(mean): 85 (06 Oct 2017 20:00) (67 - 94)  ABP: --  ABP(mean): --  RR: 24 (06 Oct 2017 20:00) (14 - 31)  SpO2: 96% (06 Oct 2017 20:00) (93% - 100%)        REVIEW OF SYSTEM:    Constitutional: No fever, chills, fatigue  Neuro: No headache, numbness, weakness  Resp: No cough, wheezing, shortness of breath  CVS: No chest pain, palpitations, leg swelling  GI: No abdominal pain, nausea, vomiting, diarrhea   : No dysuria, frequency, incontinence  Skin: No itching, burning, rashes, or lesions   Msk: pain in RLE  Psych: No depression, anxiety, mood swings          PHYSICAL EXAM:  GENERAL: NAD, well-developed  HEAD:  Atraumatic, Normocephalic  EYES: EOMI, PERRLA, conjunctiva and sclera clear  ENMT: No tonsillar erythema, exudates, or enlargement; Moist mucous membranes,   NECK: Supple, No JVD, Normal thyroid  HEART: Regular rate and rhythm; No murmurs, rubs, or gallops  RESPIRATORY: CTA B/L, No wheezing / rhonchi  ABDOMEN: Soft, Nontender, Nondistended; Bowel sounds present  NEUROLOGY: A&Ox3, nonfocal, not able to move RLE  EXTREMITIES:  +pulses RLE with doppler, mild edema R foot, LLE is moving + pulse  Skin: warm, well perfused         LABS:                        8.8    11.3  )-----------( 91       ( 06 Oct 2017 05:32 )             27.5     06 Oct 2017 05:32    131    |  95     |  58     ----------------------------<  123    4.8     |  25     |  6.20     Ca    7.2        06 Oct 2017 05:32  Phos  8.3       06 Oct 2017 05:32  Mg     2.2       06 Oct 2017 05:32    TPro  4.7    /  Alb  1.7    /  TBili  0.3    /  DBili  x      /  AST  215    /  ALT  100    /  AlkPhos  37     06 Oct 2017 05:32    PT/INR - ( 06 Oct 2017 05:32 )   PT: 12.4 sec;   INR: 1.13 ratio         PTT - ( 06 Oct 2017 05:32 )  PTT:28.8 sec    CAPILLARY BLOOD GLUCOSE        UCx       RADIOLOGY & ADDITIONAL TESTS:            10-04 @ 10:15   No growth to date.  --  --  10-04 @ 09:59   No growth  --  --          MEDICATIONS  (STANDING):  aztreonam  IVPB 500 milliGRAM(s) IV Intermittent every 8 hours  buPROPion XL . 300 milliGRAM(s) Oral daily  clindamycin IVPB 900 milliGRAM(s) IV Intermittent every 8 hours  dextrose 5% + sodium chloride 0.45% 1000 milliLiter(s) (125 mL/Hr) IV Continuous <Continuous>  lamoTRIgine 100 milliGRAM(s) Oral every 8 hours  levothyroxine Injectable 75 MICROGram(s) IV Push daily  risperiDONE   Tablet 4 milliGRAM(s) Oral <User Schedule>  risperiDONE   Tablet 8 milliGRAM(s) Oral at bedtime  sertraline 50 milliGRAM(s) Oral daily  sevelamer carbonate 800 milliGRAM(s) Oral three times a day with meals  topiramate 400 milliGRAM(s) Oral two times a day    MEDICATIONS  (PRN):  clonazePAM Tablet 2 milliGRAM(s) Oral every 6 hours PRN anxiety  HYDROmorphone  Injectable 2 milliGRAM(s) IV Push every 4 hours PRN Moderate Pain (4 - 6)  HYDROmorphone  Injectable 1 milliGRAM(s) IV Push every 3 hours PRN breakthrough pain

## 2017-10-06 NOTE — PROGRESS NOTE ADULT - SUBJECTIVE AND OBJECTIVE BOX
24 hour events:     Review of Systems:  Constitutional: No fever, chills, fatigue  Neuro: No headache, numbness, weakness  Resp: No cough, wheezing, shortness of breath  CVS: No chest pain, palpitations, leg swelling  GI: No abdominal pain, nausea, vomiting, diarrhea   : No dysuria, frequency, incontinence  Skin: No itching, burning, rashes, or lesions   Msk: No joint pain or swelling  Psych: No depression, anxiety, mood swings    T(F): 99.3 (10-06-17 @ 07:26), Max: 100.4 (10-06-17 @ 04:01)  HR: 107 (10-06-17 @ 10:00) (99 - 127)  BP: 105/69 (10-06-17 @ 10:00) (93/75 - 140/67)  RR: 26 (10-06-17 @ 10:00) (14 - 31)  SpO2: 98% (10-06-17 @ 10:00) (93% - 99%)  Wt(kg): --        CAPILLARY BLOOD GLUCOSE          I&O's Summary    05 Oct 2017 07:01  -  06 Oct 2017 07:00  --------------------------------------------------------  IN: 3865 mL / OUT: 1580 mL / NET: 2285 mL    06 Oct 2017 07:01  -  06 Oct 2017 10:14  --------------------------------------------------------  IN: 150 mL / OUT: 220 mL / NET: -70 mL        Physical Exam:   Gen:  Neuro:  HEENT:  Resp:  CVS:  Abd:  Ext:  Skin:    Meds:  aztreonam  IVPB IV Intermittent  clindamycin IVPB IV Intermittent      levothyroxine Oral      buPROPion XL . Oral  clonazePAM Tablet Oral PRN  HYDROmorphone  Injectable IV Push PRN  HYDROmorphone  Injectable IV Push PRN  lamoTRIgine Oral  risperiDONE   Tablet Oral  risperiDONE   Tablet Oral  sertraline Oral  topiramate Oral      heparin  Injectable SubCutaneous        dextrose 5% + sodium chloride 0.45% IV Continuous      fluticasone propionate 50 MICROgram(s)/spray Nasal Spray Both Nostrils    sevelamer carbonate Oral                            8.8    11.3  )-----------( 91       ( 06 Oct 2017 05:32 )             27.5       10-06    131<L>  |  95<L>  |  58<H>  ----------------------------<  123<H>  4.8   |  25  |  6.20<H>    Ca    7.2<L>      06 Oct 2017 05:32  Phos  8.3     10-06  Mg     2.2     10-06    TPro  4.7<L>  /  Alb  1.7<L>  /  TBili  0.3  /  DBili  x   /  AST  215<H>  /  ALT  100<H>  /  AlkPhos  37<L>  10-06      CARDIAC MARKERS ( 06 Oct 2017 05:32 )  x     / x     / 7889 U/L / x     / x      CARDIAC MARKERS ( 05 Oct 2017 07:08 )  x     / x     / 68402 U/L / x     / x      CARDIAC MARKERS ( 04 Oct 2017 12:46 )  x     / x     / 03934 U/L / x     / x          PT/INR - ( 06 Oct 2017 05:32 )   PT: 12.4 sec;   INR: 1.13 ratio         PTT - ( 06 Oct 2017 05:32 )  PTT:28.8 sec    .Blood Blood-Peripheral   No growth to date. -- 10-04 @ 10:15  .Urine Clean Catch (Midstream)   No growth -- 10-04 @ 09:59              Radiology: ***    Bedside ultrasound: ***    CENTRAL LINE: N/Y          DATE INSERTED:              REMOVE: Y/N  COLE: N/Y                       DATE INSERTED:              REMOVE: Y/N  A-LINE: N/Y                       DATE INSERTED:              REMOVE: Y/N    GLOBAL ISSUE/BEST PRACTICE:  Analgesia:  Sedation:  CAM-ICU:   HOB elevation: yes  Stress ulcer prophylaxis:  VTE prophylaxis:  Glycemic control:  Nutrition:    CODE STATUS: *** 24 hour events: No overnight events, afebrile. Still tachycardic at her baseline per Pt.     Review of Systems:  Constitutional: No fever, chills, fatigue  Neuro: No headache, numbness, weakness  Resp: No cough, wheezing, shortness of breath  CVS: No chest pain, palpitations, leg swelling  GI: No abdominal pain, nausea, vomiting, diarrhea   : No dysuria, frequency, incontinence  Skin: No itching, burning, rashes, or lesions   Msk: + RLE pain  Psych: No depression, anxiety, mood swings    T(F): 99.3 (10-06-17 @ 07:26), Max: 100.4 (10-06-17 @ 04:01)  HR: 107 (10-06-17 @ 10:00) (99 - 127)  BP: 105/69 (10-06-17 @ 10:00) (93/75 - 140/67)  RR: 26 (10-06-17 @ 10:00) (14 - 31)  SpO2: 98% (10-06-17 @ 10:00) (93% - 99%)  Wt(kg): --        CAPILLARY BLOOD GLUCOSE          I&O's Summary    05 Oct 2017 07:01  -  06 Oct 2017 07:00  --------------------------------------------------------  IN: 3865 mL / OUT: 1580 mL / NET: 2285 mL    06 Oct 2017 07:01  -  06 Oct 2017 10:14  --------------------------------------------------------  IN: 150 mL / OUT: 220 mL / NET: -70 mL        Physical Exam:   Gen: NAD, resting comfortably  Neuro: AAOx3, groggy  HEENT: NC/AT  Resp: CTA b/l  CVS: RRR, tachycardic, +s1/s2  Abd: Soft, NT/ND, +BS  Ext: RLE dressing c/d/i, + pulses by doppler R foot, warm, well perfused R foot, diminished sensation to light touch, unable to move R foot  Skin: Warm, well perfused    Meds:  aztreonam  IVPB IV Intermittent  clindamycin IVPB IV Intermittent      levothyroxine Oral      buPROPion XL . Oral  clonazePAM Tablet Oral PRN  HYDROmorphone  Injectable IV Push PRN  HYDROmorphone  Injectable IV Push PRN  lamoTRIgine Oral  risperiDONE   Tablet Oral  risperiDONE   Tablet Oral  sertraline Oral  topiramate Oral      heparin  Injectable SubCutaneous        dextrose 5% + sodium chloride 0.45% IV Continuous      fluticasone propionate 50 MICROgram(s)/spray Nasal Spray Both Nostrils    sevelamer carbonate Oral                            8.8    11.3  )-----------( 91       ( 06 Oct 2017 05:32 )             27.5       10-06    131<L>  |  95<L>  |  58<H>  ----------------------------<  123<H>  4.8   |  25  |  6.20<H>    Ca    7.2<L>      06 Oct 2017 05:32  Phos  8.3     10-06  Mg     2.2     10-06    TPro  4.7<L>  /  Alb  1.7<L>  /  TBili  0.3  /  DBili  x   /  AST  215<H>  /  ALT  100<H>  /  AlkPhos  37<L>  10-06      CARDIAC MARKERS ( 06 Oct 2017 05:32 )  x     / x     / 7889 U/L / x     / x      CARDIAC MARKERS ( 05 Oct 2017 07:08 )  x     / x     / 78242 U/L / x     / x      CARDIAC MARKERS ( 04 Oct 2017 12:46 )  x     / x     / 24303 U/L / x     / x          PT/INR - ( 06 Oct 2017 05:32 )   PT: 12.4 sec;   INR: 1.13 ratio         PTT - ( 06 Oct 2017 05:32 )  PTT:28.8 sec    .Blood Blood-Peripheral   No growth to date. -- 10-04 @ 10:15  .Urine Clean Catch (Midstream)   No growth -- 10-04 @ 09:59        CENTRAL LINE: N  COLE: Y           REMOVE: N  A-LINE: N    GLOBAL ISSUE/BEST PRACTICE:  Analgesia:Y  Sedation:N  CAM-ICU: N  HOB elevation: yes  Stress ulcer prophylaxis:Y  VTE prophylaxis:Y  Glycemic control:Y  Nutrition:Y    CODE STATUS: Full 24 hour events: No overnight events, afebrile. Still tachycardic at her baseline per Pt.     Review of Systems:  Constitutional: No fever, chills, fatigue  Neuro: No headache, numbness, weakness  Resp: No cough, wheezing, shortness of breath  CVS: No chest pain, palpitations, leg swelling  GI: No abdominal pain, nausea, vomiting, diarrhea   : No dysuria, frequency, incontinence  Skin: No itching, burning, rashes, or lesions   Msk: + RLE pain  Psych: No depression, anxiety, mood swings    T(F): 99.3 (10-06-17 @ 07:26), Max: 100.4 (10-06-17 @ 04:01)  HR: 107 (10-06-17 @ 10:00) (99 - 127)  BP: 105/69 (10-06-17 @ 10:00) (93/75 - 140/67)  RR: 26 (10-06-17 @ 10:00) (14 - 31)  SpO2: 98% (10-06-17 @ 10:00) (93% - 99%)  Wt(kg): --        CAPILLARY BLOOD GLUCOSE          I&O's Summary    05 Oct 2017 07:01  -  06 Oct 2017 07:00  --------------------------------------------------------  IN: 3865 mL / OUT: 1580 mL / NET: 2285 mL      Physical Exam:   Gen: NAD, resting comfortably  Neuro: AAOx3, groggy  HEENT: NC/AT  Resp: CTA b/l  CVS: RRR, tachycardic, +s1/s2  Abd: Soft, NT/ND, +BS  Ext: RLE dressing c/d/i, + pulses by doppler R foot, warm, well perfused R foot, diminished sensation to light touch, unable to move R foot  Skin: Warm, well perfused    Meds:  aztreonam  IVPB IV Intermittent  clindamycin IVPB IV Intermittent      levothyroxine Oral      buPROPion XL . Oral  clonazePAM Tablet Oral PRN  HYDROmorphone  Injectable IV Push PRN  HYDROmorphone  Injectable IV Push PRN  lamoTRIgine Oral  risperiDONE   Tablet Oral  risperiDONE   Tablet Oral  sertraline Oral  topiramate Oral      heparin  Injectable SubCutaneous        dextrose 5% + sodium chloride 0.45% IV Continuous      fluticasone propionate 50 MICROgram(s)/spray Nasal Spray Both Nostrils    sevelamer carbonate Oral                            8.8    11.3  )-----------( 91       ( 06 Oct 2017 05:32 )             27.5       10-06    131<L>  |  95<L>  |  58<H>  ----------------------------<  123<H>  4.8   |  25  |  6.20<H>    Ca    7.2<L>      06 Oct 2017 05:32  Phos  8.3     10-06  Mg     2.2     10-06    TPro  4.7<L>  /  Alb  1.7<L>  /  TBili  0.3  /  DBili  x   /  AST  215<H>  /  ALT  100<H>  /  AlkPhos  37<L>  10-06      CARDIAC MARKERS ( 06 Oct 2017 05:32 )  x     / x     / 7889 U/L / x     / x      CARDIAC MARKERS ( 05 Oct 2017 07:08 )  x     / x     / 06317 U/L / x     / x      CARDIAC MARKERS ( 04 Oct 2017 12:46 )  x     / x     / 77789 U/L / x     / x          PT/INR - ( 06 Oct 2017 05:32 )   PT: 12.4 sec;   INR: 1.13 ratio         PTT - ( 06 Oct 2017 05:32 )  PTT:28.8 sec    .Blood Blood-Peripheral   No growth to date. -- 10-04 @ 10:15  .Urine Clean Catch (Midstream)   No growth -- 10-04 @ 09:59        CENTRAL LINE: N  COLE: Y           REMOVE: N  A-LINE: N    GLOBAL ISSUE/BEST PRACTICE:  Analgesia:Y  Sedation:N  CAM-ICU: N  HOB elevation: yes  Stress ulcer prophylaxis:Y  VTE prophylaxis:Y  Glycemic control:Y  Nutrition:Y    CODE STATUS: Full

## 2017-10-06 NOTE — BRIEF OPERATIVE NOTE - PRE-OP DX
Compartment syndrome of right lower extremity, initial encounter  10/04/2017    Active  Manfred Delcid  Ischemic leg  10/04/2017  right leg and foot  Active  Manfred Delcid  Open wound of lower extremity  10/06/2017  RIGHT MEDIAL AND LATERAL CALF AND RIGHT LATERAL LOWER THIGH  Active  Janet Munson

## 2017-10-06 NOTE — PROGRESS NOTE ADULT - SUBJECTIVE AND OBJECTIVE BOX
Patient is a 32y old  Female who presents with a chief complaint of RLE edema and numbness (04 Oct 2017 01:13)      Patient seen in follow up for GHAZAL, ATN, Rhabdomyolysis. UO improved. For OR today.     PAST MEDICAL HISTORY:  Low back pain  Hypothyroid  Bipolar 1 disorder      MEDICATIONS  (STANDING):  aztreonam  IVPB 500 milliGRAM(s) IV Intermittent every 8 hours  buPROPion XL . 300 milliGRAM(s) Oral daily  clindamycin IVPB 900 milliGRAM(s) IV Intermittent every 8 hours  dextrose 5% + sodium chloride 0.45% 1000 milliLiter(s) (75 mL/Hr) IV Continuous <Continuous>  fluticasone propionate 50 MICROgram(s)/spray Nasal Spray 1 Spray(s) Both Nostrils two times a day  heparin  Injectable 5000 Unit(s) SubCutaneous every 8 hours  lamoTRIgine 100 milliGRAM(s) Oral every 8 hours  levothyroxine 150 MICROGram(s) Oral daily  oxyCODONE  ER Tablet 60 milliGRAM(s) Oral every 8 hours  risperiDONE   Tablet 4 milliGRAM(s) Oral <User Schedule>  risperiDONE   Tablet 8 milliGRAM(s) Oral at bedtime  sertraline 50 milliGRAM(s) Oral daily  sevelamer carbonate 800 milliGRAM(s) Oral three times a day with meals  topiramate 400 milliGRAM(s) Oral two times a day    MEDICATIONS  (PRN):  clonazePAM Tablet 2 milliGRAM(s) Oral every 6 hours PRN anxiety  HYDROmorphone  Injectable 2 milliGRAM(s) IV Push every 4 hours PRN Severe Pain (7 - 10)  HYDROmorphone  Injectable 1 milliGRAM(s) IV Push every 3 hours PRN Moderate Pain (4 - 6)    T(C): 37.1 (10-06-17 @ 11:43), Max: 38 (10-06-17 @ 04:01)  HR: 100 (10-06-17 @ 11:00) (99 - 137)  BP: 94/54 (10-06-17 @ 11:00) (91/55 - 140/67)  RR: 23 (10-06-17 @ 11:00)  SpO2: 93% (10-06-17 @ 11:00)  Wt(kg): --  I&O's Detail    05 Oct 2017 07:01  -  06 Oct 2017 07:00  --------------------------------------------------------  IN:    dextrose 5% + sodium chloride 0.45%: 2125 mL    Oral Fluid: 1440 mL    Solution: 150 mL    Solution: 150 mL  Total IN: 3865 mL    OUT:    Indwelling Catheter - Urethral: 1580 mL  Total OUT: 1580 mL    Total NET: 2285 mL      06 Oct 2017 07:01  -  06 Oct 2017 12:11  --------------------------------------------------------  IN:    dextrose 5% + sodium chloride 0.45%: 225 mL    Solution: 50 mL  Total IN: 275 mL    OUT:    Indwelling Catheter - Urethral: 300 mL  Total OUT: 300 mL    Total NET: -25 mL          PHYSICAL EXAM:  General: NAD  Respiratory: b/l air entry  Cardiovascular: S1 S2  Gastrointestinal: soft  Extremities:  Rt leg dressing                     LABORATORY:                        8.8    11.3  )-----------( 91       ( 06 Oct 2017 05:32 )             27.5     10-06    131<L>  |  95<L>  |  58<H>  ----------------------------<  123<H>  4.8   |  25  |  6.20<H>    Ca    7.2<L>      06 Oct 2017 05:32  Phos  8.3     10-06  Mg     2.2     10-06    TPro  4.7<L>  /  Alb  1.7<L>  /  TBili  0.3  /  DBili  x   /  AST  215<H>  /  ALT  100<H>  /  AlkPhos  37<L>  10-06    Sodium, Serum: 131 mmol/L (10-06 @ 05:32)  Sodium, Serum: 130 mmol/L (10-05 @ 17:09)  Sodium, Serum: 132 mmol/L (10-05 @ 07:08)  Sodium, Serum: 134 mmol/L (10-04 @ 17:13)    Potassium, Serum: 4.8 mmol/L (10-06 @ 05:32)  Potassium, Serum: 4.6 mmol/L (10-05 @ 17:09)  Potassium, Serum: 5.0 mmol/L (10-05 @ 07:08)  Potassium, Serum: 4.5 mmol/L (10-04 @ 17:13)    Hemoglobin: 8.8 g/dL (10-06 @ 05:32)  Hemoglobin: 10.0 g/dL (10-05 @ 07:08)  Hemoglobin: 12.5 g/dL (10-04 @ 07:36)  Hemoglobin: 15.6 g/dL (10-03 @ 22:16)    Creatinine, Serum 6.20 (10-06 @ 05:32)  Creatinine, Serum 5.60 (10-05 @ 17:09)  Creatinine, Serum 5.20 (10-05 @ 07:08)  Creatinine, Serum 4.00 (10-04 @ 17:13)    CARDIAC MARKERS ( 06 Oct 2017 05:32 )  x     / x     / 7889 U/L / x     / x      CARDIAC MARKERS ( 05 Oct 2017 07:08 )  x     / x     / 58349 U/L / x     / x      CARDIAC MARKERS ( 04 Oct 2017 12:46 )  x     / x     / 44681 U/L / x     / x          LIVER FUNCTIONS - ( 06 Oct 2017 05:32 )  Alb: 1.7 g/dL / Pro: 4.7 g/dL / ALK PHOS: 37 U/L / ALT: 100 U/L / AST: 215 U/L / GGT: x             ABG - ( 04 Oct 2017 16:48 )  pH: 7.46  /  pCO2: 27    /  pO2: 99    / HCO3: 22    / Base Excess: -4.1  /  SaO2: 98        Creatine Kinase, Serum (10.06.17 @ 05:32)    Creatine Kinase, Serum: 7889 U/L    Creatine Kinase, Serum in AM (10.05.17 @ 07:08)    Creatine Kinase, Serum: 36217 U/L

## 2017-10-06 NOTE — PROGRESS NOTE ADULT - ASSESSMENT
32-year-old female now status post right lower extremity fasciotomy for prolonged compression after a fall with lethargy. There is no evidence for an acute atherothrombotic or embolic process. No history of cardiac disease and remains in sinus rhythm.    - plan to return to OR today  -there is no evidence of acute ischemia.  -there is no evidence for meaningful volume overload.  -remains persistently tachycardic, likely reactive in the setting of severe illness  -follow creatinine, likely GHAZAL  -monitor electrolytes, keep k>4, Mg>2  -TTE with hyperdynamic LV function, no valvular issues  -cardiovascular status is otherwise without acute change.  -Antibiotics  -surgical followup  -will follow    The patient is at risk of abrupt decompensation.  35 minutes of critical care time was spent with this patient.

## 2017-10-06 NOTE — PROGRESS NOTE ADULT - PROBLEM SELECTOR PLAN 1
compartment syndrome of the right lower extremity   -s/p emergent fasciotomy by Vascular surgery (Bobbi)   -pt is extubated and doing well.  For wound vac placement in OR  -has leucocytosis with bandemia. f/u with blood cx.  C/w IV aztreonam and clinadamycin.  -monitor labs

## 2017-10-06 NOTE — PROGRESS NOTE ADULT - ASSESSMENT
32-year-old female now status post right lower extremity  fasciotomy for compartment syndrome. Pt on vent support.     ·	GHAZAL, ATN Septic / ischemic, Rhabdomyolysis  ·	s/p surgery for compartment syndrome  ·	Hyperphosphatemia  ·	Anemia    Continue IV hydration and bicarb. Improving CPK trend. Hopefully creatinine will plateau soon. No indication for dialysis at this time. For OR today. Avoid nephrotoxic meds as possible. Will follow electrolytes and renal function trend. Monitor UO. D/w ICU staff. Surgery follow up.

## 2017-10-06 NOTE — PROGRESS NOTE ADULT - SUBJECTIVE AND OBJECTIVE BOX
[facility] - Bonnie Hernandez, Keena, April, Ale, Carissa Allen  Office Number:  [phone]    Patient resting comfortably in bed in NAD.  Laying flat with no respiratory distress.  No complaints of chest pain, dyspnea, palpitations, PND, or orthopnea.  c/o leg pain    ROS: negative unless otherwise mentioned.    Telemetry:  ST to 130 bpm    MEDICATIONS  (STANDING):  aztreonam  IVPB 500 milliGRAM(s) IV Intermittent every 8 hours  buPROPion XL . 300 milliGRAM(s) Oral daily  clindamycin IVPB 900 milliGRAM(s) IV Intermittent every 8 hours  dextrose 5% + sodium chloride 0.45% 1000 milliLiter(s) (75 mL/Hr) IV Continuous <Continuous>  fluticasone propionate 50 MICROgram(s)/spray Nasal Spray 1 Spray(s) Both Nostrils two times a day  heparin  Injectable 5000 Unit(s) SubCutaneous every 8 hours  lamoTRIgine 100 milliGRAM(s) Oral every 8 hours  levothyroxine 150 MICROGram(s) Oral daily  risperiDONE   Tablet 4 milliGRAM(s) Oral <User Schedule>  risperiDONE   Tablet 8 milliGRAM(s) Oral at bedtime  sertraline 50 milliGRAM(s) Oral daily  sevelamer carbonate 800 milliGRAM(s) Oral three times a day with meals  topiramate 400 milliGRAM(s) Oral two times a day    MEDICATIONS  (PRN):  clonazePAM Tablet 2 milliGRAM(s) Oral every 6 hours PRN anxiety  HYDROmorphone  Injectable 1 milliGRAM(s) IV Push every 3 hours PRN breakthrough pain  HYDROmorphone  Injectable 2 milliGRAM(s) IV Push every 4 hours PRN Moderate Pain (4 - 6)      Allergies    latex (Urticaria; Rash)  penicillin (Unknown)  sulfa drugs (Unknown)    Intolerances        Vital Signs Last 24 Hrs  T(C): 37.4 (06 Oct 2017 07:26), Max: 38 (06 Oct 2017 04:01)  T(F): 99.3 (06 Oct 2017 07:26), Max: 100.4 (06 Oct 2017 04:01)  HR: 113 (06 Oct 2017 07:00) (99 - 128)  BP: 107/68 (06 Oct 2017 06:00) (93/75 - 140/67)  BP(mean): 83 (06 Oct 2017 06:00) (70 - 96)  RR: 18 (06 Oct 2017 07:00) (14 - 26)  SpO2: 98% (06 Oct 2017 07:00) (93% - 100%)    I&O's Summary    05 Oct 2017 07:01  -  06 Oct 2017 07:00  --------------------------------------------------------  IN: 3865 mL / OUT: 1580 mL / NET: 2285 mL        ON EXAM:    Constitutional: well-nourished, well-developed, NAD   HEENT:  MMM, sclerae anicteric, conjunctivae clear, no oral cyanosis.  Pulmonary: Non-labored, breath sounds are clear bilaterally, No wheezing, rales or rhonchi  Cardiovascular: Regular, S1 and S2. tachy,  No murmur.  No rubs, gallops or clicks  Gastrointestinal: Bowel Sounds present, soft, nontender.   Lymph: No peripheral edema.   Neurological: Alert, no focal deficits  Skin: No rashes.  Psych:  Mood & affect appropriate    LABS: All Labs Reviewed:                        8.8    11.3  )-----------( 91       ( 06 Oct 2017 05:32 )             27.5                         10.0   17.5  )-----------( 88       ( 05 Oct 2017 07:08 )             30.9                         12.5   26.4  )-----------( 107      ( 04 Oct 2017 07:36 )             38.4     06 Oct 2017 05:32    131    |  95     |  58     ----------------------------<  123    4.8     |  25     |  6.20   05 Oct 2017 17:09    130    |  95     |  49     ----------------------------<  153    4.6     |  23     |  5.60   05 Oct 2017 07:08    132    |  95     |  47     ----------------------------<  155    5.0     |  24     |  5.20     Ca    7.2        06 Oct 2017 05:32  Ca    7.0        05 Oct 2017 17:09  Ca    7.3        05 Oct 2017 07:08  Phos  8.3       06 Oct 2017 05:32  Phos  8.2       05 Oct 2017 17:09  Phos  7.9       05 Oct 2017 07:08  Mg     2.2       06 Oct 2017 05:32  Mg     2.2       05 Oct 2017 17:09  Mg     2.2       05 Oct 2017 07:08    TPro  4.7    /  Alb  1.7    /  TBili  0.3    /  DBili  x      /  AST  215    /  ALT  100    /  AlkPhos  37     06 Oct 2017 05:32  TPro  4.7    /  Alb  2.1    /  TBili  0.5    /  DBili  x      /  AST  365    /  ALT  146    /  AlkPhos  34     05 Oct 2017 07:08  TPro  4.8    /  Alb  2.2    /  TBili  1.0    /  DBili  x      /  AST  968    /  ALT  245    /  AlkPhos  42     04 Oct 2017 06:41    PT/INR - ( 06 Oct 2017 05:32 )   PT: 12.4 sec;   INR: 1.13 ratio         PTT - ( 06 Oct 2017 05:32 )  PTT:28.8 sec  CARDIAC MARKERS ( 06 Oct 2017 05:32 )  x     / x     / 7889 U/L / x     / x      CARDIAC MARKERS ( 05 Oct 2017 07:08 )  x     / x     / 81455 U/L / x     / x      CARDIAC MARKERS ( 04 Oct 2017 12:46 )  x     / x     / 27208 U/L / x     / x          Blood Culture: Organism --  Gram Stain Blood -- Gram Stain --  Specimen Source .Blood Blood-Peripheral  Culture-Blood --    Organism --  Gram Stain Blood -- Gram Stain --  Specimen Source .Urine Clean Catch (Midstream)  Culture-Blood --

## 2017-10-06 NOTE — PROGRESS NOTE ADULT - ASSESSMENT
32F PMH Asthma, Bipolar disorder, hypothyroidism, chronic low back pain s/p laminectomy and spinal fusion, opioid-dependent presents with right leg swelling, numbness, pallor, poikilothermia, and pulselessness, found to have ischemic leg due to acute compartment syndrome of right leg with acute rhabdomyolysis, GHAZAL 2/2 ATN, and leukocytosis with bandemia s/p emergent fasciotomy of the 4 leg compartments (anterior, lateral, superficial posterior, and deep posterior) and the lateral thigh muscles. The muscles were noted to be edematous and ischemic with improved perfusion upon opening the compartments. Now with worsening GHAZAL, hyperkalemia, hyperphosphatemia, oliguria, and sinus tachycardia. Dressing RLE was changed yesterday with Plastics (Dr. Isabel). Pt NPO for OR today for wound vac placement with plastics.    Neuro - Cont pain control with dilaudid PRN, cont klonopin, lamictal, risperdal, sertraline, topamax  CV - Sinus tachycardia, HD stable  Resp - No issues, stable  GI - Tolerating diet, NPO for OR today  Renal - BUN/Cr increasing 2/2 ATN from rhabdomyolysis, monitor I/Os, hyperkalemia resolved, cont gale  Endo - Cont synthroid  ID - Cont clindamycin/aztreonam  Heme - DVT ppx with SCDs  Dispo - Plan for OR today for wound vac placement with plastics, NPO 32F PMH Asthma, Bipolar disorder, hypothyroidism, chronic low back pain s/p laminectomy and spinal fusion, opioid-dependent presents with right leg swelling, numbness, pallor, poikilothermia, and pulselessness, found to have ischemic leg due to acute compartment syndrome of right leg with acute rhabdomyolysis, GHAZAL 2/2 ATN, and leukocytosis with bandemia s/p emergent fasciotomy of the 4 leg compartments (anterior, lateral, superficial posterior, and deep posterior) and the lateral thigh muscles. The muscles were noted to be edematous and ischemic with improved perfusion upon opening the compartments. Now with worsening GHAZAL, hyperkalemia, hyperphosphatemia, oliguria, and sinus tachycardia. Dressing RLE was changed yesterday with Plastics (Dr. Isaebl). Pt NPO for OR today for wound vac placement with plastics.    Neuro - Cont pain control with dilaudid PRN, cont klonopin, lamictal, risperdal, sertraline, topamax  CV - Sinus tachycardia, HD stable  Resp - No issues, stable  GI - Tolerating diet, NPO for OR today  Renal - BUN/Cr increasing 2/2 ATN from rhabdomyolysis, monitor I/Os, hyperkalemia resolved, cont gale  Endo - Cont synthroid  ID - Cont clindamycin/aztreonam  Heme - DVT ppx with SCDs  Dispo - Plan for OR today for wound vac placement with plastics, NPO, IStop report printed and placed in Pt's chart

## 2017-10-06 NOTE — PROGRESS NOTE ADULT - ATTENDING COMMENTS
pt seen and examined on rounds this am, and all data reviewed.  Pt is optimized for planned procedure 32F PMH Asthma, Bipolar disorder, hypothyroidism, chronic low back pain s/p laminectomy and spinal fusion, chronic opioid dependence with acute RLE ischemia from compartment syndrome and rhabdomyolysis, complicated by GHAZAL due to ATN, POD 2 s/p emergent fasciotomy of 4 compartments     --continue current psych regimen for bipolar disorder  --high chronic pain requirements, verified through iStop (in chart)  add oxycontin to replace baseline morphine requirement  pain remains uncontrolled, increase dilaudid to 2mg IV for moderate pain, 3mg IV for severe  --sinus tachycardia persistent, per pt is baseline  --to OR today for wound vac placement  empiric Aztreonam and clinda for now  neurovascular checks per protocol  --GHAZAL due to ATN and rhabdo  continue bicarb gtt  no HD indication at this time  UOP improving, expect Cr to plateau soon  daily CPK  --plan discussed with pt and parents, including rationale for pain meds

## 2017-10-06 NOTE — BRIEF OPERATIVE NOTE - PROCEDURE
<<-----Click on this checkbox to enter Procedure Wound VAC placement  10/06/2017  RIGHT MEDIAL AND LATERAL CALF AND RIGHT LATERAL LOWER THIGH WOUND AND APPLICATION OF A CELL  [facility]  [facility]

## 2017-10-07 LAB
ALBUMIN SERPL ELPH-MCNC: 1.5 G/DL — LOW (ref 3.3–5)
ALP SERPL-CCNC: 35 U/L — LOW (ref 40–120)
ALT FLD-CCNC: 70 U/L — SIGNIFICANT CHANGE UP (ref 12–78)
ANION GAP SERPL CALC-SCNC: 12 MMOL/L — SIGNIFICANT CHANGE UP (ref 5–17)
AST SERPL-CCNC: 133 U/L — HIGH (ref 15–37)
BILIRUB SERPL-MCNC: 0.2 MG/DL — SIGNIFICANT CHANGE UP (ref 0.2–1.2)
BUN SERPL-MCNC: 67 MG/DL — HIGH (ref 7–23)
CALCIUM SERPL-MCNC: 7.3 MG/DL — LOW (ref 8.5–10.1)
CHLORIDE SERPL-SCNC: 96 MMOL/L — SIGNIFICANT CHANGE UP (ref 96–108)
CO2 SERPL-SCNC: 25 MMOL/L — SIGNIFICANT CHANGE UP (ref 22–31)
CREAT SERPL-MCNC: 6.6 MG/DL — HIGH (ref 0.5–1.3)
GLUCOSE SERPL-MCNC: 109 MG/DL — HIGH (ref 70–99)
HCT VFR BLD CALC: 23.3 % — LOW (ref 34.5–45)
HGB BLD-MCNC: 7.4 G/DL — LOW (ref 11.5–15.5)
MAGNESIUM SERPL-MCNC: 2.5 MG/DL — SIGNIFICANT CHANGE UP (ref 1.6–2.6)
MCHC RBC-ENTMCNC: 29.6 PG — SIGNIFICANT CHANGE UP (ref 27–34)
MCHC RBC-ENTMCNC: 31.7 GM/DL — LOW (ref 32–36)
MCV RBC AUTO: 93.4 FL — SIGNIFICANT CHANGE UP (ref 80–100)
PHOSPHATE SERPL-MCNC: 9.3 MG/DL — SIGNIFICANT CHANGE UP (ref 2.5–4.5)
PLATELET # BLD AUTO: 95 K/UL — LOW (ref 150–400)
POTASSIUM SERPL-MCNC: 4.1 MMOL/L — SIGNIFICANT CHANGE UP (ref 3.5–5.3)
POTASSIUM SERPL-SCNC: 4.1 MMOL/L — SIGNIFICANT CHANGE UP (ref 3.5–5.3)
PROT SERPL-MCNC: 4.6 G/DL — LOW (ref 6–8.3)
RBC # BLD: 2.49 M/UL — LOW (ref 3.8–5.2)
RBC # FLD: 13.6 % — SIGNIFICANT CHANGE UP (ref 10.3–14.5)
SODIUM SERPL-SCNC: 133 MMOL/L — LOW (ref 135–145)
WBC # BLD: 7.4 K/UL — SIGNIFICANT CHANGE UP (ref 3.8–10.5)
WBC # FLD AUTO: 7.4 K/UL — SIGNIFICANT CHANGE UP (ref 3.8–10.5)

## 2017-10-07 PROCEDURE — 99233 SBSQ HOSP IP/OBS HIGH 50: CPT

## 2017-10-07 PROCEDURE — 99291 CRITICAL CARE FIRST HOUR: CPT

## 2017-10-07 RX ORDER — SODIUM CHLORIDE 9 MG/ML
1000 INJECTION INTRAMUSCULAR; INTRAVENOUS; SUBCUTANEOUS
Qty: 0 | Refills: 0 | Status: DISCONTINUED | OUTPATIENT
Start: 2017-10-07 | End: 2017-10-13

## 2017-10-07 RX ORDER — PANTOPRAZOLE SODIUM 20 MG/1
40 TABLET, DELAYED RELEASE ORAL
Qty: 0 | Refills: 0 | Status: DISCONTINUED | OUTPATIENT
Start: 2017-10-07 | End: 2017-10-27

## 2017-10-07 RX ADMIN — LAMOTRIGINE 100 MILLIGRAM(S): 25 TABLET, ORALLY DISINTEGRATING ORAL at 14:38

## 2017-10-07 RX ADMIN — HYDROMORPHONE HYDROCHLORIDE 2 MILLIGRAM(S): 2 INJECTION INTRAMUSCULAR; INTRAVENOUS; SUBCUTANEOUS at 11:32

## 2017-10-07 RX ADMIN — BUPROPION HYDROCHLORIDE 300 MILLIGRAM(S): 150 TABLET, EXTENDED RELEASE ORAL at 12:52

## 2017-10-07 RX ADMIN — Medication 50 MILLIGRAM(S): at 06:00

## 2017-10-07 RX ADMIN — HEPARIN SODIUM 5000 UNIT(S): 5000 INJECTION INTRAVENOUS; SUBCUTANEOUS at 21:07

## 2017-10-07 RX ADMIN — HYDROMORPHONE HYDROCHLORIDE 2 MILLIGRAM(S): 2 INJECTION INTRAMUSCULAR; INTRAVENOUS; SUBCUTANEOUS at 22:59

## 2017-10-07 RX ADMIN — HYDROMORPHONE HYDROCHLORIDE 2 MILLIGRAM(S): 2 INJECTION INTRAMUSCULAR; INTRAVENOUS; SUBCUTANEOUS at 23:30

## 2017-10-07 RX ADMIN — SEVELAMER CARBONATE 800 MILLIGRAM(S): 2400 POWDER, FOR SUSPENSION ORAL at 12:51

## 2017-10-07 RX ADMIN — HYDROMORPHONE HYDROCHLORIDE 2 MILLIGRAM(S): 2 INJECTION INTRAMUSCULAR; INTRAVENOUS; SUBCUTANEOUS at 02:15

## 2017-10-07 RX ADMIN — Medication 400 MILLIGRAM(S): at 06:53

## 2017-10-07 RX ADMIN — HYDROMORPHONE HYDROCHLORIDE 2 MILLIGRAM(S): 2 INJECTION INTRAMUSCULAR; INTRAVENOUS; SUBCUTANEOUS at 20:58

## 2017-10-07 RX ADMIN — Medication 2 MILLIGRAM(S): at 00:17

## 2017-10-07 RX ADMIN — Medication 150 MICROGRAM(S): at 06:32

## 2017-10-07 RX ADMIN — SEVELAMER CARBONATE 800 MILLIGRAM(S): 2400 POWDER, FOR SUSPENSION ORAL at 17:27

## 2017-10-07 RX ADMIN — HYDROMORPHONE HYDROCHLORIDE 2 MILLIGRAM(S): 2 INJECTION INTRAMUSCULAR; INTRAVENOUS; SUBCUTANEOUS at 17:27

## 2017-10-07 RX ADMIN — LAMOTRIGINE 100 MILLIGRAM(S): 25 TABLET, ORALLY DISINTEGRATING ORAL at 21:07

## 2017-10-07 RX ADMIN — PANTOPRAZOLE SODIUM 40 MILLIGRAM(S): 20 TABLET, DELAYED RELEASE ORAL at 17:27

## 2017-10-07 RX ADMIN — Medication 100 MILLIGRAM(S): at 14:38

## 2017-10-07 RX ADMIN — Medication 2 MILLIGRAM(S): at 10:32

## 2017-10-07 RX ADMIN — Medication 100 MILLIGRAM(S): at 05:59

## 2017-10-07 RX ADMIN — SERTRALINE 50 MILLIGRAM(S): 25 TABLET, FILM COATED ORAL at 12:52

## 2017-10-07 RX ADMIN — RISPERIDONE 4 MILLIGRAM(S): 4 TABLET ORAL at 12:30

## 2017-10-07 RX ADMIN — OXYCODONE HYDROCHLORIDE 60 MILLIGRAM(S): 5 TABLET ORAL at 06:31

## 2017-10-07 RX ADMIN — RISPERIDONE 8 MILLIGRAM(S): 4 TABLET ORAL at 21:10

## 2017-10-07 RX ADMIN — Medication 50 MILLIGRAM(S): at 14:38

## 2017-10-07 RX ADMIN — Medication 400 MILLIGRAM(S): at 17:27

## 2017-10-07 RX ADMIN — POLYETHYLENE GLYCOL 3350 17 GRAM(S): 17 POWDER, FOR SOLUTION ORAL at 12:52

## 2017-10-07 RX ADMIN — HYDROMORPHONE HYDROCHLORIDE 2 MILLIGRAM(S): 2 INJECTION INTRAMUSCULAR; INTRAVENOUS; SUBCUTANEOUS at 01:43

## 2017-10-07 RX ADMIN — OXYCODONE HYDROCHLORIDE 60 MILLIGRAM(S): 5 TABLET ORAL at 14:37

## 2017-10-07 RX ADMIN — Medication 2 MILLIGRAM(S): at 23:00

## 2017-10-07 RX ADMIN — SEVELAMER CARBONATE 800 MILLIGRAM(S): 2400 POWDER, FOR SUSPENSION ORAL at 10:32

## 2017-10-07 RX ADMIN — HEPARIN SODIUM 5000 UNIT(S): 5000 INJECTION INTRAVENOUS; SUBCUTANEOUS at 14:38

## 2017-10-07 RX ADMIN — OXYCODONE HYDROCHLORIDE 60 MILLIGRAM(S): 5 TABLET ORAL at 15:02

## 2017-10-07 RX ADMIN — LAMOTRIGINE 100 MILLIGRAM(S): 25 TABLET, ORALLY DISINTEGRATING ORAL at 06:33

## 2017-10-07 RX ADMIN — HYDROMORPHONE HYDROCHLORIDE 2 MILLIGRAM(S): 2 INJECTION INTRAMUSCULAR; INTRAVENOUS; SUBCUTANEOUS at 10:32

## 2017-10-07 RX ADMIN — Medication 1 SPRAY(S): at 12:55

## 2017-10-07 RX ADMIN — HEPARIN SODIUM 5000 UNIT(S): 5000 INJECTION INTRAVENOUS; SUBCUTANEOUS at 06:34

## 2017-10-07 RX ADMIN — HYDROMORPHONE HYDROCHLORIDE 2 MILLIGRAM(S): 2 INJECTION INTRAMUSCULAR; INTRAVENOUS; SUBCUTANEOUS at 00:17

## 2017-10-07 RX ADMIN — HYDROMORPHONE HYDROCHLORIDE 2 MILLIGRAM(S): 2 INJECTION INTRAMUSCULAR; INTRAVENOUS; SUBCUTANEOUS at 07:00

## 2017-10-07 RX ADMIN — Medication 1 SPRAY(S): at 21:07

## 2017-10-07 RX ADMIN — Medication 100 MILLIGRAM(S): at 06:32

## 2017-10-07 RX ADMIN — HYDROMORPHONE HYDROCHLORIDE 2 MILLIGRAM(S): 2 INJECTION INTRAMUSCULAR; INTRAVENOUS; SUBCUTANEOUS at 06:54

## 2017-10-07 NOTE — PROGRESS NOTE ADULT - SUBJECTIVE AND OBJECTIVE BOX
24 hour events:   pain well controlled      T(F): 98.5 (10-07-17 @ 15:25), Max: 98.9 (10-06-17 @ 20:22)  HR: 115 (10-07-17 @ 15:00) (93 - 116)  BP: 126/80 (10-07-17 @ 15:00) (100/62 - 139/79)  RR: 33 (10-07-17 @ 15:00) (14 - 33)  SpO2: 90% (10-07-17 @ 15:00) (90% - 100%)      I&O's Summary    06 Oct 2017 07:01  -  07 Oct 2017 07:00  --------------------------------------------------------  IN: 2725 mL / OUT: 2890 mL / NET: -165 mL      Physical Exam:   Gen: comfortable  Neuro: awake but slightly drowsy  Resp: CTABL  CVS: tachy, regular  Abd: soft, NTND  Ext: absent sensation to light touch in R foot, hip flexion 3+/5, not able to wiggle toes    Meds:      levothyroxine Oral      acetaminophen   Tablet Oral PRN  acetaminophen   Tablet. Oral PRN  buPROPion XL . Oral  clonazePAM Tablet Oral PRN  HYDROmorphone  Injectable IV Push PRN  HYDROmorphone  Injectable IV Push PRN  lamoTRIgine Oral  ondansetron Injectable IV Push PRN  oxyCODONE  ER Tablet Oral  risperiDONE   Tablet Oral  risperiDONE   Tablet Oral  sertraline Oral  topiramate Oral      heparin  Injectable SubCutaneous    docusate sodium Oral  senna Oral PRN      dextrose 5% + sodium chloride 0.45% IV Continuous      fluticasone propionate 50 MICROgram(s)/spray Nasal Spray Both Nostrils    sevelamer carbonate Oral                            7.4    7.4   )-----------( 95       ( 07 Oct 2017 06:43 )             23.3       10-07    133<L>  |  96  |  67<H>  ----------------------------<  109<H>  4.1   |  25  |  6.60<H>    Ca    7.3<L>      07 Oct 2017 06:43  Phos  9.3     10-07  Mg     2.5     10-07    TPro  4.6<L>  /  Alb  1.5<L>  /  TBili  0.2  /  DBili  x   /  AST  133<H>  /  ALT  70  /  AlkPhos  35<L>  10-07      CARDIAC MARKERS ( 07 Oct 2017 06:43 )  x     / x     / 4359 U/L / x     / x      CARDIAC MARKERS ( 06 Oct 2017 05:32 )  x     / x     / 7889 U/L / x     / x          PT/INR - ( 06 Oct 2017 05:32 )   PT: 12.4 sec;   INR: 1.13 ratio         PTT - ( 06 Oct 2017 05:32 )  PTT:28.8 sec    .Blood Blood-Peripheral   No growth to date. -- 10-04 @ 10:15  .Urine Clean Catch (Midstream)   No growth -- 10-04 @ 09:59      CENTRAL LINE: N  COLE: Y  A-LINE: N    GLOBAL ISSUE/BEST PRACTICE:  Analgesia: Y  Sedation:N  CAM-ICU: NEG  HOB elevation: yes  Stress ulcer prophylaxis: N  VTE prophylaxis: Y  Glycemic control: Y  Nutrition: Y    CODE STATUS: FULL

## 2017-10-07 NOTE — PROGRESS NOTE ADULT - SUBJECTIVE AND OBJECTIVE BOX
INTERVAL HPI: 32y old  Female who presents with a chief complaint of RLE edema and numbness. Patient admitted for Compartment syndrome, s/p Fasciotomy. Patient seen and examined in ICU. Wound vac placed, draining. Patient complains of minimal pain this morning    MEDICATIONS  (STANDING):  aztreonam  IVPB 500 milliGRAM(s) IV Intermittent every 8 hours  buPROPion XL . 300 milliGRAM(s) Oral daily  clindamycin IVPB 900 milliGRAM(s) IV Intermittent every 8 hours  dextrose 5% + sodium chloride 0.45% 1000 milliLiter(s) (75 mL/Hr) IV Continuous <Continuous>  docusate sodium 100 milliGRAM(s) Oral two times a day  fluticasone propionate 50 MICROgram(s)/spray Nasal Spray 1 Spray(s) Both Nostrils two times a day  heparin  Injectable 5000 Unit(s) SubCutaneous every 8 hours  lamoTRIgine 100 milliGRAM(s) Oral every 8 hours  levothyroxine 150 MICROGram(s) Oral daily  oxyCODONE  ER Tablet 60 milliGRAM(s) Oral every 8 hours  polyethylene glycol 3350 17 Gram(s) Oral daily  risperiDONE   Tablet 8 milliGRAM(s) Oral at bedtime  risperiDONE   Tablet 4 milliGRAM(s) Oral <User Schedule>  sertraline 50 milliGRAM(s) Oral daily  sevelamer carbonate 800 milliGRAM(s) Oral three times a day with meals  topiramate 400 milliGRAM(s) Oral two times a day    MEDICATIONS  (PRN):  acetaminophen   Tablet 650 milliGRAM(s) Oral every 6 hours PRN For Temp greater than 38 C (100.4 F)  acetaminophen   Tablet. 650 milliGRAM(s) Oral every 6 hours PRN Mild Pain (1 - 3)  clonazePAM Tablet 2 milliGRAM(s) Oral every 6 hours PRN anxiety  HYDROmorphone  Injectable 2 milliGRAM(s) IV Push every 2 hours PRN Moderate Pain (4 - 6)  HYDROmorphone  Injectable 3 milliGRAM(s) IV Push every 2 hours PRN Severe Pain (7 - 10)  ondansetron Injectable 4 milliGRAM(s) IV Push once PRN Nausea and/or Vomiting  senna 2 Tablet(s) Oral at bedtime PRN Constipation      REVIEW OF SYSTEM:    Constitutional: No fever, chills, fatigue  Neuro: No headache, numbness, weakness  Resp: No cough, wheezing, shortness of breath  CVS: No chest pain, palpitations, leg swelling  GI: No abdominal pain, nausea, vomiting, diarrhea   : No dysuria, frequency, incontinence  Skin: No itching, burning, rashes, or lesions   Msk: pain in RLE  Psych: No depression, anxiety, mood swings        ICU Vital Signs Last 24 Hrs  T(C): 37.2 (07 Oct 2017 12:00), Max: 37.4 (06 Oct 2017 15:45)  T(F): 98.9 (07 Oct 2017 12:00), Max: 99.4 (06 Oct 2017 15:45)  HR: 96 (07 Oct 2017 12:00) (93 - 116)  BP: 103/53 (07 Oct 2017 12:00) (102/57 - 140/76)  BP(mean): 73 (07 Oct 2017 12:00) (73 - 102)  ABP: --  ABP(mean): --  RR: 22 (07 Oct 2017 12:00) (14 - 33)  SpO2: 93% (07 Oct 2017 12:00) (90% - 100%)    PHYSICAL EXAM:  GENERAL: NAD, well-developed  HEART: Regular rate and rhythm; No murmurs, rubs, or gallops  RESPIRATORY: CTA B/L, No wheezing / rhonchi  ABDOMEN: Soft, Nontender, Nondistended; Bowel sounds present  NEUROLOGY: A&Ox3, nonfocal, not able to move RLE, sensation diminished RLE  EXTREMITIES:  +pulses mild edema R foot, LLE is moving + pulse  Skin: warm, well perfused                 10-04 @ 10:15   No growth to date.  --  --  10-04 @ 09:59   No growth  --  --          LABS:                        7.4    7.4   )-----------( 95       ( 07 Oct 2017 06:43 )             23.3     10-07    133<L>  |  96  |  67<H>  ----------------------------<  109<H>  4.1   |  25  |  6.60<H>    Ca    7.3<L>      07 Oct 2017 06:43  Phos  9.3     10-07  Mg     2.5     10-07    TPro  4.6<L>  /  Alb  1.5<L>  /  TBili  0.2  /  DBili  x   /  AST  133<H>  /  ALT  70  /  AlkPhos  35<L>  10-07    PT/INR - ( 06 Oct 2017 05:32 )   PT: 12.4 sec;   INR: 1.13 ratio         PTT - ( 06 Oct 2017 05:32 )  PTT:28.8 sec

## 2017-10-07 NOTE — PROGRESS NOTE ADULT - PROBLEM SELECTOR PLAN 1
compartment syndrome of the right lower extremity   -s/p emergent fasciotomy by Vascular surgery (Bobbi)   -s/p wound vac placement   -F/u with blood cx.  -C/w IV aztreonam and clindamycin.  -monitor labs

## 2017-10-07 NOTE — PROGRESS NOTE ADULT - ASSESSMENT
32-year-old female now status post right lower extremity fasciotomy for prolonged compression after a fall with lethargy. There is no evidence for an acute atherothrombotic or embolic process. No history of cardiac disease and remains in sinus rhythm.    -there is no evidence of acute ischemia.  -there is no evidence for meaningful volume overload.  -remains persistently tachycardic, likely reactive in the setting of severe illness  - follow creatinine, slightly worse  - CK improving with IVF  - Bicarb drip for acidosis  -monitor electrolytes, keep k>4, Mg>2  -TTE with hyperdynamic LV function, no valvular issues  -cardiovascular status is otherwise without acute change.  -Antibiotics  -surgical followup  -will follow    The patient is at risk of abrupt decompensation.  35 minutes of critical care time was spent with this patient. 32-year-old female now status post right lower extremity fasciotomy for prolonged compression after a fall with lethargy. There is no evidence for an acute atherothrombotic or embolic process. No history of cardiac disease and remains in sinus rhythm. EF preserved on recent echocardiogram    -there is no evidence of acute ischemia.  -there is no evidence for meaningful volume overload.  -remains persistently tachycardic, likely reactive in the setting of severe illness  - follow creatinine, slightly worse  - CK improving with IVF  - Bicarb drip for acidosis  -monitor electrolytes, keep k>4, Mg>2  -TTE with hyperdynamic LV function, no valvular issues  -cardiovascular status is otherwise without acute change.  -Antibiotics  -surgical followup  -will follow    The patient is at risk of abrupt decompensation.  35 minutes of critical care time was spent with this patient.

## 2017-10-07 NOTE — PROGRESS NOTE ADULT - ASSESSMENT
32F PMH Asthma, Bipolar disorder, hypothyroidism, chronic low back pain s/p laminectomy and spinal fusion, opioid-dependent presents with right leg swelling, numbness, pallor, poikilothermia, and pulselessness, found to have ischemic leg due to acute compartment syndrome of right leg with acute rhabdomyolysis, GHAZAL 2/2 ATN, and leukocytosis with bandemia s/p emergent fasciotomy of the 4 leg compartments (anterior, lateral, superficial posterior, and deep posterior) and the lateral thigh muscles. Wound vac in place.

## 2017-10-07 NOTE — PROGRESS NOTE ADULT - SUBJECTIVE AND OBJECTIVE BOX
Seen in ICU, awake, alert, denies complaints    acetaminophen   Tablet 650 milliGRAM(s) Oral every 6 hours PRN  acetaminophen   Tablet. 650 milliGRAM(s) Oral every 6 hours PRN  buPROPion XL . 300 milliGRAM(s) Oral daily  clonazePAM Tablet 2 milliGRAM(s) Oral every 6 hours PRN  dextrose 5% + sodium chloride 0.45% 1000 milliLiter(s) IV Continuous <Continuous>  docusate sodium 100 milliGRAM(s) Oral two times a day  fluticasone propionate 50 MICROgram(s)/spray Nasal Spray 1 Spray(s) Both Nostrils two times a day  heparin  Injectable 5000 Unit(s) SubCutaneous every 8 hours  HYDROmorphone  Injectable 2 milliGRAM(s) IV Push every 2 hours PRN  HYDROmorphone  Injectable 3 milliGRAM(s) IV Push every 2 hours PRN  lamoTRIgine 100 milliGRAM(s) Oral every 8 hours  levothyroxine 150 MICROGram(s) Oral daily  ondansetron Injectable 4 milliGRAM(s) IV Push once PRN  oxyCODONE  ER Tablet 60 milliGRAM(s) Oral every 8 hours  pantoprazole    Tablet 40 milliGRAM(s) Oral before breakfast  risperiDONE   Tablet 8 milliGRAM(s) Oral at bedtime  risperiDONE   Tablet 4 milliGRAM(s) Oral <User Schedule>  senna 2 Tablet(s) Oral at bedtime PRN  sertraline 50 milliGRAM(s) Oral daily  sevelamer carbonate 800 milliGRAM(s) Oral three times a day with meals  topiramate 400 milliGRAM(s) Oral two times a day    Vital Signs Last 24 Hrs  T(C): 37.1 (10-07-17 @ 19:25), Max: 37.2 (10-07-17 @ 12:00)  T(F): 98.7 (10-07-17 @ 19:25), Max: 98.9 (10-07-17 @ 12:00)  HR: 104 (10-07-17 @ 21:00) (96 - 115)  BP: 119/55 (10-07-17 @ 21:00) (100/62 - 139/79)  BP(mean): 78 (10-07-17 @ 21:00) (73 - 102)  RR: 23 (10-07-17 @ 21:00) (18 - 37)  SpO2: 81% (10-07-17 @ 21:00) (81% - 100%)    PHYSICAL EXAM:  General: NAD  Respiratory: b/l air entry  Cardiovascular: S1 S2  Gastrointestinal: soft  Extremities:  Rt LE dressed, sm edema LE                           7.4    7.4   )-----------( 95       ( 07 Oct 2017 06:43 )             23.3     07 Oct 2017 06:43    133    |  96     |  67     ----------------------------<  109    4.1     |  25     |  6.60     Ca    7.3        07 Oct 2017 06:43  Phos  9.3       07 Oct 2017 06:43  Mg     2.5       07 Oct 2017 06:43    TPro  4.6    /  Alb  1.5    /  TBili  0.2    /  DBili  x      /  AST  133    /  ALT  70     /  AlkPhos  35     07 Oct 2017 06:43    LIVER FUNCTIONS - ( 07 Oct 2017 06:43 )  Alb: 1.5 g/dL / Pro: 4.6 g/dL / ALK PHOS: 35 U/L / ALT: 70 U/L / AST: 133 U/L / GGT: x           PT/INR - ( 06 Oct 2017 05:32 )   PT: 12.4 sec;   INR: 1.13 ratio          Assessment and Plan:   		  32-year-old female status post right lower extremity  fasciotomy for compartment syndrome.   GHAZAL due to septic / ischemic ATN, rhabdomyolysis  CPK trending down  Good UO - 975cc/12hr  Cr still increasing, but lytes stable and no indication for RRT at this time  Continue IV hydration  No nephrotoxins  Will f/u UO, renal function, CPK  ICU care

## 2017-10-07 NOTE — PROGRESS NOTE ADULT - PROBLEM SELECTOR PLAN 3
- Worsening GHAZAL 2/2 ATN from rhabdomyolysis  - Avoid nephrotixic agents and renally dose all meds  - strict I/O's  -monitor BMP/Mg/Phos, resolved hyperkalemia  -No dialysis at this time as per renal

## 2017-10-07 NOTE — PROGRESS NOTE ADULT - SUBJECTIVE AND OBJECTIVE BOX
[facility] - Bonnie Hernandez, April Brink, Alejandro Aguilera Savella  Office Number:  [phone]    Patient resting comfortably in bed in NAD.  Awakes to name. Says his breathing is slightly better    ROS: negative unless otherwise mentioned.    Telemetry:      MEDICATIONS  (STANDING):  aztreonam  IVPB 500 milliGRAM(s) IV Intermittent every 8 hours  buPROPion XL . 300 milliGRAM(s) Oral daily  clindamycin IVPB 900 milliGRAM(s) IV Intermittent every 8 hours  dextrose 5% + sodium chloride 0.45% 1000 milliLiter(s) (75 mL/Hr) IV Continuous <Continuous>  docusate sodium 100 milliGRAM(s) Oral two times a day  fluticasone propionate 50 MICROgram(s)/spray Nasal Spray 1 Spray(s) Both Nostrils two times a day  heparin  Injectable 5000 Unit(s) SubCutaneous every 8 hours  lamoTRIgine 100 milliGRAM(s) Oral every 8 hours  levothyroxine 150 MICROGram(s) Oral daily  oxyCODONE  ER Tablet 60 milliGRAM(s) Oral every 8 hours  polyethylene glycol 3350 17 Gram(s) Oral daily  risperiDONE   Tablet 8 milliGRAM(s) Oral at bedtime  risperiDONE   Tablet 4 milliGRAM(s) Oral <User Schedule>  sertraline 50 milliGRAM(s) Oral daily  sevelamer carbonate 800 milliGRAM(s) Oral three times a day with meals  topiramate 400 milliGRAM(s) Oral two times a day    MEDICATIONS  (PRN):  acetaminophen   Tablet 650 milliGRAM(s) Oral every 6 hours PRN For Temp greater than 38 C (100.4 F)  acetaminophen   Tablet. 650 milliGRAM(s) Oral every 6 hours PRN Mild Pain (1 - 3)  clonazePAM Tablet 2 milliGRAM(s) Oral every 6 hours PRN anxiety  HYDROmorphone  Injectable 2 milliGRAM(s) IV Push every 2 hours PRN Moderate Pain (4 - 6)  HYDROmorphone  Injectable 3 milliGRAM(s) IV Push every 2 hours PRN Severe Pain (7 - 10)  ondansetron Injectable 4 milliGRAM(s) IV Push once PRN Nausea and/or Vomiting  senna 2 Tablet(s) Oral at bedtime PRN Constipation      Allergies    avocado (Unknown)  latex (Urticaria; Rash)  penicillin (Unknown)  sulfa drugs (Unknown)    Intolerances        Vital Signs Last 24 Hrs  T(C): 37 (07 Oct 2017 07:51), Max: 37.4 (06 Oct 2017 15:45)  T(F): 98.6 (07 Oct 2017 07:51), Max: 99.4 (06 Oct 2017 15:45)  HR: 107 (07 Oct 2017 07:00) (89 - 116)  BP: 114/57 (07 Oct 2017 07:00) (94/54 - 140/76)  BP(mean): 79 (07 Oct 2017 07:00) (67 - 102)  RR: 21 (07 Oct 2017 07:00) (14 - 26)  SpO2: 95% (07 Oct 2017 07:00) (90% - 100%)    I&O's Summary    06 Oct 2017 07:01  -  07 Oct 2017 07:00  --------------------------------------------------------  IN: 2725 mL / OUT: 2890 mL / NET: -165 mL        ON EXAM:    Constitutional: well-nourished, well-developed, NAD   HEENT:  MMM, sclerae anicteric, conjunctivae clear, no oral cyanosis.  Pulmonary: Non-labored, breath sounds are clear bilaterally, No wheezing, rales or rhonchi  Cardiovascular: Regular, S1 and S2. tachy,  No murmur.  No rubs, gallops or clicks  Gastrointestinal: Bowel Sounds present, soft, nontender.   Lymph: No peripheral edema.   Neurological: Alert, no focal deficits  Skin: No rashes.  Psych:  On pain medications so slightly confused    LABS: All Labs Reviewed:                        8.8    11.3  )-----------( 91       ( 06 Oct 2017 05:32 )             27.5                         10.0   17.5  )-----------( 88       ( 05 Oct 2017 07:08 )             30.9     07 Oct 2017 06:43    133    |  96     |  67     ----------------------------<  109    4.1     |  25     |  6.60   06 Oct 2017 05:32    131    |  95     |  58     ----------------------------<  123    4.8     |  25     |  6.20   05 Oct 2017 17:09    130    |  95     |  49     ----------------------------<  153    4.6     |  23     |  5.60     Ca    7.3        07 Oct 2017 06:43  Ca    7.2        06 Oct 2017 05:32  Ca    7.0        05 Oct 2017 17:09  Phos  9.3       07 Oct 2017 06:43  Phos  8.3       06 Oct 2017 05:32  Phos  8.2       05 Oct 2017 17:09  Mg     2.5       07 Oct 2017 06:43  Mg     2.2       06 Oct 2017 05:32  Mg     2.2       05 Oct 2017 17:09    TPro  4.6    /  Alb  1.5    /  TBili  0.2    /  DBili  x      /  AST  133    /  ALT  70     /  AlkPhos  35     07 Oct 2017 06:43  TPro  4.7    /  Alb  1.7    /  TBili  0.3    /  DBili  x      /  AST  215    /  ALT  100    /  AlkPhos  37     06 Oct 2017 05:32  TPro  4.7    /  Alb  2.1    /  TBili  0.5    /  DBili  x      /  AST  365    /  ALT  146    /  AlkPhos  34     05 Oct 2017 07:08    PT/INR - ( 06 Oct 2017 05:32 )   PT: 12.4 sec;   INR: 1.13 ratio         PTT - ( 06 Oct 2017 05:32 )  PTT:28.8 sec  CARDIAC MARKERS ( 06 Oct 2017 05:32 )  x     / x     / 7889 U/L / x     / x          Blood Culture: Organism --  Gram Stain Blood -- Gram Stain --  Specimen Source .Blood Blood-Peripheral  Culture-Blood --    Organism --  Gram Stain Blood -- Gram Stain --  Specimen Source .Urine Clean Catch (Midstream)  Culture-Blood -- [facility] - Bonnie Hernandez, April Brink, Alejandro Aguilera Savella  Office Number:  [phone]    Patient resting comfortably in bed in NAD.  Awakes to name. Says his breathing is slightly better    ROS: negative unless otherwise mentioned.    Telemetry:      MEDICATIONS  (STANDING):  aztreonam  IVPB 500 milliGRAM(s) IV Intermittent every 8 hours  buPROPion XL . 300 milliGRAM(s) Oral daily  clindamycin IVPB 900 milliGRAM(s) IV Intermittent every 8 hours  dextrose 5% + sodium chloride 0.45% 1000 milliLiter(s) (75 mL/Hr) IV Continuous <Continuous>  docusate sodium 100 milliGRAM(s) Oral two times a day  fluticasone propionate 50 MICROgram(s)/spray Nasal Spray 1 Spray(s) Both Nostrils two times a day  heparin  Injectable 5000 Unit(s) SubCutaneous every 8 hours  lamoTRIgine 100 milliGRAM(s) Oral every 8 hours  levothyroxine 150 MICROGram(s) Oral daily  oxyCODONE  ER Tablet 60 milliGRAM(s) Oral every 8 hours  polyethylene glycol 3350 17 Gram(s) Oral daily  risperiDONE   Tablet 8 milliGRAM(s) Oral at bedtime  risperiDONE   Tablet 4 milliGRAM(s) Oral <User Schedule>  sertraline 50 milliGRAM(s) Oral daily  sevelamer carbonate 800 milliGRAM(s) Oral three times a day with meals  topiramate 400 milliGRAM(s) Oral two times a day    MEDICATIONS  (PRN):  acetaminophen   Tablet 650 milliGRAM(s) Oral every 6 hours PRN For Temp greater than 38 C (100.4 F)  acetaminophen   Tablet. 650 milliGRAM(s) Oral every 6 hours PRN Mild Pain (1 - 3)  clonazePAM Tablet 2 milliGRAM(s) Oral every 6 hours PRN anxiety  HYDROmorphone  Injectable 2 milliGRAM(s) IV Push every 2 hours PRN Moderate Pain (4 - 6)  HYDROmorphone  Injectable 3 milliGRAM(s) IV Push every 2 hours PRN Severe Pain (7 - 10)  ondansetron Injectable 4 milliGRAM(s) IV Push once PRN Nausea and/or Vomiting  senna 2 Tablet(s) Oral at bedtime PRN Constipation      Allergies    avocado (Unknown)  latex (Urticaria; Rash)  penicillin (Unknown)  sulfa drugs (Unknown)    Intolerances        Vital Signs Last 24 Hrs  T(C): 37 (07 Oct 2017 07:51), Max: 37.4 (06 Oct 2017 15:45)  T(F): 98.6 (07 Oct 2017 07:51), Max: 99.4 (06 Oct 2017 15:45)  HR: 107 (07 Oct 2017 07:00) (89 - 116)  BP: 114/57 (07 Oct 2017 07:00) (94/54 - 140/76)  BP(mean): 79 (07 Oct 2017 07:00) (67 - 102)  RR: 21 (07 Oct 2017 07:00) (14 - 26)  SpO2: 95% (07 Oct 2017 07:00) (90% - 100%)    I&O's Summary    06 Oct 2017 07:01  -  07 Oct 2017 07:00  --------------------------------------------------------  IN: 2725 mL / OUT: 2890 mL / NET: -165 mL        ON EXAM:    Constitutional: well-nourished, well-developed, NAD   HEENT:  MMM, sclerae anicteric, conjunctivae clear, no oral cyanosis.  Pulmonary: Non-labored, breath sounds are clear bilaterally, No wheezing, rales or rhonchi  Cardiovascular: Regular, S1 and S2. tachycardic,  No murmur.  No rubs, gallops or clicks  Gastrointestinal: Bowel Sounds present, soft, nontender.   Lymph: + peripheral edema.   Neurological: Alert, no focal deficits  Skin: No rashes.  Psych:  On pain medications so slightly confused    LABS: All Labs Reviewed:                        8.8    11.3  )-----------( 91       ( 06 Oct 2017 05:32 )             27.5                         10.0   17.5  )-----------( 88       ( 05 Oct 2017 07:08 )             30.9     07 Oct 2017 06:43    133    |  96     |  67     ----------------------------<  109    4.1     |  25     |  6.60   06 Oct 2017 05:32    131    |  95     |  58     ----------------------------<  123    4.8     |  25     |  6.20   05 Oct 2017 17:09    130    |  95     |  49     ----------------------------<  153    4.6     |  23     |  5.60     Ca    7.3        07 Oct 2017 06:43  Ca    7.2        06 Oct 2017 05:32  Ca    7.0        05 Oct 2017 17:09  Phos  9.3       07 Oct 2017 06:43  Phos  8.3       06 Oct 2017 05:32  Phos  8.2       05 Oct 2017 17:09  Mg     2.5       07 Oct 2017 06:43  Mg     2.2       06 Oct 2017 05:32  Mg     2.2       05 Oct 2017 17:09    TPro  4.6    /  Alb  1.5    /  TBili  0.2    /  DBili  x      /  AST  133    /  ALT  70     /  AlkPhos  35     07 Oct 2017 06:43  TPro  4.7    /  Alb  1.7    /  TBili  0.3    /  DBili  x      /  AST  215    /  ALT  100    /  AlkPhos  37     06 Oct 2017 05:32  TPro  4.7    /  Alb  2.1    /  TBili  0.5    /  DBili  x      /  AST  365    /  ALT  146    /  AlkPhos  34     05 Oct 2017 07:08    PT/INR - ( 06 Oct 2017 05:32 )   PT: 12.4 sec;   INR: 1.13 ratio         PTT - ( 06 Oct 2017 05:32 )  PTT:28.8 sec  CARDIAC MARKERS ( 06 Oct 2017 05:32 )  x     / x     / 7889 U/L / x     / x          Blood Culture: Organism --  Gram Stain Blood -- Gram Stain --  Specimen Source .Blood Blood-Peripheral  Culture-Blood --    Organism --  Gram Stain Blood -- Gram Stain --  Specimen Source .Urine Clean Catch (Midstream)  Culture-Blood -- [facility] - Bonnie Hernandez, Keena, April, Ale, Carissa Allen  Office Number:  [phone]    Patient resting comfortably in bed in NAD.  s/p OR yesterday. Remains tachycardic but slightly improved    ROS: negative unless otherwise mentioned.    Telemetry:  ST to 115bpm    MEDICATIONS  (STANDING):  aztreonam  IVPB 500 milliGRAM(s) IV Intermittent every 8 hours  buPROPion XL . 300 milliGRAM(s) Oral daily  clindamycin IVPB 900 milliGRAM(s) IV Intermittent every 8 hours  dextrose 5% + sodium chloride 0.45% 1000 milliLiter(s) (75 mL/Hr) IV Continuous <Continuous>  docusate sodium 100 milliGRAM(s) Oral two times a day  fluticasone propionate 50 MICROgram(s)/spray Nasal Spray 1 Spray(s) Both Nostrils two times a day  heparin  Injectable 5000 Unit(s) SubCutaneous every 8 hours  lamoTRIgine 100 milliGRAM(s) Oral every 8 hours  levothyroxine 150 MICROGram(s) Oral daily  oxyCODONE  ER Tablet 60 milliGRAM(s) Oral every 8 hours  polyethylene glycol 3350 17 Gram(s) Oral daily  risperiDONE   Tablet 8 milliGRAM(s) Oral at bedtime  risperiDONE   Tablet 4 milliGRAM(s) Oral <User Schedule>  sertraline 50 milliGRAM(s) Oral daily  sevelamer carbonate 800 milliGRAM(s) Oral three times a day with meals  topiramate 400 milliGRAM(s) Oral two times a day    MEDICATIONS  (PRN):  acetaminophen   Tablet 650 milliGRAM(s) Oral every 6 hours PRN For Temp greater than 38 C (100.4 F)  acetaminophen   Tablet. 650 milliGRAM(s) Oral every 6 hours PRN Mild Pain (1 - 3)  clonazePAM Tablet 2 milliGRAM(s) Oral every 6 hours PRN anxiety  HYDROmorphone  Injectable 2 milliGRAM(s) IV Push every 2 hours PRN Moderate Pain (4 - 6)  HYDROmorphone  Injectable 3 milliGRAM(s) IV Push every 2 hours PRN Severe Pain (7 - 10)  ondansetron Injectable 4 milliGRAM(s) IV Push once PRN Nausea and/or Vomiting  senna 2 Tablet(s) Oral at bedtime PRN Constipation      Allergies    avocado (Unknown)  latex (Urticaria; Rash)  penicillin (Unknown)  sulfa drugs (Unknown)    Intolerances        Vital Signs Last 24 Hrs  T(C): 37 (07 Oct 2017 07:51), Max: 37.4 (06 Oct 2017 15:45)  T(F): 98.6 (07 Oct 2017 07:51), Max: 99.4 (06 Oct 2017 15:45)  HR: 107 (07 Oct 2017 07:00) (89 - 116)  BP: 114/57 (07 Oct 2017 07:00) (94/54 - 140/76)  BP(mean): 79 (07 Oct 2017 07:00) (67 - 102)  RR: 21 (07 Oct 2017 07:00) (14 - 26)  SpO2: 95% (07 Oct 2017 07:00) (90% - 100%)    I&O's Summary    06 Oct 2017 07:01  -  07 Oct 2017 07:00  --------------------------------------------------------  IN: 2725 mL / OUT: 2890 mL / NET: -165 mL        ON EXAM:    Constitutional: well-nourished, well-developed, NAD   HEENT:  MMM, sclerae anicteric, conjunctivae clear, no oral cyanosis.  Pulmonary: Non-labored, breath sounds are clear bilaterally, No wheezing, rales or rhonchi  Cardiovascular: Regular, S1 and S2. tachycardic,  No murmur.  No rubs, gallops or clicks  Gastrointestinal: Bowel Sounds present, soft, nontender.   Lymph: + peripheral edema.   Neurological: Alert, no focal deficits  Skin: No rashes.  Psych:  On pain medications so slightly confused    LABS: All Labs Reviewed:                        8.8    11.3  )-----------( 91       ( 06 Oct 2017 05:32 )             27.5                         10.0   17.5  )-----------( 88       ( 05 Oct 2017 07:08 )             30.9     07 Oct 2017 06:43    133    |  96     |  67     ----------------------------<  109    4.1     |  25     |  6.60   06 Oct 2017 05:32    131    |  95     |  58     ----------------------------<  123    4.8     |  25     |  6.20   05 Oct 2017 17:09    130    |  95     |  49     ----------------------------<  153    4.6     |  23     |  5.60     Ca    7.3        07 Oct 2017 06:43  Ca    7.2        06 Oct 2017 05:32  Ca    7.0        05 Oct 2017 17:09  Phos  9.3       07 Oct 2017 06:43  Phos  8.3       06 Oct 2017 05:32  Phos  8.2       05 Oct 2017 17:09  Mg     2.5       07 Oct 2017 06:43  Mg     2.2       06 Oct 2017 05:32  Mg     2.2       05 Oct 2017 17:09    TPro  4.6    /  Alb  1.5    /  TBili  0.2    /  DBili  x      /  AST  133    /  ALT  70     /  AlkPhos  35     07 Oct 2017 06:43  TPro  4.7    /  Alb  1.7    /  TBili  0.3    /  DBili  x      /  AST  215    /  ALT  100    /  AlkPhos  37     06 Oct 2017 05:32  TPro  4.7    /  Alb  2.1    /  TBili  0.5    /  DBili  x      /  AST  365    /  ALT  146    /  AlkPhos  34     05 Oct 2017 07:08    PT/INR - ( 06 Oct 2017 05:32 )   PT: 12.4 sec;   INR: 1.13 ratio         PTT - ( 06 Oct 2017 05:32 )  PTT:28.8 sec  CARDIAC MARKERS ( 06 Oct 2017 05:32 )  x     / x     / 7889 U/L / x     / x          Blood Culture: Organism --  Gram Stain Blood -- Gram Stain --  Specimen Source .Blood Blood-Peripheral  Culture-Blood --    Organism --  Gram Stain Blood -- Gram Stain --  Specimen Source .Urine Clean Catch (Midstream)  Culture-Blood --

## 2017-10-07 NOTE — PROGRESS NOTE ADULT - ATTENDING COMMENTS
32F PMH Asthma, Bipolar disorder, hypothyroidism, chronic low back pain s/p laminectomy and spinal fusion, chronic opioid dependence with acute RLE ischemia from compartment syndrome and rhabdomyolysis, complicated by GHAZAL due to ATN, POD 3 s/p emergent fasciotomy of 4 compartments, now with wound vacs in place.    --continue current psych regimen for bipolar disorder  --high chronic pain requirements, verified through iStop (in chart)  continue current pain regimen  --sinus tachycardia persistent, per pt is baseline  --d/c Abx  --GHAZAL due to ATN and rhabdo, improving  continue bicarb gtt  no HD indication at this time  UOP improving, anticipate renal recovery  daily CPK  --anemia, suspect dilutional, no evidence of bleeding, no transfusion at this time  --plan discussed with pt and parents  --stable for surgical floor

## 2017-10-07 NOTE — PROGRESS NOTE ADULT - SUBJECTIVE AND OBJECTIVE BOX
Patient is a 32y old  Female who presents with a chief complaint of RLE edema and numbness (04 Oct 2017 01:13)      Vascular Surgery Progress Note    Interval HPI:  POD #3 s/p fasciotomies of right leg. No complaints. 	    Medications:      Allergies:  Allergies    avocado (Unknown)  latex (Urticaria; Rash)  penicillin (Unknown)  sulfa drugs (Unknown)    Intolerances        Vital Signs Last 24 Hrs  T(C): 36.9 (07 Oct 2017 15:25), Max: 37.2 (06 Oct 2017 20:22)  T(F): 98.5 (07 Oct 2017 15:25), Max: 98.9 (06 Oct 2017 20:22)  HR: 111 (07 Oct 2017 18:00) (96 - 116)  BP: 116/60 (07 Oct 2017 18:00) (100/62 - 139/79)  BP(mean): 82 (07 Oct 2017 18:00) (73 - 102)  RR: 25 (07 Oct 2017 18:00) (18 - 37)  SpO2: 94% (07 Oct 2017 18:00) (90% - 100%)  I&O's Summary    06 Oct 2017 07:01  -  07 Oct 2017 07:00  --------------------------------------------------------  IN: 2725 mL / OUT: 2890 mL / NET: -165 mL    07 Oct 2017 07:01  -  07 Oct 2017 18:50  --------------------------------------------------------  IN: 1660 mL / OUT: 2575 mL / NET: -915 mL        Physical Exam:  Gen: NAD, A&Ox3  Heart: regular  Lungs: clear  Right leg wounds are clean with viable muscle groups of right calf. No infections.    Pulses: palpable right femoral ,popliteal and DP pulses and graded 4/4   Neurological:  no change  LABS:                        7.4    7.4   )-----------( 95       ( 07 Oct 2017 06:43 )             23.3     10-07    133<L>  |  96  |  67<H>  ----------------------------<  109<H>  4.1   |  25  |  6.60<H>    Ca    7.3<L>      07 Oct 2017 06:43  Phos  9.3     10-07  Mg     2.5     10-07    TPro  4.6<L>  /  Alb  1.5<L>  /  TBili  0.2  /  DBili  x   /  AST  133<H>  /  ALT  70  /  AlkPhos  35<L>  10-07    PT/INR - ( 06 Oct 2017 05:32 )   PT: 12.4 sec;   INR: 1.13 ratio         PTT - ( 06 Oct 2017 05:32 )  PTT:28.8 sec

## 2017-10-08 DIAGNOSIS — G89.29 OTHER CHRONIC PAIN: ICD-10-CM

## 2017-10-08 DIAGNOSIS — R52 PAIN, UNSPECIFIED: ICD-10-CM

## 2017-10-08 DIAGNOSIS — Z29.9 ENCOUNTER FOR PROPHYLACTIC MEASURES, UNSPECIFIED: ICD-10-CM

## 2017-10-08 DIAGNOSIS — F11.10 OPIOID ABUSE, UNCOMPLICATED: ICD-10-CM

## 2017-10-08 LAB
ANION GAP SERPL CALC-SCNC: 12 MMOL/L — SIGNIFICANT CHANGE UP (ref 5–17)
BUN SERPL-MCNC: 80 MG/DL — HIGH (ref 7–23)
CALCIUM SERPL-MCNC: 7.9 MG/DL — LOW (ref 8.5–10.1)
CHLORIDE SERPL-SCNC: 97 MMOL/L — SIGNIFICANT CHANGE UP (ref 96–108)
CK SERPL-CCNC: 2800 U/L — HIGH (ref 26–192)
CO2 SERPL-SCNC: 23 MMOL/L — SIGNIFICANT CHANGE UP (ref 22–31)
CREAT SERPL-MCNC: 6.2 MG/DL — HIGH (ref 0.5–1.3)
GLUCOSE SERPL-MCNC: 100 MG/DL — HIGH (ref 70–99)
HCT VFR BLD CALC: 24.7 % — LOW (ref 34.5–45)
HGB BLD-MCNC: 7.9 G/DL — LOW (ref 11.5–15.5)
MAGNESIUM SERPL-MCNC: 2.5 MG/DL — SIGNIFICANT CHANGE UP (ref 1.6–2.6)
MCHC RBC-ENTMCNC: 29.8 PG — SIGNIFICANT CHANGE UP (ref 27–34)
MCHC RBC-ENTMCNC: 32 GM/DL — SIGNIFICANT CHANGE UP (ref 32–36)
MCV RBC AUTO: 93.2 FL — SIGNIFICANT CHANGE UP (ref 80–100)
PHOSPHATE SERPL-MCNC: 8.1 MG/DL — HIGH (ref 2.5–4.5)
PLATELET # BLD AUTO: 112 K/UL — LOW (ref 150–400)
POTASSIUM SERPL-MCNC: 3.9 MMOL/L — SIGNIFICANT CHANGE UP (ref 3.5–5.3)
POTASSIUM SERPL-SCNC: 3.9 MMOL/L — SIGNIFICANT CHANGE UP (ref 3.5–5.3)
RBC # BLD: 2.66 M/UL — LOW (ref 3.8–5.2)
RBC # FLD: 13.2 % — SIGNIFICANT CHANGE UP (ref 10.3–14.5)
SODIUM SERPL-SCNC: 132 MMOL/L — LOW (ref 135–145)
WBC # BLD: 6.4 K/UL — SIGNIFICANT CHANGE UP (ref 3.8–10.5)
WBC # FLD AUTO: 6.4 K/UL — SIGNIFICANT CHANGE UP (ref 3.8–10.5)

## 2017-10-08 PROCEDURE — 99233 SBSQ HOSP IP/OBS HIGH 50: CPT

## 2017-10-08 RX ORDER — HYDROMORPHONE HYDROCHLORIDE 2 MG/ML
2 INJECTION INTRAMUSCULAR; INTRAVENOUS; SUBCUTANEOUS
Qty: 0 | Refills: 0 | Status: DISCONTINUED | OUTPATIENT
Start: 2017-10-08 | End: 2017-10-11

## 2017-10-08 RX ORDER — SEVELAMER CARBONATE 2400 MG/1
800 POWDER, FOR SUSPENSION ORAL
Qty: 0 | Refills: 0 | Status: DISCONTINUED | OUTPATIENT
Start: 2017-10-08 | End: 2017-10-08

## 2017-10-08 RX ORDER — FENTANYL CITRATE 50 UG/ML
1 INJECTION INTRAVENOUS
Qty: 0 | Refills: 0 | Status: DISCONTINUED | OUTPATIENT
Start: 2017-10-08 | End: 2017-10-13

## 2017-10-08 RX ORDER — ACETAMINOPHEN 500 MG
650 TABLET ORAL EVERY 8 HOURS
Qty: 0 | Refills: 0 | Status: DISCONTINUED | OUTPATIENT
Start: 2017-10-08 | End: 2017-10-08

## 2017-10-08 RX ORDER — HYDROMORPHONE HYDROCHLORIDE 2 MG/ML
1 INJECTION INTRAMUSCULAR; INTRAVENOUS; SUBCUTANEOUS
Qty: 0 | Refills: 0 | Status: DISCONTINUED | OUTPATIENT
Start: 2017-10-08 | End: 2017-10-10

## 2017-10-08 RX ORDER — SEVELAMER CARBONATE 2400 MG/1
800 POWDER, FOR SUSPENSION ORAL
Qty: 0 | Refills: 0 | Status: DISCONTINUED | OUTPATIENT
Start: 2017-10-08 | End: 2017-10-09

## 2017-10-08 RX ADMIN — OXYCODONE HYDROCHLORIDE 60 MILLIGRAM(S): 5 TABLET ORAL at 14:07

## 2017-10-08 RX ADMIN — HEPARIN SODIUM 5000 UNIT(S): 5000 INJECTION INTRAVENOUS; SUBCUTANEOUS at 22:19

## 2017-10-08 RX ADMIN — HEPARIN SODIUM 5000 UNIT(S): 5000 INJECTION INTRAVENOUS; SUBCUTANEOUS at 06:48

## 2017-10-08 RX ADMIN — RISPERIDONE 4 MILLIGRAM(S): 4 TABLET ORAL at 08:47

## 2017-10-08 RX ADMIN — SEVELAMER CARBONATE 800 MILLIGRAM(S): 2400 POWDER, FOR SUSPENSION ORAL at 17:06

## 2017-10-08 RX ADMIN — LAMOTRIGINE 100 MILLIGRAM(S): 25 TABLET, ORALLY DISINTEGRATING ORAL at 06:50

## 2017-10-08 RX ADMIN — HYDROMORPHONE HYDROCHLORIDE 1 MILLIGRAM(S): 2 INJECTION INTRAMUSCULAR; INTRAVENOUS; SUBCUTANEOUS at 18:40

## 2017-10-08 RX ADMIN — HYDROMORPHONE HYDROCHLORIDE 1 MILLIGRAM(S): 2 INJECTION INTRAMUSCULAR; INTRAVENOUS; SUBCUTANEOUS at 14:15

## 2017-10-08 RX ADMIN — Medication 400 MILLIGRAM(S): at 06:50

## 2017-10-08 RX ADMIN — HYDROMORPHONE HYDROCHLORIDE 1 MILLIGRAM(S): 2 INJECTION INTRAMUSCULAR; INTRAVENOUS; SUBCUTANEOUS at 17:06

## 2017-10-08 RX ADMIN — HYDROMORPHONE HYDROCHLORIDE 3 MILLIGRAM(S): 2 INJECTION INTRAMUSCULAR; INTRAVENOUS; SUBCUTANEOUS at 07:03

## 2017-10-08 RX ADMIN — Medication 1 SPRAY(S): at 07:09

## 2017-10-08 RX ADMIN — Medication 400 MILLIGRAM(S): at 18:37

## 2017-10-08 RX ADMIN — HYDROMORPHONE HYDROCHLORIDE 1 MILLIGRAM(S): 2 INJECTION INTRAMUSCULAR; INTRAVENOUS; SUBCUTANEOUS at 12:07

## 2017-10-08 RX ADMIN — HYDROMORPHONE HYDROCHLORIDE 1 MILLIGRAM(S): 2 INJECTION INTRAMUSCULAR; INTRAVENOUS; SUBCUTANEOUS at 13:48

## 2017-10-08 RX ADMIN — LAMOTRIGINE 100 MILLIGRAM(S): 25 TABLET, ORALLY DISINTEGRATING ORAL at 22:19

## 2017-10-08 RX ADMIN — RISPERIDONE 8 MILLIGRAM(S): 4 TABLET ORAL at 22:20

## 2017-10-08 RX ADMIN — OXYCODONE HYDROCHLORIDE 60 MILLIGRAM(S): 5 TABLET ORAL at 23:39

## 2017-10-08 RX ADMIN — HYDROMORPHONE HYDROCHLORIDE 1 MILLIGRAM(S): 2 INJECTION INTRAMUSCULAR; INTRAVENOUS; SUBCUTANEOUS at 10:12

## 2017-10-08 RX ADMIN — FENTANYL CITRATE 1 PATCH: 50 INJECTION INTRAVENOUS at 10:12

## 2017-10-08 RX ADMIN — Medication 2 MILLIGRAM(S): at 07:45

## 2017-10-08 RX ADMIN — BUPROPION HYDROCHLORIDE 300 MILLIGRAM(S): 150 TABLET, EXTENDED RELEASE ORAL at 12:11

## 2017-10-08 RX ADMIN — Medication 1 SPRAY(S): at 18:38

## 2017-10-08 RX ADMIN — SERTRALINE 50 MILLIGRAM(S): 25 TABLET, FILM COATED ORAL at 12:11

## 2017-10-08 RX ADMIN — SEVELAMER CARBONATE 800 MILLIGRAM(S): 2400 POWDER, FOR SUSPENSION ORAL at 12:11

## 2017-10-08 RX ADMIN — Medication 150 MICROGRAM(S): at 06:46

## 2017-10-08 RX ADMIN — Medication 100 MILLIGRAM(S): at 18:38

## 2017-10-08 RX ADMIN — OXYCODONE HYDROCHLORIDE 60 MILLIGRAM(S): 5 TABLET ORAL at 15:00

## 2017-10-08 RX ADMIN — LAMOTRIGINE 100 MILLIGRAM(S): 25 TABLET, ORALLY DISINTEGRATING ORAL at 14:07

## 2017-10-08 RX ADMIN — SEVELAMER CARBONATE 800 MILLIGRAM(S): 2400 POWDER, FOR SUSPENSION ORAL at 08:46

## 2017-10-08 RX ADMIN — HYDROMORPHONE HYDROCHLORIDE 1 MILLIGRAM(S): 2 INJECTION INTRAMUSCULAR; INTRAVENOUS; SUBCUTANEOUS at 22:00

## 2017-10-08 RX ADMIN — HYDROMORPHONE HYDROCHLORIDE 1 MILLIGRAM(S): 2 INJECTION INTRAMUSCULAR; INTRAVENOUS; SUBCUTANEOUS at 20:30

## 2017-10-08 RX ADMIN — SODIUM CHLORIDE 100 MILLILITER(S): 9 INJECTION INTRAMUSCULAR; INTRAVENOUS; SUBCUTANEOUS at 07:07

## 2017-10-08 RX ADMIN — Medication 100 MILLIGRAM(S): at 06:50

## 2017-10-08 RX ADMIN — PANTOPRAZOLE SODIUM 40 MILLIGRAM(S): 20 TABLET, DELAYED RELEASE ORAL at 06:50

## 2017-10-08 RX ADMIN — Medication 2 MILLIGRAM(S): at 18:37

## 2017-10-08 RX ADMIN — HEPARIN SODIUM 5000 UNIT(S): 5000 INJECTION INTRAVENOUS; SUBCUTANEOUS at 14:08

## 2017-10-08 NOTE — PROGRESS NOTE ADULT - ASSESSMENT
32-year-old female now status post right lower extremity fasciotomy for prolonged compression after a fall with lethargy. There is no evidence for an acute atherothrombotic or embolic process. No history of cardiac disease and remains in sinus rhythm. EF preserved on recent echocardiogram    - there is no evidence of acute ischemia.  - there is no evidence for meaningful volume overload.  - remains persistently tachycardic, likely reactive in the setting of severe illness  - follow creatinine, slightly improved, but BUN trending upward  - CK improving with IVF, I would continue to keep her hydrated  - Bicarb drip for acidosis now off. Renal follow up.  - monitor electrolytes, keep k>4, Mg>2  - TTE with hyperdynamic LV function, no valvular issues  - cardiovascular status is otherwise without acute change.  - Antibiotics  - surgical followup  - pain control will help with tachycardia

## 2017-10-08 NOTE — PROGRESS NOTE ADULT - SUBJECTIVE AND OBJECTIVE BOX
Patient is a 32y old  Female who presents with a chief complaint of RLE edema and numbness (04 Oct 2017 01:13)      Vascular Surgery Progress Note    Interval HPI: POD#4 s/p fasciotomies of right leg. Some incisional pain. Patient is addicted to narcotics . No ischemic pedal pain at rest.    Medications:      Allergies:  Allergies    avocado (Unknown)  latex (Urticaria; Rash)  penicillin (Unknown)  sulfa drugs (Unknown)    Intolerances        Vital Signs Last 24 Hrs  T(C): 37 (08 Oct 2017 08:00), Max: 37.3 (08 Oct 2017 06:46)  T(F): 98.6 (08 Oct 2017 08:00), Max: 99.1 (08 Oct 2017 06:46)  HR: 116 (08 Oct 2017 08:00) (96 - 116)  BP: 128/76 (08 Oct 2017 08:00) (100/62 - 137/76)  BP(mean): 97 (07 Oct 2017 21:16) (73 - 97)  RR: 17 (08 Oct 2017 08:00) (16 - 37)  SpO2: 96% (08 Oct 2017 08:00) (90% - 99%)  I&O's Summary    07 Oct 2017 07:01  -  08 Oct 2017 07:00  --------------------------------------------------------  IN: 3090 mL / OUT: 5725 mL / NET: -2635 mL        Physical Exam:  Gen: NAD, A&Ox3  Right leg and foot remain viable with a strong palpable right DP pulse. Neurologically the right foot remains insensate with motor paralysis.       LABS:                        7.9    6.4   )-----------( 112      ( 08 Oct 2017 08:05 )             24.7     10-08    132<L>  |  97  |  80<H>  ----------------------------<  100<H>  3.9   |  23  |  6.20<H>    Ca    7.9<L>      08 Oct 2017 08:05  Phos  8.1     10-08  Mg     2.5     10-08    TPro  4.6<L>  /  Alb  1.5<L>  /  TBili  0.2  /  DBili  x   /  AST  133<H>  /  ALT  70  /  AlkPhos  35<L>  10-07

## 2017-10-08 NOTE — CONSULT NOTE ADULT - PROBLEM SELECTOR RECOMMENDATION 6
fasciotomy in the setting of compartment syndrome and acute illness  most likely opioid addiction, discussed with mom, pt has the signs and symptoms of opioid dependence and misuse,   will cont oxycontin TID at current dose  will add fentanyl patch 25 mcg every 72 hours  will change dilaudid to 1 mg IVP every 3 hrs PRN for acute breakthrough pain  will add dilaudid PO 2 mg every 6 hours for moderate to severe pain  cont Senna and Colace for bowel regimen in pt who is on heavy narcotic doses  unfortunately cannot utilize NSAID or ACAP ( elevated LFT)  will follow  will need social work and psych eval  Klonopin PRN

## 2017-10-08 NOTE — PROGRESS NOTE ADULT - SUBJECTIVE AND OBJECTIVE BOX
[facility] - Bonnie Hernandez, Keena, April, Ale, Carissa Allen  Office Number:  [phone]    Patient resting comfortably in bed in NAD.  Laying flat with no respiratory distress.  No complaints of chest pain, dyspnea, palpitations, PND, or orthopnea.  + leg pain. Remains tachycardic to 110 bpm    ROS: negative unless otherwise mentioned.    Telemetry:  Not on tele    MEDICATIONS  (STANDING):  buPROPion XL . 300 milliGRAM(s) Oral daily  docusate sodium 100 milliGRAM(s) Oral two times a day  fentaNYL   Patch  25 MICROgram(s)/Hr 1 Patch Transdermal every 72 hours  fluticasone propionate 50 MICROgram(s)/spray Nasal Spray 1 Spray(s) Both Nostrils two times a day  heparin  Injectable 5000 Unit(s) SubCutaneous every 8 hours  lamoTRIgine 100 milliGRAM(s) Oral every 8 hours  levothyroxine 150 MICROGram(s) Oral daily  oxyCODONE  ER Tablet 60 milliGRAM(s) Oral every 8 hours  pantoprazole    Tablet 40 milliGRAM(s) Oral before breakfast  risperiDONE   Tablet 8 milliGRAM(s) Oral at bedtime  risperiDONE   Tablet 4 milliGRAM(s) Oral <User Schedule>  sertraline 50 milliGRAM(s) Oral daily  sevelamer carbonate 800 milliGRAM(s) Oral three times a day with meals  sodium chloride 0.9%. 1000 milliLiter(s) (100 mL/Hr) IV Continuous <Continuous>  topiramate 400 milliGRAM(s) Oral two times a day    MEDICATIONS  (PRN):  acetaminophen   Tablet 650 milliGRAM(s) Oral every 6 hours PRN For Temp greater than 38 C (100.4 F)  clonazePAM Tablet 2 milliGRAM(s) Oral every 6 hours PRN anxiety  HYDROmorphone   Tablet 2 milliGRAM(s) Oral four times a day PRN moderate to severe pain  HYDROmorphone  Injectable 1 milliGRAM(s) IV Push every 3 hours PRN Severe Pain (7 - 10)  ondansetron Injectable 4 milliGRAM(s) IV Push once PRN Nausea and/or Vomiting  senna 2 Tablet(s) Oral at bedtime PRN Constipation      Allergies    avocado (Unknown)  latex (Urticaria; Rash)  penicillin (Unknown)  sulfa drugs (Unknown)    Intolerances        Vital Signs Last 24 Hrs  T(C): 37 (08 Oct 2017 08:00), Max: 37.3 (08 Oct 2017 06:46)  T(F): 98.6 (08 Oct 2017 08:00), Max: 99.1 (08 Oct 2017 06:46)  HR: 116 (08 Oct 2017 08:00) (99 - 116)  BP: 128/76 (08 Oct 2017 08:00) (100/62 - 137/76)  BP(mean): 97 (07 Oct 2017 21:16) (76 - 97)  RR: 17 (08 Oct 2017 08:00) (16 - 37)  SpO2: 96% (08 Oct 2017 08:00) (90% - 99%)    I&O's Summary    07 Oct 2017 07:01  -  08 Oct 2017 07:00  --------------------------------------------------------  IN: 3090 mL / OUT: 5725 mL / NET: -2635 mL    08 Oct 2017 07:01  -  08 Oct 2017 12:31  --------------------------------------------------------  IN: 680 mL / OUT: 0 mL / NET: 680 mL        ON EXAM:    Constitutional: well-nourished, well-developed, NAD   HEENT:  MMM, sclerae anicteric, conjunctivae clear, no oral cyanosis.  Pulmonary: Non-labored, breath sounds are clear bilaterally, No wheezing, rales or rhonchi  Cardiovascular: Regular, S1 and S2. tachycardic,  No murmur.  No rubs, gallops or clicks  Gastrointestinal: Bowel Sounds present, soft, nontender.   Lymph: + peripheral edema, right leg wrapped  Neurological: Alert, no focal deficits  Skin: No rashes.  Psych:  On pain medications so slightly confused    LABS: All Labs Reviewed:                        7.9    6.4   )-----------( 112      ( 08 Oct 2017 08:05 )             24.7                         7.4    7.4   )-----------( 95       ( 07 Oct 2017 06:43 )             23.3                         8.8    11.3  )-----------( 91       ( 06 Oct 2017 05:32 )             27.5     08 Oct 2017 08:05    132    |  97     |  80     ----------------------------<  100    3.9     |  23     |  6.20   07 Oct 2017 06:43    133    |  96     |  67     ----------------------------<  109    4.1     |  25     |  6.60   06 Oct 2017 05:32    131    |  95     |  58     ----------------------------<  123    4.8     |  25     |  6.20     Ca    7.9        08 Oct 2017 08:05  Ca    7.3        07 Oct 2017 06:43  Ca    7.2        06 Oct 2017 05:32  Phos  8.1       08 Oct 2017 08:05  Phos  9.3       07 Oct 2017 06:43  Phos  8.3       06 Oct 2017 05:32  Mg     2.5       08 Oct 2017 08:05  Mg     2.5       07 Oct 2017 06:43  Mg     2.2       06 Oct 2017 05:32    TPro  4.6    /  Alb  1.5    /  TBili  0.2    /  DBili  x      /  AST  133    /  ALT  70     /  AlkPhos  35     07 Oct 2017 06:43  TPro  4.7    /  Alb  1.7    /  TBili  0.3    /  DBili  x      /  AST  215    /  ALT  100    /  AlkPhos  37     06 Oct 2017 05:32      CARDIAC MARKERS ( 08 Oct 2017 08:05 )  x     / x     / 2800 U/L / x     / x      CARDIAC MARKERS ( 07 Oct 2017 06:43 )  x     / x     / 4359 U/L / x     / x          Blood Culture: Organism --  Gram Stain Blood -- Gram Stain --  Specimen Source .Blood Blood-Peripheral  Culture-Blood --    Organism --  Gram Stain Blood -- Gram Stain --  Specimen Source .Urine Clean Catch (Midstream)  Culture-Blood --

## 2017-10-08 NOTE — CONSULT NOTE ADULT - SUBJECTIVE AND OBJECTIVE BOX
Date/Time Patient Seen:  		  Referring MD:   Data Reviewed	       Patient is a 32y old  Female who presents with a chief complaint of RLE edema and numbness (04 Oct 2017 01:13)      Subjective/HPI    seen and examined  eval for pain regimen  pt s/p fasciotomy  has chronic pain issues, has hx of spine surgery back and neck, sees Dr. Manfred Galaviz - Pain Management   now s/p fall and compartment syndrome, surgical care, on wound vac, seeing vascular surgery for follow up  complicated by GHAZAL, elevated LFT    32F PMH Asthma, Bipolar disorder, hypothyroidism, chronic low back pain s/p laminectomy and spinal fusion, chronic opioid dependence with acute RLE ischemia from compartment syndrome and rhabdomyolysis, complicated by GHAZAL due to ATN, POD 3 s/p emergent fasciotomy of 4 compartments, now with wound vacs in place.       PAST MEDICAL & SURGICAL HISTORY:  Low back pain  Hypothyroid  Bipolar 1 disorder  History of spinal fusion  History of laminectomy        Medication list         MEDICATIONS  (STANDING):  buPROPion XL . 300 milliGRAM(s) Oral daily  docusate sodium 100 milliGRAM(s) Oral two times a day  fentaNYL   Patch  25 MICROgram(s)/Hr 1 Patch Transdermal every 72 hours  fluticasone propionate 50 MICROgram(s)/spray Nasal Spray 1 Spray(s) Both Nostrils two times a day  heparin  Injectable 5000 Unit(s) SubCutaneous every 8 hours  lamoTRIgine 100 milliGRAM(s) Oral every 8 hours  levothyroxine 150 MICROGram(s) Oral daily  oxyCODONE  ER Tablet 60 milliGRAM(s) Oral every 8 hours  pantoprazole    Tablet 40 milliGRAM(s) Oral before breakfast  risperiDONE   Tablet 8 milliGRAM(s) Oral at bedtime  risperiDONE   Tablet 4 milliGRAM(s) Oral <User Schedule>  sertraline 50 milliGRAM(s) Oral daily  sevelamer carbonate 800 milliGRAM(s) Oral three times a day with meals  sodium chloride 0.9%. 1000 milliLiter(s) (100 mL/Hr) IV Continuous <Continuous>  topiramate 400 milliGRAM(s) Oral two times a day    MEDICATIONS  (PRN):  acetaminophen   Tablet 650 milliGRAM(s) Oral every 6 hours PRN For Temp greater than 38 C (100.4 F)  clonazePAM Tablet 2 milliGRAM(s) Oral every 6 hours PRN anxiety  HYDROmorphone   Tablet 2 milliGRAM(s) Oral four times a day PRN moderate to severe pain  HYDROmorphone  Injectable 1 milliGRAM(s) IV Push every 3 hours PRN Severe Pain (7 - 10)  ondansetron Injectable 4 milliGRAM(s) IV Push once PRN Nausea and/or Vomiting  senna 2 Tablet(s) Oral at bedtime PRN Constipation         Vitals log        ICU Vital Signs Last 24 Hrs  T(C): 37 (08 Oct 2017 08:00), Max: 37.3 (08 Oct 2017 06:46)  T(F): 98.6 (08 Oct 2017 08:00), Max: 99.1 (08 Oct 2017 06:46)  HR: 116 (08 Oct 2017 08:00) (96 - 116)  BP: 128/76 (08 Oct 2017 08:00) (100/62 - 137/76)  BP(mean): 97 (07 Oct 2017 21:16) (73 - 97)  ABP: --  ABP(mean): --  RR: 17 (08 Oct 2017 08:00) (16 - 37)  SpO2: 96% (08 Oct 2017 08:00) (90% - 99%)           Input and Output:  I&O's Detail    07 Oct 2017 07:01  -  08 Oct 2017 07:00  --------------------------------------------------------  IN:    dextrose 5% + sodium chloride 0.45%: 1050 mL    Oral Fluid: 940 mL    sodium chloride 0.9%.: 900 mL    Solution: 100 mL    Solution: 100 mL  Total IN: 3090 mL    OUT:    Indwelling Catheter - Urethral: 4250 mL    VAC (Vacuum Assisted Closure) System: 1475 mL  Total OUT: 5725 mL    Total NET: -2635 mL          Lab Data                        7.9    6.4   )-----------( 112      ( 08 Oct 2017 08:05 )             24.7     10-08    132<L>  |  97  |  80<H>  ----------------------------<  100<H>  3.9   |  23  |  6.20<H>    Ca    7.9<L>      08 Oct 2017 08:05  Phos  8.1     10-08  Mg     2.5     10-08    TPro  4.6<L>  /  Alb  1.5<L>  /  TBili  0.2  /  DBili  x   /  AST  133<H>  /  ALT  70  /  AlkPhos  35<L>  10-07      CARDIAC MARKERS ( 08 Oct 2017 08:05 )  x     / x     / 2800 U/L / x     / x      CARDIAC MARKERS ( 07 Oct 2017 06:43 )  x     / x     / 4359 U/L / x     / x          non smoker  non drinker  lives with family at home  on disability      Review of Systems	      Objective     Physical Examination  obese  lethargic but easily arousable  head at  RLE dressed and wound vac  lungs - dec BS  abd - soft  cn grossly int        Pertinent Lab findings & Imaging      Coffey:  NO   Adequate UO     I&O's Detail    07 Oct 2017 07:01  -  08 Oct 2017 07:00  --------------------------------------------------------  IN:    dextrose 5% + sodium chloride 0.45%: 1050 mL    Oral Fluid: 940 mL    sodium chloride 0.9%.: 900 mL    Solution: 100 mL    Solution: 100 mL  Total IN: 3090 mL    OUT:    Indwelling Catheter - Urethral: 4250 mL    VAC (Vacuum Assisted Closure) System: 1475 mL  Total OUT: 5725 mL    Total NET: -2635 mL               Discussed with:     Cultures:	        Radiology

## 2017-10-08 NOTE — PROGRESS NOTE ADULT - PROBLEM SELECTOR PLAN 2
Pain management Dr. Damico consulted  Stop Dilaudid 3mg IV q2h   Started fentanyl patch   Start dilaudid 1mg IVP prn q3hrs and 2mg PO prn based on pain severity

## 2017-10-08 NOTE — PROGRESS NOTE ADULT - PROBLEM SELECTOR PLAN 1
compartment syndrome of the right lower extremity   -s/p emergent fasciotomy by Vascular surgery (Bobbi)   -s/p wound vac placement   -F/u with blood cx.  -Stopped aztreonam and clindamycin in ICU  -monitor labs

## 2017-10-08 NOTE — PROGRESS NOTE ADULT - SUBJECTIVE AND OBJECTIVE BOX
INTERVAL HPI: 32y old  Female who presents with a chief complaint of RLE edema and numbness. Patient admitted for Compartment syndrome, s/p Fasciotomy. Patient seen and examined in ICU. Wound vac placed, overnight issues wind drainage, nurse at bedside.    This morning patient asking for 4mg of Dilaudid IV every 2 hours. Dr. Damico consulted for pain management and concern for opoid dependence and abuse. Team spoke with patients mother and in agreement with pain management to be on board.   Patient persistently ask for pain medication to be increased but aware pain management will be tailored to pain control avoiding  lethary/sedation. Patient addicted to opioids and aware no negotiation with how much dilaudid she can get.  Patient evaluated periodically, laying comfortable in bed.  Nurse aware of pain management plan.    MEDICATIONS  (STANDING):  buPROPion XL . 300 milliGRAM(s) Oral daily  docusate sodium 100 milliGRAM(s) Oral two times a day  fentaNYL   Patch  25 MICROgram(s)/Hr 1 Patch Transdermal every 72 hours  fluticasone propionate 50 MICROgram(s)/spray Nasal Spray 1 Spray(s) Both Nostrils two times a day  heparin  Injectable 5000 Unit(s) SubCutaneous every 8 hours  lamoTRIgine 100 milliGRAM(s) Oral every 8 hours  levothyroxine 150 MICROGram(s) Oral daily  oxyCODONE  ER Tablet 60 milliGRAM(s) Oral every 8 hours  pantoprazole    Tablet 40 milliGRAM(s) Oral before breakfast  risperiDONE   Tablet 8 milliGRAM(s) Oral at bedtime  risperiDONE   Tablet 4 milliGRAM(s) Oral <User Schedule>  sertraline 50 milliGRAM(s) Oral daily  sevelamer carbonate 800 milliGRAM(s) Oral three times a day with meals  sodium chloride 0.9%. 1000 milliLiter(s) (100 mL/Hr) IV Continuous <Continuous>  topiramate 400 milliGRAM(s) Oral two times a day    MEDICATIONS  (PRN):  acetaminophen   Tablet 650 milliGRAM(s) Oral every 6 hours PRN For Temp greater than 38 C (100.4 F)  clonazePAM Tablet 2 milliGRAM(s) Oral every 6 hours PRN anxiety  HYDROmorphone   Tablet 2 milliGRAM(s) Oral four times a day PRN moderate to severe pain  HYDROmorphone  Injectable 1 milliGRAM(s) IV Push every 3 hours PRN Severe Pain (7 - 10)  ondansetron Injectable 4 milliGRAM(s) IV Push once PRN Nausea and/or Vomiting  senna 2 Tablet(s) Oral at bedtime PRN Constipation        REVIEW OF SYSTEM:    Constitutional: No fever, chills, fatigue  Neuro: No headache, numbness, weakness  Resp: No cough, wheezing, shortness of breath  CVS: No chest pain, palpitations, leg swelling  GI: No abdominal pain, nausea, vomiting, diarrhea   : No dysuria, frequency, incontinence  Skin: No itching, burning, rashes, or lesions   Msk: +pain in RLE  Psych: No depression, anxiety, mood swings        Vital Signs Last 24 Hrs  T(C): 37 (08 Oct 2017 08:00), Max: 37.3 (08 Oct 2017 06:46)  T(F): 98.6 (08 Oct 2017 08:00), Max: 99.1 (08 Oct 2017 06:46)  HR: 116 (08 Oct 2017 08:00) (96 - 116)  BP: 128/76 (08 Oct 2017 08:00) (100/62 - 137/76)  BP(mean): 97 (07 Oct 2017 21:16) (73 - 97)  RR: 17 (08 Oct 2017 08:00) (16 - 37)  SpO2: 96% (08 Oct 2017 08:00) (90% - 99%)    PHYSICAL EXAM:  GENERAL: NAD, well-developed  HEART: Regular rate and rhythm; No murmurs, rubs, or gallops  RESPIRATORY: CTA B/L, No wheezing / rhonchi  ABDOMEN: Soft, Nontender, Nondistended; Bowel sounds present  NEUROLOGY: A&Ox3, nonfocal, not able to move RLE, sensation diminished RLE  EXTREMITIES:  +pulses mild edema R foot, LLE is moving + pulse  Skin: warm, well perfused       LABS:                        7.9    6.4   )-----------( 112      ( 08 Oct 2017 08:05 )             24.7     10-08    132<L>  |  97  |  80<H>  ----------------------------<  100<H>  3.9   |  23  |  6.20<H>    Ca    7.9<L>      08 Oct 2017 08:05  Phos  8.1     10-08  Mg     2.5     10-08    TPro  4.6<L>  /  Alb  1.5<L>  /  TBili  0.2  /  DBili  x   /  AST  133<H>  /  ALT  70  /  AlkPhos  35<L>  10-07                10-04 @ 10:15   No growth to date.  --  --  10-04 @ 09:59   No growth  --  --

## 2017-10-08 NOTE — PROGRESS NOTE ADULT - SUBJECTIVE AND OBJECTIVE BOX
Awake, alert, denies complaints    acetaminophen   Tablet 650 milliGRAM(s) Oral every 6 hours PRN  buPROPion XL . 300 milliGRAM(s) Oral daily  clonazePAM Tablet 2 milliGRAM(s) Oral every 6 hours PRN  docusate sodium 100 milliGRAM(s) Oral two times a day  fentaNYL   Patch  25 MICROgram(s)/Hr 1 Patch Transdermal every 72 hours  fluticasone propionate 50 MICROgram(s)/spray Nasal Spray 1 Spray(s) Both Nostrils two times a day  heparin  Injectable 5000 Unit(s) SubCutaneous every 8 hours  HYDROmorphone   Tablet 2 milliGRAM(s) Oral four times a day PRN  HYDROmorphone  Injectable 1 milliGRAM(s) IV Push every 3 hours PRN  lamoTRIgine 100 milliGRAM(s) Oral every 8 hours  levothyroxine 150 MICROGram(s) Oral daily  ondansetron Injectable 4 milliGRAM(s) IV Push once PRN  oxyCODONE  ER Tablet 60 milliGRAM(s) Oral every 8 hours  pantoprazole    Tablet 40 milliGRAM(s) Oral before breakfast  risperiDONE   Tablet 8 milliGRAM(s) Oral at bedtime  risperiDONE   Tablet 4 milliGRAM(s) Oral <User Schedule>  senna 2 Tablet(s) Oral at bedtime PRN  sertraline 50 milliGRAM(s) Oral daily  sevelamer hydrochloride 800 milliGRAM(s) Oral three times a day with meals  sodium chloride 0.9%. 1000 milliLiter(s) IV Continuous <Continuous>  topiramate 400 milliGRAM(s) Oral two times a day    Vital Signs Last 24 Hrs  T(C): 36.8 (10-08-17 @ 16:21), Max: 37.3 (10-08-17 @ 06:46)  T(F): 98.2 (10-08-17 @ 16:21), Max: 99.1 (10-08-17 @ 06:46)  HR: 102 (10-08-17 @ 16:21) (99 - 116)  BP: 120/77 (10-08-17 @ 16:21) (103/65 - 137/76)  BP(mean): 97 (10-07-17 @ 21:16) (76 - 97)  RR: 18 (10-08-17 @ 16:21) (16 - 31)  SpO2: 99% (10-08-17 @ 16:21) (93% - 99%)    PHYSICAL EXAM:  General: NAD  Respiratory: b/l air entry  Cardiovascular: S1 S2  Gastrointestinal: soft  Extremities:  Rt LE dressed, sm edema LLE                           7.9    6.4   )-----------( 112      ( 08 Oct 2017 08:05 )             24.7     08 Oct 2017 08:05    132    |  97     |  80     ----------------------------<  100    3.9     |  23     |  6.20     Ca    7.9        08 Oct 2017 08:05  Phos  8.1       08 Oct 2017 08:05  Mg     2.5       08 Oct 2017 08:05    TPro  4.6    /  Alb  1.5    /  TBili  0.2    /  DBili  x      /  AST  133    /  ALT  70     /  AlkPhos  35     07 Oct 2017 06:43    LIVER FUNCTIONS - ( 07 Oct 2017 06:43 )  Alb: 1.5 g/dL / Pro: 4.6 g/dL / ALK PHOS: 35 U/L / ALT: 70 U/L / AST: 133 U/L / GGT: x               Assessment and Plan:   		  32-year-old female status post right lower extremity  fasciotomy for compartment syndrome  GHAZAL due to septic / ischemic ATN, rhabdomyolysis  CPK trending down  Excellent UO  Cr better today  no indication for RRT at this time  Continue IV hydration  No nephrotoxins  Will f/u UO, renal function, CPK  Wound care  D/w mother at bedside

## 2017-10-08 NOTE — PROGRESS NOTE ADULT - ASSESSMENT
32F PMH Asthma, Bipolar disorder, hypothyroidism, chronic low back pain s/p laminectomy and spinal fusion, opioid-dependent presents with right leg swelling, numbness, pallor, poikilothermia, and pulselessness, found to have ischemic leg due to acute compartment syndrome of right leg with acute rhabdomyolysis, GHAZAL 2/2 ATN, and leukocytosis with bandemia s/p emergent fasciotomy of the 4 leg compartments (anterior, lateral, superficial posterior, and deep posterior) and the lateral thigh muscles. Wound vac in place. Pain management for opioid dependence and abuse.

## 2017-10-08 NOTE — PROGRESS NOTE ADULT - ASSESSMENT
From a vascular standpoint the patient is stable. Will continue with the wound VAC. The rhabdo induced renal failure appears to have stabilized. This is being  followed by Dr. Tavares.

## 2017-10-08 NOTE — CONSULT NOTE ADULT - PROBLEM SELECTOR RECOMMENDATION 5
chronic pain hx  sees Dr. Manfred Galaviz, left a message  hx of back and neck pain, surgical intervention hx  on Opioids at home  hx of psych illness  will need psych eval for support and management in the period of acute illness

## 2017-10-09 LAB
ANION GAP SERPL CALC-SCNC: 12 MMOL/L — SIGNIFICANT CHANGE UP (ref 5–17)
BUN SERPL-MCNC: 86 MG/DL — HIGH (ref 7–23)
CALCIUM SERPL-MCNC: 8.3 MG/DL — LOW (ref 8.5–10.1)
CHLORIDE SERPL-SCNC: 106 MMOL/L — SIGNIFICANT CHANGE UP (ref 96–108)
CK SERPL-CCNC: 1934 U/L — HIGH (ref 26–192)
CO2 SERPL-SCNC: 22 MMOL/L — SIGNIFICANT CHANGE UP (ref 22–31)
CREAT SERPL-MCNC: 6.4 MG/DL — HIGH (ref 0.5–1.3)
CULTURE RESULTS: SIGNIFICANT CHANGE UP
CULTURE RESULTS: SIGNIFICANT CHANGE UP
GLUCOSE SERPL-MCNC: 84 MG/DL — SIGNIFICANT CHANGE UP (ref 70–99)
HCT VFR BLD CALC: 23.1 % — LOW (ref 34.5–45)
HGB BLD-MCNC: 7.2 G/DL — LOW (ref 11.5–15.5)
MCHC RBC-ENTMCNC: 29.1 PG — SIGNIFICANT CHANGE UP (ref 27–34)
MCHC RBC-ENTMCNC: 31.2 GM/DL — LOW (ref 32–36)
MCV RBC AUTO: 93.2 FL — SIGNIFICANT CHANGE UP (ref 80–100)
PLATELET # BLD AUTO: 115 K/UL — LOW (ref 150–400)
POTASSIUM SERPL-MCNC: 3.7 MMOL/L — SIGNIFICANT CHANGE UP (ref 3.5–5.3)
POTASSIUM SERPL-SCNC: 3.7 MMOL/L — SIGNIFICANT CHANGE UP (ref 3.5–5.3)
RBC # BLD: 2.48 M/UL — LOW (ref 3.8–5.2)
RBC # FLD: 13.3 % — SIGNIFICANT CHANGE UP (ref 10.3–14.5)
SODIUM SERPL-SCNC: 140 MMOL/L — SIGNIFICANT CHANGE UP (ref 135–145)
SPECIMEN SOURCE: SIGNIFICANT CHANGE UP
SPECIMEN SOURCE: SIGNIFICANT CHANGE UP
WBC # BLD: 5.2 K/UL — SIGNIFICANT CHANGE UP (ref 3.8–10.5)
WBC # FLD AUTO: 5.2 K/UL — SIGNIFICANT CHANGE UP (ref 3.8–10.5)

## 2017-10-09 PROCEDURE — 99233 SBSQ HOSP IP/OBS HIGH 50: CPT

## 2017-10-09 RX ORDER — SEVELAMER CARBONATE 2400 MG/1
1600 POWDER, FOR SUSPENSION ORAL
Qty: 0 | Refills: 0 | Status: DISCONTINUED | OUTPATIENT
Start: 2017-10-09 | End: 2017-10-13

## 2017-10-09 RX ORDER — SEVELAMER CARBONATE 2400 MG/1
1600 POWDER, FOR SUSPENSION ORAL
Qty: 0 | Refills: 0 | Status: DISCONTINUED | OUTPATIENT
Start: 2017-10-09 | End: 2017-10-09

## 2017-10-09 RX ORDER — POLYETHYLENE GLYCOL 3350 17 G/17G
17 POWDER, FOR SOLUTION ORAL ONCE
Qty: 0 | Refills: 0 | Status: COMPLETED | OUTPATIENT
Start: 2017-10-09 | End: 2017-10-09

## 2017-10-09 RX ORDER — POLYETHYLENE GLYCOL 3350 17 G/17G
17 POWDER, FOR SOLUTION ORAL DAILY
Qty: 0 | Refills: 0 | Status: DISCONTINUED | OUTPATIENT
Start: 2017-10-09 | End: 2017-10-19

## 2017-10-09 RX ORDER — ACETAMINOPHEN 500 MG
650 TABLET ORAL ONCE
Qty: 0 | Refills: 0 | Status: COMPLETED | OUTPATIENT
Start: 2017-10-09 | End: 2017-10-09

## 2017-10-09 RX ADMIN — LAMOTRIGINE 100 MILLIGRAM(S): 25 TABLET, ORALLY DISINTEGRATING ORAL at 21:14

## 2017-10-09 RX ADMIN — HYDROMORPHONE HYDROCHLORIDE 1 MILLIGRAM(S): 2 INJECTION INTRAMUSCULAR; INTRAVENOUS; SUBCUTANEOUS at 19:49

## 2017-10-09 RX ADMIN — Medication 150 MICROGRAM(S): at 06:10

## 2017-10-09 RX ADMIN — BUPROPION HYDROCHLORIDE 300 MILLIGRAM(S): 150 TABLET, EXTENDED RELEASE ORAL at 12:00

## 2017-10-09 RX ADMIN — OXYCODONE HYDROCHLORIDE 60 MILLIGRAM(S): 5 TABLET ORAL at 07:09

## 2017-10-09 RX ADMIN — SEVELAMER CARBONATE 800 MILLIGRAM(S): 2400 POWDER, FOR SUSPENSION ORAL at 12:00

## 2017-10-09 RX ADMIN — OXYCODONE HYDROCHLORIDE 60 MILLIGRAM(S): 5 TABLET ORAL at 06:09

## 2017-10-09 RX ADMIN — HYDROMORPHONE HYDROCHLORIDE 1 MILLIGRAM(S): 2 INJECTION INTRAMUSCULAR; INTRAVENOUS; SUBCUTANEOUS at 13:50

## 2017-10-09 RX ADMIN — HEPARIN SODIUM 5000 UNIT(S): 5000 INJECTION INTRAVENOUS; SUBCUTANEOUS at 21:14

## 2017-10-09 RX ADMIN — OXYCODONE HYDROCHLORIDE 60 MILLIGRAM(S): 5 TABLET ORAL at 22:00

## 2017-10-09 RX ADMIN — Medication 1 SPRAY(S): at 06:10

## 2017-10-09 RX ADMIN — Medication 650 MILLIGRAM(S): at 21:04

## 2017-10-09 RX ADMIN — HYDROMORPHONE HYDROCHLORIDE 1 MILLIGRAM(S): 2 INJECTION INTRAMUSCULAR; INTRAVENOUS; SUBCUTANEOUS at 16:45

## 2017-10-09 RX ADMIN — HEPARIN SODIUM 5000 UNIT(S): 5000 INJECTION INTRAVENOUS; SUBCUTANEOUS at 13:37

## 2017-10-09 RX ADMIN — RISPERIDONE 8 MILLIGRAM(S): 4 TABLET ORAL at 21:13

## 2017-10-09 RX ADMIN — RISPERIDONE 4 MILLIGRAM(S): 4 TABLET ORAL at 08:13

## 2017-10-09 RX ADMIN — Medication 400 MILLIGRAM(S): at 18:19

## 2017-10-09 RX ADMIN — SEVELAMER CARBONATE 800 MILLIGRAM(S): 2400 POWDER, FOR SUSPENSION ORAL at 08:14

## 2017-10-09 RX ADMIN — HYDROMORPHONE HYDROCHLORIDE 1 MILLIGRAM(S): 2 INJECTION INTRAMUSCULAR; INTRAVENOUS; SUBCUTANEOUS at 13:37

## 2017-10-09 RX ADMIN — HYDROMORPHONE HYDROCHLORIDE 1 MILLIGRAM(S): 2 INJECTION INTRAMUSCULAR; INTRAVENOUS; SUBCUTANEOUS at 10:09

## 2017-10-09 RX ADMIN — SEVELAMER CARBONATE 1600 MILLIGRAM(S): 2400 POWDER, FOR SUSPENSION ORAL at 17:03

## 2017-10-09 RX ADMIN — Medication 100 MILLIGRAM(S): at 18:19

## 2017-10-09 RX ADMIN — HYDROMORPHONE HYDROCHLORIDE 1 MILLIGRAM(S): 2 INJECTION INTRAMUSCULAR; INTRAVENOUS; SUBCUTANEOUS at 20:38

## 2017-10-09 RX ADMIN — Medication 400 MILLIGRAM(S): at 06:10

## 2017-10-09 RX ADMIN — Medication 2 MILLIGRAM(S): at 08:19

## 2017-10-09 RX ADMIN — LAMOTRIGINE 100 MILLIGRAM(S): 25 TABLET, ORALLY DISINTEGRATING ORAL at 13:37

## 2017-10-09 RX ADMIN — LAMOTRIGINE 100 MILLIGRAM(S): 25 TABLET, ORALLY DISINTEGRATING ORAL at 06:10

## 2017-10-09 RX ADMIN — Medication 1 SPRAY(S): at 18:19

## 2017-10-09 RX ADMIN — HYDROMORPHONE HYDROCHLORIDE 1 MILLIGRAM(S): 2 INJECTION INTRAMUSCULAR; INTRAVENOUS; SUBCUTANEOUS at 17:15

## 2017-10-09 RX ADMIN — OXYCODONE HYDROCHLORIDE 60 MILLIGRAM(S): 5 TABLET ORAL at 21:14

## 2017-10-09 RX ADMIN — HYDROMORPHONE HYDROCHLORIDE 1 MILLIGRAM(S): 2 INJECTION INTRAMUSCULAR; INTRAVENOUS; SUBCUTANEOUS at 09:56

## 2017-10-09 RX ADMIN — PANTOPRAZOLE SODIUM 40 MILLIGRAM(S): 20 TABLET, DELAYED RELEASE ORAL at 06:10

## 2017-10-09 RX ADMIN — Medication 100 MILLIGRAM(S): at 06:10

## 2017-10-09 RX ADMIN — SERTRALINE 50 MILLIGRAM(S): 25 TABLET, FILM COATED ORAL at 12:00

## 2017-10-09 RX ADMIN — POLYETHYLENE GLYCOL 3350 17 GRAM(S): 17 POWDER, FOR SOLUTION ORAL at 09:57

## 2017-10-09 RX ADMIN — HYDROMORPHONE HYDROCHLORIDE 1 MILLIGRAM(S): 2 INJECTION INTRAMUSCULAR; INTRAVENOUS; SUBCUTANEOUS at 06:22

## 2017-10-09 RX ADMIN — HEPARIN SODIUM 5000 UNIT(S): 5000 INJECTION INTRAVENOUS; SUBCUTANEOUS at 06:10

## 2017-10-09 RX ADMIN — SODIUM CHLORIDE 75 MILLILITER(S): 9 INJECTION INTRAMUSCULAR; INTRAVENOUS; SUBCUTANEOUS at 16:57

## 2017-10-09 NOTE — PROGRESS NOTE ADULT - SUBJECTIVE AND OBJECTIVE BOX
Patient is a 32y old  Female who presents with a chief complaint of RLE edema and numbness (04 Oct 2017 01:13)      Patient seen in follow up for GHAZAL, ATN, Rhabdomyolysis. Good UO.      PAST MEDICAL HISTORY:  Low back pain  Hypothyroid  Bipolar 1 disorder    MEDICATIONS  (STANDING):  buPROPion XL . 300 milliGRAM(s) Oral daily  docusate sodium 100 milliGRAM(s) Oral two times a day  fentaNYL   Patch  25 MICROgram(s)/Hr 1 Patch Transdermal every 72 hours  fluticasone propionate 50 MICROgram(s)/spray Nasal Spray 1 Spray(s) Both Nostrils two times a day  heparin  Injectable 5000 Unit(s) SubCutaneous every 8 hours  lamoTRIgine 100 milliGRAM(s) Oral every 8 hours  levothyroxine 150 MICROGram(s) Oral daily  oxyCODONE  ER Tablet 60 milliGRAM(s) Oral every 8 hours  pantoprazole    Tablet 40 milliGRAM(s) Oral before breakfast  polyethylene glycol 3350 17 Gram(s) Oral daily  risperiDONE   Tablet 8 milliGRAM(s) Oral at bedtime  risperiDONE   Tablet 4 milliGRAM(s) Oral <User Schedule>  sertraline 50 milliGRAM(s) Oral daily  sevelamer carbonate 800 milliGRAM(s) Oral three times a day with meals  sodium chloride 0.9%. 1000 milliLiter(s) (100 mL/Hr) IV Continuous <Continuous>  topiramate 400 milliGRAM(s) Oral two times a day    MEDICATIONS  (PRN):  acetaminophen   Tablet 650 milliGRAM(s) Oral every 6 hours PRN For Temp greater than 38 C (100.4 F)  clonazePAM Tablet 2 milliGRAM(s) Oral every 6 hours PRN anxiety  HYDROmorphone   Tablet 2 milliGRAM(s) Oral four times a day PRN moderate to severe pain  HYDROmorphone  Injectable 1 milliGRAM(s) IV Push every 3 hours PRN Severe Pain (7 - 10)  ondansetron Injectable 4 milliGRAM(s) IV Push once PRN Nausea and/or Vomiting  senna 2 Tablet(s) Oral at bedtime PRN Constipation    T(C): 36.8 (10-09-17 @ 07:42), Max: 37.3 (10-08-17 @ 06:46)  HR: 100 (10-09-17 @ 07:42) (95 - 116)  BP: 115/77 (10-09-17 @ 07:42) (103/65 - 137/76)  RR: 16 (10-09-17 @ 07:42)  SpO2: 99% (10-09-17 @ 07:42)  Wt(kg): --  I&O's Detail    08 Oct 2017 07:01  -  09 Oct 2017 07:00  --------------------------------------------------------  IN:    Oral Fluid: 1160 mL    sodium chloride 0.9%.: 1200 mL  Total IN: 2360 mL    OUT:    Indwelling Catheter - Urethral: 5500 mL    VAC (Vacuum Assisted Closure) System: 400 mL  Total OUT: 5900 mL    Total NET: -3540 mL      09 Oct 2017 07:01  -  09 Oct 2017 15:08  --------------------------------------------------------  IN:    Oral Fluid: 2280 mL  Total IN: 2280 mL    OUT:    Indwelling Catheter - Urethral: 2650 mL  Total OUT: 2650 mL    Total NET: -370 mL              PHYSICAL EXAM:  General: NAD  Respiratory: b/l air entry  Cardiovascular: S1 S2  Gastrointestinal: soft  Extremities:  Rt leg dressing                    LABORATORY:                        7.2    5.2   )-----------( 115      ( 09 Oct 2017 07:00 )             23.1     10-09    140  |  106  |  86<H>  ----------------------------<  84  3.7   |  22  |  6.40<H>    Ca    8.3<L>      09 Oct 2017 07:00  Phos  8.1     10-08  Mg     2.5     10-08      Sodium, Serum: 140 mmol/L (10-09 @ 07:00)  Sodium, Serum: 132 mmol/L (10-08 @ 08:05)    Potassium, Serum: 3.7 mmol/L (10-09 @ 07:00)  Potassium, Serum: 3.9 mmol/L (10-08 @ 08:05)    Hemoglobin: 7.2 g/dL (10-09 @ 07:00)  Hemoglobin: 7.9 g/dL (10-08 @ 08:05)  Hemoglobin: 7.4 g/dL (10-07 @ 06:43)    Creatinine, Serum 6.40 (10-09 @ 07:00)  Creatinine, Serum 6.20 (10-08 @ 08:05)  Creatinine, Serum 6.60 (10-07 @ 06:43)    CARDIAC MARKERS ( 09 Oct 2017 07:00 )  x     / x     / 1934 U/L / x     / x      CARDIAC MARKERS ( 08 Oct 2017 08:05 )  x     / x     / 2800 U/L / x     / x        Creatine Kinase, Serum in AM (10.09.17 @ 07:00)    Creatine Kinase, Serum: 1934 U/L

## 2017-10-09 NOTE — PROGRESS NOTE ADULT - ASSESSMENT
Clinically unchanged. Case discussed with Dr. Cannon. Will order PT therapy . Patient will need [facility] rehab.

## 2017-10-09 NOTE — PROGRESS NOTE ADULT - ASSESSMENT
32-year-old female now status post right lower extremity  fasciotomy for compartment syndrome. Pt on vent support.     ·	GHAZAL, ATN Septic / ischemic, Rhabdomyolysis  ·	s/p surgery for compartment syndrome  ·	Hyperphosphatemia  ·	Anemia    Stable creatinine. On IVF with good UO. Will lower IVF rate. Hopefully creatinine will plateau soon. No indication for dialysis at this time. Avoid nephrotoxic meds as possible. Will follow electrolytes and renal function trend. Surgery follow up. On phos binders.

## 2017-10-09 NOTE — PROGRESS NOTE ADULT - SUBJECTIVE AND OBJECTIVE BOX
Patient is a 32y old  Female who presents with a chief complaint of RLE edema and numbness (04 Oct 2017 01:13)      Vascular Surgery Progress Note    Interval HPI: POD #5 . patient s vascular status remains unchanged with a strong  palpable right  dp pulse and a insensate  , paralyzed right foot secondary to a 18 hour ischemic time preoperatively    Medications:      Allergies:  Allergies    avocado (Unknown)  latex (Urticaria; Rash)  penicillin (Unknown)  sulfa drugs (Unknown)    Intolerances        Vital Signs Last 24 Hrs  T(C): 36.8 (09 Oct 2017 07:42), Max: 36.9 (08 Oct 2017 23:37)  T(F): 98.3 (09 Oct 2017 07:42), Max: 98.5 (08 Oct 2017 23:37)  HR: 100 (09 Oct 2017 07:42) (95 - 102)  BP: 115/77 (09 Oct 2017 07:42) (113/77 - 120/77)  BP(mean): --  RR: 16 (09 Oct 2017 07:42) (16 - 18)  SpO2: 99% (09 Oct 2017 07:42) (99% - 99%)  I&O's Summary    08 Oct 2017 07:01  -  09 Oct 2017 07:00  --------------------------------------------------------  IN: 2360 mL / OUT: 5900 mL / NET: -3540 mL    09 Oct 2017 07:01  -  09 Oct 2017 15:13  --------------------------------------------------------  IN: 2280 mL / OUT: 2650 mL / NET: -370 mL        Physical Exam:  Gen: NAD, A&Ox3  Vascular status is unchanged with viable muscles of right calf and thigh.  Neurologically unchanged.         LABS:                        7.2    5.2   )-----------( 115      ( 09 Oct 2017 07:00 )             23.1     10-09    140  |  106  |  86<H>  ----------------------------<  84  3.7   |  22  |  6.40<H>    Ca    8.3<L>      09 Oct 2017 07:00  Phos  8.1     10-08  Mg     2.5     10-08

## 2017-10-09 NOTE — PROGRESS NOTE ADULT - PROBLEM SELECTOR PLAN 1
compartment syndrome of the right lower extremity   -s/p emergent fasciotomy by Vascular surgery (Bobbi)   -s/p wound vac placement   -Finished course of IV abx aztreonam and clindamycin in ICU  -monitor labs

## 2017-10-09 NOTE — PROGRESS NOTE ADULT - ASSESSMENT
32-year-old female now status post right lower extremity fasciotomy for prolonged compression after a fall with lethargy. There is no evidence for an acute atherothrombotic or embolic process. No history of cardiac disease and remains in sinus rhythm. EF preserved on recent echocardiogram    - there is no evidence of acute ischemia.  - there is no evidence for meaningful volume overload.  - remains persistently tachycardic, likely reactive in the setting of severe illness.  This is overall trending in a favorable direction.  - renal function not clearly improving  - CK improving with IVF, I would continue to keep her hydrated  - monitor electrolytes, keep k>4, Mg>2  - TTE with hyperdynamic LV function, no valvular issues  - will follow

## 2017-10-09 NOTE — PROGRESS NOTE ADULT - PROBLEM SELECTOR PLAN 3
pt with Rhabdo  pt with Fasciotomy  pt with substance use problem  added Fentanyl Patch 25 mcg, changed Dilaudid Po and Iv, cont neurontin  at present pt seems to have good control of pain  she slept the entire night, and maintains good VS  will need to negotiate pt's demands for higher doses of pain meds and look for alternative means and collaborative effort to treat acute and chronic pain and anxiety associated with it  discussed with nursing and staff and family

## 2017-10-09 NOTE — PROGRESS NOTE ADULT - PROBLEM SELECTOR PLAN 2
sees Dr. Manfred Galaviz   will reach out for discussion about patient condition  cont Oxycontin TID - long acting treatment of pain

## 2017-10-09 NOTE — PROGRESS NOTE ADULT - SUBJECTIVE AND OBJECTIVE BOX
Date/Time Patient Seen:  		  Referring MD:   Data Reviewed	       Patient is a 32y old  Female who presents with a chief complaint of RLE edema and numbness (04 Oct 2017 01:13)  in bed  seen and examined  slept the entire night  discussed with nursing  no acute pain events overnight      Subjective/HPI     PAST MEDICAL & SURGICAL HISTORY:  Low back pain  Hypothyroid  Bipolar 1 disorder  History of spinal fusion  History of laminectomy        Medication list         MEDICATIONS  (STANDING):  buPROPion XL . 300 milliGRAM(s) Oral daily  docusate sodium 100 milliGRAM(s) Oral two times a day  fentaNYL   Patch  25 MICROgram(s)/Hr 1 Patch Transdermal every 72 hours  fluticasone propionate 50 MICROgram(s)/spray Nasal Spray 1 Spray(s) Both Nostrils two times a day  heparin  Injectable 5000 Unit(s) SubCutaneous every 8 hours  lamoTRIgine 100 milliGRAM(s) Oral every 8 hours  levothyroxine 150 MICROGram(s) Oral daily  oxyCODONE  ER Tablet 60 milliGRAM(s) Oral every 8 hours  pantoprazole    Tablet 40 milliGRAM(s) Oral before breakfast  risperiDONE   Tablet 8 milliGRAM(s) Oral at bedtime  risperiDONE   Tablet 4 milliGRAM(s) Oral <User Schedule>  sertraline 50 milliGRAM(s) Oral daily  sevelamer carbonate 800 milliGRAM(s) Oral three times a day with meals  sodium chloride 0.9%. 1000 milliLiter(s) (100 mL/Hr) IV Continuous <Continuous>  topiramate 400 milliGRAM(s) Oral two times a day    MEDICATIONS  (PRN):  acetaminophen   Tablet 650 milliGRAM(s) Oral every 6 hours PRN For Temp greater than 38 C (100.4 F)  clonazePAM Tablet 2 milliGRAM(s) Oral every 6 hours PRN anxiety  HYDROmorphone   Tablet 2 milliGRAM(s) Oral four times a day PRN moderate to severe pain  HYDROmorphone  Injectable 1 milliGRAM(s) IV Push every 3 hours PRN Severe Pain (7 - 10)  ondansetron Injectable 4 milliGRAM(s) IV Push once PRN Nausea and/or Vomiting  senna 2 Tablet(s) Oral at bedtime PRN Constipation         Vitals log        ICU Vital Signs Last 24 Hrs  T(C): 36.9 (08 Oct 2017 23:37), Max: 37.3 (08 Oct 2017 06:46)  T(F): 98.5 (08 Oct 2017 23:37), Max: 99.1 (08 Oct 2017 06:46)  HR: 95 (08 Oct 2017 23:37) (95 - 116)  BP: 113/77 (08 Oct 2017 23:37) (108/71 - 128/76)  BP(mean): --  ABP: --  ABP(mean): --  RR: 16 (08 Oct 2017 23:37) (16 - 18)  SpO2: 99% (08 Oct 2017 23:37) (96% - 99%)           Input and Output:  I&O's Detail    07 Oct 2017 07:01  -  08 Oct 2017 07:00  --------------------------------------------------------  IN:    dextrose 5% + sodium chloride 0.45%: 1050 mL    Oral Fluid: 940 mL    sodium chloride 0.9%.: 900 mL    Solution: 100 mL    Solution: 100 mL  Total IN: 3090 mL    OUT:    Indwelling Catheter - Urethral: 4250 mL    VAC (Vacuum Assisted Closure) System: 1475 mL  Total OUT: 5725 mL    Total NET: -2635 mL      08 Oct 2017 07:01  -  09 Oct 2017 06:23  --------------------------------------------------------  IN:    Oral Fluid: 1160 mL    sodium chloride 0.9%.: 1100 mL  Total IN: 2260 mL    OUT:    Indwelling Catheter - Urethral: 5500 mL    VAC (Vacuum Assisted Closure) System: 400 mL  Total OUT: 5900 mL    Total NET: -3640 mL          Lab Data                        7.9    6.4   )-----------( 112      ( 08 Oct 2017 08:05 )             24.7     10-08    132<L>  |  97  |  80<H>  ----------------------------<  100<H>  3.9   |  23  |  6.20<H>    Ca    7.9<L>      08 Oct 2017 08:05  Phos  8.1     10-08  Mg     2.5     10-08    TPro  4.6<L>  /  Alb  1.5<L>  /  TBili  0.2  /  DBili  x   /  AST  133<H>  /  ALT  70  /  AlkPhos  35<L>  10-07      CARDIAC MARKERS ( 08 Oct 2017 08:05 )  x     / x     / 2800 U/L / x     / x      CARDIAC MARKERS ( 07 Oct 2017 06:43 )  x     / x     / 4359 U/L / x     / x            Review of Systems	      Objective     Physical Examination    obese  head at  heart - s1s2  lungs - dec BS  cn grossly int      Pertinent Lab findings & Imaging      Coffey:  NO   Adequate UO     I&O's Detail    07 Oct 2017 07:01  -  08 Oct 2017 07:00  --------------------------------------------------------  IN:    dextrose 5% + sodium chloride 0.45%: 1050 mL    Oral Fluid: 940 mL    sodium chloride 0.9%.: 900 mL    Solution: 100 mL    Solution: 100 mL  Total IN: 3090 mL    OUT:    Indwelling Catheter - Urethral: 4250 mL    VAC (Vacuum Assisted Closure) System: 1475 mL  Total OUT: 5725 mL    Total NET: -2635 mL      08 Oct 2017 07:01  -  09 Oct 2017 06:23  --------------------------------------------------------  IN:    Oral Fluid: 1160 mL    sodium chloride 0.9%.: 1100 mL  Total IN: 2260 mL    OUT:    Indwelling Catheter - Urethral: 5500 mL    VAC (Vacuum Assisted Closure) System: 400 mL  Total OUT: 5900 mL    Total NET: -3640 mL               Discussed with:     Cultures:	        Radiology

## 2017-10-09 NOTE — PROGRESS NOTE ADULT - SUBJECTIVE AND OBJECTIVE BOX
INTERVAL HPI: 32y old  Female who presents with a chief complaint of RLE edema and numbness. Patient admitted for Compartment syndrome, s/p Fasciotomy. Patient seen and examined in ICU. Wound vac placed, overnight issues wind drainage, nurse at bedside.    Dr. Damico consulted for pain management and concern for opoid dependence and abuse. Nurse aware of pain management plan.  Patient constipated this morning added miralax.     MEDICATIONS  (STANDING):  buPROPion XL . 300 milliGRAM(s) Oral daily  docusate sodium 100 milliGRAM(s) Oral two times a day  fentaNYL   Patch  25 MICROgram(s)/Hr 1 Patch Transdermal every 72 hours  fluticasone propionate 50 MICROgram(s)/spray Nasal Spray 1 Spray(s) Both Nostrils two times a day  heparin  Injectable 5000 Unit(s) SubCutaneous every 8 hours  lamoTRIgine 100 milliGRAM(s) Oral every 8 hours  levothyroxine 150 MICROGram(s) Oral daily  oxyCODONE  ER Tablet 60 milliGRAM(s) Oral every 8 hours  pantoprazole    Tablet 40 milliGRAM(s) Oral before breakfast  polyethylene glycol 3350 17 Gram(s) Oral daily  risperiDONE   Tablet 8 milliGRAM(s) Oral at bedtime  risperiDONE   Tablet 4 milliGRAM(s) Oral <User Schedule>  sertraline 50 milliGRAM(s) Oral daily  sevelamer carbonate 800 milliGRAM(s) Oral three times a day with meals  sodium chloride 0.9%. 1000 milliLiter(s) (100 mL/Hr) IV Continuous <Continuous>  topiramate 400 milliGRAM(s) Oral two times a day    MEDICATIONS  (PRN):  acetaminophen   Tablet 650 milliGRAM(s) Oral every 6 hours PRN For Temp greater than 38 C (100.4 F)  clonazePAM Tablet 2 milliGRAM(s) Oral every 6 hours PRN anxiety  HYDROmorphone   Tablet 2 milliGRAM(s) Oral four times a day PRN moderate to severe pain  HYDROmorphone  Injectable 1 milliGRAM(s) IV Push every 3 hours PRN Severe Pain (7 - 10)  ondansetron Injectable 4 milliGRAM(s) IV Push once PRN Nausea and/or Vomiting  senna 2 Tablet(s) Oral at bedtime PRN Constipation        REVIEW OF SYSTEM:    Constitutional: No fever, chills, fatigue  Neuro: No headache, numbness, weakness  Resp: No cough, wheezing, shortness of breath  CVS: No chest pain, palpitations, leg swelling  GI: No abdominal pain, nausea, vomiting, diarrhea   : No dysuria, frequency, incontinence  Skin: No itching, burning, rashes, or lesions   Msk: +pain in RLE  Psych: No depression, anxiety, mood swings      Vital Signs Last 24 Hrs  T(C): 36.8 (09 Oct 2017 07:42), Max: 36.9 (08 Oct 2017 23:37)  T(F): 98.3 (09 Oct 2017 07:42), Max: 98.5 (08 Oct 2017 23:37)  HR: 100 (09 Oct 2017 07:42) (95 - 102)  BP: 115/77 (09 Oct 2017 07:42) (113/77 - 120/77)  BP(mean): --  RR: 16 (09 Oct 2017 07:42) (16 - 18)  SpO2: 99% (09 Oct 2017 07:42) (99% - 99%)    PHYSICAL EXAM:  GENERAL: NAD, obese habitus  HEART: S1S2 RRR  RESPIRATORY: CTA B/L, No wheezing / rhonchi  ABDOMEN: Soft, Nontender, Nondistended; Bowel sounds present  NEUROLOGY: A&Ox3, nonfocal, not able to move RLE, sensation diminished RLE  EXTREMITIES:  +pulses mild edema R foot, LLE is moving + pulse  Skin: warm, well perfused       LABS:                        7.2    5.2   )-----------( 115      ( 09 Oct 2017 07:00 )             23.1     10-09    140  |  106  |  86<H>  ----------------------------<  84  3.7   |  22  |  6.40<H>    Ca    8.3<L>      09 Oct 2017 07:00  Phos  8.1     10-08  Mg     2.5     10-08                      10-04 @ 10:15   No growth to date.  --  --  10-04 @ 09:59   No growth  --  --

## 2017-10-09 NOTE — INPATIENT CERTIFICATION FOR MEDICARE PATIENTS - RISKS OF ADVERSE EVENTS
Concern for neurologic deterioration/Concern for delay in diagnosis and treatment/Concern for renal deterioration

## 2017-10-09 NOTE — PROGRESS NOTE ADULT - SUBJECTIVE AND OBJECTIVE BOX
[facility]    Bonnie Hernandez, Keena, April, Ale, Alejandro, Carissa      [phone]    CHIEF COMPLAINT: Patient is a 32y old  Female who presents with a chief complaint of RLE edema and numbness (04 Oct 2017 01:13)      Follow Up: rhabdo    Interim history: sedated/sleeping, unable to obtain hx. events noted    MEDICATIONS  (STANDING):  buPROPion XL . 300 milliGRAM(s) Oral daily  docusate sodium 100 milliGRAM(s) Oral two times a day  fentaNYL   Patch  25 MICROgram(s)/Hr 1 Patch Transdermal every 72 hours  fluticasone propionate 50 MICROgram(s)/spray Nasal Spray 1 Spray(s) Both Nostrils two times a day  heparin  Injectable 5000 Unit(s) SubCutaneous every 8 hours  lamoTRIgine 100 milliGRAM(s) Oral every 8 hours  levothyroxine 150 MICROGram(s) Oral daily  oxyCODONE  ER Tablet 60 milliGRAM(s) Oral every 8 hours  pantoprazole    Tablet 40 milliGRAM(s) Oral before breakfast  polyethylene glycol 3350 17 Gram(s) Oral daily  risperiDONE   Tablet 8 milliGRAM(s) Oral at bedtime  risperiDONE   Tablet 4 milliGRAM(s) Oral <User Schedule>  sertraline 50 milliGRAM(s) Oral daily  sevelamer carbonate 800 milliGRAM(s) Oral three times a day with meals  sodium chloride 0.9%. 1000 milliLiter(s) (100 mL/Hr) IV Continuous <Continuous>  topiramate 400 milliGRAM(s) Oral two times a day    MEDICATIONS  (PRN):  acetaminophen   Tablet 650 milliGRAM(s) Oral every 6 hours PRN For Temp greater than 38 C (100.4 F)  clonazePAM Tablet 2 milliGRAM(s) Oral every 6 hours PRN anxiety  HYDROmorphone   Tablet 2 milliGRAM(s) Oral four times a day PRN moderate to severe pain  HYDROmorphone  Injectable 1 milliGRAM(s) IV Push every 3 hours PRN Severe Pain (7 - 10)  ondansetron Injectable 4 milliGRAM(s) IV Push once PRN Nausea and/or Vomiting  senna 2 Tablet(s) Oral at bedtime PRN Constipation      REVIEW OF SYSTEMS: unable    Vital Signs Last 24 Hrs  T(C): 36.8 (09 Oct 2017 07:42), Max: 36.9 (08 Oct 2017 23:37)  T(F): 98.3 (09 Oct 2017 07:42), Max: 98.5 (08 Oct 2017 23:37)  HR: 100 (09 Oct 2017 07:42) (95 - 102)  BP: 115/77 (09 Oct 2017 07:42) (113/77 - 120/77)  BP(mean): --  RR: 16 (09 Oct 2017 07:42) (16 - 18)  SpO2: 99% (09 Oct 2017 07:42) (99% - 99%)    I&O's Summary    08 Oct 2017 07:01  -  09 Oct 2017 07:00  --------------------------------------------------------  IN: 2360 mL / OUT: 5900 mL / NET: -3540 mL        Telemetry past 24h:     PHYSICAL EXAM:    Constitutional: well-nourished, well-developed, NAD   HEENT:  MMM, sclerae anicteric, conjunctivae clear, no oral cyanosis.  Pulmonary: Non-labored, breath sounds are clear anteriorly, No wheezing, rales or rhonchi  Cardiovascular: Regular, S1 and S2.  No murmur.  No rubs, gallops or clicks  Gastrointestinal: Bowel Sounds present, soft, nontender.   Lymph: rle wrapped  Neurological: Alert, no focal deficits  Skin: No rashes.  Psych:  Mood & affect appropriate    LABS: All Labs Reviewed:                        7.2    5.2   )-----------( 115      ( 09 Oct 2017 07:00 )             23.1                         7.9    6.4   )-----------( 112      ( 08 Oct 2017 08:05 )             24.7                         7.4    7.4   )-----------( 95       ( 07 Oct 2017 06:43 )             23.3     09 Oct 2017 07:00    140    |  106    |  86     ----------------------------<  84     3.7     |  22     |  6.40   08 Oct 2017 08:05    132    |  97     |  80     ----------------------------<  100    3.9     |  23     |  6.20   07 Oct 2017 06:43    133    |  96     |  67     ----------------------------<  109    4.1     |  25     |  6.60     Ca    8.3        09 Oct 2017 07:00  Ca    7.9        08 Oct 2017 08:05  Ca    7.3        07 Oct 2017 06:43  Phos  8.1       08 Oct 2017 08:05  Phos  9.3       07 Oct 2017 06:43  Mg     2.5       08 Oct 2017 08:05  Mg     2.5       07 Oct 2017 06:43    TPro  4.6    /  Alb  1.5    /  TBili  0.2    /  DBili  x      /  AST  133    /  ALT  70     /  AlkPhos  35     07 Oct 2017 06:43      CARDIAC MARKERS ( 09 Oct 2017 07:00 )  x     / x     / 1934 U/L / x     / x      CARDIAC MARKERS ( 08 Oct 2017 08:05 )  x     / x     / 2800 U/L / x     / x          Blood Culture:         RADIOLOGY:    EKG:    Echo:    < from: TTE Echo Doppler w/o Cont (10.05.17 @ 12:46) >     EXAM:  ECHO TTE W/O CON COMP W/DOPPLR         PROCEDURE DATE:  10/05/2017        INTERPRETATION:  Ordering Physician: MARISSA RUFF [id number]    Indication: Respiratory failure    Study Quality: Technically difficult in ICU   A complete echocardiographic study was performed utilizing standard   protocol including spectral and color Doppler in all echocardiographic   windows.    Height: 1 75 cm  Weight: 113 kg  BSA: 2.27  Blood Pressure: 104/76    MEASUREMENTS  IVS: 0.9cm  PWT: 0.9cm  LA: 3.5cm  AO: 2.8cm  LVIDd: 5.2cm  LVIDs: 3.5cm      LVEF: 70%    FINDINGS  Left Ventricle: Hyperdynamic left ventricle. Estimated EF 70%. No   segmental wall motion abnormalities  Aortic Valve: Not well-visualized. Probably normal trileaflet aortic valve  Mitral Valve: There is subtle systolic anterior motion of the mitral   valve in the setting of a hyperdynamic left ventricle. No left   ventricular outflow tract gradient is seen. Trace mitral regurgitation is   visualized.  Tricuspid Valve: Not well visualized. Trace tricuspid regurgitation  Pulmonic Valve: Not visualized.  Left Atrium: Grossly normal  Right Ventricle: Not well-visualized. Grossly normal right ventricular   size and function  Right Atrium: Grossly normal  Diastolic Function: Normaldiastolic function  Pericardium/Pleura: No pericardial effusion      CONCLUSIONS:  1. Technically difficult study  2. Hyperdynamic left ventricle. Estimated EF 70%  3. Subtle systolic anterior motion of the mitral valve in the setting of   a hyperdynamic left ventricle, but no outflow tract gradient.  4. Right ventricle is not well visualized.Grossly normal right   ventricular size and function  5. No pericardial effusion    No prior exam for comparison                  ROBERTA MARKS   This document has been electronically signed. Oct  6 2017  1:33PM                < end of copied text >

## 2017-10-10 DIAGNOSIS — F60.3 BORDERLINE PERSONALITY DISORDER: ICD-10-CM

## 2017-10-10 DIAGNOSIS — F19.20 OTHER PSYCHOACTIVE SUBSTANCE DEPENDENCE, UNCOMPLICATED: ICD-10-CM

## 2017-10-10 LAB
ALBUMIN SERPL ELPH-MCNC: 1.6 G/DL — LOW (ref 3.3–5)
ALP SERPL-CCNC: 39 U/L — LOW (ref 40–120)
ALT FLD-CCNC: 42 U/L — SIGNIFICANT CHANGE UP (ref 12–78)
ANION GAP SERPL CALC-SCNC: 10 MMOL/L — SIGNIFICANT CHANGE UP (ref 5–17)
AST SERPL-CCNC: 65 U/L — HIGH (ref 15–37)
BILIRUB SERPL-MCNC: 0.2 MG/DL — SIGNIFICANT CHANGE UP (ref 0.2–1.2)
BUN SERPL-MCNC: 83 MG/DL — HIGH (ref 7–23)
CALCIUM SERPL-MCNC: 8.2 MG/DL — LOW (ref 8.5–10.1)
CHLORIDE SERPL-SCNC: 104 MMOL/L — SIGNIFICANT CHANGE UP (ref 96–108)
CK SERPL-CCNC: 1377 U/L — HIGH (ref 26–192)
CO2 SERPL-SCNC: 21 MMOL/L — LOW (ref 22–31)
CREAT SERPL-MCNC: 5.5 MG/DL — HIGH (ref 0.5–1.3)
GLUCOSE SERPL-MCNC: 80 MG/DL — SIGNIFICANT CHANGE UP (ref 70–99)
HCT VFR BLD CALC: 25 % — LOW (ref 34.5–45)
HGB BLD-MCNC: 7.9 G/DL — LOW (ref 11.5–15.5)
MCHC RBC-ENTMCNC: 29.4 PG — SIGNIFICANT CHANGE UP (ref 27–34)
MCHC RBC-ENTMCNC: 31.6 GM/DL — LOW (ref 32–36)
MCV RBC AUTO: 93.2 FL — SIGNIFICANT CHANGE UP (ref 80–100)
PHOSPHATE SERPL-MCNC: 7.4 MG/DL — HIGH (ref 2.5–4.5)
PLATELET # BLD AUTO: 146 K/UL — LOW (ref 150–400)
POTASSIUM SERPL-MCNC: 3.6 MMOL/L — SIGNIFICANT CHANGE UP (ref 3.5–5.3)
POTASSIUM SERPL-SCNC: 3.6 MMOL/L — SIGNIFICANT CHANGE UP (ref 3.5–5.3)
PROT SERPL-MCNC: 5.1 G/DL — LOW (ref 6–8.3)
RBC # BLD: 2.68 M/UL — LOW (ref 3.8–5.2)
RBC # FLD: 13.1 % — SIGNIFICANT CHANGE UP (ref 10.3–14.5)
SODIUM SERPL-SCNC: 135 MMOL/L — SIGNIFICANT CHANGE UP (ref 135–145)
WBC # BLD: 6.1 K/UL — SIGNIFICANT CHANGE UP (ref 3.8–10.5)
WBC # FLD AUTO: 6.1 K/UL — SIGNIFICANT CHANGE UP (ref 3.8–10.5)

## 2017-10-10 PROCEDURE — 99232 SBSQ HOSP IP/OBS MODERATE 35: CPT

## 2017-10-10 PROCEDURE — 99233 SBSQ HOSP IP/OBS HIGH 50: CPT

## 2017-10-10 RX ORDER — ACETAMINOPHEN 500 MG
650 TABLET ORAL ONCE
Qty: 0 | Refills: 0 | Status: COMPLETED | OUTPATIENT
Start: 2017-10-10 | End: 2017-10-10

## 2017-10-10 RX ORDER — HYDROMORPHONE HYDROCHLORIDE 2 MG/ML
0.5 INJECTION INTRAMUSCULAR; INTRAVENOUS; SUBCUTANEOUS
Qty: 0 | Refills: 0 | Status: DISCONTINUED | OUTPATIENT
Start: 2017-10-10 | End: 2017-10-16

## 2017-10-10 RX ORDER — CLONAZEPAM 1 MG
2 TABLET ORAL EVERY 8 HOURS
Qty: 0 | Refills: 0 | Status: DISCONTINUED | OUTPATIENT
Start: 2017-10-10 | End: 2017-10-17

## 2017-10-10 RX ADMIN — LAMOTRIGINE 100 MILLIGRAM(S): 25 TABLET, ORALLY DISINTEGRATING ORAL at 13:37

## 2017-10-10 RX ADMIN — OXYCODONE HYDROCHLORIDE 60 MILLIGRAM(S): 5 TABLET ORAL at 13:37

## 2017-10-10 RX ADMIN — OXYCODONE HYDROCHLORIDE 60 MILLIGRAM(S): 5 TABLET ORAL at 21:17

## 2017-10-10 RX ADMIN — SEVELAMER CARBONATE 1600 MILLIGRAM(S): 2400 POWDER, FOR SUSPENSION ORAL at 11:47

## 2017-10-10 RX ADMIN — HEPARIN SODIUM 5000 UNIT(S): 5000 INJECTION INTRAVENOUS; SUBCUTANEOUS at 06:15

## 2017-10-10 RX ADMIN — Medication 100 MILLIGRAM(S): at 18:43

## 2017-10-10 RX ADMIN — Medication 1 SPRAY(S): at 06:15

## 2017-10-10 RX ADMIN — SERTRALINE 50 MILLIGRAM(S): 25 TABLET, FILM COATED ORAL at 11:48

## 2017-10-10 RX ADMIN — HYDROMORPHONE HYDROCHLORIDE 0.5 MILLIGRAM(S): 2 INJECTION INTRAMUSCULAR; INTRAVENOUS; SUBCUTANEOUS at 08:27

## 2017-10-10 RX ADMIN — Medication 400 MILLIGRAM(S): at 06:19

## 2017-10-10 RX ADMIN — Medication 650 MILLIGRAM(S): at 06:37

## 2017-10-10 RX ADMIN — RISPERIDONE 4 MILLIGRAM(S): 4 TABLET ORAL at 11:48

## 2017-10-10 RX ADMIN — OXYCODONE HYDROCHLORIDE 60 MILLIGRAM(S): 5 TABLET ORAL at 22:15

## 2017-10-10 RX ADMIN — Medication 100 MILLIGRAM(S): at 06:15

## 2017-10-10 RX ADMIN — HYDROMORPHONE HYDROCHLORIDE 0.5 MILLIGRAM(S): 2 INJECTION INTRAMUSCULAR; INTRAVENOUS; SUBCUTANEOUS at 16:04

## 2017-10-10 RX ADMIN — HEPARIN SODIUM 5000 UNIT(S): 5000 INJECTION INTRAVENOUS; SUBCUTANEOUS at 21:16

## 2017-10-10 RX ADMIN — SEVELAMER CARBONATE 1600 MILLIGRAM(S): 2400 POWDER, FOR SUSPENSION ORAL at 11:48

## 2017-10-10 RX ADMIN — SENNA PLUS 2 TABLET(S): 8.6 TABLET ORAL at 21:18

## 2017-10-10 RX ADMIN — LAMOTRIGINE 100 MILLIGRAM(S): 25 TABLET, ORALLY DISINTEGRATING ORAL at 21:17

## 2017-10-10 RX ADMIN — BUPROPION HYDROCHLORIDE 300 MILLIGRAM(S): 150 TABLET, EXTENDED RELEASE ORAL at 13:37

## 2017-10-10 RX ADMIN — SEVELAMER CARBONATE 1600 MILLIGRAM(S): 2400 POWDER, FOR SUSPENSION ORAL at 18:43

## 2017-10-10 RX ADMIN — Medication 150 MICROGRAM(S): at 06:15

## 2017-10-10 RX ADMIN — HYDROMORPHONE HYDROCHLORIDE 0.5 MILLIGRAM(S): 2 INJECTION INTRAMUSCULAR; INTRAVENOUS; SUBCUTANEOUS at 16:30

## 2017-10-10 RX ADMIN — HYDROMORPHONE HYDROCHLORIDE 1 MILLIGRAM(S): 2 INJECTION INTRAMUSCULAR; INTRAVENOUS; SUBCUTANEOUS at 00:32

## 2017-10-10 RX ADMIN — PANTOPRAZOLE SODIUM 40 MILLIGRAM(S): 20 TABLET, DELAYED RELEASE ORAL at 06:15

## 2017-10-10 RX ADMIN — OXYCODONE HYDROCHLORIDE 60 MILLIGRAM(S): 5 TABLET ORAL at 14:10

## 2017-10-10 RX ADMIN — Medication 400 MILLIGRAM(S): at 18:43

## 2017-10-10 RX ADMIN — HEPARIN SODIUM 5000 UNIT(S): 5000 INJECTION INTRAVENOUS; SUBCUTANEOUS at 13:37

## 2017-10-10 RX ADMIN — HYDROMORPHONE HYDROCHLORIDE 0.5 MILLIGRAM(S): 2 INJECTION INTRAMUSCULAR; INTRAVENOUS; SUBCUTANEOUS at 20:15

## 2017-10-10 RX ADMIN — LAMOTRIGINE 100 MILLIGRAM(S): 25 TABLET, ORALLY DISINTEGRATING ORAL at 06:15

## 2017-10-10 RX ADMIN — HYDROMORPHONE HYDROCHLORIDE 1 MILLIGRAM(S): 2 INJECTION INTRAMUSCULAR; INTRAVENOUS; SUBCUTANEOUS at 01:15

## 2017-10-10 RX ADMIN — HYDROMORPHONE HYDROCHLORIDE 0.5 MILLIGRAM(S): 2 INJECTION INTRAMUSCULAR; INTRAVENOUS; SUBCUTANEOUS at 08:45

## 2017-10-10 RX ADMIN — Medication 2 MILLIGRAM(S): at 00:10

## 2017-10-10 RX ADMIN — Medication 2 MILLIGRAM(S): at 22:06

## 2017-10-10 RX ADMIN — Medication 1 SPRAY(S): at 18:45

## 2017-10-10 RX ADMIN — RISPERIDONE 8 MILLIGRAM(S): 4 TABLET ORAL at 21:19

## 2017-10-10 RX ADMIN — Medication 650 MILLIGRAM(S): at 07:30

## 2017-10-10 RX ADMIN — HYDROMORPHONE HYDROCHLORIDE 0.5 MILLIGRAM(S): 2 INJECTION INTRAMUSCULAR; INTRAVENOUS; SUBCUTANEOUS at 19:48

## 2017-10-10 RX ADMIN — POLYETHYLENE GLYCOL 3350 17 GRAM(S): 17 POWDER, FOR SOLUTION ORAL at 11:48

## 2017-10-10 NOTE — PROGRESS NOTE ADULT - PROBLEM SELECTOR PLAN 2
Pain management Dr. Damico consulted  Tapering prn IV and PO dilaudid as per Dr. Damico  Continue fentanyl patch

## 2017-10-10 NOTE — PROGRESS NOTE ADULT - SUBJECTIVE AND OBJECTIVE BOX
Patient is a 32y old  Female who presents with a chief complaint of RLE edema and numbness (04 Oct 2017 01:13)      Vascular Surgery Progress Note    Interval HPI: POD #6 S/P FASCIOTOMIES OF RIGHT LEG FOR SEVERE COMPARTMENT SYNDROME AND ISCHEMIA. Patient is comfortable and eating breakfast.  Denies pain in right foot. Wound vac is in place and functioning well.    Medications:      Allergies:  Allergies    avocado (Unknown)  latex (Urticaria; Rash)  penicillin (Unknown)  sulfa drugs (Unknown)    Intolerances        Vital Signs Last 24 Hrs  T(C): 37.2 (10 Oct 2017 07:12), Max: 37.2 (10 Oct 2017 07:12)  T(F): 98.9 (10 Oct 2017 07:12), Max: 98.9 (10 Oct 2017 07:12)  HR: 93 (10 Oct 2017 07:12) (92 - 99)  BP: 123/77 (10 Oct 2017 07:12) (111/72 - 123/77)  BP(mean): --  RR: 16 (10 Oct 2017 07:12) (16 - 17)  SpO2: 100% (10 Oct 2017 07:12) (99% - 100%)  I&O's Summary    09 Oct 2017 07:01  -  10 Oct 2017 07:00  --------------------------------------------------------  IN: 3180 mL / OUT: 44026 mL / NET: -7420 mL        Physical Exam:  Gen: NAD, A&Ox3  Heart: regular  Lungs : clear  Extremities: right leg wounds remain clean and viable  Pulses: 4/4 right femoral ,popliteal and DP pulses.  Neurologically unchanged.    LABS:                        7.2    5.2   )-----------( 115      ( 09 Oct 2017 07:00 )             23.1     10-09    140  |  106  |  86<H>  ----------------------------<  84  3.7   |  22  |  6.40<H>    Ca    8.3<L>      09 Oct 2017 07:00

## 2017-10-10 NOTE — BEHAVIORAL HEALTH ASSESSMENT NOTE - NSBHSUICPROTECTFACT_PSY_A_CORE
Supportive social network or family/Responsibility to family and others/Future oriented/Identifies reasons for living/Positive therapeutic relationships

## 2017-10-10 NOTE — PROGRESS NOTE ADULT - SUBJECTIVE AND OBJECTIVE BOX
ABELARDO CROWELL  32y  Female    Patient is a 32y old  Female who presents with a chief complaint of RLE edema and numbness (04 Oct 2017 01:13)    feels much better today. Urine out put has improved. Denies any chest pain.         PAST MEDICAL & SURGICAL HISTORY:  Low back pain  Hypothyroid  Bipolar 1 disorder  History of spinal fusion  History of laminectomy          PHYSICAL EXAM:    T(C): 37.2 (10-10-17 @ 07:12), Max: 37.2 (10-10-17 @ 07:12)  HR: 93 (10-10-17 @ 07:12) (92 - 99)  BP: 123/77 (10-10-17 @ 07:12) (111/72 - 123/77)  RR: 16 (10-10-17 @ 07:12) (16 - 17)  SpO2: 100% (10-10-17 @ 07:12) (99% - 100%)  Wt(kg): --    I&O's Detail    09 Oct 2017 07:01  -  10 Oct 2017 07:00  --------------------------------------------------------  IN:    Oral Fluid: 2280 mL    sodium chloride 0.9%.: 900 mL  Total IN: 3180 mL    OUT:    Indwelling Catheter - Urethral: 9600 mL    VAC (Vacuum Assisted Closure) System: 1000 mL  Total OUT: 69069 mL    Total NET: -7420 mL      10 Oct 2017 07:01  -  10 Oct 2017 14:37  --------------------------------------------------------  IN:  Total IN: 0 mL    OUT:    Indwelling Catheter - Urethral: 2900 mL  Total OUT: 2900 mL    Total NET: -2900 mL          Respiratory: clear anteriorly, decreased BS at bases  Cardiovascular: S1 S2  Gastrointestinal: soft NT ND +BS  Extremities: edema   Neuro: Awake and alert    MEDICATIONS  (STANDING):  buPROPion XL . 300 milliGRAM(s) Oral daily  docusate sodium 100 milliGRAM(s) Oral two times a day  fentaNYL   Patch  25 MICROgram(s)/Hr 1 Patch Transdermal every 72 hours  fluticasone propionate 50 MICROgram(s)/spray Nasal Spray 1 Spray(s) Both Nostrils two times a day  heparin  Injectable 5000 Unit(s) SubCutaneous every 8 hours  lamoTRIgine 100 milliGRAM(s) Oral every 8 hours  levothyroxine 150 MICROGram(s) Oral daily  oxyCODONE  ER Tablet 60 milliGRAM(s) Oral every 8 hours  pantoprazole    Tablet 40 milliGRAM(s) Oral before breakfast  polyethylene glycol 3350 17 Gram(s) Oral daily  risperiDONE   Tablet 8 milliGRAM(s) Oral at bedtime  sertraline 50 milliGRAM(s) Oral daily  sevelamer carbonate 1600 milliGRAM(s) Oral three times a day with meals  sodium chloride 0.9%. 1000 milliLiter(s) (75 mL/Hr) IV Continuous <Continuous>  topiramate 400 milliGRAM(s) Oral two times a day    MEDICATIONS  (PRN):  acetaminophen   Tablet 650 milliGRAM(s) Oral every 6 hours PRN For Temp greater than 38 C (100.4 F)  clonazePAM Tablet 2 milliGRAM(s) Oral every 8 hours PRN ANxiety  HYDROmorphone   Tablet 2 milliGRAM(s) Oral four times a day PRN moderate to severe pain  HYDROmorphone  Injectable 0.5 milliGRAM(s) IV Push every 3 hours PRN Severe Pain (7 - 10)  ondansetron Injectable 4 milliGRAM(s) IV Push once PRN Nausea and/or Vomiting  senna 2 Tablet(s) Oral at bedtime PRN Constipation                            7.9    6.1   )-----------( 146      ( 10 Oct 2017 08:46 )             25.0       10-10    135  |  104  |  83<H>  ----------------------------<  80  3.6   |  21<L>  |  5.50<H>    Ca    8.2<L>      10 Oct 2017 08:46  Phos  7.4     10-10    TPro  5.1<L>  /  Alb  1.6<L>  /  TBili  0.2  /  DBili  x   /  AST  65<H>  /  ALT  42  /  AlkPhos  39<L>  10-10

## 2017-10-10 NOTE — PHYSICAL THERAPY INITIAL EVALUATION ADULT - PERTINENT HX OF CURRENT PROBLEM, REHAB EVAL
Pt is a 33 yo female with PMHX of Bipolar disorder, hypothyroidism s/p laminectomy and spinal fusion in 2016 who is BIB EMS for right leg swelling and numbness. Admitted for Right LE compartment syndrome. Pt s/p R fasciotomy 10/4, NWB RLE. wound VAC 10/6

## 2017-10-10 NOTE — PROGRESS NOTE ADULT - ASSESSMENT
32 white female with compartment syndrome S/P surgery.  GHAZAL and severe rhabdomyolysis, pigment nephropathy, now non oliguric.  renal indices are stabilizing and CPK is trending down.  continue the IVF for now.   No urgent need for any dialysis  continue renvela until the renal function improves,  will follow closely.

## 2017-10-10 NOTE — PROGRESS NOTE ADULT - SUBJECTIVE AND OBJECTIVE BOX
HPI:  Pt is a 33 yo female with PMHX of Bipolar disorder, hypothyroidism, chronic low back pain on morphine s/p laminectomy and spinal fusion in 2016 who is BIB EMS for right leg swelling and numbness. Patient is a poor historian as she is on many pain medicines and is currently lethargic, therefore much info provided based on hospital staff and charts. She states that 18 hours prior to arrival, she fell on the floor at her house after taking too much morphine and was lying on floor for 8 hours. She woke up to numbness, swelling, and cool temperature of her right leg, from below the knee onwards. Patient denies any current SOB, CP, f/c, denies head trauma.     In the ED, vitals were Temp 101.4F /91  RR 20  SpO2 93% RA. Significant labs are WBS 24.3, Hb 15.6, Hmt 47.6,  Platelets 118, neutr 93%, sodium 131, BUN 30,  Cr 2.30, glucose 152 AST 1377, , GFR 27, lactate 1.7. Doppler US RLE showed Extremely limited evaluation of the right lower extremity with nonvisualization of the venous structures beyond the mid right femoral vein. No evidence of DVT within the right common femoral, proximal and   mid femoral veins. No evidence of left lower extremity deep venous thrombosis. XR of right tibia and XR of R femur showed no acute fractures. Arterial Doppler of RLE pending. EKG showed sinus tach of 140, with right superior axis deviation. Patient started on aztreonam, Tylenol given for fever, patient made NPO, Dr. Martines consulted for emergent surgery. (04 Oct 2017 01:13)      SUBJECTIVE:  Patient is a 32y old  Female who presents with a chief complaint of RLE edema and numbness (04 Oct 2017 01:13)          OBJECTIVE:  Review Of Systems:  Constitutional: [ ] Fever [ ] Chills [ ] Fatigue [ ] Weight change   HEENT: [ ] Blurred vision [ ] Eye Pain [ ] Headache [ ] Runny nose [ ] Sore Throat   Respiratory: [ ] Cough [ ] Wheezing [ ] Shortness of breath  Cardiovascular: [ ] Chest Pain [ ] Palpitations [ ] BENNETT [ ] PND [ ] Orthopnea  Gastrointestinal: [ ] Abdominal Pain [ ] Diarrhea [ ] Constipation [ ] Hemorrhoids [ ] Nausea [ ] Vomiting  Genitourinary: [ ] Nocturia [ ] Dysuria [ ] Incontinence  Extremities: [ ] Swelling [ ] Joint Pain  Neurologic: [ ] Focal deficit [ ] Paresthesias [ ] Syncope  Lymphatic: [ ] Swelling [ ] Lymphadenopathy   Skin: [ ] Rash [ ] Ecchymoses [ ] Wounds [ ] Lesions  Psychiatry: [ ] Depression [ ] Suicidal/Homicidal Ideation [ ] Anxiety [ ] Sleep Disturbances  [ ] 10 point review of systems is otherwise negative except as mentioned above            [ ]Unable to obtain    Allergy:  Allergies    avocado (Unknown)  latex (Urticaria; Rash)  penicillin (Unknown)  sulfa drugs (Unknown)    Intolerances        Medications:  MEDICATIONS  (STANDING):  buPROPion XL . 300 milliGRAM(s) Oral daily  docusate sodium 100 milliGRAM(s) Oral two times a day  fentaNYL   Patch  25 MICROgram(s)/Hr 1 Patch Transdermal every 72 hours  fluticasone propionate 50 MICROgram(s)/spray Nasal Spray 1 Spray(s) Both Nostrils two times a day  heparin  Injectable 5000 Unit(s) SubCutaneous every 8 hours  lamoTRIgine 100 milliGRAM(s) Oral every 8 hours  levothyroxine 150 MICROGram(s) Oral daily  oxyCODONE  ER Tablet 60 milliGRAM(s) Oral every 8 hours  pantoprazole    Tablet 40 milliGRAM(s) Oral before breakfast  polyethylene glycol 3350 17 Gram(s) Oral daily  risperiDONE   Tablet 8 milliGRAM(s) Oral at bedtime  risperiDONE   Tablet 4 milliGRAM(s) Oral <User Schedule>  sertraline 50 milliGRAM(s) Oral daily  sevelamer carbonate 1600 milliGRAM(s) Oral three times a day with meals  sodium chloride 0.9%. 1000 milliLiter(s) (75 mL/Hr) IV Continuous <Continuous>  topiramate 400 milliGRAM(s) Oral two times a day    MEDICATIONS  (PRN):  acetaminophen   Tablet 650 milliGRAM(s) Oral every 6 hours PRN For Temp greater than 38 C (100.4 F)  clonazePAM Tablet 2 milliGRAM(s) Oral every 6 hours PRN anxiety  HYDROmorphone   Tablet 2 milliGRAM(s) Oral four times a day PRN moderate to severe pain  HYDROmorphone  Injectable 0.5 milliGRAM(s) IV Push every 3 hours PRN Severe Pain (7 - 10)  ondansetron Injectable 4 milliGRAM(s) IV Push once PRN Nausea and/or Vomiting  senna 2 Tablet(s) Oral at bedtime PRN Constipation      PMH/PSH/FH/SH: [ ] Unchanged    Vitals:  T(C): 37.2 (10-10-17 @ 07:12), Max: 37.2 (10-10-17 @ 07:12)  HR: 93 (10-10-17 @ 07:12) (92 - 99)  BP: 123/77 (10-10-17 @ 07:12) (111/72 - 123/77)  BP(mean): --  RR: 16 (10-10-17 @ 07:12) (16 - 17)  SpO2: 100% (10-10-17 @ 07:12) (99% - 100%)  Wt(kg): --  Daily     Daily   I&O's Summary    09 Oct 2017 07:01  -  10 Oct 2017 07:00  --------------------------------------------------------  IN: 3180 mL / OUT: 18648 mL / NET: -7420 mL    10 Oct 2017 07:01  -  10 Oct 2017 12:34  --------------------------------------------------------  IN: 0 mL / OUT: 1000 mL / NET: -1000 mL        Labs:                        7.9    6.1   )-----------( 146      ( 10 Oct 2017 08:46 )             25.0     10-10    135  |  104  |  83<H>  ----------------------------<  80  3.6   |  21<L>  |  5.50<H>    Ca    8.2<L>      10 Oct 2017 08:46  Phos  7.4     10-10    TPro  5.1<L>  /  Alb  1.6<L>  /  TBili  0.2  /  DBili  x   /  AST  65<H>  /  ALT  42  /  AlkPhos  39<L>  10-10      CARDIAC MARKERS ( 10 Oct 2017 08:46 )  x     / x     / 1377 U/L / x     / x      CARDIAC MARKERS ( 09 Oct 2017 07:00 )  x     / x     / 1934 U/L / x     / x          Phosphorus Level, Serum: 7.4 mg/dL (10-10 @ 08:46)              ECG:    Echo:    Stress Testing:     Cath:    Imaging:    Interpretation of Telemetry:      Physical Exam:  Appearance: [ ] Normal  [ ] abnormal [ ] NAD   Eyes: [ ] PERRL [ ] EOMI  HENT: [ ] Normal [ ] Abnormal oral muscosa [ ]NC/AT  Cardiovascular: [ ] S1 [ ] S2 [ ] RRR [ ] m/r/g [ ]edema [ ] JVP  Procedural Access Site: [ ]  hematoma [ ] tender to palpation [ ] 2+ pulse [ ] bruit [ ] Ecchymosis  Respiratory: [ ] Clear to auscultation bilaterally  Gastrointestinal: [ ] Soft [ ] tenderness[ ] distension [ ] BS  Musculoskeletal: [ ] clubbing [ ] joint deformity   Neurologic: [ ] Non-focal  Lymphatic: [ ] lymphadenopathy  Psychiatry: [ ] AAOx3  [ ] confused [ ] disoriented [ ] Mood & affect appropriate  Skin: [ ]  rashes [ ] ecchymoses [ ] cyanosis HPI:  Pt is a 31 yo female with PMHX of Bipolar disorder, hypothyroidism, chronic low back pain on morphine s/p laminectomy and spinal fusion in 2016 who is BIB EMS for right leg swelling and numbness. Patient is a poor historian as she is on many pain medicines and is currently lethargic, therefore much info provided based on hospital staff and charts. She states that 18 hours prior to arrival, she fell on the floor at her house after taking too much morphine and was lying on floor for 8 hours. She woke up to numbness, swelling, and cool temperature of her right leg, from below the knee onwards. Patient denies any current SOB, CP, f/c, denies head trauma.     In the ED, vitals were Temp 101.4F /91  RR 20  SpO2 93% RA. Significant labs are WBS 24.3, Hb 15.6, Hmt 47.6,  Platelets 118, neutr 93%, sodium 131, BUN 30,  Cr 2.30, glucose 152 AST 1377, , GFR 27, lactate 1.7. Doppler US RLE showed Extremely limited evaluation of the right lower extremity with nonvisualization of the venous structures beyond the mid right femoral vein. No evidence of DVT within the right common femoral, proximal and   mid femoral veins. No evidence of left lower extremity deep venous thrombosis. XR of right tibia and XR of R femur showed no acute fractures. Arterial Doppler of RLE pending. EKG showed sinus tach of 140, with right superior axis deviation. Patient started on aztreonam, Tylenol given for fever, patient made NPO, Dr. Martines consulted for emergent surgery. (04 Oct 2017 01:13)      SUBJECTIVE:  Patient is a 32y old  Female who presents with a chief complaint of RLE edema and numbness (04 Oct 2017 01:13)          OBJECTIVE:  Review Of Systems:  Constitutional: [ ] Fever [ ] Chills [ ] Fatigue [ ] Weight change   HEENT: [ ] Blurred vision [ ] Eye Pain [ ] Headache [ ] Runny nose [ ] Sore Throat   Respiratory: [ ] Cough [ ] Wheezing [ ] Shortness of breath  Cardiovascular: [ ] Chest Pain [ ] Palpitations [ ] BENNETT [ ] PND [ ] Orthopnea  Gastrointestinal: [ ] Abdominal Pain [ ] Diarrhea [ ] Constipation [ ] Hemorrhoids [ ] Nausea [ ] Vomiting  Genitourinary: [ ] Nocturia [ ] Dysuria [ ] Incontinence  Extremities: [ ] Swelling [ ] Joint Pain  Neurologic: [ ] Focal deficit [ ] Paresthesias [ ] Syncope  Lymphatic: [ ] Swelling [ ] Lymphadenopathy   Skin: [ ] Rash [ ] Ecchymoses [ ] Wounds [ ] Lesions  Psychiatry: [ ] Depression [ ] Suicidal/Homicidal Ideation [ ] Anxiety [ ] Sleep Disturbances  [ x] 10 point review of systems is otherwise negative except as mentioned above            [ ]Unable to obtain    Allergy:  Allergies    avocado (Unknown)  latex (Urticaria; Rash)  penicillin (Unknown)  sulfa drugs (Unknown)    Intolerances        Medications:  MEDICATIONS  (STANDING):  buPROPion XL . 300 milliGRAM(s) Oral daily  docusate sodium 100 milliGRAM(s) Oral two times a day  fentaNYL   Patch  25 MICROgram(s)/Hr 1 Patch Transdermal every 72 hours  fluticasone propionate 50 MICROgram(s)/spray Nasal Spray 1 Spray(s) Both Nostrils two times a day  heparin  Injectable 5000 Unit(s) SubCutaneous every 8 hours  lamoTRIgine 100 milliGRAM(s) Oral every 8 hours  levothyroxine 150 MICROGram(s) Oral daily  oxyCODONE  ER Tablet 60 milliGRAM(s) Oral every 8 hours  pantoprazole    Tablet 40 milliGRAM(s) Oral before breakfast  polyethylene glycol 3350 17 Gram(s) Oral daily  risperiDONE   Tablet 8 milliGRAM(s) Oral at bedtime  risperiDONE   Tablet 4 milliGRAM(s) Oral <User Schedule>  sertraline 50 milliGRAM(s) Oral daily  sevelamer carbonate 1600 milliGRAM(s) Oral three times a day with meals  sodium chloride 0.9%. 1000 milliLiter(s) (75 mL/Hr) IV Continuous <Continuous>  topiramate 400 milliGRAM(s) Oral two times a day    MEDICATIONS  (PRN):  acetaminophen   Tablet 650 milliGRAM(s) Oral every 6 hours PRN For Temp greater than 38 C (100.4 F)  clonazePAM Tablet 2 milliGRAM(s) Oral every 6 hours PRN anxiety  HYDROmorphone   Tablet 2 milliGRAM(s) Oral four times a day PRN moderate to severe pain  HYDROmorphone  Injectable 0.5 milliGRAM(s) IV Push every 3 hours PRN Severe Pain (7 - 10)  ondansetron Injectable 4 milliGRAM(s) IV Push once PRN Nausea and/or Vomiting  senna 2 Tablet(s) Oral at bedtime PRN Constipation      PMH/PSH/FH/SH: [ ] Unchanged    Vitals:  T(C): 37.2 (10-10-17 @ 07:12), Max: 37.2 (10-10-17 @ 07:12)  HR: 93 (10-10-17 @ 07:12) (92 - 99)  BP: 123/77 (10-10-17 @ 07:12) (111/72 - 123/77)  BP(mean): --  RR: 16 (10-10-17 @ 07:12) (16 - 17)  SpO2: 100% (10-10-17 @ 07:12) (99% - 100%)  Wt(kg): --  Daily     Daily   I&O's Summary    09 Oct 2017 07:01  -  10 Oct 2017 07:00  --------------------------------------------------------  IN: 3180 mL / OUT: 23916 mL / NET: -7420 mL    10 Oct 2017 07:01  -  10 Oct 2017 12:34  --------------------------------------------------------  IN: 0 mL / OUT: 1000 mL / NET: -1000 mL        Labs:                        7.9    6.1   )-----------( 146      ( 10 Oct 2017 08:46 )             25.0     10-10    135  |  104  |  83<H>  ----------------------------<  80  3.6   |  21<L>  |  5.50<H>    Ca    8.2<L>      10 Oct 2017 08:46  Phos  7.4     10-10    TPro  5.1<L>  /  Alb  1.6<L>  /  TBili  0.2  /  DBili  x   /  AST  65<H>  /  ALT  42  /  AlkPhos  39<L>  10-10      CARDIAC MARKERS ( 10 Oct 2017 08:46 )  x     / x     / 1377 U/L / x     / x      CARDIAC MARKERS ( 09 Oct 2017 07:00 )  x     / x     / 1934 U/L / x     / x          Phosphorus Level, Serum: 7.4 mg/dL (10-10 @ 08:46)              ECG:  < from: 12 Lead ECG (10.04.17 @ 08:44) >    Ventricular Rate 122 BPM    Atrial Rate 122 BPM    P-R Interval 136 ms    QRS Duration 94 ms    Q-T Interval 332 ms    QTC Calculation(Bezet) 473 ms    P Axis 35 degrees    R Axis -86 degrees    T Axis 10 degrees    Diagnosis Line Sinus tachycardia  Left axis deviation  Inferior infarct (cited on or before 03-OCT-2017)  Abnormal ECG  When compared with ECG of 03-OCT-2017 21:50, (Unconfirmed)  No significant change was found  Confirmed by Juanito Brink MD ([id number]) on 10/4/2017 1:55:40 PM    < end of copied text >    Echo:  < from: TTE Echo Doppler w/o Cont (10.05.17 @ 12:46) >   EXAM:  ECHO TTE W/O CON COMP W/DOPPLR         PROCEDURE DATE:  10/05/2017        INTERPRETATION:  Ordering Physician: MARISSA RUFF [id number]    Indication: Respiratory failure    Study Quality: Technically difficult in ICU   A complete echocardiographic study was performed utilizing standard   protocol including spectral and color Doppler in all echocardiographic   windows.    Height: 1 75 cm  Weight: 113 kg  BSA: 2.27  Blood Pressure: 104/76    MEASUREMENTS  IVS: 0.9cm  PWT: 0.9cm  LA: 3.5cm  AO: 2.8cm  LVIDd: 5.2cm  LVIDs: 3.5cm      LVEF: 70%    FINDINGS  Left Ventricle: Hyperdynamic left ventricle. Estimated EF 70%. No   segmental wall motion abnormalities  Aortic Valve: Not well-visualized. Probably normal trileaflet aortic valve  Mitral Valve: There is subtle systolic anterior motion of the mitral   valve in the setting of a hyperdynamic left ventricle. No left   ventricular outflow tract gradient is seen. Trace mitral regurgitation is   visualized.  Tricuspid Valve: Not well visualized. Trace tricuspid regurgitation  Pulmonic Valve: Not visualized.  Left Atrium: Grossly normal  Right Ventricle: Not well-visualized. Grossly normal right ventricular   size and function  Right Atrium: Grossly normal  Diastolic Function: Normaldiastolic function  Pericardium/Pleura: No pericardial effusion      CONCLUSIONS:  1. Technically difficult study  2. Hyperdynamic left ventricle. Estimated EF 70%  3. Subtle systolic anterior motion of the mitral valve in the setting of   a hyperdynamic left ventricle, but no outflow tract gradient.  4. Right ventricle is not well visualized.Grossly normal right   ventricular size and function  5. No pericardial effusion    No prior exam for comparison                  ROBERTA PRITCHARDAKBAR   This document has been electronically signed. Oct  6 2017  1:33PM        < end of copied text >    Stress Testing:     Cath:    Imaging:  < from: US Renal (10.05.17 @ 15:15) >  EXAM:  US KIDNEY(S)                            PROCEDURE DATE:  10/05/2017          INTERPRETATION:  CLINICAL STATEMENT: Acute kidney injury, acute tubular   necrosis    TECHNIQUE: Ultrasound of the kidneys. Portable technique was utilized    COMPARISON: None.    FINDINGS:    The right kidney measures 12.3 x 6.2 x 6.2 cm.    The left kidney measures 13.3 x 5.9 x 5.9 cm.    There is no hydronephrosis.  There is no gross renal mass or renal   calculus.    The bladder is not evaluated as it is notdistended in the presence of a   Coffey catheter.    IMPRESSION:    No focal renal pathology.  Nondiagnostic evaluation of the nondistended bladder                RIAN CHRISTIANSONWITZ   This document has been electronically signed. Oct  5 2017  2:14PM    < end of copied text >    Interpretation of Telemetry:  Not on Tele    Physical Exam:  Appearance: [x ] lying in bed arousable [ ] abnormal [x ] NAD   Eyes: [x ] PERRL [ ] EOMI  HENT: [x ] Normal [ ] Abnormal oral muscosa [ x]NC/AT  Cardiovascular: [ x] S1 [ x] S2 [x ] RRR [ ] m/r/g [ ]edema [ ] JVP  Procedural Site: RLE with ace wrap dressing intact swelling warm  Respiratory: [ x] Clear to auscultation bilaterally  Gastrointestinal: [ x] Soft, obese [ ] tenderness[ ] distension [ x] BS  Musculoskeletal: [ ] clubbing [ ] joint deformity   Neurologic: [x ] Non-focal  Lymphatic: [x ] edema to RLE   Psychiatry: [x ] AAOx3  [ ] confused [ ] disoriented [x ] Mood & affect appropriate  Skin: [ ]  rashes [ ] ecchymoses [ ] cyanosis

## 2017-10-10 NOTE — PROGRESS NOTE ADULT - SUBJECTIVE AND OBJECTIVE BOX
INTERVAL HPI: 32y old  Female who presents with a chief complaint of RLE edema and numbness. Patient admitted for Compartment syndrome, s/p Fasciotomy. Patient seen and examined in ICU. Wound vac placed, overnight issues wind drainage, nurse at bedside.    Dr. Damico consulted for pain management and concern for opoid dependence and abuse. Nurse aware of pain management plan.  PT to see patient this morning.  Neurology evaluation given RLE loss of sensation.      MEDICATIONS  (STANDING):  buPROPion XL . 300 milliGRAM(s) Oral daily  docusate sodium 100 milliGRAM(s) Oral two times a day  fentaNYL   Patch  25 MICROgram(s)/Hr 1 Patch Transdermal every 72 hours  fluticasone propionate 50 MICROgram(s)/spray Nasal Spray 1 Spray(s) Both Nostrils two times a day  heparin  Injectable 5000 Unit(s) SubCutaneous every 8 hours  lamoTRIgine 100 milliGRAM(s) Oral every 8 hours  levothyroxine 150 MICROGram(s) Oral daily  oxyCODONE  ER Tablet 60 milliGRAM(s) Oral every 8 hours  pantoprazole    Tablet 40 milliGRAM(s) Oral before breakfast  polyethylene glycol 3350 17 Gram(s) Oral daily  risperiDONE   Tablet 8 milliGRAM(s) Oral at bedtime  risperiDONE   Tablet 4 milliGRAM(s) Oral <User Schedule>  sertraline 50 milliGRAM(s) Oral daily  sevelamer carbonate 1600 milliGRAM(s) Oral three times a day with meals  sodium chloride 0.9%. 1000 milliLiter(s) (75 mL/Hr) IV Continuous <Continuous>  topiramate 400 milliGRAM(s) Oral two times a day    MEDICATIONS  (PRN):  acetaminophen   Tablet 650 milliGRAM(s) Oral every 6 hours PRN For Temp greater than 38 C (100.4 F)  clonazePAM Tablet 2 milliGRAM(s) Oral every 6 hours PRN anxiety  HYDROmorphone   Tablet 2 milliGRAM(s) Oral four times a day PRN moderate to severe pain  HYDROmorphone  Injectable 0.5 milliGRAM(s) IV Push every 3 hours PRN Severe Pain (7 - 10)  ondansetron Injectable 4 milliGRAM(s) IV Push once PRN Nausea and/or Vomiting  senna 2 Tablet(s) Oral at bedtime PRN Constipation          REVIEW OF SYSTEM:    Constitutional: No fever, chills, fatigue  Neuro: No headache, numbness, weakness  Resp: No cough, wheezing, shortness of breath  CVS: No chest pain, palpitations, leg swelling  GI: No abdominal pain, nausea, vomiting, diarrhea   : No dysuria, frequency, incontinence  Skin: No itching, burning, rashes, or lesions   Msk: +pain in RLE  Psych: No depression, anxiety, mood swings      Vital Signs Last 24 Hrs  T(C): 37.2 (10 Oct 2017 07:12), Max: 37.2 (10 Oct 2017 07:12)  T(F): 98.9 (10 Oct 2017 07:12), Max: 98.9 (10 Oct 2017 07:12)  HR: 93 (10 Oct 2017 07:12) (92 - 99)  BP: 123/77 (10 Oct 2017 07:12) (111/72 - 123/77)  BP(mean): --  RR: 16 (10 Oct 2017 07:12) (16 - 17)  SpO2: 100% (10 Oct 2017 07:12) (99% - 100%)    PHYSICAL EXAM:  GENERAL: NAD, obese habitus  HEART: S1S2 RRR  RESPIRATORY: CTA B/L, No wheezing / rhonchi  ABDOMEN: Soft, Nontender, Nondistended; Bowel sounds present  NEUROLOGY: A&Ox3, nonfocal, not able to move RLE, sensation diminished RLE  EXTREMITIES:  +pulses mild edema R foot, LLE is moving + pulse  Skin: warm, well perfused       LABS:                        7.9    6.1   )-----------( 146      ( 10 Oct 2017 08:46 )             25.0     10-10    135  |  104  |  83<H>  ----------------------------<  80  3.6   |  21<L>  |  5.50<H>    Ca    8.2<L>      10 Oct 2017 08:46  Phos  7.4     10-10    TPro  5.1<L>  /  Alb  1.6<L>  /  TBili  0.2  /  DBili  x   /  AST  65<H>  /  ALT  42  /  AlkPhos  39<L>  10-10                            10-04 @ 10:15   No growth to date.  --  --  10-04 @ 09:59   No growth  --  --

## 2017-10-10 NOTE — PHYSICAL THERAPY INITIAL EVALUATION ADULT - ADDITIONAL COMMENTS
Pt lives with her parents and brother in a house with steps. Pt has home aide 7xwk for 9 hrs as per pt. Pt ambulates with rolling walker and requires assistance for ADLS

## 2017-10-10 NOTE — BEHAVIORAL HEALTH ASSESSMENT NOTE - DESCRIPTION (FIRST USE, LAST USE, QUANTITY, FREQUENCY, DURATION)
Patient has been on increasing dosages of opioid medications for several years, patient shows very limited insight into a possible druge dependence Patient has been on large doses of benzodiazepines for several years, she shows limited insight into a possible anxiolytic dependence

## 2017-10-10 NOTE — PROGRESS NOTE ADULT - PROBLEM SELECTOR PLAN 3
acute pain due to Fasciotomy and rhabdo and post op  cont Dilaudid PO prn  will taper IV Dilaudid to 0.5 mg IVP every 3 hrs PRN for pain  cont Oxycontin LA for chronic pain and base line pain management  Fentanyl Patch continued  bowel regimen  monitor vs and sat  counseling

## 2017-10-10 NOTE — BEHAVIORAL HEALTH ASSESSMENT NOTE - HPI (INCLUDE ILLNESS QUALITY, SEVERITY, DURATION, TIMING, CONTEXT, MODIFYING FACTORS, ASSOCIATED SIGNS AND SYMPTOMS)
Pt is a 31 yo female with PMHX of Bipolar disorder, hypothyroidism, chronic low back pain on morphine s/p laminectomy and spinal fusion in 2016 who is BIB EMS for right leg swelling and numbness. She stated that 18 hours prior to arrival, she fell on the floor at her house after taking too much morphine and was lying on floor for 8 hours. She woke up to numbness, swelling, and cool temperature of her right leg, from below the knee onwards.   Patient reports history of bipolar disorder, with two prior psychiatric hospitalizations, she currently is following with Dr. Case at [facility]. She denies intent to hurt herself, "I was trying to alleviate the pain."

## 2017-10-10 NOTE — PROGRESS NOTE ADULT - SUBJECTIVE AND OBJECTIVE BOX
Date/Time Patient Seen:  		  Referring MD:   Data Reviewed	       Patient is a 32y old  Female who presents with a chief complaint of RLE edema and numbness (04 Oct 2017 01:13)  in bed  seen and examined  slept the entire night  no reports of acute pain overnight    discussed with nursing        Subjective/HPI     PAST MEDICAL & SURGICAL HISTORY:  Low back pain  Hypothyroid  Bipolar 1 disorder  History of spinal fusion  History of laminectomy        Medication list         MEDICATIONS  (STANDING):  buPROPion XL . 300 milliGRAM(s) Oral daily  docusate sodium 100 milliGRAM(s) Oral two times a day  fentaNYL   Patch  25 MICROgram(s)/Hr 1 Patch Transdermal every 72 hours  fluticasone propionate 50 MICROgram(s)/spray Nasal Spray 1 Spray(s) Both Nostrils two times a day  heparin  Injectable 5000 Unit(s) SubCutaneous every 8 hours  lamoTRIgine 100 milliGRAM(s) Oral every 8 hours  levothyroxine 150 MICROGram(s) Oral daily  oxyCODONE  ER Tablet 60 milliGRAM(s) Oral every 8 hours  pantoprazole    Tablet 40 milliGRAM(s) Oral before breakfast  polyethylene glycol 3350 17 Gram(s) Oral daily  risperiDONE   Tablet 8 milliGRAM(s) Oral at bedtime  risperiDONE   Tablet 4 milliGRAM(s) Oral <User Schedule>  sertraline 50 milliGRAM(s) Oral daily  sevelamer carbonate 1600 milliGRAM(s) Oral three times a day with meals  sodium chloride 0.9%. 1000 milliLiter(s) (75 mL/Hr) IV Continuous <Continuous>  topiramate 400 milliGRAM(s) Oral two times a day    MEDICATIONS  (PRN):  acetaminophen   Tablet 650 milliGRAM(s) Oral every 6 hours PRN For Temp greater than 38 C (100.4 F)  clonazePAM Tablet 2 milliGRAM(s) Oral every 6 hours PRN anxiety  HYDROmorphone   Tablet 2 milliGRAM(s) Oral four times a day PRN moderate to severe pain  HYDROmorphone  Injectable 0.5 milliGRAM(s) IV Push every 3 hours PRN Severe Pain (7 - 10)  ondansetron Injectable 4 milliGRAM(s) IV Push once PRN Nausea and/or Vomiting  senna 2 Tablet(s) Oral at bedtime PRN Constipation         Vitals log        ICU Vital Signs Last 24 Hrs  T(C): 37 (09 Oct 2017 23:57), Max: 37 (09 Oct 2017 23:57)  T(F): 98.6 (09 Oct 2017 23:57), Max: 98.6 (09 Oct 2017 23:57)  HR: 92 (09 Oct 2017 23:57) (92 - 100)  BP: 111/72 (09 Oct 2017 23:57) (111/72 - 118/81)  BP(mean): --  ABP: --  ABP(mean): --  RR: 17 (09 Oct 2017 23:57) (16 - 17)  SpO2: 99% (09 Oct 2017 23:57) (99% - 100%)           Input and Output:  I&O's Detail    08 Oct 2017 07:01  -  09 Oct 2017 07:00  --------------------------------------------------------  IN:    Oral Fluid: 1160 mL    sodium chloride 0.9%.: 1200 mL  Total IN: 2360 mL    OUT:    Indwelling Catheter - Urethral: 5500 mL    VAC (Vacuum Assisted Closure) System: 400 mL  Total OUT: 5900 mL    Total NET: -3540 mL      09 Oct 2017 07:01  -  10 Oct 2017 06:05  --------------------------------------------------------  IN:    Oral Fluid: 2280 mL  Total IN: 2280 mL    OUT:    Indwelling Catheter - Urethral: 9600 mL    VAC (Vacuum Assisted Closure) System: 500 mL  Total OUT: 58607 mL    Total NET: -7820 mL          Lab Data                        7.2    5.2   )-----------( 115      ( 09 Oct 2017 07:00 )             23.1     10-09    140  |  106  |  86<H>  ----------------------------<  84  3.7   |  22  |  6.40<H>    Ca    8.3<L>      09 Oct 2017 07:00  Phos  8.1     10-08  Mg     2.5     10-08        CARDIAC MARKERS ( 09 Oct 2017 07:00 )  x     / x     / 1934 U/L / x     / x      CARDIAC MARKERS ( 08 Oct 2017 08:05 )  x     / x     / 2800 U/L / x     / x            Review of Systems	      Objective     Physical Examination  head at  heart - s1s2  lungs - dec BS  abd - soft        Pertinent Lab findings & Imaging      Erlinda:  NO   Adequate UO     I&O's Detail    08 Oct 2017 07:01  -  09 Oct 2017 07:00  --------------------------------------------------------  IN:    Oral Fluid: 1160 mL    sodium chloride 0.9%.: 1200 mL  Total IN: 2360 mL    OUT:    Indwelling Catheter - Urethral: 5500 mL    VAC (Vacuum Assisted Closure) System: 400 mL  Total OUT: 5900 mL    Total NET: -3540 mL      09 Oct 2017 07:01  -  10 Oct 2017 06:05  --------------------------------------------------------  IN:    Oral Fluid: 2280 mL  Total IN: 2280 mL    OUT:    Indwelling Catheter - Urethral: 9600 mL    VAC (Vacuum Assisted Closure) System: 500 mL  Total OUT: 23498 mL    Total NET: -7820 mL               Discussed with:     Cultures:	        Radiology

## 2017-10-10 NOTE — PROGRESS NOTE ADULT - ASSESSMENT
32-year-old female now status post right lower extremity fasciotomy for prolonged compression after a fall with lethargy. There is no evidence for an acute atherothrombotic or embolic process. No history of cardiac disease and remains in sinus rhythm. EF preserved on recent echocardiogram    - there is no evidence of acute ischemia.  - there is no evidence for meaningful volume overload.  - remains persistently tachycardic, likely reactive in the setting of severe illness.  This is overall trending in a favorable direction.  - renal function not clearly improving  - CK improving with IVF, I would continue to keep her hydrated  - monitor electrolytes, keep k>4, Mg>2  - TTE with hyperdynamic LV function, no valvular issues  - will follow    Yue Das NP-C  Cardiology

## 2017-10-11 LAB
ANION GAP SERPL CALC-SCNC: 12 MMOL/L — SIGNIFICANT CHANGE UP (ref 5–17)
BUN SERPL-MCNC: 79 MG/DL — HIGH (ref 7–23)
CALCIUM SERPL-MCNC: 8.4 MG/DL — LOW (ref 8.5–10.1)
CHLORIDE SERPL-SCNC: 105 MMOL/L — SIGNIFICANT CHANGE UP (ref 96–108)
CO2 SERPL-SCNC: 20 MMOL/L — LOW (ref 22–31)
CREAT SERPL-MCNC: 4.8 MG/DL — HIGH (ref 0.5–1.3)
GLUCOSE SERPL-MCNC: 85 MG/DL — SIGNIFICANT CHANGE UP (ref 70–99)
HCT VFR BLD CALC: 23.1 % — LOW (ref 34.5–45)
HGB BLD-MCNC: 7.3 G/DL — LOW (ref 11.5–15.5)
MCHC RBC-ENTMCNC: 28.9 PG — SIGNIFICANT CHANGE UP (ref 27–34)
MCHC RBC-ENTMCNC: 31.7 GM/DL — LOW (ref 32–36)
MCV RBC AUTO: 91.1 FL — SIGNIFICANT CHANGE UP (ref 80–100)
PLATELET # BLD AUTO: 196 K/UL — SIGNIFICANT CHANGE UP (ref 150–400)
POTASSIUM SERPL-MCNC: 3.8 MMOL/L — SIGNIFICANT CHANGE UP (ref 3.5–5.3)
POTASSIUM SERPL-SCNC: 3.8 MMOL/L — SIGNIFICANT CHANGE UP (ref 3.5–5.3)
RBC # BLD: 2.53 M/UL — LOW (ref 3.8–5.2)
RBC # FLD: 12.6 % — SIGNIFICANT CHANGE UP (ref 10.3–14.5)
SODIUM SERPL-SCNC: 137 MMOL/L — SIGNIFICANT CHANGE UP (ref 135–145)
WBC # BLD: 6.7 K/UL — SIGNIFICANT CHANGE UP (ref 3.8–10.5)
WBC # FLD AUTO: 6.7 K/UL — SIGNIFICANT CHANGE UP (ref 3.8–10.5)

## 2017-10-11 PROCEDURE — 99233 SBSQ HOSP IP/OBS HIGH 50: CPT

## 2017-10-11 RX ORDER — HYDROMORPHONE HYDROCHLORIDE 2 MG/ML
1 INJECTION INTRAMUSCULAR; INTRAVENOUS; SUBCUTANEOUS ONCE
Qty: 0 | Refills: 0 | Status: DISCONTINUED | OUTPATIENT
Start: 2017-10-11 | End: 2017-10-11

## 2017-10-11 RX ORDER — ALBUTEROL 90 UG/1
2 AEROSOL, METERED ORAL ONCE
Qty: 0 | Refills: 0 | Status: COMPLETED | OUTPATIENT
Start: 2017-10-11 | End: 2017-10-11

## 2017-10-11 RX ORDER — LANOLIN ALCOHOL/MO/W.PET/CERES
5 CREAM (GRAM) TOPICAL AT BEDTIME
Qty: 0 | Refills: 0 | Status: DISCONTINUED | OUTPATIENT
Start: 2017-10-11 | End: 2017-10-11

## 2017-10-11 RX ORDER — LANOLIN ALCOHOL/MO/W.PET/CERES
5 CREAM (GRAM) TOPICAL ONCE
Qty: 0 | Refills: 0 | Status: COMPLETED | OUTPATIENT
Start: 2017-10-11 | End: 2017-10-11

## 2017-10-11 RX ORDER — ACETAMINOPHEN 500 MG
650 TABLET ORAL ONCE
Qty: 0 | Refills: 0 | Status: COMPLETED | OUTPATIENT
Start: 2017-10-11 | End: 2017-10-11

## 2017-10-11 RX ADMIN — Medication 2 MILLIGRAM(S): at 17:31

## 2017-10-11 RX ADMIN — OXYCODONE HYDROCHLORIDE 60 MILLIGRAM(S): 5 TABLET ORAL at 05:37

## 2017-10-11 RX ADMIN — ALBUTEROL 2 PUFF(S): 90 AEROSOL, METERED ORAL at 23:35

## 2017-10-11 RX ADMIN — SERTRALINE 50 MILLIGRAM(S): 25 TABLET, FILM COATED ORAL at 13:42

## 2017-10-11 RX ADMIN — Medication 650 MILLIGRAM(S): at 04:15

## 2017-10-11 RX ADMIN — HYDROMORPHONE HYDROCHLORIDE 1 MILLIGRAM(S): 2 INJECTION INTRAMUSCULAR; INTRAVENOUS; SUBCUTANEOUS at 13:20

## 2017-10-11 RX ADMIN — PANTOPRAZOLE SODIUM 40 MILLIGRAM(S): 20 TABLET, DELAYED RELEASE ORAL at 05:24

## 2017-10-11 RX ADMIN — OXYCODONE HYDROCHLORIDE 60 MILLIGRAM(S): 5 TABLET ORAL at 05:24

## 2017-10-11 RX ADMIN — Medication 400 MILLIGRAM(S): at 05:23

## 2017-10-11 RX ADMIN — LAMOTRIGINE 100 MILLIGRAM(S): 25 TABLET, ORALLY DISINTEGRATING ORAL at 05:24

## 2017-10-11 RX ADMIN — Medication 1 SPRAY(S): at 05:31

## 2017-10-11 RX ADMIN — Medication 400 MILLIGRAM(S): at 17:27

## 2017-10-11 RX ADMIN — FENTANYL CITRATE 1 PATCH: 50 INJECTION INTRAVENOUS at 11:16

## 2017-10-11 RX ADMIN — HYDROMORPHONE HYDROCHLORIDE 0.5 MILLIGRAM(S): 2 INJECTION INTRAMUSCULAR; INTRAVENOUS; SUBCUTANEOUS at 01:04

## 2017-10-11 RX ADMIN — HEPARIN SODIUM 5000 UNIT(S): 5000 INJECTION INTRAVENOUS; SUBCUTANEOUS at 22:06

## 2017-10-11 RX ADMIN — HYDROMORPHONE HYDROCHLORIDE 0.5 MILLIGRAM(S): 2 INJECTION INTRAMUSCULAR; INTRAVENOUS; SUBCUTANEOUS at 16:53

## 2017-10-11 RX ADMIN — OXYCODONE HYDROCHLORIDE 60 MILLIGRAM(S): 5 TABLET ORAL at 13:36

## 2017-10-11 RX ADMIN — HYDROMORPHONE HYDROCHLORIDE 2 MILLIGRAM(S): 2 INJECTION INTRAMUSCULAR; INTRAVENOUS; SUBCUTANEOUS at 22:22

## 2017-10-11 RX ADMIN — SEVELAMER CARBONATE 1600 MILLIGRAM(S): 2400 POWDER, FOR SUSPENSION ORAL at 13:42

## 2017-10-11 RX ADMIN — Medication 100 MILLIGRAM(S): at 05:23

## 2017-10-11 RX ADMIN — SEVELAMER CARBONATE 1600 MILLIGRAM(S): 2400 POWDER, FOR SUSPENSION ORAL at 17:27

## 2017-10-11 RX ADMIN — HYDROMORPHONE HYDROCHLORIDE 2 MILLIGRAM(S): 2 INJECTION INTRAMUSCULAR; INTRAVENOUS; SUBCUTANEOUS at 23:15

## 2017-10-11 RX ADMIN — LAMOTRIGINE 100 MILLIGRAM(S): 25 TABLET, ORALLY DISINTEGRATING ORAL at 22:06

## 2017-10-11 RX ADMIN — HEPARIN SODIUM 5000 UNIT(S): 5000 INJECTION INTRAVENOUS; SUBCUTANEOUS at 13:37

## 2017-10-11 RX ADMIN — HYDROMORPHONE HYDROCHLORIDE 0.5 MILLIGRAM(S): 2 INJECTION INTRAMUSCULAR; INTRAVENOUS; SUBCUTANEOUS at 17:15

## 2017-10-11 RX ADMIN — Medication 650 MILLIGRAM(S): at 03:31

## 2017-10-11 RX ADMIN — HYDROMORPHONE HYDROCHLORIDE 0.5 MILLIGRAM(S): 2 INJECTION INTRAMUSCULAR; INTRAVENOUS; SUBCUTANEOUS at 01:40

## 2017-10-11 RX ADMIN — RISPERIDONE 8 MILLIGRAM(S): 4 TABLET ORAL at 22:03

## 2017-10-11 RX ADMIN — Medication 1 SPRAY(S): at 17:29

## 2017-10-11 RX ADMIN — HYDROMORPHONE HYDROCHLORIDE 1 MILLIGRAM(S): 2 INJECTION INTRAMUSCULAR; INTRAVENOUS; SUBCUTANEOUS at 11:52

## 2017-10-11 RX ADMIN — Medication 100 MILLIGRAM(S): at 17:29

## 2017-10-11 RX ADMIN — FENTANYL CITRATE 1 PATCH: 50 INJECTION INTRAVENOUS at 09:25

## 2017-10-11 RX ADMIN — POLYETHYLENE GLYCOL 3350 17 GRAM(S): 17 POWDER, FOR SOLUTION ORAL at 13:36

## 2017-10-11 RX ADMIN — LAMOTRIGINE 100 MILLIGRAM(S): 25 TABLET, ORALLY DISINTEGRATING ORAL at 13:37

## 2017-10-11 RX ADMIN — SODIUM CHLORIDE 75 MILLILITER(S): 9 INJECTION INTRAMUSCULAR; INTRAVENOUS; SUBCUTANEOUS at 05:07

## 2017-10-11 RX ADMIN — Medication 150 MICROGRAM(S): at 05:09

## 2017-10-11 RX ADMIN — OXYCODONE HYDROCHLORIDE 60 MILLIGRAM(S): 5 TABLET ORAL at 22:06

## 2017-10-11 RX ADMIN — OXYCODONE HYDROCHLORIDE 60 MILLIGRAM(S): 5 TABLET ORAL at 14:10

## 2017-10-11 RX ADMIN — Medication 5 MILLIGRAM(S): at 03:31

## 2017-10-11 RX ADMIN — BUPROPION HYDROCHLORIDE 300 MILLIGRAM(S): 150 TABLET, EXTENDED RELEASE ORAL at 13:36

## 2017-10-11 RX ADMIN — HEPARIN SODIUM 5000 UNIT(S): 5000 INJECTION INTRAVENOUS; SUBCUTANEOUS at 05:24

## 2017-10-11 NOTE — PROGRESS NOTE ADULT - SUBJECTIVE AND OBJECTIVE BOX
INTERVAL HPI: 32y old  Female who presents with a chief complaint of RLE edema and numbness. Patient admitted for Compartment syndrome, s/p Fasciotomy. Patient seen and examined in ICU. Wound vac placed, overnight issues wind drainage, nurse at bedside.    Dr. Damico consulted for pain management and concern for opoid dependence and abuse. Nurse aware of pain management plan.  Renal function improving. Spoke to patient and mother at bedside. Patient is persistently asking for opioids, but she is aware they are being tapered and managed accordingly with pain management Dr. Damico.   Wound vac to be changed today. Spoke to pain management ok to give one dose of dilaudid during procedure.  Will reach out to patients outpatient pain management doctor Jeffrey Galaviz.       MEDICATIONS  (STANDING):  buPROPion XL . 300 milliGRAM(s) Oral daily  docusate sodium 100 milliGRAM(s) Oral two times a day  fentaNYL   Patch  25 MICROgram(s)/Hr 1 Patch Transdermal every 72 hours  fluticasone propionate 50 MICROgram(s)/spray Nasal Spray 1 Spray(s) Both Nostrils two times a day  heparin  Injectable 5000 Unit(s) SubCutaneous every 8 hours  lamoTRIgine 100 milliGRAM(s) Oral every 8 hours  levothyroxine 150 MICROGram(s) Oral daily  oxyCODONE  ER Tablet 60 milliGRAM(s) Oral every 8 hours  pantoprazole    Tablet 40 milliGRAM(s) Oral before breakfast  polyethylene glycol 3350 17 Gram(s) Oral daily  risperiDONE   Tablet 8 milliGRAM(s) Oral at bedtime  sertraline 50 milliGRAM(s) Oral daily  sevelamer carbonate 1600 milliGRAM(s) Oral three times a day with meals  sodium chloride 0.9%. 1000 milliLiter(s) (30 mL/Hr) IV Continuous <Continuous>  topiramate 400 milliGRAM(s) Oral two times a day    MEDICATIONS  (PRN):  acetaminophen   Tablet 650 milliGRAM(s) Oral every 6 hours PRN For Temp greater than 38 C (100.4 F)  clonazePAM Tablet 2 milliGRAM(s) Oral every 8 hours PRN ANxiety  HYDROmorphone   Tablet 2 milliGRAM(s) Oral four times a day PRN moderate to severe pain  HYDROmorphone  Injectable 0.5 milliGRAM(s) IV Push every 3 hours PRN Severe Pain (7 - 10)  HYDROmorphone  Injectable 1 milliGRAM(s) IV Push once PRN Pain  ondansetron Injectable 4 milliGRAM(s) IV Push once PRN Nausea and/or Vomiting  senna 2 Tablet(s) Oral at bedtime PRN Constipation            REVIEW OF SYSTEM:    Constitutional: No fever, chills, fatigue  Neuro: No headache, numbness, weakness  Resp: No cough, wheezing, shortness of breath  CVS: No chest pain, palpitations, leg swelling  GI: No abdominal pain, nausea, vomiting, diarrhea   : No dysuria, frequency, incontinence  Skin: No itching, burning, rashes, or lesions   Msk: +pain in RLE, +pain in her back  Psych: No depression, anxiety, mood swings      Vital Signs Last 24 Hrs  T(C): 37.2 (10 Oct 2017 07:12), Max: 37.2 (10 Oct 2017 07:12)  T(F): 98.9 (10 Oct 2017 07:12), Max: 98.9 (10 Oct 2017 07:12)  HR: 93 (10 Oct 2017 07:12) (92 - 99)  BP: 123/77 (10 Oct 2017 07:12) (111/72 - 123/77)  BP(mean): --  RR: 16 (10 Oct 2017 07:12) (16 - 17)  SpO2: 100% (10 Oct 2017 07:12) (99% - 100%)    PHYSICAL EXAM:  GENERAL: anxious asking for pain meds, dozes off at times but able to carry on a conversation ,  HEART: S1S2 RRR  RESPIRATORY: CTA B/L, No wheezing / rhonchi  ABDOMEN: Soft, Nontender, Nondistended; Bowel sounds present  NEUROLOGY: A&Ox3, nonfocal, not able to move RLE, sensation diminished RLE  EXTREMITIES:  +pulses mild edema R foot, LLE is moving + pulse  Skin: warm, well perfused       LABS:                        7.3    6.7   )-----------( 196      ( 11 Oct 2017 08:43 )             23.1     10-11    137  |  105  |  79<H>  ----------------------------<  85  3.8   |  20<L>  |  4.80<H>    Ca    8.4<L>      11 Oct 2017 08:43  Phos  7.4     10-10    TPro  5.1<L>  /  Alb  1.6<L>  /  TBili  0.2  /  DBili  x   /  AST  65<H>  /  ALT  42  /  AlkPhos  39<L>  10-10        10-04 @ 10:15   No growth to date.  --  --  10-04 @ 09:59   No growth  --  --

## 2017-10-11 NOTE — PROGRESS NOTE ADULT - SUBJECTIVE AND OBJECTIVE BOX
Date/Time Patient Seen:  		  Referring MD:   Data Reviewed	       Patient is a 32y old  Female who presents with a chief complaint of RLE edema and numbness (04 Oct 2017 01:13)  in bed  seen and examined  vs and meds reviewed  on IVF  slept through the night      Subjective/HPI     PAST MEDICAL & SURGICAL HISTORY:  Low back pain  Hypothyroid  Bipolar 1 disorder  History of spinal fusion  History of laminectomy        Medication list         MEDICATIONS  (STANDING):  buPROPion XL . 300 milliGRAM(s) Oral daily  docusate sodium 100 milliGRAM(s) Oral two times a day  fentaNYL   Patch  25 MICROgram(s)/Hr 1 Patch Transdermal every 72 hours  fluticasone propionate 50 MICROgram(s)/spray Nasal Spray 1 Spray(s) Both Nostrils two times a day  heparin  Injectable 5000 Unit(s) SubCutaneous every 8 hours  lamoTRIgine 100 milliGRAM(s) Oral every 8 hours  levothyroxine 150 MICROGram(s) Oral daily  oxyCODONE  ER Tablet 60 milliGRAM(s) Oral every 8 hours  pantoprazole    Tablet 40 milliGRAM(s) Oral before breakfast  polyethylene glycol 3350 17 Gram(s) Oral daily  risperiDONE   Tablet 8 milliGRAM(s) Oral at bedtime  sertraline 50 milliGRAM(s) Oral daily  sevelamer carbonate 1600 milliGRAM(s) Oral three times a day with meals  sodium chloride 0.9%. 1000 milliLiter(s) (75 mL/Hr) IV Continuous <Continuous>  topiramate 400 milliGRAM(s) Oral two times a day    MEDICATIONS  (PRN):  acetaminophen   Tablet 650 milliGRAM(s) Oral every 6 hours PRN For Temp greater than 38 C (100.4 F)  clonazePAM Tablet 2 milliGRAM(s) Oral every 8 hours PRN ANxiety  HYDROmorphone   Tablet 2 milliGRAM(s) Oral four times a day PRN moderate to severe pain  HYDROmorphone  Injectable 0.5 milliGRAM(s) IV Push every 3 hours PRN Severe Pain (7 - 10)  ondansetron Injectable 4 milliGRAM(s) IV Push once PRN Nausea and/or Vomiting  senna 2 Tablet(s) Oral at bedtime PRN Constipation         Vitals log        ICU Vital Signs Last 24 Hrs  T(C): 37.4 (10 Oct 2017 23:41), Max: 37.4 (10 Oct 2017 23:41)  T(F): 99.3 (10 Oct 2017 23:41), Max: 99.3 (10 Oct 2017 23:41)  HR: 96 (10 Oct 2017 23:41) (93 - 100)  BP: 104/64 (10 Oct 2017 23:41) (103/67 - 123/77)  BP(mean): --  ABP: --  ABP(mean): --  RR: 16 (10 Oct 2017 23:41) (16 - 16)  SpO2: 99% (10 Oct 2017 23:41) (99% - 100%)           Input and Output:  I&O's Detail    09 Oct 2017 07:01  -  10 Oct 2017 07:00  --------------------------------------------------------  IN:    Oral Fluid: 2280 mL    sodium chloride 0.9%.: 900 mL  Total IN: 3180 mL    OUT:    Indwelling Catheter - Urethral: 9600 mL    VAC (Vacuum Assisted Closure) System: 1000 mL  Total OUT: 64043 mL    Total NET: -7420 mL      10 Oct 2017 07:01  -  11 Oct 2017 05:59  --------------------------------------------------------  IN:  Total IN: 0 mL    OUT:    Indwelling Catheter - Urethral: 9000 mL    VAC (Vacuum Assisted Closure) System: 450 mL  Total OUT: 9450 mL    Total NET: -9450 mL          Lab Data                        7.9    6.1   )-----------( 146      ( 10 Oct 2017 08:46 )             25.0     10-10    135  |  104  |  83<H>  ----------------------------<  80  3.6   |  21<L>  |  5.50<H>    Ca    8.2<L>      10 Oct 2017 08:46  Phos  7.4     10-10    TPro  5.1<L>  /  Alb  1.6<L>  /  TBili  0.2  /  DBili  x   /  AST  65<H>  /  ALT  42  /  AlkPhos  39<L>  10-10      CARDIAC MARKERS ( 10 Oct 2017 08:46 )  x     / x     / 1377 U/L / x     / x      CARDIAC MARKERS ( 09 Oct 2017 07:00 )  x     / x     / 1934 U/L / x     / x            Review of Systems	      Objective     Physical Examination    head at  heart - s1s2  lungs - dec BS  abd - soft      Pertinent Lab findings & Imaging      Coffey:  NO   Adequate UO     I&O's Detail    09 Oct 2017 07:01  -  10 Oct 2017 07:00  --------------------------------------------------------  IN:    Oral Fluid: 2280 mL    sodium chloride 0.9%.: 900 mL  Total IN: 3180 mL    OUT:    Indwelling Catheter - Urethral: 9600 mL    VAC (Vacuum Assisted Closure) System: 1000 mL  Total OUT: 93309 mL    Total NET: -7420 mL      10 Oct 2017 07:01  -  11 Oct 2017 05:59  --------------------------------------------------------  IN:  Total IN: 0 mL    OUT:    Indwelling Catheter - Urethral: 9000 mL    VAC (Vacuum Assisted Closure) System: 450 mL  Total OUT: 9450 mL    Total NET: -9450 mL               Discussed with:     Cultures:	        Radiology

## 2017-10-11 NOTE — PROGRESS NOTE ADULT - SUBJECTIVE AND OBJECTIVE BOX
[facility] - Bonnie Hernandez Grossman, [place], Ale, Carissa Allen  Office Number:  [phone]    Pt seen and examined at bedside, in NAD. Denies CP, SOB, dizziness, lightheadedness, palpitations, dyspnea, orthopnea.    Telemetry:  Not on tele    MEDICATIONS  (STANDING):  buPROPion XL . 300 milliGRAM(s) Oral daily  docusate sodium 100 milliGRAM(s) Oral two times a day  fentaNYL   Patch  25 MICROgram(s)/Hr 1 Patch Transdermal every 72 hours  fluticasone propionate 50 MICROgram(s)/spray Nasal Spray 1 Spray(s) Both Nostrils two times a day  heparin  Injectable 5000 Unit(s) SubCutaneous every 8 hours  lamoTRIgine 100 milliGRAM(s) Oral every 8 hours  levothyroxine 150 MICROGram(s) Oral daily  oxyCODONE  ER Tablet 60 milliGRAM(s) Oral every 8 hours  pantoprazole    Tablet 40 milliGRAM(s) Oral before breakfast  polyethylene glycol 3350 17 Gram(s) Oral daily  risperiDONE   Tablet 8 milliGRAM(s) Oral at bedtime  sertraline 50 milliGRAM(s) Oral daily  sevelamer carbonate 1600 milliGRAM(s) Oral three times a day with meals  sodium chloride 0.9%. 1000 milliLiter(s) (30 mL/Hr) IV Continuous <Continuous>  topiramate 400 milliGRAM(s) Oral two times a day    MEDICATIONS  (PRN):  acetaminophen   Tablet 650 milliGRAM(s) Oral every 6 hours PRN For Temp greater than 38 C (100.4 F)  clonazePAM Tablet 2 milliGRAM(s) Oral every 8 hours PRN ANxiety  HYDROmorphone   Tablet 2 milliGRAM(s) Oral four times a day PRN moderate to severe pain  HYDROmorphone  Injectable 0.5 milliGRAM(s) IV Push every 3 hours PRN Severe Pain (7 - 10)  ondansetron Injectable 4 milliGRAM(s) IV Push once PRN Nausea and/or Vomiting  senna 2 Tablet(s) Oral at bedtime PRN Constipation      Allergies    avocado (Unknown)  latex (Urticaria; Rash)  penicillin (Unknown)  sulfa drugs (Unknown)    Intolerances        Vital Signs Last 24 Hrs  T(C): 37 (11 Oct 2017 07:41), Max: 37.4 (10 Oct 2017 23:41)  T(F): 98.6 (11 Oct 2017 07:41), Max: 99.3 (10 Oct 2017 23:41)  HR: 98 (11 Oct 2017 07:41) (96 - 100)  BP: 111/72 (11 Oct 2017 07:41) (103/67 - 111/72)  BP(mean): --  RR: 16 (11 Oct 2017 07:41) (16 - 16)  SpO2: 96% (11 Oct 2017 07:41) (96% - 99%)    I&O's Summary    10 Oct 2017 07:01  -  11 Oct 2017 07:00  --------------------------------------------------------  IN: 900 mL / OUT: 9450 mL / NET: -8550 mL    11 Oct 2017 07:01  -  11 Oct 2017 11:03  --------------------------------------------------------  IN: 680 mL / OUT: 0 mL / NET: 680 mL        ON EXAM:    General: NAD, lethargic  HEENT: Mucous membranes are moist, anicteric  Lungs: Non-labored, breath sounds are clear bilaterally, No wheezing, rales or rhonchi  Cardiovascular: Regular, S1 and S2, no murmurs, rubs, or gallops  Gastrointestinal: Bowel Sounds present, soft, nontender.   Lymph: R leg dressing, RLE edema, erythema, warmth  Skin: No rashes or ulcers  Psych:  unable to access pt lethargic, responding poorly to questions    LABS: All Labs Reviewed:                        7.3    6.7   )-----------( 196      ( 11 Oct 2017 08:43 )             23.1                         7.9    6.1   )-----------( 146      ( 10 Oct 2017 08:46 )             25.0                         7.2    5.2   )-----------( 115      ( 09 Oct 2017 07:00 )             23.1     11 Oct 2017 08:43    137    |  105    |  79     ----------------------------<  85     3.8     |  20     |  4.80   10 Oct 2017 08:46    135    |  104    |  83     ----------------------------<  80     3.6     |  21     |  5.50   09 Oct 2017 07:00    140    |  106    |  86     ----------------------------<  84     3.7     |  22     |  6.40     Ca    8.4        11 Oct 2017 08:43  Ca    8.2        10 Oct 2017 08:46  Ca    8.3        09 Oct 2017 07:00  Phos  7.4       10 Oct 2017 08:46    TPro  5.1    /  Alb  1.6    /  TBili  0.2    /  DBili  x      /  AST  65     /  ALT  42     /  AlkPhos  39     10 Oct 2017 08:46      CARDIAC MARKERS ( 10 Oct 2017 08:46 )  x     / x     / 1377 U/L / x     / x            Blood Culture:     < from: 12 Lead ECG (10.04.17 @ 08:44) >    Ventricular Rate 122 BPM    Atrial Rate 122 BPM    P-R Interval 136 ms    QRS Duration 94 ms    Q-T Interval 332 ms    QTC Calculation(Bezet) 473 ms    P Axis 35 degrees    R Axis -86 degrees    T Axis 10 degrees    Diagnosis Line Sinus tachycardia  Left axis deviation  Inferior infarct (cited on or before 03-OCT-2017)  Abnormal ECG  When compared with ECG of 03-OCT-2017 21:50, (Unconfirmed)  No significant change was found  Confirmed by Keena LLAMAS, Juanito ([id number]) on 10/4/2017 1:55:40 PM    < end of copied text >

## 2017-10-11 NOTE — PROVIDER CONTACT NOTE (OTHER) - SITUATION
Pt states she has a hx of asthma and "feels like I need a puff of something". NC oxygen placed on pt at 2 liters. Pt has no rescue inhalers or respiratory medications ordered.

## 2017-10-11 NOTE — CHART NOTE - NSCHARTNOTEFT_GEN_A_CORE
S: Called by RN that pt was complaining of pain and wants more Dilauded, although received 0.5mg IV push at 1am. Patient seen and examined at bedside, crying in pain stating she takes 4 tabs 8mg Dilaudid and 4 tabs 100mg morphine every few hours for pain at home. Patient reports severe back and neck pain.    CONSTITUTIONAL: no fevers or chills  RESPIRATORY: no cough, no wheezing, no hemoptysis, positive dyspnea   CARDIOVASCULAR: No chest pain or palpitations  GASTROINTESTINAL: No abdominal or epigastric pain. No nausea, vomiting, or hematemesis; No diarrhea, no constipation    Physical Exam:  General: obese female crying in pain  HEENT: EOMI  Neck: Supple, nontender, no mass  Neurology: A&Ox3, nonfocal,   Respiratory: CTA B/L, No W/R/R  CV: RRR, +S1/S2, no murmurs, rubs or gallops  Abdominal: Soft, NT, ND +BSx4  Skin: scar on neck and back from surgeries      A/P:32F PMH Asthma, Bipolar disorder, hypothyroidism, chronic low back pain s/p laminectomy and spinal fusion, opioid-dependent presents with right leg swelling, numbness, pallor, poikilothermia, and pulselessness, found to have ischemic leg due to acute compartment syndrome of right leg with acute rhabdomyolysis, GHAZAL 2/2 ATN, and leukocytosis with bandemia s/p emergent fasciotomy of the 4 leg compartments (anterior, lateral, superficial posterior, and deep posterior) and the lateral thigh muscles. Wound vac in place. Pain management for opioid dependence and abuse.   -After a discussion with patient about opioid dependence/abuse, she agreed to melatonin for sleep and an oral dose of acetaminophen 650mg for pain  -Will continue to monitor overnight for signs of pain, next dose of 0.5mg IV Dilaudid is at around 4am S: Called by RN that pt was complaining of pain and wants more Dilauded, although received 0.5mg IV push at 1am. Patient seen and examined at bedside, crying in pain stating she takes 4 tabs 8mg Dilaudid and 4 tabs 100mg morphine every few hours for pain at home. Patient reports severe back and neck pain.    CONSTITUTIONAL: no fevers or chills  RESPIRATORY: no cough, no wheezing, no hemoptysis, positive dyspnea   CARDIOVASCULAR: No chest pain or palpitations  GASTROINTESTINAL: No abdominal or epigastric pain. No nausea, vomiting, or hematemesis; No diarrhea, no constipation    Physical Exam:  General: obese female crying in pain  HEENT: EOMI  Neck: Supple, nontender, no mass  Neurology: A&Ox3, nonfocal,   Respiratory: CTA B/L, No W/R/R  CV: RRR, +S1/S2, no murmurs, rubs or gallops  Abdominal: Soft, NT, ND +BSx4  Skin: scar on neck and back from surgeries      A/P:32F PMH Asthma, Bipolar disorder, hypothyroidism, chronic low back pain s/p laminectomy and spinal fusion, opioid-dependent presents with right leg swelling, numbness, pallor, poikilothermia, and pulselessness, found to have ischemic leg due to acute compartment syndrome of right leg with acute rhabdomyolysis, GHAZAL 2/2 ATN, and leukocytosis with bandemia s/p emergent fasciotomy of the 4 leg compartments (anterior, lateral, superficial posterior, and deep posterior) and the lateral thigh muscles. Wound vac in place. Pain management for opioid dependence and abuse.   -Per nursing staff, they were encouraged not to give patient oral Dilaudid due to misuse  -After a discussion with patient about opioid dependence/misuse, she agreed to melatonin for sleep and an oral dose of acetaminophen 650mg for pain  -Will continue to monitor overnight for signs of pain, next dose of 0.5mg IV Dilaudid is at around 4am

## 2017-10-11 NOTE — PROGRESS NOTE ADULT - ASSESSMENT
32-year-old female now status post right lower extremity fasciotomy for prolonged compression after a fall with lethargy.     - no clear evidence of acute ischemia or volume overload, pt asymptomatic  - Persistent tachycardia HR 90s, likely reactive in the setting of severe illness  - renal function improving, continue IVF  - CK levels improving with IVF  - TTE with hyperdynamic LV function, EF 70%, no valvular disease noted  - No acute changes on EKG compared to previous  - monitor and replete lytes, keep K>4, Mg>2  - Other cardiovascular workup will depend on clinical course.  - All other workup per primary team  - Will follow

## 2017-10-11 NOTE — PROGRESS NOTE ADULT - PROBLEM SELECTOR PLAN 2
Pain management Dr. Damico consulted  Tapering prn IV and PO dilaudid as per Dr. Damico  Continue fentanyl patch  Will reach out to patients outpatient pain management doctor Jeffrey Galaviz.

## 2017-10-11 NOTE — PROGRESS NOTE ADULT - PROBLEM SELECTOR PLAN 1
compartment syndrome of the right lower extremity   -s/p emergent fasciotomy by Vascular surgery (Bobbi)   -s/p wound vac placement , changed today  -Finished course of IV abx aztreonam and clindamycin in ICU  -monitor labs

## 2017-10-11 NOTE — PROGRESS NOTE ADULT - SUBJECTIVE AND OBJECTIVE BOX
Patient is a 32y old  Female who presents with a chief complaint of RLE edema and numbness (04 Oct 2017 01:13)      Vascular Surgery Progress Note    Interval HPI: POD #6 . Patient is sitting in a chair and appears comfortable.  She had her wound VAC changed today.     Medications:      Allergies:  Allergies    avocado (Unknown)  latex (Urticaria; Rash)  penicillin (Unknown)  sulfa drugs (Unknown)    Intolerances        Vital Signs Last 24 Hrs  T(C): 36.9 (11 Oct 2017 15:49), Max: 37.4 (10 Oct 2017 23:41)  T(F): 98.4 (11 Oct 2017 15:49), Max: 99.3 (10 Oct 2017 23:41)  HR: 102 (11 Oct 2017 15:49) (96 - 102)  BP: 103/68 (11 Oct 2017 15:49) (103/68 - 111/72)  BP(mean): --  RR: 16 (11 Oct 2017 15:49) (16 - 16)  SpO2: 99% (11 Oct 2017 15:49) (96% - 99%)  I&O's Summary    10 Oct 2017 07:01  -  11 Oct 2017 07:00  --------------------------------------------------------  IN: 900 mL / OUT: 9450 mL / NET: -8550 mL    11 Oct 2017 07:01  -  11 Oct 2017 18:14  --------------------------------------------------------  IN: 1160 mL / OUT: 2500 mL / NET: -1340 mL        Physical Exam:  Gen: NAD, A&Ox   Vascular status is unchanged with a well perfused right foot and a strong palpable DP pulse. Wounds remain clean and viable.    LABS:                        7.3    6.7   )-----------( 196      ( 11 Oct 2017 08:43 )             23.1     10-11    137  |  105  |  79<H>  ----------------------------<  85  3.8   |  20<L>  |  4.80<H>    Ca    8.4<L>      11 Oct 2017 08:43  Phos  7.4     10-10    TPro  5.1<L>  /  Alb  1.6<L>  /  TBili  0.2  /  DBili  x   /  AST  65<H>  /  ALT  42  /  AlkPhos  39<L>  10-10

## 2017-10-11 NOTE — PROGRESS NOTE ADULT - PROBLEM SELECTOR PLAN 3
fentanyl Patch  Dilaudid IV and PO prn  counseling  emotional support  reassurance  local wound care  vascular follow up

## 2017-10-11 NOTE — PROGRESS NOTE ADULT - SUBJECTIVE AND OBJECTIVE BOX
Patient is a 32y old  Female who presents with a chief complaint of RLE edema and numbness (04 Oct 2017 01:13)      Patient seen in follow up for GHAZAL, ATN, Rhabdomyolysis. Good UO.  Improving renal function.     PAST MEDICAL HISTORY:  Low back pain  Hypothyroid  Bipolar 1 disorder    MEDICATIONS  (STANDING):  buPROPion XL . 300 milliGRAM(s) Oral daily  docusate sodium 100 milliGRAM(s) Oral two times a day  fentaNYL   Patch  25 MICROgram(s)/Hr 1 Patch Transdermal every 72 hours  fluticasone propionate 50 MICROgram(s)/spray Nasal Spray 1 Spray(s) Both Nostrils two times a day  heparin  Injectable 5000 Unit(s) SubCutaneous every 8 hours  lamoTRIgine 100 milliGRAM(s) Oral every 8 hours  levothyroxine 150 MICROGram(s) Oral daily  oxyCODONE  ER Tablet 60 milliGRAM(s) Oral every 8 hours  pantoprazole    Tablet 40 milliGRAM(s) Oral before breakfast  polyethylene glycol 3350 17 Gram(s) Oral daily  risperiDONE   Tablet 8 milliGRAM(s) Oral at bedtime  sertraline 50 milliGRAM(s) Oral daily  sevelamer carbonate 1600 milliGRAM(s) Oral three times a day with meals  sodium chloride 0.9%. 1000 milliLiter(s) (75 mL/Hr) IV Continuous <Continuous>  topiramate 400 milliGRAM(s) Oral two times a day    MEDICATIONS  (PRN):  acetaminophen   Tablet 650 milliGRAM(s) Oral every 6 hours PRN For Temp greater than 38 C (100.4 F)  clonazePAM Tablet 2 milliGRAM(s) Oral every 8 hours PRN ANxiety  HYDROmorphone   Tablet 2 milliGRAM(s) Oral four times a day PRN moderate to severe pain  HYDROmorphone  Injectable 0.5 milliGRAM(s) IV Push every 3 hours PRN Severe Pain (7 - 10)  ondansetron Injectable 4 milliGRAM(s) IV Push once PRN Nausea and/or Vomiting  senna 2 Tablet(s) Oral at bedtime PRN Constipation    T(C): 37 (10-11-17 @ 07:41), Max: 37.4 (10-10-17 @ 23:41)  HR: 98 (10-11-17 @ 07:41) (92 - 100)  BP: 111/72 (10-11-17 @ 07:41) (103/67 - 123/77)  RR: 16 (10-11-17 @ 07:41)  SpO2: 96% (10-11-17 @ 07:41)  Wt(kg): --  I&O's Detail    10 Oct 2017 07:01  -  11 Oct 2017 07:00  --------------------------------------------------------  IN:    sodium chloride 0.9%.: 900 mL  Total IN: 900 mL    OUT:    Indwelling Catheter - Urethral: 9000 mL    VAC (Vacuum Assisted Closure) System: 450 mL  Total OUT: 9450 mL    Total NET: -8550 mL      11 Oct 2017 07:01  -  11 Oct 2017 10:59  --------------------------------------------------------  IN:    Oral Fluid: 680 mL  Total IN: 680 mL    OUT:  Total OUT: 0 mL    Total NET: 680 mL              PHYSICAL EXAM:  General: NAD  Respiratory: b/l air entry  Cardiovascular: S1 S2  Gastrointestinal: soft  Extremities:  Rt leg dressing                LABORATORY:                        7.3    6.7   )-----------( 196      ( 11 Oct 2017 08:43 )             23.1     10-11    137  |  105  |  79<H>  ----------------------------<  85  3.8   |  20<L>  |  4.80<H>    Ca    8.4<L>      11 Oct 2017 08:43  Phos  7.4     10-10    TPro  5.1<L>  /  Alb  1.6<L>  /  TBili  0.2  /  DBili  x   /  AST  65<H>  /  ALT  42  /  AlkPhos  39<L>  10-10    Sodium, Serum: 137 mmol/L (10-11 @ 08:43)  Sodium, Serum: 135 mmol/L (10-10 @ 08:46)    Potassium, Serum: 3.8 mmol/L (10-11 @ 08:43)  Potassium, Serum: 3.6 mmol/L (10-10 @ 08:46)    Hemoglobin: 7.3 g/dL (10-11 @ 08:43)  Hemoglobin: 7.9 g/dL (10-10 @ 08:46)  Hemoglobin: 7.2 g/dL (10-09 @ 07:00)    Creatinine, Serum 4.80 (10-11 @ 08:43)  Creatinine, Serum 5.50 (10-10 @ 08:46)  Creatinine, Serum 6.40 (10-09 @ 07:00)    CARDIAC MARKERS ( 10 Oct 2017 08:46 )  x     / x     / 1377 U/L / x     / x          LIVER FUNCTIONS - ( 10 Oct 2017 08:46 )  Alb: 1.6 g/dL / Pro: 5.1 g/dL / ALK PHOS: 39 U/L / ALT: 42 U/L / AST: 65 U/L / GGT: x

## 2017-10-11 NOTE — PROGRESS NOTE ADULT - SUBJECTIVE AND OBJECTIVE BOX
Neurology follow up note    ABELARDO [id number]      Interval History:    Patient had pain last night in leg now is feeling sleepy     MEDICATIONS    acetaminophen   Tablet 650 milliGRAM(s) Oral every 6 hours PRN  buPROPion XL . 300 milliGRAM(s) Oral daily  clonazePAM Tablet 2 milliGRAM(s) Oral every 8 hours PRN  docusate sodium 100 milliGRAM(s) Oral two times a day  fentaNYL   Patch  25 MICROgram(s)/Hr 1 Patch Transdermal every 72 hours  fluticasone propionate 50 MICROgram(s)/spray Nasal Spray 1 Spray(s) Both Nostrils two times a day  heparin  Injectable 5000 Unit(s) SubCutaneous every 8 hours  HYDROmorphone   Tablet 2 milliGRAM(s) Oral four times a day PRN  HYDROmorphone  Injectable 0.5 milliGRAM(s) IV Push every 3 hours PRN  lamoTRIgine 100 milliGRAM(s) Oral every 8 hours  levothyroxine 150 MICROGram(s) Oral daily  ondansetron Injectable 4 milliGRAM(s) IV Push once PRN  oxyCODONE  ER Tablet 60 milliGRAM(s) Oral every 8 hours  pantoprazole    Tablet 40 milliGRAM(s) Oral before breakfast  polyethylene glycol 3350 17 Gram(s) Oral daily  risperiDONE   Tablet 8 milliGRAM(s) Oral at bedtime  senna 2 Tablet(s) Oral at bedtime PRN  sertraline 50 milliGRAM(s) Oral daily  sevelamer carbonate 1600 milliGRAM(s) Oral three times a day with meals  sodium chloride 0.9%. 1000 milliLiter(s) IV Continuous <Continuous>  topiramate 400 milliGRAM(s) Oral two times a day      Allergies    avocado (Unknown)  latex (Urticaria; Rash)  penicillin (Unknown)  sulfa drugs (Unknown)    Intolerances            Vital Signs Last 24 Hrs  T(C): 37 (11 Oct 2017 07:41), Max: 37.4 (10 Oct 2017 23:41)  T(F): 98.6 (11 Oct 2017 07:41), Max: 99.3 (10 Oct 2017 23:41)  HR: 98 (11 Oct 2017 07:41) (96 - 100)  BP: 111/72 (11 Oct 2017 07:41) (103/67 - 111/72)  BP(mean): --  RR: 16 (11 Oct 2017 07:41) (16 - 16)  SpO2: 96% (11 Oct 2017 07:41) (96% - 99%)      REVIEW OF SYSTEMS:     Constitutional: No fever, chills, fatigue, weakness  Eyes: no eye pain, visual disturbances, or discharge  ENT:  No difficulty hearing, tinnitus, vertigo; No sinus or throat pain  Neck: No pain or stiffness  Respiratory: No cough, dyspnea, wheezing   Cardiovascular: No chest pain, palpitations,   Gastrointestinal: No abdominal or epigastric pain. No nausea, vomiting  No diarrhea or constipation.   Genitourinary: No dysuria, frequency, hematuria or incontinence  Neurological: + numbness right leg  Psychiatric: No depression, anxiety, mood swings or difficulty sleeping  Musculoskeletal: + swelling; + extremity pain  Skin: No itching, burning, rashes or lesions   Lymph Nodes: No enlarged glands  Endocrine: No heat or cold intolerance; No hair loss   Allergy and Immunologic: No hives or eczema    On Neurological Examination:    Mental Status - Patient is sleepy      Follow simple commands    Speech -   Fluent                     Cranial Nerves - Pupils 3 mm equal and reactive to light,   extraocular eye movements intact.   smile symmetric  intact bilateral NLF     Motor:  Bilateral upper were 5/5,   left lower was 4+/5,   right lower examination was limited secondary to edema and the patient has a dressing that goes from a mid thigh down to her ankle.  There was subtle flexation at the hip, would say 2-/5.  There was no movement of her right foot in regard to dorsi plantar flexation.      sensory -The patient was unable to feel in her right foot.  The patient did appear to have feeling in the top part of her thigh.  The rest of examination was limited secondary to dressing.  Unable to evaluate reflexes secondary to surgery and dressing       GENERAL Exam: Nontoxic , No Acute Distress   	  HEENT:  normocephalic, atraumatic  		  LUNGS: Clear bilaterally    HEART: Normal S1S2   No murmur RRR        	  GI/ ABDOMEN:  Soft  Non tender  	   SKIN: Normal  No Ecchymosis               LABS:  CBC Full  -  ( 11 Oct 2017 08:43 )  WBC Count : 6.7 K/uL  Hemoglobin : 7.3 g/dL  Hematocrit : 23.1 %  Platelet Count - Automated : 196 K/uL  Mean Cell Volume : 91.1 fl  Mean Cell Hemoglobin : 28.9 pg  Mean Cell Hemoglobin Concentration : 31.7 gm/dL  Auto Neutrophil # : x  Auto Lymphocyte # : x  Auto Monocyte # : x  Auto Eosinophil # : x  Auto Basophil # : x  Auto Neutrophil % : x  Auto Lymphocyte % : x  Auto Monocyte % : x  Auto Eosinophil % : x  Auto Basophil % : x      10-11    137  |  105  |  79<H>  ----------------------------<  85  3.8   |  20<L>  |  4.80<H>    Ca    8.4<L>      11 Oct 2017 08:43  Phos  7.4     10-10    TPro  5.1<L>  /  Alb  1.6<L>  /  TBili  0.2  /  DBili  x   /  AST  65<H>  /  ALT  42  /  AlkPhos  39<L>  10-10    Hemoglobin A1C:     LIVER FUNCTIONS - ( 10 Oct 2017 08:46 )  Alb: 1.6 g/dL / Pro: 5.1 g/dL / ALK PHOS: 39 U/L / ALT: 42 U/L / AST: 65 U/L / GGT: x           Vitamin B12         RADIOLOGY    ANALYSIS AND PLAN:  This is a 32-year-old with a history of being found on the ground, status post compartment syndrome with fasciotomy surgery.    1-For compartment syndrome, fasciotomy, examination is extremely limited secondary to dressing on her right leg status post surgical intervention.  The patient does appear to have movement proximally at the hip, but no movement distally.  The patient does not appear to have any sensory modalities distally.  This could be secondary to underlying neurapraxia from the edema and also from the compartment syndrome.     I would recommend to continue pain medication as needed.      The patient will need Physical Therapy.      With time we will see how much improvement there is in regards to motor and sensory.  The patient may need nerve conduction studies on an outpatient basis.      From Neurology standpoint, the patient is stable for discharge planning.    Thank you for the courtesy of this consultation.    30 minutes spent on total encounter; more than 50% of the visit was spent counseling and/or coordinating care by the attending physician.

## 2017-10-11 NOTE — PROGRESS NOTE ADULT - ASSESSMENT
32-year-old female now status post right lower extremity  fasciotomy for compartment syndrome. Pt on vent support.     ·	GHAZAL, ATN Septic / ischemic, Rhabdomyolysis  ·	s/p surgery for compartment syndrome  ·	Hyperphosphatemia  ·	Anemia    Improving renal function. Will lower IVF. Avoid nephrotoxic meds as possible. Will follow electrolytes and renal function trend. Surgery follow up. On phos binders.

## 2017-10-12 DIAGNOSIS — J45.909 UNSPECIFIED ASTHMA, UNCOMPLICATED: ICD-10-CM

## 2017-10-12 LAB
ANION GAP SERPL CALC-SCNC: 12 MMOL/L — SIGNIFICANT CHANGE UP (ref 5–17)
BUN SERPL-MCNC: 73 MG/DL — HIGH (ref 7–23)
CALCIUM SERPL-MCNC: 8.5 MG/DL — SIGNIFICANT CHANGE UP (ref 8.5–10.1)
CHLORIDE SERPL-SCNC: 101 MMOL/L — SIGNIFICANT CHANGE UP (ref 96–108)
CO2 SERPL-SCNC: 18 MMOL/L — LOW (ref 22–31)
CREAT SERPL-MCNC: 4.1 MG/DL — HIGH (ref 0.5–1.3)
GLUCOSE SERPL-MCNC: 95 MG/DL — SIGNIFICANT CHANGE UP (ref 70–99)
HCT VFR BLD CALC: 25.2 % — LOW (ref 34.5–45)
HGB BLD-MCNC: 8 G/DL — LOW (ref 11.5–15.5)
MCHC RBC-ENTMCNC: 29.3 PG — SIGNIFICANT CHANGE UP (ref 27–34)
MCHC RBC-ENTMCNC: 31.8 GM/DL — LOW (ref 32–36)
MCV RBC AUTO: 92 FL — SIGNIFICANT CHANGE UP (ref 80–100)
PLATELET # BLD AUTO: 245 K/UL — SIGNIFICANT CHANGE UP (ref 150–400)
POTASSIUM SERPL-MCNC: 3.6 MMOL/L — SIGNIFICANT CHANGE UP (ref 3.5–5.3)
POTASSIUM SERPL-SCNC: 3.6 MMOL/L — SIGNIFICANT CHANGE UP (ref 3.5–5.3)
RBC # BLD: 2.74 M/UL — LOW (ref 3.8–5.2)
RBC # FLD: 12.5 % — SIGNIFICANT CHANGE UP (ref 10.3–14.5)
SODIUM SERPL-SCNC: 131 MMOL/L — LOW (ref 135–145)
WBC # BLD: 9.4 K/UL — SIGNIFICANT CHANGE UP (ref 3.8–10.5)
WBC # FLD AUTO: 9.4 K/UL — SIGNIFICANT CHANGE UP (ref 3.8–10.5)

## 2017-10-12 PROCEDURE — 71010: CPT | Mod: 26

## 2017-10-12 PROCEDURE — 99233 SBSQ HOSP IP/OBS HIGH 50: CPT

## 2017-10-12 RX ORDER — ALBUTEROL 90 UG/1
2 AEROSOL, METERED ORAL EVERY 6 HOURS
Qty: 0 | Refills: 0 | Status: DISCONTINUED | OUTPATIENT
Start: 2017-10-12 | End: 2017-10-27

## 2017-10-12 RX ORDER — BUDESONIDE AND FORMOTEROL FUMARATE DIHYDRATE 160; 4.5 UG/1; UG/1
2 AEROSOL RESPIRATORY (INHALATION)
Qty: 0 | Refills: 0 | Status: DISCONTINUED | OUTPATIENT
Start: 2017-10-12 | End: 2017-10-27

## 2017-10-12 RX ORDER — INFLUENZA VIRUS VACCINE 15; 15; 15; 15 UG/.5ML; UG/.5ML; UG/.5ML; UG/.5ML
0.5 SUSPENSION INTRAMUSCULAR ONCE
Qty: 0 | Refills: 0 | Status: COMPLETED | OUTPATIENT
Start: 2017-10-12 | End: 2017-11-10

## 2017-10-12 RX ADMIN — HYDROMORPHONE HYDROCHLORIDE 0.5 MILLIGRAM(S): 2 INJECTION INTRAMUSCULAR; INTRAVENOUS; SUBCUTANEOUS at 13:00

## 2017-10-12 RX ADMIN — HYDROMORPHONE HYDROCHLORIDE 0.5 MILLIGRAM(S): 2 INJECTION INTRAMUSCULAR; INTRAVENOUS; SUBCUTANEOUS at 12:21

## 2017-10-12 RX ADMIN — SERTRALINE 50 MILLIGRAM(S): 25 TABLET, FILM COATED ORAL at 12:04

## 2017-10-12 RX ADMIN — Medication 400 MILLIGRAM(S): at 06:19

## 2017-10-12 RX ADMIN — SEVELAMER CARBONATE 1600 MILLIGRAM(S): 2400 POWDER, FOR SUSPENSION ORAL at 12:05

## 2017-10-12 RX ADMIN — HEPARIN SODIUM 5000 UNIT(S): 5000 INJECTION INTRAVENOUS; SUBCUTANEOUS at 13:45

## 2017-10-12 RX ADMIN — HYDROMORPHONE HYDROCHLORIDE 0.5 MILLIGRAM(S): 2 INJECTION INTRAMUSCULAR; INTRAVENOUS; SUBCUTANEOUS at 04:17

## 2017-10-12 RX ADMIN — LAMOTRIGINE 100 MILLIGRAM(S): 25 TABLET, ORALLY DISINTEGRATING ORAL at 21:38

## 2017-10-12 RX ADMIN — BUDESONIDE AND FORMOTEROL FUMARATE DIHYDRATE 2 PUFF(S): 160; 4.5 AEROSOL RESPIRATORY (INHALATION) at 21:36

## 2017-10-12 RX ADMIN — HYDROMORPHONE HYDROCHLORIDE 0.5 MILLIGRAM(S): 2 INJECTION INTRAMUSCULAR; INTRAVENOUS; SUBCUTANEOUS at 22:05

## 2017-10-12 RX ADMIN — Medication 2 MILLIGRAM(S): at 13:45

## 2017-10-12 RX ADMIN — SODIUM CHLORIDE 30 MILLILITER(S): 9 INJECTION INTRAMUSCULAR; INTRAVENOUS; SUBCUTANEOUS at 04:17

## 2017-10-12 RX ADMIN — HEPARIN SODIUM 5000 UNIT(S): 5000 INJECTION INTRAVENOUS; SUBCUTANEOUS at 06:18

## 2017-10-12 RX ADMIN — Medication 1 SPRAY(S): at 06:18

## 2017-10-12 RX ADMIN — OXYCODONE HYDROCHLORIDE 60 MILLIGRAM(S): 5 TABLET ORAL at 22:30

## 2017-10-12 RX ADMIN — HYDROMORPHONE HYDROCHLORIDE 0.5 MILLIGRAM(S): 2 INJECTION INTRAMUSCULAR; INTRAVENOUS; SUBCUTANEOUS at 04:35

## 2017-10-12 RX ADMIN — Medication 400 MILLIGRAM(S): at 18:16

## 2017-10-12 RX ADMIN — HYDROMORPHONE HYDROCHLORIDE 0.5 MILLIGRAM(S): 2 INJECTION INTRAMUSCULAR; INTRAVENOUS; SUBCUTANEOUS at 21:38

## 2017-10-12 RX ADMIN — Medication 100 MILLIGRAM(S): at 18:33

## 2017-10-12 RX ADMIN — LAMOTRIGINE 100 MILLIGRAM(S): 25 TABLET, ORALLY DISINTEGRATING ORAL at 06:19

## 2017-10-12 RX ADMIN — OXYCODONE HYDROCHLORIDE 60 MILLIGRAM(S): 5 TABLET ORAL at 13:45

## 2017-10-12 RX ADMIN — HEPARIN SODIUM 5000 UNIT(S): 5000 INJECTION INTRAVENOUS; SUBCUTANEOUS at 21:36

## 2017-10-12 RX ADMIN — HYDROMORPHONE HYDROCHLORIDE 0.5 MILLIGRAM(S): 2 INJECTION INTRAMUSCULAR; INTRAVENOUS; SUBCUTANEOUS at 18:58

## 2017-10-12 RX ADMIN — SEVELAMER CARBONATE 1600 MILLIGRAM(S): 2400 POWDER, FOR SUSPENSION ORAL at 18:16

## 2017-10-12 RX ADMIN — RISPERIDONE 8 MILLIGRAM(S): 4 TABLET ORAL at 21:37

## 2017-10-12 RX ADMIN — BUPROPION HYDROCHLORIDE 300 MILLIGRAM(S): 150 TABLET, EXTENDED RELEASE ORAL at 12:04

## 2017-10-12 RX ADMIN — OXYCODONE HYDROCHLORIDE 60 MILLIGRAM(S): 5 TABLET ORAL at 21:38

## 2017-10-12 RX ADMIN — HYDROMORPHONE HYDROCHLORIDE 0.5 MILLIGRAM(S): 2 INJECTION INTRAMUSCULAR; INTRAVENOUS; SUBCUTANEOUS at 18:17

## 2017-10-12 RX ADMIN — POLYETHYLENE GLYCOL 3350 17 GRAM(S): 17 POWDER, FOR SOLUTION ORAL at 12:04

## 2017-10-12 RX ADMIN — Medication 150 MICROGRAM(S): at 05:05

## 2017-10-12 RX ADMIN — LAMOTRIGINE 100 MILLIGRAM(S): 25 TABLET, ORALLY DISINTEGRATING ORAL at 13:45

## 2017-10-12 RX ADMIN — OXYCODONE HYDROCHLORIDE 60 MILLIGRAM(S): 5 TABLET ORAL at 06:19

## 2017-10-12 RX ADMIN — Medication 100 MILLIGRAM(S): at 06:18

## 2017-10-12 RX ADMIN — BUDESONIDE AND FORMOTEROL FUMARATE DIHYDRATE 2 PUFF(S): 160; 4.5 AEROSOL RESPIRATORY (INHALATION) at 09:07

## 2017-10-12 RX ADMIN — OXYCODONE HYDROCHLORIDE 60 MILLIGRAM(S): 5 TABLET ORAL at 06:40

## 2017-10-12 RX ADMIN — OXYCODONE HYDROCHLORIDE 60 MILLIGRAM(S): 5 TABLET ORAL at 14:45

## 2017-10-12 RX ADMIN — PANTOPRAZOLE SODIUM 40 MILLIGRAM(S): 20 TABLET, DELAYED RELEASE ORAL at 06:18

## 2017-10-12 RX ADMIN — SEVELAMER CARBONATE 1600 MILLIGRAM(S): 2400 POWDER, FOR SUSPENSION ORAL at 07:56

## 2017-10-12 RX ADMIN — Medication 1 SPRAY(S): at 18:17

## 2017-10-12 NOTE — PROGRESS NOTE ADULT - NSHPATTENDINGPLANDISCUSS_GEN_ALL_CORE
patient, mom, [phone] Dr. Damico, nurse, renal, neuro, outpatient pain management doctor Jeffrey Galaviz [phone]

## 2017-10-12 NOTE — PROGRESS NOTE ADULT - ASSESSMENT
32-year-old female now status post right lower extremity  fasciotomy for compartment syndrome. Pt on vent support.     ·	GHAZAL, ATN Septic / ischemic, Rhabdomyolysis  ·	s/p surgery for compartment syndrome  ·	Hyperphosphatemia  ·	Anemia    Improving renal function. Will d/c IVF. Avoid nephrotoxic meds as possible. Will follow electrolytes and renal function trend. Surgery follow up. On phos binders. Repeat phos levels. Low phos diet.

## 2017-10-12 NOTE — PROGRESS NOTE ADULT - ATTENDING COMMENTS
Reached out to patients outpatient pain management doctor Jeffrey Galaviz [phone] Reached out to patients outpatient pain management doctor Jeffrey Gaalviz [phone]  Patient has been following with Dr Galaviz for years after back surgery for chronic pain.   Patient was on Morphine 100mg TID and Dilaudid 8mg 4x a day as per Dr. Galaviz.  She was seen in his office prior to admission for lumbar epidural.  Dr. Painteri aware of current pain management plan.

## 2017-10-12 NOTE — PROGRESS NOTE ADULT - PROBLEM SELECTOR PLAN 3
hx of asthma as per pt  will restart home meds  proventil PRN  symbicort BID  check CXR this am, eval pulm edema, had a lot IVF for rhabdo tx

## 2017-10-12 NOTE — CHART NOTE - NSCHARTNOTEFT_GEN_A_CORE
S: Called by RN that pt's was complaining of SOB. Patient placed on 2L NC, which is not normal for her. Pt seen and examined at bedside, O2 sat 100%. Patient reports history of Asthma with use of Symbicort, Albuterol, having mild dyspnea while speaking. Pt denies of any CP/ palpitation/ diaphoresis.       CONSTITUTIONAL: no fevers or chills  RESPIRATORY: no cough, no wheezing, no hemoptysis, positive dyspnea   CARDIOVASCULAR: No chest pain or palpitations  GASTROINTESTINAL: No abdominal or epigastric pain. No nausea, vomiting, or hematemesis; No diarrhea, no constipation  GENITOURINARY: No dysuria, frequency or hematuria  NEUROLOGICAL: No numbness or weakness     Physical Exam:  General: obese female in no acute distress  HEENT: NCAT, PERRLA, EOMI bl, moist mucous membranes   Neck: Supple, nontender, no mass  Neurology: A&Ox3, nonfocal, CN II-XII grossly intact, sensation intact, no gait abnormalities   Respiratory: CTA B/L, No W/R/R  CV: RRR, +S1/S2, no murmurs, rubs or gallops  Abdominal: Soft, NT, ND +BSx4  Extremities: No C/C/E, +peripheral pulses; no calf tenderness to palpation  MSK: Normal ROM, no joint erythema or warmth, no joint swelling   Skin: warm, dry, normal color, no rash or abnormal lesions      A/P: 87yo F w/PMHx of HTN, AFib, Anemia, Lung Ca s/p pneumectomy presents with intermittent bright red GI bleeding with dark stools.  1. EKG stat, remote tele until labs come back  2. H/H to be done at 11pm instead of 2am   3. pRBC transfusion if hemoglobin <8  4. Positive FOBT, possible colonoscopy tomorrow  5. Case discussed with senior and attending, will continue monitor. S: Called by RN that pt's was complaining of SOB. Patient placed on 2L NC, which is not normal for her. Pt seen and examined at bedside, O2 sat 100%. Patient reports history of Asthma and uses Symbicort, Albuterol, and Spiriva PRN. Reports she is wheezing and has a cough. Also reports mild chest pain, patient able to palpate location.     CONSTITUTIONAL: no fevers or chills  RESPIRATORY: positive cough, positive wheezing, no hemoptysis, no dyspnea   CARDIOVASCULAR: positive chest pain, no palpitations  GASTROINTESTINAL: No abdominal or epigastric pain. No nausea, vomiting, or hematemesis; No diarrhea, no constipation  GENITOURINARY: No dysuria, frequency or hematuria  NEUROLOGICAL: No numbness or weakness     Physical Exam:  General: obese female in no acute distress  HEENT: NCAT, PERRLA, EOMI bl  Neurology: A&Ox3, nonfocal   Respiratory: CTA B/L, No W/R/R  CV: RRR, +S1/S2, no murmurs, rubs or gallops  Abdominal: Soft, NT, ND +BSx4    A/P: 32F with PMHX of Bipolar disorder, hypothyroidism s/p laminectomy and spinal fusion in 2016 who is BIB EMS for right leg swelling and numbness. Admitted for Right LE compartment syndrome.  -Patient refusing chest pain/SOB work up at this time. Doesn't want blood drawn or chest x-ray  -Albuterol 2puffs inhaler given once  -Continue 2L NC  -if symptoms don't improve will reconsider work up  -case discussed with senior, will continue to monitor S: Called by RN that pt's was complaining of SOB. Patient placed on 2L NC, which is not normal for her. Pt seen and examined at bedside, O2 sat 98%. Patient reports history of Asthma and uses Symbicort, Albuterol, and Spiriva PRN. Reports she is wheezing and has a cough. Also reports mild chest pain, patient able to palpate location.     CONSTITUTIONAL: no fevers or chills  RESPIRATORY: positive cough, positive wheezing, no hemoptysis, no dyspnea   CARDIOVASCULAR: positive chest pain, no palpitations  GASTROINTESTINAL: No abdominal or epigastric pain. No nausea, vomiting, or hematemesis; No diarrhea, no constipation  GENITOURINARY: No dysuria, frequency or hematuria  NEUROLOGICAL: No numbness or weakness     Physical Exam:  Vitals: Temp 98.5, BP  122/79, , RR 16, SpO2 98% 2L NC  General: obese female in no acute distress  HEENT: NCAT, PERRLA, EOMI bl  Neurology: A&Ox3, nonfocal   Respiratory: CTA B/L, No W/R/R  CV: RRR, +S1/S2, no murmurs, rubs or gallops  Abdominal: Soft, NT, ND +BSx4    A/P: 32F with PMHX of Bipolar disorder, hypothyroidism s/p laminectomy and spinal fusion in 2016 who is BIB EMS for right leg swelling and numbness. Admitted for Right LE compartment syndrome.  -Patient refusing chest pain/SOB work up at this time. Doesn't want blood drawn or chest x-ray  -Albuterol 2puffs inhaler given once  -Continue 2L NC  -if symptoms don't improve will reconsider work up  -case discussed with senior, will continue to monitor

## 2017-10-12 NOTE — PROGRESS NOTE ADULT - SUBJECTIVE AND OBJECTIVE BOX
HPI:  Pt is a 33 yo female with PMHX of Bipolar disorder, hypothyroidism, chronic low back pain on morphine s/p laminectomy and spinal fusion in 2016 who is BIB EMS for right leg swelling and numbness. Patient is a poor historian as she is on many pain medicines and is currently lethargic, therefore much info provided based on hospital staff and charts. She states that 18 hours prior to arrival, she fell on the floor at her house after taking too much morphine and was lying on floor for 8 hours. She woke up to numbness, swelling, and cool temperature of her right leg, from below the knee onwards. Patient denies any current SOB, CP, f/c, denies head trauma.     In the ED, vitals were Temp 101.4F /91  RR 20  SpO2 93% RA. Significant labs are WBS 24.3, Hb 15.6, Hmt 47.6,  Platelets 118, neutr 93%, sodium 131, BUN 30,  Cr 2.30, glucose 152 AST 1377, , GFR 27, lactate 1.7. Doppler US RLE showed Extremely limited evaluation of the right lower extremity with nonvisualization of the venous structures beyond the mid right femoral vein. No evidence of DVT within the right common femoral, proximal and   mid femoral veins. No evidence of left lower extremity deep venous thrombosis. XR of right tibia and XR of R femur showed no acute fractures. Arterial Doppler of RLE pending. EKG showed sinus tach of 140, with right superior axis deviation. Patient started on aztreonam, Tylenol given for fever, patient made NPO, Dr. Martines consulted for emergent surgery. (04 Oct 2017 01:13)      SUBJECTIVE:  Patient is a 32y old  Female who presents with a chief complaint of RLE edema and numbness (04 Oct 2017 01:13)    Sitting on the chair, sound asleep, on room air.  Awakened by name-calling and c/o right leg pain.  Denies CP, SOB, palpitations.      OBJECTIVE:  Review Of Systems:  Constitutional: [ ] Fever [ ] Chills [ ] Fatigue [ ] Weight change   HEENT: [ ] Blurred vision [ ] Eye Pain [ ] Headache [ ] Runny nose [ ] Sore Throat   Respiratory: [ ] Cough [ ] Wheezing [ ] Shortness of breath  Cardiovascular: [ ] Chest Pain [ ] Palpitations [ ] BENNETT [ ] PND [ ] Orthopnea  Gastrointestinal: [ ] Abdominal Pain [ ] Diarrhea [ ] Constipation [ ] Hemorrhoids [ ] Nausea [ ] Vomiting  Genitourinary: [ ] Nocturia [ ] Dysuria [ ] Incontinence  Extremities: [ ] Swelling [ ] Joint Pain  Neurologic: [ ] Focal deficit [ ] Paresthesias [ ] Syncope  Lymphatic: [ ] Swelling [ ] Lymphadenopathy   Skin: [ ] Rash [ ] Ecchymoses [ ] Wounds [ ] Lesions  Psychiatry: [ ] Depression [ ] Suicidal/Homicidal Ideation [ ] Anxiety [ ] Sleep Disturbances  [x ] 10 point review of systems is otherwise negative except as mentioned above            [ ]Unable to obtain    Allergy:  Allergies    avocado (Unknown)  latex (Urticaria; Rash)  penicillin (Unknown)  sulfa drugs (Unknown)    Intolerances    Medications:  MEDICATIONS  (STANDING):  buDESOnide  80 MICROgram(s)/formoterol 4.5 MICROgram(s) Inhaler 2 Puff(s) Inhalation two times a day  buPROPion XL . 300 milliGRAM(s) Oral daily  docusate sodium 100 milliGRAM(s) Oral two times a day  fentaNYL   Patch  25 MICROgram(s)/Hr 1 Patch Transdermal every 72 hours  fluticasone propionate 50 MICROgram(s)/spray Nasal Spray 1 Spray(s) Both Nostrils two times a day  heparin  Injectable 5000 Unit(s) SubCutaneous every 8 hours  lamoTRIgine 100 milliGRAM(s) Oral every 8 hours  levothyroxine 150 MICROGram(s) Oral daily  oxyCODONE  ER Tablet 60 milliGRAM(s) Oral every 8 hours  pantoprazole    Tablet 40 milliGRAM(s) Oral before breakfast  polyethylene glycol 3350 17 Gram(s) Oral daily  risperiDONE   Tablet 8 milliGRAM(s) Oral at bedtime  sertraline 50 milliGRAM(s) Oral daily  sevelamer carbonate 1600 milliGRAM(s) Oral three times a day with meals  sodium chloride 0.9%. 1000 milliLiter(s) (30 mL/Hr) IV Continuous <Continuous>  topiramate 400 milliGRAM(s) Oral two times a day    MEDICATIONS  (PRN):  acetaminophen   Tablet 650 milliGRAM(s) Oral every 6 hours PRN For Temp greater than 38 C (100.4 F)  ALBUTerol    90 MICROgram(s) HFA Inhaler 2 Puff(s) Inhalation every 6 hours PRN Shortness of Breath and/or Wheezing  clonazePAM Tablet 2 milliGRAM(s) Oral every 8 hours PRN ANxiety  HYDROmorphone   Tablet 2 milliGRAM(s) Oral four times a day PRN moderate to severe pain  HYDROmorphone  Injectable 0.5 milliGRAM(s) IV Push every 3 hours PRN Severe Pain (7 - 10)  ondansetron Injectable 4 milliGRAM(s) IV Push once PRN Nausea and/or Vomiting  senna 2 Tablet(s) Oral at bedtime PRN Constipation      PMH/PSH/FH/SH: [ ] Unchanged    Vitals:  T(C): 36.8 (10-12-17 @ 07:24), Max: 36.9 (10-11-17 @ 15:49)  HR: 103 (10-12-17 @ 07:24) (102 - 104)  BP: 116/75 (10-12-17 @ 07:24) (103/68 - 122/79)  BP(mean): --  RR: 17 (10-12-17 @ 07:24) (16 - 17)  SpO2: 98% (10-12-17 @ 07:24) (98% - 99%)  Wt(kg): --  Daily     Daily   I&O's Summary    11 Oct 2017 07:01  -  12 Oct 2017 07:00  --------------------------------------------------------  IN: 1520 mL / OUT: 6800 mL / NET: -5280 mL    12 Oct 2017 07:01  -  12 Oct 2017 11:07  --------------------------------------------------------  IN: 680 mL / OUT: 1425 mL / NET: -745 mL    Labs:                        8.0    9.4   )-----------( 245      ( 12 Oct 2017 07:57 )             25.2     10-12    131<L>  |  101  |  73<H>  ----------------------------<  95  3.6   |  18<L>  |  4.10<H>    Ca    8.5      12 Oct 2017 07:57    ECG:  < from: 12 Lead ECG (10.04.17 @ 08:44) >    Ventricular Rate 122 BPM    Atrial Rate 122 BPM    P-R Interval 136 ms    QRS Duration 94 ms    Q-T Interval 332 ms    QTC Calculation(Bezet) 473 ms    P Axis 35 degrees    R Axis -86 degrees    T Axis 10 degrees    Diagnosis Line Sinus tachycardia  Left axis deviation  Inferior infarct (cited on or before 03-OCT-2017)  Abnormal ECG  When compared with ECG of 03-OCT-2017 21:50, (Unconfirmed)  No significant change was found  Confirmed by Juanito Brink MD ([id number]) on 10/4/2017 1:55:40 PM    < end of copied text >    Echo:  < from: TTE Echo Doppler w/o Cont (10.05.17 @ 12:46) >     EXAM:  ECHO TTE W/O CON COMP W/DOPPLR         PROCEDURE DATE:  10/05/2017        INTERPRETATION:  Ordering Physician: MARISSA RUFF [id number]    Indication: Respiratory failure    Study Quality: Technically difficult in ICU   A complete echocardiographic study was performed utilizing standard   protocol including spectral and color Doppler in all echocardiographic   windows.    Height: 1 75 cm  Weight: 113 kg  BSA: 2.27  Blood Pressure: 104/76    MEASUREMENTS  IVS: 0.9cm  PWT: 0.9cm  LA: 3.5cm  AO: 2.8cm  LVIDd: 5.2cm  LVIDs: 3.5cm      LVEF: 70%    FINDINGS  Left Ventricle: Hyperdynamic left ventricle. Estimated EF 70%. No   segmental wall motion abnormalities  Aortic Valve: Not well-visualized. Probably normal trileaflet aortic valve  Mitral Valve: There is subtle systolic anterior motion of the mitral   valve in the setting of a hyperdynamic left ventricle. No left   ventricular outflow tract gradient is seen. Trace mitral regurgitation is   visualized.  Tricuspid Valve: Not well visualized. Trace tricuspid regurgitation  Pulmonic Valve: Not visualized.  Left Atrium: Grossly normal  Right Ventricle: Not well-visualized. Grossly normal right ventricular   size and function  Right Atrium: Grossly normal  Diastolic Function: Normaldiastolic function  Pericardium/Pleura: No pericardial effusion      CONCLUSIONS:  1. Technically difficult study  2. Hyperdynamic left ventricle. Estimated EF 70%  3. Subtle systolic anterior motion of the mitral valve in the setting of   a hyperdynamic left ventricle, but no outflow tract gradient.  4. Right ventricle is not well visualized.Grossly normal right   ventricular size and function  5. No pericardial effusion    No prior exam for comparison      ROBERTA MARKS   This document has been electronically signed. Oct  6 2017  1:33PM      < end of copied text >    Stress Testing:     Cath:    Imaging:    Interpretation of Telemetry:  Not on tele    Physical Exam:  Appearance: [ ] Normal  [ ] abnormal [x ] NAD [x] Obese  Eyes: [x ] PERRL [ ] EOMI  HENT: [x ] Normal [ ] Abnormal oral muscosa [ ]NC/AT  Cardiovascular: [x ] S1 [x ] S2 [x ] RRR [ ] m/r/g  [x] Slightly tachycardic [ ]edema [ ] JVP  Procedural Access Site: [ ]  hematoma [x ] tender to palpation [ ] 2+ pulse [ ] bruit [ ] Ecchymosis  Respiratory: [x ] Clear to auscultation bilaterally  [x] diminished  Gastrointestinal: [x ] Soft [ ] tenderness[ ] distension [ x] BS  Musculoskeletal: [ ] clubbing [ ] joint deformity [x] right leg swollen with dressing/ace wrap  Neurologic: [x ] Non-focal  Lymphatic: [ ] lymphadenopathy  Psychiatry: [x ] AAOx3  [ ] confused [ ] disoriented [ ] Mood & affect appropriate  [x] Flat  Skin: [ ]  rashes [ ] ecchymoses [ ] cyanosis

## 2017-10-12 NOTE — PROGRESS NOTE ADULT - SUBJECTIVE AND OBJECTIVE BOX
Patient is a 32y old  Female who presents with a chief complaint of RLE edema and numbness (04 Oct 2017 01:13)      Patient seen in follow up for GHAZAL, ATN, Rhabdomyolysis. Good UO.  Improving renal function. Coffey removed.     PAST MEDICAL HISTORY:  Low back pain  Hypothyroid  Bipolar 1 disorder    MEDICATIONS  (STANDING):  buDESOnide  80 MICROgram(s)/formoterol 4.5 MICROgram(s) Inhaler 2 Puff(s) Inhalation two times a day  buPROPion XL . 300 milliGRAM(s) Oral daily  docusate sodium 100 milliGRAM(s) Oral two times a day  fentaNYL   Patch  25 MICROgram(s)/Hr 1 Patch Transdermal every 72 hours  fluticasone propionate 50 MICROgram(s)/spray Nasal Spray 1 Spray(s) Both Nostrils two times a day  heparin  Injectable 5000 Unit(s) SubCutaneous every 8 hours  lamoTRIgine 100 milliGRAM(s) Oral every 8 hours  levothyroxine 150 MICROGram(s) Oral daily  oxyCODONE  ER Tablet 60 milliGRAM(s) Oral every 8 hours  pantoprazole    Tablet 40 milliGRAM(s) Oral before breakfast  polyethylene glycol 3350 17 Gram(s) Oral daily  risperiDONE   Tablet 8 milliGRAM(s) Oral at bedtime  sertraline 50 milliGRAM(s) Oral daily  sevelamer carbonate 1600 milliGRAM(s) Oral three times a day with meals  sodium chloride 0.9%. 1000 milliLiter(s) (30 mL/Hr) IV Continuous <Continuous>  topiramate 400 milliGRAM(s) Oral two times a day    MEDICATIONS  (PRN):  acetaminophen   Tablet 650 milliGRAM(s) Oral every 6 hours PRN For Temp greater than 38 C (100.4 F)  ALBUTerol    90 MICROgram(s) HFA Inhaler 2 Puff(s) Inhalation every 6 hours PRN Shortness of Breath and/or Wheezing  clonazePAM Tablet 2 milliGRAM(s) Oral every 8 hours PRN ANxiety  HYDROmorphone   Tablet 2 milliGRAM(s) Oral four times a day PRN moderate to severe pain  HYDROmorphone  Injectable 0.5 milliGRAM(s) IV Push every 3 hours PRN Severe Pain (7 - 10)  ondansetron Injectable 4 milliGRAM(s) IV Push once PRN Nausea and/or Vomiting  senna 2 Tablet(s) Oral at bedtime PRN Constipation    T(C): 36.8 (10-12-17 @ 07:24), Max: 37.4 (10-10-17 @ 23:41)  HR: 103 (10-12-17 @ 07:24) (96 - 104)  BP: 116/75 (10-12-17 @ 07:24) (103/67 - 122/79)  RR: 17 (10-12-17 @ 07:24)  SpO2: 98% (10-12-17 @ 07:24)  Wt(kg): --  I&O's Detail    11 Oct 2017 07:01  -  12 Oct 2017 07:00  --------------------------------------------------------  IN:    Oral Fluid: 1160 mL    sodium chloride 0.9%.: 360 mL  Total IN: 1520 mL    OUT:    Indwelling Catheter - Urethral: 2500 mL    VAC (Vacuum Assisted Closure) System: 500 mL    Voided: 3800 mL  Total OUT: 6800 mL    Total NET: -5280 mL      12 Oct 2017 07:01  -  12 Oct 2017 11:05  --------------------------------------------------------  IN:    Oral Fluid: 680 mL  Total IN: 680 mL    OUT:    Voided: 1425 mL  Total OUT: 1425 mL    Total NET: -745 mL                PHYSICAL EXAM:  General: NAD  Respiratory: b/l air entry  Cardiovascular: S1 S2  Gastrointestinal: soft  Extremities:  Rt leg dressing                LABORATORY:                        8.0    9.4   )-----------( 245      ( 12 Oct 2017 07:57 )             25.2     10-12    131<L>  |  101  |  73<H>  ----------------------------<  95  3.6   |  18<L>  |  4.10<H>    Ca    8.5      12 Oct 2017 07:57      Sodium, Serum: 131 mmol/L (10-12 @ 07:57)  Sodium, Serum: 137 mmol/L (10-11 @ 08:43)    Potassium, Serum: 3.6 mmol/L (10-12 @ 07:57)  Potassium, Serum: 3.8 mmol/L (10-11 @ 08:43)    Hemoglobin: 8.0 g/dL (10-12 @ 07:57)  Hemoglobin: 7.3 g/dL (10-11 @ 08:43)  Hemoglobin: 7.9 g/dL (10-10 @ 08:46)    Creatinine, Serum 4.10 (10-12 @ 07:57)  Creatinine, Serum 4.80 (10-11 @ 08:43)  Creatinine, Serum 5.50 (10-10 @ 08:46)    Phosphorus Level, Serum in AM (10.10.17 @ 08:46)    Phosphorus Level, Serum: 7.4 mg/dL

## 2017-10-12 NOTE — PROGRESS NOTE ADULT - ASSESSMENT
32-year-old female now status post right lower extremity fasciotomy for prolonged compression after a fall with lethargy.     - Rhabdo improving  - No obvious cardiac complications post op  - no clear evidence of acute ischemia or volume overload, pt asymptomatic  - Persistent tachycardia HR of 100's likely secondary to pain  - Pain control  - renal function improving, continue IV hydration  - CK levels improving with IVF  - TTE with hyperdynamic LV function, EF 70%, no valvular disease noted  - Other cardiovascular workup will depend on clinical course.  - DVT prophylaxis  - Activity as tolerated  - All other workup per primary team  - Will follow      Kathleen Fernandes NP  Cardiology

## 2017-10-12 NOTE — PROGRESS NOTE ADULT - ATTENDING COMMENTS
Seen/examined. Agree with the above. Still with some sinus tachycardia. Echo with normal LV function

## 2017-10-12 NOTE — PROGRESS NOTE ADULT - PROBLEM SELECTOR PLAN 1
dilaudid IV 0.5 PRN  dilaudid PO 2 mg PRN  Fentanyl Patch  Long acting Oxycontin  reassurance  emotional support  anxiolytics  discussed with pt and mom

## 2017-10-12 NOTE — PROGRESS NOTE ADULT - SUBJECTIVE AND OBJECTIVE BOX
Neurology follow up note    ABELARDO [id number]      Interval History:    Patient events noted SOB , feels tired on and off pain in leg     MEDICATIONS    acetaminophen   Tablet 650 milliGRAM(s) Oral every 6 hours PRN  ALBUTerol    90 MICROgram(s) HFA Inhaler 2 Puff(s) Inhalation every 6 hours PRN  buDESOnide  80 MICROgram(s)/formoterol 4.5 MICROgram(s) Inhaler 2 Puff(s) Inhalation two times a day  buPROPion XL . 300 milliGRAM(s) Oral daily  clonazePAM Tablet 2 milliGRAM(s) Oral every 8 hours PRN  docusate sodium 100 milliGRAM(s) Oral two times a day  fentaNYL   Patch  25 MICROgram(s)/Hr 1 Patch Transdermal every 72 hours  fluticasone propionate 50 MICROgram(s)/spray Nasal Spray 1 Spray(s) Both Nostrils two times a day  heparin  Injectable 5000 Unit(s) SubCutaneous every 8 hours  HYDROmorphone   Tablet 2 milliGRAM(s) Oral four times a day PRN  HYDROmorphone  Injectable 0.5 milliGRAM(s) IV Push every 3 hours PRN  lamoTRIgine 100 milliGRAM(s) Oral every 8 hours  levothyroxine 150 MICROGram(s) Oral daily  ondansetron Injectable 4 milliGRAM(s) IV Push once PRN  oxyCODONE  ER Tablet 60 milliGRAM(s) Oral every 8 hours  pantoprazole    Tablet 40 milliGRAM(s) Oral before breakfast  polyethylene glycol 3350 17 Gram(s) Oral daily  risperiDONE   Tablet 8 milliGRAM(s) Oral at bedtime  senna 2 Tablet(s) Oral at bedtime PRN  sertraline 50 milliGRAM(s) Oral daily  sevelamer carbonate 1600 milliGRAM(s) Oral three times a day with meals  sodium chloride 0.9%. 1000 milliLiter(s) IV Continuous <Continuous>  topiramate 400 milliGRAM(s) Oral two times a day      Allergies    avocado (Unknown)  latex (Urticaria; Rash)  penicillin (Unknown)  sulfa drugs (Unknown)    Intolerances            Vital Signs Last 24 Hrs  T(C): 36.8 (12 Oct 2017 07:24), Max: 36.9 (11 Oct 2017 15:49)  T(F): 98.3 (12 Oct 2017 07:24), Max: 98.5 (11 Oct 2017 22:43)  HR: 103 (12 Oct 2017 07:24) (102 - 104)  BP: 116/75 (12 Oct 2017 07:24) (103/68 - 122/79)  BP(mean): --  RR: 17 (12 Oct 2017 07:24) (16 - 17)  SpO2: 98% (12 Oct 2017 07:24) (98% - 99%)             REVIEW OF SYSTEMS:     Constitutional: No fever, chills, fatigue, weakness  Eyes: no eye pain, visual disturbances, or discharge  ENT:  No difficulty hearing, tinnitus, vertigo; No sinus or throat pain  Neck: No pain or stiffness  Respiratory: NO SOB at present   Cardiovascular: No chest pain, palpitations,   Gastrointestinal: No abdominal or epigastric pain. No nausea, vomiting  No diarrhea or constipation.   Genitourinary: No dysuria, frequency, hematuria or incontinence  Neurological: + numbness right leg  Psychiatric: No depression, anxiety, mood swings or difficulty sleeping  Musculoskeletal: + swelling; + extremity pain  Skin: No itching, burning, rashes or lesions   Lymph Nodes: No enlarged glands  Endocrine: No heat or cold intolerance; No hair loss   Allergy and Immunologic: No hives or eczema    On Neurological Examination:    Mental Status - Patient is sleepy      Follow simple commands    Speech -   Fluent                     Cranial Nerves - Pupils 3 mm equal and reactive to light,   extraocular eye movements intact.   smile symmetric  intact bilateral NLF     Motor:  Bilateral upper were 5/5,   left lower was 4+/5,   right lower examination was limited secondary to edema and the patient has a dressing that goes from a mid thigh down to her ankle.   today  NO  flexation at the hip, patient stated was to tired and had pain.  There was no movement of her right foot in regard to dorsi plantar flexation.      sensory -The patient was unable to feel in her right foot.  The patient did appear to have feeling in the top part of her thigh.  The rest of examination was limited secondary to dressing.  Unable to evaluate reflexes secondary to surgery and dressing       GENERAL Exam: Nontoxic , No Acute Distress   	  HEENT:  normocephalic, atraumatic  		  LUNGS: Clear bilaterally    HEART: Normal S1S2   No murmur RRR        	  GI/ ABDOMEN:  Soft  Non tender  	   SKIN: Normal  No Ecchymosis  foot left was warm to touch        LABS:  CBC Full  -  ( 12 Oct 2017 07:57 )  WBC Count : 9.4 K/uL  Hemoglobin : 8.0 g/dL  Hematocrit : 25.2 %  Platelet Count - Automated : 245 K/uL  Mean Cell Volume : 92.0 fl  Mean Cell Hemoglobin : 29.3 pg  Mean Cell Hemoglobin Concentration : 31.8 gm/dL  Auto Neutrophil # : x  Auto Lymphocyte # : x  Auto Monocyte # : x  Auto Eosinophil # : x  Auto Basophil # : x  Auto Neutrophil % : x  Auto Lymphocyte % : x  Auto Monocyte % : x  Auto Eosinophil % : x  Auto Basophil % : x      10-12    131<L>  |  101  |  73<H>  ----------------------------<  95  3.6   |  18<L>  |  4.10<H>    Ca    8.5      12 Oct 2017 07:57      Hemoglobin A1C:       Vitamin B12         RADIOLOGY    ANALYSIS AND PLAN:  This is a 32-year-old with a history of being found on the ground, status post compartment syndrome with fasciotomy surgery.    1-For compartment syndrome, fasciotomy, examination is extremely limited secondary to dressing on her right leg status post surgical intervention.  The patient does appear to have movement proximally at the hip i past but today no cooperative with exam , but no movement distally.  The patient does not appear to have any sensory modalities distally.  This could be secondary to underlying neurapraxia from the edema and also from the compartment syndrome.      I would recommend to continue pain medication as needed.      The patient will need Physical Therapy.      With time we will see how much improvement there is in regards to motor and sensory.  The patient may need nerve conduction studies on an outpatient basis.      Spoke to vascular surgery do the degree of muscle damage that was found --- suspect long term prognosis to recovery motor/sensory is low     From Neurology standpoint, the patient is stable for discharge planning.    Thank you for the courtesy of this consultation.    30 minutes spent on total encounter; more than 50% of the visit was spent counseling and/or coordinating care by the attending physician.

## 2017-10-12 NOTE — PROGRESS NOTE ADULT - SUBJECTIVE AND OBJECTIVE BOX
Date/Time Patient Seen:  		  Referring MD:   Data Reviewed	       Patient is a 32y old  Female who presents with a chief complaint of RLE edema and numbness (04 Oct 2017 01:13)  in bed  seen and examined  vs and meds reviewed      Subjective/HPI     PAST MEDICAL & SURGICAL HISTORY:  Low back pain  Hypothyroid  Bipolar 1 disorder  History of spinal fusion  History of laminectomy        Medication list         MEDICATIONS  (STANDING):  buDESOnide  80 MICROgram(s)/formoterol 4.5 MICROgram(s) Inhaler 2 Puff(s) Inhalation two times a day  buPROPion XL . 300 milliGRAM(s) Oral daily  docusate sodium 100 milliGRAM(s) Oral two times a day  fentaNYL   Patch  25 MICROgram(s)/Hr 1 Patch Transdermal every 72 hours  fluticasone propionate 50 MICROgram(s)/spray Nasal Spray 1 Spray(s) Both Nostrils two times a day  heparin  Injectable 5000 Unit(s) SubCutaneous every 8 hours  lamoTRIgine 100 milliGRAM(s) Oral every 8 hours  levothyroxine 150 MICROGram(s) Oral daily  oxyCODONE  ER Tablet 60 milliGRAM(s) Oral every 8 hours  pantoprazole    Tablet 40 milliGRAM(s) Oral before breakfast  polyethylene glycol 3350 17 Gram(s) Oral daily  risperiDONE   Tablet 8 milliGRAM(s) Oral at bedtime  sertraline 50 milliGRAM(s) Oral daily  sevelamer carbonate 1600 milliGRAM(s) Oral three times a day with meals  sodium chloride 0.9%. 1000 milliLiter(s) (30 mL/Hr) IV Continuous <Continuous>  topiramate 400 milliGRAM(s) Oral two times a day    MEDICATIONS  (PRN):  acetaminophen   Tablet 650 milliGRAM(s) Oral every 6 hours PRN For Temp greater than 38 C (100.4 F)  ALBUTerol    90 MICROgram(s) HFA Inhaler 2 Puff(s) Inhalation every 6 hours PRN Shortness of Breath and/or Wheezing  clonazePAM Tablet 2 milliGRAM(s) Oral every 8 hours PRN ANxiety  HYDROmorphone   Tablet 2 milliGRAM(s) Oral four times a day PRN moderate to severe pain  HYDROmorphone  Injectable 0.5 milliGRAM(s) IV Push every 3 hours PRN Severe Pain (7 - 10)  ondansetron Injectable 4 milliGRAM(s) IV Push once PRN Nausea and/or Vomiting  senna 2 Tablet(s) Oral at bedtime PRN Constipation         Vitals log        ICU Vital Signs Last 24 Hrs  T(C): 36.9 (11 Oct 2017 22:43), Max: 37 (11 Oct 2017 07:41)  T(F): 98.5 (11 Oct 2017 22:43), Max: 98.6 (11 Oct 2017 07:41)  HR: 104 (11 Oct 2017 22:43) (98 - 104)  BP: 122/79 (11 Oct 2017 22:43) (103/68 - 122/79)  BP(mean): --  ABP: --  ABP(mean): --  RR: 16 (11 Oct 2017 22:43) (16 - 16)  SpO2: 98% (11 Oct 2017 22:43) (96% - 99%)           Input and Output:  I&O's Detail    10 Oct 2017 07:01  -  11 Oct 2017 07:00  --------------------------------------------------------  IN:    sodium chloride 0.9%.: 900 mL  Total IN: 900 mL    OUT:    Indwelling Catheter - Urethral: 9000 mL    VAC (Vacuum Assisted Closure) System: 450 mL  Total OUT: 9450 mL    Total NET: -8550 mL      11 Oct 2017 07:01  -  12 Oct 2017 05:43  --------------------------------------------------------  IN:    Oral Fluid: 1160 mL    sodium chloride 0.9%.: 240 mL  Total IN: 1400 mL    OUT:    Indwelling Catheter - Urethral: 2500 mL    VAC (Vacuum Assisted Closure) System: 500 mL    Voided: 3000 mL  Total OUT: 6000 mL    Total NET: -4600 mL          Lab Data                        7.3    6.7   )-----------( 196      ( 11 Oct 2017 08:43 )             23.1     10-11    137  |  105  |  79<H>  ----------------------------<  85  3.8   |  20<L>  |  4.80<H>    Ca    8.4<L>      11 Oct 2017 08:43  Phos  7.4     10-10    TPro  5.1<L>  /  Alb  1.6<L>  /  TBili  0.2  /  DBili  x   /  AST  65<H>  /  ALT  42  /  AlkPhos  39<L>  10-10      CARDIAC MARKERS ( 10 Oct 2017 08:46 )  x     / x     / 1377 U/L / x     / x            Review of Systems	      Objective     Physical Examination    head at  heart - s1s2  lungs - dec BS  abd - soft      Pertinent Lab findings & Imaging      Coffey:  NO   Adequate UO     I&O's Detail    10 Oct 2017 07:01  -  11 Oct 2017 07:00  --------------------------------------------------------  IN:    sodium chloride 0.9%.: 900 mL  Total IN: 900 mL    OUT:    Indwelling Catheter - Urethral: 9000 mL    VAC (Vacuum Assisted Closure) System: 450 mL  Total OUT: 9450 mL    Total NET: -8550 mL      11 Oct 2017 07:01  -  12 Oct 2017 05:43  --------------------------------------------------------  IN:    Oral Fluid: 1160 mL    sodium chloride 0.9%.: 240 mL  Total IN: 1400 mL    OUT:    Indwelling Catheter - Urethral: 2500 mL    VAC (Vacuum Assisted Closure) System: 500 mL    Voided: 3000 mL  Total OUT: 6000 mL    Total NET: -4600 mL               Discussed with:     Cultures:	        Radiology

## 2017-10-12 NOTE — PROGRESS NOTE ADULT - SUBJECTIVE AND OBJECTIVE BOX
Patient is a 32y old  Female who presents with a chief complaint of RLE edema and numbness (04 Oct 2017 01:13)      Vascular Surgery Progress Note    Interval HPI: POD #8 s/p fasciotomies of right leg. Clinically patient is doing better with less incisional pain in right leg.    Medications:      Allergies:  Allergies    avocado (Unknown)  latex (Urticaria; Rash)  penicillin (Unknown)  sulfa drugs (Unknown)    Intolerances        Vital Signs Last 24 Hrs  T(C): 36.7 (12 Oct 2017 16:00), Max: 36.9 (11 Oct 2017 22:43)  T(F): 98.1 (12 Oct 2017 16:00), Max: 98.5 (11 Oct 2017 22:43)  HR: 102 (12 Oct 2017 16:00) (102 - 104)  BP: 110/73 (12 Oct 2017 16:00) (110/73 - 122/79)  BP(mean): --  RR: 16 (12 Oct 2017 16:00) (16 - 17)  SpO2: 98% (12 Oct 2017 16:00) (98% - 98%)  I&O's Summary    11 Oct 2017 07:01  -  12 Oct 2017 07:00  --------------------------------------------------------  IN: 1520 mL / OUT: 6800 mL / NET: -5280 mL    12 Oct 2017 07:01  -  12 Oct 2017 16:33  --------------------------------------------------------  IN: 680 mL / OUT: 1825 mL / NET: -1145 mL        Physical Exam:  Gen: NAD, A&Ox3  Heart: regular  Lungs: clear  Right leg wounds: clean with viable pink muscles. No infections.  Pulses:  4/4 palpable right femoral, popliteal and DP pulses.  Neurologically unchanged.  Extr:   Pulses:    LABS:                        8.0    9.4   )-----------( 245      ( 12 Oct 2017 07:57 )             25.2     10-12    131<L>  |  101  |  73<H>  ----------------------------<  95  3.6   |  18<L>  |  4.10<H>    Ca    8.5      12 Oct 2017 07:57

## 2017-10-12 NOTE — PROGRESS NOTE ADULT - SUBJECTIVE AND OBJECTIVE BOX
INTERVAL HPI: 32y old  Female who presents with a chief complaint of RLE edema and numbness. Patient admitted for Compartment syndrome, s/p Fasciotomy. Dr. Damico consulted for pain management and concern for opoid dependence and abuse. Nurse aware of pain management plan. Renal function improving. Spoke to patient and mother at bedside. Patient is persistently asking for opioids, but she is aware they are being tapered and managed accordingly with pain management Dr. Damico.   Spoke to patient at length in regards to her opioid dependence and need to taper down her dilaudid.  Reached out to patients outpatient pain management doctor Jeffrey Galaviz [phone]        MEDICATIONS  (STANDING):  buDESOnide  80 MICROgram(s)/formoterol 4.5 MICROgram(s) Inhaler 2 Puff(s) Inhalation two times a day  buPROPion XL . 300 milliGRAM(s) Oral daily  docusate sodium 100 milliGRAM(s) Oral two times a day  fentaNYL   Patch  25 MICROgram(s)/Hr 1 Patch Transdermal every 72 hours  fluticasone propionate 50 MICROgram(s)/spray Nasal Spray 1 Spray(s) Both Nostrils two times a day  heparin  Injectable 5000 Unit(s) SubCutaneous every 8 hours  lamoTRIgine 100 milliGRAM(s) Oral every 8 hours  levothyroxine 150 MICROGram(s) Oral daily  oxyCODONE  ER Tablet 60 milliGRAM(s) Oral every 8 hours  pantoprazole    Tablet 40 milliGRAM(s) Oral before breakfast  polyethylene glycol 3350 17 Gram(s) Oral daily  risperiDONE   Tablet 8 milliGRAM(s) Oral at bedtime  sertraline 50 milliGRAM(s) Oral daily  sevelamer carbonate 1600 milliGRAM(s) Oral three times a day with meals  sodium chloride 0.9%. 1000 milliLiter(s) (30 mL/Hr) IV Continuous <Continuous>  topiramate 400 milliGRAM(s) Oral two times a day    MEDICATIONS  (PRN):  acetaminophen   Tablet 650 milliGRAM(s) Oral every 6 hours PRN For Temp greater than 38 C (100.4 F)  ALBUTerol    90 MICROgram(s) HFA Inhaler 2 Puff(s) Inhalation every 6 hours PRN Shortness of Breath and/or Wheezing  clonazePAM Tablet 2 milliGRAM(s) Oral every 8 hours PRN ANxiety  HYDROmorphone   Tablet 2 milliGRAM(s) Oral four times a day PRN moderate to severe pain  HYDROmorphone  Injectable 0.5 milliGRAM(s) IV Push every 3 hours PRN Severe Pain (7 - 10)  ondansetron Injectable 4 milliGRAM(s) IV Push once PRN Nausea and/or Vomiting  senna 2 Tablet(s) Oral at bedtime PRN Constipation        REVIEW OF SYSTEM:    Constitutional: No fever, chills, fatigue  Neuro: No headache, numbness, weakness  Resp: No cough, wheezing, shortness of breath  CVS: No chest pain, palpitations, leg swelling  GI: No abdominal pain, nausea, vomiting, diarrhea   : No dysuria, frequency, incontinence  Skin: No itching, burning, rashes, or lesions   Msk: +pain in RLE, +pain in her back  Psych: No depression, anxiety, mood swings      	  Vital Signs Last 24 Hrs  T(C): 36.8 (12 Oct 2017 07:24), Max: 36.9 (11 Oct 2017 15:49)  T(F): 98.3 (12 Oct 2017 07:24), Max: 98.5 (11 Oct 2017 22:43)  HR: 103 (12 Oct 2017 07:24) (102 - 104)  BP: 116/75 (12 Oct 2017 07:24) (103/68 - 122/79)  BP(mean): --  RR: 17 (12 Oct 2017 07:24) (16 - 17)  SpO2: 98% (12 Oct 2017 07:24) (98% - 99%)    PHYSICAL EXAM:  GENERAL: anxious asking for pain meds, dozes off at times but able to carry on a conversation ,  HEART: S1S2 RRR  RESPIRATORY: CTA B/L, No wheezing / rhonchi  ABDOMEN: Soft, Nontender, Nondistended; Bowel sounds present  NEUROLOGY: A&Ox3, nonfocal, not able to move RLE, sensation diminished RLE  EXTREMITIES:  +pulses mild edema R foot, LLE is moving + pulse  Skin: warm, well perfused       LABS:                        7.3    6.7   )-----------( 196      ( 11 Oct 2017 08:43 )             23.1     10-11    137  |  105  |  79<H>  ----------------------------<  85  3.8   |  20<L>  |  4.80<H>    Ca    8.4<L>      11 Oct 2017 08:43  Phos  7.4     10-10    TPro  5.1<L>  /  Alb  1.6<L>  /  TBili  0.2  /  DBili  x   /  AST  65<H>  /  ALT  42  /  AlkPhos  39<L>  10-10        10-04 @ 10:15   No growth to date.  --  --  10-04 @ 09:59   No growth  --  --

## 2017-10-13 ENCOUNTER — TRANSCRIPTION ENCOUNTER (OUTPATIENT)
Age: 32
End: 2017-10-13

## 2017-10-13 LAB
ALBUMIN SERPL ELPH-MCNC: 1.6 G/DL — LOW (ref 3.3–5)
ALP SERPL-CCNC: 34 U/L — LOW (ref 40–120)
ALT FLD-CCNC: 26 U/L — SIGNIFICANT CHANGE UP (ref 12–78)
ANION GAP SERPL CALC-SCNC: 12 MMOL/L — SIGNIFICANT CHANGE UP (ref 5–17)
AST SERPL-CCNC: 35 U/L — SIGNIFICANT CHANGE UP (ref 15–37)
BILIRUB SERPL-MCNC: 0.2 MG/DL — SIGNIFICANT CHANGE UP (ref 0.2–1.2)
BUN SERPL-MCNC: 73 MG/DL — HIGH (ref 7–23)
CALCIUM SERPL-MCNC: 8.5 MG/DL — SIGNIFICANT CHANGE UP (ref 8.5–10.1)
CHLORIDE SERPL-SCNC: 103 MMOL/L — SIGNIFICANT CHANGE UP (ref 96–108)
CK SERPL-CCNC: 518 U/L — HIGH (ref 26–192)
CO2 SERPL-SCNC: 20 MMOL/L — LOW (ref 22–31)
CREAT SERPL-MCNC: 3.8 MG/DL — HIGH (ref 0.5–1.3)
GLUCOSE SERPL-MCNC: 84 MG/DL — SIGNIFICANT CHANGE UP (ref 70–99)
PHOSPHATE SERPL-MCNC: 6.1 MG/DL — HIGH (ref 2.5–4.5)
POTASSIUM SERPL-MCNC: 3.3 MMOL/L — LOW (ref 3.5–5.3)
POTASSIUM SERPL-SCNC: 3.3 MMOL/L — LOW (ref 3.5–5.3)
PROT SERPL-MCNC: 5.1 G/DL — LOW (ref 6–8.3)
SODIUM SERPL-SCNC: 135 MMOL/L — SIGNIFICANT CHANGE UP (ref 135–145)

## 2017-10-13 PROCEDURE — 99233 SBSQ HOSP IP/OBS HIGH 50: CPT

## 2017-10-13 RX ORDER — POTASSIUM CHLORIDE 20 MEQ
40 PACKET (EA) ORAL EVERY 6 HOURS
Qty: 0 | Refills: 0 | Status: COMPLETED | OUTPATIENT
Start: 2017-10-13 | End: 2017-10-13

## 2017-10-13 RX ORDER — SEVELAMER CARBONATE 2400 MG/1
800 POWDER, FOR SUSPENSION ORAL
Qty: 0 | Refills: 0 | Status: COMPLETED | OUTPATIENT
Start: 2017-10-13 | End: 2017-10-15

## 2017-10-13 RX ORDER — FENTANYL CITRATE 50 UG/ML
2 INJECTION INTRAVENOUS
Qty: 0 | Refills: 0 | Status: DISCONTINUED | OUTPATIENT
Start: 2017-10-13 | End: 2017-10-19

## 2017-10-13 RX ORDER — POTASSIUM CHLORIDE 20 MEQ
40 PACKET (EA) ORAL ONCE
Qty: 0 | Refills: 0 | Status: DISCONTINUED | OUTPATIENT
Start: 2017-10-13 | End: 2017-10-13

## 2017-10-13 RX ORDER — SEVELAMER CARBONATE 2400 MG/1
800 POWDER, FOR SUSPENSION ORAL
Qty: 0 | Refills: 0 | Status: DISCONTINUED | OUTPATIENT
Start: 2017-10-13 | End: 2017-10-13

## 2017-10-13 RX ADMIN — OXYCODONE HYDROCHLORIDE 60 MILLIGRAM(S): 5 TABLET ORAL at 16:12

## 2017-10-13 RX ADMIN — LAMOTRIGINE 100 MILLIGRAM(S): 25 TABLET, ORALLY DISINTEGRATING ORAL at 05:36

## 2017-10-13 RX ADMIN — OXYCODONE HYDROCHLORIDE 60 MILLIGRAM(S): 5 TABLET ORAL at 22:00

## 2017-10-13 RX ADMIN — SEVELAMER CARBONATE 800 MILLIGRAM(S): 2400 POWDER, FOR SUSPENSION ORAL at 19:11

## 2017-10-13 RX ADMIN — BUDESONIDE AND FORMOTEROL FUMARATE DIHYDRATE 2 PUFF(S): 160; 4.5 AEROSOL RESPIRATORY (INHALATION) at 21:16

## 2017-10-13 RX ADMIN — OXYCODONE HYDROCHLORIDE 60 MILLIGRAM(S): 5 TABLET ORAL at 07:42

## 2017-10-13 RX ADMIN — BUDESONIDE AND FORMOTEROL FUMARATE DIHYDRATE 2 PUFF(S): 160; 4.5 AEROSOL RESPIRATORY (INHALATION) at 12:49

## 2017-10-13 RX ADMIN — Medication 40 MILLIEQUIVALENT(S): at 12:50

## 2017-10-13 RX ADMIN — HYDROMORPHONE HYDROCHLORIDE 0.5 MILLIGRAM(S): 2 INJECTION INTRAMUSCULAR; INTRAVENOUS; SUBCUTANEOUS at 15:48

## 2017-10-13 RX ADMIN — SERTRALINE 50 MILLIGRAM(S): 25 TABLET, FILM COATED ORAL at 12:50

## 2017-10-13 RX ADMIN — Medication 400 MILLIGRAM(S): at 05:36

## 2017-10-13 RX ADMIN — HYDROMORPHONE HYDROCHLORIDE 0.5 MILLIGRAM(S): 2 INJECTION INTRAMUSCULAR; INTRAVENOUS; SUBCUTANEOUS at 15:30

## 2017-10-13 RX ADMIN — OXYCODONE HYDROCHLORIDE 60 MILLIGRAM(S): 5 TABLET ORAL at 05:35

## 2017-10-13 RX ADMIN — Medication 150 MICROGRAM(S): at 05:36

## 2017-10-13 RX ADMIN — Medication 100 MILLIGRAM(S): at 05:36

## 2017-10-13 RX ADMIN — HYDROMORPHONE HYDROCHLORIDE 0.5 MILLIGRAM(S): 2 INJECTION INTRAMUSCULAR; INTRAVENOUS; SUBCUTANEOUS at 19:07

## 2017-10-13 RX ADMIN — RISPERIDONE 8 MILLIGRAM(S): 4 TABLET ORAL at 21:13

## 2017-10-13 RX ADMIN — HYDROMORPHONE HYDROCHLORIDE 0.5 MILLIGRAM(S): 2 INJECTION INTRAMUSCULAR; INTRAVENOUS; SUBCUTANEOUS at 12:12

## 2017-10-13 RX ADMIN — HYDROMORPHONE HYDROCHLORIDE 0.5 MILLIGRAM(S): 2 INJECTION INTRAMUSCULAR; INTRAVENOUS; SUBCUTANEOUS at 12:37

## 2017-10-13 RX ADMIN — PANTOPRAZOLE SODIUM 40 MILLIGRAM(S): 20 TABLET, DELAYED RELEASE ORAL at 05:36

## 2017-10-13 RX ADMIN — HYDROMORPHONE HYDROCHLORIDE 0.5 MILLIGRAM(S): 2 INJECTION INTRAMUSCULAR; INTRAVENOUS; SUBCUTANEOUS at 00:50

## 2017-10-13 RX ADMIN — Medication 400 MILLIGRAM(S): at 19:12

## 2017-10-13 RX ADMIN — HYDROMORPHONE HYDROCHLORIDE 0.5 MILLIGRAM(S): 2 INJECTION INTRAMUSCULAR; INTRAVENOUS; SUBCUTANEOUS at 03:52

## 2017-10-13 RX ADMIN — LAMOTRIGINE 100 MILLIGRAM(S): 25 TABLET, ORALLY DISINTEGRATING ORAL at 15:20

## 2017-10-13 RX ADMIN — HEPARIN SODIUM 5000 UNIT(S): 5000 INJECTION INTRAVENOUS; SUBCUTANEOUS at 05:36

## 2017-10-13 RX ADMIN — LAMOTRIGINE 100 MILLIGRAM(S): 25 TABLET, ORALLY DISINTEGRATING ORAL at 21:08

## 2017-10-13 RX ADMIN — HEPARIN SODIUM 5000 UNIT(S): 5000 INJECTION INTRAVENOUS; SUBCUTANEOUS at 21:07

## 2017-10-13 RX ADMIN — Medication 100 MILLIGRAM(S): at 19:12

## 2017-10-13 RX ADMIN — OXYCODONE HYDROCHLORIDE 60 MILLIGRAM(S): 5 TABLET ORAL at 15:20

## 2017-10-13 RX ADMIN — ONDANSETRON 4 MILLIGRAM(S): 8 TABLET, FILM COATED ORAL at 15:44

## 2017-10-13 RX ADMIN — SEVELAMER CARBONATE 800 MILLIGRAM(S): 2400 POWDER, FOR SUSPENSION ORAL at 12:50

## 2017-10-13 RX ADMIN — Medication 40 MILLIEQUIVALENT(S): at 19:12

## 2017-10-13 RX ADMIN — POLYETHYLENE GLYCOL 3350 17 GRAM(S): 17 POWDER, FOR SOLUTION ORAL at 12:49

## 2017-10-13 RX ADMIN — Medication 1 SPRAY(S): at 19:12

## 2017-10-13 RX ADMIN — FENTANYL CITRATE 2 PATCH: 50 INJECTION INTRAVENOUS at 09:38

## 2017-10-13 RX ADMIN — SEVELAMER CARBONATE 1600 MILLIGRAM(S): 2400 POWDER, FOR SUSPENSION ORAL at 09:37

## 2017-10-13 RX ADMIN — Medication 1 SPRAY(S): at 05:35

## 2017-10-13 RX ADMIN — HYDROMORPHONE HYDROCHLORIDE 0.5 MILLIGRAM(S): 2 INJECTION INTRAMUSCULAR; INTRAVENOUS; SUBCUTANEOUS at 04:30

## 2017-10-13 RX ADMIN — HYDROMORPHONE HYDROCHLORIDE 0.5 MILLIGRAM(S): 2 INJECTION INTRAMUSCULAR; INTRAVENOUS; SUBCUTANEOUS at 01:15

## 2017-10-13 RX ADMIN — HYDROMORPHONE HYDROCHLORIDE 0.5 MILLIGRAM(S): 2 INJECTION INTRAMUSCULAR; INTRAVENOUS; SUBCUTANEOUS at 09:03

## 2017-10-13 RX ADMIN — BUPROPION HYDROCHLORIDE 300 MILLIGRAM(S): 150 TABLET, EXTENDED RELEASE ORAL at 12:49

## 2017-10-13 RX ADMIN — HEPARIN SODIUM 5000 UNIT(S): 5000 INJECTION INTRAVENOUS; SUBCUTANEOUS at 15:20

## 2017-10-13 RX ADMIN — OXYCODONE HYDROCHLORIDE 60 MILLIGRAM(S): 5 TABLET ORAL at 21:05

## 2017-10-13 RX ADMIN — Medication 2 MILLIGRAM(S): at 21:45

## 2017-10-13 RX ADMIN — HYDROMORPHONE HYDROCHLORIDE 0.5 MILLIGRAM(S): 2 INJECTION INTRAMUSCULAR; INTRAVENOUS; SUBCUTANEOUS at 12:16

## 2017-10-13 NOTE — DISCHARGE NOTE ADULT - PLAN OF CARE
Resolution Please f/u with Plastics Dr. Gale.  Wound care per Plastics Continue meds. F/U with Psych Resolved Continue home meds. F/u with PCP. Continue Synthroid. f/U with PCP. F/U with hematologist Wean off of Opiates, still requiring it s/p surgery and acute pain.

## 2017-10-13 NOTE — DISCHARGE NOTE ADULT - MEDICATION SUMMARY - MEDICATIONS TO TAKE
I will START or STAY ON the medications listed below when I get home from the hospital:    fentaNYL 25 mcg/hr transdermal film, extended release  -- 1 patch by transdermal patch every 72 hours  -- Indication: For Pain    oxyCODONE 60 mg oral tablet, extended release  -- 1 tab(s) by mouth every 8 hours  -- Indication: For Pain    heparin  -- 5000 unit(s) subcutaneous every 8 hours  -- Indication: For DVT PPX    lamoTRIgine 100 mg oral tablet  -- 1 tab(s) by mouth every 8 hours  -- Indication: For Bipolar 1 disorder    topiramate 200 mg oral tablet  -- 2 tab(s) by mouth 2 times a day  -- Indication: For Bipolar 1 disorder    clonazePAM 2 mg oral tablet  -- 1 tab(s) by mouth every 8 hours, As needed, anxiety  -- Indication: For Bipolar 1 disorder    sertraline 50 mg oral tablet  -- 1 tab(s) by mouth once a day  -- Indication: For Bipolar 1 disorder    Viibryd 40 mg oral tablet  -- 1 tab(s) by mouth once a day  -- Indication: For Home med    risperiDONE 4 mg oral tablet  -- 2 tab(s) by mouth once a day (at bedtime)  -- Indication: For Bipolar 1 disorder    ProAir HFA 90 mcg/inh inhalation aerosol  -- 2 puff(s) inhaled 4 times a day  -- Indication: For Asthma    budesonide-formoterol 80 mcg-4.5 mcg/inh inhalation aerosol  -- 2 puff(s) inhaled 2 times a day  -- Indication: For Asthma    Spiriva Respimat 1.25 mcg/inh inhalation aerosol  -- 2 puff(s) inhaled once a day  -- Indication: For Asthma    bacitracin 500 units/g topical ointment  -- 1 application on skin 2 times a day  -- Indication: For Skin care     docusate sodium 100 mg oral capsule  -- 1 cap(s) by mouth 2 times a day, As needed, Constipation  -- Indication: For Constipation PRN    senna oral tablet  -- 2 tab(s) by mouth once a day (at bedtime), As needed, Constipation  -- Indication: For Constipation PRN     ipratropium 42 mcg/inh (0.06%) nasal spray  -- 2 spray(s) into nose every 12 hours  -- Indication: For Hpme Med    Flonase 50 mcg/inh nasal spray  --  into nose twice daily  -- Indication: For Home Med    omeprazole 40 mg oral delayed release capsule  -- 1 cap(s) by mouth 2 times a day  -- Indication: For Home Med    buPROPion 24 hour extended release  -- 200 milligram(s) 2 pill am  -- Indication: For Home Med    levothyroxine 150 mcg (0.15 mg) oral tablet  -- 1 tab(s) by mouth once a day  -- Indication: For Hypothyroid    Multiple Vitamins with Minerals oral tablet  -- 1 tab(s) by mouth once a day  -- Indication: For Supplement    ascorbic acid 500 mg oral tablet  -- 1 tab(s) by mouth 2 times a day  -- Indication: For Supplement    cyanocobalamin 1000 mcg oral tablet  -- 1 tab(s) by mouth once a day  -- Indication: For Supplement     folic acid 1 mg oral tablet  -- 1 tab(s) by mouth once a day  -- Indication: For Supplement

## 2017-10-13 NOTE — DISCHARGE NOTE ADULT - ADDITIONAL INSTRUCTIONS
Wound Care :  LEFT THIGH DONOR SITE XEROFORM, ABD AND ACE BANDAGE  EVERY 2-3 DAYS / AS NEEDED IF SATURATED   SKIN GRAFT SITE XEROFORM, ABD AND ACE BANDAGE LOOSELY CHANGE EVERY 2-3 DAYS UNTIL FULL GRANULATION   RIGHT LOWER THIGH WOUND AQUACEL DRESSING/ DRY 4X4 , MAY LEAVE AQUACEL FOR 5 DAYS / DRY 4X4 CHANGE DAILY, SUTURE TO BE REMOVED IN TWO WEEKS.

## 2017-10-13 NOTE — PROGRESS NOTE ADULT - SUBJECTIVE AND OBJECTIVE BOX
INTERVAL HPI: 32y old  Female who presents with a chief complaint of RLE edema and numbness. Patient admitted for Compartment syndrome, s/p Fasciotomy. Dr. Damico consulted for pain management and concern for opoid dependence and abuse. Nurse aware of pain management plan. Renal function improving. Spoke to patient and mother at bedside. Patient is persistently asking for opioids, but she is aware they are being tapered and managed accordingly with pain management Dr. Damico.   Spoke to patient at length in regards to her opioid dependence and need to taper down her dilaudid.  Spoke to pain management doctor Jeffrey Galaviz [phone]) see chart note  Spoke to mom aware of plan. Plastic surgery to recommend if need for skin grafting.       MEDICATIONS  (STANDING):  buDESOnide  80 MICROgram(s)/formoterol 4.5 MICROgram(s) Inhaler 2 Puff(s) Inhalation two times a day  buPROPion XL . 300 milliGRAM(s) Oral daily  docusate sodium 100 milliGRAM(s) Oral two times a day  fentaNYL   Patch  25 MICROgram(s)/Hr 2 Patch Transdermal every 72 hours  fluticasone propionate 50 MICROgram(s)/spray Nasal Spray 1 Spray(s) Both Nostrils two times a day  heparin  Injectable 5000 Unit(s) SubCutaneous every 8 hours  influenza   Vaccine 0.5 milliLiter(s) IntraMuscular once  lamoTRIgine 100 milliGRAM(s) Oral every 8 hours  levothyroxine 150 MICROGram(s) Oral daily  oxyCODONE  ER Tablet 60 milliGRAM(s) Oral every 8 hours  pantoprazole    Tablet 40 milliGRAM(s) Oral before breakfast  polyethylene glycol 3350 17 Gram(s) Oral daily  potassium chloride    Tablet ER 40 milliEquivalent(s) Oral every 6 hours  risperiDONE   Tablet 8 milliGRAM(s) Oral at bedtime  sertraline 50 milliGRAM(s) Oral daily  sevelamer carbonate 800 milliGRAM(s) Oral three times a day with meals  topiramate 400 milliGRAM(s) Oral two times a day    MEDICATIONS  (PRN):  acetaminophen   Tablet 650 milliGRAM(s) Oral every 6 hours PRN For Temp greater than 38 C (100.4 F)  ALBUTerol    90 MICROgram(s) HFA Inhaler 2 Puff(s) Inhalation every 6 hours PRN Shortness of Breath and/or Wheezing  clonazePAM Tablet 2 milliGRAM(s) Oral every 8 hours PRN ANxiety  HYDROmorphone   Tablet 2 milliGRAM(s) Oral four times a day PRN moderate to severe pain  HYDROmorphone  Injectable 0.5 milliGRAM(s) IV Push every 3 hours PRN Severe Pain (7 - 10)  ondansetron Injectable 4 milliGRAM(s) IV Push once PRN Nausea and/or Vomiting  senna 2 Tablet(s) Oral at bedtime PRN Constipation          REVIEW OF SYSTEM:    Constitutional: No fever, chills, fatigue  Neuro: No headache, numbness, weakness  Resp: No cough, wheezing, shortness of breath  CVS: No chest pain, palpitations, leg swelling  GI: No abdominal pain, nausea, vomiting, diarrhea   : No dysuria, frequency, incontinence  Skin: No itching, burning, rashes, or lesions   Msk: +pain in RLE, +pain in her back  Psych: No depression, anxiety, mood swings    Vital Signs Last 24 Hrs  T(C): 36.8 (13 Oct 2017 08:09), Max: 36.8 (12 Oct 2017 23:47)  T(F): 98.3 (13 Oct 2017 08:09), Max: 98.3 (13 Oct 2017 08:09)  HR: 100 (13 Oct 2017 08:09) (90 - 102)  BP: 113/70 (13 Oct 2017 08:09) (101/63 - 113/70)  BP(mean): --  RR: 17 (13 Oct 2017 08:09) (16 - 17)  SpO2: 99% (13 Oct 2017 08:09) (95% - 99%)    PHYSICAL EXAM:  GENERAL: anxious asking for pain meds, dozes off at times but able to carry on a conversation ,  HEART: S1S2 RRR  RESPIRATORY: CTA B/L, No wheezing / rhonchi  ABDOMEN: Soft, Nontender, Nondistended; Bowel sounds present  NEUROLOGY: A&Ox3, nonfocal, not able to move RLE, sensation diminished RLE  EXTREMITIES:  +pulses mild edema R foot, LLE is moving + pulse  Skin: warm, well perfused       LABS:                        8.0    9.4   )-----------( 245      ( 12 Oct 2017 07:57 )             25.2     10-13    135  |  103  |  73<H>  ----------------------------<  84  3.3<L>   |  20<L>  |  3.80<H>    Ca    8.5      13 Oct 2017 07:38  Phos  6.1     10-13    TPro  5.1<L>  /  Alb  1.6<L>  /  TBili  0.2  /  DBili  x   /  AST  35  /  ALT  26  /  AlkPhos  34<L>  10-13              10-04 @ 10:15   No growth to date.  --  --  10-04 @ 09:59   No growth  --  --

## 2017-10-13 NOTE — PROGRESS NOTE ADULT - SUBJECTIVE AND OBJECTIVE BOX
Patient is a 32y old  Female who presents with a chief complaint of RLE edema and numbness (04 Oct 2017 01:13)      Vascular Surgery Progress Note    Interval HPI: POD #9 s/p fasciotomies of right leg. Sleeping comfortably. No complaints.    Medications:      Allergies:  Allergies    avocado (Unknown)  latex (Urticaria; Rash)  penicillin (Unknown)  sulfa drugs (Unknown)    Intolerances        Vital Signs Last 24 Hrs  T(C): 36.8 (12 Oct 2017 23:47), Max: 36.8 (12 Oct 2017 07:24)  T(F): 98.2 (12 Oct 2017 23:47), Max: 98.3 (12 Oct 2017 07:24)  HR: 90 (12 Oct 2017 23:47) (90 - 103)  BP: 101/63 (12 Oct 2017 23:47) (101/63 - 116/75)  BP(mean): --  RR: 16 (12 Oct 2017 23:47) (16 - 17)  SpO2: 95% (12 Oct 2017 23:47) (95% - 98%)  I&O's Summary    11 Oct 2017 07:01  -  12 Oct 2017 07:00  --------------------------------------------------------  IN: 1520 mL / OUT: 6800 mL / NET: -5280 mL    12 Oct 2017 07:01  -  13 Oct 2017 06:32  --------------------------------------------------------  IN: 680 mL / OUT: 7325 mL / NET: -6645 mL        Physical Exam:  Gen: NAD, A&Ox3  Vascular exam is unchanged.  Pulses: bilateral palpable pedal pulses  Neurologically unchanged.  LABS:                        8.0    9.4   )-----------( 245      ( 12 Oct 2017 07:57 )             25.2     10-12    131<L>  |  101  |  73<H>  ----------------------------<  95  3.6   |  18<L>  |  4.10<H>    Ca    8.5      12 Oct 2017 07:57

## 2017-10-13 NOTE — DISCHARGE NOTE ADULT - HOSPITAL COURSE
Pt is a 33 yo female with PMHX of Bipolar disorder, hypothyroidism, chronic low back pain on morphine s/p laminectomy and spinal fusion in 2016 who is BIB EMS for right leg swelling and numbness. Patient is a poor historian as she is on many pain medicines and is currently lethargic, therefore much info provided based on hospital staff and charts. She states that 18 hours prior to arrival, she fell on the floor at her house after taking too much morphine and was lying on floor for 8 hours. She woke up to numbness, swelling, and cool temperature of her right leg, from below the knee onwards. Patient denies any current SOB, CP, f/c, denies head trauma.     In the ED, vitals were Temp 101.4F /91  RR 20  SpO2 93% RA. Significant labs are WBS 24.3, Hb 15.6, Hmt 47.6,  Platelets 118, neutr 93%, sodium 131, BUN 30,  Cr 2.30, glucose 152 AST 1377, , GFR 27, lactate 1.7. Doppler US RLE showed Extremely limited evaluation of the right lower extremity with nonvisualization of the venous structures beyond the mid right femoral vein. No evidence of DVT within the right common femoral, proximal and   mid femoral veins. No evidence of left lower extremity deep venous thrombosis. XR of right tibia and XR of R femur showed no acute fractures. Arterial Doppler of RLE pending. EKG showed sinus tach of 140, with right superior axis deviation. Patient started on aztreonam, Tylenol given for fever, patient made NPO, Dr. Martines consulted for emergent surgery.    Patient admitted to ICU for Compartment syndrome, s/p Fasciotomy with plastic surgery and Dr. Martines consulted for vascular surgery. Patient was treated with IV antibiotics. Patient noted to have GHAZAL, likely acute tubular necrosis due to rhabdomyolisis followed by Dr. Tavares Nephrology, renal function improving.  Dr. Damico consulted for pain management as patient on high dose of pain medication and history of chronic opioid dependence. Wound vac placed and continued wound care by nurse. Spoke to pain management doctor Jeffrey Galavzi [phone]) aware of current plan and management. Neurology consulted given complete sensory loss of RLE.   Awaiting plastic surgery to recommend if need for skin grafting. Pt is a 33 yo female with PMHX of Bipolar disorder, hypothyroidism, chronic low back pain on morphine s/p laminectomy and spinal fusion in 2016 who is BIB EMS for right leg swelling and numbness. Patient is a poor historian as she is on many pain medicines and is currently lethargic, therefore much info provided based on hospital staff and charts. She states that 18 hours prior to arrival, she fell on the floor at her house after taking too much morphine and was lying on floor for 8 hours. She woke up to numbness, swelling, and cool temperature of her right leg, from below the knee onwards. Patient denies any current SOB, CP, f/c, denies head trauma.     In the ED, vitals were Temp 101.4F /91  RR 20  SpO2 93% RA. Significant labs are WBS 24.3, Hb 15.6, Hmt 47.6,  Platelets 118, neutr 93%, sodium 131, BUN 30,  Cr 2.30, glucose 152 AST 1377, , GFR 27, lactate 1.7. Doppler US RLE showed Extremely limited evaluation of the right lower extremity with nonvisualization of the venous structures beyond the mid right femoral vein. No evidence of DVT within the right common femoral, proximal and   mid femoral veins. No evidence of left lower extremity deep venous thrombosis. XR of right tibia and XR of R femur showed no acute fractures. Arterial Doppler of RLE pending. EKG showed sinus tach of 140, with right superior axis deviation. Patient started on aztreonam, Tylenol given for fever, patient made NPO, Dr. Martines consulted for emergent surgery.    Patient admitted to ICU for Compartment syndrome, s/p Fasciotomy with plastic surgery and Dr. Martines consulted for vascular surgery. Patient was treated with IV antibiotics. Patient noted to have GHAZAL, likely acute tubular necrosis due to rhabdomyolisis followed by Dr. Tavares Nephrology, renal function improving.  Dr. Damico consulted for pain management as patient on high dose of pain medication and history of chronic opioid dependence. Wound vac placed and continued wound care by nurse. Spoke to outpatient pain management doctor Jeffrey Galaviz [phone]) aware of current plan and management. 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  Awaiting plastic surgery to recommend if need for skin grafting. Pt is a 33 yo female with PMHX of Bipolar disorder, hypothyroidism, chronic low back pain on morphine s/p laminectomy and spinal fusion in 2016 who is BIB EMS for right leg swelling and numbness. Patient is a poor historian as she is on many pain medicines and is currently lethargic, therefore much info provided based on hospital staff and charts. She states that 18 hours prior to arrival, she fell on the floor at her house after taking too much morphine and was lying on floor for 8 hours. She woke up to numbness, swelling, and cool temperature of her right leg, from below the knee onwards. Patient denies any current SOB, CP, f/c, denies head trauma.     In the ED, vitals were Temp 101.4F /91  RR 20  SpO2 93% RA. Significant labs are WBS 24.3, Hb 15.6, Hmt 47.6,  Platelets 118, neutr 93%, sodium 131, BUN 30,  Cr 2.30, glucose 152 AST 1377, , GFR 27, lactate 1.7. Doppler US RLE showed Extremely limited evaluation of the right lower extremity with nonvisualization of the venous structures beyond the mid right femoral vein. No evidence of DVT within the right common femoral, proximal and   mid femoral veins. No evidence of left lower extremity deep venous thrombosis. XR of right tibia and XR of R femur showed no acute fractures. Arterial Doppler of RLE pending. EKG showed sinus tach of 140, with right superior axis deviation. Patient started on aztreonam, Tylenol given for fever, patient made NPO, Dr. Martines consulted for emergent surgery.    Patient admitted to ICU for Compartment syndrome, s/p Fasciotomy with plastic surgery and Dr. Martines consulted for vascular surgery. Patient was treated with IV antibiotics. Patient noted to have GHAZAL, likely acute tubular necrosis due to rhabdomyolisis followed by Dr. Tavares Nephrology, renal function improving.  Dr. Damico consulted for pain management as patient on high dose of pain medication and history of chronic opioid dependence. Wound vac placed and continued wound care by nurse. Spoke to outpatient pain management doctor Jeffrey Galaviz [phone]) aware of current plan and management. 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  Patient having low grade temperatures. Dr. Montiel consulted, no need for antibiotics at this time. CT LE done non specific swelling in calf, no drainable fluid collection noted. Discussed with Dr. Martines. Patient with anemia, prior transfusion, work up initiated. 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 Discussed with Dr. Jin Heme Onc, US done, non specific, if increased in size will biopsy, monitor for now, discussed with hematology. ENT Dr. Schmitz consulted, no intervention.    Patient scheduled for skin grafting with Plastic surgery, postponed for 10/27. Pt is a 31 yo female with PMHX of Bipolar disorder, hypothyroidism, chronic low back pain on morphine s/p laminectomy and spinal fusion in 2016 who is BIB EMS for right leg swelling and numbness. Patient is a poor historian as she is on many pain medicines and is currently lethargic, therefore much info provided based on hospital staff and charts. She states that 18 hours prior to arrival, she fell on the floor at her house after taking too much morphine and was lying on floor for 8 hours. She woke up to numbness, swelling, and cool temperature of her right leg, from below the knee onwards. Patient denies any current SOB, CP, f/c, denies head trauma.     In the ED, vitals were Temp 101.4F /91  RR 20  SpO2 93% RA. Significant labs are WBS 24.3, Hb 15.6, Hmt 47.6,  Platelets 118, neutr 93%, sodium 131, BUN 30,  Cr 2.30, glucose 152 AST 1377, , GFR 27, lactate 1.7. Doppler US RLE showed Extremely limited evaluation of the right lower extremity with nonvisualization of the venous structures beyond the mid right femoral vein. No evidence of DVT within the right common femoral, proximal and   mid femoral veins. No evidence of left lower extremity deep venous thrombosis. XR of right tibia and XR of R femur showed no acute fractures. Arterial Doppler of RLE pending. EKG showed sinus tach of 140, with right superior axis deviation. Patient started on aztreonam, Tylenol given for fever, patient made NPO, Dr. Martines consulted for emergent surgery.    Patient admitted to ICU for Compartment syndrome, s/p Fasciotomy with plastic surgery and Dr. Martines consulted for vascular surgery. Patient was treated with IV antibiotics. Patient noted to have GHAZAL, likely acute tubular necrosis due to rhabdomyolisis followed by Dr. Tavares Nephrology, renal function improving.  Dr. Damico consulted for pain management as patient on high dose of pain medication and history of chronic opioid dependence. Wound vac placed and continued wound care by nurse. Spoke to outpatient pain management doctor Jeffrey Galaviz [phone]) aware of current plan and management. 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  Patient having low grade temperatures. Dr. Montiel consulted, no need for antibiotics at this time. CT LE done non specific swelling in calf, no drainable fluid collection noted. Discussed with Dr. Martines. Patient with anemia, prior transfusion, work up initiated. 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 Discussed with Dr. Jin Heme Onc, US done, non specific, if increased in size will biopsy, monitor for now, discussed with hematology. ENT Dr. Schmitz consulted, no intervention.   Of note, pt required several blood transfusion during hospitalization especially rito-op.   Patient brought to the OR on 10/27 with Plastic surgery, Dr. Isabel, with washout of the wounds, some more necrotic muscle debridement. Continue to care for wounds with wound vac. Pt had low grade fevers, perhaps from small necrotic areas of muscle, and was treated with broad spectrum Abx as per ID recs. Cultures negative. Fever resolved.   On 11/3, pt again went to OR and had wound closure with advancement flap of the R thigh wound and skin grafts in the R calf wounds. Wound care nurse followed for regular vac changes. Pt is a 31 yo female with PMHX of Bipolar disorder, hypothyroidism, chronic low back pain on morphine s/p laminectomy and spinal fusion in 2016 who is BIB EMS for right leg swelling and numbness. Patient is a poor historian as she is on many pain medicines and is currently lethargic, therefore much info provided based on hospital staff and charts. She states that 18 hours prior to arrival, she fell on the floor at her house after taking too much morphine and was lying on floor for 8 hours. She woke up to numbness, swelling, and cool temperature of her right leg, from below the knee onwards. Patient denies any current SOB, CP, f/c, denies head trauma.     In the ED, vitals were Temp 101.4F /91  RR 20  SpO2 93% RA. Significant labs are WBS 24.3, Hb 15.6, Hmt 47.6,  Platelets 118, neutr 93%, sodium 131, BUN 30,  Cr 2.30, glucose 152 AST 1377, , GFR 27, lactate 1.7. Doppler US RLE showed Extremely limited evaluation of the right lower extremity with nonvisualization of the venous structures beyond the mid right femoral vein. No evidence of DVT within the right common femoral, proximal and   mid femoral veins. No evidence of left lower extremity deep venous thrombosis. XR of right tibia and XR of R femur showed no acute fractures. Arterial Doppler of RLE pending. EKG showed sinus tach of 140, with right superior axis deviation. Patient started on aztreonam, Tylenol given for fever, patient made NPO, Dr. Martines consulted for emergent surgery.    Patient admitted to ICU for Compartment syndrome, s/p Fasciotomy with plastic surgery and Dr. Martines consulted for vascular surgery. Patient was treated with IV antibiotics. Patient noted to have GHAZAL, likely acute tubular necrosis due to rhabdomyolisis followed by Dr. Tavares Nephrology, renal function improving.  Dr. Damico consulted for pain management as patient on high dose of pain medication and history of chronic opioid dependence. Wound vac placed and continued wound care by nurse. Spoke to outpatient pain management doctor Jeffrey Galaviz [phone]) aware of current plan and management. 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  Patient having low grade temperatures. Dr. Montiel consulted, no need for antibiotics at this time. CT LE done non specific swelling in calf, no drainable fluid collection noted. Discussed with Dr. Martines. Patient with anemia, prior transfusion, work up initiated. 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 Discussed with Dr. Jin Heme Onc, US done, non specific, if increased in size will biopsy, monitor for now, discussed with hematology. ENT Dr. Schmitz consulted, no intervention.   Of note, pt required several blood transfusion during hospitalization especially rito-op.   Patient brought to the OR on 10/27 with Plastic surgery, Dr. Isabel, with washout of the wounds, some more necrotic muscle debridement. Continue to care for wounds with wound vac. Pt had low grade fevers, perhaps from small necrotic areas of muscle, and was treated with broad spectrum Abx as per ID recs. Cultures negative. Fever resolved.   On 11/3, pt again went to OR and had wound closure with advancement flap of the R thigh wound and skin grafts in the R calf wounds. Wound care nurse followed for regular vac changes.   Skin graft revaluation was performed on 11/08 and 11/10 by Dr. Isabel , Wound vac removed. Patient cleared by all the physicians for discharge to [facility].

## 2017-10-13 NOTE — DISCHARGE NOTE ADULT - CARE PROVIDER_API CALL
Katie Isabel), Plastic Surgery  [address]  [address]  [place] [address]  Phone: [phone]  Fax: [phone]    Shyam Martines), Surgery  [address]  [address]  [place], [place] [id number]  Phone: [phone]  Fax: [phone]

## 2017-10-13 NOTE — PROGRESS NOTE ADULT - SUBJECTIVE AND OBJECTIVE BOX
Patient is a 32y old  Female who presents with a chief complaint of RLE edema and numbness (04 Oct 2017 01:13)      Patient seen in follow up for GHAZAL, ATN, Rhabdomyolysis. Good UO.  Improving renal function.    PAST MEDICAL HISTORY:  Low back pain  Hypothyroid  Bipolar 1 disorder    MEDICATIONS  (STANDING):  buDESOnide  80 MICROgram(s)/formoterol 4.5 MICROgram(s) Inhaler 2 Puff(s) Inhalation two times a day  buPROPion XL . 300 milliGRAM(s) Oral daily  docusate sodium 100 milliGRAM(s) Oral two times a day  fentaNYL   Patch  25 MICROgram(s)/Hr 2 Patch Transdermal every 72 hours  fluticasone propionate 50 MICROgram(s)/spray Nasal Spray 1 Spray(s) Both Nostrils two times a day  heparin  Injectable 5000 Unit(s) SubCutaneous every 8 hours  influenza   Vaccine 0.5 milliLiter(s) IntraMuscular once  lamoTRIgine 100 milliGRAM(s) Oral every 8 hours  levothyroxine 150 MICROGram(s) Oral daily  oxyCODONE  ER Tablet 60 milliGRAM(s) Oral every 8 hours  pantoprazole    Tablet 40 milliGRAM(s) Oral before breakfast  polyethylene glycol 3350 17 Gram(s) Oral daily  potassium chloride    Tablet ER 40 milliEquivalent(s) Oral every 6 hours  risperiDONE   Tablet 8 milliGRAM(s) Oral at bedtime  sertraline 50 milliGRAM(s) Oral daily  sevelamer carbonate 1600 milliGRAM(s) Oral three times a day with meals  sodium chloride 0.9%. 1000 milliLiter(s) (30 mL/Hr) IV Continuous <Continuous>  topiramate 400 milliGRAM(s) Oral two times a day    MEDICATIONS  (PRN):  acetaminophen   Tablet 650 milliGRAM(s) Oral every 6 hours PRN For Temp greater than 38 C (100.4 F)  ALBUTerol    90 MICROgram(s) HFA Inhaler 2 Puff(s) Inhalation every 6 hours PRN Shortness of Breath and/or Wheezing  clonazePAM Tablet 2 milliGRAM(s) Oral every 8 hours PRN ANxiety  HYDROmorphone   Tablet 2 milliGRAM(s) Oral four times a day PRN moderate to severe pain  HYDROmorphone  Injectable 0.5 milliGRAM(s) IV Push every 3 hours PRN Severe Pain (7 - 10)  ondansetron Injectable 4 milliGRAM(s) IV Push once PRN Nausea and/or Vomiting  senna 2 Tablet(s) Oral at bedtime PRN Constipation    T(C): 36.8 (10-13-17 @ 08:09), Max: 36.9 (10-11-17 @ 15:49)  HR: 100 (10-13-17 @ 08:09) (90 - 104)  BP: 113/70 (10-13-17 @ 08:09) (101/63 - 122/79)  RR: 17 (10-13-17 @ 08:09)  SpO2: 99% (10-13-17 @ 08:09)  Wt(kg): --  I&O's Detail    12 Oct 2017 07:01  -  13 Oct 2017 07:00  --------------------------------------------------------  IN:    Oral Fluid: 680 mL  Total IN: 680 mL    OUT:    VAC (Vacuum Assisted Closure) System: 1000 mL    Voided: 6325 mL  Total OUT: 7325 mL    Total NET: -6645 mL                PHYSICAL EXAM:  General: NAD  Respiratory: b/l air entry  Cardiovascular: S1 S2  Gastrointestinal: soft  Extremities:  Rt leg dressing     LABORATORY:                        8.0    9.4   )-----------( 245      ( 12 Oct 2017 07:57 )             25.2     10-13    135  |  103  |  73<H>  ----------------------------<  84  3.3<L>   |  20<L>  |  3.80<H>    Ca    8.5      13 Oct 2017 07:38  Phos  6.1     10-13    TPro  5.1<L>  /  Alb  1.6<L>  /  TBili  0.2  /  DBili  x   /  AST  35  /  ALT  26  /  AlkPhos  34<L>  10-13    Sodium, Serum: 135 mmol/L (10-13 @ 07:38)  Sodium, Serum: 131 mmol/L (10-12 @ 07:57)    Potassium, Serum: 3.3 mmol/L (10-13 @ 07:38)  Potassium, Serum: 3.6 mmol/L (10-12 @ 07:57)    Hemoglobin: 8.0 g/dL (10-12 @ 07:57)  Hemoglobin: 7.3 g/dL (10-11 @ 08:43)    Creatinine, Serum 3.80 (10-13 @ 07:38)  Creatinine, Serum 4.10 (10-12 @ 07:57)  Creatinine, Serum 4.80 (10-11 @ 08:43)    CARDIAC MARKERS ( 13 Oct 2017 07:38 )  x     / x     / 518 U/L / x     / x          LIVER FUNCTIONS - ( 13 Oct 2017 07:38 )  Alb: 1.6 g/dL / Pro: 5.1 g/dL / ALK PHOS: 34 U/L / ALT: 26 U/L / AST: 35 U/L / GGT: x

## 2017-10-13 NOTE — DISCHARGE NOTE ADULT - SECONDARY DIAGNOSIS.
Bipolar 1 disorder Acute respiratory failure following trauma and surgery Asthma Hypothyroid Lymphadenopathy of head and neck Polysubstance dependence

## 2017-10-13 NOTE — PROGRESS NOTE ADULT - SUBJECTIVE AND OBJECTIVE BOX
Date/Time Patient Seen:  		  Referring MD:   Data Reviewed	       Patient is a 32y old  Female who presents with a chief complaint of RLE edema and numbness (04 Oct 2017 01:13)  in bed  had pain overnight as per nursing      Subjective/HPI     PAST MEDICAL & SURGICAL HISTORY:  Low back pain  Hypothyroid  Bipolar 1 disorder  History of spinal fusion  History of laminectomy        Medication list         MEDICATIONS  (STANDING):  buDESOnide  80 MICROgram(s)/formoterol 4.5 MICROgram(s) Inhaler 2 Puff(s) Inhalation two times a day  buPROPion XL . 300 milliGRAM(s) Oral daily  docusate sodium 100 milliGRAM(s) Oral two times a day  fentaNYL   Patch  25 MICROgram(s)/Hr 2 Patch Transdermal every 72 hours  fluticasone propionate 50 MICROgram(s)/spray Nasal Spray 1 Spray(s) Both Nostrils two times a day  heparin  Injectable 5000 Unit(s) SubCutaneous every 8 hours  influenza   Vaccine 0.5 milliLiter(s) IntraMuscular once  lamoTRIgine 100 milliGRAM(s) Oral every 8 hours  levothyroxine 150 MICROGram(s) Oral daily  oxyCODONE  ER Tablet 60 milliGRAM(s) Oral every 8 hours  pantoprazole    Tablet 40 milliGRAM(s) Oral before breakfast  polyethylene glycol 3350 17 Gram(s) Oral daily  risperiDONE   Tablet 8 milliGRAM(s) Oral at bedtime  sertraline 50 milliGRAM(s) Oral daily  sevelamer carbonate 1600 milliGRAM(s) Oral three times a day with meals  sodium chloride 0.9%. 1000 milliLiter(s) (30 mL/Hr) IV Continuous <Continuous>  topiramate 400 milliGRAM(s) Oral two times a day    MEDICATIONS  (PRN):  acetaminophen   Tablet 650 milliGRAM(s) Oral every 6 hours PRN For Temp greater than 38 C (100.4 F)  ALBUTerol    90 MICROgram(s) HFA Inhaler 2 Puff(s) Inhalation every 6 hours PRN Shortness of Breath and/or Wheezing  clonazePAM Tablet 2 milliGRAM(s) Oral every 8 hours PRN ANxiety  HYDROmorphone   Tablet 2 milliGRAM(s) Oral four times a day PRN moderate to severe pain  HYDROmorphone  Injectable 0.5 milliGRAM(s) IV Push every 3 hours PRN Severe Pain (7 - 10)  ondansetron Injectable 4 milliGRAM(s) IV Push once PRN Nausea and/or Vomiting  senna 2 Tablet(s) Oral at bedtime PRN Constipation         Vitals log        ICU Vital Signs Last 24 Hrs  T(C): 36.8 (12 Oct 2017 23:47), Max: 36.8 (12 Oct 2017 07:24)  T(F): 98.2 (12 Oct 2017 23:47), Max: 98.3 (12 Oct 2017 07:24)  HR: 90 (12 Oct 2017 23:47) (90 - 103)  BP: 101/63 (12 Oct 2017 23:47) (101/63 - 116/75)  BP(mean): --  ABP: --  ABP(mean): --  RR: 16 (12 Oct 2017 23:47) (16 - 17)  SpO2: 95% (12 Oct 2017 23:47) (95% - 98%)           Input and Output:  I&O's Detail    11 Oct 2017 07:01  -  12 Oct 2017 07:00  --------------------------------------------------------  IN:    Oral Fluid: 1160 mL    sodium chloride 0.9%.: 360 mL  Total IN: 1520 mL    OUT:    Indwelling Catheter - Urethral: 2500 mL    VAC (Vacuum Assisted Closure) System: 500 mL    Voided: 3800 mL  Total OUT: 6800 mL    Total NET: -5280 mL      12 Oct 2017 07:01  -  13 Oct 2017 06:00  --------------------------------------------------------  IN:    Oral Fluid: 680 mL  Total IN: 680 mL    OUT:    Voided: 6325 mL  Total OUT: 6325 mL    Total NET: -5645 mL          Lab Data                        8.0    9.4   )-----------( 245      ( 12 Oct 2017 07:57 )             25.2     10-12    131<L>  |  101  |  73<H>  ----------------------------<  95  3.6   |  18<L>  |  4.10<H>    Ca    8.5      12 Oct 2017 07:57              Review of Systems	      Objective     Physical Examination    obese  rle immobilized  heart - s1s2  lungs - dec BS      Pertinent Lab findings & Imaging      Erlinda:  NO   Adequate UO     I&O's Detail    11 Oct 2017 07:01  -  12 Oct 2017 07:00  --------------------------------------------------------  IN:    Oral Fluid: 1160 mL    sodium chloride 0.9%.: 360 mL  Total IN: 1520 mL    OUT:    Indwelling Catheter - Urethral: 2500 mL    VAC (Vacuum Assisted Closure) System: 500 mL    Voided: 3800 mL  Total OUT: 6800 mL    Total NET: -5280 mL      12 Oct 2017 07:01  -  13 Oct 2017 06:00  --------------------------------------------------------  IN:    Oral Fluid: 680 mL  Total IN: 680 mL    OUT:    Voided: 6325 mL  Total OUT: 6325 mL    Total NET: -5645 mL               Discussed with:     Cultures:	        Radiology

## 2017-10-13 NOTE — PROGRESS NOTE ADULT - NSHPATTENDINGPLANDISCUSS_GEN_ALL_CORE
patient, mom, [phone] Dr. Damico, nurse, renal, neuro, outpatient pain management doctor Jeffrey Galaviz [phone]

## 2017-10-13 NOTE — DISCHARGE NOTE ADULT - PATIENT PORTAL LINK FT
“You can access the [facility] Patient Portal, offered by [facility], by registering with the following website: [email or web address]”

## 2017-10-13 NOTE — DISCHARGE NOTE ADULT - CARE PLAN
Principal Discharge DX:	Compartment syndrome of right lower extremity, subsequent encounter  Goal:	Resolution  Instructions for follow-up, activity and diet:	Please f/u with Plastics Dr. Gale.  Wound care per Plastics  Secondary Diagnosis:	Bipolar 1 disorder  Instructions for follow-up, activity and diet:	Continue meds. F/U with Psych  Secondary Diagnosis:	Acute respiratory failure following trauma and surgery  Instructions for follow-up, activity and diet:	Resolved  Secondary Diagnosis:	Asthma  Instructions for follow-up, activity and diet:	Continue home meds. F/u with PCP.  Secondary Diagnosis:	Hypothyroid  Instructions for follow-up, activity and diet:	Continue Synthroid. f/U with PCP.  Secondary Diagnosis:	Lymphadenopathy of head and neck  Instructions for follow-up, activity and diet:	F/U with hematologist  Secondary Diagnosis:	Polysubstance dependence  Instructions for follow-up, activity and diet:	Wean off of Opiates, still requiring it s/p surgery and acute pain.

## 2017-10-13 NOTE — PROGRESS NOTE ADULT - SUBJECTIVE AND OBJECTIVE BOX
Neurology follow up note    ABELARDO [id number]      Interval History:    Patient with on and off leg pain     MEDICATIONS    acetaminophen   Tablet 650 milliGRAM(s) Oral every 6 hours PRN  ALBUTerol    90 MICROgram(s) HFA Inhaler 2 Puff(s) Inhalation every 6 hours PRN  buDESOnide  80 MICROgram(s)/formoterol 4.5 MICROgram(s) Inhaler 2 Puff(s) Inhalation two times a day  buPROPion XL . 300 milliGRAM(s) Oral daily  clonazePAM Tablet 2 milliGRAM(s) Oral every 8 hours PRN  docusate sodium 100 milliGRAM(s) Oral two times a day  fentaNYL   Patch  25 MICROgram(s)/Hr 2 Patch Transdermal every 72 hours  fluticasone propionate 50 MICROgram(s)/spray Nasal Spray 1 Spray(s) Both Nostrils two times a day  heparin  Injectable 5000 Unit(s) SubCutaneous every 8 hours  HYDROmorphone   Tablet 2 milliGRAM(s) Oral four times a day PRN  HYDROmorphone  Injectable 0.5 milliGRAM(s) IV Push every 3 hours PRN  influenza   Vaccine 0.5 milliLiter(s) IntraMuscular once  lamoTRIgine 100 milliGRAM(s) Oral every 8 hours  levothyroxine 150 MICROGram(s) Oral daily  ondansetron Injectable 4 milliGRAM(s) IV Push once PRN  oxyCODONE  ER Tablet 60 milliGRAM(s) Oral every 8 hours  pantoprazole    Tablet 40 milliGRAM(s) Oral before breakfast  polyethylene glycol 3350 17 Gram(s) Oral daily  potassium chloride    Tablet ER 40 milliEquivalent(s) Oral every 6 hours  risperiDONE   Tablet 8 milliGRAM(s) Oral at bedtime  senna 2 Tablet(s) Oral at bedtime PRN  sertraline 50 milliGRAM(s) Oral daily  sevelamer carbonate 800 milliGRAM(s) Oral three times a day with meals  topiramate 400 milliGRAM(s) Oral two times a day      Allergies    avocado (Unknown)  latex (Urticaria; Rash)  penicillin (Unknown)  sulfa drugs (Unknown)    Intolerances            Vital Signs Last 24 Hrs  T(C): 36.8 (13 Oct 2017 08:09), Max: 36.8 (12 Oct 2017 23:47)  T(F): 98.3 (13 Oct 2017 08:09), Max: 98.3 (13 Oct 2017 08:09)  HR: 100 (13 Oct 2017 08:09) (90 - 102)  BP: 113/70 (13 Oct 2017 08:09) (101/63 - 113/70)  BP(mean): --  RR: 17 (13 Oct 2017 08:09) (16 - 17)  SpO2: 99% (13 Oct 2017 08:09) (95% - 99%)      REVIEW OF SYSTEMS:     Constitutional: No fever, chills, fatigue, weakness  Eyes: no eye pain, visual disturbances, or discharge  ENT:  No difficulty hearing, tinnitus, vertigo; No sinus or throat pain  Neck: No pain or stiffness  Respiratory: No cough, dyspnea, wheezing   Cardiovascular: No chest pain, palpitations,   Gastrointestinal: No abdominal or epigastric pain. No nausea, vomiting  No diarrhea or constipation.   Genitourinary: No dysuria, frequency, hematuria or incontinence  Neurological: + numbness right leg  Psychiatric: No depression, anxiety, mood swings or difficulty sleeping  Musculoskeletal: + swelling; + extremity pain  Skin: No itching, burning, rashes or lesions   Lymph Nodes: No enlarged glands  Endocrine: No heat or cold intolerance; No hair loss   Allergy and Immunologic: No hives or eczema    On Neurological Examination:    Mental Status - Patient is sleepy      Follow simple commands    Speech -   Fluent                     Cranial Nerves - Pupils 3 mm equal and reactive to light,   extraocular eye movements intact.   smile symmetric  intact bilateral NLF     Motor:  Bilateral upper were 5/5,   left lower was 4+/5,   right lower examination was limited secondary to edema and the patient has a dressing that goes from a mid thigh down to her ankle.  There was subtle flexation at the hip, would say 2-/5 with sligh knee flexion  There was no movement of her right foot in regard to dorsi plantar flexation.      sensory -The patient was unable to feel in her right foot.  The patient did appear to have feeling in the top part of her thigh.  The rest of examination was limited secondary to dressing.  Unable to evaluate reflexes secondary to surgery and dressing       GENERAL Exam: Nontoxic , No Acute Distress   	  HEENT:  normocephalic, atraumatic  		  LUNGS: Clear bilaterally    HEART: Normal S1S2   No murmur RRR        	  GI/ ABDOMEN:  Soft  Non tender  	   SKIN: Normal  No Ecchymosis                  LABS:  CBC Full  -  ( 12 Oct 2017 07:57 )  WBC Count : 9.4 K/uL  Hemoglobin : 8.0 g/dL  Hematocrit : 25.2 %  Platelet Count - Automated : 245 K/uL  Mean Cell Volume : 92.0 fl  Mean Cell Hemoglobin : 29.3 pg  Mean Cell Hemoglobin Concentration : 31.8 gm/dL  Auto Neutrophil # : x  Auto Lymphocyte # : x  Auto Monocyte # : x  Auto Eosinophil # : x  Auto Basophil # : x  Auto Neutrophil % : x  Auto Lymphocyte % : x  Auto Monocyte % : x  Auto Eosinophil % : x  Auto Basophil % : x      10-13    135  |  103  |  73<H>  ----------------------------<  84  3.3<L>   |  20<L>  |  3.80<H>    Ca    8.5      13 Oct 2017 07:38  Phos  6.1     10-13    TPro  5.1<L>  /  Alb  1.6<L>  /  TBili  0.2  /  DBili  x   /  AST  35  /  ALT  26  /  AlkPhos  34<L>  10-13    Hemoglobin A1C:     LIVER FUNCTIONS - ( 13 Oct 2017 07:38 )  Alb: 1.6 g/dL / Pro: 5.1 g/dL / ALK PHOS: 34 U/L / ALT: 26 U/L / AST: 35 U/L / GGT: x           Vitamin B12         RADIOLOGY    ANALYSIS AND PLAN:  This is a 32-year-old with a history of being found on the ground, status post compartment syndrome with fasciotomy surgery.    1-For compartment syndrome, fasciotomy, examination is extremely limited secondary to dressing on her right leg status post surgical intervention.  The patient does appear to have movement proximally at the hip i past but today no cooperative with exam , but no movement distally.  The patient does not appear to have any sensory modalities distally.  This could be secondary to underlying neurapraxia from the edema and also from the compartment syndrome.      I would recommend to continue pain medication as needed.      The patient will need Physical Therapy.      With time we will see how much improvement there is in regards to motor and sensory.  The patient may need nerve conduction studies on an outpatient basis.      Spoke to vascular surgery do the degree of muscle damage that was found --- suspect long term prognosis to recovery motor/sensory is low     From Neurology standpoint, the patient is stable for discharge planning.     30 minutes spent on total encounter; more than 50% of the visit was spent counseling and/or coordinating care by the attending physician.     no new events will follow up as needed

## 2017-10-13 NOTE — PROGRESS NOTE ADULT - ATTENDING COMMENTS
Reached out to patients outpatient pain management doctor Jeffrey Galaviz [phone]   Patient has been following with Dr Galaviz for years after back surgery for chronic pain.   Patient was on Morphine 100mg TID and Dilaudid 8mg 4x a day as per Dr. Galaviz.  She was seen in his office prior to admission for lumbar epidural.  Dr. Painteri aware of current pain management plan.

## 2017-10-13 NOTE — PROGRESS NOTE ADULT - SUBJECTIVE AND OBJECTIVE BOX
[facility] -- Bonnie Hernandez, April Brink, Alejandro Aguilera Savella  Office # [phone]      Follow Up:  tachycardia    Subjective/Observations: Patient seen and examined. Events noted. Resting comfortably in chair. She is mildly lethargic but arousable.        REVIEW OF SYSTEMS: Limited 2/2 comorbidities     PAST MEDICAL & SURGICAL HISTORY:  Low back pain  Hypothyroid  Bipolar 1 disorder  History of spinal fusion  History of laminectomy      MEDICATIONS  (STANDING):  buDESOnide  80 MICROgram(s)/formoterol 4.5 MICROgram(s) Inhaler 2 Puff(s) Inhalation two times a day  buPROPion XL . 300 milliGRAM(s) Oral daily  docusate sodium 100 milliGRAM(s) Oral two times a day  fentaNYL   Patch  25 MICROgram(s)/Hr 2 Patch Transdermal every 72 hours  fluticasone propionate 50 MICROgram(s)/spray Nasal Spray 1 Spray(s) Both Nostrils two times a day  heparin  Injectable 5000 Unit(s) SubCutaneous every 8 hours  influenza   Vaccine 0.5 milliLiter(s) IntraMuscular once  lamoTRIgine 100 milliGRAM(s) Oral every 8 hours  levothyroxine 150 MICROGram(s) Oral daily  oxyCODONE  ER Tablet 60 milliGRAM(s) Oral every 8 hours  pantoprazole    Tablet 40 milliGRAM(s) Oral before breakfast  polyethylene glycol 3350 17 Gram(s) Oral daily  potassium chloride    Tablet ER 40 milliEquivalent(s) Oral every 6 hours  risperiDONE   Tablet 8 milliGRAM(s) Oral at bedtime  sertraline 50 milliGRAM(s) Oral daily  sevelamer carbonate 800 milliGRAM(s) Oral three times a day with meals  topiramate 400 milliGRAM(s) Oral two times a day    MEDICATIONS  (PRN):  acetaminophen   Tablet 650 milliGRAM(s) Oral every 6 hours PRN For Temp greater than 38 C (100.4 F)  ALBUTerol    90 MICROgram(s) HFA Inhaler 2 Puff(s) Inhalation every 6 hours PRN Shortness of Breath and/or Wheezing  clonazePAM Tablet 2 milliGRAM(s) Oral every 8 hours PRN ANxiety  HYDROmorphone   Tablet 2 milliGRAM(s) Oral four times a day PRN moderate to severe pain  HYDROmorphone  Injectable 0.5 milliGRAM(s) IV Push every 3 hours PRN Severe Pain (7 - 10)  ondansetron Injectable 4 milliGRAM(s) IV Push once PRN Nausea and/or Vomiting  senna 2 Tablet(s) Oral at bedtime PRN Constipation      Allergies    avocado (Unknown)  latex (Urticaria; Rash)  penicillin (Unknown)  sulfa drugs (Unknown)    Intolerances            Vital Signs Last 24 Hrs  T(C): 36.8 (13 Oct 2017 08:09), Max: 36.8 (12 Oct 2017 23:47)  T(F): 98.3 (13 Oct 2017 08:09), Max: 98.3 (13 Oct 2017 08:09)  HR: 100 (13 Oct 2017 08:09) (90 - 102)  BP: 113/70 (13 Oct 2017 08:09) (101/63 - 113/70)  BP(mean): --  RR: 17 (13 Oct 2017 08:09) (16 - 17)  SpO2: 99% (13 Oct 2017 08:09) (95% - 99%)    I&O's Summary    12 Oct 2017 07:01  -  13 Oct 2017 07:00  --------------------------------------------------------  IN: 680 mL / OUT: 7325 mL / NET: -6645 mL          PHYSICAL EXAM:     Constitutional: NAD,  lethargic  HEENT: Moist Mucous Membranes, Anicteric  Pulmonary: Decreased breath sounds b/l.   Cardiovascular: Tachy, S1 and S2, No murmurs, rubs, gallops or clicks  Gastrointestinal: Bowel Sounds present, soft, nontender.   Lymph: No peripheral edema. No lymphadenopathy.  Skin: No visible rashes or ulcers.       LABS: All Labs Reviewed:                        8.0    9.4   )-----------( 245      ( 12 Oct 2017 07:57 )             25.2                         7.3    6.7   )-----------( 196      ( 11 Oct 2017 08:43 )             23.1     13 Oct 2017 07:38    135    |  103    |  73     ----------------------------<  84     3.3     |  20     |  3.80   12 Oct 2017 07:57    131    |  101    |  73     ----------------------------<  95     3.6     |  18     |  4.10   11 Oct 2017 08:43    137    |  105    |  79     ----------------------------<  85     3.8     |  20     |  4.80     Ca    8.5        13 Oct 2017 07:38  Ca    8.5        12 Oct 2017 07:57  Ca    8.4        11 Oct 2017 08:43  Phos  6.1       13 Oct 2017 07:38    TPro  5.1    /  Alb  1.6    /  TBili  0.2    /  DBili  x      /  AST  35     /  ALT  26     /  AlkPhos  34     13 Oct 2017 07:38      CARDIAC MARKERS ( 13 Oct 2017 07:38 )  x     / x     / 518 U/L / x     / x

## 2017-10-13 NOTE — PROGRESS NOTE ADULT - ASSESSMENT
32-year-old female now status post right lower extremity fasciotomy for prolonged compression after a fall with lethargy.     - Rhabdo improving  - No obvious cardiac complications post op  - no clear evidence of acute ischemia or volume overload, pt asymptomatic  - Persistent tachycardia HR of 100's likely  reactive  - Pain control  - renal function improving, continue IV hydration  - TTE with hyperdynamic LV function, EF 70%, no valvular disease noted  - Other cardiovascular workup will depend on clinical course.  - DVT prophylaxis  - Activity as tolerated  - All other workup per primary team  - Will follow

## 2017-10-13 NOTE — PROGRESS NOTE ADULT - PROBLEM SELECTOR PLAN 3
will increase Fentanyl Patch to 50 mcg transdermal every 72 hrs  cont dilaudid PRN oral and IV  reassurance  emotional support

## 2017-10-14 LAB
ANION GAP SERPL CALC-SCNC: 10 MMOL/L — SIGNIFICANT CHANGE UP (ref 5–17)
BUN SERPL-MCNC: 70 MG/DL — HIGH (ref 7–23)
CALCIUM SERPL-MCNC: 8.6 MG/DL — SIGNIFICANT CHANGE UP (ref 8.5–10.1)
CHLORIDE SERPL-SCNC: 102 MMOL/L — SIGNIFICANT CHANGE UP (ref 96–108)
CO2 SERPL-SCNC: 22 MMOL/L — SIGNIFICANT CHANGE UP (ref 22–31)
CREAT SERPL-MCNC: 3.5 MG/DL — HIGH (ref 0.5–1.3)
GLUCOSE SERPL-MCNC: 95 MG/DL — SIGNIFICANT CHANGE UP (ref 70–99)
HCT VFR BLD CALC: 23.9 % — LOW (ref 34.5–45)
HGB BLD-MCNC: 7.7 G/DL — LOW (ref 11.5–15.5)
MAGNESIUM SERPL-MCNC: 2.3 MG/DL — SIGNIFICANT CHANGE UP (ref 1.6–2.6)
MCHC RBC-ENTMCNC: 29.3 PG — SIGNIFICANT CHANGE UP (ref 27–34)
MCHC RBC-ENTMCNC: 32.2 GM/DL — SIGNIFICANT CHANGE UP (ref 32–36)
MCV RBC AUTO: 90.9 FL — SIGNIFICANT CHANGE UP (ref 80–100)
PLATELET # BLD AUTO: 288 K/UL — SIGNIFICANT CHANGE UP (ref 150–400)
POTASSIUM SERPL-MCNC: 4 MMOL/L — SIGNIFICANT CHANGE UP (ref 3.5–5.3)
POTASSIUM SERPL-SCNC: 4 MMOL/L — SIGNIFICANT CHANGE UP (ref 3.5–5.3)
RBC # BLD: 2.63 M/UL — LOW (ref 3.8–5.2)
RBC # FLD: 12.6 % — SIGNIFICANT CHANGE UP (ref 10.3–14.5)
SODIUM SERPL-SCNC: 134 MMOL/L — LOW (ref 135–145)
WBC # BLD: 11.7 K/UL — HIGH (ref 3.8–10.5)
WBC # FLD AUTO: 11.7 K/UL — HIGH (ref 3.8–10.5)

## 2017-10-14 PROCEDURE — 99233 SBSQ HOSP IP/OBS HIGH 50: CPT

## 2017-10-14 RX ORDER — ONDANSETRON 8 MG/1
4 TABLET, FILM COATED ORAL EVERY 4 HOURS
Qty: 0 | Refills: 0 | Status: DISCONTINUED | OUTPATIENT
Start: 2017-10-14 | End: 2017-10-27

## 2017-10-14 RX ORDER — OXYCODONE HYDROCHLORIDE 5 MG/1
60 TABLET ORAL EVERY 8 HOURS
Qty: 0 | Refills: 0 | Status: DISCONTINUED | OUTPATIENT
Start: 2017-10-14 | End: 2017-10-21

## 2017-10-14 RX ORDER — OXYCODONE HYDROCHLORIDE 5 MG/1
60 TABLET ORAL ONCE
Qty: 0 | Refills: 0 | Status: DISCONTINUED | OUTPATIENT
Start: 2017-10-14 | End: 2017-10-14

## 2017-10-14 RX ADMIN — HEPARIN SODIUM 5000 UNIT(S): 5000 INJECTION INTRAVENOUS; SUBCUTANEOUS at 14:30

## 2017-10-14 RX ADMIN — Medication 650 MILLIGRAM(S): at 16:51

## 2017-10-14 RX ADMIN — OXYCODONE HYDROCHLORIDE 60 MILLIGRAM(S): 5 TABLET ORAL at 15:00

## 2017-10-14 RX ADMIN — RISPERIDONE 8 MILLIGRAM(S): 4 TABLET ORAL at 21:38

## 2017-10-14 RX ADMIN — BUDESONIDE AND FORMOTEROL FUMARATE DIHYDRATE 2 PUFF(S): 160; 4.5 AEROSOL RESPIRATORY (INHALATION) at 20:00

## 2017-10-14 RX ADMIN — Medication 150 MICROGRAM(S): at 06:30

## 2017-10-14 RX ADMIN — Medication 400 MILLIGRAM(S): at 17:49

## 2017-10-14 RX ADMIN — Medication 100 MILLIGRAM(S): at 07:06

## 2017-10-14 RX ADMIN — BUDESONIDE AND FORMOTEROL FUMARATE DIHYDRATE 2 PUFF(S): 160; 4.5 AEROSOL RESPIRATORY (INHALATION) at 08:00

## 2017-10-14 RX ADMIN — OXYCODONE HYDROCHLORIDE 60 MILLIGRAM(S): 5 TABLET ORAL at 21:38

## 2017-10-14 RX ADMIN — HYDROMORPHONE HYDROCHLORIDE 0.5 MILLIGRAM(S): 2 INJECTION INTRAMUSCULAR; INTRAVENOUS; SUBCUTANEOUS at 02:08

## 2017-10-14 RX ADMIN — Medication 400 MILLIGRAM(S): at 07:06

## 2017-10-14 RX ADMIN — SEVELAMER CARBONATE 800 MILLIGRAM(S): 2400 POWDER, FOR SUSPENSION ORAL at 12:25

## 2017-10-14 RX ADMIN — SEVELAMER CARBONATE 800 MILLIGRAM(S): 2400 POWDER, FOR SUSPENSION ORAL at 08:00

## 2017-10-14 RX ADMIN — FENTANYL CITRATE 1 PATCH: 50 INJECTION INTRAVENOUS at 11:51

## 2017-10-14 RX ADMIN — HEPARIN SODIUM 5000 UNIT(S): 5000 INJECTION INTRAVENOUS; SUBCUTANEOUS at 07:05

## 2017-10-14 RX ADMIN — OXYCODONE HYDROCHLORIDE 60 MILLIGRAM(S): 5 TABLET ORAL at 09:00

## 2017-10-14 RX ADMIN — ONDANSETRON 4 MILLIGRAM(S): 8 TABLET, FILM COATED ORAL at 23:55

## 2017-10-14 RX ADMIN — HYDROMORPHONE HYDROCHLORIDE 0.5 MILLIGRAM(S): 2 INJECTION INTRAMUSCULAR; INTRAVENOUS; SUBCUTANEOUS at 16:51

## 2017-10-14 RX ADMIN — LAMOTRIGINE 100 MILLIGRAM(S): 25 TABLET, ORALLY DISINTEGRATING ORAL at 21:38

## 2017-10-14 RX ADMIN — Medication 100 MILLIGRAM(S): at 17:49

## 2017-10-14 RX ADMIN — OXYCODONE HYDROCHLORIDE 60 MILLIGRAM(S): 5 TABLET ORAL at 14:30

## 2017-10-14 RX ADMIN — Medication 1 SPRAY(S): at 17:50

## 2017-10-14 RX ADMIN — HYDROMORPHONE HYDROCHLORIDE 0.5 MILLIGRAM(S): 2 INJECTION INTRAMUSCULAR; INTRAVENOUS; SUBCUTANEOUS at 17:20

## 2017-10-14 RX ADMIN — HEPARIN SODIUM 5000 UNIT(S): 5000 INJECTION INTRAVENOUS; SUBCUTANEOUS at 21:38

## 2017-10-14 RX ADMIN — ONDANSETRON 4 MILLIGRAM(S): 8 TABLET, FILM COATED ORAL at 19:53

## 2017-10-14 RX ADMIN — Medication 2 MILLIGRAM(S): at 23:56

## 2017-10-14 RX ADMIN — SEVELAMER CARBONATE 800 MILLIGRAM(S): 2400 POWDER, FOR SUSPENSION ORAL at 16:54

## 2017-10-14 RX ADMIN — Medication 1 SPRAY(S): at 07:06

## 2017-10-14 RX ADMIN — BUPROPION HYDROCHLORIDE 300 MILLIGRAM(S): 150 TABLET, EXTENDED RELEASE ORAL at 12:25

## 2017-10-14 RX ADMIN — OXYCODONE HYDROCHLORIDE 60 MILLIGRAM(S): 5 TABLET ORAL at 08:48

## 2017-10-14 RX ADMIN — PANTOPRAZOLE SODIUM 40 MILLIGRAM(S): 20 TABLET, DELAYED RELEASE ORAL at 07:02

## 2017-10-14 RX ADMIN — LAMOTRIGINE 100 MILLIGRAM(S): 25 TABLET, ORALLY DISINTEGRATING ORAL at 07:07

## 2017-10-14 RX ADMIN — LAMOTRIGINE 100 MILLIGRAM(S): 25 TABLET, ORALLY DISINTEGRATING ORAL at 14:30

## 2017-10-14 RX ADMIN — OXYCODONE HYDROCHLORIDE 60 MILLIGRAM(S): 5 TABLET ORAL at 22:30

## 2017-10-14 RX ADMIN — SERTRALINE 50 MILLIGRAM(S): 25 TABLET, FILM COATED ORAL at 12:25

## 2017-10-14 NOTE — PROGRESS NOTE ADULT - PROBLEM SELECTOR PLAN 1
-compartment syndrome of the right lower extremity   -s/p emergent fasciotomy by Vascular surgery (Bobbi)   -s/p wound vac placement , changed today.  -Plastic surgery to recommend if need for skin grafting.   -Finished course of IV abx aztreonam and clindamycin in ICU  -monitor labs, and PT

## 2017-10-14 NOTE — PROGRESS NOTE ADULT - SUBJECTIVE AND OBJECTIVE BOX
Date/Time Patient Seen:  		  Referring MD:   Data Reviewed	       Patient is a 32y old  Female who presents with a chief complaint of RLE edema and numbness (13 Oct 2017 16:25)  in bed  seen and examined  overnight events noted        Subjective/HPI     PAST MEDICAL & SURGICAL HISTORY:  Low back pain  Hypothyroid  Bipolar 1 disorder  History of spinal fusion  History of laminectomy        Medication list         MEDICATIONS  (STANDING):  buDESOnide  80 MICROgram(s)/formoterol 4.5 MICROgram(s) Inhaler 2 Puff(s) Inhalation two times a day  buPROPion XL . 300 milliGRAM(s) Oral daily  docusate sodium 100 milliGRAM(s) Oral two times a day  fentaNYL   Patch  25 MICROgram(s)/Hr 2 Patch Transdermal every 72 hours  fluticasone propionate 50 MICROgram(s)/spray Nasal Spray 1 Spray(s) Both Nostrils two times a day  heparin  Injectable 5000 Unit(s) SubCutaneous every 8 hours  influenza   Vaccine 0.5 milliLiter(s) IntraMuscular once  lamoTRIgine 100 milliGRAM(s) Oral every 8 hours  levothyroxine 150 MICROGram(s) Oral daily  oxyCODONE  ER Tablet 60 milliGRAM(s) Oral every 8 hours  pantoprazole    Tablet 40 milliGRAM(s) Oral before breakfast  polyethylene glycol 3350 17 Gram(s) Oral daily  risperiDONE   Tablet 8 milliGRAM(s) Oral at bedtime  sertraline 50 milliGRAM(s) Oral daily  sevelamer carbonate 800 milliGRAM(s) Oral three times a day with meals  topiramate 400 milliGRAM(s) Oral two times a day    MEDICATIONS  (PRN):  acetaminophen   Tablet 650 milliGRAM(s) Oral every 6 hours PRN For Temp greater than 38 C (100.4 F)  ALBUTerol    90 MICROgram(s) HFA Inhaler 2 Puff(s) Inhalation every 6 hours PRN Shortness of Breath and/or Wheezing  clonazePAM Tablet 2 milliGRAM(s) Oral every 8 hours PRN ANxiety  HYDROmorphone   Tablet 2 milliGRAM(s) Oral four times a day PRN moderate to severe pain  HYDROmorphone  Injectable 0.5 milliGRAM(s) IV Push every 3 hours PRN Severe Pain (7 - 10)  senna 2 Tablet(s) Oral at bedtime PRN Constipation         Vitals log        ICU Vital Signs Last 24 Hrs  T(C): 37.3 (14 Oct 2017 08:18), Max: 37.3 (14 Oct 2017 08:18)  T(F): 99.1 (14 Oct 2017 08:18), Max: 99.1 (14 Oct 2017 08:18)  HR: 108 (14 Oct 2017 08:18) (93 - 114)  BP: 111/75 (14 Oct 2017 08:18) (99/66 - 111/75)  BP(mean): --  ABP: --  ABP(mean): --  RR: 16 (14 Oct 2017 08:18) (16 - 16)  SpO2: 97% (14 Oct 2017 08:18) (95% - 99%)           Input and Output:  I&O's Detail    13 Oct 2017 07:01  -  14 Oct 2017 07:00  --------------------------------------------------------  IN:    Oral Fluid: 480 mL  Total IN: 480 mL    OUT:  Total OUT: 0 mL    Total NET: 480 mL          Lab Data                        7.7    11.7  )-----------( 288      ( 14 Oct 2017 07:28 )             23.9     10-14    134<L>  |  102  |  70<H>  ----------------------------<  95  4.0   |  22  |  3.50<H>    Ca    8.6      14 Oct 2017 07:28  Phos  6.1     10-13  Mg     2.3     10-14    TPro  5.1<L>  /  Alb  1.6<L>  /  TBili  0.2  /  DBili  x   /  AST  35  /  ALT  26  /  AlkPhos  34<L>  10-13      CARDIAC MARKERS ( 13 Oct 2017 07:38 )  x     / x     / 518 U/L / x     / x            Review of Systems	      Objective     Physical Examination    head at  heart - s1s2  lungs - dec BS  abd obese  soft  cn grossly int  RLE dressed        Pertinent Lab findings & Imaging      Erlinda:  NO   Adequate UO     I&O's Detail    13 Oct 2017 07:01  -  14 Oct 2017 07:00  --------------------------------------------------------  IN:    Oral Fluid: 480 mL  Total IN: 480 mL    OUT:  Total OUT: 0 mL    Total NET: 480 mL               Discussed with:     Cultures:	        Radiology

## 2017-10-14 NOTE — PROGRESS NOTE ADULT - SUBJECTIVE AND OBJECTIVE BOX
32y old  Female who presents with a chief complaint of RLE edema and numbness. Patient admitted for Compartment syndrome, s/p Fasciotomy.  Dr. Damico consulted for pain management and concern for opoid dependence and abuse. Nurse aware of pain management plan. Renal function improving. Spoke to patient and mother at bedside. Patient is persistently asking for opioids, but she is aware they are being tapered and managed accordingly with pain management Dr. Damico.   Spoke to patient at length in regards to her opioid dependence and need to taper down her dilaudid.  Spoke to pain management doctor Jeffrey Galaviz [phone]) see chart note  Spoke to mom aware of plan.   Admitted for compartment syndrome, s/p Sx and vac  placement.      INTERVAL HPI: Pt seen and examined. mom bedside. c/o pain in RLE. no N/V, F/Ch  OVERNIGHT EVENTS: none  T(F): 99.1 (10-14-17 @ 08:18), Max: 99.1 (10-14-17 @ 08:18)  HR: 108 (10-14-17 @ 08:18) (93 - 114)  BP: 111/75 (10-14-17 @ 08:18) (99/66 - 111/75)  RR: 16 (10-14-17 @ 08:18) (16 - 16)  SpO2: 97% (10-14-17 @ 08:18) (95% - 99%)  Wt(kg): --  I&O's Summary    13 Oct 2017 07:01  -  14 Oct 2017 07:00  --------------------------------------------------------  IN: 480 mL / OUT: 0 mL / NET: 480 mL        REVIEW OF SYSTEM:    Constitutional: No fever, chills, fatigue  Neuro: Pain, numbness, and +weakness of LE  Resp: No cough, wheezing, shortness of breath  CVS: No chest pain, palpitations, leg swelling  GI: No abdominal pain, nausea, vomiting, diarrhea   : No dysuria, frequency, incontinence  Skin: No itching, burning, rashes, or lesions   Msk: pain in RLE  Psych: No depression, anxiety, mood swings          PHYSICAL EXAM:  GENERAL: NAD, well-developed  HEAD:  Atraumatic, Normocephalic  EYES: EOMI, PERRLA, conjunctiva and sclera clear  ENMT: No tonsillar erythema, exudates, or enlargement; Moist mucous membranes,   NECK: Supple, No JVD, Normal thyroid  HEART: Regular rate and rhythm; No murmurs, rubs, or gallops  RESPIRATORY: CTA B/L, No wheezing / rhonchi  ABDOMEN: Soft, Nontender, Nondistended; Bowel sounds present  NEUROLOGY: A&Ox3, nonfocal, CN II-XII grossly intact, sensation intact, no gait abnormalities bilaterally;  EXTREMITIES: RLE wrapped with ace bandage, with wound vac.  SKIN: warm, dry, normal color, no rash or abnormal lesions        LABS:                        7.7    11.7  )-----------( 288      ( 14 Oct 2017 07:28 )             23.9     10-14    134<L>  |  102  |  70<H>  ----------------------------<  95  4.0   |  22  |  3.50<H>    Ca    8.6      14 Oct 2017 07:28  Phos  6.1     10-13  Mg     2.3     10-14    TPro  5.1<L>  /  Alb  1.6<L>  /  TBili  0.2  /  DBili  x   /  AST  35  /  ALT  26  /  AlkPhos  34<L>  10-13        CAPILLARY BLOOD GLUCOSE                  MEDICATIONS  (STANDING):  buDESOnide  80 MICROgram(s)/formoterol 4.5 MICROgram(s) Inhaler 2 Puff(s) Inhalation two times a day  buPROPion XL . 300 milliGRAM(s) Oral daily  docusate sodium 100 milliGRAM(s) Oral two times a day  fentaNYL   Patch  25 MICROgram(s)/Hr 2 Patch Transdermal every 72 hours  fluticasone propionate 50 MICROgram(s)/spray Nasal Spray 1 Spray(s) Both Nostrils two times a day  heparin  Injectable 5000 Unit(s) SubCutaneous every 8 hours  influenza   Vaccine 0.5 milliLiter(s) IntraMuscular once  lamoTRIgine 100 milliGRAM(s) Oral every 8 hours  levothyroxine 150 MICROGram(s) Oral daily  oxyCODONE  ER Tablet 60 milliGRAM(s) Oral every 8 hours  pantoprazole    Tablet 40 milliGRAM(s) Oral before breakfast  polyethylene glycol 3350 17 Gram(s) Oral daily  risperiDONE   Tablet 8 milliGRAM(s) Oral at bedtime  sertraline 50 milliGRAM(s) Oral daily  sevelamer carbonate 800 milliGRAM(s) Oral three times a day with meals  topiramate 400 milliGRAM(s) Oral two times a day    MEDICATIONS  (PRN):  acetaminophen   Tablet 650 milliGRAM(s) Oral every 6 hours PRN For Temp greater than 38 C (100.4 F)  ALBUTerol    90 MICROgram(s) HFA Inhaler 2 Puff(s) Inhalation every 6 hours PRN Shortness of Breath and/or Wheezing  clonazePAM Tablet 2 milliGRAM(s) Oral every 8 hours PRN ANxiety  HYDROmorphone   Tablet 2 milliGRAM(s) Oral four times a day PRN moderate to severe pain  HYDROmorphone  Injectable 0.5 milliGRAM(s) IV Push every 3 hours PRN Severe Pain (7 - 10)  senna 2 Tablet(s) Oral at bedtime PRN Constipation

## 2017-10-14 NOTE — PROGRESS NOTE ADULT - SUBJECTIVE AND OBJECTIVE BOX
Patient is a 32y old  Female who presents with a chief complaint of RLE edema and numbness (13 Oct 2017 16:25)      Vascular Surgery Progress Note    Interval HPI: POD #10 s/p fasciotomies of right leg. Patient is comfortable and has no complaints today.    Medications:      Allergies:  Allergies    avocado (Unknown)  latex (Urticaria; Rash)  penicillin (Unknown)  sulfa drugs (Unknown)    Intolerances        Vital Signs Last 24 Hrs  T(C): 37.3 (14 Oct 2017 08:18), Max: 37.3 (14 Oct 2017 08:18)  T(F): 99.1 (14 Oct 2017 08:18), Max: 99.1 (14 Oct 2017 08:18)  HR: 108 (14 Oct 2017 08:18) (93 - 114)  BP: 111/75 (14 Oct 2017 08:18) (99/66 - 111/75)  BP(mean): --  RR: 16 (14 Oct 2017 08:18) (16 - 16)  SpO2: 97% (14 Oct 2017 08:18) (95% - 99%)  I&O's Summary    13 Oct 2017 07:01  -  14 Oct 2017 07:00  --------------------------------------------------------  IN: 480 mL / OUT: 0 mL / NET: 480 mL        Physical Exam:  Gen: NAD, A&Ox3  Neuro-vascular exam is unchanged. Wounds remain viable.      LABS:                        7.7    11.7  )-----------( 288      ( 14 Oct 2017 07:28 )             23.9     10-14    134<L>  |  102  |  70<H>  ----------------------------<  95  4.0   |  22  |  3.50<H>    Ca    8.6      14 Oct 2017 07:28  Phos  6.1     10-13  Mg     2.3     10-14    TPro  5.1<L>  /  Alb  1.6<L>  /  TBili  0.2  /  DBili  x   /  AST  35  /  ALT  26  /  AlkPhos  34<L>  10-13

## 2017-10-14 NOTE — PROGRESS NOTE ADULT - SUBJECTIVE AND OBJECTIVE BOX
Patient seen in follow up for GHAZAL; feels ok.    MEDICATIONS  (STANDING):  buDESOnide  80 MICROgram(s)/formoterol 4.5 MICROgram(s) Inhaler 2 Puff(s) Inhalation two times a day  buPROPion XL . 300 milliGRAM(s) Oral daily  docusate sodium 100 milliGRAM(s) Oral two times a day  fentaNYL   Patch  25 MICROgram(s)/Hr 2 Patch Transdermal every 72 hours  fluticasone propionate 50 MICROgram(s)/spray Nasal Spray 1 Spray(s) Both Nostrils two times a day  heparin  Injectable 5000 Unit(s) SubCutaneous every 8 hours  influenza   Vaccine 0.5 milliLiter(s) IntraMuscular once  lamoTRIgine 100 milliGRAM(s) Oral every 8 hours  levothyroxine 150 MICROGram(s) Oral daily  oxyCODONE  ER Tablet 60 milliGRAM(s) Oral every 8 hours  pantoprazole    Tablet 40 milliGRAM(s) Oral before breakfast  polyethylene glycol 3350 17 Gram(s) Oral daily  risperiDONE   Tablet 8 milliGRAM(s) Oral at bedtime  sertraline 50 milliGRAM(s) Oral daily  sevelamer carbonate 800 milliGRAM(s) Oral three times a day with meals  topiramate 400 milliGRAM(s) Oral two times a day    MEDICATIONS  (PRN):  acetaminophen   Tablet 650 milliGRAM(s) Oral every 6 hours PRN For Temp greater than 38 C (100.4 F)  ALBUTerol    90 MICROgram(s) HFA Inhaler 2 Puff(s) Inhalation every 6 hours PRN Shortness of Breath and/or Wheezing  clonazePAM Tablet 2 milliGRAM(s) Oral every 8 hours PRN ANxiety  HYDROmorphone   Tablet 2 milliGRAM(s) Oral four times a day PRN moderate to severe pain  HYDROmorphone  Injectable 0.5 milliGRAM(s) IV Push every 3 hours PRN Severe Pain (7 - 10)  senna 2 Tablet(s) Oral at bedtime PRN Constipation    PHYSICAL EXAM:      T(C): 37.3 (10-14-17 @ 08:18), Max: 37.3 (10-14-17 @ 08:18)  HR: 108 (10-14-17 @ 08:18) (93 - 114)  BP: 111/75 (10-14-17 @ 08:18) (99/66 - 111/75)  RR: 16 (10-14-17 @ 08:18) (16 - 16)  SpO2: 97% (10-14-17 @ 08:18) (95% - 99%)  Wt(kg): --  Respiratory: clear anteriorly, decreased BS at bases  Cardiovascular: S1 S2  Gastrointestinal: soft NT ND +BS  Extremities: right dressed  edema                                    7.7    11.7  )-----------( 288      ( 14 Oct 2017 07:28 )             23.9     10-14    134<L>  |  102  |  70<H>  ----------------------------<  95  4.0   |  22  |  3.50<H>    Ca    8.6      14 Oct 2017 07:28  Phos  6.1     10-13  Mg     2.3     10-14    TPro  5.1<L>  /  Alb  1.6<L>  /  TBili  0.2  /  DBili  x   /  AST  35  /  ALT  26  /  AlkPhos  34<L>  10-13      LIVER FUNCTIONS - ( 13 Oct 2017 07:38 )  Alb: 1.6 g/dL / Pro: 5.1 g/dL / ALK PHOS: 34 U/L / ALT: 26 U/L / AST: 35 U/L / GGT: x             Assessment and Plan:    GHAZAL ATN, stable;  Continue current rx; wound care follow up.

## 2017-10-14 NOTE — PROGRESS NOTE ADULT - PROBLEM SELECTOR PLAN 3
spoke with Dr. Galaviz pain management  cont current regimen  discussion and conversation about DENISE

## 2017-10-15 DIAGNOSIS — D64.9 ANEMIA, UNSPECIFIED: ICD-10-CM

## 2017-10-15 LAB
ANION GAP SERPL CALC-SCNC: 12 MMOL/L — SIGNIFICANT CHANGE UP (ref 5–17)
BUN SERPL-MCNC: 69 MG/DL — HIGH (ref 7–23)
CALCIUM SERPL-MCNC: 8.2 MG/DL — LOW (ref 8.5–10.1)
CHLORIDE SERPL-SCNC: 101 MMOL/L — SIGNIFICANT CHANGE UP (ref 96–108)
CO2 SERPL-SCNC: 20 MMOL/L — LOW (ref 22–31)
CREAT SERPL-MCNC: 3.1 MG/DL — HIGH (ref 0.5–1.3)
GLUCOSE SERPL-MCNC: 91 MG/DL — SIGNIFICANT CHANGE UP (ref 70–99)
HCT VFR BLD CALC: 23 % — LOW (ref 34.5–45)
HGB BLD-MCNC: 7.4 G/DL — LOW (ref 11.5–15.5)
MCHC RBC-ENTMCNC: 29.2 PG — SIGNIFICANT CHANGE UP (ref 27–34)
MCHC RBC-ENTMCNC: 32.4 GM/DL — SIGNIFICANT CHANGE UP (ref 32–36)
MCV RBC AUTO: 90.3 FL — SIGNIFICANT CHANGE UP (ref 80–100)
PLATELET # BLD AUTO: 318 K/UL — SIGNIFICANT CHANGE UP (ref 150–400)
POTASSIUM SERPL-MCNC: 3.7 MMOL/L — SIGNIFICANT CHANGE UP (ref 3.5–5.3)
POTASSIUM SERPL-SCNC: 3.7 MMOL/L — SIGNIFICANT CHANGE UP (ref 3.5–5.3)
RBC # BLD: 2.54 M/UL — LOW (ref 3.8–5.2)
RBC # FLD: 12.5 % — SIGNIFICANT CHANGE UP (ref 10.3–14.5)
SODIUM SERPL-SCNC: 133 MMOL/L — LOW (ref 135–145)
WBC # BLD: 12.4 K/UL — HIGH (ref 3.8–10.5)
WBC # FLD AUTO: 12.4 K/UL — HIGH (ref 3.8–10.5)

## 2017-10-15 PROCEDURE — 99233 SBSQ HOSP IP/OBS HIGH 50: CPT

## 2017-10-15 RX ORDER — BACITRACIN ZINC 500 UNIT/G
1 OINTMENT IN PACKET (EA) TOPICAL
Qty: 0 | Refills: 0 | Status: DISCONTINUED | OUTPATIENT
Start: 2017-10-15 | End: 2017-10-27

## 2017-10-15 RX ADMIN — ONDANSETRON 4 MILLIGRAM(S): 8 TABLET, FILM COATED ORAL at 14:53

## 2017-10-15 RX ADMIN — ONDANSETRON 4 MILLIGRAM(S): 8 TABLET, FILM COATED ORAL at 18:42

## 2017-10-15 RX ADMIN — ALBUTEROL 2 PUFF(S): 90 AEROSOL, METERED ORAL at 12:58

## 2017-10-15 RX ADMIN — Medication 2 MILLIGRAM(S): at 23:25

## 2017-10-15 RX ADMIN — HYDROMORPHONE HYDROCHLORIDE 0.5 MILLIGRAM(S): 2 INJECTION INTRAMUSCULAR; INTRAVENOUS; SUBCUTANEOUS at 19:38

## 2017-10-15 RX ADMIN — HYDROMORPHONE HYDROCHLORIDE 0.5 MILLIGRAM(S): 2 INJECTION INTRAMUSCULAR; INTRAVENOUS; SUBCUTANEOUS at 16:12

## 2017-10-15 RX ADMIN — HYDROMORPHONE HYDROCHLORIDE 0.5 MILLIGRAM(S): 2 INJECTION INTRAMUSCULAR; INTRAVENOUS; SUBCUTANEOUS at 09:32

## 2017-10-15 RX ADMIN — Medication 1 APPLICATION(S): at 11:55

## 2017-10-15 RX ADMIN — SERTRALINE 50 MILLIGRAM(S): 25 TABLET, FILM COATED ORAL at 11:55

## 2017-10-15 RX ADMIN — LAMOTRIGINE 100 MILLIGRAM(S): 25 TABLET, ORALLY DISINTEGRATING ORAL at 21:13

## 2017-10-15 RX ADMIN — BUDESONIDE AND FORMOTEROL FUMARATE DIHYDRATE 2 PUFF(S): 160; 4.5 AEROSOL RESPIRATORY (INHALATION) at 17:08

## 2017-10-15 RX ADMIN — HEPARIN SODIUM 5000 UNIT(S): 5000 INJECTION INTRAVENOUS; SUBCUTANEOUS at 13:11

## 2017-10-15 RX ADMIN — Medication 400 MILLIGRAM(S): at 17:08

## 2017-10-15 RX ADMIN — HEPARIN SODIUM 5000 UNIT(S): 5000 INJECTION INTRAVENOUS; SUBCUTANEOUS at 06:37

## 2017-10-15 RX ADMIN — Medication 150 MICROGRAM(S): at 05:23

## 2017-10-15 RX ADMIN — SEVELAMER CARBONATE 800 MILLIGRAM(S): 2400 POWDER, FOR SUSPENSION ORAL at 08:14

## 2017-10-15 RX ADMIN — Medication 100 MILLIGRAM(S): at 06:37

## 2017-10-15 RX ADMIN — OXYCODONE HYDROCHLORIDE 60 MILLIGRAM(S): 5 TABLET ORAL at 21:13

## 2017-10-15 RX ADMIN — HYDROMORPHONE HYDROCHLORIDE 0.5 MILLIGRAM(S): 2 INJECTION INTRAMUSCULAR; INTRAVENOUS; SUBCUTANEOUS at 06:40

## 2017-10-15 RX ADMIN — PANTOPRAZOLE SODIUM 40 MILLIGRAM(S): 20 TABLET, DELAYED RELEASE ORAL at 06:39

## 2017-10-15 RX ADMIN — Medication 650 MILLIGRAM(S): at 21:13

## 2017-10-15 RX ADMIN — RISPERIDONE 8 MILLIGRAM(S): 4 TABLET ORAL at 21:14

## 2017-10-15 RX ADMIN — HYDROMORPHONE HYDROCHLORIDE 0.5 MILLIGRAM(S): 2 INJECTION INTRAMUSCULAR; INTRAVENOUS; SUBCUTANEOUS at 13:52

## 2017-10-15 RX ADMIN — OXYCODONE HYDROCHLORIDE 60 MILLIGRAM(S): 5 TABLET ORAL at 16:07

## 2017-10-15 RX ADMIN — BUPROPION HYDROCHLORIDE 300 MILLIGRAM(S): 150 TABLET, EXTENDED RELEASE ORAL at 11:56

## 2017-10-15 RX ADMIN — LAMOTRIGINE 100 MILLIGRAM(S): 25 TABLET, ORALLY DISINTEGRATING ORAL at 06:38

## 2017-10-15 RX ADMIN — Medication 2 MILLIGRAM(S): at 12:58

## 2017-10-15 RX ADMIN — OXYCODONE HYDROCHLORIDE 60 MILLIGRAM(S): 5 TABLET ORAL at 13:51

## 2017-10-15 RX ADMIN — POLYETHYLENE GLYCOL 3350 17 GRAM(S): 17 POWDER, FOR SOLUTION ORAL at 11:56

## 2017-10-15 RX ADMIN — HYDROMORPHONE HYDROCHLORIDE 0.5 MILLIGRAM(S): 2 INJECTION INTRAMUSCULAR; INTRAVENOUS; SUBCUTANEOUS at 09:56

## 2017-10-15 RX ADMIN — Medication 1 APPLICATION(S): at 17:08

## 2017-10-15 RX ADMIN — Medication 1 SPRAY(S): at 06:39

## 2017-10-15 RX ADMIN — HEPARIN SODIUM 5000 UNIT(S): 5000 INJECTION INTRAVENOUS; SUBCUTANEOUS at 21:14

## 2017-10-15 RX ADMIN — HYDROMORPHONE HYDROCHLORIDE 0.5 MILLIGRAM(S): 2 INJECTION INTRAMUSCULAR; INTRAVENOUS; SUBCUTANEOUS at 16:41

## 2017-10-15 RX ADMIN — OXYCODONE HYDROCHLORIDE 60 MILLIGRAM(S): 5 TABLET ORAL at 08:15

## 2017-10-15 RX ADMIN — HYDROMORPHONE HYDROCHLORIDE 0.5 MILLIGRAM(S): 2 INJECTION INTRAMUSCULAR; INTRAVENOUS; SUBCUTANEOUS at 03:00

## 2017-10-15 RX ADMIN — OXYCODONE HYDROCHLORIDE 60 MILLIGRAM(S): 5 TABLET ORAL at 22:30

## 2017-10-15 RX ADMIN — HYDROMORPHONE HYDROCHLORIDE 0.5 MILLIGRAM(S): 2 INJECTION INTRAMUSCULAR; INTRAVENOUS; SUBCUTANEOUS at 21:10

## 2017-10-15 RX ADMIN — ONDANSETRON 4 MILLIGRAM(S): 8 TABLET, FILM COATED ORAL at 05:30

## 2017-10-15 RX ADMIN — LAMOTRIGINE 100 MILLIGRAM(S): 25 TABLET, ORALLY DISINTEGRATING ORAL at 13:11

## 2017-10-15 RX ADMIN — Medication 1 SPRAY(S): at 17:08

## 2017-10-15 RX ADMIN — OXYCODONE HYDROCHLORIDE 60 MILLIGRAM(S): 5 TABLET ORAL at 06:37

## 2017-10-15 RX ADMIN — HYDROMORPHONE HYDROCHLORIDE 0.5 MILLIGRAM(S): 2 INJECTION INTRAMUSCULAR; INTRAVENOUS; SUBCUTANEOUS at 13:12

## 2017-10-15 RX ADMIN — ONDANSETRON 4 MILLIGRAM(S): 8 TABLET, FILM COATED ORAL at 09:37

## 2017-10-15 RX ADMIN — BUDESONIDE AND FORMOTEROL FUMARATE DIHYDRATE 2 PUFF(S): 160; 4.5 AEROSOL RESPIRATORY (INHALATION) at 06:42

## 2017-10-15 RX ADMIN — HYDROMORPHONE HYDROCHLORIDE 0.5 MILLIGRAM(S): 2 INJECTION INTRAMUSCULAR; INTRAVENOUS; SUBCUTANEOUS at 05:23

## 2017-10-15 RX ADMIN — Medication 400 MILLIGRAM(S): at 06:39

## 2017-10-15 NOTE — PROGRESS NOTE ADULT - ASSESSMENT
Stable from a vascular standpoint . The wounds will be reevaluated and wound VAC changed with wound nurse tomorrow.

## 2017-10-15 NOTE — PROGRESS NOTE ADULT - PROBLEM SELECTOR PLAN 1
-compartment syndrome of the right lower extremity   -s/p emergent fasciotomy by Vascular surgery (Bobbi)   -s/p wound vac placement , changed today.  -Plastic surgery to recommend if need for skin grafting.   -Finished course of IV abx aztreonam and clindamycin in ICU  -monitor labs, increase activity

## 2017-10-15 NOTE — PROGRESS NOTE ADULT - SUBJECTIVE AND OBJECTIVE BOX
Patient is a 32y old  Female who presents with a chief complaint of RLE edema and numbness (13 Oct 2017 16:25)      Vascular Surgery Progress Note    Interval HPI: POD #11 S/P fasciotomies of right leg. Less pain in right leg. Has not done much PT therapy yet.    Medications:      Allergies:  Allergies    avocado (Unknown)  latex (Urticaria; Rash)  penicillin (Unknown)  sulfa drugs (Unknown)    Intolerances        Vital Signs Last 24 Hrs  T(C): 36.7 (15 Oct 2017 07:15), Max: 38.3 (14 Oct 2017 16:45)  T(F): 98.1 (15 Oct 2017 07:15), Max: 100.9 (14 Oct 2017 16:45)  HR: 98 (15 Oct 2017 07:15) (98 - 108)  BP: 106/69 (15 Oct 2017 07:15) (102/62 - 126/77)  BP(mean): --  RR: 18 (15 Oct 2017 07:15) (16 - 18)  SpO2: 99% (15 Oct 2017 07:15) (93% - 99%)  I&O's Summary    14 Oct 2017 07:01  -  15 Oct 2017 07:00  --------------------------------------------------------  IN: 300 mL / OUT: 1200 mL / NET: -900 mL    15 Oct 2017 07:01  -  15 Oct 2017 11:26  --------------------------------------------------------  IN: 0 mL / OUT: 600 mL / NET: -600 mL        Physical Exam:  Gen: NAD, A&Ox3  Vascular exam is unchanged with palpable pulses in both feet.   Neurological exam shows a persistent paralyzed and insensate  right foot.  Wounds: vac in place and working. Has persistent swelling of right foot and calf    LABS:                        7.4    12.4  )-----------( 318      ( 15 Oct 2017 05:58 )             23.0     10-15    133<L>  |  101  |  69<H>  ----------------------------<  91  3.7   |  20<L>  |  3.10<H>    Ca    8.2<L>      15 Oct 2017 05:58  Mg     2.3     10-14

## 2017-10-15 NOTE — PROGRESS NOTE ADULT - SUBJECTIVE AND OBJECTIVE BOX
32y old  Female who presents with a chief complaint of RLE edema and numbness. Patient admitted for Compartment syndrome, s/p Fasciotomy.  Dr. Damico consulted for pain management and concern for opoid dependence and abuse. Nurse aware of pain management plan. Renal function improving. Spoke to patient and mother at bedside. Patient is persistently asking for opioids, but she is aware they are being tapered and managed accordingly with pain management Dr. Damico.   Spoke to patient at length in regards to her opioid dependence and need to taper down her dilaudid.  Spoke to pain management doctor Jeffrey Galaviz [phone]) see chart note  Spoke to mom aware of plan.   Admitted for compartment syndrome, s/p Sx and vac  placement.      INTERVAL HPI: Pt seen and examined. mom bedside. c/o pain in RLE. no N/V, F/Ch. Not doing much PT, now OOB to chair  OVERNIGHT EVENTS: none    Vital Signs Last 24 Hrs  T(C): 36.7 (15 Oct 2017 07:15), Max: 38.3 (14 Oct 2017 16:45)  T(F): 98.1 (15 Oct 2017 07:15), Max: 100.9 (14 Oct 2017 16:45)  HR: 98 (15 Oct 2017 07:15) (98 - 108)  BP: 106/69 (15 Oct 2017 07:15) (102/62 - 126/77)  BP(mean): --  RR: 18 (15 Oct 2017 07:15) (16 - 18)  SpO2: 99% (15 Oct 2017 07:15) (93% - 99%)      REVIEW OF SYSTEM:    Constitutional: No fever, chills, fatigue  Neuro: Pain, numbness, and +weakness of LE  Resp: No cough, wheezing, shortness of breath  CVS: No chest pain, palpitations, leg swelling  GI: No abdominal pain, nausea, vomiting, diarrhea   : No dysuria, frequency, incontinence  Skin: No itching, burning, rashes, or lesions   Msk: pain in RLE  Psych: No depression, anxiety, mood swings          PHYSICAL EXAM:  GENERAL: NAD, well-developed  HEAD:  Atraumatic, Normocephalic  EYES: EOMI, PERRLA, conjunctiva and sclera clear  ENMT: No tonsillar erythema, exudates, or enlargement; Moist mucous membranes,   NECK: Supple, No JVD, Normal thyroid  HEART: Regular rate and rhythm; No murmurs, rubs, or gallops  RESPIRATORY: CTA B/L, No wheezing / rhonchi  ABDOMEN: Soft, Nontender, Nondistended; Bowel sounds present  NEUROLOGY: A&Ox3, RLE persistently paralysed with no sensation  EXTREMITIES: RLE wrapped with ace bandage, with wound vac. + pulses B/L and edema of RLE  SKIN: warm, dry, normal color, no rash or abnormal lesions      LABS:                        7.4    12.4  )-----------( 318      ( 15 Oct 2017 05:58 )             23.0     15 Oct 2017 05:58    133    |  101    |  69     ----------------------------<  91     3.7     |  20     |  3.10     Ca    8.2        15 Oct 2017 05:58          CAPILLARY BLOOD GLUCOSE        UCx       RADIOLOGY & ADDITIONAL TESTS:                MEDICATIONS  (STANDING):  buDESOnide  80 MICROgram(s)/formoterol 4.5 MICROgram(s) Inhaler 2 Puff(s) Inhalation two times a day  buPROPion XL . 300 milliGRAM(s) Oral daily  docusate sodium 100 milliGRAM(s) Oral two times a day  fentaNYL   Patch  25 MICROgram(s)/Hr 2 Patch Transdermal every 72 hours  fluticasone propionate 50 MICROgram(s)/spray Nasal Spray 1 Spray(s) Both Nostrils two times a day  heparin  Injectable 5000 Unit(s) SubCutaneous every 8 hours  influenza   Vaccine 0.5 milliLiter(s) IntraMuscular once  lamoTRIgine 100 milliGRAM(s) Oral every 8 hours  levothyroxine 150 MICROGram(s) Oral daily  oxyCODONE  ER Tablet 60 milliGRAM(s) Oral every 8 hours  pantoprazole    Tablet 40 milliGRAM(s) Oral before breakfast  polyethylene glycol 3350 17 Gram(s) Oral daily  risperiDONE   Tablet 8 milliGRAM(s) Oral at bedtime  sertraline 50 milliGRAM(s) Oral daily  sevelamer carbonate 800 milliGRAM(s) Oral three times a day with meals  topiramate 400 milliGRAM(s) Oral two times a day    MEDICATIONS  (PRN):  acetaminophen   Tablet 650 milliGRAM(s) Oral every 6 hours PRN For Temp greater than 38 C (100.4 F)  ALBUTerol    90 MICROgram(s) HFA Inhaler 2 Puff(s) Inhalation every 6 hours PRN Shortness of Breath and/or Wheezing  clonazePAM Tablet 2 milliGRAM(s) Oral every 8 hours PRN ANxiety  HYDROmorphone   Tablet 2 milliGRAM(s) Oral four times a day PRN moderate to severe pain  HYDROmorphone  Injectable 0.5 milliGRAM(s) IV Push every 3 hours PRN Severe Pain (7 - 10)  senna 2 Tablet(s) Oral at bedtime PRN Constipation

## 2017-10-15 NOTE — PROGRESS NOTE ADULT - PROBLEM SELECTOR PLAN 7
-c/w all home meds  -Appreciate psychiatry consult Dr. Schmidt   -pain management consulted Dr. Damico

## 2017-10-15 NOTE — PROGRESS NOTE ADULT - ASSESSMENT
32-year-old female now status post right lower extremity fasciotomy for prolonged compression after a fall with lethargy.     - Rhabdo resolving; GHAZAL improving; off IVF  - No obvious cardiac complications post op  - no clear evidence of acute ischemia or volume overload, pt asymptomatic  - Persistent tachycardia HR of 100's likely  reactive; Low-grade fever overnight  - Pain control  - TTE with hyperdynamic LV function, EF 70%, no valvular disease noted  - Other cardiovascular workup will depend on clinical course.  - DVT prophylaxis  - Activity as tolerated  - Right leg wound care per primary/surgery  - All other workup per primary team  - Will follow     Kathleen Fernandes NP  Cardiology 32-year-old female now status post right lower extremity fasciotomy for prolonged compression after a fall with lethargy.     - Rhabdo resolving; GHAZAL improving; off IVF  - No obvious cardiac complications post op  - no clear evidence of acute ischemia or volume overload, pt asymptomatic  - Persistent tachycardia HR of 100's likely  reactive; Low-grade fever overnight  - Pain control  - TTE with hyperdynamic LV function, EF 70%, no valvular disease noted  - Encourage PO hydration  - Other cardiovascular workup will depend on clinical course.  - DVT prophylaxis  - Activity as tolerated  - Right leg wound care per primary/surgery  - All other workup per primary team  - Will follow     Kathleen Fernandes NP  Cardiology

## 2017-10-15 NOTE — PROGRESS NOTE ADULT - SUBJECTIVE AND OBJECTIVE BOX
HPI:  Pt is a 31 yo female with PMHX of Bipolar disorder, hypothyroidism, chronic low back pain on morphine s/p laminectomy and spinal fusion in 2016 who is BIB EMS for right leg swelling and numbness. Patient is a poor historian as she is on many pain medicines and is currently lethargic, therefore much info provided based on hospital staff and charts. She states that 18 hours prior to arrival, she fell on the floor at her house after taking too much morphine and was lying on floor for 8 hours. She woke up to numbness, swelling, and cool temperature of her right leg, from below the knee onwards. Patient denies any current SOB, CP, f/c, denies head trauma.     In the ED, vitals were Temp 101.4F /91  RR 20  SpO2 93% RA. Significant labs are WBS 24.3, Hb 15.6, Hmt 47.6,  Platelets 118, neutr 93%, sodium 131, BUN 30,  Cr 2.30, glucose 152 AST 1377, , GFR 27, lactate 1.7. Doppler US RLE showed Extremely limited evaluation of the right lower extremity with nonvisualization of the venous structures beyond the mid right femoral vein. No evidence of DVT within the right common femoral, proximal and   mid femoral veins. No evidence of left lower extremity deep venous thrombosis. XR of right tibia and XR of R femur showed no acute fractures. Arterial Doppler of RLE pending. EKG showed sinus tach of 140, with right superior axis deviation. Patient started on aztreonam, Tylenol given for fever, patient made NPO, Dr. Martines consulted for emergent surgery. (04 Oct 2017 01:13)    SUBJECTIVE:  Patient is a 32y old  Female who presents with a chief complaint of RLE edema and numbness (13 Oct 2017 16:25)    Awake and alert, comfortable on RA.  Pale-looking.  Denies CP, palpitations, or SOB.  c/o nausea, vomiting, and leg pain.  However, tolerating breakfast.    OBJECTIVE:  Review Of Systems:  Constitutional: [ ] Fever [ ] Chills [ ] Fatigue [ ] Weight change   HEENT: [ ] Blurred vision [ ] Eye Pain [ ] Headache [ ] Runny nose [ ] Sore Throat   Respiratory: [ ] Cough [ ] Wheezing [ ] Shortness of breath  Cardiovascular: [ ] Chest Pain [ ] Palpitations [ ] BENNETT [ ] PND [ ] Orthopnea  Gastrointestinal: [ ] Abdominal Pain [ ] Diarrhea [ ] Constipation [ ] Hemorrhoids [ ] Nausea [ ] Vomiting  Genitourinary: [ ] Nocturia [ ] Dysuria [ ] Incontinence  Extremities: [ ] Swelling [ ] Joint Pain  Neurologic: [ ] Focal deficit [ ] Paresthesias [ ] Syncope  Lymphatic: [ ] Swelling [ ] Lymphadenopathy   Skin: [ ] Rash [ ] Ecchymoses [ ] Wounds [ ] Lesions  Psychiatry: [ ] Depression [ ] Suicidal/Homicidal Ideation [ ] Anxiety [ ] Sleep Disturbances  [x ] 10 point review of systems is otherwise negative except as mentioned above            [ ]Unable to obtain    Allergy:  Allergies    avocado (Unknown)  latex (Urticaria; Rash)  penicillin (Unknown)  sulfa drugs (Unknown)    Intolerances    Medications:  MEDICATIONS  (STANDING):  buDESOnide  80 MICROgram(s)/formoterol 4.5 MICROgram(s) Inhaler 2 Puff(s) Inhalation two times a day  buPROPion XL . 300 milliGRAM(s) Oral daily  docusate sodium 100 milliGRAM(s) Oral two times a day  fentaNYL   Patch  25 MICROgram(s)/Hr 2 Patch Transdermal every 72 hours  fluticasone propionate 50 MICROgram(s)/spray Nasal Spray 1 Spray(s) Both Nostrils two times a day  heparin  Injectable 5000 Unit(s) SubCutaneous every 8 hours  influenza   Vaccine 0.5 milliLiter(s) IntraMuscular once  lamoTRIgine 100 milliGRAM(s) Oral every 8 hours  levothyroxine 150 MICROGram(s) Oral daily  oxyCODONE  ER Tablet 60 milliGRAM(s) Oral every 8 hours  pantoprazole    Tablet 40 milliGRAM(s) Oral before breakfast  polyethylene glycol 3350 17 Gram(s) Oral daily  risperiDONE   Tablet 8 milliGRAM(s) Oral at bedtime  sertraline 50 milliGRAM(s) Oral daily  topiramate 400 milliGRAM(s) Oral two times a day    MEDICATIONS  (PRN):  acetaminophen   Tablet 650 milliGRAM(s) Oral every 6 hours PRN For Temp greater than 38 C (100.4 F)  ALBUTerol    90 MICROgram(s) HFA Inhaler 2 Puff(s) Inhalation every 6 hours PRN Shortness of Breath and/or Wheezing  clonazePAM Tablet 2 milliGRAM(s) Oral every 8 hours PRN ANxiety  HYDROmorphone   Tablet 2 milliGRAM(s) Oral four times a day PRN moderate to severe pain  HYDROmorphone  Injectable 0.5 milliGRAM(s) IV Push every 3 hours PRN Severe Pain (7 - 10)  ondansetron Injectable 4 milliGRAM(s) IV Push every 4 hours PRN Nausea and/or Vomiting  senna 2 Tablet(s) Oral at bedtime PRN Constipation      PMH/PSH/FH/SH: [ ] Unchanged    Vitals:  T(C): 36.7 (10-15-17 @ 07:15), Max: 38.3 (10-14-17 @ 16:45)  HR: 98 (10-15-17 @ 07:15) (98 - 108)  BP: 106/69 (10-15-17 @ 07:15) (102/62 - 126/77)  BP(mean): --  RR: 18 (10-15-17 @ 07:15) (16 - 18)  SpO2: 99% (10-15-17 @ 07:15) (93% - 99%)  Wt(kg): --  Daily     Daily   I&O's Summary    14 Oct 2017 07:01  -  15 Oct 2017 07:00  --------------------------------------------------------  IN: 300 mL / OUT: 1200 mL / NET: -900 mL        Labs:                        7.4    12.4  )-----------( 318      ( 15 Oct 2017 05:58 )             23.0     10-15    133<L>  |  101  |  69<H>  ----------------------------<  91  3.7   |  20<L>  |  3.10<H>    Ca    8.2<L>      15 Oct 2017 05:58  Mg     2.3     10-14    ECG:  < from: 12 Lead ECG (10.04.17 @ 08:44) >    Ventricular Rate 122 BPM    Atrial Rate 122 BPM    P-R Interval 136 ms    QRS Duration 94 ms    Q-T Interval 332 ms    QTC Calculation(Bezet) 473 ms    P Axis 35 degrees    R Axis -86 degrees    T Axis 10 degrees    Diagnosis Line Sinus tachycardia  Left axis deviation  Inferior infarct (cited on or before 03-OCT-2017)  Abnormal ECG  When compared with ECG of 03-OCT-2017 21:50, (Unconfirmed)  No significant change was found  Confirmed by Juanito Brink MD ([id number]) on 10/4/2017 1:55:40 PM    < end of copied text >    Echo:  < from: TTE Echo Doppler w/o Cont (10.05.17 @ 12:46) >     EXAM:  ECHO TTE W/O CON COMP W/DOPPLR         PROCEDURE DATE:  10/05/2017        INTERPRETATION:  Ordering Physician: MARISSA RUFF [id number]    Indication: Respiratory failure    Study Quality: Technically difficult in ICU   A complete echocardiographic study was performed utilizing standard   protocol including spectral and color Doppler in all echocardiographic   windows.    Height: 1 75 cm  Weight: 113 kg  BSA: 2.27  Blood Pressure: 104/76    MEASUREMENTS  IVS: 0.9cm  PWT: 0.9cm  LA: 3.5cm  AO: 2.8cm  LVIDd: 5.2cm  LVIDs: 3.5cm      LVEF: 70%    FINDINGS  Left Ventricle: Hyperdynamic left ventricle. Estimated EF 70%. No   segmental wall motion abnormalities  Aortic Valve: Not well-visualized. Probably normal trileaflet aortic valve  Mitral Valve: There is subtle systolic anterior motion of the mitral   valve in the setting of a hyperdynamic left ventricle. No left   ventricular outflow tract gradient is seen. Trace mitral regurgitation is   visualized.  Tricuspid Valve: Not well visualized. Trace tricuspid regurgitation  Pulmonic Valve: Not visualized.  Left Atrium: Grossly normal  Right Ventricle: Not well-visualized. Grossly normal right ventricular   size and function  Right Atrium: Grossly normal  Diastolic Function: Normaldiastolic function  Pericardium/Pleura: No pericardial effusion      CONCLUSIONS:  1. Technically difficult study  2. Hyperdynamic left ventricle. Estimated EF 70%  3. Subtle systolic anterior motion of the mitral valve in the setting of   a hyperdynamic left ventricle, but no outflow tract gradient.  4. Right ventricle is not well visualized.Grossly normal right   ventricular size and function  5. No pericardial effusion    No prior exam for comparison        ROBERTA MARKS   This document has been electronically signed. Oct  6 2017  1:33PM      < end of copied text >    Stress Testing:     Cath:    Imaging:    Interpretation of Telemetry:  ot on tele      Physical Exam:  Refused to be examined, stating, "Please don't touch me."    Appearance: [ ] Normal  [ ] abnormal [ ] NAD   Eyes: [ ] PERRL [ ] EOMI  HENT: [ ] Normal [ ] Abnormal oral muscosa [ ]NC/AT  Cardiovascular: [ ] S1 [ ] S2 [ ] RRR [ ] m/r/g [ ]edema [ ] JVP  Procedural Access Site: [ ]  hematoma [ ] tender to palpation [ ] 2+ pulse [ ] bruit [ ] Ecchymosis  Respiratory: [ ] Clear to auscultation bilaterally  Gastrointestinal: [ ] Soft [ ] tenderness[ ] distension [ ] BS  Musculoskeletal: [ ] clubbing [ ] joint deformity   Neurologic: [ ] Non-focal  Lymphatic: [ ] lymphadenopathy  Psychiatry: [ ] AAOx3  [ ] confused [ ] disoriented [ ] Mood & affect appropriate  Skin: [ ]  rashes [ ] ecchymoses [ ] cyanosis

## 2017-10-15 NOTE — PROGRESS NOTE ADULT - SUBJECTIVE AND OBJECTIVE BOX
Date/Time Patient Seen:  		  Referring MD:   Data Reviewed	       Patient is a 32y old  Female who presents with a chief complaint of RLE edema and numbness (13 Oct 2017 16:25)  in bed  seen and examined      Subjective/HPI     PAST MEDICAL & SURGICAL HISTORY:  Low back pain  Hypothyroid  Bipolar 1 disorder  History of spinal fusion  History of laminectomy        Medication list         MEDICATIONS  (STANDING):  buDESOnide  80 MICROgram(s)/formoterol 4.5 MICROgram(s) Inhaler 2 Puff(s) Inhalation two times a day  buPROPion XL . 300 milliGRAM(s) Oral daily  docusate sodium 100 milliGRAM(s) Oral two times a day  fentaNYL   Patch  25 MICROgram(s)/Hr 2 Patch Transdermal every 72 hours  fluticasone propionate 50 MICROgram(s)/spray Nasal Spray 1 Spray(s) Both Nostrils two times a day  heparin  Injectable 5000 Unit(s) SubCutaneous every 8 hours  influenza   Vaccine 0.5 milliLiter(s) IntraMuscular once  lamoTRIgine 100 milliGRAM(s) Oral every 8 hours  levothyroxine 150 MICROGram(s) Oral daily  oxyCODONE  ER Tablet 60 milliGRAM(s) Oral every 8 hours  pantoprazole    Tablet 40 milliGRAM(s) Oral before breakfast  polyethylene glycol 3350 17 Gram(s) Oral daily  risperiDONE   Tablet 8 milliGRAM(s) Oral at bedtime  sertraline 50 milliGRAM(s) Oral daily  sevelamer carbonate 800 milliGRAM(s) Oral three times a day with meals  topiramate 400 milliGRAM(s) Oral two times a day    MEDICATIONS  (PRN):  acetaminophen   Tablet 650 milliGRAM(s) Oral every 6 hours PRN For Temp greater than 38 C (100.4 F)  ALBUTerol    90 MICROgram(s) HFA Inhaler 2 Puff(s) Inhalation every 6 hours PRN Shortness of Breath and/or Wheezing  clonazePAM Tablet 2 milliGRAM(s) Oral every 8 hours PRN ANxiety  HYDROmorphone   Tablet 2 milliGRAM(s) Oral four times a day PRN moderate to severe pain  HYDROmorphone  Injectable 0.5 milliGRAM(s) IV Push every 3 hours PRN Severe Pain (7 - 10)  ondansetron Injectable 4 milliGRAM(s) IV Push every 4 hours PRN Nausea and/or Vomiting  senna 2 Tablet(s) Oral at bedtime PRN Constipation         Vitals log        ICU Vital Signs Last 24 Hrs  T(C): 37.4 (15 Oct 2017 06:00), Max: 38.3 (14 Oct 2017 16:45)  T(F): 99.4 (15 Oct 2017 06:00), Max: 100.9 (14 Oct 2017 16:45)  HR: 100 (15 Oct 2017 06:00) (100 - 108)  BP: 102/62 (15 Oct 2017 06:00) (102/62 - 126/77)  BP(mean): --  ABP: --  ABP(mean): --  RR: 16 (15 Oct 2017 06:00) (16 - 16)  SpO2: 97% (15 Oct 2017 06:00) (93% - 99%)           Input and Output:  I&O's Detail    13 Oct 2017 07:01  -  14 Oct 2017 07:00  --------------------------------------------------------  IN:    Oral Fluid: 480 mL  Total IN: 480 mL    OUT:  Total OUT: 0 mL    Total NET: 480 mL      14 Oct 2017 07:01  -  15 Oct 2017 06:10  --------------------------------------------------------  IN:    Oral Fluid: 300 mL  Total IN: 300 mL    OUT:    Voided: 1200 mL  Total OUT: 1200 mL    Total NET: -900 mL          Lab Data                        7.7    11.7  )-----------( 288      ( 14 Oct 2017 07:28 )             23.9     10-14    134<L>  |  102  |  70<H>  ----------------------------<  95  4.0   |  22  |  3.50<H>    Ca    8.6      14 Oct 2017 07:28  Phos  6.1     10-13  Mg     2.3     10-14    TPro  5.1<L>  /  Alb  1.6<L>  /  TBili  0.2  /  DBili  x   /  AST  35  /  ALT  26  /  AlkPhos  34<L>  10-13      CARDIAC MARKERS ( 13 Oct 2017 07:38 )  x     / x     / 518 U/L / x     / x            Review of Systems	      Objective     Physical Examination    head at  heart - s1s2  lungs - dec BS  abd - soft      Pertinent Lab findings & Imaging      Erlinda:  NO   Adequate UO     I&O's Detail    13 Oct 2017 07:01  -  14 Oct 2017 07:00  --------------------------------------------------------  IN:    Oral Fluid: 480 mL  Total IN: 480 mL    OUT:  Total OUT: 0 mL    Total NET: 480 mL      14 Oct 2017 07:01  -  15 Oct 2017 06:10  --------------------------------------------------------  IN:    Oral Fluid: 300 mL  Total IN: 300 mL    OUT:    Voided: 1200 mL  Total OUT: 1200 mL    Total NET: -900 mL               Discussed with:     Cultures:	        Radiology

## 2017-10-15 NOTE — PROGRESS NOTE ADULT - PROBLEM SELECTOR PLAN 2
Rhabdo  GHAZAL  Compartment syndrome  wound care  dilaudid IV and PO prn  long acting - fentanyl and oxycontin

## 2017-10-15 NOTE — PROGRESS NOTE ADULT - PROBLEM SELECTOR PLAN 3
neck and back pain hx  follows with Dr. Anastacia john long acting pain rx regimen  bowel regimen  mobilize as tolerated  emotional support

## 2017-10-16 LAB
ANION GAP SERPL CALC-SCNC: 10 MMOL/L — SIGNIFICANT CHANGE UP (ref 5–17)
APPEARANCE UR: CLEAR — SIGNIFICANT CHANGE UP
BILIRUB UR-MCNC: NEGATIVE — SIGNIFICANT CHANGE UP
BUN SERPL-MCNC: 62 MG/DL — HIGH (ref 7–23)
CALCIUM SERPL-MCNC: 8 MG/DL — LOW (ref 8.5–10.1)
CHLORIDE SERPL-SCNC: 102 MMOL/L — SIGNIFICANT CHANGE UP (ref 96–108)
CO2 SERPL-SCNC: 22 MMOL/L — SIGNIFICANT CHANGE UP (ref 22–31)
COLOR SPEC: SIGNIFICANT CHANGE UP
CREAT SERPL-MCNC: 3 MG/DL — HIGH (ref 0.5–1.3)
CRP SERPL-MCNC: 0.4 MG/DL — SIGNIFICANT CHANGE UP (ref 0–0.4)
DIFF PNL FLD: NEGATIVE — SIGNIFICANT CHANGE UP
EOSINOPHIL NFR BLD AUTO: 1 % — SIGNIFICANT CHANGE UP (ref 0–6)
GLUCOSE SERPL-MCNC: 98 MG/DL — SIGNIFICANT CHANGE UP (ref 70–99)
GLUCOSE UR QL: NEGATIVE — SIGNIFICANT CHANGE UP
HCT VFR BLD CALC: 21.3 % — LOW (ref 34.5–45)
HCT VFR BLD CALC: 22.1 % — LOW (ref 34.5–45)
HGB BLD-MCNC: 7 G/DL — CRITICAL LOW (ref 11.5–15.5)
HGB BLD-MCNC: 7.6 G/DL — LOW (ref 11.5–15.5)
KETONES UR-MCNC: NEGATIVE — SIGNIFICANT CHANGE UP
LACTATE SERPL-SCNC: 1.5 MMOL/L — SIGNIFICANT CHANGE UP (ref 0.7–2)
LEUKOCYTE ESTERASE UR-ACNC: NEGATIVE — SIGNIFICANT CHANGE UP
LYMPHOCYTES # BLD AUTO: 12 % — LOW (ref 13–44)
MCHC RBC-ENTMCNC: 29.8 PG — SIGNIFICANT CHANGE UP (ref 27–34)
MCHC RBC-ENTMCNC: 30.6 PG — SIGNIFICANT CHANGE UP (ref 27–34)
MCHC RBC-ENTMCNC: 33.1 GM/DL — SIGNIFICANT CHANGE UP (ref 32–36)
MCHC RBC-ENTMCNC: 34.4 GM/DL — SIGNIFICANT CHANGE UP (ref 32–36)
MCV RBC AUTO: 88.8 FL — SIGNIFICANT CHANGE UP (ref 80–100)
MCV RBC AUTO: 90.1 FL — SIGNIFICANT CHANGE UP (ref 80–100)
MONOCYTES NFR BLD AUTO: 10 % — HIGH (ref 1–9)
NEUTROPHILS NFR BLD AUTO: 74 % — SIGNIFICANT CHANGE UP (ref 43–77)
NITRITE UR-MCNC: NEGATIVE — SIGNIFICANT CHANGE UP
PH UR: 7 — SIGNIFICANT CHANGE UP (ref 5–8)
PLATELET # BLD AUTO: 346 K/UL — SIGNIFICANT CHANGE UP (ref 150–400)
PLATELET # BLD AUTO: 405 K/UL — HIGH (ref 150–400)
POTASSIUM SERPL-MCNC: 3.7 MMOL/L — SIGNIFICANT CHANGE UP (ref 3.5–5.3)
POTASSIUM SERPL-SCNC: 3.7 MMOL/L — SIGNIFICANT CHANGE UP (ref 3.5–5.3)
PROT UR-MCNC: NEGATIVE — SIGNIFICANT CHANGE UP
RAPID RVP RESULT: SIGNIFICANT CHANGE UP
RBC # BLD: 2.36 M/UL — LOW (ref 3.8–5.2)
RBC # BLD: 2.49 M/UL — LOW (ref 3.8–5.2)
RBC # FLD: 12.3 % — SIGNIFICANT CHANGE UP (ref 10.3–14.5)
RBC # FLD: 12.8 % — SIGNIFICANT CHANGE UP (ref 10.3–14.5)
SODIUM SERPL-SCNC: 134 MMOL/L — LOW (ref 135–145)
SP GR SPEC: 1 — LOW (ref 1.01–1.02)
UROBILINOGEN FLD QL: NEGATIVE — SIGNIFICANT CHANGE UP
WBC # BLD: 12.9 K/UL — HIGH (ref 3.8–10.5)
WBC # BLD: 16.2 K/UL — HIGH (ref 3.8–10.5)
WBC # FLD AUTO: 12.9 K/UL — HIGH (ref 3.8–10.5)
WBC # FLD AUTO: 16.2 K/UL — HIGH (ref 3.8–10.5)

## 2017-10-16 PROCEDURE — 99233 SBSQ HOSP IP/OBS HIGH 50: CPT

## 2017-10-16 PROCEDURE — 71010: CPT | Mod: 26

## 2017-10-16 RX ORDER — ACETAMINOPHEN 500 MG
650 TABLET ORAL ONCE
Qty: 0 | Refills: 0 | Status: COMPLETED | OUTPATIENT
Start: 2017-10-16 | End: 2017-10-16

## 2017-10-16 RX ORDER — HYDROMORPHONE HYDROCHLORIDE 2 MG/ML
0.5 INJECTION INTRAMUSCULAR; INTRAVENOUS; SUBCUTANEOUS
Qty: 0 | Refills: 0 | Status: DISCONTINUED | OUTPATIENT
Start: 2017-10-16 | End: 2017-10-17

## 2017-10-16 RX ORDER — HYDROMORPHONE HYDROCHLORIDE 2 MG/ML
0.5 INJECTION INTRAMUSCULAR; INTRAVENOUS; SUBCUTANEOUS
Qty: 0 | Refills: 0 | Status: DISCONTINUED | OUTPATIENT
Start: 2017-10-18 | End: 2017-10-25

## 2017-10-16 RX ADMIN — LAMOTRIGINE 100 MILLIGRAM(S): 25 TABLET, ORALLY DISINTEGRATING ORAL at 15:02

## 2017-10-16 RX ADMIN — Medication 1 APPLICATION(S): at 19:07

## 2017-10-16 RX ADMIN — HYDROMORPHONE HYDROCHLORIDE 0.5 MILLIGRAM(S): 2 INJECTION INTRAMUSCULAR; INTRAVENOUS; SUBCUTANEOUS at 18:42

## 2017-10-16 RX ADMIN — BUPROPION HYDROCHLORIDE 300 MILLIGRAM(S): 150 TABLET, EXTENDED RELEASE ORAL at 11:03

## 2017-10-16 RX ADMIN — RISPERIDONE 8 MILLIGRAM(S): 4 TABLET ORAL at 21:28

## 2017-10-16 RX ADMIN — Medication 400 MILLIGRAM(S): at 06:06

## 2017-10-16 RX ADMIN — Medication 1 APPLICATION(S): at 06:08

## 2017-10-16 RX ADMIN — LAMOTRIGINE 100 MILLIGRAM(S): 25 TABLET, ORALLY DISINTEGRATING ORAL at 06:06

## 2017-10-16 RX ADMIN — HYDROMORPHONE HYDROCHLORIDE 0.5 MILLIGRAM(S): 2 INJECTION INTRAMUSCULAR; INTRAVENOUS; SUBCUTANEOUS at 09:52

## 2017-10-16 RX ADMIN — HEPARIN SODIUM 5000 UNIT(S): 5000 INJECTION INTRAVENOUS; SUBCUTANEOUS at 15:01

## 2017-10-16 RX ADMIN — HEPARIN SODIUM 5000 UNIT(S): 5000 INJECTION INTRAVENOUS; SUBCUTANEOUS at 06:07

## 2017-10-16 RX ADMIN — Medication 1 SPRAY(S): at 18:25

## 2017-10-16 RX ADMIN — ONDANSETRON 4 MILLIGRAM(S): 8 TABLET, FILM COATED ORAL at 19:59

## 2017-10-16 RX ADMIN — FENTANYL CITRATE 2 PATCH: 50 INJECTION INTRAVENOUS at 08:49

## 2017-10-16 RX ADMIN — Medication 400 MILLIGRAM(S): at 18:22

## 2017-10-16 RX ADMIN — Medication 150 MICROGRAM(S): at 06:08

## 2017-10-16 RX ADMIN — Medication 1 SPRAY(S): at 06:07

## 2017-10-16 RX ADMIN — ALBUTEROL 2 PUFF(S): 90 AEROSOL, METERED ORAL at 13:30

## 2017-10-16 RX ADMIN — BUDESONIDE AND FORMOTEROL FUMARATE DIHYDRATE 2 PUFF(S): 160; 4.5 AEROSOL RESPIRATORY (INHALATION) at 21:28

## 2017-10-16 RX ADMIN — Medication 650 MILLIGRAM(S): at 18:23

## 2017-10-16 RX ADMIN — Medication 100 MILLIGRAM(S): at 06:23

## 2017-10-16 RX ADMIN — OXYCODONE HYDROCHLORIDE 60 MILLIGRAM(S): 5 TABLET ORAL at 06:06

## 2017-10-16 RX ADMIN — OXYCODONE HYDROCHLORIDE 60 MILLIGRAM(S): 5 TABLET ORAL at 07:00

## 2017-10-16 RX ADMIN — Medication 650 MILLIGRAM(S): at 06:06

## 2017-10-16 RX ADMIN — HYDROMORPHONE HYDROCHLORIDE 0.5 MILLIGRAM(S): 2 INJECTION INTRAMUSCULAR; INTRAVENOUS; SUBCUTANEOUS at 19:07

## 2017-10-16 RX ADMIN — BUDESONIDE AND FORMOTEROL FUMARATE DIHYDRATE 2 PUFF(S): 160; 4.5 AEROSOL RESPIRATORY (INHALATION) at 11:03

## 2017-10-16 RX ADMIN — LAMOTRIGINE 100 MILLIGRAM(S): 25 TABLET, ORALLY DISINTEGRATING ORAL at 21:28

## 2017-10-16 RX ADMIN — SERTRALINE 50 MILLIGRAM(S): 25 TABLET, FILM COATED ORAL at 11:03

## 2017-10-16 RX ADMIN — HEPARIN SODIUM 5000 UNIT(S): 5000 INJECTION INTRAVENOUS; SUBCUTANEOUS at 21:27

## 2017-10-16 RX ADMIN — Medication 2 MILLIGRAM(S): at 21:28

## 2017-10-16 RX ADMIN — PANTOPRAZOLE SODIUM 40 MILLIGRAM(S): 20 TABLET, DELAYED RELEASE ORAL at 06:06

## 2017-10-16 RX ADMIN — Medication 2 MILLIGRAM(S): at 11:00

## 2017-10-16 RX ADMIN — FENTANYL CITRATE 2 PATCH: 50 INJECTION INTRAVENOUS at 08:48

## 2017-10-16 RX ADMIN — HYDROMORPHONE HYDROCHLORIDE 0.5 MILLIGRAM(S): 2 INJECTION INTRAMUSCULAR; INTRAVENOUS; SUBCUTANEOUS at 09:28

## 2017-10-16 NOTE — PROGRESS NOTE ADULT - SUBJECTIVE AND OBJECTIVE BOX
Patient is a 32y old  Female who presents with a chief complaint of RLE edema and numbness (13 Oct 2017 16:25)      Vascular Surgery Progress Note    Interval HPI: POD # 12 S/P FASCIOTOMIES OF RIGHT LEG. Comfortable     Medications:      Allergies:  Allergies    avocado (Unknown)  latex (Urticaria; Rash)  penicillin (Unknown)  sulfa drugs (Unknown)    Intolerances        Vital Signs Last 24 Hrs  T(C): 37.1 (16 Oct 2017 08:30), Max: 38.4 (15 Oct 2017 20:56)  T(F): 98.8 (16 Oct 2017 08:30), Max: 101.1 (15 Oct 2017 20:56)  HR: 82 (16 Oct 2017 08:30) (82 - 110)  BP: 110/72 (16 Oct 2017 08:30) (98/63 - 125/77)  BP(mean): --  RR: 16 (16 Oct 2017 08:30) (16 - 16)  SpO2: 97% (16 Oct 2017 08:30) (96% - 99%)  I&O's Summary    15 Oct 2017 07:01  -  16 Oct 2017 07:00  --------------------------------------------------------  IN: 0 mL / OUT: 5450 mL / NET: -5450 mL    16 Oct 2017 07:01  -  16 Oct 2017 13:22  --------------------------------------------------------  IN: 300 mL / OUT: 350 mL / NET: -50 mL        Physical Exam:  Gen: NAD, A&Ox3   Wound VAC changed today with Perla (wound nurse). The open wounds are clean with no sign of wound infections All muscle groups are pink and viable. There is persistent swelling of right calf and foot.  There are strong palpable pulses in both feet.   Neurologically there are no changes.      LABS:                        7.0    12.9  )-----------( 346      ( 16 Oct 2017 06:43 )             21.3     10-16    134<L>  |  102  |  62<H>  ----------------------------<  98  3.7   |  22  |  3.00<H>    Ca    8.0<L>      16 Oct 2017 06:43

## 2017-10-16 NOTE — CHART NOTE - NSCHARTNOTEFT_GEN_A_CORE
S: Called by RN that pt's had a new rectal fever of 101.1. Patient seen and examined at bedside, tearful that she is in a lot of pain and doesn't like her pain meds being weaned off.  Denies CP, SOB, HA, change in vision, abd pain, n/v/c/d.      CONSTITUTIONAL: no fevers or chills  RESPIRATORY: no cough, no wheezing, no hemoptysis, positive dyspnea   CARDIOVASCULAR: No chest pain or palpitations  GASTROINTESTINAL: No abdominal or epigastric pain. No nausea, vomiting, or hematemesis; No diarrhea, no constipation  GENITOURINARY: No dysuria, frequency or hematuria  NEUROLOGICAL: No numbness or weakness     Physical Exam:  General: S: Called by RN that pt's had a new rectal fever of 101.1. Patient seen and examined at bedside, tearful that she is in a lot of pain and doesn't like her pain meds being weaned off.  Denies CP, SOB, HA, change in vision, abd pain, n/v/c/d.    CONSTITUTIONAL: no fevers or chills  RESPIRATORY: no cough, no wheezing, no hemoptysis, positive dyspnea   CARDIOVASCULAR: No chest pain or palpitations  GASTROINTESTINAL: No abdominal or epigastric pain. No nausea, vomiting, or hematemesis; No diarrhea, no constipation  GENITOURINARY: No dysuria, frequency or hematuria  NEUROLOGICAL: No numbness or weakness     Physical Exam:  Vitals: Temp 101.1 rectal, , /77, RR 16, SpO2 99% RA  Repeat vitals: Temp 99.1 rectal, , /61, RR 16, SpO2 96% RA  General: obese female in no acute distress, occasionally becoming tearful  HEENT: PERRLA, EOMI bl,  Neck: Supple, nontender, no mass  Neurology: A&Ox4, nonfocal  Respiratory: CTA B/L, No W/R/R  CV: mild tachycardia, +S1/S2,   Abdominal: Soft, NT, ND +BSx4  Extremities: Wound vac in R lower extremity, +peripheral pulses    A/P: 32F PMH Asthma, Bipolar disorder, hypothyroidism, chronic low back pain s/p laminectomy and spinal fusion, opioid-dependent presents with right leg swelling, numbness, pallor, poikilothermia, and pulselessness, found to have ischemic leg due to acute compartment syndrome of right leg with acute rhabdomyolysis, GHAZAL 2/2 ATN, and leukocytosis with bandemia s/p emergent fasciotomy of the 4 leg compartments (anterior, lateral, superficial posterior, and deep posterior) and the lateral thigh muscles.   -Chest Xray: negative for acute pathology (prelim)  -CBC: leukocytosis with elevated lymphocytes and monocytes   -lactate normal 1.5  -UA negative  -follow up urine culture and blood cultures  -Tylenol 650mg for fever, repeat 99.1  -Will continue to monitor

## 2017-10-16 NOTE — PROGRESS NOTE ADULT - SUBJECTIVE AND OBJECTIVE BOX
Date/Time Patient Seen:  		  Referring MD:   Data Reviewed	       Patient is a 32y old  Female who presents with a chief complaint of RLE edema and numbness (13 Oct 2017 16:25)    febrile  labile mood  overnight events noted    Subjective/HPI     PAST MEDICAL & SURGICAL HISTORY:  Low back pain  Hypothyroid  Bipolar 1 disorder  History of spinal fusion  History of laminectomy        Medication list         MEDICATIONS  (STANDING):  BACItracin   Ointment 1 Application(s) Topical two times a day  buDESOnide  80 MICROgram(s)/formoterol 4.5 MICROgram(s) Inhaler 2 Puff(s) Inhalation two times a day  buPROPion XL . 300 milliGRAM(s) Oral daily  docusate sodium 100 milliGRAM(s) Oral two times a day  fentaNYL   Patch  25 MICROgram(s)/Hr 2 Patch Transdermal every 72 hours  fluticasone propionate 50 MICROgram(s)/spray Nasal Spray 1 Spray(s) Both Nostrils two times a day  heparin  Injectable 5000 Unit(s) SubCutaneous every 8 hours  influenza   Vaccine 0.5 milliLiter(s) IntraMuscular once  lamoTRIgine 100 milliGRAM(s) Oral every 8 hours  levothyroxine 150 MICROGram(s) Oral daily  oxyCODONE  ER Tablet 60 milliGRAM(s) Oral every 8 hours  pantoprazole    Tablet 40 milliGRAM(s) Oral before breakfast  polyethylene glycol 3350 17 Gram(s) Oral daily  risperiDONE   Tablet 8 milliGRAM(s) Oral at bedtime  sertraline 50 milliGRAM(s) Oral daily  topiramate 400 milliGRAM(s) Oral two times a day    MEDICATIONS  (PRN):  acetaminophen   Tablet 650 milliGRAM(s) Oral every 6 hours PRN For Temp greater than 38 C (100.4 F)  ALBUTerol    90 MICROgram(s) HFA Inhaler 2 Puff(s) Inhalation every 6 hours PRN Shortness of Breath and/or Wheezing  clonazePAM Tablet 2 milliGRAM(s) Oral every 8 hours PRN ANxiety  HYDROmorphone  Injectable 0.5 milliGRAM(s) IV Push every 3 hours PRN Severe Pain (7 - 10)  ondansetron Injectable 4 milliGRAM(s) IV Push every 4 hours PRN Nausea and/or Vomiting  senna 2 Tablet(s) Oral at bedtime PRN Constipation         Vitals log        ICU Vital Signs Last 24 Hrs  T(C): 38.4 (16 Oct 2017 04:38), Max: 38.4 (15 Oct 2017 20:56)  T(F): 101.1 (16 Oct 2017 04:38), Max: 101.1 (15 Oct 2017 20:56)  HR: 99 (16 Oct 2017 04:38) (98 - 110)  BP: 100/52 (16 Oct 2017 05:56) (98/63 - 125/77)  BP(mean): --  ABP: --  ABP(mean): --  RR: 16 (16 Oct 2017 04:38) (16 - 18)  SpO2: 96% (16 Oct 2017 04:38) (96% - 99%)           Input and Output:  I&O's Detail    14 Oct 2017 07:01  -  15 Oct 2017 07:00  --------------------------------------------------------  IN:    Oral Fluid: 300 mL  Total IN: 300 mL    OUT:    Voided: 1200 mL  Total OUT: 1200 mL    Total NET: -900 mL      15 Oct 2017 07:01  -  16 Oct 2017 06:08  --------------------------------------------------------  IN:  Total IN: 0 mL    OUT:    VAC (Vacuum Assisted Closure) System: 800 mL    Voided: 3850 mL  Total OUT: 4650 mL    Total NET: -4650 mL          Lab Data                        7.6    16.2  )-----------( 405      ( 16 Oct 2017 01:16 )             22.1     10-15    133<L>  |  101  |  69<H>  ----------------------------<  91  3.7   |  20<L>  |  3.10<H>    Ca    8.2<L>      15 Oct 2017 05:58  Mg     2.3     10-14              Review of Systems	      Objective     Physical Examination    head at  heart - s1s2  lungs - dec BS  abd - soft  cn grossly int      Pertinent Lab findings & Imaging      Erlinda:  NO   Adequate UO     I&O's Detail    14 Oct 2017 07:01  -  15 Oct 2017 07:00  --------------------------------------------------------  IN:    Oral Fluid: 300 mL  Total IN: 300 mL    OUT:    Voided: 1200 mL  Total OUT: 1200 mL    Total NET: -900 mL      15 Oct 2017 07:01  -  16 Oct 2017 06:08  --------------------------------------------------------  IN:  Total IN: 0 mL    OUT:    VAC (Vacuum Assisted Closure) System: 800 mL    Voided: 3850 mL  Total OUT: 4650 mL    Total NET: -4650 mL               Discussed with:     Cultures:	        Radiology

## 2017-10-16 NOTE — PROGRESS NOTE ADULT - PROBLEM SELECTOR PLAN 3
on pain regimen acute and chronic  fentanyl patch  oxycontin   dilaudid IV and PO  reassurance  counseling  emotional support  medical regimen and care

## 2017-10-16 NOTE — PROVIDER CONTACT NOTE (CRITICAL VALUE NOTIFICATION) - ACTION/TREATMENT ORDERED:
MD to follow up during morning rounds; no new orders at this time
None at this time
None at this time
no order at this time.

## 2017-10-16 NOTE — CONSULT NOTE ADULT - SUBJECTIVE AND OBJECTIVE BOX
[facility], Division of Infectious Diseases  NATALIO Hopkins A. Lee ZIEGLER, ABELARDO  32y, Female  [id number]    HPI--  32F, poor historian. At times she is alert and at other times nods off mid-sentence intermittently, presumably from narcotics. Patient admitted with compartment syndrome right leg presumably related to narcotic overdose and lying on it for at least 8 hours. Patient's leg found to be cold, with absent mobility and sensation distantly and went for emergent fasciotomies. Patient also with GHAZAL, rhabdomyolysis, leukocytosis. Patient has had fever for 3 days presently. On 10/12 patient had complained of shortness of breath, cough, and wheezing. She feel a little better after taking her asthma medications. For what it's worth, has no complaints presently.    PMH/PSH--  Low back pain  Hypothyroid  Bipolar 1 disorder  History of spinal fusion  History of laminectomy      Allergies--  PCN -> rash. Not sure if she has taken keflex.   Sulfa -> mouth sores    Medications--  Antibiotics:   S/P Aztreonam and Clindamycin  Immunologic: influenza   Vaccine 0.5 milliLiter(s) IntraMuscular once    Other: acetaminophen   Tablet PRN  ALBUTerol    90 MICROgram(s) HFA Inhaler PRN  BACItracin   Ointment  buDESOnide  80 MICROgram(s)/formoterol 4.5 MICROgram(s) Inhaler  buPROPion XL .  clonazePAM Tablet PRN  docusate sodium  fentaNYL   Patch  25 MICROgram(s)/Hr  fluticasone propionate 50 MICROgram(s)/spray Nasal Spray  heparin  Injectable  HYDROmorphone  Injectable PRN  lamoTRIgine  levothyroxine  ondansetron Injectable PRN  oxyCODONE  ER Tablet  pantoprazole    Tablet  polyethylene glycol 3350  risperiDONE   Tablet  senna PRN  sertraline  topiramate      Social History--  EtOH: denies  Tobacco: denies   Drug Use: denies illicit use, Rx opioid abuse    Family/Marital History--  No pertinent family history in first degree relatives  Remainder not relevant to clinical concern.    Review of Systems:  A >=10-point review of systems was obtained.     Pertinent positives and negatives--  Constitutional: +fevers. No Chills. No Rigors.   Eyes: denies.   ENMT: denies.   Cardiovascular: No chest pain. No palpitations.  Respiratory: No shortness of breath. No cough.  Gastrointestinal: No nausea or vomiting. No diarrhea. Normal BM yesterday, no constipation.   Genitourinary: denies  Musculoskeletal: pain in back, chronic unchanged. Leg pain, unchanged.   Skin: denies.   Neurologic: absent sensation distal R leg  Psychiatric: Somewhat angry and dismissive demeanor.   Endocrine: Thirst.  Heme/Lymphatic: to get PRBC for anemia  Allergy/Immunologic: denies.     Review of systems limited due to patient reliability but otherwise negative except as previously noted.    Physical Exam--  Vital Signs: T(F): 98.8 (10-16-17 @ 08:30), Max: 101.1 (10-15-17 @ 20:56)  HR: 82 (10-16-17 @ 08:30)  BP: 110/72 (10-16-17 @ 08:30)  RR: 16 (10-16-17 @ 08:30)  SpO2: 97% (10-16-17 @ 08:30)  Wt(kg): --  General: Nontoxic-appearing Female in no acute distress.  HEENT: AT/NC. Pupils 2mm, =, poorly reactive. . Anicteric. Conjunctiva pale and moist. Oropharynx pale but clear. Dentition fair.  Neck: Not rigid. No sense of mass.  Nodes: None palpable.  Lungs: Clear bilaterally without rales, wheezing or rhonchi  Heart: Tachycardic (110 on my exam with regular rhythm. No Murmur. No rub. No gallop. No palpable thrill.  Abdomen: Bowel sounds present and normoactive. Soft. Nondistended. Nontender. No sense of mass. No organomegaly.  Back: No spinal tenderness. No costovertebral angle tenderness.   Extremities: No cyanosis or clubbing. LLE no edema. RLE wrapped. Foot cool, edematous. VAC under ACE.   Skin: Pale. Dry. Good turgor. No rash. No vasculitic stigmata.  Psychiatric: Odd affect. Waxing/Waning mental status.    Laboratory & Imaging Data--  CBC                        7.0    12.9  )-----------( 346      ( 16 Oct 2017 06:43 )             21.3       Chemistries  10-16    134<L>  |  102  |  62<H>  ----------------------------<  98  3.7   |  22  |  3.00<H>    Ca    8.0<L>      16 Oct 2017 06:43    Urinalysis (10.16.17 @ 01:36)    Glucose Qualitative, Urine: Negative    Blood, Urine: Negative    pH Urine: 7.0    Color: Pale Yellow    Urine Appearance: Clear    Bilirubin: Negative    Ketone - Urine: Negative    Specific Gravity: 1.005    Protein, Urine: Negative    Urobilinogen: Negative    Nitrite: Negative    Leukocyte Esterase Concentration: Negative    < from: Xray Chest 1 View AP-PORTABLE IMMEDIATE (10.16.17 @ 00:57) >  EXAM:  CHEST PORTABLE IMMED                        PROCEDURE DATE:  10/16/2017    INTERPRETATION:  Fever.  AP chest. Prior 10/12/2017.  Heart magnified by AP film shallow inspiration. No consolidation or   effusion. Thoracic dextroscoliosis. Hardware lower cervical spine.  Impression: No active disease.  GLORIA TURNER M.D., ATTENDING RADIOLOGIST  This document has been electronically signed. Oct 16 2017  9:04A  < end of copied text >      Culture Data  Culture - Blood (10.04.17 @ 10:15)    Specimen Source: .Blood Blood-Peripheral    Culture Results:   No growth at 5 days.    Culture - Blood (10.04.17 @ 10:15)    Specimen Source: .Blood Blood-Peripheral    Culture Results:   No growth at 5 days.    Culture - Urine (10.04.17 @ 09:59)    Specimen Source: .Urine Clean Catch (Midstream)    Culture Results:   No growth    Otherwise no cultures ordered until today.    Assessment--  "New" fever  Delirum, suspect narcotic induced rather than due to infection  GHAZAL, slowly improving  Had some nonspecific respiratory symptoms 10/12.  Per review of notes surgical sites appear to be doing well.  U/A bland, no symptoms  Benign abdomen, no concern of occult intra-abdominal pathology  No diarrhea to suggest C. difficile.  Atelectasis possible as cause of fever  Aspiration possible as well given narcotic abuse  Per discussion with nursing patient last received narcotics 5 hours ago and is still sedated appearing  DVT/PE in DDx given immobility  Doubt RP hematoma as cause of  fever and decline in H/H  Viral process possible as well    Suggestions--  Given fever for several days in the setting of general stability, and the absence of a clear focus, I see no urgency to start antibiotics here  Check RVP  F/U temperature curve  Minimize narcotics as able  F/U renal function, CBC  F/U Cx data  Incentive spirometry  Reviewed with Dr. Damico  Will discuss with Dr. Restrepo    Thank you for the courtesy of this referral.      Raúl Elam MD  [phone]

## 2017-10-16 NOTE — PROGRESS NOTE ADULT - ASSESSMENT
32-year-old female now status post right lower extremity  fasciotomy for compartment syndrome. Pt on vent support.     ·	GHAZAL, ATN Septic / ischemic, Rhabdomyolysis  ·	s/p surgery for compartment syndrome  ·	Hyperphosphatemia  ·	Anemia  ·	Hypokalemia    Improving renal function. Avoid nephrotoxic meds as possible. Will follow electrolytes and renal function trend. Surgery follow up.   Check phos level. Low phos diet. Potassium supps as needed. Psych / Surgery follow up. 32-year-old female now status post right lower extremity  fasciotomy for compartment syndrome. Pt on vent support.     ·	GHAZAL, ATN Septic / ischemic, Rhabdomyolysis  ·	s/p surgery for compartment syndrome  ·	Hyperphosphatemia  ·	Anemia  ·	Hypokalemia    Improving renal function. Avoid nephrotoxic meds as possible. Will follow electrolytes and renal function trend. Surgery follow up.   Monitor h/h. PRBC transfusion. On multiple pain meds. Pain management follow up.   Check phos level. Low phos diet. Potassium supps as needed. Psych / Surgery follow up.

## 2017-10-16 NOTE — PROGRESS NOTE ADULT - ASSESSMENT
32-year-old female now status post right lower extremity fasciotomy for prolonged compression after a fall with lethargy.     - Drop in H/H today Hb 7, transfuse PRN  - Rhabdo resolving; GHAZAL improving  - No obvious cardiac complications post op  - No clear evidence of acute ischemia or volume overload, pt asymptomatic  - Persistent tachycardia HR of 90s-100's likely reactive; fever overnight  - continue Pain control  - TTE with hyperdynamic LV function, EF 70%, no valvular disease noted  - Encourage PO hydration  - Other cardiovascular workup will depend on clinical course.  - DVT prophylaxis  - Activity as tolerated  - Right leg wound care per primary/surgery  - All other workup per primary team  - Will follow

## 2017-10-16 NOTE — PROGRESS NOTE ADULT - ASSESSMENT
Postop anemia s/p fasciotomies. Suggest patient be transfused 2 units of pRBC.  Continue local wound management  and PT therapy. She  will need [facility] rehab.

## 2017-10-16 NOTE — PROGRESS NOTE ADULT - SUBJECTIVE AND OBJECTIVE BOX
Patient is a 32y old  Female who presents with a chief complaint of RLE edema and numbness (04 Oct 2017 01:13)      Patient seen in follow up for GHAZAL, ATN, Rhabdomyolysis. Improving renal function.    PAST MEDICAL HISTORY:  Low back pain  Hypothyroid  Bipolar 1 disorder     MEDICATIONS  (STANDING):  BACItracin   Ointment 1 Application(s) Topical two times a day  buDESOnide  80 MICROgram(s)/formoterol 4.5 MICROgram(s) Inhaler 2 Puff(s) Inhalation two times a day  buPROPion XL . 300 milliGRAM(s) Oral daily  docusate sodium 100 milliGRAM(s) Oral two times a day  fentaNYL   Patch  25 MICROgram(s)/Hr 2 Patch Transdermal every 72 hours  fluticasone propionate 50 MICROgram(s)/spray Nasal Spray 1 Spray(s) Both Nostrils two times a day  heparin  Injectable 5000 Unit(s) SubCutaneous every 8 hours  influenza   Vaccine 0.5 milliLiter(s) IntraMuscular once  lamoTRIgine 100 milliGRAM(s) Oral every 8 hours  levothyroxine 150 MICROGram(s) Oral daily  oxyCODONE  ER Tablet 60 milliGRAM(s) Oral every 8 hours  pantoprazole    Tablet 40 milliGRAM(s) Oral before breakfast  polyethylene glycol 3350 17 Gram(s) Oral daily  risperiDONE   Tablet 8 milliGRAM(s) Oral at bedtime  sertraline 50 milliGRAM(s) Oral daily  topiramate 400 milliGRAM(s) Oral two times a day    MEDICATIONS  (PRN):  acetaminophen   Tablet 650 milliGRAM(s) Oral every 6 hours PRN For Temp greater than 38 C (100.4 F)  ALBUTerol    90 MICROgram(s) HFA Inhaler 2 Puff(s) Inhalation every 6 hours PRN Shortness of Breath and/or Wheezing  clonazePAM Tablet 2 milliGRAM(s) Oral every 8 hours PRN ANxiety  HYDROmorphone  Injectable 0.5 milliGRAM(s) IV Push every 3 hours PRN Severe Pain (7 - 10)  ondansetron Injectable 4 milliGRAM(s) IV Push every 4 hours PRN Nausea and/or Vomiting  senna 2 Tablet(s) Oral at bedtime PRN Constipation    T(C): 37.1 (10-16-17 @ 08:30), Max: 38.4 (10-15-17 @ 20:56)  HR: 82 (10-16-17 @ 08:30) (82 - 110)  BP: 110/72 (10-16-17 @ 08:30) (98/63 - 126/77)  RR: 16 (10-16-17 @ 08:30)  SpO2: 97% (10-16-17 @ 08:30)  Wt(kg): --  I&O's Detail    15 Oct 2017 07:01  -  16 Oct 2017 07:00  --------------------------------------------------------  IN:  Total IN: 0 mL    OUT:    VAC (Vacuum Assisted Closure) System: 1600 mL    Voided: 3850 mL  Total OUT: 5450 mL    Total NET: -5450 mL      16 Oct 2017 07:01  -  16 Oct 2017 11:29  --------------------------------------------------------  IN:    Oral Fluid: 300 mL  Total IN: 300 mL    OUT:    Voided: 350 mL  Total OUT: 350 mL    Total NET: -50 mL              PHYSICAL EXAM:  General: NAD  Respiratory: b/l air entry  Cardiovascular: S1 S2  Gastrointestinal: soft  Extremities:  Rt leg dressing        LABORATORY:                        7.0    12.9  )-----------( 346      ( 16 Oct 2017 06:43 )             21.3     10-16    134<L>  |  102  |  62<H>  ----------------------------<  98  3.7   |  22  |  3.00<H>    Ca    8.0<L>      16 Oct 2017 06:43      Sodium, Serum: 134 mmol/L (10-16 @ 06:43)  Sodium, Serum: 133 mmol/L (10-15 @ 05:58)    Potassium, Serum: 3.7 mmol/L (10-16 @ 06:43)  Potassium, Serum: 3.7 mmol/L (10-15 @ 05:58)    Hemoglobin: 7.0 g/dL (10-16 @ 06:43)  Hemoglobin: 7.6 g/dL (10-16 @ 01:16)  Hemoglobin: 7.4 g/dL (10-15 @ 05:58)  Hemoglobin: 7.7 g/dL (10-14 @ 07:28)    Creatinine, Serum 3.00 (10-16 @ 06:43)  Creatinine, Serum 3.10 (10-15 @ 05:58)  Creatinine, Serum 3.50 (10-14 @ 07:28)          Urinalysis Basic - ( 16 Oct 2017 01:36 )    Color: Pale Yellow / Appearance: Clear / S.005 / pH: x  Gluc: x / Ketone: Negative  / Bili: Negative / Urobili: Negative   Blood: x / Protein: Negative / Nitrite: Negative   Leuk Esterase: Negative / RBC: x / WBC x   Sq Epi: x / Non Sq Epi: x / Bacteria: x

## 2017-10-16 NOTE — PROGRESS NOTE ADULT - SUBJECTIVE AND OBJECTIVE BOX
32y old  Female who presents with a chief complaint of RLE edema and numbness. Patient admitted for Compartment syndrome, s/p Fasciotomy.  Dr. Damico consulted for pain management and concern for opoid dependence and abuse. Nurse aware of pain management plan. Renal function improving. Spoke to patient and mother at bedside. Patient is persistently asking for opioids, but she is aware they are being tapered and managed accordingly with pain management Dr. Damico.   Spoke to patient at length in regards to her opioid dependence and need to taper down her dilaudid.  Spoke to pain management doctor Jeffrey Galaviz [phone]) see chart note  Spoke to mom aware of plan.   Admitted for compartment syndrome, s/p Sx and vac  placement.      INTERVAL HPI: Pt seen and examined. mom bedside. c/o pain in RLE. no N/V, F/Ch. Not doing much PT, now OOB to chair  OVERNIGHT EVENTS: spiked temp to 101.1    Vital Signs Last 24 Hrs  T(C): 37.4 (16 Oct 2017 15:15), Max: 38.4 (15 Oct 2017 20:56)  T(F): 99.4 (16 Oct 2017 15:15), Max: 101.1 (15 Oct 2017 20:56)  HR: 100 (16 Oct 2017 15:15) (82 - 110)  BP: 104/61 (16 Oct 2017 15:15) (98/63 - 125/77)  BP(mean): --  RR: 15 (16 Oct 2017 15:15) (15 - 16)  SpO2: 93% (16 Oct 2017 15:15) (93% - 99%)  REVIEW OF SYSTEM:    Constitutional: No fever, chills, fatigue  Neuro: Pain, numbness, and weakness of RLE  Resp: No cough, wheezing, shortness of breath  CVS: No chest pain, palpitations, leg swelling  GI: No abdominal pain, nausea, vomiting, diarrhea   : No dysuria, frequency, incontinence  Skin: No itching, burning, rashes, or lesions   Msk: pain in RLE  Psych: No depression, anxiety, mood swings          PHYSICAL EXAM:  GENERAL: NAD, well-developed  HEAD:  Atraumatic, Normocephalic  EYES: EOMI, PERRLA, conjunctiva and sclera clear  ENMT: No tonsillar erythema, exudates, or enlargement; Moist mucous membranes,   NECK: Supple, No JVD, Normal thyroid  HEART: Regular rate and rhythm; No murmurs, rubs, or gallops  RESPIRATORY: CTA B/L, No wheezing / rhonchi  ABDOMEN: Soft, Nontender, Nondistended; Bowel sounds present  NEUROLOGY: A&Ox3, RLE persistently paralysed with no sensation  EXTREMITIES: RLE wrapped with ace bandage, with wound vac. + pulses B/L and edema of RLE  SKIN: warm, dry, normal color, no rash or abnormal lesions    LABS:                        7.0    12.9  )-----------( 346      ( 16 Oct 2017 06:43 )             21.3     16 Oct 2017 06:43    134    |  102    |  62     ----------------------------<  98     3.7     |  22     |  3.00     Ca    8.0        16 Oct 2017 06:43        Urinalysis Basic - ( 16 Oct 2017 01:36 )    Color: Pale Yellow / Appearance: Clear / S.005 / pH: x  Gluc: x / Ketone: Negative  / Bili: Negative / Urobili: Negative   Blood: x / Protein: Negative / Nitrite: Negative   Leuk Esterase: Negative / RBC: x / WBC x   Sq Epi: x / Non Sq Epi: x / Bacteria: x      CAPILLARY BLOOD GLUCOSE        UCx       RADIOLOGY & ADDITIONAL TESTS:      MEDICATIONS  (STANDING):  BACItracin   Ointment 1 Application(s) Topical two times a day  buDESOnide  80 MICROgram(s)/formoterol 4.5 MICROgram(s) Inhaler 2 Puff(s) Inhalation two times a day  buPROPion XL . 300 milliGRAM(s) Oral daily  docusate sodium 100 milliGRAM(s) Oral two times a day  fentaNYL   Patch  25 MICROgram(s)/Hr 2 Patch Transdermal every 72 hours  fluticasone propionate 50 MICROgram(s)/spray Nasal Spray 1 Spray(s) Both Nostrils two times a day  heparin  Injectable 5000 Unit(s) SubCutaneous every 8 hours  influenza   Vaccine 0.5 milliLiter(s) IntraMuscular once  lamoTRIgine 100 milliGRAM(s) Oral every 8 hours  levothyroxine 150 MICROGram(s) Oral daily  oxyCODONE  ER Tablet 60 milliGRAM(s) Oral every 8 hours  pantoprazole    Tablet 40 milliGRAM(s) Oral before breakfast  polyethylene glycol 3350 17 Gram(s) Oral daily  risperiDONE   Tablet 8 milliGRAM(s) Oral at bedtime  sertraline 50 milliGRAM(s) Oral daily  topiramate 400 milliGRAM(s) Oral two times a day    MEDICATIONS  (PRN):  acetaminophen   Tablet 650 milliGRAM(s) Oral every 6 hours PRN For Temp greater than 38 C (100.4 F)  ALBUTerol    90 MICROgram(s) HFA Inhaler 2 Puff(s) Inhalation every 6 hours PRN Shortness of Breath and/or Wheezing  clonazePAM Tablet 2 milliGRAM(s) Oral every 8 hours PRN ANxiety  HYDROmorphone  Injectable 0.5 milliGRAM(s) IV Push every 3 hours PRN Severe Pain (7 - 10)  ondansetron Injectable 4 milliGRAM(s) IV Push every 4 hours PRN Nausea and/or Vomiting  senna 2 Tablet(s) Oral at bedtime PRN Constipation

## 2017-10-16 NOTE — PROGRESS NOTE ADULT - SUBJECTIVE AND OBJECTIVE BOX
[facility]     CHIEF COMPLAINT: Patient is a 32y old  Female who presents with a chief complaint of RLE edema and numbness (13 Oct 2017 16:25)      Follow Up: [ ] Chest Pain      [ ] Dyspnea     [ ] Palpitations    [ ] Atrial Fibrillation     [ ] Ventricular Dysrhythmia    [ ] Abnormal EKG                      [ ] Abnormal Cardiac Enzymes     [ ] Valvular Disease   [ ] CAD  [ ] Hypertension [ ] CHF     HPI:  Pt is a 31 yo female with PMHX of Bipolar disorder, hypothyroidism, chronic low back pain on morphine s/p laminectomy and spinal fusion in 2016 who is BIB EMS for right leg swelling and numbness. Patient is a poor historian as she is on many pain medicines and is currently lethargic, therefore much info provided based on hospital staff and charts. She states that 18 hours prior to arrival, she fell on the floor at her house after taking too much morphine and was lying on floor for 8 hours. She woke up to numbness, swelling, and cool temperature of her right leg, from below the knee onwards. Patient denies any current SOB, CP, f/c, denies head trauma.     In the ED, vitals were Temp 101.4F /91  RR 20  SpO2 93% RA. Significant labs are WBS 24.3, Hb 15.6, Hmt 47.6,  Platelets 118, neutr 93%, sodium 131, BUN 30,  Cr 2.30, glucose 152 AST 1377, , GFR 27, lactate 1.7. Doppler US RLE showed Extremely limited evaluation of the right lower extremity with nonvisualization of the venous structures beyond the mid right femoral vein. No evidence of DVT within the right common femoral, proximal and mid femoral veins. No evidence of left lower extremity deep venous thrombosis. XR of right tibia and XR of R femur showed no acute fractures. Arterial Doppler of RLE pending. EKG showed sinus tach of 140, with right superior axis deviation. Patient started on aztreonam, Tylenol given for fever, patient made NPO, Dr. Martines consulted for emergent surgery. (04 Oct 2017 01:13)    Pt seen and examined at bedside, lethargic, poorly responding to questions. Denies CP, SOB, dizziness, lightheadedness, palpitations, dyspnea, orthopnea.      PAST MEDICAL & SURGICAL HISTORY:  Low back pain  Hypothyroid  Bipolar 1 disorder  History of spinal fusion  History of laminectomy      MEDICATIONS  (STANDING):  BACItracin   Ointment 1 Application(s) Topical two times a day  buDESOnide  80 MICROgram(s)/formoterol 4.5 MICROgram(s) Inhaler 2 Puff(s) Inhalation two times a day  buPROPion XL . 300 milliGRAM(s) Oral daily  docusate sodium 100 milliGRAM(s) Oral two times a day  fentaNYL   Patch  25 MICROgram(s)/Hr 2 Patch Transdermal every 72 hours  fluticasone propionate 50 MICROgram(s)/spray Nasal Spray 1 Spray(s) Both Nostrils two times a day  heparin  Injectable 5000 Unit(s) SubCutaneous every 8 hours  influenza   Vaccine 0.5 milliLiter(s) IntraMuscular once  lamoTRIgine 100 milliGRAM(s) Oral every 8 hours  levothyroxine 150 MICROGram(s) Oral daily  oxyCODONE  ER Tablet 60 milliGRAM(s) Oral every 8 hours  pantoprazole    Tablet 40 milliGRAM(s) Oral before breakfast  polyethylene glycol 3350 17 Gram(s) Oral daily  risperiDONE   Tablet 8 milliGRAM(s) Oral at bedtime  sertraline 50 milliGRAM(s) Oral daily  topiramate 400 milliGRAM(s) Oral two times a day    MEDICATIONS  (PRN):  acetaminophen   Tablet 650 milliGRAM(s) Oral every 6 hours PRN For Temp greater than 38 C (100.4 F)  ALBUTerol    90 MICROgram(s) HFA Inhaler 2 Puff(s) Inhalation every 6 hours PRN Shortness of Breath and/or Wheezing  clonazePAM Tablet 2 milliGRAM(s) Oral every 8 hours PRN ANxiety  HYDROmorphone  Injectable 0.5 milliGRAM(s) IV Push every 3 hours PRN Severe Pain (7 - 10)  ondansetron Injectable 4 milliGRAM(s) IV Push every 4 hours PRN Nausea and/or Vomiting  senna 2 Tablet(s) Oral at bedtime PRN Constipation      Allergies    avocado (Unknown)  latex (Urticaria; Rash)  penicillin (Unknown)  sulfa drugs (Unknown)    Intolerances        REVIEW OF SYSTEMS: unable to obtain, pt is lethargic poorly responding to questions      Vital Signs Last 24 Hrs  T(C): 37.4 (16 Oct 2017 08:00), Max: 38.4 (15 Oct 2017 20:56)  T(F): 99.4 (16 Oct 2017 08:00), Max: 101.1 (15 Oct 2017 20:56)  HR: 90 (16 Oct 2017 08:00) (90 - 110)  BP: 102/64 (16 Oct 2017 08:00) (98/63 - 125/77)  BP(mean): --  RR: 16 (16 Oct 2017 08:00) (16 - 16)  SpO2: 98% (16 Oct 2017 08:00) (96% - 99%)    I&O's Summary    15 Oct 2017 07:01  -  16 Oct 2017 07:00  --------------------------------------------------------  IN: 0 mL / OUT: 5450 mL / NET: -5450 mL    16 Oct 2017 07:01  -  16 Oct 2017 09:58  --------------------------------------------------------  IN: 300 mL / OUT: 350 mL / NET: -50 mL        PHYSICAL EXAM:    Constitutional: NAD, lethargic  Eyes:  EOMI,  Pupils round, No oral cyanosis.  ENMT: No exudate or erythema  Pulmonary: Non-labored, breath sounds are clear bilaterally, No wheezing, rales or rhonchi  Cardiovascular: Regular, S1 and S2, No murmurs, rubs, gallops or clicks  Gastrointestinal: Bowel Sounds present, soft, nontender.   Lymph: R leg dressing, erythema, warmth, swelling. No lymphadenopathy.  Neurological: Alert, no focal deficits  Skin: No rashes.  Psych:  lethargic    LABS: All Labs Reviewed:                        7.0    12.9  )-----------( 346      ( 16 Oct 2017 06:43 )             21.3                         7.6    16.2  )-----------( 405      ( 16 Oct 2017 01:16 )             22.1                         7.4    12.4  )-----------( 318      ( 15 Oct 2017 05:58 )             23.0     16 Oct 2017 06:43    134    |  102    |  62     ----------------------------<  98     3.7     |  22     |  3.00   15 Oct 2017 05:58    133    |  101    |  69     ----------------------------<  91     3.7     |  20     |  3.10   14 Oct 2017 07:28    134    |  102    |  70     ----------------------------<  95     4.0     |  22     |  3.50     Ca    8.0        16 Oct 2017 06:43  Ca    8.2        15 Oct 2017 05:58  Ca    8.6        14 Oct 2017 07:28  Mg     2.3       14 Oct 2017 07:28            Blood Culture:         RADIOLOGY/EKG:  < from: 12 Lead ECG (10.04.17 @ 08:44) >    Ventricular Rate 122 BPM    Atrial Rate 122 BPM    P-R Interval 136 ms    QRS Duration 94 ms    Q-T Interval 332 ms    QTC Calculation(Bezet) 473 ms    P Axis 35 degrees    R Axis -86 degrees    T Axis 10 degrees    Diagnosis Line Sinus tachycardia  Left axis deviation  Inferior infarct (cited on or before 03-OCT-2017)  Abnormal ECG  When compared with ECG of 03-OCT-2017 21:50, (Unconfirmed)  No significant change was found  Confirmed by Keena LLAMAS, Juanito ([id number]) on 10/4/2017 1:55:40 PM    < end of copied text >    < from: TTE Echo Doppler w/o Cont (10.05.17 @ 12:46) >     EXAM:  ECHO TTE W/O CON COMP W/DOPPLR         PROCEDURE DATE:  10/05/2017        INTERPRETATION:  Ordering Physician: MARISSA RUFF [id number]    Indication: Respiratory failure    Study Quality: Technically difficult in ICU   A complete echocardiographic study was performed utilizing standard   protocol including spectral and color Doppler in all echocardiographic   windows.    Height: 1 75 cm  Weight: 113 kg  BSA: 2.27  Blood Pressure: 104/76    MEASUREMENTS  IVS: 0.9cm  PWT: 0.9cm  LA: 3.5cm  AO: 2.8cm  LVIDd: 5.2cm  LVIDs: 3.5cm      LVEF: 70%    FINDINGS  Left Ventricle: Hyperdynamic left ventricle. Estimated EF 70%. No   segmental wall motion abnormalities  Aortic Valve: Not well-visualized. Probably normal trileaflet aortic valve  Mitral Valve: There is subtle systolic anterior motion of the mitral   valve in the setting of a hyperdynamic left ventricle. No left   ventricular outflow tract gradient is seen. Trace mitral regurgitation is   visualized.  Tricuspid Valve: Not well visualized. Trace tricuspid regurgitation  Pulmonic Valve: Not visualized.  Left Atrium: Grossly normal  Right Ventricle: Not well-visualized. Grossly normal right ventricular   size and function  Right Atrium: Grossly normal  Diastolic Function: Normaldiastolic function  Pericardium/Pleura: No pericardial effusion      CONCLUSIONS:  1. Technically difficult study  2. Hyperdynamic left ventricle. Estimated EF 70%  3. Subtle systolic anterior motion of the mitral valve in the setting of   a hyperdynamic left ventricle, but no outflow tract gradient.  4. Right ventricle is not well visualized.Grossly normal right   ventricular size and function  5. No pericardial effusion    No prior exam for comparison                  ROBERTA MARKS   This document has been electronically signed. Oct  6 2017  1:33PM                < end of copied text >

## 2017-10-16 NOTE — PROGRESS NOTE ADULT - PROBLEM SELECTOR PLAN 3
Pt having low grade fever and spiked to 101.1 last 2 days.  Pt is off antibx and leucocytosis improving.  ID consult noted. will follow with lab work as adv.

## 2017-10-17 LAB
ANION GAP SERPL CALC-SCNC: 13 MMOL/L — SIGNIFICANT CHANGE UP (ref 5–17)
BUN SERPL-MCNC: 57 MG/DL — HIGH (ref 7–23)
CALCIUM SERPL-MCNC: 7.9 MG/DL — LOW (ref 8.5–10.1)
CHLORIDE SERPL-SCNC: 99 MMOL/L — SIGNIFICANT CHANGE UP (ref 96–108)
CO2 SERPL-SCNC: 21 MMOL/L — LOW (ref 22–31)
CREAT SERPL-MCNC: 2.4 MG/DL — HIGH (ref 0.5–1.3)
CULTURE RESULTS: SIGNIFICANT CHANGE UP
GLUCOSE SERPL-MCNC: 108 MG/DL — HIGH (ref 70–99)
HCT VFR BLD CALC: 25.4 % — LOW (ref 34.5–45)
HGB BLD-MCNC: 8.6 G/DL — LOW (ref 11.5–15.5)
MCHC RBC-ENTMCNC: 30.2 PG — SIGNIFICANT CHANGE UP (ref 27–34)
MCHC RBC-ENTMCNC: 33.7 GM/DL — SIGNIFICANT CHANGE UP (ref 32–36)
MCV RBC AUTO: 89.7 FL — SIGNIFICANT CHANGE UP (ref 80–100)
PLATELET # BLD AUTO: 303 K/UL — SIGNIFICANT CHANGE UP (ref 150–400)
POTASSIUM SERPL-MCNC: 3.3 MMOL/L — LOW (ref 3.5–5.3)
POTASSIUM SERPL-SCNC: 3.3 MMOL/L — LOW (ref 3.5–5.3)
RBC # BLD: 2.84 M/UL — LOW (ref 3.8–5.2)
RBC # FLD: 12.5 % — SIGNIFICANT CHANGE UP (ref 10.3–14.5)
SODIUM SERPL-SCNC: 133 MMOL/L — LOW (ref 135–145)
SPECIMEN SOURCE: SIGNIFICANT CHANGE UP
WBC # BLD: 15.4 K/UL — HIGH (ref 3.8–10.5)
WBC # FLD AUTO: 15.4 K/UL — HIGH (ref 3.8–10.5)

## 2017-10-17 PROCEDURE — 99233 SBSQ HOSP IP/OBS HIGH 50: CPT

## 2017-10-17 PROCEDURE — 99232 SBSQ HOSP IP/OBS MODERATE 35: CPT

## 2017-10-17 RX ORDER — ACETAMINOPHEN 500 MG
650 TABLET ORAL EVERY 6 HOURS
Qty: 0 | Refills: 0 | Status: DISCONTINUED | OUTPATIENT
Start: 2017-10-17 | End: 2017-10-27

## 2017-10-17 RX ORDER — POTASSIUM CHLORIDE 20 MEQ
20 PACKET (EA) ORAL
Qty: 0 | Refills: 0 | Status: COMPLETED | OUTPATIENT
Start: 2017-10-17 | End: 2017-10-17

## 2017-10-17 RX ADMIN — HEPARIN SODIUM 5000 UNIT(S): 5000 INJECTION INTRAVENOUS; SUBCUTANEOUS at 21:29

## 2017-10-17 RX ADMIN — Medication 20 MILLIEQUIVALENT(S): at 15:25

## 2017-10-17 RX ADMIN — HYDROMORPHONE HYDROCHLORIDE 0.5 MILLIGRAM(S): 2 INJECTION INTRAMUSCULAR; INTRAVENOUS; SUBCUTANEOUS at 00:32

## 2017-10-17 RX ADMIN — Medication 650 MILLIGRAM(S): at 07:58

## 2017-10-17 RX ADMIN — PANTOPRAZOLE SODIUM 40 MILLIGRAM(S): 20 TABLET, DELAYED RELEASE ORAL at 06:00

## 2017-10-17 RX ADMIN — HYDROMORPHONE HYDROCHLORIDE 0.5 MILLIGRAM(S): 2 INJECTION INTRAMUSCULAR; INTRAVENOUS; SUBCUTANEOUS at 01:00

## 2017-10-17 RX ADMIN — HYDROMORPHONE HYDROCHLORIDE 0.5 MILLIGRAM(S): 2 INJECTION INTRAMUSCULAR; INTRAVENOUS; SUBCUTANEOUS at 23:56

## 2017-10-17 RX ADMIN — Medication 650 MILLIGRAM(S): at 00:32

## 2017-10-17 RX ADMIN — HYDROMORPHONE HYDROCHLORIDE 0.5 MILLIGRAM(S): 2 INJECTION INTRAMUSCULAR; INTRAVENOUS; SUBCUTANEOUS at 17:52

## 2017-10-17 RX ADMIN — HYDROMORPHONE HYDROCHLORIDE 0.5 MILLIGRAM(S): 2 INJECTION INTRAMUSCULAR; INTRAVENOUS; SUBCUTANEOUS at 06:00

## 2017-10-17 RX ADMIN — BUPROPION HYDROCHLORIDE 300 MILLIGRAM(S): 150 TABLET, EXTENDED RELEASE ORAL at 12:25

## 2017-10-17 RX ADMIN — Medication 2 MILLIGRAM(S): at 20:10

## 2017-10-17 RX ADMIN — Medication 20 MILLIEQUIVALENT(S): at 17:39

## 2017-10-17 RX ADMIN — Medication 100 MILLIGRAM(S): at 17:39

## 2017-10-17 RX ADMIN — RISPERIDONE 8 MILLIGRAM(S): 4 TABLET ORAL at 21:30

## 2017-10-17 RX ADMIN — LAMOTRIGINE 100 MILLIGRAM(S): 25 TABLET, ORALLY DISINTEGRATING ORAL at 15:25

## 2017-10-17 RX ADMIN — HEPARIN SODIUM 5000 UNIT(S): 5000 INJECTION INTRAVENOUS; SUBCUTANEOUS at 05:59

## 2017-10-17 RX ADMIN — Medication 400 MILLIGRAM(S): at 17:39

## 2017-10-17 RX ADMIN — SERTRALINE 50 MILLIGRAM(S): 25 TABLET, FILM COATED ORAL at 12:25

## 2017-10-17 RX ADMIN — HYDROMORPHONE HYDROCHLORIDE 0.5 MILLIGRAM(S): 2 INJECTION INTRAMUSCULAR; INTRAVENOUS; SUBCUTANEOUS at 14:37

## 2017-10-17 RX ADMIN — Medication 400 MILLIGRAM(S): at 06:00

## 2017-10-17 RX ADMIN — HEPARIN SODIUM 5000 UNIT(S): 5000 INJECTION INTRAVENOUS; SUBCUTANEOUS at 15:25

## 2017-10-17 RX ADMIN — HYDROMORPHONE HYDROCHLORIDE 0.5 MILLIGRAM(S): 2 INJECTION INTRAMUSCULAR; INTRAVENOUS; SUBCUTANEOUS at 20:51

## 2017-10-17 RX ADMIN — Medication 150 MICROGRAM(S): at 06:00

## 2017-10-17 RX ADMIN — LAMOTRIGINE 100 MILLIGRAM(S): 25 TABLET, ORALLY DISINTEGRATING ORAL at 06:00

## 2017-10-17 RX ADMIN — Medication 650 MILLIGRAM(S): at 08:30

## 2017-10-17 RX ADMIN — HYDROMORPHONE HYDROCHLORIDE 0.5 MILLIGRAM(S): 2 INJECTION INTRAMUSCULAR; INTRAVENOUS; SUBCUTANEOUS at 14:55

## 2017-10-17 RX ADMIN — HYDROMORPHONE HYDROCHLORIDE 0.5 MILLIGRAM(S): 2 INJECTION INTRAMUSCULAR; INTRAVENOUS; SUBCUTANEOUS at 17:39

## 2017-10-17 RX ADMIN — Medication 1 APPLICATION(S): at 06:10

## 2017-10-17 RX ADMIN — Medication 1 SPRAY(S): at 06:01

## 2017-10-17 RX ADMIN — Medication 100 MILLIGRAM(S): at 06:00

## 2017-10-17 RX ADMIN — LAMOTRIGINE 100 MILLIGRAM(S): 25 TABLET, ORALLY DISINTEGRATING ORAL at 21:29

## 2017-10-17 RX ADMIN — Medication 1 SPRAY(S): at 17:39

## 2017-10-17 RX ADMIN — OXYCODONE HYDROCHLORIDE 60 MILLIGRAM(S): 5 TABLET ORAL at 23:10

## 2017-10-17 RX ADMIN — HYDROMORPHONE HYDROCHLORIDE 0.5 MILLIGRAM(S): 2 INJECTION INTRAMUSCULAR; INTRAVENOUS; SUBCUTANEOUS at 06:30

## 2017-10-17 NOTE — PROGRESS NOTE ADULT - PROBLEM SELECTOR PLAN 1
-compartment syndrome of the right lower extremity   -s/p emergent fasciotomy by Vascular surgery (Bobbi)   -s/p wound vac placement f/u with vascular sx  -Plastic surgery to recommend if need for skin grafting.   -Finished course of IV abx aztreonam and clindamycin in ICU  -monitor labs, increase activity

## 2017-10-17 NOTE — PROGRESS NOTE ADULT - ASSESSMENT
32 white female with compartment syndrome S/P surgery.  GHAZAL and severe rhabdomyolysis, pigment nephropathy, now non oliguric.  renal indices are improving and CPK is trending down.  supplement potassium today. ,  will follow closely.

## 2017-10-17 NOTE — PROGRESS NOTE ADULT - PROBLEM SELECTOR PLAN 3
Pt having low grade fever   Pt is off antibx and leucocytosis improving.  ID consult noted. will follow with lab work as adv.

## 2017-10-17 NOTE — PROGRESS NOTE ADULT - SUBJECTIVE AND OBJECTIVE BOX
Date/Time Patient Seen:  		  Referring MD:   Data Reviewed	       Patient is a 32y old  Female who presents with a chief complaint of RLE edema and numbness (13 Oct 2017 16:25)  in bed  seen and examined  vs and meds reviewed      Subjective/HPI     PAST MEDICAL & SURGICAL HISTORY:  Low back pain  Hypothyroid  Bipolar 1 disorder  History of spinal fusion  History of laminectomy        Medication list         MEDICATIONS  (STANDING):  BACItracin   Ointment 1 Application(s) Topical two times a day  buDESOnide  80 MICROgram(s)/formoterol 4.5 MICROgram(s) Inhaler 2 Puff(s) Inhalation two times a day  buPROPion XL . 300 milliGRAM(s) Oral daily  docusate sodium 100 milliGRAM(s) Oral two times a day  fentaNYL   Patch  25 MICROgram(s)/Hr 2 Patch Transdermal every 72 hours  fluticasone propionate 50 MICROgram(s)/spray Nasal Spray 1 Spray(s) Both Nostrils two times a day  heparin  Injectable 5000 Unit(s) SubCutaneous every 8 hours  influenza   Vaccine 0.5 milliLiter(s) IntraMuscular once  lamoTRIgine 100 milliGRAM(s) Oral every 8 hours  levothyroxine 150 MICROGram(s) Oral daily  oxyCODONE  ER Tablet 60 milliGRAM(s) Oral every 8 hours  pantoprazole    Tablet 40 milliGRAM(s) Oral before breakfast  polyethylene glycol 3350 17 Gram(s) Oral daily  risperiDONE   Tablet 8 milliGRAM(s) Oral at bedtime  sertraline 50 milliGRAM(s) Oral daily  topiramate 400 milliGRAM(s) Oral two times a day    MEDICATIONS  (PRN):  acetaminophen   Tablet 650 milliGRAM(s) Oral every 6 hours PRN For Temp greater than 38 C (100.4 F)  ALBUTerol    90 MICROgram(s) HFA Inhaler 2 Puff(s) Inhalation every 6 hours PRN Shortness of Breath and/or Wheezing  clonazePAM Tablet 2 milliGRAM(s) Oral every 8 hours PRN ANxiety  HYDROmorphone  Injectable 0.5 milliGRAM(s) IV Push every 3 hours PRN Severe Pain (7 - 10)  ondansetron Injectable 4 milliGRAM(s) IV Push every 4 hours PRN Nausea and/or Vomiting  senna 2 Tablet(s) Oral at bedtime PRN Constipation         Vitals log        ICU Vital Signs Last 24 Hrs  T(C): 38.1 (17 Oct 2017 00:05), Max: 38.2 (16 Oct 2017 18:35)  T(F): 100.5 (17 Oct 2017 00:05), Max: 100.7 (16 Oct 2017 18:35)  HR: 91 (17 Oct 2017 00:05) (16 - 105)  BP: 105/66 (17 Oct 2017 00:05) (100/60 - 114/68)  BP(mean): --  ABP: --  ABP(mean): --  RR: 17 (17 Oct 2017 00:05) (2 - 20)  SpO2: 99% (17 Oct 2017 00:05) (93% - 100%)           Input and Output:  I&O's Detail    15 Oct 2017 07:01  -  16 Oct 2017 07:00  --------------------------------------------------------  IN:  Total IN: 0 mL    OUT:    VAC (Vacuum Assisted Closure) System: 1600 mL    Voided: 3850 mL  Total OUT: 5450 mL    Total NET: -5450 mL      16 Oct 2017 07:01  -  17 Oct 2017 06:10  --------------------------------------------------------  IN:    Oral Fluid: 300 mL    Packed Red Blood Cells: 341 mL  Total IN: 641 mL    OUT:    VAC (Vacuum Assisted Closure) System: 1800 mL    Voided: 1150 mL  Total OUT: 2950 mL    Total NET: -2309 mL          Lab Data                        7.0    12.9  )-----------( 346      ( 16 Oct 2017 06:43 )             21.3     10-16    134<L>  |  102  |  62<H>  ----------------------------<  98  3.7   |  22  |  3.00<H>    Ca    8.0<L>      16 Oct 2017 06:43              Review of Systems	      Objective     Physical Examination    obese  head at  heart - s1s2  lungs - dec BS      Pertinent Lab findings & Imaging      Erlinda:  NO   Adequate UO     I&O's Detail    15 Oct 2017 07:01  -  16 Oct 2017 07:00  --------------------------------------------------------  IN:  Total IN: 0 mL    OUT:    VAC (Vacuum Assisted Closure) System: 1600 mL    Voided: 3850 mL  Total OUT: 5450 mL    Total NET: -5450 mL      16 Oct 2017 07:01  -  17 Oct 2017 06:10  --------------------------------------------------------  IN:    Oral Fluid: 300 mL    Packed Red Blood Cells: 341 mL  Total IN: 641 mL    OUT:    VAC (Vacuum Assisted Closure) System: 1800 mL    Voided: 1150 mL  Total OUT: 2950 mL    Total NET: -2309 mL               Discussed with:     Cultures:	        Radiology

## 2017-10-17 NOTE — PROGRESS NOTE ADULT - PROBLEM SELECTOR PLAN 2
Pain management Dr. Damico consulted  Tapering prn IV and PO dilaudid as per Dr. Damico  Continue fentanyl patch.  Hold dilaudid and oxycodone for lethargy.

## 2017-10-17 NOTE — PROGRESS NOTE ADULT - PROBLEM SELECTOR PLAN 3
on opioids for pain   acute and chronic pain  dilaudid PRN iv low dose  fentanyl and oxycontin long acting - chronic and acute on chronic pain  back and neck pain  rhabdo and aris and compartment syndrome acute diagnoses  will monitor  counseling provided

## 2017-10-17 NOTE — PROGRESS NOTE ADULT - SUBJECTIVE AND OBJECTIVE BOX
ABELARDO CROWELL  32y  Female    Patient is a 32y old  Female who presents with a chief complaint of RLE edema and numbness (13 Oct 2017 16:25)    seen at bed side. Denies any chest pain or shortness of breath. Ate well today.         PAST MEDICAL & SURGICAL HISTORY:  Low back pain  Hypothyroid  Bipolar 1 disorder  History of spinal fusion  History of laminectomy          PHYSICAL EXAM:    T(C): 38 (10-17-17 @ 12:06), Max: 38.2 (10-16-17 @ 18:35)  HR: 85 (10-17-17 @ 07:45) (16 - 105)  BP: 118/74 (10-17-17 @ 07:45) (100/60 - 118/74)  RR: 16 (10-17-17 @ 07:45) (2 - 20)  SpO2: 100% (10-17-17 @ 07:45) (93% - 100%)  Wt(kg): --    I&O's Detail    16 Oct 2017 07:01  -  17 Oct 2017 07:00  --------------------------------------------------------  IN:    Oral Fluid: 300 mL    Packed Red Blood Cells: 341 mL  Total IN: 641 mL    OUT:    VAC (Vacuum Assisted Closure) System: 1800 mL    Voided: 1150 mL  Total OUT: 2950 mL    Total NET: -2309 mL          Respiratory: clear anteriorly, decreased BS at bases  Cardiovascular: S1 S2  Gastrointestinal: soft NT ND +BS  Extremities: edema   Neuro: Awake and alert    MEDICATIONS  (STANDING):  BACItracin   Ointment 1 Application(s) Topical two times a day  buDESOnide  80 MICROgram(s)/formoterol 4.5 MICROgram(s) Inhaler 2 Puff(s) Inhalation two times a day  buPROPion XL . 300 milliGRAM(s) Oral daily  docusate sodium 100 milliGRAM(s) Oral two times a day  fentaNYL   Patch  25 MICROgram(s)/Hr 2 Patch Transdermal every 72 hours  fluticasone propionate 50 MICROgram(s)/spray Nasal Spray 1 Spray(s) Both Nostrils two times a day  heparin  Injectable 5000 Unit(s) SubCutaneous every 8 hours  influenza   Vaccine 0.5 milliLiter(s) IntraMuscular once  lamoTRIgine 100 milliGRAM(s) Oral every 8 hours  levothyroxine 150 MICROGram(s) Oral daily  oxyCODONE  ER Tablet 60 milliGRAM(s) Oral every 8 hours  pantoprazole    Tablet 40 milliGRAM(s) Oral before breakfast  polyethylene glycol 3350 17 Gram(s) Oral daily  risperiDONE   Tablet 8 milliGRAM(s) Oral at bedtime  sertraline 50 milliGRAM(s) Oral daily  topiramate 400 milliGRAM(s) Oral two times a day    MEDICATIONS  (PRN):  acetaminophen   Tablet 650 milliGRAM(s) Oral every 6 hours PRN For Temp greater than 38 C (100.4 F)  acetaminophen   Tablet. 650 milliGRAM(s) Oral every 6 hours PRN Mild Pain (1 - 3)  ALBUTerol    90 MICROgram(s) HFA Inhaler 2 Puff(s) Inhalation every 6 hours PRN Shortness of Breath and/or Wheezing  clonazePAM Tablet 2 milliGRAM(s) Oral every 8 hours PRN ANxiety  HYDROmorphone  Injectable 0.5 milliGRAM(s) IV Push every 3 hours PRN Severe Pain (7 - 10)  ondansetron Injectable 4 milliGRAM(s) IV Push every 4 hours PRN Nausea and/or Vomiting  senna 2 Tablet(s) Oral at bedtime PRN Constipation                            8.6    15.4  )-----------( 303      ( 17 Oct 2017 09:39 )             25.4       10-17    133<L>  |  99  |  57<H>  ----------------------------<  108<H>  3.3<L>   |  21<L>  |  2.40<H>    Ca    7.9<L>      17 Oct 2017 09:39        Urinalysis Basic - ( 16 Oct 2017 01:36 )    Color: Pale Yellow / Appearance: Clear / S.005 / pH: x  Gluc: x / Ketone: Negative  / Bili: Negative / Urobili: Negative   Blood: x / Protein: Negative / Nitrite: Negative   Leuk Esterase: Negative / RBC: x / WBC x   Sq Epi: x / Non Sq Epi: x / Bacteria: x

## 2017-10-17 NOTE — PROGRESS NOTE ADULT - ASSESSMENT
32-year-old female now status post right lower extremity fasciotomy for prolonged compression after a fall with lethargy.     - Rhabdo resolving; GHAZAL improving  - No obvious cardiac complications post op  - No clear evidence of acute ischemia or volume overload, pt asymptomatic  - Persistent tachycardia HR of 90s-100's likely reactive...now improving.   - continue Pain control  - TTE with hyperdynamic LV function, EF 70%, no valvular disease noted  - Encourage PO hydration  - Other cardiovascular workup will depend on clinical course.  - DVT prophylaxis  - Activity as tolerated  - Right leg wound care per primary/surgery  - All other workup per primary team  - Will follow

## 2017-10-17 NOTE — PROGRESS NOTE ADULT - SUBJECTIVE AND OBJECTIVE BOX
[facility] -- Bonnie Hernandez, April Brink Pannella, Patel, Savella  Office # [phone]      Follow Up:  tachycardia    Subjective/Observations: Patient seen and examined. Events noted. Resting comfortably in bed. No complaints of chest pain, dyspnea, or palpitations reported. no longer lightheaded      REVIEW OF SYSTEMS: All other review of systems is negative unless indicated above    PAST MEDICAL & SURGICAL HISTORY:  Low back pain  Hypothyroid  Bipolar 1 disorder  History of spinal fusion  History of laminectomy      MEDICATIONS  (STANDING):  BACItracin   Ointment 1 Application(s) Topical two times a day  buDESOnide  80 MICROgram(s)/formoterol 4.5 MICROgram(s) Inhaler 2 Puff(s) Inhalation two times a day  buPROPion XL . 300 milliGRAM(s) Oral daily  docusate sodium 100 milliGRAM(s) Oral two times a day  fentaNYL   Patch  25 MICROgram(s)/Hr 2 Patch Transdermal every 72 hours  fluticasone propionate 50 MICROgram(s)/spray Nasal Spray 1 Spray(s) Both Nostrils two times a day  heparin  Injectable 5000 Unit(s) SubCutaneous every 8 hours  influenza   Vaccine 0.5 milliLiter(s) IntraMuscular once  lamoTRIgine 100 milliGRAM(s) Oral every 8 hours  levothyroxine 150 MICROGram(s) Oral daily  oxyCODONE  ER Tablet 60 milliGRAM(s) Oral every 8 hours  pantoprazole    Tablet 40 milliGRAM(s) Oral before breakfast  polyethylene glycol 3350 17 Gram(s) Oral daily  risperiDONE   Tablet 8 milliGRAM(s) Oral at bedtime  sertraline 50 milliGRAM(s) Oral daily  topiramate 400 milliGRAM(s) Oral two times a day    MEDICATIONS  (PRN):  acetaminophen   Tablet 650 milliGRAM(s) Oral every 6 hours PRN For Temp greater than 38 C (100.4 F)  acetaminophen   Tablet. 650 milliGRAM(s) Oral every 6 hours PRN Mild Pain (1 - 3)  ALBUTerol    90 MICROgram(s) HFA Inhaler 2 Puff(s) Inhalation every 6 hours PRN Shortness of Breath and/or Wheezing  HYDROmorphone  Injectable 0.5 milliGRAM(s) IV Push every 3 hours PRN Severe Pain (7 - 10)  ondansetron Injectable 4 milliGRAM(s) IV Push every 4 hours PRN Nausea and/or Vomiting  senna 2 Tablet(s) Oral at bedtime PRN Constipation      Allergies    avocado (Unknown)  latex (Urticaria; Rash)  penicillin (Unknown)  sulfa drugs (Unknown)    Intolerances            Vital Signs Last 24 Hrs  T(C): 37.9 (17 Oct 2017 23:22), Max: 38.2 (17 Oct 2017 16:07)  T(F): 100.3 (17 Oct 2017 23:22), Max: 100.8 (17 Oct 2017 16:07)  HR: 97 (17 Oct 2017 23:22) (85 - 102)  BP: 114/71 (17 Oct 2017 23:22) (105/66 - 118/74)  BP(mean): --  RR: 16 (17 Oct 2017 23:22) (16 - 18)  SpO2: 100% (17 Oct 2017 23:22) (99% - 100%)    I&O's Summary    16 Oct 2017 07:01  -  17 Oct 2017 07:00  --------------------------------------------------------  IN: 641 mL / OUT: 2950 mL / NET: -2309 mL    17 Oct 2017 07:01  -  17 Oct 2017 23:28  --------------------------------------------------------  IN: 360 mL / OUT: 0 mL / NET: 360 mL          PHYSICAL EXAM:     Constitutional: NAD, awake and alert, well-developed  HEENT: Moist Mucous Membranes, Anicteric  Pulmonary: Non-labored, breath sounds are clear bilaterally, No wheezing, rales or rhonchi  Cardiovascular: Regular, S1 and S2, No murmurs, rubs, gallops or clicks  Gastrointestinal: Bowel Sounds present, soft, nontender.   Lymph: No peripheral edema. No lymphadenopathy.  Skin: No visible rashes or ulcers.  Psych:  flat affect    LABS: All Labs Reviewed:                        8.6    15.4  )-----------( 303      ( 17 Oct 2017 09:39 )             25.4                         7.0    12.9  )-----------( 346      ( 16 Oct 2017 06:43 )             21.3                         7.6    16.2  )-----------( 405      ( 16 Oct 2017 01:16 )             22.1     17 Oct 2017 09:39    133    |  99     |  57     ----------------------------<  108    3.3     |  21     |  2.40   16 Oct 2017 06:43    134    |  102    |  62     ----------------------------<  98     3.7     |  22     |  3.00   15 Oct 2017 05:58    133    |  101    |  69     ----------------------------<  91     3.7     |  20     |  3.10     Ca    7.9        17 Oct 2017 09:39  Ca    8.0        16 Oct 2017 06:43  Ca    8.2        15 Oct 2017 05:58

## 2017-10-17 NOTE — PROGRESS NOTE ADULT - SUBJECTIVE AND OBJECTIVE BOX
32y old  Female who presents with a chief complaint of RLE edema and numbness. Patient admitted for Compartment syndrome, s/p Fasciotomy.  Dr. Damico consulted for pain management and concern for opoid dependence and abuse. Nurse aware of pain management plan. Renal function improving. Spoke to patient and mother at bedside. Patient is persistently asking for opioids, but she is aware they are being tapered and managed accordingly with pain management Dr. Damico.   Spoke to patient at length in regards to her opioid dependence and need to taper down her dilaudid.  Spoke to pain management doctor Jeffrey Galaviz [phone]) see chart note  Spoke to mom aware of plan.   Admitted for compartment syndrome, s/p Sx and vac  placement.      INTERVAL HPI: Pt seen and examined. mom bedside. c/o pain in RLE. no N/V, F/Ch. Not doing much PT, and somnolent  OVERNIGHT EVENTS: low grade fever  Vital Signs Last 24 Hrs  T(C): 37.9 (17 Oct 2017 08:38), Max: 38.2 (16 Oct 2017 18:35)  T(F): 100.2 (17 Oct 2017 08:38), Max: 100.7 (16 Oct 2017 18:35)  HR: 85 (17 Oct 2017 07:45) (16 - 105)  BP: 118/74 (17 Oct 2017 07:45) (100/60 - 118/74)  BP(mean): --  RR: 16 (17 Oct 2017 07:45) (2 - 20)  SpO2: 100% (17 Oct 2017 07:45) (93% - 100%)    REVIEW OF SYSTEM:  very sleepy  Constitutional: No fever, chills, fatigue  Neuro: Pain, numbness, and weakness of RLE  Resp: No cough, wheezing, shortness of breath  CVS: No chest pain, palpitations, leg swelling  GI: No abdominal pain, nausea, vomiting, diarrhea   : No dysuria, frequency, incontinence  Skin: No itching, burning, rashes, or lesions   Msk: pain in RLE  Psych: No depression, anxiety, mood swings          PHYSICAL EXAM:  GENERAL: NAD, well-developed  HEAD:  Atraumatic, Normocephalic  EYES: EOMI, PERRLA, conjunctiva and sclera clear  ENMT: No tonsillar erythema, exudates, or enlargement; Moist mucous membranes,   NECK: Supple, No JVD, Normal thyroid  HEART: Regular rate and rhythm; No murmurs, rubs, or gallops  RESPIRATORY: CTA B/L, No wheezing / rhonchi  ABDOMEN: Soft, Nontender, Nondistended; Bowel sounds present  NEUROLOGY: A&Ox3, RLE persistently paralysed with no sensation  EXTREMITIES: RLE wrapped with ace bandage, with wound vac. + pulses B/L and edema of RLE  SKIN: warm, dry, normal color, no rash or abnormal lesions    LABS:                        8.6    15.4  )-----------( 303      ( 17 Oct 2017 09:39 )             25.4       Ca    8.0        16 Oct 2017 06:43        Urinalysis Basic - ( 16 Oct 2017 01:36 )    Color: Pale Yellow / Appearance: Clear / S.005 / pH: x  Gluc: x / Ketone: Negative  / Bili: Negative / Urobili: Negative   Blood: x / Protein: Negative / Nitrite: Negative   Leuk Esterase: Negative / RBC: x / WBC x   Sq Epi: x / Non Sq Epi: x / Bacteria: x      CAPILLARY BLOOD GLUCOSE        UCx       RADIOLOGY & ADDITIONAL TESTS:      MEDICATIONS  (STANDING):  BACItracin   Ointment 1 Application(s) Topical two times a day  buDESOnide  80 MICROgram(s)/formoterol 4.5 MICROgram(s) Inhaler 2 Puff(s) Inhalation two times a day  buPROPion XL . 300 milliGRAM(s) Oral daily  docusate sodium 100 milliGRAM(s) Oral two times a day  fentaNYL   Patch  25 MICROgram(s)/Hr 2 Patch Transdermal every 72 hours  fluticasone propionate 50 MICROgram(s)/spray Nasal Spray 1 Spray(s) Both Nostrils two times a day  heparin  Injectable 5000 Unit(s) SubCutaneous every 8 hours  influenza   Vaccine 0.5 milliLiter(s) IntraMuscular once  lamoTRIgine 100 milliGRAM(s) Oral every 8 hours  levothyroxine 150 MICROGram(s) Oral daily  oxyCODONE  ER Tablet 60 milliGRAM(s) Oral every 8 hours  pantoprazole    Tablet 40 milliGRAM(s) Oral before breakfast  polyethylene glycol 3350 17 Gram(s) Oral daily  risperiDONE   Tablet 8 milliGRAM(s) Oral at bedtime  sertraline 50 milliGRAM(s) Oral daily  topiramate 400 milliGRAM(s) Oral two times a day    MEDICATIONS  (PRN):  acetaminophen   Tablet 650 milliGRAM(s) Oral every 6 hours PRN For Temp greater than 38 C (100.4 F)  acetaminophen   Tablet. 650 milliGRAM(s) Oral every 6 hours PRN Mild Pain (1 - 3)  ALBUTerol    90 MICROgram(s) HFA Inhaler 2 Puff(s) Inhalation every 6 hours PRN Shortness of Breath and/or Wheezing  clonazePAM Tablet 2 milliGRAM(s) Oral every 8 hours PRN ANxiety  HYDROmorphone  Injectable 0.5 milliGRAM(s) IV Push every 3 hours PRN Severe Pain (7 - 10)  ondansetron Injectable 4 milliGRAM(s) IV Push every 4 hours PRN Nausea and/or Vomiting  senna 2 Tablet(s) Oral at bedtime PRN Constipation

## 2017-10-18 DIAGNOSIS — R50.9 FEVER, UNSPECIFIED: ICD-10-CM

## 2017-10-18 LAB
ANION GAP SERPL CALC-SCNC: 11 MMOL/L — SIGNIFICANT CHANGE UP (ref 5–17)
BUN SERPL-MCNC: 51 MG/DL — HIGH (ref 7–23)
CALCIUM SERPL-MCNC: 8 MG/DL — LOW (ref 8.5–10.1)
CHLORIDE SERPL-SCNC: 100 MMOL/L — SIGNIFICANT CHANGE UP (ref 96–108)
CO2 SERPL-SCNC: 20 MMOL/L — LOW (ref 22–31)
CREAT SERPL-MCNC: 2.3 MG/DL — HIGH (ref 0.5–1.3)
GLUCOSE SERPL-MCNC: 155 MG/DL — HIGH (ref 70–99)
HCT VFR BLD CALC: 26.9 % — LOW (ref 34.5–45)
HGB BLD-MCNC: 9 G/DL — LOW (ref 11.5–15.5)
MCHC RBC-ENTMCNC: 29.9 PG — SIGNIFICANT CHANGE UP (ref 27–34)
MCHC RBC-ENTMCNC: 33.6 GM/DL — SIGNIFICANT CHANGE UP (ref 32–36)
MCV RBC AUTO: 88.9 FL — SIGNIFICANT CHANGE UP (ref 80–100)
PLATELET # BLD AUTO: 304 K/UL — SIGNIFICANT CHANGE UP (ref 150–400)
POTASSIUM SERPL-MCNC: 3 MMOL/L — LOW (ref 3.5–5.3)
POTASSIUM SERPL-SCNC: 3 MMOL/L — LOW (ref 3.5–5.3)
RBC # BLD: 3.03 M/UL — LOW (ref 3.8–5.2)
RBC # FLD: 12.1 % — SIGNIFICANT CHANGE UP (ref 10.3–14.5)
SODIUM SERPL-SCNC: 131 MMOL/L — LOW (ref 135–145)
WBC # BLD: 15.3 K/UL — HIGH (ref 3.8–10.5)
WBC # FLD AUTO: 15.3 K/UL — HIGH (ref 3.8–10.5)

## 2017-10-18 PROCEDURE — 99233 SBSQ HOSP IP/OBS HIGH 50: CPT

## 2017-10-18 RX ORDER — POTASSIUM CHLORIDE 20 MEQ
10 PACKET (EA) ORAL
Qty: 0 | Refills: 0 | Status: COMPLETED | OUTPATIENT
Start: 2017-10-18 | End: 2017-10-18

## 2017-10-18 RX ORDER — IOHEXOL 300 MG/ML
30 INJECTION, SOLUTION INTRAVENOUS ONCE
Qty: 0 | Refills: 0 | Status: COMPLETED | OUTPATIENT
Start: 2017-10-18 | End: 2017-10-19

## 2017-10-18 RX ORDER — CLONAZEPAM 1 MG
2 TABLET ORAL EVERY 8 HOURS
Qty: 0 | Refills: 0 | Status: DISCONTINUED | OUTPATIENT
Start: 2017-10-18 | End: 2017-10-23

## 2017-10-18 RX ORDER — POTASSIUM CHLORIDE 20 MEQ
40 PACKET (EA) ORAL ONCE
Qty: 0 | Refills: 0 | Status: COMPLETED | OUTPATIENT
Start: 2017-10-18 | End: 2017-10-18

## 2017-10-18 RX ADMIN — Medication 2 MILLIGRAM(S): at 19:32

## 2017-10-18 RX ADMIN — POLYETHYLENE GLYCOL 3350 17 GRAM(S): 17 POWDER, FOR SOLUTION ORAL at 13:04

## 2017-10-18 RX ADMIN — Medication 1 APPLICATION(S): at 05:06

## 2017-10-18 RX ADMIN — LAMOTRIGINE 100 MILLIGRAM(S): 25 TABLET, ORALLY DISINTEGRATING ORAL at 21:50

## 2017-10-18 RX ADMIN — Medication 1 SPRAY(S): at 17:55

## 2017-10-18 RX ADMIN — Medication 100 MILLIEQUIVALENT(S): at 19:00

## 2017-10-18 RX ADMIN — Medication 100 MILLIGRAM(S): at 05:12

## 2017-10-18 RX ADMIN — OXYCODONE HYDROCHLORIDE 60 MILLIGRAM(S): 5 TABLET ORAL at 15:24

## 2017-10-18 RX ADMIN — HEPARIN SODIUM 5000 UNIT(S): 5000 INJECTION INTRAVENOUS; SUBCUTANEOUS at 21:50

## 2017-10-18 RX ADMIN — OXYCODONE HYDROCHLORIDE 60 MILLIGRAM(S): 5 TABLET ORAL at 00:10

## 2017-10-18 RX ADMIN — Medication 400 MILLIGRAM(S): at 17:55

## 2017-10-18 RX ADMIN — Medication 1 SPRAY(S): at 05:06

## 2017-10-18 RX ADMIN — OXYCODONE HYDROCHLORIDE 60 MILLIGRAM(S): 5 TABLET ORAL at 14:21

## 2017-10-18 RX ADMIN — BUDESONIDE AND FORMOTEROL FUMARATE DIHYDRATE 2 PUFF(S): 160; 4.5 AEROSOL RESPIRATORY (INHALATION) at 21:49

## 2017-10-18 RX ADMIN — HYDROMORPHONE HYDROCHLORIDE 0.5 MILLIGRAM(S): 2 INJECTION INTRAMUSCULAR; INTRAVENOUS; SUBCUTANEOUS at 02:35

## 2017-10-18 RX ADMIN — OXYCODONE HYDROCHLORIDE 60 MILLIGRAM(S): 5 TABLET ORAL at 21:50

## 2017-10-18 RX ADMIN — HYDROMORPHONE HYDROCHLORIDE 0.5 MILLIGRAM(S): 2 INJECTION INTRAMUSCULAR; INTRAVENOUS; SUBCUTANEOUS at 00:30

## 2017-10-18 RX ADMIN — OXYCODONE HYDROCHLORIDE 60 MILLIGRAM(S): 5 TABLET ORAL at 05:35

## 2017-10-18 RX ADMIN — Medication 100 MILLIEQUIVALENT(S): at 13:05

## 2017-10-18 RX ADMIN — SERTRALINE 50 MILLIGRAM(S): 25 TABLET, FILM COATED ORAL at 13:04

## 2017-10-18 RX ADMIN — HYDROMORPHONE HYDROCHLORIDE 0.5 MILLIGRAM(S): 2 INJECTION INTRAMUSCULAR; INTRAVENOUS; SUBCUTANEOUS at 14:35

## 2017-10-18 RX ADMIN — ONDANSETRON 4 MILLIGRAM(S): 8 TABLET, FILM COATED ORAL at 16:45

## 2017-10-18 RX ADMIN — Medication 400 MILLIGRAM(S): at 05:11

## 2017-10-18 RX ADMIN — Medication 150 MICROGRAM(S): at 05:05

## 2017-10-18 RX ADMIN — HYDROMORPHONE HYDROCHLORIDE 0.5 MILLIGRAM(S): 2 INJECTION INTRAMUSCULAR; INTRAVENOUS; SUBCUTANEOUS at 15:24

## 2017-10-18 RX ADMIN — RISPERIDONE 8 MILLIGRAM(S): 4 TABLET ORAL at 21:51

## 2017-10-18 RX ADMIN — Medication 1 APPLICATION(S): at 17:58

## 2017-10-18 RX ADMIN — HYDROMORPHONE HYDROCHLORIDE 0.5 MILLIGRAM(S): 2 INJECTION INTRAMUSCULAR; INTRAVENOUS; SUBCUTANEOUS at 17:40

## 2017-10-18 RX ADMIN — PANTOPRAZOLE SODIUM 40 MILLIGRAM(S): 20 TABLET, DELAYED RELEASE ORAL at 05:12

## 2017-10-18 RX ADMIN — OXYCODONE HYDROCHLORIDE 60 MILLIGRAM(S): 5 TABLET ORAL at 05:11

## 2017-10-18 RX ADMIN — BUPROPION HYDROCHLORIDE 300 MILLIGRAM(S): 150 TABLET, EXTENDED RELEASE ORAL at 13:04

## 2017-10-18 RX ADMIN — LAMOTRIGINE 100 MILLIGRAM(S): 25 TABLET, ORALLY DISINTEGRATING ORAL at 14:25

## 2017-10-18 RX ADMIN — Medication 650 MILLIGRAM(S): at 06:46

## 2017-10-18 RX ADMIN — HEPARIN SODIUM 5000 UNIT(S): 5000 INJECTION INTRAVENOUS; SUBCUTANEOUS at 14:25

## 2017-10-18 RX ADMIN — HEPARIN SODIUM 5000 UNIT(S): 5000 INJECTION INTRAVENOUS; SUBCUTANEOUS at 05:06

## 2017-10-18 RX ADMIN — LAMOTRIGINE 100 MILLIGRAM(S): 25 TABLET, ORALLY DISINTEGRATING ORAL at 05:12

## 2017-10-18 RX ADMIN — HYDROMORPHONE HYDROCHLORIDE 0.5 MILLIGRAM(S): 2 INJECTION INTRAMUSCULAR; INTRAVENOUS; SUBCUTANEOUS at 02:59

## 2017-10-18 RX ADMIN — Medication 100 MILLIEQUIVALENT(S): at 15:04

## 2017-10-18 RX ADMIN — OXYCODONE HYDROCHLORIDE 60 MILLIGRAM(S): 5 TABLET ORAL at 22:55

## 2017-10-18 RX ADMIN — Medication 40 MILLIEQUIVALENT(S): at 22:18

## 2017-10-18 RX ADMIN — HYDROMORPHONE HYDROCHLORIDE 0.5 MILLIGRAM(S): 2 INJECTION INTRAMUSCULAR; INTRAVENOUS; SUBCUTANEOUS at 07:08

## 2017-10-18 NOTE — PROGRESS NOTE ADULT - ASSESSMENT
FUO  S/P fasciotomy for compartment syndrome  S/P Rhabdomyolysis  S/P obtundation/immobility due to drug overdose  GHAZAL, improving  WBC approximately the same range  Low grade fever persists  +Urine cx, no symptoms. Would not treat at this time

## 2017-10-18 NOTE — PROGRESS NOTE ADULT - SUBJECTIVE AND OBJECTIVE BOX
32y old  Female who presents with a chief complaint of RLE edema and numbness. Patient admitted for Compartment syndrome, s/p Fasciotomy.  Dr. Damico consulted for pain management and concern for opoid dependence and abuse. Nurse aware of pain management plan. Renal function improving. Spoke to patient and mother at bedside. Patient is persistently asking for opioids, but she is aware they are being tapered and managed accordingly with pain management Dr. Damico.   Spoke to pain management doctor Jeffrey Galaviz [phone]) see chart note  Spoke to mom aware of plan.   Admitted for compartment syndrome, s/p Sx and vac  placement.      INTERVAL HPI: Pt seen and examined. c/o pain in RLE. no N/V, F/Ch. Not doing much PT, and cheerful today  OVERNIGHT EVENTS: low grade fever  Vital Signs Last 24 Hrs  T(C): 38 (18 Oct 2017 11:00), Max: 38.3 (18 Oct 2017 08:15)  T(F): 100.4 (18 Oct 2017 11:00), Max: 100.9 (18 Oct 2017 08:15)  HR: 109 (18 Oct 2017 08:15) (97 - 109)  BP: 106/72 (18 Oct 2017 08:15) (106/72 - 114/71)  BP(mean): --  RR: 17 (18 Oct 2017 08:15) (16 - 18)  SpO2: 99% (18 Oct 2017 08:15) (99% - 100%)  REVIEW OF SYSTEM:  very sleepy  Constitutional: No fever, chills, fatigue  Neuro: Pain, numbness, and weakness of RLE  Resp: No cough, wheezing, shortness of breath  CVS: No chest pain, palpitations, leg swelling  GI: No abdominal pain, nausea, vomiting, diarrhea   : No dysuria, frequency, incontinence  Skin: No itching, burning, rashes, or lesions   Msk: pain in RLE  Psych: No depression, anxiety, mood swings          PHYSICAL EXAM:  GENERAL: NAD, well-developed  HEAD:  Atraumatic, Normocephalic  EYES: EOMI, PERRLA, conjunctiva and sclera clear  ENMT: No tonsillar erythema, exudates, or enlargement; Moist mucous membranes,   NECK: Supple, No JVD, Normal thyroid  HEART: Regular rate and rhythm; No murmurs, rubs, or gallops  RESPIRATORY: CTA B/L, No wheezing / rhonchi  ABDOMEN: Soft, Nontender, Nondistended; Bowel sounds present  NEUROLOGY: A&Ox3, RLE persistently paralysed with no sensation  EXTREMITIES: RLE wrapped with ace bandage, with wound vac. + pulses B/L and edema of RLE  SKIN: warm, dry, normal color, no rash or abnormal lesions    LABS:                        9.0    15.3  )-----------( 304      ( 18 Oct 2017 08:47 )             26.9     18 Oct 2017 08:47    131    |  100    |  51     ----------------------------<  155    3.0     |  20     |  2.30     Ca    8.0        18 Oct 2017 08:47  Phos  4.4       18 Oct 2017 08:47  Mg     1.9       18 Oct 2017 08:47          CAPILLARY BLOOD GLUCOSE        UCx       RADIOLOGY & ADDITIONAL TESTS:      MEDICATIONS  (STANDING):  BACItracin   Ointment 1 Application(s) Topical two times a day  buDESOnide  80 MICROgram(s)/formoterol 4.5 MICROgram(s) Inhaler 2 Puff(s) Inhalation two times a day  buPROPion XL . 300 milliGRAM(s) Oral daily  docusate sodium 100 milliGRAM(s) Oral two times a day  fentaNYL   Patch  25 MICROgram(s)/Hr 2 Patch Transdermal every 72 hours  fluticasone propionate 50 MICROgram(s)/spray Nasal Spray 1 Spray(s) Both Nostrils two times a day  heparin  Injectable 5000 Unit(s) SubCutaneous every 8 hours  influenza   Vaccine 0.5 milliLiter(s) IntraMuscular once  lamoTRIgine 100 milliGRAM(s) Oral every 8 hours  levothyroxine 150 MICROGram(s) Oral daily  oxyCODONE  ER Tablet 60 milliGRAM(s) Oral every 8 hours  pantoprazole    Tablet 40 milliGRAM(s) Oral before breakfast  polyethylene glycol 3350 17 Gram(s) Oral daily  potassium chloride    Tablet ER 40 milliEquivalent(s) Oral once  potassium chloride  10 mEq/100 mL IVPB 10 milliEquivalent(s) IV Intermittent every 1 hour  risperiDONE   Tablet 8 milliGRAM(s) Oral at bedtime  sertraline 50 milliGRAM(s) Oral daily  topiramate 400 milliGRAM(s) Oral two times a day    MEDICATIONS  (PRN):  acetaminophen   Tablet 650 milliGRAM(s) Oral every 6 hours PRN For Temp greater than 38 C (100.4 F)  acetaminophen   Tablet. 650 milliGRAM(s) Oral every 6 hours PRN Mild Pain (1 - 3)  ALBUTerol    90 MICROgram(s) HFA Inhaler 2 Puff(s) Inhalation every 6 hours PRN Shortness of Breath and/or Wheezing  HYDROmorphone  Injectable 0.5 milliGRAM(s) IV Push every 3 hours PRN Severe Pain (7 - 10)  ondansetron Injectable 4 milliGRAM(s) IV Push every 4 hours PRN Nausea and/or Vomiting  senna 2 Tablet(s) Oral at bedtime PRN Constipation

## 2017-10-18 NOTE — PROGRESS NOTE ADULT - ASSESSMENT
32-year-old female now status post right lower extremity fasciotomy for prolonged compression after a fall with lethargy.     - Rhabdo resolving; GHAZAL improving  - No obvious cardiac complications post op  - No clear evidence of acute ischemia or volume overload, pt asymptomatic  - Persistent tachycardia HR of 90s-100's likely reactive...now improving.   - continue Pain control  - TTE with hyperdynamic LV function, EF 70%, no valvular disease noted  - Encourage PO hydration  - Other cardiovascular workup will depend on clinical course.  - DVT prophylaxis  - Activity as tolerated  - Right leg wound care per primary/surgery  - All other workup per primary team  - Will follow      Yue Das, NP-C  Cardiology 32-year-old female now status post right lower extremity fasciotomy for prolonged compression after a fall with lethargy.     - Rhabdo resolving; GHAZAL improving  - No obvious cardiac complications  - No clear evidence of acute ischemia or volume overload, pt asymptomatic  - tachycardia HR of 90s-100's likely reactive, improving.   - continue Pain control  - TTE with hyperdynamic LV function, EF 70%, no valvular disease noted  - Encourage PO hydration  - Other cardiovascular workup will depend on clinical course.  - DVT prophylaxis  - Activity as tolerated  - Right leg wound care per primary/surgery  - All other workup per primary team  - Will follow      Yue Das NP-C  Cardiology

## 2017-10-18 NOTE — PROGRESS NOTE ADULT - PROBLEM SELECTOR PLAN 1
Given stability, negative culture data, and unrevealing physical exam I don't see a role to give antibiotics empirically at this time  CT C/A/P  +PO no IV contrast (GHAZAL)  ESR ALFONSO RF ANCA  Surgical follow up re RLE  F/U CBC  Consider repeat venous duplex US LLE

## 2017-10-18 NOTE — PROGRESS NOTE ADULT - PROBLEM SELECTOR PLAN 2
acute and chronic pain  on opioids, cont current regimen of long acting medicine, opioids - Oxycontin and Fentanyl Patch  cont Dilaudid IV low dose PRN  discussed transition to PO Dilaudid for dc preparation as pt will not be able to get IV Dilaudid in Rehab

## 2017-10-18 NOTE — PROGRESS NOTE ADULT - SUBJECTIVE AND OBJECTIVE BOX
Date/Time Patient Seen:  		  Referring MD:   Data Reviewed	       Patient is a 32y old  Female who presents with a chief complaint of RLE edema and numbness (13 Oct 2017 16:25)  in bed  awake  asking for pain Rx  low grade temp noted earlier      Subjective/HPI     PAST MEDICAL & SURGICAL HISTORY:  Low back pain  Hypothyroid  Bipolar 1 disorder  History of spinal fusion  History of laminectomy        Medication list         MEDICATIONS  (STANDING):  BACItracin   Ointment 1 Application(s) Topical two times a day  buDESOnide  80 MICROgram(s)/formoterol 4.5 MICROgram(s) Inhaler 2 Puff(s) Inhalation two times a day  buPROPion XL . 300 milliGRAM(s) Oral daily  docusate sodium 100 milliGRAM(s) Oral two times a day  fentaNYL   Patch  25 MICROgram(s)/Hr 2 Patch Transdermal every 72 hours  fluticasone propionate 50 MICROgram(s)/spray Nasal Spray 1 Spray(s) Both Nostrils two times a day  heparin  Injectable 5000 Unit(s) SubCutaneous every 8 hours  influenza   Vaccine 0.5 milliLiter(s) IntraMuscular once  lamoTRIgine 100 milliGRAM(s) Oral every 8 hours  levothyroxine 150 MICROGram(s) Oral daily  oxyCODONE  ER Tablet 60 milliGRAM(s) Oral every 8 hours  pantoprazole    Tablet 40 milliGRAM(s) Oral before breakfast  polyethylene glycol 3350 17 Gram(s) Oral daily  risperiDONE   Tablet 8 milliGRAM(s) Oral at bedtime  sertraline 50 milliGRAM(s) Oral daily  topiramate 400 milliGRAM(s) Oral two times a day    MEDICATIONS  (PRN):  acetaminophen   Tablet 650 milliGRAM(s) Oral every 6 hours PRN For Temp greater than 38 C (100.4 F)  acetaminophen   Tablet. 650 milliGRAM(s) Oral every 6 hours PRN Mild Pain (1 - 3)  ALBUTerol    90 MICROgram(s) HFA Inhaler 2 Puff(s) Inhalation every 6 hours PRN Shortness of Breath and/or Wheezing  HYDROmorphone  Injectable 0.5 milliGRAM(s) IV Push every 3 hours PRN Severe Pain (7 - 10)  ondansetron Injectable 4 milliGRAM(s) IV Push every 4 hours PRN Nausea and/or Vomiting  senna 2 Tablet(s) Oral at bedtime PRN Constipation         Vitals log        ICU Vital Signs Last 24 Hrs  T(C): 37.9 (17 Oct 2017 23:22), Max: 38.2 (17 Oct 2017 16:07)  T(F): 100.3 (17 Oct 2017 23:22), Max: 100.8 (17 Oct 2017 16:07)  HR: 97 (17 Oct 2017 23:22) (85 - 102)  BP: 114/71 (17 Oct 2017 23:22) (113/72 - 118/74)  BP(mean): --  ABP: --  ABP(mean): --  RR: 16 (17 Oct 2017 23:22) (16 - 18)  SpO2: 100% (17 Oct 2017 23:22) (100% - 100%)           Input and Output:  I&O's Detail    16 Oct 2017 07:01  -  17 Oct 2017 07:00  --------------------------------------------------------  IN:    Oral Fluid: 300 mL    Packed Red Blood Cells: 341 mL  Total IN: 641 mL    OUT:    VAC (Vacuum Assisted Closure) System: 1800 mL    Voided: 1150 mL  Total OUT: 2950 mL    Total NET: -2309 mL      17 Oct 2017 07:01  -  18 Oct 2017 06:00  --------------------------------------------------------  IN:    Oral Fluid: 360 mL  Total IN: 360 mL    OUT:  Total OUT: 0 mL    Total NET: 360 mL          Lab Data                        8.6    15.4  )-----------( 303      ( 17 Oct 2017 09:39 )             25.4     10-17    133<L>  |  99  |  57<H>  ----------------------------<  108<H>  3.3<L>   |  21<L>  |  2.40<H>    Ca    7.9<L>      17 Oct 2017 09:39              Review of Systems	      Objective     Physical Examination    head at  heart - s1s2  lungs - dec BS  obese  cn grossly int      Pertinent Lab findings & Imaging      Erlinda:  NO   Adequate UO     I&O's Detail    16 Oct 2017 07:01  -  17 Oct 2017 07:00  --------------------------------------------------------  IN:    Oral Fluid: 300 mL    Packed Red Blood Cells: 341 mL  Total IN: 641 mL    OUT:    VAC (Vacuum Assisted Closure) System: 1800 mL    Voided: 1150 mL  Total OUT: 2950 mL    Total NET: -2309 mL      17 Oct 2017 07:01  -  18 Oct 2017 06:00  --------------------------------------------------------  IN:    Oral Fluid: 360 mL  Total IN: 360 mL    OUT:  Total OUT: 0 mL    Total NET: 360 mL               Discussed with:     Cultures:	        Radiology

## 2017-10-18 NOTE — PROGRESS NOTE ADULT - SUBJECTIVE AND OBJECTIVE BOX
Patient is a 32y old  Female who presents with a chief complaint of RLE edema and numbness (04 Oct 2017 01:13)      Patient seen in follow up for GHAZAL, ATN, Rhabdomyolysis. Improving renal function. Low potassium.     PAST MEDICAL HISTORY:  Low back pain  Hypothyroi  Bipolar 1 disorder    MEDICATIONS  (STANDING):  BACItracin   Ointment 1 Application(s) Topical two times a day  buDESOnide  80 MICROgram(s)/formoterol 4.5 MICROgram(s) Inhaler 2 Puff(s) Inhalation two times a day  buPROPion XL . 300 milliGRAM(s) Oral daily  docusate sodium 100 milliGRAM(s) Oral two times a day  fentaNYL   Patch  25 MICROgram(s)/Hr 2 Patch Transdermal every 72 hours  fluticasone propionate 50 MICROgram(s)/spray Nasal Spray 1 Spray(s) Both Nostrils two times a day  heparin  Injectable 5000 Unit(s) SubCutaneous every 8 hours  influenza   Vaccine 0.5 milliLiter(s) IntraMuscular once  lamoTRIgine 100 milliGRAM(s) Oral every 8 hours  levothyroxine 150 MICROGram(s) Oral daily  oxyCODONE  ER Tablet 60 milliGRAM(s) Oral every 8 hours  pantoprazole    Tablet 40 milliGRAM(s) Oral before breakfast  polyethylene glycol 3350 17 Gram(s) Oral daily  potassium chloride    Tablet ER 40 milliEquivalent(s) Oral once  potassium chloride  10 mEq/100 mL IVPB 10 milliEquivalent(s) IV Intermittent every 1 hour  risperiDONE   Tablet 8 milliGRAM(s) Oral at bedtime  sertraline 50 milliGRAM(s) Oral daily  topiramate 400 milliGRAM(s) Oral two times a day    MEDICATIONS  (PRN):  acetaminophen   Tablet 650 milliGRAM(s) Oral every 6 hours PRN For Temp greater than 38 C (100.4 F)  acetaminophen   Tablet. 650 milliGRAM(s) Oral every 6 hours PRN Mild Pain (1 - 3)  ALBUTerol    90 MICROgram(s) HFA Inhaler 2 Puff(s) Inhalation every 6 hours PRN Shortness of Breath and/or Wheezing  HYDROmorphone  Injectable 0.5 milliGRAM(s) IV Push every 3 hours PRN Severe Pain (7 - 10)  ondansetron Injectable 4 milliGRAM(s) IV Push every 4 hours PRN Nausea and/or Vomiting  senna 2 Tablet(s) Oral at bedtime PRN Constipation    T(C): 38 (10-18-17 @ 11:00), Max: 38.3 (10-18-17 @ 08:15)  HR: 109 (10-18-17 @ 08:15) (16 - 109)  BP: 106/72 (10-18-17 @ 08:15) (100/60 - 118/74)  RR: 17 (10-18-17 @ 08:15)  SpO2: 99% (10-18-17 @ 08:15)  Wt(kg): --  I&O's Detail    17 Oct 2017 07:01  -  18 Oct 2017 07:00  --------------------------------------------------------  IN:    Oral Fluid: 360 mL  Total IN: 360 mL    OUT:  Total OUT: 0 mL    Total NET: 360 mL          PHYSICAL EXAM:  General: NAD  Respiratory: b/l air entry  Cardiovascular: S1 S2  Gastrointestinal: soft  Extremities:  Rt leg dressing        LABORATORY:                        9.0    15.3  )-----------( 304      ( 18 Oct 2017 08:47 )             26.9     10-18    131<L>  |  100  |  51<H>  ----------------------------<  155<H>  3.0<L>   |  20<L>  |  2.30<H>    Ca    8.0<L>      18 Oct 2017 08:47      Sodium, Serum: 131 mmol/L (10-18 @ 08:47)  Sodium, Serum: 133 mmol/L (10-17 @ 09:39)    Potassium, Serum: 3.0 mmol/L (10-18 @ 08:47)  Potassium, Serum: 3.3 mmol/L (10-17 @ 09:39)    Hemoglobin: 9.0 g/dL (10-18 @ 08:47)  Hemoglobin: 8.6 g/dL (10-17 @ 09:39)  Hemoglobin: 7.0 g/dL (10-16 @ 06:43)  Hemoglobin: 7.6 g/dL (10-16 @ 01:16)    Creatinine, Serum 2.30 (10-18 @ 08:47)  Creatinine, Serum 2.40 (10-17 @ 09:39)  Creatinine, Serum 3.00 (10-16 @ 06:43)

## 2017-10-18 NOTE — PROGRESS NOTE ADULT - SUBJECTIVE AND OBJECTIVE BOX
[facility], Division of Infectious Diseases  NATALIO Hopkins A. Lee    Name: ABELARDO CROWELL  Age: 32y  Gender: Female  MRN: [id number]    Interval History--  Notes reviewed. Feels okay, not great, generally just "blah" No specific pain except in RLE. Some nausea. No vomiting. Appetite not great. No SOB or CP. No cough. No abdominal pain. No diarrhea. No urinary symptoms.     Past Medical History--  Low back pain  Hypothyroid  Bipolar 1 disorder  History of spinal fusion  History of laminectomy      For details regarding the patient's social history, family history, and other miscellaneous elements, please refer the initial infectious diseases consultation and/or the admitting history and physical examination for this admission.    Allergies    avocado (Unknown)  latex (Urticaria; Rash)  penicillin (Unknown)  sulfa drugs (Unknown)    Intolerances        Medications--  Antibiotics:    Immunologic:  influenza   Vaccine 0.5 milliLiter(s) IntraMuscular once    Other:  acetaminophen   Tablet PRN  acetaminophen   Tablet. PRN  ALBUTerol    90 MICROgram(s) HFA Inhaler PRN  BACItracin   Ointment  buDESOnide  80 MICROgram(s)/formoterol 4.5 MICROgram(s) Inhaler  buPROPion XL .  clonazePAM Tablet PRN  docusate sodium  fentaNYL   Patch  25 MICROgram(s)/Hr  fluticasone propionate 50 MICROgram(s)/spray Nasal Spray  heparin  Injectable  HYDROmorphone  Injectable PRN  iohexol 300 mG (iodine)/mL Oral Solution  lamoTRIgine  levothyroxine  ondansetron Injectable PRN  oxyCODONE  ER Tablet  pantoprazole    Tablet  polyethylene glycol 3350  potassium chloride    Tablet ER  risperiDONE   Tablet  senna PRN  sertraline  topiramate      Review of Systems--  A 10-point review of systems was obtained.     Pertinent positives and negatives--  Constitutional: +fevers. No Chills. No Rigors.   Cardiovascular: No chest pain. No palpitations.  Respiratory: No shortness of breath. No cough.  Gastrointestinal: +nausea No vomiting. No diarrhea or constipation.   Psychiatric: Pleasant. Appropriate affect.    Review of systems otherwise negative except as previously noted.    Physical Examination--  Vital Signs: T(F): 99.5 (10-18-17 @ 16:09), Max: 100.9 (10-18-17 @ 08:15)  HR: 100 (10-18-17 @ 15:50)  BP: 105/66 (10-18-17 @ 15:50)  RR: 17 (10-18-17 @ 15:50)  SpO2: 97% (10-18-17 @ 15:50)  Wt(kg): --  General: Nontoxic-appearing Female in no acute distress.  HEENT: AT/NC. Anicteric. Conjunctiva pale and moist. Oropharynx pale but clear. Dentition fair.  Neck: Not rigid. No sense of mass.  Nodes: None palpable.  Lungs: Clear bilaterally without rales, wheezing or rhonchi  Heart: RRR. No Murmur. No rub. No gallop. No palpable thrill.  Abdomen: Bowel sounds present and normoactive. Soft. Nondistended. Nontender. No sense of mass. No organomegaly..   Extremities: No cyanosis or clubbing. LLE no edema. RLE wrapped. Foot cool, edematous. VAC under ACE. No IV phelbitis.  Skin: Pale. Dry. Good turgor. No rash. No vasculitic stigmata.  Psychiatric: Lucid, oriented.      Laboratory Studies--  CBC                        9.0    15.3  )-----------( 304      ( 18 Oct 2017 08:47 )             26.9       Chemistries  10-18    131<L>  |  100  |  51<H>  ----------------------------<  155<H>  3.0<L>   |  20<L>  |  2.30<H>    Ca    8.0<L>      18 Oct 2017 08:47  Phos  4.4     10-18  Mg     1.9     10-18        Culture Data  Culture - Urine (10.16.17 @ 09:28)    Specimen Source: .Urine Clean Catch (Midstream)    Culture Results:   10,000 - 49,000 CFU/mL Coag negative Staphylococcus not Staph  saprophyticus    Culture - Blood (10.16.17 @ 09:27)    Specimen Source: .Blood Blood-Peripheral    Culture Results:   No growth to date.    Culture - Blood (10.16.17 @ 09:27)    Specimen Source: .Blood Blood-Peripheral    Culture Results:   No growth to date.

## 2017-10-18 NOTE — PROGRESS NOTE ADULT - ASSESSMENT
32-year-old female now status post right lower extremity  fasciotomy for compartment syndrome. Pt on vent support.     ·	GHAZAL, ATN Septic / ischemic, Rhabdomyolysis  ·	s/p surgery for compartment syndrome  ·	Hyperphosphatemia  ·	Anemia  ·	Hypokalemia    Improving renal function. Avoid nephrotoxic meds as possible. Will follow electrolytes and renal function trend.   Surgery follow up. Monitor h/h. PRBC transfusion. On multiple pain meds.   Pain management follow up. Potassium supplements. Check phos level. Low phos diet.   Potassium supps as needed. Surgery follow up.

## 2017-10-18 NOTE — PROGRESS NOTE ADULT - SUBJECTIVE AND OBJECTIVE BOX
HPI:  Pt is a 31 yo female with PMHX of Bipolar disorder, hypothyroidism, chronic low back pain on morphine s/p laminectomy and spinal fusion in 2016 who is BIB EMS for right leg swelling and numbness. Patient is a poor historian as she is on many pain medicines and is currently lethargic, therefore much info provided based on hospital staff and charts. She states that 18 hours prior to arrival, she fell on the floor at her house after taking too much morphine and was lying on floor for 8 hours. She woke up to numbness, swelling, and cool temperature of her right leg, from below the knee onwards. Patient denies any current SOB, CP, f/c, denies head trauma.     In the ED, vitals were Temp 101.4F /91  RR 20  SpO2 93% RA. Significant labs are WBS 24.3, Hb 15.6, Hmt 47.6,  Platelets 118, neutr 93%, sodium 131, BUN 30,  Cr 2.30, glucose 152 AST 1377, , GFR 27, lactate 1.7. Doppler US RLE showed Extremely limited evaluation of the right lower extremity with nonvisualization of the venous structures beyond the mid right femoral vein. No evidence of DVT within the right common femoral, proximal and   mid femoral veins. No evidence of left lower extremity deep venous thrombosis. XR of right tibia and XR of R femur showed no acute fractures. Arterial Doppler of RLE pending. EKG showed sinus tach of 140, with right superior axis deviation. Patient started on aztreonam, Tylenol given for fever, patient made NPO, Dr. Martines consulted for emergent surgery. (04 Oct 2017 01:13)      SUBJECTIVE:  Patient is a 32y old  Female who presents with a chief complaint of RLE edema and numbness (13 Oct 2017 16:25)          OBJECTIVE:  Review Of Systems:  Constitutional: [ ] Fever [ ] Chills [ ] Fatigue [ ] Weight change   HEENT: [ ] Blurred vision [ ] Eye Pain [ ] Headache [ ] Runny nose [ ] Sore Throat   Respiratory: [ ] Cough [ ] Wheezing [ ] Shortness of breath  Cardiovascular: [ ] Chest Pain [ ] Palpitations [ ] BENNETT [ ] PND [ ] Orthopnea  Gastrointestinal: [ ] Abdominal Pain [ ] Diarrhea [ ] Constipation [ ] Hemorrhoids [ ] Nausea [ ] Vomiting  Genitourinary: [ ] Nocturia [ ] Dysuria [ ] Incontinence  Extremities: [ ] Swelling [ ] Joint Pain  Neurologic: [ ] Focal deficit [ ] Paresthesias [ ] Syncope  Lymphatic: [ ] Swelling [ ] Lymphadenopathy   Skin: [ ] Rash [ ] Ecchymoses [ ] Wounds [ ] Lesions  Psychiatry: [ ] Depression [ ] Suicidal/Homicidal Ideation [ ] Anxiety [ ] Sleep Disturbances  [ ] 10 point review of systems is otherwise negative except as mentioned above            [ ]Unable to obtain    Allergy:  Allergies    avocado (Unknown)  latex (Urticaria; Rash)  penicillin (Unknown)  sulfa drugs (Unknown)    Intolerances        Medications:  MEDICATIONS  (STANDING):  BACItracin   Ointment 1 Application(s) Topical two times a day  buDESOnide  80 MICROgram(s)/formoterol 4.5 MICROgram(s) Inhaler 2 Puff(s) Inhalation two times a day  buPROPion XL . 300 milliGRAM(s) Oral daily  docusate sodium 100 milliGRAM(s) Oral two times a day  fentaNYL   Patch  25 MICROgram(s)/Hr 2 Patch Transdermal every 72 hours  fluticasone propionate 50 MICROgram(s)/spray Nasal Spray 1 Spray(s) Both Nostrils two times a day  heparin  Injectable 5000 Unit(s) SubCutaneous every 8 hours  influenza   Vaccine 0.5 milliLiter(s) IntraMuscular once  lamoTRIgine 100 milliGRAM(s) Oral every 8 hours  levothyroxine 150 MICROGram(s) Oral daily  oxyCODONE  ER Tablet 60 milliGRAM(s) Oral every 8 hours  pantoprazole    Tablet 40 milliGRAM(s) Oral before breakfast  polyethylene glycol 3350 17 Gram(s) Oral daily  potassium chloride    Tablet ER 40 milliEquivalent(s) Oral once  potassium chloride  10 mEq/100 mL IVPB 10 milliEquivalent(s) IV Intermittent every 1 hour  risperiDONE   Tablet 8 milliGRAM(s) Oral at bedtime  sertraline 50 milliGRAM(s) Oral daily  topiramate 400 milliGRAM(s) Oral two times a day    MEDICATIONS  (PRN):  acetaminophen   Tablet 650 milliGRAM(s) Oral every 6 hours PRN For Temp greater than 38 C (100.4 F)  acetaminophen   Tablet. 650 milliGRAM(s) Oral every 6 hours PRN Mild Pain (1 - 3)  ALBUTerol    90 MICROgram(s) HFA Inhaler 2 Puff(s) Inhalation every 6 hours PRN Shortness of Breath and/or Wheezing  HYDROmorphone  Injectable 0.5 milliGRAM(s) IV Push every 3 hours PRN Severe Pain (7 - 10)  ondansetron Injectable 4 milliGRAM(s) IV Push every 4 hours PRN Nausea and/or Vomiting  senna 2 Tablet(s) Oral at bedtime PRN Constipation      PMH/PSH/FH/SH: [ ] Unchanged    Vitals:  T(C): 38 (10-18-17 @ 11:00), Max: 38.3 (10-18-17 @ 08:15)  HR: 109 (10-18-17 @ 08:15) (97 - 109)  BP: 106/72 (10-18-17 @ 08:15) (106/72 - 114/71)  BP(mean): --  RR: 17 (10-18-17 @ 08:15) (16 - 18)  SpO2: 99% (10-18-17 @ 08:15) (99% - 100%)  Wt(kg): --  Daily     Daily   I&O's Summary    17 Oct 2017 07:01  -  18 Oct 2017 07:00  --------------------------------------------------------  IN: 360 mL / OUT: 0 mL / NET: 360 mL        Labs:                        9.0    15.3  )-----------( 304      ( 18 Oct 2017 08:47 )             26.9     10-18    131<L>  |  100  |  51<H>  ----------------------------<  155<H>  3.0<L>   |  20<L>  |  2.30<H>    Ca    8.0<L>      18 Oct 2017 08:47  Phos  4.4     10-18  Mg     1.9     10-18            Magnesium, Serum: 1.9 mg/dL (10-18 @ 08:47)  Phosphorus Level, Serum: 4.4 mg/dL (10-18 @ 08:47)              ECG:    Echo:    Stress Testing:     Cath:    Imaging:    Interpretation of Telemetry:      Physical Exam:  Appearance: [ ] Normal  [ ] abnormal [ ] NAD   Eyes: [ ] PERRL [ ] EOMI  HENT: [ ] Normal [ ] Abnormal oral muscosa [ ]NC/AT  Cardiovascular: [ ] S1 [ ] S2 [ ] RRR [ ] m/r/g [ ]edema [ ] JVP  Procedural Access Site: [ ]  hematoma [ ] tender to palpation [ ] 2+ pulse [ ] bruit [ ] Ecchymosis  Respiratory: [ ] Clear to auscultation bilaterally  Gastrointestinal: [ ] Soft [ ] tenderness[ ] distension [ ] BS  Musculoskeletal: [ ] clubbing [ ] joint deformity   Neurologic: [ ] Non-focal  Lymphatic: [ ] lymphadenopathy  Psychiatry: [ ] AAOx3  [ ] confused [ ] disoriented [ ] Mood & affect appropriate  Skin: [ ]  rashes [ ] ecchymoses [ ] cyanosis HPI:  Pt is a 31 yo female with PMHX of Bipolar disorder, hypothyroidism, chronic low back pain on morphine s/p laminectomy and spinal fusion in 2016 who is BIB EMS for right leg swelling and numbness. Patient is a poor historian as she is on many pain medicines and is currently lethargic, therefore much info provided based on hospital staff and charts. She states that 18 hours prior to arrival, she fell on the floor at her house after taking too much morphine and was lying on floor for 8 hours. She woke up to numbness, swelling, and cool temperature of her right leg, from below the knee onwards. Patient denies any current SOB, CP, f/c, denies head trauma.     In the ED, vitals were Temp 101.4F /91  RR 20  SpO2 93% RA. Significant labs are WBS 24.3, Hb 15.6, Hmt 47.6,  Platelets 118, neutr 93%, sodium 131, BUN 30,  Cr 2.30, glucose 152 AST 1377, , GFR 27, lactate 1.7. Doppler US RLE showed Extremely limited evaluation of the right lower extremity with nonvisualization of the venous structures beyond the mid right femoral vein. No evidence of DVT within the right common femoral, proximal and   mid femoral veins. No evidence of left lower extremity deep venous thrombosis. XR of right tibia and XR of R femur showed no acute fractures. Arterial Doppler of RLE pending. EKG showed sinus tach of 140, with right superior axis deviation. Patient started on aztreonam, Tylenol given for fever, patient made NPO, Dr. Martines consulted for emergent surgery. (04 Oct 2017 01:13)      SUBJECTIVE:  Patient is a 32y old  Female who presents with a chief complaint of RLE edema and numbness (13 Oct 2017 16:25)          OBJECTIVE:  Review Of Systems:  Constitutional: [ ] Fever [ ] Chills [ ] Fatigue [ ] Weight change   HEENT: [ ] Blurred vision [ ] Eye Pain [ ] Headache [ ] Runny nose [ ] Sore Throat   Respiratory: [ ] Cough [ ] Wheezing [ ] Shortness of breath  Cardiovascular: [ ] Chest Pain [ ] Palpitations [ ] BENNETT [ ] PND [ ] Orthopnea  Gastrointestinal: [ ] Abdominal Pain [ ] Diarrhea [ ] Constipation [ ] Hemorrhoids [ ] Nausea [ ] Vomiting  Genitourinary: [ ] Nocturia [ ] Dysuria [ ] Incontinence  Extremities: [ ] Swelling [ ] Joint Pain  Neurologic: [ ] Focal deficit [ ] Paresthesias [ ] Syncope  Lymphatic: [ ] Swelling [ ] Lymphadenopathy   Skin: [ ] Rash [ ] Ecchymoses [ ] Wounds [ ] Lesions  Psychiatry: [ ] Depression [ ] Suicidal/Homicidal Ideation [ ] Anxiety [ ] Sleep Disturbances  [x ] 10 point review of systems is otherwise negative except as mentioned above            [ ]Unable to obtain    Allergy:  Allergies    avocado (Unknown)  latex (Urticaria; Rash)  penicillin (Unknown)  sulfa drugs (Unknown)    Intolerances        Medications:  MEDICATIONS  (STANDING):  BACItracin   Ointment 1 Application(s) Topical two times a day  buDESOnide  80 MICROgram(s)/formoterol 4.5 MICROgram(s) Inhaler 2 Puff(s) Inhalation two times a day  buPROPion XL . 300 milliGRAM(s) Oral daily  docusate sodium 100 milliGRAM(s) Oral two times a day  fentaNYL   Patch  25 MICROgram(s)/Hr 2 Patch Transdermal every 72 hours  fluticasone propionate 50 MICROgram(s)/spray Nasal Spray 1 Spray(s) Both Nostrils two times a day  heparin  Injectable 5000 Unit(s) SubCutaneous every 8 hours  influenza   Vaccine 0.5 milliLiter(s) IntraMuscular once  lamoTRIgine 100 milliGRAM(s) Oral every 8 hours  levothyroxine 150 MICROGram(s) Oral daily  oxyCODONE  ER Tablet 60 milliGRAM(s) Oral every 8 hours  pantoprazole    Tablet 40 milliGRAM(s) Oral before breakfast  polyethylene glycol 3350 17 Gram(s) Oral daily  potassium chloride    Tablet ER 40 milliEquivalent(s) Oral once  potassium chloride  10 mEq/100 mL IVPB 10 milliEquivalent(s) IV Intermittent every 1 hour  risperiDONE   Tablet 8 milliGRAM(s) Oral at bedtime  sertraline 50 milliGRAM(s) Oral daily  topiramate 400 milliGRAM(s) Oral two times a day    MEDICATIONS  (PRN):  acetaminophen   Tablet 650 milliGRAM(s) Oral every 6 hours PRN For Temp greater than 38 C (100.4 F)  acetaminophen   Tablet. 650 milliGRAM(s) Oral every 6 hours PRN Mild Pain (1 - 3)  ALBUTerol    90 MICROgram(s) HFA Inhaler 2 Puff(s) Inhalation every 6 hours PRN Shortness of Breath and/or Wheezing  HYDROmorphone  Injectable 0.5 milliGRAM(s) IV Push every 3 hours PRN Severe Pain (7 - 10)  ondansetron Injectable 4 milliGRAM(s) IV Push every 4 hours PRN Nausea and/or Vomiting  senna 2 Tablet(s) Oral at bedtime PRN Constipation      PMH/PSH/FH/SH: [ ] Unchanged    Vitals:  T(C): 38 (10-18-17 @ 11:00), Max: 38.3 (10-18-17 @ 08:15)  HR: 109 (10-18-17 @ 08:15) (97 - 109)  BP: 106/72 (10-18-17 @ 08:15) (106/72 - 114/71)  BP(mean): --  RR: 17 (10-18-17 @ 08:15) (16 - 18)  SpO2: 99% (10-18-17 @ 08:15) (99% - 100%)  Wt(kg): --  Daily     Daily   I&O's Summary    17 Oct 2017 07:01  -  18 Oct 2017 07:00  --------------------------------------------------------  IN: 360 mL / OUT: 0 mL / NET: 360 mL        Labs:                        9.0    15.3  )-----------( 304      ( 18 Oct 2017 08:47 )             26.9     10-18    131<L>  |  100  |  51<H>  ----------------------------<  155<H>  3.0<L>   |  20<L>  |  2.30<H>    Ca    8.0<L>      18 Oct 2017 08:47  Phos  4.4     10-18  Mg     1.9     10-18            Magnesium, Serum: 1.9 mg/dL (10-18 @ 08:47)  Phosphorus Level, Serum: 4.4 mg/dL (10-18 @ 08:47)              ECG:  < from: 12 Lead ECG (10.04.17 @ 08:44) >    Ventricular Rate 122 BPM    Atrial Rate 122 BPM    P-R Interval 136 ms    QRS Duration 94 ms    Q-T Interval 332 ms    QTC Calculation(Bezet) 473 ms    P Axis 35 degrees    R Axis -86 degrees    T Axis 10 degrees    Diagnosis Line Sinus tachycardia  Left axis deviation  Inferior infarct (cited on or before 03-OCT-2017)  Abnormal ECG  When compared with ECG of 03-OCT-2017 21:50, (Unconfirmed)  No significant change was found  Confirmed by Juanito Brink MD ([id number]) on 10/4/2017 1:55:40 PM    < end of copied text >    Echo:  < from: TTE Echo Doppler w/o Cont (10.05.17 @ 12:46) >   EXAM:  ECHO TTE W/O CON COMP W/DOPPLR         PROCEDURE DATE:  10/05/2017        INTERPRETATION:  Ordering Physician: MARISSA RUFF [id number]    Indication: Respiratory failure    Study Quality: Technically difficult in ICU   A complete echocardiographic study was performed utilizing standard   protocol including spectral and color Doppler in all echocardiographic   windows.    Height: 1 75 cm  Weight: 113 kg  BSA: 2.27  Blood Pressure: 104/76    MEASUREMENTS  IVS: 0.9cm  PWT: 0.9cm  LA: 3.5cm  AO: 2.8cm  LVIDd: 5.2cm  LVIDs: 3.5cm      LVEF: 70%    FINDINGS  Left Ventricle: Hyperdynamic left ventricle. Estimated EF 70%. No   segmental wall motion abnormalities  Aortic Valve: Not well-visualized. Probably normal trileaflet aortic valve  Mitral Valve: There is subtle systolic anterior motion of the mitral   valve in the setting of a hyperdynamic left ventricle. No left   ventricular outflow tract gradient is seen. Trace mitral regurgitation is   visualized.  Tricuspid Valve: Not well visualized. Trace tricuspid regurgitation  Pulmonic Valve: Not visualized.  Left Atrium: Grossly normal  Right Ventricle: Not well-visualized. Grossly normal right ventricular   size and function  Right Atrium: Grossly normal  Diastolic Function: Normaldiastolic function  Pericardium/Pleura: No pericardial effusion      CONCLUSIONS:  1. Technically difficult study  2. Hyperdynamic left ventricle. Estimated EF 70%  3. Subtle systolic anterior motion of the mitral valve in the setting of   a hyperdynamic left ventricle, but no outflow tract gradient.  4. Right ventricle is not well visualized.Grossly normal right   ventricular size and function  5. No pericardial effusion    No prior exam for comparison                  ROBERTA MARKS   This document has been electronically signed. Oct  6 2017  1:33PM    < end of copied text >    Stress Testing:     Cath:    Imaging:  < from: Xray Chest 1 View AP-PORTABLE IMMEDIATE (10.16.17 @ 00:57) >    EXAM:  CHEST PORTABLE IMMED                            PROCEDURE DATE:  10/16/2017          INTERPRETATION:  Fever.    AP chest. Prior 10/12/2017.    Heart magnified by AP film shallow inspiration. No consolidation or   effusion. Thoracic dextroscoliosis. Hardware lower cervical spine.    Impression: No active disease.                GLORIA TURNER M.D., ATTENDING RADIOLOGIST  This document has been electronically signed. Oct 16 2017  9:04AM        < end of copied text >  < from: US Renal (10.05.17 @ 15:15) >  EXAM:  US KIDNEY(S)                            PROCEDURE DATE:  10/05/2017          INTERPRETATION:  CLINICAL STATEMENT: Acute kidney injury, acute tubular   necrosis    TECHNIQUE: Ultrasound of the kidneys. Portable technique was utilized    COMPARISON: None.    FINDINGS:    The right kidney measures 12.3 x 6.2 x 6.2 cm.    The left kidney measures 13.3 x 5.9 x 5.9 cm.    There is no hydronephrosis.  There is no gross renal mass or renal   calculus.    The bladder is not evaluated as it is notdistended in the presence of a   Coffey catheter.    IMPRESSION:    No focal renal pathology.  Nondiagnostic evaluation of the nondistended bladder                RIAN MAYER   This document has been electronically signed. Oct  5 2017  2:14PM        < end of copied text >    Interpretation of Telemetry:  Not on TELE    Physical Exam:  Appearance: [ ] Normal  [ ] abnormal [x ] NAD   Eyes: [x ] PERRL [ ] EOMI  HENT: [ x] Normal [ ] Abnormal oral muscosa [ x]NC/AT  Cardiovascular: [x ] S1 [x ] S2 [x ] RRR [ ] m/r/g [ ]edema [ ] JVP  Procedural Access Site: Right leg with ace wrap dressing with wound vac  Respiratory: [ x] Clear to auscultation bilaterally  Gastrointestinal: [x ] Soft [ ] tenderness[ ] distension [x ] BS  Musculoskeletal: [ ] clubbing [ ] joint deformity   Neurologic: [x ] Non-focal  Lymphatic: [x ] Right Leg swelling. No lymphadenopathy  Psychiatry: [x ] AAOx3  [ ] confused [ ] disoriented [x ] Mood appropriate for disease process with flat affect   Skin: [ ]  rashes [ ] ecchymoses [ ] cyanosis

## 2017-10-19 LAB
ALBUMIN SERPL ELPH-MCNC: 1.6 G/DL — LOW (ref 3.3–5)
ALP SERPL-CCNC: 39 U/L — LOW (ref 40–120)
ALT FLD-CCNC: 17 U/L — SIGNIFICANT CHANGE UP (ref 12–78)
ANION GAP SERPL CALC-SCNC: 11 MMOL/L — SIGNIFICANT CHANGE UP (ref 5–17)
AST SERPL-CCNC: 24 U/L — SIGNIFICANT CHANGE UP (ref 15–37)
BILIRUB SERPL-MCNC: 0.3 MG/DL — SIGNIFICANT CHANGE UP (ref 0.2–1.2)
BUN SERPL-MCNC: 46 MG/DL — HIGH (ref 7–23)
CALCIUM SERPL-MCNC: 8.3 MG/DL — LOW (ref 8.5–10.1)
CHLORIDE SERPL-SCNC: 104 MMOL/L — SIGNIFICANT CHANGE UP (ref 96–108)
CO2 SERPL-SCNC: 21 MMOL/L — LOW (ref 22–31)
CREAT SERPL-MCNC: 1.9 MG/DL — HIGH (ref 0.5–1.3)
ERYTHROCYTE [SEDIMENTATION RATE] IN BLOOD: 124 MM/HR — HIGH (ref 0–15)
GLUCOSE SERPL-MCNC: 129 MG/DL — HIGH (ref 70–99)
HCT VFR BLD CALC: 26.2 % — LOW (ref 34.5–45)
HGB BLD-MCNC: 8.5 G/DL — LOW (ref 11.5–15.5)
MCHC RBC-ENTMCNC: 29.6 PG — SIGNIFICANT CHANGE UP (ref 27–34)
MCHC RBC-ENTMCNC: 32.6 GM/DL — SIGNIFICANT CHANGE UP (ref 32–36)
MCV RBC AUTO: 90.8 FL — SIGNIFICANT CHANGE UP (ref 80–100)
PLATELET # BLD AUTO: 287 K/UL — SIGNIFICANT CHANGE UP (ref 150–400)
POTASSIUM SERPL-MCNC: 3.4 MMOL/L — LOW (ref 3.5–5.3)
POTASSIUM SERPL-SCNC: 3.4 MMOL/L — LOW (ref 3.5–5.3)
PROT SERPL-MCNC: 5.6 G/DL — LOW (ref 6–8.3)
RBC # BLD: 2.88 M/UL — LOW (ref 3.8–5.2)
RBC # FLD: 12.3 % — SIGNIFICANT CHANGE UP (ref 10.3–14.5)
RHEUMATOID FACT SERPL-ACNC: 15 IU/ML — HIGH (ref 0–13.9)
SODIUM SERPL-SCNC: 136 MMOL/L — SIGNIFICANT CHANGE UP (ref 135–145)
WBC # BLD: 11.4 K/UL — HIGH (ref 3.8–10.5)
WBC # FLD AUTO: 11.4 K/UL — HIGH (ref 3.8–10.5)

## 2017-10-19 PROCEDURE — 71250 CT THORAX DX C-: CPT | Mod: 26

## 2017-10-19 PROCEDURE — 99233 SBSQ HOSP IP/OBS HIGH 50: CPT

## 2017-10-19 PROCEDURE — 74176 CT ABD & PELVIS W/O CONTRAST: CPT | Mod: 26

## 2017-10-19 PROCEDURE — 99232 SBSQ HOSP IP/OBS MODERATE 35: CPT

## 2017-10-19 RX ORDER — POTASSIUM CHLORIDE 20 MEQ
40 PACKET (EA) ORAL EVERY 4 HOURS
Qty: 0 | Refills: 0 | Status: COMPLETED | OUTPATIENT
Start: 2017-10-19 | End: 2017-10-19

## 2017-10-19 RX ORDER — DOCUSATE SODIUM 100 MG
100 CAPSULE ORAL
Qty: 0 | Refills: 0 | Status: DISCONTINUED | OUTPATIENT
Start: 2017-10-19 | End: 2017-10-27

## 2017-10-19 RX ADMIN — LAMOTRIGINE 100 MILLIGRAM(S): 25 TABLET, ORALLY DISINTEGRATING ORAL at 21:14

## 2017-10-19 RX ADMIN — HYDROMORPHONE HYDROCHLORIDE 0.5 MILLIGRAM(S): 2 INJECTION INTRAMUSCULAR; INTRAVENOUS; SUBCUTANEOUS at 02:39

## 2017-10-19 RX ADMIN — Medication 100 MILLIGRAM(S): at 05:49

## 2017-10-19 RX ADMIN — HYDROMORPHONE HYDROCHLORIDE 0.5 MILLIGRAM(S): 2 INJECTION INTRAMUSCULAR; INTRAVENOUS; SUBCUTANEOUS at 09:00

## 2017-10-19 RX ADMIN — BUDESONIDE AND FORMOTEROL FUMARATE DIHYDRATE 2 PUFF(S): 160; 4.5 AEROSOL RESPIRATORY (INHALATION) at 23:16

## 2017-10-19 RX ADMIN — HEPARIN SODIUM 5000 UNIT(S): 5000 INJECTION INTRAVENOUS; SUBCUTANEOUS at 14:25

## 2017-10-19 RX ADMIN — Medication 2 MILLIGRAM(S): at 11:20

## 2017-10-19 RX ADMIN — HEPARIN SODIUM 5000 UNIT(S): 5000 INJECTION INTRAVENOUS; SUBCUTANEOUS at 05:50

## 2017-10-19 RX ADMIN — BUPROPION HYDROCHLORIDE 300 MILLIGRAM(S): 150 TABLET, EXTENDED RELEASE ORAL at 11:17

## 2017-10-19 RX ADMIN — Medication 650 MILLIGRAM(S): at 20:50

## 2017-10-19 RX ADMIN — BUDESONIDE AND FORMOTEROL FUMARATE DIHYDRATE 2 PUFF(S): 160; 4.5 AEROSOL RESPIRATORY (INHALATION) at 06:00

## 2017-10-19 RX ADMIN — Medication 400 MILLIGRAM(S): at 18:50

## 2017-10-19 RX ADMIN — IOHEXOL 30 MILLILITER(S): 300 INJECTION, SOLUTION INTRAVENOUS at 07:32

## 2017-10-19 RX ADMIN — Medication 1 SPRAY(S): at 18:50

## 2017-10-19 RX ADMIN — PANTOPRAZOLE SODIUM 40 MILLIGRAM(S): 20 TABLET, DELAYED RELEASE ORAL at 05:49

## 2017-10-19 RX ADMIN — LAMOTRIGINE 100 MILLIGRAM(S): 25 TABLET, ORALLY DISINTEGRATING ORAL at 14:25

## 2017-10-19 RX ADMIN — HEPARIN SODIUM 5000 UNIT(S): 5000 INJECTION INTRAVENOUS; SUBCUTANEOUS at 21:13

## 2017-10-19 RX ADMIN — RISPERIDONE 8 MILLIGRAM(S): 4 TABLET ORAL at 21:13

## 2017-10-19 RX ADMIN — HYDROMORPHONE HYDROCHLORIDE 0.5 MILLIGRAM(S): 2 INJECTION INTRAMUSCULAR; INTRAVENOUS; SUBCUTANEOUS at 23:15

## 2017-10-19 RX ADMIN — Medication 1 APPLICATION(S): at 05:50

## 2017-10-19 RX ADMIN — LAMOTRIGINE 100 MILLIGRAM(S): 25 TABLET, ORALLY DISINTEGRATING ORAL at 05:49

## 2017-10-19 RX ADMIN — SERTRALINE 50 MILLIGRAM(S): 25 TABLET, FILM COATED ORAL at 11:17

## 2017-10-19 RX ADMIN — Medication 1 APPLICATION(S): at 18:50

## 2017-10-19 RX ADMIN — HYDROMORPHONE HYDROCHLORIDE 0.5 MILLIGRAM(S): 2 INJECTION INTRAMUSCULAR; INTRAVENOUS; SUBCUTANEOUS at 19:53

## 2017-10-19 RX ADMIN — Medication 650 MILLIGRAM(S): at 05:50

## 2017-10-19 RX ADMIN — OXYCODONE HYDROCHLORIDE 60 MILLIGRAM(S): 5 TABLET ORAL at 05:49

## 2017-10-19 RX ADMIN — Medication 40 MILLIEQUIVALENT(S): at 14:25

## 2017-10-19 RX ADMIN — FENTANYL CITRATE 2 PATCH: 50 INJECTION INTRAVENOUS at 09:03

## 2017-10-19 RX ADMIN — Medication 1 SPRAY(S): at 05:53

## 2017-10-19 RX ADMIN — HYDROMORPHONE HYDROCHLORIDE 0.5 MILLIGRAM(S): 2 INJECTION INTRAMUSCULAR; INTRAVENOUS; SUBCUTANEOUS at 18:50

## 2017-10-19 RX ADMIN — HYDROMORPHONE HYDROCHLORIDE 0.5 MILLIGRAM(S): 2 INJECTION INTRAMUSCULAR; INTRAVENOUS; SUBCUTANEOUS at 13:36

## 2017-10-19 RX ADMIN — HYDROMORPHONE HYDROCHLORIDE 0.5 MILLIGRAM(S): 2 INJECTION INTRAMUSCULAR; INTRAVENOUS; SUBCUTANEOUS at 12:01

## 2017-10-19 RX ADMIN — Medication 150 MICROGRAM(S): at 05:27

## 2017-10-19 RX ADMIN — Medication 400 MILLIGRAM(S): at 05:49

## 2017-10-19 RX ADMIN — FENTANYL CITRATE 2 PATCH: 50 INJECTION INTRAVENOUS at 08:58

## 2017-10-19 RX ADMIN — Medication 40 MILLIEQUIVALENT(S): at 18:49

## 2017-10-19 RX ADMIN — HYDROMORPHONE HYDROCHLORIDE 0.5 MILLIGRAM(S): 2 INJECTION INTRAMUSCULAR; INTRAVENOUS; SUBCUTANEOUS at 08:35

## 2017-10-19 NOTE — PROGRESS NOTE ADULT - SUBJECTIVE AND OBJECTIVE BOX
[facility], Division of Infectious Diseases  NATALIO Hopkins A. Lee  [phone]  Name: ABELARDO CROWELL  Age: 32y  Gender: Female  MRN: [id number]    Interval History--  Notes reviewed  no cough, no sob, no dysuria.      Past Medical History--  Low back pain  Hypothyroid  Bipolar 1 disorder  History of spinal fusion  History of laminectomy      For details regarding the patient's social history, family history, and other miscellaneous elements, please refer the initial infectious diseases consultation and/or the admitting history and physical examination for this admission.    Allergies    avocado (Unknown)  latex (Urticaria; Rash)  penicillin (Unknown)  sulfa drugs (Unknown)    Intolerances        Medications--  Antibiotics:    Immunologic:  influenza   Vaccine 0.5 milliLiter(s) IntraMuscular once    Other:  acetaminophen   Tablet PRN  acetaminophen   Tablet. PRN  ALBUTerol    90 MICROgram(s) HFA Inhaler PRN  BACItracin   Ointment  buDESOnide  80 MICROgram(s)/formoterol 4.5 MICROgram(s) Inhaler  buPROPion XL .  clonazePAM Tablet PRN  docusate sodium PRN  fluticasone propionate 50 MICROgram(s)/spray Nasal Spray  heparin  Injectable  HYDROmorphone  Injectable PRN  lamoTRIgine  levothyroxine  ondansetron Injectable PRN  oxyCODONE  ER Tablet  pantoprazole    Tablet  potassium chloride    Tablet ER  risperiDONE   Tablet  senna PRN  sertraline  topiramate      Review of Systems--  A 10-point review of systems was obtained.     Pertinent positives and negatives--  Constitutional: No fevers. No Chills. No Rigors.   Cardiovascular: No chest pain. No palpitations.  Respiratory: No shortness of breath. No cough.  Gastrointestinal: No nausea or vomiting. No diarrhea or constipation.   Psychiatric: + anxiety    Review of systems otherwise negative except as previously noted.    Physical Examination--  Vital Signs: T(F): 100 (10-19-17 @ 10:29), Max: 100.4 (10-19-17 @ 05:28)  HR: 97 (10-19-17 @ 08:00)  BP: 109/73 (10-19-17 @ 08:00)  RR: 17 (10-19-17 @ 08:00)  SpO2: 97% (10-19-17 @ 08:00)  Wt(kg): --  General: Nontoxic-appearing Female in no acute distress.  HEENT: AT/NC.  Anicteric. Conjunctiva pink and moist. Oropharynx clear. Dentition fair.  Neck: Not rigid. No sense of mass.  Nodes: None palpable.  Lungs: Clear bilaterally without rales, wheezing or rhonchi  Heart: Regular rate and rhythm. No Murmur. No rub. No gallop. No palpable thrill.  Abdomen: Bowel sounds present and normoactive. Soft. Nondistended. Nontender.   Extremities: No cyanosis or clubbing. wounds seen with wound care.  small dark area consistent with hematoma.  no erythema, ++granulation tissue  no discharge    Skin: Warm. Dry. Good turgor. No rash. No vasculitic stigmata.  Psychiatric: Appropriate affect and mood for situation.         Laboratory Studies--  CBC                        9.0    15.3  )-----------( 304      ( 18 Oct 2017 08:47 )             26.9       Chemistries  10-18    131<L>  |  100  |  51<H>  ----------------------------<  155<H>  3.0<L>   |  20<L>  |  2.30<H>    Ca    8.0<L>      18 Oct 2017 08:47  Phos  4.4     10-18  Mg     1.9     10-18        Culture Data  Culture - Urine (10.16.17 @ 09:28)    Specimen Source: .Urine Clean Catch (Midstream)    Culture Results:   10,000 - 49,000 CFU/mL Coag negative Staphylococcus not Staph  saprophyticus        Culture - Blood (10.16.17 @ 09:27)    Specimen Source: .Blood Blood-Peripheral    Culture Results:   No growth to date.        Urinalysis (10.16.17 @ 01:36)    Glucose Qualitative, Urine: Negative    Blood, Urine: Negative    pH Urine: 7.0    Color: Pale Yellow    Urine Appearance: Clear    Bilirubin: Negative    Ketone - Urine: Negative    Specific Gravity: 1.005    Protein, Urine: Negative    Urobilinogen: Negative    Nitrite: Negative    Leukocyte Esterase Concentration: Negative      < from: CT Chest No Cont (10.19.17 @ 09:49) >    EXAM:  CT ABDOMEN AND PELVIS OC                          EXAM:  CT CHEST                            PROCEDURE DATE:  10/19/2017          INTERPRETATION:  CLINICAL INFORMATION:  Fever. Status post recent   fasciotomy for compartment syndrome.    PROCEDURE:  Using multislice helical CT, 2.5 mm sections were obtained   from the thoracic inlet through the lung bases.  Multiplanar reformatted images.    COMPARISON: Chest radiograph 10/14/2017.  CT angiogram of the chest 4/8/2017.    FINDINGS:      There are a few, poorly defined peribronchiolar groundglass nodular   opacities in the superior segment of the right upper lobe, largest   measuring up to 10 mm. There are isolated scattered small peripheral   nodular opacities right middle and right lower lobe measuring up to 3 mm.  There is a 5 mm nodule superior segment left lower lobe.  There are a few small subpleural nodular opacities at the left lung apex,   as well as a 3 mm nodule left upper lobe.    The central airways remain patent. No endobronchial lesion is noted.  No lobar lung consolidation is noted.    No enlarged mediastinal or hilar lymphadenopathy is noted.    No pleural effusion is noted.    The evaluation of the solid organ parenchyma is limited without   intravenous contrast.    The patient is status post cholecystectomy.  No intra or extrahepatic biliary ductal dilatation is noted.  The spleen is enlarged measuring 14 cm in length.    The adrenal glands and nonenhanced pancreas are unremarkable in   appearance.    No hydronephrosis is noted.    The abdominal aorta is normal in caliber.  No enlarged retroperitoneal lymphadenopathy is noted.    Evaluation of the stomach is limited without distention.  No bowel obstruction is noted.  No localized intra-abdominal fluid collection or pneumoperitoneum is   noted.    There is a normal-appearing appendix.    There is infiltration with stranding in the subcutaneous soft tissues of   the right flank, buttock and proximal thigh.  No encapsulated, localized fluid collection is noted.    No free fluid is present within the pelvis. There is possible mild   bladder wall thickening, correlate for cystitis.    Patient is status post posterior lumbar fusion L4-5 and L5-S1 with   bilateral pedicle screw and quang instrumentation interbody graft at L4-5   and disc spacer at L5-S1.    There are chronic appearing right lower rib fracture deformities.    Impression:    Peribronchiolar groundglass nodular opacities right lung as discussed may   be secondary to infectious or inflammatory small airway disease.  Small scattered pulmonary nodules bilaterally as discussed above.  Recommend short interval follow-up chest CT examination in 6-8 weeks.    Subcutaneous stranding/infiltration right posterior lateral flank,   buttock and thigh without localized fluid collection noted.    No localized intra-abdominal fluid collection noted.    Possible mild bladder wall thickening, correlate clinically for cystitis.    Splenomegaly.      < end of copied text >

## 2017-10-19 NOTE — PROGRESS NOTE ADULT - SUBJECTIVE AND OBJECTIVE BOX
Patient is a 32y old  Female who presents with a chief complaint of RLE edema and numbness (13 Oct 2017 16:25)      Vascular Surgery Progress Note    Interval HPI: POD #15 s/p fasciotomies of right leg. Patient having intermittent fevers of uncertain etiology . Appreciate ID input. CT scans of chest abdomen and pelvis are unremarkable .    Medications:      Allergies:  Allergies    avocado (Unknown)  latex (Urticaria; Rash)  penicillin (Unknown)  sulfa drugs (Unknown)    Intolerances        Vital Signs Last 24 Hrs  T(C): 37.8 (19 Oct 2017 10:29), Max: 38 (19 Oct 2017 05:28)  T(F): 100 (19 Oct 2017 10:29), Max: 100.4 (19 Oct 2017 05:28)  HR: 97 (19 Oct 2017 08:00) (97 - 98)  BP: 109/73 (19 Oct 2017 08:00) (99/64 - 111/70)  BP(mean): --  RR: 17 (19 Oct 2017 08:00) (17 - 18)  SpO2: 97% (19 Oct 2017 08:00) (96% - 99%)  I&O's Summary    18 Oct 2017 07:01  -  19 Oct 2017 07:00  --------------------------------------------------------  IN: 360 mL / OUT: 750 mL / NET: -390 mL    19 Oct 2017 07:01  -  19 Oct 2017 19:29  --------------------------------------------------------  IN: 1000 mL / OUT: 0 mL / NET: 1000 mL        Physical Exam:  Gen: NAD, A&Ox3   Wound VAC changed today by wound nurse. The wounds appear clean and viable. No obvious infection. Both feet are warm and pink with palpable pedal pulses     LABS:                        8.5    11.4  )-----------( 287      ( 19 Oct 2017 14:02 )             26.2     10-19    136  |  104  |  46<H>  ----------------------------<  129<H>  3.4<L>   |  21<L>  |  1.90<H>    Ca    8.3<L>      19 Oct 2017 14:02  Phos  4.4     10-18  Mg     1.9     10-18    TPro  5.6<L>  /  Alb  1.6<L>  /  TBili  0.3  /  DBili  x   /  AST  24  /  ALT  17  /  AlkPhos  39<L>  10-19

## 2017-10-19 NOTE — PROGRESS NOTE ADULT - SUBJECTIVE AND OBJECTIVE BOX
Patient is a 32y old  Female who presents with a chief complaint of RLE edema and numbness (13 Oct 2017 16:25)      INTERVAL HPI: 32y old  Female who presents with a chief complaint of RLE edema and numbness. Patient admitted for Compartment syndrome, s/p Fasciotomy.  Dr. Damico consulted for pain management and concern for opoid dependence and abuse. Spoke to patient and mother at bedside. Patient is persistently asking for opioids, but she is aware they are being tapered and managed accordingly with pain management Dr. Damico.      Admitted for compartment syndrome, s/p Sx and vac  placement. S/p 2 units of PRBCs  Renal function improving.    INTERVAL HPI: Pt seen and examined. c/o pain in RLE. no N/V, F/Ch. Not doing much PT, but more co operative  OVERNIGHT EVENTS:  T(F): 100 (10-19-17 @ 10:29), Max: 100.4 (10-19-17 @ 05:28)  HR: 97 (10-19-17 @ 08:00) (97 - 100)  BP: 109/73 (10-19-17 @ 08:00) (99/64 - 111/70)  RR: 17 (10-19-17 @ 08:00) (17 - 18)  SpO2: 97% (10-19-17 @ 08:00) (96% - 99%)  Wt(kg): --  I&O's Summary    18 Oct 2017 07:01  -  19 Oct 2017 07:00  --------------------------------------------------------  IN: 360 mL / OUT: 750 mL / NET: -390 mL    19 Oct 2017 07:01  -  19 Oct 2017 11:01  --------------------------------------------------------  IN: 1000 mL / OUT: 0 mL / NET: 1000 mL        REVIEW OF SYSTEM:    Constitutional: No fever, chills, fatigue  Neuro: No headache, numbness, +weakness  Resp: No cough, wheezing, shortness of breath  CVS: No chest pain, palpitations, leg swelling  GI: No abdominal pain, nausea, vomiting, diarrhea   : No dysuria, frequency, incontinence  Skin: No itching, burning, rashes, or lesions   Msk: pain in RLE with numbness  Psych: No depression, anxiety, mood swings    PHYSICAL EXAM:  GENERAL: NAD, well-developed  HEAD:  Atraumatic, Normocephalic  EYES: EOMI, PERRLA, conjunctiva and sclera clear  ENMT: No tonsillar erythema, exudates, or enlargement; Moist mucous membranes,   NECK: Supple, No JVD, Normal thyroid  HEART: Regular rate and rhythm; No murmurs, rubs, or gallops  RESPIRATORY: CTA B/L, No wheezing / rhonchi  ABDOMEN: Soft, Nontender, Nondistended; Bowel sounds present  NEUROLOGY: A&Ox3, RLE persistently paralysed with no sensation  EXTREMITIES: RLE wrapped with ace bandage, with wound vac. + pulses B/L and edema of RLE  SKIN: warm, dry, normal color, no rash or abnormal lesions        PHYSICAL EXAM:  GENERAL: NAD, well-developed  HEAD:  Atraumatic, Normocephalic  EYES: EOMI, PERRLA, conjunctiva and sclera clear  ENMT: No tonsillar erythema, exudates, or enlargement; Moist mucous membranes,   NECK: Supple, No JVD, Normal thyroid  HEART: Regular rate and rhythm; No murmurs, rubs, or gallops  RESPIRATORY: CTA B/L, No wheezing / rhonchi  ABDOMEN: Soft, Nontender, Nondistended; Bowel sounds present  NEUROLOGY: A&Ox3, nonfocal, moving all extremities.  EXTREMITIES:  2+ Peripheral Pulses, No clubbing, cyanosis, or edema  SKIN: warm, dry, normal color, no rash or abnormal lesions        LABS:                        9.0    15.3  )-----------( 304      ( 18 Oct 2017 08:47 )             26.9     10-18    131<L>  |  100  |  51<H>  ----------------------------<  155<H>  3.0<L>   |  20<L>  |  2.30<H>    Ca    8.0<L>      18 Oct 2017 08:47  Phos  4.4     10-18  Mg     1.9     10-18          CAPILLARY BLOOD GLUCOSE          10-16 @ 09:28   10,000 - 49,000 CFU/mL Coag negative Staphylococcus not Staph  saprophyticus  --  --  10-16 @ 09:27   No growth to date.  --  --          MEDICATIONS  (STANDING):  BACItracin   Ointment 1 Application(s) Topical two times a day  buDESOnide  80 MICROgram(s)/formoterol 4.5 MICROgram(s) Inhaler 2 Puff(s) Inhalation two times a day  buPROPion XL . 300 milliGRAM(s) Oral daily  docusate sodium 100 milliGRAM(s) Oral two times a day  fluticasone propionate 50 MICROgram(s)/spray Nasal Spray 1 Spray(s) Both Nostrils two times a day  heparin  Injectable 5000 Unit(s) SubCutaneous every 8 hours  influenza   Vaccine 0.5 milliLiter(s) IntraMuscular once  lamoTRIgine 100 milliGRAM(s) Oral every 8 hours  levothyroxine 150 MICROGram(s) Oral daily  oxyCODONE  ER Tablet 60 milliGRAM(s) Oral every 8 hours  pantoprazole    Tablet 40 milliGRAM(s) Oral before breakfast  polyethylene glycol 3350 17 Gram(s) Oral daily  risperiDONE   Tablet 8 milliGRAM(s) Oral at bedtime  sertraline 50 milliGRAM(s) Oral daily  topiramate 400 milliGRAM(s) Oral two times a day    MEDICATIONS  (PRN):  acetaminophen   Tablet 650 milliGRAM(s) Oral every 6 hours PRN For Temp greater than 38 C (100.4 F)  acetaminophen   Tablet. 650 milliGRAM(s) Oral every 6 hours PRN Mild Pain (1 - 3)  ALBUTerol    90 MICROgram(s) HFA Inhaler 2 Puff(s) Inhalation every 6 hours PRN Shortness of Breath and/or Wheezing  clonazePAM Tablet 2 milliGRAM(s) Oral every 8 hours PRN anxiety  HYDROmorphone  Injectable 0.5 milliGRAM(s) IV Push every 3 hours PRN Severe Pain (7 - 10)  ondansetron Injectable 4 milliGRAM(s) IV Push every 4 hours PRN Nausea and/or Vomiting  senna 2 Tablet(s) Oral at bedtime PRN Constipation

## 2017-10-19 NOTE — PROGRESS NOTE ADULT - SUBJECTIVE AND OBJECTIVE BOX
Patient is a 32y old  Female who presents with a chief complaint of RLE edema and numbness (04 Oct 2017 01:13)      Patient seen in follow up for GHAZAL, ATN, Rhabdomyolysis. Improving renal function trend.     PAST MEDICAL HISTORY:  Low back pain  Hypothyroi  Bipolar 1 disorder    MEDICATIONS  (STANDING):  BACItracin   Ointment 1 Application(s) Topical two times a day  buDESOnide  80 MICROgram(s)/formoterol 4.5 MICROgram(s) Inhaler 2 Puff(s) Inhalation two times a day  buPROPion XL . 300 milliGRAM(s) Oral daily  fluticasone propionate 50 MICROgram(s)/spray Nasal Spray 1 Spray(s) Both Nostrils two times a day  heparin  Injectable 5000 Unit(s) SubCutaneous every 8 hours  influenza   Vaccine 0.5 milliLiter(s) IntraMuscular once  lamoTRIgine 100 milliGRAM(s) Oral every 8 hours  levothyroxine 150 MICROGram(s) Oral daily  oxyCODONE  ER Tablet 60 milliGRAM(s) Oral every 8 hours  pantoprazole    Tablet 40 milliGRAM(s) Oral before breakfast  potassium chloride    Tablet ER 40 milliEquivalent(s) Oral every 4 hours  risperiDONE   Tablet 8 milliGRAM(s) Oral at bedtime  sertraline 50 milliGRAM(s) Oral daily  topiramate 400 milliGRAM(s) Oral two times a day    MEDICATIONS  (PRN):  acetaminophen   Tablet 650 milliGRAM(s) Oral every 6 hours PRN For Temp greater than 38 C (100.4 F)  acetaminophen   Tablet. 650 milliGRAM(s) Oral every 6 hours PRN Mild Pain (1 - 3)  ALBUTerol    90 MICROgram(s) HFA Inhaler 2 Puff(s) Inhalation every 6 hours PRN Shortness of Breath and/or Wheezing  clonazePAM Tablet 2 milliGRAM(s) Oral every 8 hours PRN anxiety  docusate sodium 100 milliGRAM(s) Oral two times a day PRN Constipation  HYDROmorphone  Injectable 0.5 milliGRAM(s) IV Push every 3 hours PRN Severe Pain (7 - 10)  ondansetron Injectable 4 milliGRAM(s) IV Push every 4 hours PRN Nausea and/or Vomiting  senna 2 Tablet(s) Oral at bedtime PRN Constipation    T(C): 37.8 (10-19-17 @ 10:29), Max: 38.3 (10-18-17 @ 08:15)  HR: 97 (10-19-17 @ 08:00) (97 - 109)  BP: 109/73 (10-19-17 @ 08:00) (99/64 - 114/71)  RR: 17 (10-19-17 @ 08:00)  SpO2: 97% (10-19-17 @ 08:00)  Wt(kg): --  I&O's Detail    18 Oct 2017 07:01  -  19 Oct 2017 07:00  --------------------------------------------------------  IN:    Oral Fluid: 360 mL  Total IN: 360 mL    OUT:    Voided: 750 mL  Total OUT: 750 mL    Total NET: -390 mL      19 Oct 2017 07:01  -  19 Oct 2017 11:36  --------------------------------------------------------  IN:    Oral Fluid: 1000 mL  Total IN: 1000 mL    OUT:  Total OUT: 0 mL    Total NET: 1000 mL              PHYSICAL EXAM:  General: NAD  Respiratory: b/l air entry  Cardiovascular: S1 S2  Gastrointestinal: soft  Extremities:  Rt leg dressing        LABORATORY:                        9.0    15.3  )-----------( 304      ( 18 Oct 2017 08:47 )             26.9     10-18    131<L>  |  100  |  51<H>  ----------------------------<  155<H>  3.0<L>   |  20<L>  |  2.30<H>    Ca    8.0<L>      18 Oct 2017 08:47  Phos  4.4     10-18  Mg     1.9     10-18      Sodium, Serum: 131 mmol/L (10-18 @ 08:47)    Potassium, Serum: 3.0 mmol/L (10-18 @ 08:47)    Hemoglobin: 9.0 g/dL (10-18 @ 08:47)  Hemoglobin: 8.6 g/dL (10-17 @ 09:39)    Creatinine, Serum 2.30 (10-18 @ 08:47)  Creatinine, Serum 2.40 (10-17 @ 09:39)

## 2017-10-19 NOTE — PROGRESS NOTE ADULT - PROBLEM SELECTOR PLAN 3
dilaudid IV low dose  oxycontin long acting management of pain  fentanyl patch  reassurance  emotional support  manage primary condition

## 2017-10-19 NOTE — PROGRESS NOTE ADULT - ASSESSMENT
32-year-old female now status post right lower extremity  fasciotomy for compartment syndrome. Pt on vent support.     ·	GHAZAL, ATN Septic / ischemic, Rhabdomyolysis  ·	s/p surgery for compartment syndrome  ·	Anemia  ·	Hypokalemia    Check repeat labs. Improving renal function. Avoid nephrotoxic meds as possible.   Will follow electrolytes and renal function trend.   Surgery follow up. Monitor h/h. On multiple pain meds.   Pain management follow up. Potassium supplements as needed. Surgery follow up.

## 2017-10-19 NOTE — PROGRESS NOTE ADULT - PROBLEM SELECTOR PLAN 1
Given stability, negative culture data, and unrevealing physical exam I don't see a role to give antibiotics empirically at this time  ESR ALFONSO RF ANCA  Surgical follow up re RLE  F/U CBC  Consider repeat venous duplex US LLE  wound encourage incentive spirometer and oob

## 2017-10-19 NOTE — PROGRESS NOTE ADULT - ASSESSMENT
32F with opiate dependence, anxiety disorder, admitted with leg swelling   S/P fasciotomy for compartment syndrome  S/P Rhabdomyolysis  S/P obtundation/immobility due to drug overdose  GHAZAL, improving  no cbc today  Low grade fever persists  +Urine cx, no symptoms. Would not treat at this time  CT chest /A /P noted.   no cough, no dyspnea

## 2017-10-19 NOTE — PROGRESS NOTE ADULT - PROBLEM SELECTOR PLAN 3
Pt having low grade fever, for CT A/P with PO c+  Pt is off antibx and leucocytosis improving.  ID consult noted. will follow with lab work as adv.

## 2017-10-19 NOTE — PROGRESS NOTE ADULT - SUBJECTIVE AND OBJECTIVE BOX
HPI:  Pt is a 33 yo female with PMHX of Bipolar disorder, hypothyroidism, chronic low back pain on morphine s/p laminectomy and spinal fusion in 2016 who is BIB EMS for right leg swelling and numbness. Patient is a poor historian as she is on many pain medicines and is currently lethargic, therefore much info provided based on hospital staff and charts. She states that 18 hours prior to arrival, she fell on the floor at her house after taking too much morphine and was lying on floor for 8 hours. She woke up to numbness, swelling, and cool temperature of her right leg, from below the knee onwards. Patient denies any current SOB, CP, f/c, denies head trauma.     In the ED, vitals were Temp 101.4F /91  RR 20  SpO2 93% RA. Significant labs are WBS 24.3, Hb 15.6, Hmt 47.6,  Platelets 118, neutr 93%, sodium 131, BUN 30,  Cr 2.30, glucose 152 AST 1377, , GFR 27, lactate 1.7. Doppler US RLE showed Extremely limited evaluation of the right lower extremity with nonvisualization of the venous structures beyond the mid right femoral vein. No evidence of DVT within the right common femoral, proximal and   mid femoral veins. No evidence of left lower extremity deep venous thrombosis. XR of right tibia and XR of R femur showed no acute fractures. Arterial Doppler of RLE pending. EKG showed sinus tach of 140, with right superior axis deviation. Patient started on aztreonam, Tylenol given for fever, patient made NPO, Dr. Martines consulted for emergent surgery. (04 Oct 2017 01:13)    SUBJECTIVE:  Patient is a 32y old  Female who presents with a chief complaint of RLE edema and numbness (13 Oct 2017 16:25)    Awake, currently on the phone.  Uncooperative with exam.    OBJECTIVE:  Review Of Systems:  Constitutional: [ ] Fever [ ] Chills [ ] Fatigue [ ] Weight change   HEENT: [ ] Blurred vision [ ] Eye Pain [ ] Headache [ ] Runny nose [ ] Sore Throat   Respiratory: [ ] Cough [ ] Wheezing [ ] Shortness of breath  Cardiovascular: [ ] Chest Pain [ ] Palpitations [ ] BENNETT [ ] PND [ ] Orthopnea  Gastrointestinal: [ ] Abdominal Pain [ ] Diarrhea [ ] Constipation [ ] Hemorrhoids [ ] Nausea [ ] Vomiting  Genitourinary: [ ] Nocturia [ ] Dysuria [ ] Incontinence  Extremities: [ ] Swelling [ ] Joint Pain  Neurologic: [ ] Focal deficit [ ] Paresthesias [ ] Syncope  Lymphatic: [ ] Swelling [ ] Lymphadenopathy   Skin: [ ] Rash [ ] Ecchymoses [ ] Wounds [ ] Lesions  Psychiatry: [ ] Depression [ ] Suicidal/Homicidal Ideation [ ] Anxiety [ ] Sleep Disturbances  [ ] 10 point review of systems is otherwise negative except as mentioned above            [x ]Unable to obtain    Allergy:  Allergies    avocado (Unknown)  latex (Urticaria; Rash)  penicillin (Unknown)  sulfa drugs (Unknown)    Intolerances    Medications:  MEDICATIONS  (STANDING):  BACItracin   Ointment 1 Application(s) Topical two times a day  buDESOnide  80 MICROgram(s)/formoterol 4.5 MICROgram(s) Inhaler 2 Puff(s) Inhalation two times a day  buPROPion XL . 300 milliGRAM(s) Oral daily  docusate sodium 100 milliGRAM(s) Oral two times a day  fentaNYL   Patch  25 MICROgram(s)/Hr 2 Patch Transdermal every 72 hours  fluticasone propionate 50 MICROgram(s)/spray Nasal Spray 1 Spray(s) Both Nostrils two times a day  heparin  Injectable 5000 Unit(s) SubCutaneous every 8 hours  influenza   Vaccine 0.5 milliLiter(s) IntraMuscular once  lamoTRIgine 100 milliGRAM(s) Oral every 8 hours  levothyroxine 150 MICROGram(s) Oral daily  oxyCODONE  ER Tablet 60 milliGRAM(s) Oral every 8 hours  pantoprazole    Tablet 40 milliGRAM(s) Oral before breakfast  polyethylene glycol 3350 17 Gram(s) Oral daily  risperiDONE   Tablet 8 milliGRAM(s) Oral at bedtime  sertraline 50 milliGRAM(s) Oral daily  topiramate 400 milliGRAM(s) Oral two times a day    MEDICATIONS  (PRN):  acetaminophen   Tablet 650 milliGRAM(s) Oral every 6 hours PRN For Temp greater than 38 C (100.4 F)  acetaminophen   Tablet. 650 milliGRAM(s) Oral every 6 hours PRN Mild Pain (1 - 3)  ALBUTerol    90 MICROgram(s) HFA Inhaler 2 Puff(s) Inhalation every 6 hours PRN Shortness of Breath and/or Wheezing  clonazePAM Tablet 2 milliGRAM(s) Oral every 8 hours PRN anxiety  HYDROmorphone  Injectable 0.5 milliGRAM(s) IV Push every 3 hours PRN Severe Pain (7 - 10)  ondansetron Injectable 4 milliGRAM(s) IV Push every 4 hours PRN Nausea and/or Vomiting  senna 2 Tablet(s) Oral at bedtime PRN Constipation    PMH/PSH/FH/SH: [ ] Unchanged    Vitals:  T(C): 38 (10-19-17 @ 08:00), Max: 38 (10-18-17 @ 11:00)  HR: 97 (10-19-17 @ 08:00) (97 - 100)  BP: 109/73 (10-19-17 @ 08:00) (99/64 - 111/70)  BP(mean): --  RR: 17 (10-19-17 @ 08:00) (17 - 18)  SpO2: 97% (10-19-17 @ 08:00) (96% - 99%)  Wt(kg): --  Daily     Daily   I&O's Summary    18 Oct 2017 07:01  -  19 Oct 2017 07:00  --------------------------------------------------------  IN: 360 mL / OUT: 750 mL / NET: -390 mL    19 Oct 2017 07:01  -  19 Oct 2017 08:57  --------------------------------------------------------  IN: 1000 mL / OUT: 0 mL / NET: 1000 mL    Labs:                        9.0    15.3  )-----------( 304      ( 18 Oct 2017 08:47 )             26.9     10-18    131<L>  |  100  |  51<H>  ----------------------------<  155<H>  3.0<L>   |  20<L>  |  2.30<H>    Ca    8.0<L>      18 Oct 2017 08:47  Phos  4.4     10-18  Mg     1.9     10-18    ECG:  < from: 12 Lead ECG (10.04.17 @ 08:44) >    Ventricular Rate 122 BPM    Atrial Rate 122 BPM    P-R Interval 136 ms    QRS Duration 94 ms    Q-T Interval 332 ms    QTC Calculation(Bezet) 473 ms    P Axis 35 degrees    R Axis -86 degrees    T Axis 10 degrees    Diagnosis Line Sinus tachycardia  Left axis deviation  Inferior infarct (cited on or before 03-OCT-2017)  Abnormal ECG  When compared with ECG of 03-OCT-2017 21:50, (Unconfirmed)  No significant change was found  Confirmed by Juanito Brink MD ([id number]) on 10/4/2017 1:55:40 PM    < end of copied text >    Echo:  < from: TTE Echo Doppler w/o Cont (10.05.17 @ 12:46) >     EXAM:  ECHO TTE W/O CON COMP W/DOPPLR         PROCEDURE DATE:  10/05/2017        INTERPRETATION:  Ordering Physician: MARISSA RUFF [id number]    Indication: Respiratory failure    Study Quality: Technically difficult in ICU   A complete echocardiographic study was performed utilizing standard   protocol including spectral and color Doppler in all echocardiographic   windows.    Height: 1 75 cm  Weight: 113 kg  BSA: 2.27  Blood Pressure: 104/76    MEASUREMENTS  IVS: 0.9cm  PWT: 0.9cm  LA: 3.5cm  AO: 2.8cm  LVIDd: 5.2cm  LVIDs: 3.5cm      LVEF: 70%    FINDINGS  Left Ventricle: Hyperdynamic left ventricle. Estimated EF 70%. No   segmental wall motion abnormalities  Aortic Valve: Not well-visualized. Probably normal trileaflet aortic valve  Mitral Valve: There is subtle systolic anterior motion of the mitral   valve in the setting of a hyperdynamic left ventricle. No left   ventricular outflow tract gradient is seen. Trace mitral regurgitation is   visualized.  Tricuspid Valve: Not well visualized. Trace tricuspid regurgitation  Pulmonic Valve: Not visualized.  Left Atrium: Grossly normal  Right Ventricle: Not well-visualized. Grossly normal right ventricular   size and function  Right Atrium: Grossly normal  Diastolic Function: Normaldiastolic function  Pericardium/Pleura: No pericardial effusion      CONCLUSIONS:  1. Technically difficult study  2. Hyperdynamic left ventricle. Estimated EF 70%  3. Subtle systolic anterior motion of the mitral valve in the setting of   a hyperdynamic left ventricle, but no outflow tract gradient.  4. Right ventricle is not well visualized.Grossly normal right   ventricular size and function  5. No pericardial effusion    No prior exam for comparison      ROBERTA MARKS   This document has been electronically signed. Oct  6 2017  1:33PM      < end of copied text >    Stress Testing:     Cath:    Imaging:    Interpretation of Telemetry:  Not on tele      Physical Exam:  Appearance: [ ] Normal  [ ] abnormal [ ] NAD   Eyes: [ ] PERRL [ ] EOMI  HENT: [ ] Normal [ ] Abnormal oral muscosa [ ]NC/AT  Cardiovascular: [ ] S1 [ ] S2 [ ] RRR [ ] m/r/g [ ]edema [ ] JVP  Procedural Access Site: [ ]  hematoma [ ] tender to palpation [ ] 2+ pulse [ ] bruit [ ] Ecchymosis  Respiratory: [ ] Clear to auscultation bilaterally  Gastrointestinal: [ ] Soft [ ] tenderness[ ] distension [ ] BS  Musculoskeletal: [ ] clubbing [ ] joint deformity   Neurologic: [ ] Non-focal  Lymphatic: [ ] lymphadenopathy  Psychiatry: [ ] AAOx3  [ ] confused [ ] disoriented [ ] Mood & affect appropriate  Skin: [ ]  rashes [ ] ecchymoses [ ] cyanosis    Unable to do exam; patient uncooperative.

## 2017-10-19 NOTE — PROGRESS NOTE ADULT - ASSESSMENT
32-year-old female now status post right lower extremity fasciotomy for prolonged compression after a fall with lethargy.     - Rhabdo resolving; GHAZAL continuously improving; Avoid nephrotoxics  - No obvious cardiac complications post fasciotomy  - No clear evidence of acute ischemia or volume overload  - HR better controlled now at 90's, however, remains with low-grade fever with negative blood cultures.  Cadre per ID  - Continue Pain control  - TTE with hyperdynamic LV function, EF 70%, no valvular disease noted  - Encourage PO hydration  - Other cardiovascular workup will depend on clinical course.  - DVT prophylaxis  - Activity as tolerated  - Right leg wound care per primary/surgery  - All other workup per primary team  - Will follow      Kathleen Fernandes NP  Cardiology 32-year-old female now status post right lower extremity fasciotomy for prolonged compression after a fall with lethargy.     - Rhabdo resolving; GHAZAL continuously improving; Avoid nephrotoxics  - No obvious cardiac complications post fasciotomy  - No clear evidence of acute ischemia or volume overload  - HR better controlled now at 90's, however, remains with low-grade fever with negative blood cultures.  Care per ID  - Continue Pain control  - TTE with hyperdynamic LV function, EF 70%, no valvular disease noted  - Encourage PO hydration  - Other cardiovascular workup will depend on clinical course.  - DVT prophylaxis  - Activity as tolerated  - Right leg wound care per primary/surgery  - All other workup per primary team  - Will follow      Kathleen Fernandes NP  Cardiology

## 2017-10-19 NOTE — PROGRESS NOTE ADULT - SUBJECTIVE AND OBJECTIVE BOX
Date/Time Patient Seen:  		  Referring MD:   Data Reviewed	       Patient is a 32y old  Female who presents with a chief complaint of RLE edema and numbness (13 Oct 2017 16:25)  in bed  seen and examined  vs and meds reviewed      Subjective/HPI     PAST MEDICAL & SURGICAL HISTORY:  Low back pain  Hypothyroid  Bipolar 1 disorder  History of spinal fusion  History of laminectomy        Medication list         MEDICATIONS  (STANDING):  BACItracin   Ointment 1 Application(s) Topical two times a day  buDESOnide  80 MICROgram(s)/formoterol 4.5 MICROgram(s) Inhaler 2 Puff(s) Inhalation two times a day  buPROPion XL . 300 milliGRAM(s) Oral daily  docusate sodium 100 milliGRAM(s) Oral two times a day  fentaNYL   Patch  25 MICROgram(s)/Hr 2 Patch Transdermal every 72 hours  fluticasone propionate 50 MICROgram(s)/spray Nasal Spray 1 Spray(s) Both Nostrils two times a day  heparin  Injectable 5000 Unit(s) SubCutaneous every 8 hours  influenza   Vaccine 0.5 milliLiter(s) IntraMuscular once  iohexol 300 mG (iodine)/mL Oral Solution 30 milliLiter(s) Oral once  lamoTRIgine 100 milliGRAM(s) Oral every 8 hours  levothyroxine 150 MICROGram(s) Oral daily  oxyCODONE  ER Tablet 60 milliGRAM(s) Oral every 8 hours  pantoprazole    Tablet 40 milliGRAM(s) Oral before breakfast  polyethylene glycol 3350 17 Gram(s) Oral daily  risperiDONE   Tablet 8 milliGRAM(s) Oral at bedtime  sertraline 50 milliGRAM(s) Oral daily  topiramate 400 milliGRAM(s) Oral two times a day    MEDICATIONS  (PRN):  acetaminophen   Tablet 650 milliGRAM(s) Oral every 6 hours PRN For Temp greater than 38 C (100.4 F)  acetaminophen   Tablet. 650 milliGRAM(s) Oral every 6 hours PRN Mild Pain (1 - 3)  ALBUTerol    90 MICROgram(s) HFA Inhaler 2 Puff(s) Inhalation every 6 hours PRN Shortness of Breath and/or Wheezing  clonazePAM Tablet 2 milliGRAM(s) Oral every 8 hours PRN anxiety  HYDROmorphone  Injectable 0.5 milliGRAM(s) IV Push every 3 hours PRN Severe Pain (7 - 10)  ondansetron Injectable 4 milliGRAM(s) IV Push every 4 hours PRN Nausea and/or Vomiting  senna 2 Tablet(s) Oral at bedtime PRN Constipation         Vitals log        ICU Vital Signs Last 24 Hrs  T(C): 38 (19 Oct 2017 05:28), Max: 38.3 (18 Oct 2017 08:15)  T(F): 100.4 (19 Oct 2017 05:28), Max: 100.9 (18 Oct 2017 08:15)  HR: 98 (19 Oct 2017 05:28) (97 - 109)  BP: 99/64 (19 Oct 2017 05:28) (99/64 - 111/70)  BP(mean): --  ABP: --  ABP(mean): --  RR: 18 (19 Oct 2017 05:28) (17 - 18)  SpO2: 96% (19 Oct 2017 05:28) (96% - 99%)           Input and Output:  I&O's Detail    17 Oct 2017 07:01  -  18 Oct 2017 07:00  --------------------------------------------------------  IN:    Oral Fluid: 360 mL  Total IN: 360 mL    OUT:  Total OUT: 0 mL    Total NET: 360 mL      18 Oct 2017 07:01  -  19 Oct 2017 06:03  --------------------------------------------------------  IN:    Oral Fluid: 360 mL  Total IN: 360 mL    OUT:    Voided: 750 mL  Total OUT: 750 mL    Total NET: -390 mL          Lab Data                        9.0    15.3  )-----------( 304      ( 18 Oct 2017 08:47 )             26.9     10-18    131<L>  |  100  |  51<H>  ----------------------------<  155<H>  3.0<L>   |  20<L>  |  2.30<H>    Ca    8.0<L>      18 Oct 2017 08:47  Phos  4.4     10-18  Mg     1.9     10-18              Review of Systems	      Objective     Physical Examination  head at  heart - s1s2  lungs - dec BS  obese  low grade temp documented        Pertinent Lab findings & Imaging      Erlinda:  NO   Adequate UO     I&O's Detail    17 Oct 2017 07:01  -  18 Oct 2017 07:00  --------------------------------------------------------  IN:    Oral Fluid: 360 mL  Total IN: 360 mL    OUT:  Total OUT: 0 mL    Total NET: 360 mL      18 Oct 2017 07:01  -  19 Oct 2017 06:03  --------------------------------------------------------  IN:    Oral Fluid: 360 mL  Total IN: 360 mL    OUT:    Voided: 750 mL  Total OUT: 750 mL    Total NET: -390 mL               Discussed with:     Cultures:	        Radiology

## 2017-10-19 NOTE — PROGRESS NOTE ADULT - ASSESSMENT
Stable from a vascular standpoint. Agree with holding off on antibiotics at this time. Dr. Isabel to evaluate for skin grafting.

## 2017-10-20 LAB
ANA TITR SER: NEGATIVE — SIGNIFICANT CHANGE UP
ANION GAP SERPL CALC-SCNC: 12 MMOL/L — SIGNIFICANT CHANGE UP (ref 5–17)
AUTO DIFF PNL BLD: NEGATIVE — SIGNIFICANT CHANGE UP
BUN SERPL-MCNC: 42 MG/DL — HIGH (ref 7–23)
C-ANCA SER-ACNC: NEGATIVE — SIGNIFICANT CHANGE UP
CALCIUM SERPL-MCNC: 8.1 MG/DL — LOW (ref 8.5–10.1)
CHLORIDE SERPL-SCNC: 104 MMOL/L — SIGNIFICANT CHANGE UP (ref 96–108)
CO2 SERPL-SCNC: 19 MMOL/L — LOW (ref 22–31)
CREAT SERPL-MCNC: 1.6 MG/DL — HIGH (ref 0.5–1.3)
GLUCOSE SERPL-MCNC: 111 MG/DL — HIGH (ref 70–99)
HCT VFR BLD CALC: 24 % — LOW (ref 34.5–45)
HGB BLD-MCNC: 8.1 G/DL — LOW (ref 11.5–15.5)
MCHC RBC-ENTMCNC: 30.1 PG — SIGNIFICANT CHANGE UP (ref 27–34)
MCHC RBC-ENTMCNC: 33.7 GM/DL — SIGNIFICANT CHANGE UP (ref 32–36)
MCV RBC AUTO: 89.2 FL — SIGNIFICANT CHANGE UP (ref 80–100)
P-ANCA SER-ACNC: NEGATIVE — SIGNIFICANT CHANGE UP
PLATELET # BLD AUTO: 280 K/UL — SIGNIFICANT CHANGE UP (ref 150–400)
POTASSIUM SERPL-MCNC: 3.2 MMOL/L — LOW (ref 3.5–5.3)
POTASSIUM SERPL-SCNC: 3.2 MMOL/L — LOW (ref 3.5–5.3)
RBC # BLD: 2.69 M/UL — LOW (ref 3.8–5.2)
RBC # FLD: 11.8 % — SIGNIFICANT CHANGE UP (ref 10.3–14.5)
SODIUM SERPL-SCNC: 135 MMOL/L — SIGNIFICANT CHANGE UP (ref 135–145)
WBC # BLD: 10.6 K/UL — HIGH (ref 3.8–10.5)
WBC # FLD AUTO: 10.6 K/UL — HIGH (ref 3.8–10.5)

## 2017-10-20 PROCEDURE — 99233 SBSQ HOSP IP/OBS HIGH 50: CPT

## 2017-10-20 PROCEDURE — 93971 EXTREMITY STUDY: CPT | Mod: 26,LT

## 2017-10-20 PROCEDURE — 99232 SBSQ HOSP IP/OBS MODERATE 35: CPT

## 2017-10-20 RX ORDER — POTASSIUM CHLORIDE 20 MEQ
10 PACKET (EA) ORAL
Qty: 0 | Refills: 0 | Status: COMPLETED | OUTPATIENT
Start: 2017-10-20 | End: 2017-10-20

## 2017-10-20 RX ORDER — POTASSIUM CHLORIDE 20 MEQ
40 PACKET (EA) ORAL ONCE
Qty: 0 | Refills: 0 | Status: COMPLETED | OUTPATIENT
Start: 2017-10-20 | End: 2017-10-20

## 2017-10-20 RX ADMIN — Medication 100 MILLIEQUIVALENT(S): at 14:09

## 2017-10-20 RX ADMIN — HYDROMORPHONE HYDROCHLORIDE 0.5 MILLIGRAM(S): 2 INJECTION INTRAMUSCULAR; INTRAVENOUS; SUBCUTANEOUS at 18:27

## 2017-10-20 RX ADMIN — LAMOTRIGINE 100 MILLIGRAM(S): 25 TABLET, ORALLY DISINTEGRATING ORAL at 21:45

## 2017-10-20 RX ADMIN — Medication 400 MILLIGRAM(S): at 18:29

## 2017-10-20 RX ADMIN — HYDROMORPHONE HYDROCHLORIDE 0.5 MILLIGRAM(S): 2 INJECTION INTRAMUSCULAR; INTRAVENOUS; SUBCUTANEOUS at 02:54

## 2017-10-20 RX ADMIN — BUPROPION HYDROCHLORIDE 300 MILLIGRAM(S): 150 TABLET, EXTENDED RELEASE ORAL at 12:29

## 2017-10-20 RX ADMIN — Medication 40 MILLIEQUIVALENT(S): at 12:29

## 2017-10-20 RX ADMIN — Medication 1 SPRAY(S): at 18:29

## 2017-10-20 RX ADMIN — Medication 1 APPLICATION(S): at 05:22

## 2017-10-20 RX ADMIN — Medication 650 MILLIGRAM(S): at 04:07

## 2017-10-20 RX ADMIN — Medication 150 MICROGRAM(S): at 05:18

## 2017-10-20 RX ADMIN — OXYCODONE HYDROCHLORIDE 60 MILLIGRAM(S): 5 TABLET ORAL at 14:27

## 2017-10-20 RX ADMIN — HEPARIN SODIUM 5000 UNIT(S): 5000 INJECTION INTRAVENOUS; SUBCUTANEOUS at 14:07

## 2017-10-20 RX ADMIN — HYDROMORPHONE HYDROCHLORIDE 0.5 MILLIGRAM(S): 2 INJECTION INTRAMUSCULAR; INTRAVENOUS; SUBCUTANEOUS at 00:10

## 2017-10-20 RX ADMIN — HEPARIN SODIUM 5000 UNIT(S): 5000 INJECTION INTRAVENOUS; SUBCUTANEOUS at 21:45

## 2017-10-20 RX ADMIN — Medication 400 MILLIGRAM(S): at 05:22

## 2017-10-20 RX ADMIN — HYDROMORPHONE HYDROCHLORIDE 0.5 MILLIGRAM(S): 2 INJECTION INTRAMUSCULAR; INTRAVENOUS; SUBCUTANEOUS at 17:41

## 2017-10-20 RX ADMIN — HYDROMORPHONE HYDROCHLORIDE 0.5 MILLIGRAM(S): 2 INJECTION INTRAMUSCULAR; INTRAVENOUS; SUBCUTANEOUS at 09:48

## 2017-10-20 RX ADMIN — SERTRALINE 50 MILLIGRAM(S): 25 TABLET, FILM COATED ORAL at 14:07

## 2017-10-20 RX ADMIN — Medication 100 MILLIEQUIVALENT(S): at 12:30

## 2017-10-20 RX ADMIN — BUDESONIDE AND FORMOTEROL FUMARATE DIHYDRATE 2 PUFF(S): 160; 4.5 AEROSOL RESPIRATORY (INHALATION) at 21:46

## 2017-10-20 RX ADMIN — PANTOPRAZOLE SODIUM 40 MILLIGRAM(S): 20 TABLET, DELAYED RELEASE ORAL at 05:22

## 2017-10-20 RX ADMIN — Medication 2 MILLIGRAM(S): at 03:52

## 2017-10-20 RX ADMIN — OXYCODONE HYDROCHLORIDE 60 MILLIGRAM(S): 5 TABLET ORAL at 15:35

## 2017-10-20 RX ADMIN — Medication 100 MILLIEQUIVALENT(S): at 16:16

## 2017-10-20 RX ADMIN — LAMOTRIGINE 100 MILLIGRAM(S): 25 TABLET, ORALLY DISINTEGRATING ORAL at 05:22

## 2017-10-20 RX ADMIN — Medication 2 MILLIGRAM(S): at 21:57

## 2017-10-20 RX ADMIN — RISPERIDONE 8 MILLIGRAM(S): 4 TABLET ORAL at 21:44

## 2017-10-20 RX ADMIN — Medication 1 APPLICATION(S): at 21:45

## 2017-10-20 RX ADMIN — HEPARIN SODIUM 5000 UNIT(S): 5000 INJECTION INTRAVENOUS; SUBCUTANEOUS at 05:22

## 2017-10-20 RX ADMIN — LAMOTRIGINE 100 MILLIGRAM(S): 25 TABLET, ORALLY DISINTEGRATING ORAL at 14:07

## 2017-10-20 RX ADMIN — Medication 2 MILLIGRAM(S): at 12:40

## 2017-10-20 RX ADMIN — HYDROMORPHONE HYDROCHLORIDE 0.5 MILLIGRAM(S): 2 INJECTION INTRAMUSCULAR; INTRAVENOUS; SUBCUTANEOUS at 03:30

## 2017-10-20 RX ADMIN — HYDROMORPHONE HYDROCHLORIDE 0.5 MILLIGRAM(S): 2 INJECTION INTRAMUSCULAR; INTRAVENOUS; SUBCUTANEOUS at 10:31

## 2017-10-20 NOTE — PROGRESS NOTE ADULT - PROBLEM SELECTOR PLAN 1
work up in progress  RF elevated  ESR elevated  low grade temp persists  CT scan report reviewed  ID follow up noted  supportive care

## 2017-10-20 NOTE — PROGRESS NOTE ADULT - PROBLEM SELECTOR PLAN 1
Given stability, negative culture data, and unrevealing physical exam I don't see a role to give antibiotics empirically at this time  F/U CBC  wound encourage incentive spirometer and oob

## 2017-10-20 NOTE — PROGRESS NOTE ADULT - ASSESSMENT
32-year-old female now status post right lower extremity  fasciotomy for compartment syndrome. Pt on vent support.     ·	GHAZAL, ATN Septic / ischemic, Rhabdomyolysis  ·	s/p surgery for compartment syndrome  ·	Anemia  ·	Hypokalemia    Improving renal function. Avoid nephrotoxic meds as possible.   Will follow electrolytes and renal function trend.   Surgery follow up. Monitor h/h. On multiple pain meds.   Pain management follow up. Potassium supplements. Surgery follow up.

## 2017-10-20 NOTE — PROGRESS NOTE ADULT - SUBJECTIVE AND OBJECTIVE BOX
Patient is a 32y old  Female who presents with a chief complaint of RLE edema and numbness (13 Oct 2017 16:25)      Vascular Surgery Progress Note    Interval HPI: POD #16 .Patient is still running low grade temperatures and has ongoing anemia. In view of this I will order a CT scan of right leg to r/o a fluid collection.    Medications:      Allergies:  Allergies    avocado (Unknown)  latex (Urticaria; Rash)  penicillin (Unknown)  sulfa drugs (Unknown)    Intolerances        Vital Signs Last 24 Hrs  T(C): 37.2 (20 Oct 2017 16:20), Max: 38.2 (19 Oct 2017 20:09)  T(F): 99 (20 Oct 2017 16:20), Max: 100.7 (19 Oct 2017 20:09)  HR: 88 (20 Oct 2017 16:20) (79 - 106)  BP: 94/50 (20 Oct 2017 16:20) (94/50 - 108/69)  BP(mean): --  RR: 16 (20 Oct 2017 16:20) (16 - 17)  SpO2: 98% (20 Oct 2017 16:20) (96% - 98%)  I&O's Summary    19 Oct 2017 07:01  -  20 Oct 2017 07:00  --------------------------------------------------------  IN: 1600 mL / OUT: 3350 mL / NET: -1750 mL    20 Oct 2017 07:01  -  20 Oct 2017 18:38  --------------------------------------------------------  IN: 300 mL / OUT: 1650 mL / NET: -1350 mL        Physical Exam:  Gen: NAD, A&Ox3  Right leg wounds are clean  Right foot is warm and pink with palpable pedal pulses  Neurologically unchanged.        LABS:                        8.1    10.6  )-----------( 280      ( 20 Oct 2017 08:25 )             24.0     10-20    135  |  104  |  42<H>  ----------------------------<  111<H>  3.2<L>   |  19<L>  |  1.60<H>    Ca    8.1<L>      20 Oct 2017 08:25    TPro  5.6<L>  /  Alb  1.6<L>  /  TBili  0.3  /  DBili  x   /  AST  24  /  ALT  17  /  AlkPhos  39<L>  10-19

## 2017-10-20 NOTE — PROGRESS NOTE ADULT - PROBLEM SELECTOR PLAN 3
Pt having low grade fever, for CT A/P is normal  Pt is off antibx and leucocytosis improving.  ID consult noted. will follow with lab work as adv.

## 2017-10-20 NOTE — PROGRESS NOTE ADULT - PROBLEM SELECTOR PLAN 3
rhabdo  aris  monitor labs  cr better  overall better  compartment syndrome - post op, wound care, vascular follow up

## 2017-10-20 NOTE — PROGRESS NOTE ADULT - PROBLEM SELECTOR PLAN 1
-compartment syndrome of the right lower extremity   -s/p emergent fasciotomy by Vascular surgery (Bobbi)   -s/p wound vac placement f/u with vascular sx  - Pt is cleared by vascular for Plastic surgery for skin grafting.   -Finished course of IV abx aztreonam and clindamycin in ICU  -monitor labs, increase activity

## 2017-10-20 NOTE — PROGRESS NOTE ADULT - ASSESSMENT
Will order a CT scan of right leg to r/o a fluid collection in view of ongoing temperatures and anemia

## 2017-10-20 NOTE — PROGRESS NOTE ADULT - SUBJECTIVE AND OBJECTIVE BOX
Patient is a 32y old  Female who presents with a chief complaint of RLE edema and numbness (13 Oct 2017 16:25)      INTERVAL HPI:  32y old  Female who presents with a chief complaint of RLE edema and numbness. Patient admitted for Compartment syndrome, s/p Fasciotomy. Dr. Damico consulted for pain management and concern for opoid dependence and abuse.  Patient is aware they are being tapered and managed accordingly with pain management Dr. Damico.      Admitted for compartment syndrome, s/p Sx and vac  placement. S/p 2 units of PRBCs. WBCs Renal function improving.    INTERVAL HPI: Pt seen and examined. c/o pain in RLE. no N/V, F/Ch. Not doing much PT, but more co-operative  OVERNIGHT EVENTS:  T(F): 99.7 (10-20-17 @ 08:45), Max: 100.7 (10-19-17 @ 20:09)  HR: 79 (10-20-17 @ 07:39) (79 - 106)  BP: 99/62 (10-20-17 @ 07:39) (99/62 - 108/69)  RR: 17 (10-20-17 @ 07:39) (16 - 17)  SpO2: 96% (10-20-17 @ 07:39) (96% - 98%)  Wt(kg): --  I&O's Summary    19 Oct 2017 07:01  -  20 Oct 2017 07:00  --------------------------------------------------------  IN: 1600 mL / OUT: 3350 mL / NET: -1750 mL        REVIEW OF SYSTEM:    Constitutional: No fever, chills, fatigue  Neuro: No headache, numbness, weakness  Resp: No cough, wheezing, shortness of breath  CVS: No chest pain, palpitations, leg swelling  GI: No abdominal pain, nausea, vomiting, diarrhea   : No dysuria, frequency, incontinence  Skin: No itching, burning, rashes, or lesions   Msk: No joint pain or swelling  Psych: No depression, anxiety, mood swings      PHYSICAL EXAM:  GENERAL: NAD, well-developed  HEAD:  Atraumatic, Normocephalic  EYES: EOMI, PERRLA, conjunctiva and sclera clear  ENMT: No tonsillar erythema, exudates, or enlargement; Moist mucous membranes,   NECK: Supple, No JVD, Normal thyroid  HEART: Regular rate and rhythm; No murmurs, rubs, or gallops  RESPIRATORY: CTA B/L, No wheezing / rhonchi  ABDOMEN: Soft, Nontender, Nondistended; Bowel sounds present  NEUROLOGY: A&Ox3, RLE persistently paralysed with no sensation  EXTREMITIES: RLE wrapped with ace bandage, with wound vac. + pulses B/L and edema of RLE  SKIN: warm, dry, normal color, no rash or abnormal lesions          LABS:                        8.1    10.6  )-----------( 280      ( 20 Oct 2017 08:25 )             24.0     10-20    135  |  104  |  42<H>  ----------------------------<  111<H>  3.2<L>   |  19<L>  |  1.60<H>    Ca    8.1<L>      20 Oct 2017 08:25    TPro  5.6<L>  /  Alb  1.6<L>  /  TBili  0.3  /  DBili  x   /  AST  24  /  ALT  17  /  AlkPhos  39<L>  10-19        CAPILLARY BLOOD GLUCOSE                  MEDICATIONS  (STANDING):  BACItracin   Ointment 1 Application(s) Topical two times a day  buDESOnide  80 MICROgram(s)/formoterol 4.5 MICROgram(s) Inhaler 2 Puff(s) Inhalation two times a day  buPROPion XL . 300 milliGRAM(s) Oral daily  fluticasone propionate 50 MICROgram(s)/spray Nasal Spray 1 Spray(s) Both Nostrils two times a day  heparin  Injectable 5000 Unit(s) SubCutaneous every 8 hours  influenza   Vaccine 0.5 milliLiter(s) IntraMuscular once  lamoTRIgine 100 milliGRAM(s) Oral every 8 hours  levothyroxine 150 MICROGram(s) Oral daily  oxyCODONE  ER Tablet 60 milliGRAM(s) Oral every 8 hours  pantoprazole    Tablet 40 milliGRAM(s) Oral before breakfast  potassium chloride    Tablet ER 40 milliEquivalent(s) Oral once  potassium chloride  10 mEq/100 mL IVPB 10 milliEquivalent(s) IV Intermittent every 1 hour  risperiDONE   Tablet 8 milliGRAM(s) Oral at bedtime  sertraline 50 milliGRAM(s) Oral daily  topiramate 400 milliGRAM(s) Oral two times a day    MEDICATIONS  (PRN):  acetaminophen   Tablet 650 milliGRAM(s) Oral every 6 hours PRN For Temp greater than 38 C (100.4 F)  acetaminophen   Tablet. 650 milliGRAM(s) Oral every 6 hours PRN Mild Pain (1 - 3)  ALBUTerol    90 MICROgram(s) HFA Inhaler 2 Puff(s) Inhalation every 6 hours PRN Shortness of Breath and/or Wheezing  clonazePAM Tablet 2 milliGRAM(s) Oral every 8 hours PRN anxiety  docusate sodium 100 milliGRAM(s) Oral two times a day PRN Constipation  HYDROmorphone  Injectable 0.5 milliGRAM(s) IV Push every 3 hours PRN Severe Pain (7 - 10)  ondansetron Injectable 4 milliGRAM(s) IV Push every 4 hours PRN Nausea and/or Vomiting  senna 2 Tablet(s) Oral at bedtime PRN Constipation

## 2017-10-20 NOTE — PROGRESS NOTE ADULT - SUBJECTIVE AND OBJECTIVE BOX
[facility], Division of Infectious Diseases  NATALIO Hopkins A. Lee    Name: ABELARDO CROWELL  Age: 32y  Gender: Female  MRN: [id number]    Interval History--  Notes reviewed. Patient somnolent, rouses easily. No complaints.     Past Medical History--  Low back pain  Hypothyroid  Bipolar 1 disorder  History of spinal fusion  History of laminectomy      For details regarding the patient's social history, family history, and other miscellaneous elements, please refer the initial infectious diseases consultation and/or the admitting history and physical examination for this admission.    Allergies    avocado (Unknown)  latex (Urticaria; Rash)  penicillin (Unknown)  sulfa drugs (Unknown)    Intolerances        Medications--  Antibiotics:    Immunologic:  influenza   Vaccine 0.5 milliLiter(s) IntraMuscular once    Other:  acetaminophen   Tablet PRN  acetaminophen   Tablet. PRN  ALBUTerol    90 MICROgram(s) HFA Inhaler PRN  BACItracin   Ointment  buDESOnide  80 MICROgram(s)/formoterol 4.5 MICROgram(s) Inhaler  buPROPion XL .  clonazePAM Tablet PRN  docusate sodium PRN  fluticasone propionate 50 MICROgram(s)/spray Nasal Spray  heparin  Injectable  HYDROmorphone  Injectable PRN  lamoTRIgine  levothyroxine  ondansetron Injectable PRN  oxyCODONE  ER Tablet  pantoprazole    Tablet  potassium chloride  10 mEq/100 mL IVPB  risperiDONE   Tablet  senna PRN  sertraline  topiramate      Review of Systems--  Review of systems otherwise unchanged except as previously noted.    Physical Examination--  Vital Signs: T(F): 99 (10-20-17 @ 12:15), Max: 100.7 (10-19-17 @ 20:09)  HR: 79 (10-20-17 @ 07:39)  BP: 99/62 (10-20-17 @ 07:39)  RR: 17 (10-20-17 @ 07:39)  SpO2: 96% (10-20-17 @ 07:39)  Wt(kg): --  General: Nontoxic-appearing Female in no acute distress.  HEENT: AT/NC. Anicteric. Conjunctiva pale and moist. Oropharynx pale but clear. Dentition fair.  Neck: Not rigid. No sense of mass.  Nodes: None palpable.  Lungs: Clear bilaterally without rales, wheezing or rhonchi  Heart: RRR. No Murmur. No rub. No gallop. No palpable thrill.  Abdomen: Bowel sounds present and normoactive. Soft. Nondistended. Nontender. No sense of mass. No organomegaly.  Extremities: No cyanosis or clubbing. LLE no edema. RLE wrapped. Foot warm, edematous. VAC under ACE. No IV phelbitis.  Skin: Pale. Dry. Good turgor. No rash. No vasculitic stigmata.  Psychiatric: Sleepy, oriented when roused.      Laboratory Studies--  CBC                        8.1    10.6  )-----------( 280      ( 20 Oct 2017 08:25 )             24.0     WBC Count: 11.4 K/uL (10.19.17 @ 14:02)      Chemistries  10-20    135  |  104  |  42<H>  ----------------------------<  111<H>  3.2<L>   |  19<L>  |  1.60<H>    Ca    8.1<L>      20 Oct 2017 08:25    TPro  5.6<L>  /  Alb  1.6<L>  /  TBili  0.3  /  DBili  x   /  AST  24  /  ALT  17  /  AlkPhos  39<L>  10-19    Duplex reviewed, no DVT    Culture Data  Culture - Urine (10.16.17 @ 09:28)    Specimen Source: .Urine Clean Catch (Midstream)    Culture Results:   10,000 - 49,000 CFU/mL Coag negative Staphylococcus not Staph  saprophyticus    Culture - Blood (10.16.17 @ 09:27)    Specimen Source: .Blood Blood-Peripheral    Culture Results:   No growth to date.    Culture - Blood (10.16.17 @ 09:27)    Specimen Source: .Blood Blood-Peripheral    Culture Results:   No growth to date.

## 2017-10-20 NOTE — PROGRESS NOTE ADULT - SUBJECTIVE AND OBJECTIVE BOX
Date/Time Patient Seen:  		  Referring MD:   Data Reviewed	       Patient is a 32y old  Female who presents with a chief complaint of RLE edema and numbness (13 Oct 2017 16:25)  in bed  seen and examined  vs and meds reviewed        Subjective/HPI     PAST MEDICAL & SURGICAL HISTORY:  Low back pain  Hypothyroid  Bipolar 1 disorder  History of spinal fusion  History of laminectomy        Medication list         MEDICATIONS  (STANDING):  BACItracin   Ointment 1 Application(s) Topical two times a day  buDESOnide  80 MICROgram(s)/formoterol 4.5 MICROgram(s) Inhaler 2 Puff(s) Inhalation two times a day  buPROPion XL . 300 milliGRAM(s) Oral daily  fluticasone propionate 50 MICROgram(s)/spray Nasal Spray 1 Spray(s) Both Nostrils two times a day  heparin  Injectable 5000 Unit(s) SubCutaneous every 8 hours  influenza   Vaccine 0.5 milliLiter(s) IntraMuscular once  lamoTRIgine 100 milliGRAM(s) Oral every 8 hours  levothyroxine 150 MICROGram(s) Oral daily  oxyCODONE  ER Tablet 60 milliGRAM(s) Oral every 8 hours  pantoprazole    Tablet 40 milliGRAM(s) Oral before breakfast  risperiDONE   Tablet 8 milliGRAM(s) Oral at bedtime  sertraline 50 milliGRAM(s) Oral daily  topiramate 400 milliGRAM(s) Oral two times a day    MEDICATIONS  (PRN):  acetaminophen   Tablet 650 milliGRAM(s) Oral every 6 hours PRN For Temp greater than 38 C (100.4 F)  acetaminophen   Tablet. 650 milliGRAM(s) Oral every 6 hours PRN Mild Pain (1 - 3)  ALBUTerol    90 MICROgram(s) HFA Inhaler 2 Puff(s) Inhalation every 6 hours PRN Shortness of Breath and/or Wheezing  clonazePAM Tablet 2 milliGRAM(s) Oral every 8 hours PRN anxiety  docusate sodium 100 milliGRAM(s) Oral two times a day PRN Constipation  HYDROmorphone  Injectable 0.5 milliGRAM(s) IV Push every 3 hours PRN Severe Pain (7 - 10)  ondansetron Injectable 4 milliGRAM(s) IV Push every 4 hours PRN Nausea and/or Vomiting  senna 2 Tablet(s) Oral at bedtime PRN Constipation         Vitals log        ICU Vital Signs Last 24 Hrs  T(C): 38 (20 Oct 2017 04:00), Max: 38.2 (19 Oct 2017 20:09)  T(F): 100.4 (20 Oct 2017 04:00), Max: 100.7 (19 Oct 2017 20:09)  HR: 102 (19 Oct 2017 23:30) (97 - 106)  BP: 108/69 (19 Oct 2017 23:30) (99/68 - 109/73)  BP(mean): --  ABP: --  ABP(mean): --  RR: 17 (19 Oct 2017 23:30) (16 - 17)  SpO2: 97% (19 Oct 2017 23:30) (97% - 98%)           Input and Output:  I&O's Detail    18 Oct 2017 07:01  -  19 Oct 2017 07:00  --------------------------------------------------------  IN:    Oral Fluid: 360 mL  Total IN: 360 mL    OUT:    Voided: 750 mL  Total OUT: 750 mL    Total NET: -390 mL      19 Oct 2017 07:01  -  20 Oct 2017 06:45  --------------------------------------------------------  IN:    Oral Fluid: 1600 mL  Total IN: 1600 mL    OUT:    VAC (Vacuum Assisted Closure) System: 1600 mL    Voided: 1750 mL  Total OUT: 3350 mL    Total NET: -1750 mL          Lab Data                        8.5    11.4  )-----------( 287      ( 19 Oct 2017 14:02 )             26.2     10-19    136  |  104  |  46<H>  ----------------------------<  129<H>  3.4<L>   |  21<L>  |  1.90<H>    Ca    8.3<L>      19 Oct 2017 14:02  Phos  4.4     10-18  Mg     1.9     10-18    TPro  5.6<L>  /  Alb  1.6<L>  /  TBili  0.3  /  DBili  x   /  AST  24  /  ALT  17  /  AlkPhos  39<L>  10-19            Review of Systems	      Objective     Physical Examination    head at  heart - s1s2  lungs - dec BS  abd - soft      Pertinent Lab findings & Imaging      Erlinda:  NO   Adequate UO     I&O's Detail    18 Oct 2017 07:01  -  19 Oct 2017 07:00  --------------------------------------------------------  IN:    Oral Fluid: 360 mL  Total IN: 360 mL    OUT:    Voided: 750 mL  Total OUT: 750 mL    Total NET: -390 mL      19 Oct 2017 07:01  -  20 Oct 2017 06:45  --------------------------------------------------------  IN:    Oral Fluid: 1600 mL  Total IN: 1600 mL    OUT:    VAC (Vacuum Assisted Closure) System: 1600 mL    Voided: 1750 mL  Total OUT: 3350 mL    Total NET: -1750 mL               Discussed with:     Cultures:	        Radiology

## 2017-10-20 NOTE — PROGRESS NOTE ADULT - ASSESSMENT
32F with opiate dependence, anxiety disorder, admitted with leg swelling   S/P fasciotomy for compartment syndrome  S/P Rhabdomyolysis  S/P obtundation/immobility due to drug overdose  GHAZAL, improving  Low grade fever persists, perhaps improved trend  Elevate ESR. Minimally elevated.  +Urine cx, no symptoms. Would not treat at this time  No clear pathology to explain fevers on CT  RLE doing well per surgery

## 2017-10-20 NOTE — PROGRESS NOTE ADULT - SUBJECTIVE AND OBJECTIVE BOX
Patient is a 32y old  Female who presents with a chief complaint of RLE edema and numbness (04 Oct 2017 01:13)      Patient seen in follow up for GHAZAL, ATN, Rhabdomyolysis. Improving renal function trend. Still with low potassium.     PAST MEDICAL HISTORY:  Low back pain  Hypothyroi  Bipolar 1 disorder      MEDICATIONS  (STANDING):  BACItracin   Ointment 1 Application(s) Topical two times a day  buDESOnide  80 MICROgram(s)/formoterol 4.5 MICROgram(s) Inhaler 2 Puff(s) Inhalation two times a day  buPROPion XL . 300 milliGRAM(s) Oral daily  fluticasone propionate 50 MICROgram(s)/spray Nasal Spray 1 Spray(s) Both Nostrils two times a day  heparin  Injectable 5000 Unit(s) SubCutaneous every 8 hours  influenza   Vaccine 0.5 milliLiter(s) IntraMuscular once  lamoTRIgine 100 milliGRAM(s) Oral every 8 hours  levothyroxine 150 MICROGram(s) Oral daily  oxyCODONE  ER Tablet 60 milliGRAM(s) Oral every 8 hours  pantoprazole    Tablet 40 milliGRAM(s) Oral before breakfast  potassium chloride    Tablet ER 40 milliEquivalent(s) Oral once  potassium chloride  10 mEq/100 mL IVPB 10 milliEquivalent(s) IV Intermittent every 1 hour  risperiDONE   Tablet 8 milliGRAM(s) Oral at bedtime  sertraline 50 milliGRAM(s) Oral daily  topiramate 400 milliGRAM(s) Oral two times a day    MEDICATIONS  (PRN):  acetaminophen   Tablet 650 milliGRAM(s) Oral every 6 hours PRN For Temp greater than 38 C (100.4 F)  acetaminophen   Tablet. 650 milliGRAM(s) Oral every 6 hours PRN Mild Pain (1 - 3)  ALBUTerol    90 MICROgram(s) HFA Inhaler 2 Puff(s) Inhalation every 6 hours PRN Shortness of Breath and/or Wheezing  clonazePAM Tablet 2 milliGRAM(s) Oral every 8 hours PRN anxiety  docusate sodium 100 milliGRAM(s) Oral two times a day PRN Constipation  HYDROmorphone  Injectable 0.5 milliGRAM(s) IV Push every 3 hours PRN Severe Pain (7 - 10)  ondansetron Injectable 4 milliGRAM(s) IV Push every 4 hours PRN Nausea and/or Vomiting  senna 2 Tablet(s) Oral at bedtime PRN Constipation    T(C): 37.6 (10-20-17 @ 08:45), Max: 38.2 (10-19-17 @ 20:09)  HR: 79 (10-20-17 @ 07:39) (79 - 106)  BP: 99/62 (10-20-17 @ 07:39) (99/62 - 111/70)  RR: 17 (10-20-17 @ 07:39)  SpO2: 96% (10-20-17 @ 07:39)  Wt(kg): --  I&O's Detail    19 Oct 2017 07:01  -  20 Oct 2017 07:00  --------------------------------------------------------  IN:    Oral Fluid: 1600 mL  Total IN: 1600 mL    OUT:    VAC (Vacuum Assisted Closure) System: 1600 mL    Voided: 1750 mL  Total OUT: 3350 mL    Total NET: -1750 mL                  PHYSICAL EXAM:  General: NAD  Respiratory: b/l air entry  Cardiovascular: S1 S2  Gastrointestinal: soft  Extremities:  Rt leg dressing    LABORATORY:                        8.1    10.6  )-----------( 280      ( 20 Oct 2017 08:25 )             24.0     10-20    135  |  104  |  42<H>  ----------------------------<  111<H>  3.2<L>   |  19<L>  |  1.60<H>    Ca    8.1<L>      20 Oct 2017 08:25    TPro  5.6<L>  /  Alb  1.6<L>  /  TBili  0.3  /  DBili  x   /  AST  24  /  ALT  17  /  AlkPhos  39<L>  10-19    Sodium, Serum: 135 mmol/L (10-20 @ 08:25)  Sodium, Serum: 136 mmol/L (10-19 @ 14:02)    Potassium, Serum: 3.2 mmol/L (10-20 @ 08:25)  Potassium, Serum: 3.4 mmol/L (10-19 @ 14:02)    Hemoglobin: 8.1 g/dL (10-20 @ 08:25)  Hemoglobin: 8.5 g/dL (10-19 @ 14:02)  Hemoglobin: 9.0 g/dL (10-18 @ 08:47)    Creatinine, Serum 1.60 (10-20 @ 08:25)  Creatinine, Serum 1.90 (10-19 @ 14:02)  Creatinine, Serum 2.30 (10-18 @ 08:47)        LIVER FUNCTIONS - ( 19 Oct 2017 14:02 )  Alb: 1.6 g/dL / Pro: 5.6 g/dL / ALK PHOS: 39 U/L / ALT: 17 U/L / AST: 24 U/L / GGT: x

## 2017-10-20 NOTE — PROGRESS NOTE ADULT - SUBJECTIVE AND OBJECTIVE BOX
[facility] - Bonnie Hernandez, Keena, April, Ale, Carissa Allen  Office Number:  [phone]    Patient resting comfortably in bed in NAD.  Laying flat with no respiratory distress.  No complaints of chest pain, dyspnea, palpitations, PND, or orthopnea.      MEDICATIONS  (STANDING):  BACItracin   Ointment 1 Application(s) Topical two times a day  buDESOnide  80 MICROgram(s)/formoterol 4.5 MICROgram(s) Inhaler 2 Puff(s) Inhalation two times a day  buPROPion XL . 300 milliGRAM(s) Oral daily  fluticasone propionate 50 MICROgram(s)/spray Nasal Spray 1 Spray(s) Both Nostrils two times a day  heparin  Injectable 5000 Unit(s) SubCutaneous every 8 hours  influenza   Vaccine 0.5 milliLiter(s) IntraMuscular once  lamoTRIgine 100 milliGRAM(s) Oral every 8 hours  levothyroxine 150 MICROGram(s) Oral daily  oxyCODONE  ER Tablet 60 milliGRAM(s) Oral every 8 hours  pantoprazole    Tablet 40 milliGRAM(s) Oral before breakfast  potassium chloride    Tablet ER 40 milliEquivalent(s) Oral once  potassium chloride  10 mEq/100 mL IVPB 10 milliEquivalent(s) IV Intermittent every 1 hour  risperiDONE   Tablet 8 milliGRAM(s) Oral at bedtime  sertraline 50 milliGRAM(s) Oral daily  topiramate 400 milliGRAM(s) Oral two times a day    MEDICATIONS  (PRN):  acetaminophen   Tablet 650 milliGRAM(s) Oral every 6 hours PRN For Temp greater than 38 C (100.4 F)  acetaminophen   Tablet. 650 milliGRAM(s) Oral every 6 hours PRN Mild Pain (1 - 3)  ALBUTerol    90 MICROgram(s) HFA Inhaler 2 Puff(s) Inhalation every 6 hours PRN Shortness of Breath and/or Wheezing  clonazePAM Tablet 2 milliGRAM(s) Oral every 8 hours PRN anxiety  docusate sodium 100 milliGRAM(s) Oral two times a day PRN Constipation  HYDROmorphone  Injectable 0.5 milliGRAM(s) IV Push every 3 hours PRN Severe Pain (7 - 10)  ondansetron Injectable 4 milliGRAM(s) IV Push every 4 hours PRN Nausea and/or Vomiting  senna 2 Tablet(s) Oral at bedtime PRN Constipation      Allergies    avocado (Unknown)  latex (Urticaria; Rash)  penicillin (Unknown)  sulfa drugs (Unknown)    Intolerances        Vital Signs Last 24 Hrs  T(C): 37.6 (20 Oct 2017 08:45), Max: 38.2 (19 Oct 2017 20:09)  T(F): 99.7 (20 Oct 2017 08:45), Max: 100.7 (19 Oct 2017 20:09)  HR: 79 (20 Oct 2017 07:39) (79 - 106)  BP: 99/62 (20 Oct 2017 07:39) (99/62 - 108/69)  BP(mean): --  RR: 17 (20 Oct 2017 07:39) (16 - 17)  SpO2: 96% (20 Oct 2017 07:39) (96% - 98%)    I&O's Summary    19 Oct 2017 07:01  -  20 Oct 2017 07:00  --------------------------------------------------------  IN: 1600 mL / OUT: 3350 mL / NET: -1750 mL        ON EXAM:    General: NAD, awake and alert, oriented x 3  HEENT: Mucous membranes are moist, anicteric  Lungs: Non-labored, breath sounds are clear bilaterally, No wheezing, rales or rhonchi  Cardiovascular: Regular, S1 and S2, no murmurs, rubs, or gallops  Gastrointestinal: Bowel Sounds present, soft, nontender.   Lymph: No peripheral edema. No lymphadenopathy.  Skin: No rashes or ulcers  Psych:  Mood & affect appropriate    LABS: All Labs Reviewed:                        8.1    10.6  )-----------( 280      ( 20 Oct 2017 08:25 )             24.0                         8.5    11.4  )-----------( 287      ( 19 Oct 2017 14:02 )             26.2                         9.0    15.3  )-----------( 304      ( 18 Oct 2017 08:47 )             26.9     20 Oct 2017 08:25    135    |  104    |  42     ----------------------------<  111    3.2     |  19     |  1.60   19 Oct 2017 14:02    136    |  104    |  46     ----------------------------<  129    3.4     |  21     |  1.90   18 Oct 2017 08:47    131    |  100    |  51     ----------------------------<  155    3.0     |  20     |  2.30     Ca    8.1        20 Oct 2017 08:25  Ca    8.3        19 Oct 2017 14:02  Ca    8.0        18 Oct 2017 08:47  Phos  4.4       18 Oct 2017 08:47  Mg     1.9       18 Oct 2017 08:47    TPro  5.6    /  Alb  1.6    /  TBili  0.3    /  DBili  x      /  AST  24     /  ALT  17     /  AlkPhos  39     19 Oct 2017 14:02          Blood Culture: Organism --  Gram Stain Blood -- Gram Stain --  Specimen Source .Urine Clean Catch (Midstream)  Culture-Blood --    Organism --  Gram Stain Blood -- Gram Stain --  Specimen Source .Blood Blood-Peripheral  Culture-Blood --

## 2017-10-20 NOTE — PROGRESS NOTE ADULT - ASSESSMENT
32-year-old female now status post right lower extremity fasciotomy for prolonged compression after a fall with lethargy.     - Rhabdo resolving; GHAZAL continuously improving; Avoid nephrotoxics  - No obvious cardiac complications post fasciotomy  - No clear evidence of acute ischemia or volume overload  - HR better controlled now at 70's, however, remains with low-grade fever with negative blood cultures.  Care per ID  - Continue Pain control  - TTE with hyperdynamic LV function, EF 70%, no valvular disease noted  - Encourage PO hydration  - Other cardiovascular workup will depend on clinical course.  - All other workup per primary team  - Will follow

## 2017-10-21 LAB
ANION GAP SERPL CALC-SCNC: 10 MMOL/L — SIGNIFICANT CHANGE UP (ref 5–17)
BUN SERPL-MCNC: 37 MG/DL — HIGH (ref 7–23)
CALCIUM SERPL-MCNC: 8.1 MG/DL — LOW (ref 8.5–10.1)
CHLORIDE SERPL-SCNC: 105 MMOL/L — SIGNIFICANT CHANGE UP (ref 96–108)
CO2 SERPL-SCNC: 19 MMOL/L — LOW (ref 22–31)
CREAT SERPL-MCNC: 1.4 MG/DL — HIGH (ref 0.5–1.3)
CULTURE RESULTS: SIGNIFICANT CHANGE UP
CULTURE RESULTS: SIGNIFICANT CHANGE UP
GLUCOSE SERPL-MCNC: 95 MG/DL — SIGNIFICANT CHANGE UP (ref 70–99)
HCT VFR BLD CALC: 23.2 % — LOW (ref 34.5–45)
HGB BLD-MCNC: 7.8 G/DL — LOW (ref 11.5–15.5)
MAGNESIUM SERPL-MCNC: 1.8 MG/DL — SIGNIFICANT CHANGE UP (ref 1.6–2.6)
MCHC RBC-ENTMCNC: 30 PG — SIGNIFICANT CHANGE UP (ref 27–34)
MCHC RBC-ENTMCNC: 33.4 GM/DL — SIGNIFICANT CHANGE UP (ref 32–36)
MCV RBC AUTO: 90 FL — SIGNIFICANT CHANGE UP (ref 80–100)
PLATELET # BLD AUTO: 262 K/UL — SIGNIFICANT CHANGE UP (ref 150–400)
POTASSIUM SERPL-MCNC: 3.6 MMOL/L — SIGNIFICANT CHANGE UP (ref 3.5–5.3)
POTASSIUM SERPL-SCNC: 3.6 MMOL/L — SIGNIFICANT CHANGE UP (ref 3.5–5.3)
RBC # BLD: 2.58 M/UL — LOW (ref 3.8–5.2)
RBC # FLD: 12.2 % — SIGNIFICANT CHANGE UP (ref 10.3–14.5)
SODIUM SERPL-SCNC: 134 MMOL/L — LOW (ref 135–145)
SPECIMEN SOURCE: SIGNIFICANT CHANGE UP
SPECIMEN SOURCE: SIGNIFICANT CHANGE UP
WBC # BLD: 11.8 K/UL — HIGH (ref 3.8–10.5)
WBC # FLD AUTO: 11.8 K/UL — HIGH (ref 3.8–10.5)

## 2017-10-21 PROCEDURE — 99232 SBSQ HOSP IP/OBS MODERATE 35: CPT

## 2017-10-21 PROCEDURE — 99233 SBSQ HOSP IP/OBS HIGH 50: CPT

## 2017-10-21 PROCEDURE — 73700 CT LOWER EXTREMITY W/O DYE: CPT | Mod: 26,RT

## 2017-10-21 RX ADMIN — OXYCODONE HYDROCHLORIDE 60 MILLIGRAM(S): 5 TABLET ORAL at 22:00

## 2017-10-21 RX ADMIN — HEPARIN SODIUM 5000 UNIT(S): 5000 INJECTION INTRAVENOUS; SUBCUTANEOUS at 05:10

## 2017-10-21 RX ADMIN — HYDROMORPHONE HYDROCHLORIDE 0.5 MILLIGRAM(S): 2 INJECTION INTRAMUSCULAR; INTRAVENOUS; SUBCUTANEOUS at 18:51

## 2017-10-21 RX ADMIN — OXYCODONE HYDROCHLORIDE 60 MILLIGRAM(S): 5 TABLET ORAL at 07:00

## 2017-10-21 RX ADMIN — HYDROMORPHONE HYDROCHLORIDE 0.5 MILLIGRAM(S): 2 INJECTION INTRAMUSCULAR; INTRAVENOUS; SUBCUTANEOUS at 15:22

## 2017-10-21 RX ADMIN — BUDESONIDE AND FORMOTEROL FUMARATE DIHYDRATE 2 PUFF(S): 160; 4.5 AEROSOL RESPIRATORY (INHALATION) at 19:29

## 2017-10-21 RX ADMIN — LAMOTRIGINE 100 MILLIGRAM(S): 25 TABLET, ORALLY DISINTEGRATING ORAL at 05:48

## 2017-10-21 RX ADMIN — HYDROMORPHONE HYDROCHLORIDE 0.5 MILLIGRAM(S): 2 INJECTION INTRAMUSCULAR; INTRAVENOUS; SUBCUTANEOUS at 04:15

## 2017-10-21 RX ADMIN — HYDROMORPHONE HYDROCHLORIDE 0.5 MILLIGRAM(S): 2 INJECTION INTRAMUSCULAR; INTRAVENOUS; SUBCUTANEOUS at 06:36

## 2017-10-21 RX ADMIN — HYDROMORPHONE HYDROCHLORIDE 0.5 MILLIGRAM(S): 2 INJECTION INTRAMUSCULAR; INTRAVENOUS; SUBCUTANEOUS at 11:14

## 2017-10-21 RX ADMIN — OXYCODONE HYDROCHLORIDE 60 MILLIGRAM(S): 5 TABLET ORAL at 21:19

## 2017-10-21 RX ADMIN — LAMOTRIGINE 100 MILLIGRAM(S): 25 TABLET, ORALLY DISINTEGRATING ORAL at 21:19

## 2017-10-21 RX ADMIN — OXYCODONE HYDROCHLORIDE 60 MILLIGRAM(S): 5 TABLET ORAL at 15:02

## 2017-10-21 RX ADMIN — SERTRALINE 50 MILLIGRAM(S): 25 TABLET, FILM COATED ORAL at 11:57

## 2017-10-21 RX ADMIN — HYDROMORPHONE HYDROCHLORIDE 0.5 MILLIGRAM(S): 2 INJECTION INTRAMUSCULAR; INTRAVENOUS; SUBCUTANEOUS at 19:29

## 2017-10-21 RX ADMIN — OXYCODONE HYDROCHLORIDE 60 MILLIGRAM(S): 5 TABLET ORAL at 05:51

## 2017-10-21 RX ADMIN — LAMOTRIGINE 100 MILLIGRAM(S): 25 TABLET, ORALLY DISINTEGRATING ORAL at 15:03

## 2017-10-21 RX ADMIN — Medication 1 SPRAY(S): at 18:22

## 2017-10-21 RX ADMIN — Medication 150 MICROGRAM(S): at 05:10

## 2017-10-21 RX ADMIN — ONDANSETRON 4 MILLIGRAM(S): 8 TABLET, FILM COATED ORAL at 15:03

## 2017-10-21 RX ADMIN — HYDROMORPHONE HYDROCHLORIDE 0.5 MILLIGRAM(S): 2 INJECTION INTRAMUSCULAR; INTRAVENOUS; SUBCUTANEOUS at 11:50

## 2017-10-21 RX ADMIN — HYDROMORPHONE HYDROCHLORIDE 0.5 MILLIGRAM(S): 2 INJECTION INTRAMUSCULAR; INTRAVENOUS; SUBCUTANEOUS at 07:33

## 2017-10-21 RX ADMIN — Medication 1 SPRAY(S): at 05:10

## 2017-10-21 RX ADMIN — PANTOPRAZOLE SODIUM 40 MILLIGRAM(S): 20 TABLET, DELAYED RELEASE ORAL at 05:48

## 2017-10-21 RX ADMIN — BUPROPION HYDROCHLORIDE 300 MILLIGRAM(S): 150 TABLET, EXTENDED RELEASE ORAL at 11:57

## 2017-10-21 RX ADMIN — ALBUTEROL 2 PUFF(S): 90 AEROSOL, METERED ORAL at 03:26

## 2017-10-21 RX ADMIN — Medication 650 MILLIGRAM(S): at 03:20

## 2017-10-21 RX ADMIN — Medication 2 MILLIGRAM(S): at 15:22

## 2017-10-21 RX ADMIN — RISPERIDONE 8 MILLIGRAM(S): 4 TABLET ORAL at 21:19

## 2017-10-21 RX ADMIN — Medication 1 APPLICATION(S): at 05:17

## 2017-10-21 RX ADMIN — HYDROMORPHONE HYDROCHLORIDE 0.5 MILLIGRAM(S): 2 INJECTION INTRAMUSCULAR; INTRAVENOUS; SUBCUTANEOUS at 16:00

## 2017-10-21 RX ADMIN — HEPARIN SODIUM 5000 UNIT(S): 5000 INJECTION INTRAVENOUS; SUBCUTANEOUS at 21:19

## 2017-10-21 RX ADMIN — HEPARIN SODIUM 5000 UNIT(S): 5000 INJECTION INTRAVENOUS; SUBCUTANEOUS at 15:03

## 2017-10-21 RX ADMIN — OXYCODONE HYDROCHLORIDE 60 MILLIGRAM(S): 5 TABLET ORAL at 15:30

## 2017-10-21 RX ADMIN — Medication 400 MILLIGRAM(S): at 05:48

## 2017-10-21 RX ADMIN — HYDROMORPHONE HYDROCHLORIDE 0.5 MILLIGRAM(S): 2 INJECTION INTRAMUSCULAR; INTRAVENOUS; SUBCUTANEOUS at 03:21

## 2017-10-21 RX ADMIN — BUDESONIDE AND FORMOTEROL FUMARATE DIHYDRATE 2 PUFF(S): 160; 4.5 AEROSOL RESPIRATORY (INHALATION) at 09:39

## 2017-10-21 RX ADMIN — Medication 400 MILLIGRAM(S): at 18:21

## 2017-10-21 NOTE — PROGRESS NOTE ADULT - SUBJECTIVE AND OBJECTIVE BOX
[facility] - Bonnie Hernandez, Keena, April, Ale, Carissa Allen  Office Number:  [phone]    Patient resting comfortably in bed in NAD.  Laying flat with no respiratory distress.  No complaints of chest pain, dyspnea, palpitations, PND, or orthopnea.  Asking for more pain medications    MEDICATIONS  (STANDING):  BACItracin   Ointment 1 Application(s) Topical two times a day  buDESOnide  80 MICROgram(s)/formoterol 4.5 MICROgram(s) Inhaler 2 Puff(s) Inhalation two times a day  buPROPion XL . 300 milliGRAM(s) Oral daily  fluticasone propionate 50 MICROgram(s)/spray Nasal Spray 1 Spray(s) Both Nostrils two times a day  heparin  Injectable 5000 Unit(s) SubCutaneous every 8 hours  influenza   Vaccine 0.5 milliLiter(s) IntraMuscular once  lamoTRIgine 100 milliGRAM(s) Oral every 8 hours  levothyroxine 150 MICROGram(s) Oral daily  oxyCODONE  ER Tablet 60 milliGRAM(s) Oral every 8 hours  pantoprazole    Tablet 40 milliGRAM(s) Oral before breakfast  risperiDONE   Tablet 8 milliGRAM(s) Oral at bedtime  sertraline 50 milliGRAM(s) Oral daily  topiramate 400 milliGRAM(s) Oral two times a day    MEDICATIONS  (PRN):  acetaminophen   Tablet 650 milliGRAM(s) Oral every 6 hours PRN For Temp greater than 38 C (100.4 F)  acetaminophen   Tablet. 650 milliGRAM(s) Oral every 6 hours PRN Mild Pain (1 - 3)  ALBUTerol    90 MICROgram(s) HFA Inhaler 2 Puff(s) Inhalation every 6 hours PRN Shortness of Breath and/or Wheezing  clonazePAM Tablet 2 milliGRAM(s) Oral every 8 hours PRN anxiety  docusate sodium 100 milliGRAM(s) Oral two times a day PRN Constipation  HYDROmorphone  Injectable 0.5 milliGRAM(s) IV Push every 3 hours PRN Severe Pain (7 - 10)  ondansetron Injectable 4 milliGRAM(s) IV Push every 4 hours PRN Nausea and/or Vomiting  senna 2 Tablet(s) Oral at bedtime PRN Constipation      Allergies    avocado (Unknown)  latex (Urticaria; Rash)  penicillin (Unknown)  sulfa drugs (Unknown)        Vital Signs Last 24 Hrs  T(C): 37.2 (21 Oct 2017 08:36), Max: 38 (21 Oct 2017 03:00)  T(F): 99 (21 Oct 2017 08:36), Max: 100.4 (21 Oct 2017 03:00)  HR: 102 (21 Oct 2017 08:36) (88 - 107)  BP: 101/65 (21 Oct 2017 08:36) (90/57 - 101/65)  BP(mean): --  RR: 17 (21 Oct 2017 08:36) (16 - 17)  SpO2: 95% (21 Oct 2017 08:36) (95% - 99%)    I&O's Summary    20 Oct 2017 07:01  -  21 Oct 2017 07:00  --------------------------------------------------------  IN: 720 mL / OUT: 3250 mL / NET: -2530 mL        ON EXAM:    General: NAD, awake and alert, oriented x 3  HEENT: Mucous membranes are moist, anicteric  Lungs: Non-labored, breath sounds are clear bilaterally, No wheezing, rales or rhonchi  Cardiovascular: Regular, S1 and S2, no murmurs, rubs, or gallops  Gastrointestinal: Bowel Sounds present, soft, nontender.   Lymph: No peripheral edema. No lymphadenopathy.  Skin: No rashes or ulcers  Psych:  Mood & affect appropriate    LABS: All Labs Reviewed:                        7.8    11.8  )-----------( 262      ( 21 Oct 2017 06:45 )             23.2                         8.1    10.6  )-----------( 280      ( 20 Oct 2017 08:25 )             24.0                         8.5    11.4  )-----------( 287      ( 19 Oct 2017 14:02 )             26.2     21 Oct 2017 06:45    134    |  105    |  37     ----------------------------<  95     3.6     |  19     |  1.40   20 Oct 2017 08:25    135    |  104    |  42     ----------------------------<  111    3.2     |  19     |  1.60   19 Oct 2017 14:02    136    |  104    |  46     ----------------------------<  129    3.4     |  21     |  1.90     Ca    8.1        21 Oct 2017 06:45  Ca    8.1        20 Oct 2017 08:25  Ca    8.3        19 Oct 2017 14:02  Mg     1.8       21 Oct 2017 06:45    TPro  5.6    /  Alb  1.6    /  TBili  0.3    /  DBili  x      /  AST  24     /  ALT  17     /  AlkPhos  39     19 Oct 2017 14:02          Blood Culture:

## 2017-10-21 NOTE — PROGRESS NOTE ADULT - ASSESSMENT
32-year-old female now status post right lower extremity fasciotomy for prolonged compression after a fall with lethargy.     - Rhabdo resolving; GHAZAL continuously improving; Avoid nephrotoxics  - No obvious cardiac complications post fasciotomy  - No clear evidence of acute ischemia or volume overload  - HR elevated today, likely secondary to pain and low grade fever.    - Continue Pain control  - TTE with hyperdynamic LV function, EF 70%, no valvular disease noted  - Encourage PO hydration  - Other cardiovascular workup will depend on clinical course.  - All other workup per primary team  - Will follow

## 2017-10-21 NOTE — PROGRESS NOTE ADULT - PROBLEM SELECTOR PLAN 3
dilaudid PRN  oxycontin long acting pain relief  supportive care  GHAZAL  Rhabdo  Compartment syndrome  wound care  vascular follow up  psych care and support  mobilize  PT  I darcy

## 2017-10-21 NOTE — PROGRESS NOTE ADULT - SUBJECTIVE AND OBJECTIVE BOX
Date/Time Patient Seen:  		  Referring MD:   Data Reviewed	       Patient is a 32y old  Female who presents with a chief complaint of RLE edema and numbness (13 Oct 2017 16:25)  in bed  seen and examined  vs and meds reviewed  low grade temp noted overnight          Subjective/HPI     PAST MEDICAL & SURGICAL HISTORY:  Low back pain  Hypothyroid  Bipolar 1 disorder  History of spinal fusion  History of laminectomy        Medication list         MEDICATIONS  (STANDING):  BACItracin   Ointment 1 Application(s) Topical two times a day  buDESOnide  80 MICROgram(s)/formoterol 4.5 MICROgram(s) Inhaler 2 Puff(s) Inhalation two times a day  buPROPion XL . 300 milliGRAM(s) Oral daily  fluticasone propionate 50 MICROgram(s)/spray Nasal Spray 1 Spray(s) Both Nostrils two times a day  heparin  Injectable 5000 Unit(s) SubCutaneous every 8 hours  influenza   Vaccine 0.5 milliLiter(s) IntraMuscular once  lamoTRIgine 100 milliGRAM(s) Oral every 8 hours  levothyroxine 150 MICROGram(s) Oral daily  oxyCODONE  ER Tablet 60 milliGRAM(s) Oral every 8 hours  pantoprazole    Tablet 40 milliGRAM(s) Oral before breakfast  risperiDONE   Tablet 8 milliGRAM(s) Oral at bedtime  sertraline 50 milliGRAM(s) Oral daily  topiramate 400 milliGRAM(s) Oral two times a day    MEDICATIONS  (PRN):  acetaminophen   Tablet 650 milliGRAM(s) Oral every 6 hours PRN For Temp greater than 38 C (100.4 F)  acetaminophen   Tablet. 650 milliGRAM(s) Oral every 6 hours PRN Mild Pain (1 - 3)  ALBUTerol    90 MICROgram(s) HFA Inhaler 2 Puff(s) Inhalation every 6 hours PRN Shortness of Breath and/or Wheezing  clonazePAM Tablet 2 milliGRAM(s) Oral every 8 hours PRN anxiety  docusate sodium 100 milliGRAM(s) Oral two times a day PRN Constipation  HYDROmorphone  Injectable 0.5 milliGRAM(s) IV Push every 3 hours PRN Severe Pain (7 - 10)  ondansetron Injectable 4 milliGRAM(s) IV Push every 4 hours PRN Nausea and/or Vomiting  senna 2 Tablet(s) Oral at bedtime PRN Constipation         Vitals log        ICU Vital Signs Last 24 Hrs  T(C): 38 (21 Oct 2017 03:00), Max: 38 (21 Oct 2017 03:00)  T(F): 100.4 (21 Oct 2017 03:00), Max: 100.4 (21 Oct 2017 03:00)  HR: 107 (21 Oct 2017 03:00) (79 - 107)  BP: 96/67 (21 Oct 2017 03:00) (90/57 - 99/62)  BP(mean): --  ABP: --  ABP(mean): --  RR: 17 (21 Oct 2017 03:00) (16 - 17)  SpO2: 99% (21 Oct 2017 03:00) (96% - 99%)           Input and Output:  I&O's Detail    19 Oct 2017 07:01  -  20 Oct 2017 07:00  --------------------------------------------------------  IN:    Oral Fluid: 1600 mL  Total IN: 1600 mL    OUT:    VAC (Vacuum Assisted Closure) System: 1600 mL    Voided: 1750 mL  Total OUT: 3350 mL    Total NET: -1750 mL      20 Oct 2017 07:01  -  21 Oct 2017 06:09  --------------------------------------------------------  IN:    Oral Fluid: 420 mL    Solution: 300 mL  Total IN: 720 mL    OUT:    VAC (Vacuum Assisted Closure) System: 1500 mL    Voided: 1750 mL  Total OUT: 3250 mL    Total NET: -2530 mL          Lab Data                        8.1    10.6  )-----------( 280      ( 20 Oct 2017 08:25 )             24.0     10-20    135  |  104  |  42<H>  ----------------------------<  111<H>  3.2<L>   |  19<L>  |  1.60<H>    Ca    8.1<L>      20 Oct 2017 08:25    TPro  5.6<L>  /  Alb  1.6<L>  /  TBili  0.3  /  DBili  x   /  AST  24  /  ALT  17  /  AlkPhos  39<L>  10-19            Review of Systems	      Objective     Physical Examination    head at  heart - s1s2  lungs - dec BS  abd - soft  cn grossly int      Pertinent Lab findings & Imaging      Erlinda:  NO   Adequate UO     I&O's Detail    19 Oct 2017 07:01  -  20 Oct 2017 07:00  --------------------------------------------------------  IN:    Oral Fluid: 1600 mL  Total IN: 1600 mL    OUT:    VAC (Vacuum Assisted Closure) System: 1600 mL    Voided: 1750 mL  Total OUT: 3350 mL    Total NET: -1750 mL      20 Oct 2017 07:01  -  21 Oct 2017 06:09  --------------------------------------------------------  IN:    Oral Fluid: 420 mL    Solution: 300 mL  Total IN: 720 mL    OUT:    VAC (Vacuum Assisted Closure) System: 1500 mL    Voided: 1750 mL  Total OUT: 3250 mL    Total NET: -2530 mL               Discussed with:     Cultures:	        Radiology

## 2017-10-21 NOTE — PROGRESS NOTE ADULT - ASSESSMENT
32F PMH Asthma, Bipolar disorder, hypothyroidism, chronic low back pain s/p laminectomy and spinal fusion, opioid-dependent presents with right leg swelling, numbness, pallor, poikilothermia, and pulselessness, found to have ischemic leg due to acute compartment syndrome of right leg with acute rhabdomyolysis, GHAZAL 2/2 ATN, and leukocytosis with bandemia s/p emergent fasciotomy of the 4 leg compartments (anterior, lateral, superficial posterior, and deep posterior) and the lateral thigh muscles. Wound vac in place. Pain management for opioid dependence and abuse. 32F PMH Asthma, Bipolar disorder, hypothyroidism, chronic low back pain s/p laminectomy and spinal fusion, opioid-dependent presents with right leg swelling, numbness, pallor, poikilothermia, and pulselessness, found to have ischemic leg due to acute compartment syndrome of right leg with acute rhabdomyolysis, GHAZAL 2/2 ATN, and leukocytosis with bandemia s/p emergent fasciotomy of the 4 leg compartments (anterior, lateral, superficial posterior, and deep posterior) and the lateral thigh muscles. Wound vac in place. Pain management for opioid dependence and abuse.  Low grade temperature. ID and vascular following.

## 2017-10-21 NOTE — PROGRESS NOTE ADULT - PROBLEM SELECTOR PLAN 3
Pt having low grade fever, for CT A/P is normal  Pt is off antibx and leucocytosis improving.  ID consult noted. will follow with lab work as adv. Pt having low grade fever, for CT A/P is normal  Pt is off antibx and leucocytosis improving.  ID consult noted. will follow with lab work as adv.  CT Lower extremity done, no drainable fluid collection, will d/w Dr. Martines

## 2017-10-21 NOTE — PROGRESS NOTE ADULT - SUBJECTIVE AND OBJECTIVE BOX
Patient is a 32y old  Female who presents with a chief complaint of RLE edema and numbness (13 Oct 2017 16:25)      INTERVAL HPI:  32y old  Female who presents with a chief complaint of RLE edema and numbness. Patient admitted for Compartment syndrome, s/p Fasciotomy. Dr. Damico consulted for pain management and concern for opoid dependence and abuse.  Patient is aware they are being tapered and managed accordingly with pain management Dr. Damico.      Admitted for compartment syndrome, s/p Sx and vac  placement. S/p 2 units of PRBCs. WBCs Renal function improving.    Noted to have fevers, ID was consulted    INTERVAL HPI: Pt seen and examined. c/o pain in RLE. no N/V, F/Ch. Not doing much PT, but more co-operative  OVERNIGHT EVENTS:  T(F): 99.7 (10-20-17 @ 08:45), Max: 100.7 (10-19-17 @ 20:09)  HR: 79 (10-20-17 @ 07:39) (79 - 106)  BP: 99/62 (10-20-17 @ 07:39) (99/62 - 108/69)  RR: 17 (10-20-17 @ 07:39) (16 - 17)  SpO2: 96% (10-20-17 @ 07:39) (96% - 98%)  Wt(kg): --  I&O's Summary    19 Oct 2017 07:01  -  20 Oct 2017 07:00  --------------------------------------------------------  IN: 1600 mL / OUT: 3350 mL / NET: -1750 mL        REVIEW OF SYSTEM:    Constitutional: No fever, chills, fatigue  Neuro: No headache, numbness, weakness  Resp: No cough, wheezing, shortness of breath  CVS: No chest pain, palpitations, leg swelling  GI: No abdominal pain, nausea, vomiting, diarrhea   : No dysuria, frequency, incontinence  Skin: No itching, burning, rashes, or lesions   Msk: No joint pain or swelling  Psych: No depression, anxiety, mood swings      PHYSICAL EXAM:  GENERAL: NAD, well-developed  HEAD:  Atraumatic, Normocephalic  EYES: EOMI, PERRLA, conjunctiva and sclera clear  ENMT: No tonsillar erythema, exudates, or enlargement; Moist mucous membranes,   NECK: Supple, No JVD, Normal thyroid  HEART: Regular rate and rhythm; No murmurs, rubs, or gallops  RESPIRATORY: CTA B/L, No wheezing / rhonchi  ABDOMEN: Soft, Nontender, Nondistended; Bowel sounds present  NEUROLOGY: A&Ox3, RLE persistently paralysed with no sensation  EXTREMITIES: RLE wrapped with ace bandage, with wound vac. + pulses B/L and edema of RLE  SKIN: warm, dry, normal color, no rash or abnormal lesions          LABS:                        8.1    10.6  )-----------( 280      ( 20 Oct 2017 08:25 )             24.0     10-20    135  |  104  |  42<H>  ----------------------------<  111<H>  3.2<L>   |  19<L>  |  1.60<H>    Ca    8.1<L>      20 Oct 2017 08:25    TPro  5.6<L>  /  Alb  1.6<L>  /  TBili  0.3  /  DBili  x   /  AST  24  /  ALT  17  /  AlkPhos  39<L>  10-19        CAPILLARY BLOOD GLUCOSE                  MEDICATIONS  (STANDING):  BACItracin   Ointment 1 Application(s) Topical two times a day  buDESOnide  80 MICROgram(s)/formoterol 4.5 MICROgram(s) Inhaler 2 Puff(s) Inhalation two times a day  buPROPion XL . 300 milliGRAM(s) Oral daily  fluticasone propionate 50 MICROgram(s)/spray Nasal Spray 1 Spray(s) Both Nostrils two times a day  heparin  Injectable 5000 Unit(s) SubCutaneous every 8 hours  influenza   Vaccine 0.5 milliLiter(s) IntraMuscular once  lamoTRIgine 100 milliGRAM(s) Oral every 8 hours  levothyroxine 150 MICROGram(s) Oral daily  oxyCODONE  ER Tablet 60 milliGRAM(s) Oral every 8 hours  pantoprazole    Tablet 40 milliGRAM(s) Oral before breakfast  potassium chloride    Tablet ER 40 milliEquivalent(s) Oral once  potassium chloride  10 mEq/100 mL IVPB 10 milliEquivalent(s) IV Intermittent every 1 hour  risperiDONE   Tablet 8 milliGRAM(s) Oral at bedtime  sertraline 50 milliGRAM(s) Oral daily  topiramate 400 milliGRAM(s) Oral two times a day    MEDICATIONS  (PRN):  acetaminophen   Tablet 650 milliGRAM(s) Oral every 6 hours PRN For Temp greater than 38 C (100.4 F)  acetaminophen   Tablet. 650 milliGRAM(s) Oral every 6 hours PRN Mild Pain (1 - 3)  ALBUTerol    90 MICROgram(s) HFA Inhaler 2 Puff(s) Inhalation every 6 hours PRN Shortness of Breath and/or Wheezing  clonazePAM Tablet 2 milliGRAM(s) Oral every 8 hours PRN anxiety  docusate sodium 100 milliGRAM(s) Oral two times a day PRN Constipation  HYDROmorphone  Injectable 0.5 milliGRAM(s) IV Push every 3 hours PRN Severe Pain (7 - 10)  ondansetron Injectable 4 milliGRAM(s) IV Push every 4 hours PRN Nausea and/or Vomiting  senna 2 Tablet(s) Oral at bedtime PRN Constipation INTERVAL HPI: 32y old  Female who presents with a chief complaint of RLE edema and numbness. Patient admitted for Compartment syndrome, s/p Fasciotomy. Dr. Damico consulted for pain management and concern for opoid dependence and abuse. Nurse aware of pain management plan. Renal function improving. Spoke to patient and mother at bedside. Patient is persistently asking for opioids, but she is aware they are being tapered and managed accordingly with pain management Dr. Damico.   Spoke to patient at length in regards to her opioid dependence and need to taper down her dilaudid.  Spoke to pain management doctor Jeffrey Galaviz [phone]) see chart note  Spoke to mom aware of plan. Plastic surgery to recommend if need for skin grafting.     Patient having low grade temperatures. Dr. Montiel consulted, no need for antibiotics at this time.        MEDICATIONS  (STANDING):  buDESOnide  80 MICROgram(s)/formoterol 4.5 MICROgram(s) Inhaler 2 Puff(s) Inhalation two times a day  buPROPion XL . 300 milliGRAM(s) Oral daily  docusate sodium 100 milliGRAM(s) Oral two times a day  fentaNYL   Patch  25 MICROgram(s)/Hr 2 Patch Transdermal every 72 hours  fluticasone propionate 50 MICROgram(s)/spray Nasal Spray 1 Spray(s) Both Nostrils two times a day  heparin  Injectable 5000 Unit(s) SubCutaneous every 8 hours  influenza   Vaccine 0.5 milliLiter(s) IntraMuscular once  lamoTRIgine 100 milliGRAM(s) Oral every 8 hours  levothyroxine 150 MICROGram(s) Oral daily  oxyCODONE  ER Tablet 60 milliGRAM(s) Oral every 8 hours  pantoprazole    Tablet 40 milliGRAM(s) Oral before breakfast  polyethylene glycol 3350 17 Gram(s) Oral daily  potassium chloride    Tablet ER 40 milliEquivalent(s) Oral every 6 hours  risperiDONE   Tablet 8 milliGRAM(s) Oral at bedtime  sertraline 50 milliGRAM(s) Oral daily  sevelamer carbonate 800 milliGRAM(s) Oral three times a day with meals  topiramate 400 milliGRAM(s) Oral two times a day    MEDICATIONS  (PRN):  acetaminophen   Tablet 650 milliGRAM(s) Oral every 6 hours PRN For Temp greater than 38 C (100.4 F)  ALBUTerol    90 MICROgram(s) HFA Inhaler 2 Puff(s) Inhalation every 6 hours PRN Shortness of Breath and/or Wheezing  clonazePAM Tablet 2 milliGRAM(s) Oral every 8 hours PRN ANxiety  HYDROmorphone   Tablet 2 milliGRAM(s) Oral four times a day PRN moderate to severe pain  HYDROmorphone  Injectable 0.5 milliGRAM(s) IV Push every 3 hours PRN Severe Pain (7 - 10)  ondansetron Injectable 4 milliGRAM(s) IV Push once PRN Nausea and/or Vomiting  senna 2 Tablet(s) Oral at bedtime PRN Constipation          REVIEW OF SYSTEM:    Constitutional: No fever, chills, fatigue  Neuro: No headache, numbness, weakness  Resp: No cough, wheezing, shortness of breath  CVS: No chest pain, palpitations, leg swelling  GI: No abdominal pain, nausea, vomiting, diarrhea   : No dysuria, frequency, incontinence  Skin: No itching, burning, rashes, or lesions   Msk: +pain in RLE, +pain in her back  Psych: No depression, anxiety, mood swings    Vital Signs Last 24 Hrs  T(C): 36.8 (13 Oct 2017 08:09), Max: 36.8 (12 Oct 2017 23:47)  T(F): 98.3 (13 Oct 2017 08:09), Max: 98.3 (13 Oct 2017 08:09)  HR: 100 (13 Oct 2017 08:09) (90 - 102)  BP: 113/70 (13 Oct 2017 08:09) (101/63 - 113/70)  BP(mean): --  RR: 17 (13 Oct 2017 08:09) (16 - 17)  SpO2: 99% (13 Oct 2017 08:09) (95% - 99%)    PHYSICAL EXAM:  GENERAL: anxious asking for pain meds, dozes off at times but able to carry on a conversation ,  HEART: S1S2 RRR  RESPIRATORY: CTA B/L, No wheezing / rhonchi  ABDOMEN: Soft, Nontender, Nondistended; Bowel sounds present  NEUROLOGY: A&Ox3, nonfocal, not able to move RLE, sensation diminished RLE  EXTREMITIES:  +pulses mild edema R foot, LLE is moving + pulse  Skin: warm, well perfused       LABS:                        8.0    9.4   )-----------( 245      ( 12 Oct 2017 07:57 )             25.2     10-13    135  |  103  |  73<H>  ----------------------------<  84  3.3<L>   |  20<L>  |  3.80<H>    Ca    8.5      13 Oct 2017 07:38  Phos  6.1     10-13    TPro  5.1<L>  /  Alb  1.6<L>  /  TBili  0.2  /  DBili  x   /  AST  35  /  ALT  26  /  AlkPhos  34<L>  10-13              10-04 @ 10:15   No growth to date.  --  --  10-04 @ 09:59   No growth  --  -- INTERVAL HPI: 32y old  Female who presents with a chief complaint of RLE edema and numbness. Patient admitted for Compartment syndrome, s/p Fasciotomy. Dr. Damico consulted for pain management and concern for opoid dependence and abuse. Nurse aware of pain management plan. Renal function improving. Spoke to patient and mother at bedside. Patient is persistently asking for opioids, but she is aware they are being tapered and managed accordingly with pain management Dr. Damico.   Spoke to patient at length in regards to her opioid dependence and need to taper down her dilaudid.  Spoke to pain management doctor Jeffrey Galaviz [phone]) see chart note  Spoke to mom aware of plan. Plastic surgery to recommend if need for skin grafting.     Patient having low grade temperatures. Dr. Montiel consulted, no need for antibiotics at this time.  CT LE done non specific swelling in calf, no drainable fluid collection noted.    S: Spoke to patient this morning, states thats she needs to be on muscle relaxant, will discuss with her orthopedic surgeon Dr. Bowden.  She denies any dysuria, chills, cough.     MEDICATIONS  (STANDING):  BACItracin   Ointment 1 Application(s) Topical two times a day  buDESOnide  80 MICROgram(s)/formoterol 4.5 MICROgram(s) Inhaler 2 Puff(s) Inhalation two times a day  buPROPion XL . 300 milliGRAM(s) Oral daily  fluticasone propionate 50 MICROgram(s)/spray Nasal Spray 1 Spray(s) Both Nostrils two times a day  heparin  Injectable 5000 Unit(s) SubCutaneous every 8 hours  influenza   Vaccine 0.5 milliLiter(s) IntraMuscular once  lamoTRIgine 100 milliGRAM(s) Oral every 8 hours  levothyroxine 150 MICROGram(s) Oral daily  oxyCODONE  ER Tablet 60 milliGRAM(s) Oral every 8 hours  pantoprazole    Tablet 40 milliGRAM(s) Oral before breakfast  risperiDONE   Tablet 8 milliGRAM(s) Oral at bedtime  sertraline 50 milliGRAM(s) Oral daily  topiramate 400 milliGRAM(s) Oral two times a day    MEDICATIONS  (PRN):  acetaminophen   Tablet 650 milliGRAM(s) Oral every 6 hours PRN For Temp greater than 38 C (100.4 F)  acetaminophen   Tablet. 650 milliGRAM(s) Oral every 6 hours PRN Mild Pain (1 - 3)  ALBUTerol    90 MICROgram(s) HFA Inhaler 2 Puff(s) Inhalation every 6 hours PRN Shortness of Breath and/or Wheezing  clonazePAM Tablet 2 milliGRAM(s) Oral every 8 hours PRN anxiety  docusate sodium 100 milliGRAM(s) Oral two times a day PRN Constipation  HYDROmorphone  Injectable 0.5 milliGRAM(s) IV Push every 3 hours PRN Severe Pain (7 - 10)  ondansetron Injectable 4 milliGRAM(s) IV Push every 4 hours PRN Nausea and/or Vomiting  senna 2 Tablet(s) Oral at bedtime PRN Constipation          REVIEW OF SYSTEM:    Constitutional: No fever, chills, fatigue  Neuro: No headache, numbness, weakness  Resp: No cough, wheezing, shortness of breath  CVS: No chest pain, palpitations, leg swelling  GI: No abdominal pain, nausea, vomiting, diarrhea   : No dysuria, frequency, incontinence  Skin: No itching, burning, rashes, or lesions   Msk: +pain in RLE, +pain in her back  Psych: No depression, anxiety, mood swings    Vital Signs Last 24 Hrs  T(C): 37.2 (21 Oct 2017 08:36), Max: 38 (21 Oct 2017 03:00)  T(F): 99 (21 Oct 2017 08:36), Max: 100.4 (21 Oct 2017 03:00)  HR: 102 (21 Oct 2017 08:36) (88 - 107)  BP: 101/65 (21 Oct 2017 08:36) (90/57 - 101/65)  BP(mean): --  RR: 17 (21 Oct 2017 08:36) (16 - 17)  SpO2: 95% (21 Oct 2017 08:36) (95% - 99%)    PHYSICAL EXAM:  GENERAL: anxious asking for pain meds, dozes off at times but able to carry on a conversation ,  HEART: S1S2 RRR  RESPIRATORY: CTA B/L, No wheezing / rhonchi  ABDOMEN: Soft, Nontender, Nondistended; Bowel sounds present  NEUROLOGY: A&Ox3, nonfocal, not able to move RLE, sensation diminished RLE  EXTREMITIES:  +pulses mild edema R foot, LLE is moving + pulse  Skin: warm, well perfused       LABS:                        7.8    11.8  )-----------( 262      ( 21 Oct 2017 06:45 )             23.2     10-21    134<L>  |  105  |  37<H>  ----------------------------<  95  3.6   |  19<L>  |  1.40<H>    Ca    8.1<L>      21 Oct 2017 06:45  Mg     1.8     10-21        Culture Results:   10,000 - 49,000 CFU/mL Coag negative Staphylococcus not Staph  saprophyticus (10.16.17 @ 09:28)    Culture Results:   No growth at 5 days. (10.16.17 @ 09:27)      10-04 @ 10:15   No growth to date.  --  --  10-04 @ 09:59   No growth  --  --    < from: CT Lower Extremity No Cont, Right (10.21.17 @ 08:36) >  IMPRESSION:   1.  Multiple skin defects including the medial thigh, anterolateral calf   and posterior medial calf with absence of the underlying subcutaneous fat   and approximation of the underlying musculature to the skin surface in   keeping with prior fasciotomies.  2.  Nonspecific enlargement of the soleus musculature.  3.  Nonspecific soft tissue swelling about the calf and ankle maybe   postoperative. Differential includes infection. No drainable fluid   collection.  4.  Calcification within the medial gastrocnemius and soleus musculature   may be postoperative or posttraumatic in etiology.    < end of copied text >

## 2017-10-21 NOTE — PROGRESS NOTE ADULT - PROBLEM SELECTOR PLAN 2
work up note  neg thus far  imaging and lab work up  ID following  supportive tx, tylenol PRN  am labs pending  auto immune markers neg

## 2017-10-22 LAB
ANION GAP SERPL CALC-SCNC: 12 MMOL/L — SIGNIFICANT CHANGE UP (ref 5–17)
BUN SERPL-MCNC: 37 MG/DL — HIGH (ref 7–23)
CALCIUM SERPL-MCNC: 8.1 MG/DL — LOW (ref 8.5–10.1)
CHLORIDE SERPL-SCNC: 105 MMOL/L — SIGNIFICANT CHANGE UP (ref 96–108)
CO2 SERPL-SCNC: 20 MMOL/L — LOW (ref 22–31)
CREAT SERPL-MCNC: 1.3 MG/DL — SIGNIFICANT CHANGE UP (ref 0.5–1.3)
FERRITIN SERPL-MCNC: 357 NG/ML — HIGH (ref 15–150)
FOLATE SERPL-MCNC: 5.4 NG/ML — SIGNIFICANT CHANGE UP (ref 4.8–24.2)
GLUCOSE SERPL-MCNC: 88 MG/DL — SIGNIFICANT CHANGE UP (ref 70–99)
HCT VFR BLD CALC: 22.4 % — LOW (ref 34.5–45)
HGB BLD-MCNC: 7.4 G/DL — LOW (ref 11.5–15.5)
MCHC RBC-ENTMCNC: 29.7 PG — SIGNIFICANT CHANGE UP (ref 27–34)
MCHC RBC-ENTMCNC: 32.9 GM/DL — SIGNIFICANT CHANGE UP (ref 32–36)
MCV RBC AUTO: 90.2 FL — SIGNIFICANT CHANGE UP (ref 80–100)
PLATELET # BLD AUTO: 245 K/UL — SIGNIFICANT CHANGE UP (ref 150–400)
POTASSIUM SERPL-MCNC: 3.3 MMOL/L — LOW (ref 3.5–5.3)
POTASSIUM SERPL-SCNC: 3.3 MMOL/L — LOW (ref 3.5–5.3)
RBC # BLD: 2.48 M/UL — LOW (ref 3.8–5.2)
RBC # FLD: 12.4 % — SIGNIFICANT CHANGE UP (ref 10.3–14.5)
SODIUM SERPL-SCNC: 137 MMOL/L — SIGNIFICANT CHANGE UP (ref 135–145)
VIT B12 SERPL-MCNC: 378 PG/ML — SIGNIFICANT CHANGE UP (ref 243–894)
WBC # BLD: 9.9 K/UL — SIGNIFICANT CHANGE UP (ref 3.8–10.5)
WBC # FLD AUTO: 9.9 K/UL — SIGNIFICANT CHANGE UP (ref 3.8–10.5)

## 2017-10-22 PROCEDURE — 99232 SBSQ HOSP IP/OBS MODERATE 35: CPT

## 2017-10-22 PROCEDURE — 99233 SBSQ HOSP IP/OBS HIGH 50: CPT

## 2017-10-22 RX ORDER — POTASSIUM CHLORIDE 20 MEQ
40 PACKET (EA) ORAL ONCE
Qty: 0 | Refills: 0 | Status: COMPLETED | OUTPATIENT
Start: 2017-10-22 | End: 2017-10-22

## 2017-10-22 RX ORDER — OXYCODONE HYDROCHLORIDE 5 MG/1
60 TABLET ORAL EVERY 8 HOURS
Qty: 0 | Refills: 0 | Status: DISCONTINUED | OUTPATIENT
Start: 2017-10-22 | End: 2017-10-27

## 2017-10-22 RX ADMIN — OXYCODONE HYDROCHLORIDE 60 MILLIGRAM(S): 5 TABLET ORAL at 14:20

## 2017-10-22 RX ADMIN — Medication 150 MICROGRAM(S): at 05:11

## 2017-10-22 RX ADMIN — HYDROMORPHONE HYDROCHLORIDE 0.5 MILLIGRAM(S): 2 INJECTION INTRAMUSCULAR; INTRAVENOUS; SUBCUTANEOUS at 12:14

## 2017-10-22 RX ADMIN — HYDROMORPHONE HYDROCHLORIDE 0.5 MILLIGRAM(S): 2 INJECTION INTRAMUSCULAR; INTRAVENOUS; SUBCUTANEOUS at 16:11

## 2017-10-22 RX ADMIN — ONDANSETRON 4 MILLIGRAM(S): 8 TABLET, FILM COATED ORAL at 08:44

## 2017-10-22 RX ADMIN — Medication 1 SPRAY(S): at 17:48

## 2017-10-22 RX ADMIN — HYDROMORPHONE HYDROCHLORIDE 0.5 MILLIGRAM(S): 2 INJECTION INTRAMUSCULAR; INTRAVENOUS; SUBCUTANEOUS at 08:52

## 2017-10-22 RX ADMIN — LAMOTRIGINE 100 MILLIGRAM(S): 25 TABLET, ORALLY DISINTEGRATING ORAL at 14:20

## 2017-10-22 RX ADMIN — Medication 1 APPLICATION(S): at 18:16

## 2017-10-22 RX ADMIN — HEPARIN SODIUM 5000 UNIT(S): 5000 INJECTION INTRAVENOUS; SUBCUTANEOUS at 05:11

## 2017-10-22 RX ADMIN — SERTRALINE 50 MILLIGRAM(S): 25 TABLET, FILM COATED ORAL at 12:14

## 2017-10-22 RX ADMIN — Medication 1 APPLICATION(S): at 05:11

## 2017-10-22 RX ADMIN — Medication 650 MILLIGRAM(S): at 17:45

## 2017-10-22 RX ADMIN — Medication 1 SPRAY(S): at 05:11

## 2017-10-22 RX ADMIN — Medication 400 MILLIGRAM(S): at 17:48

## 2017-10-22 RX ADMIN — OXYCODONE HYDROCHLORIDE 60 MILLIGRAM(S): 5 TABLET ORAL at 15:15

## 2017-10-22 RX ADMIN — Medication 40 MILLIEQUIVALENT(S): at 14:19

## 2017-10-22 RX ADMIN — HYDROMORPHONE HYDROCHLORIDE 0.5 MILLIGRAM(S): 2 INJECTION INTRAMUSCULAR; INTRAVENOUS; SUBCUTANEOUS at 16:45

## 2017-10-22 RX ADMIN — HYDROMORPHONE HYDROCHLORIDE 0.5 MILLIGRAM(S): 2 INJECTION INTRAMUSCULAR; INTRAVENOUS; SUBCUTANEOUS at 05:30

## 2017-10-22 RX ADMIN — LAMOTRIGINE 100 MILLIGRAM(S): 25 TABLET, ORALLY DISINTEGRATING ORAL at 05:13

## 2017-10-22 RX ADMIN — HEPARIN SODIUM 5000 UNIT(S): 5000 INJECTION INTRAVENOUS; SUBCUTANEOUS at 22:46

## 2017-10-22 RX ADMIN — RISPERIDONE 8 MILLIGRAM(S): 4 TABLET ORAL at 22:46

## 2017-10-22 RX ADMIN — HYDROMORPHONE HYDROCHLORIDE 0.5 MILLIGRAM(S): 2 INJECTION INTRAMUSCULAR; INTRAVENOUS; SUBCUTANEOUS at 05:12

## 2017-10-22 RX ADMIN — HEPARIN SODIUM 5000 UNIT(S): 5000 INJECTION INTRAVENOUS; SUBCUTANEOUS at 14:20

## 2017-10-22 RX ADMIN — LAMOTRIGINE 100 MILLIGRAM(S): 25 TABLET, ORALLY DISINTEGRATING ORAL at 22:46

## 2017-10-22 RX ADMIN — Medication 650 MILLIGRAM(S): at 18:17

## 2017-10-22 RX ADMIN — HYDROMORPHONE HYDROCHLORIDE 0.5 MILLIGRAM(S): 2 INJECTION INTRAMUSCULAR; INTRAVENOUS; SUBCUTANEOUS at 12:45

## 2017-10-22 RX ADMIN — BUPROPION HYDROCHLORIDE 300 MILLIGRAM(S): 150 TABLET, EXTENDED RELEASE ORAL at 12:14

## 2017-10-22 RX ADMIN — HYDROMORPHONE HYDROCHLORIDE 0.5 MILLIGRAM(S): 2 INJECTION INTRAMUSCULAR; INTRAVENOUS; SUBCUTANEOUS at 23:30

## 2017-10-22 RX ADMIN — Medication 2 MILLIGRAM(S): at 16:11

## 2017-10-22 RX ADMIN — BUDESONIDE AND FORMOTEROL FUMARATE DIHYDRATE 2 PUFF(S): 160; 4.5 AEROSOL RESPIRATORY (INHALATION) at 22:46

## 2017-10-22 RX ADMIN — Medication 400 MILLIGRAM(S): at 05:14

## 2017-10-22 RX ADMIN — PANTOPRAZOLE SODIUM 40 MILLIGRAM(S): 20 TABLET, DELAYED RELEASE ORAL at 05:14

## 2017-10-22 RX ADMIN — HYDROMORPHONE HYDROCHLORIDE 0.5 MILLIGRAM(S): 2 INJECTION INTRAMUSCULAR; INTRAVENOUS; SUBCUTANEOUS at 23:05

## 2017-10-22 RX ADMIN — HYDROMORPHONE HYDROCHLORIDE 0.5 MILLIGRAM(S): 2 INJECTION INTRAMUSCULAR; INTRAVENOUS; SUBCUTANEOUS at 09:43

## 2017-10-22 NOTE — PROGRESS NOTE ADULT - ASSESSMENT
32F PMH Asthma, Bipolar disorder, hypothyroidism, chronic low back pain s/p laminectomy and spinal fusion, opioid-dependent presents with right leg swelling, numbness, pallor, poikilothermia, and pulselessness, found to have ischemic leg due to acute compartment syndrome of right leg with acute rhabdomyolysis, GHAZAL 2/2 ATN, and leukocytosis with bandemia s/p emergent fasciotomy of the 4 leg compartments (anterior, lateral, superficial posterior, and deep posterior) and the lateral thigh muscles. Wound vac in place. Pain management for opioid dependence and abuse.  Low grade temperature. ID and vascular following. Anemic.

## 2017-10-22 NOTE — PROGRESS NOTE ADULT - PROBLEM SELECTOR PLAN 3
dilaudid PRN  off oxycontin at present  monitor acute and chronic pain  supportive regimen  tx medical issues, GHAZAL, compartment syndrome, Rhabdo  overall better  wound care  reassurance  psych follow up  emotional support  family support

## 2017-10-22 NOTE — PROGRESS NOTE ADULT - SUBJECTIVE AND OBJECTIVE BOX
INTERVAL HPI: 32y old  Female who presents with a chief complaint of RLE edema and numbness. Patient admitted for Compartment syndrome, s/p Fasciotomy, was initially in ICU, finished course of IV antibiotics and was transferred to med- surg floors. Patient with ATN likely from rhabdomyolysis with improving creatinine, followed by nephrology.   Dr. Damico consulted for pain management and concern for opoid dependence and abuse. Nurse aware of pain management plan. Renal function improving. Spoke to patient and mother at bedside. Patient is persistently asking for opioids, but she is aware they are being tapered and managed accordingly with pain management Dr. Damico.   Spoke to patient at length in regards to her opioid dependence and need to taper down her dilaudid.  Spoke to pain management doctor Jeffrey Galaviz [phone]) see chart note.    Patient having low grade temperatures. Dr. Montiel consulted, no need for antibiotics at this time.  CT LE done non specific swelling in calf, no drainable fluid collection noted. Discussed with Dr. Martines  Plastic surgery to decide when plan for possible skin grafting.   Patient with anemia, prior transfusion, work up initiated.      S: Spoke to patient this morning, she was a bit sleepy, denies any pain, no complaints at the time.      MEDICATIONS  (STANDING):  BACItracin   Ointment 1 Application(s) Topical two times a day  buDESOnide  80 MICROgram(s)/formoterol 4.5 MICROgram(s) Inhaler 2 Puff(s) Inhalation two times a day  buPROPion XL . 300 milliGRAM(s) Oral daily  fluticasone propionate 50 MICROgram(s)/spray Nasal Spray 1 Spray(s) Both Nostrils two times a day  heparin  Injectable 5000 Unit(s) SubCutaneous every 8 hours  influenza   Vaccine 0.5 milliLiter(s) IntraMuscular once  lamoTRIgine 100 milliGRAM(s) Oral every 8 hours  levothyroxine 150 MICROGram(s) Oral daily  oxyCODONE  ER Tablet 60 milliGRAM(s) Oral every 8 hours  pantoprazole    Tablet 40 milliGRAM(s) Oral before breakfast  risperiDONE   Tablet 8 milliGRAM(s) Oral at bedtime  sertraline 50 milliGRAM(s) Oral daily  topiramate 400 milliGRAM(s) Oral two times a day  Viibryd Oral Tablet 40 milliGRAM(s) 1 Tablet(s) Oral daily    MEDICATIONS  (PRN):  acetaminophen   Tablet 650 milliGRAM(s) Oral every 6 hours PRN For Temp greater than 38 C (100.4 F)  acetaminophen   Tablet. 650 milliGRAM(s) Oral every 6 hours PRN Mild Pain (1 - 3)  ALBUTerol    90 MICROgram(s) HFA Inhaler 2 Puff(s) Inhalation every 6 hours PRN Shortness of Breath and/or Wheezing  clonazePAM Tablet 2 milliGRAM(s) Oral every 8 hours PRN anxiety  docusate sodium 100 milliGRAM(s) Oral two times a day PRN Constipation  HYDROmorphone  Injectable 0.5 milliGRAM(s) IV Push every 3 hours PRN Severe Pain (7 - 10)  ondansetron Injectable 4 milliGRAM(s) IV Push every 4 hours PRN Nausea and/or Vomiting  senna 2 Tablet(s) Oral at bedtime PRN Constipation          REVIEW OF SYSTEM:    Constitutional: No fever, chills, fatigue  Neuro: No headache, numbness, weakness  Resp: No cough, wheezing, shortness of breath  CVS: No chest pain, palpitations, leg swelling  GI: No abdominal pain, nausea, vomiting, diarrhea   : No dysuria, frequency, incontinence  Skin: No itching, burning, rashes, or lesions   Msk: +pain in RLE, +pain in her back  Psych: No depression, anxiety, mood swings    Vital Signs Last 24 Hrs  T(C): 38 (22 Oct 2017 00:53), Max: 38 (22 Oct 2017 00:53)  T(F): 100.4 (22 Oct 2017 00:53), Max: 100.4 (22 Oct 2017 00:53)  HR: 100 (22 Oct 2017 07:49) (100 - 103)  BP: 94/58 (22 Oct 2017 07:49) (94/58 - 106/70)  BP(mean): --  RR: 15 (22 Oct 2017 07:49) (15 - 16)  SpO2: 97% (22 Oct 2017 07:49) (97% - 99%)    PHYSICAL EXAM:  GENERAL: NAD  HEART: S1S2 RRR  RESPIRATORY: CTA B/L, No wheezing / rhonchi  ABDOMEN: Soft, Nontender, Nondistended; Bowel sounds present  NEUROLOGY: A&Ox3, nonfocal, not able to move RLE, sensation diminished RLE  EXTREMITIES:  +pulses mild edema R foot, LLE is moving + pulse  Skin: warm, well perfused       LABS:                        7.4    9.9   )-----------( 245      ( 22 Oct 2017 09:44 )             22.4     10-22    137  |  105  |  37<H>  ----------------------------<  88  3.3<L>   |  20<L>  |  1.30    Ca    8.1<L>      22 Oct 2017 09:44  Mg     1.8     10-21            Culture Results:   10,000 - 49,000 CFU/mL Coag negative Staphylococcus not Staph  saprophyticus (10.16.17 @ 09:28)    Culture Results:   No growth at 5 days. (10.16.17 @ 09:27)      10-04 @ 10:15   No growth to date.  --  --  10-04 @ 09:59   No growth  --  --    < from: CT Lower Extremity No Cont, Right (10.21.17 @ 08:36) >  IMPRESSION:   1.  Multiple skin defects including the medial thigh, anterolateral calf   and posterior medial calf with absence of the underlying subcutaneous fat   and approximation of the underlying musculature to the skin surface in   keeping with prior fasciotomies.  2.  Nonspecific enlargement of the soleus musculature.  3.  Nonspecific soft tissue swelling about the calf and ankle maybe   postoperative. Differential includes infection. No drainable fluid   collection.  4.  Calcification within the medial gastrocnemius and soleus musculature   may be postoperative or posttraumatic in etiology.    < end of copied text >

## 2017-10-22 NOTE — PROGRESS NOTE ADULT - SUBJECTIVE AND OBJECTIVE BOX
[facility] - Bonnie Hernandez, Keena, April, Ale, Carissa Allen  Office Number:  [phone]    Patient resting comfortably in bed in NAD.  No cardiac complaints.     MEDICATIONS  (STANDING):  BACItracin   Ointment 1 Application(s) Topical two times a day  buDESOnide  80 MICROgram(s)/formoterol 4.5 MICROgram(s) Inhaler 2 Puff(s) Inhalation two times a day  buPROPion XL . 300 milliGRAM(s) Oral daily  fluticasone propionate 50 MICROgram(s)/spray Nasal Spray 1 Spray(s) Both Nostrils two times a day  heparin  Injectable 5000 Unit(s) SubCutaneous every 8 hours  influenza   Vaccine 0.5 milliLiter(s) IntraMuscular once  lamoTRIgine 100 milliGRAM(s) Oral every 8 hours  levothyroxine 150 MICROGram(s) Oral daily  oxyCODONE  ER Tablet 60 milliGRAM(s) Oral every 8 hours  pantoprazole    Tablet 40 milliGRAM(s) Oral before breakfast  risperiDONE   Tablet 8 milliGRAM(s) Oral at bedtime  sertraline 50 milliGRAM(s) Oral daily  topiramate 400 milliGRAM(s) Oral two times a day  Viibryd Oral Tablet 40 milliGRAM(s) 1 Tablet(s) Oral daily    MEDICATIONS  (PRN):  acetaminophen   Tablet 650 milliGRAM(s) Oral every 6 hours PRN For Temp greater than 38 C (100.4 F)  acetaminophen   Tablet. 650 milliGRAM(s) Oral every 6 hours PRN Mild Pain (1 - 3)  ALBUTerol    90 MICROgram(s) HFA Inhaler 2 Puff(s) Inhalation every 6 hours PRN Shortness of Breath and/or Wheezing  clonazePAM Tablet 2 milliGRAM(s) Oral every 8 hours PRN anxiety  docusate sodium 100 milliGRAM(s) Oral two times a day PRN Constipation  HYDROmorphone  Injectable 0.5 milliGRAM(s) IV Push every 3 hours PRN Severe Pain (7 - 10)  ondansetron Injectable 4 milliGRAM(s) IV Push every 4 hours PRN Nausea and/or Vomiting  senna 2 Tablet(s) Oral at bedtime PRN Constipation      Allergies    avocado (Unknown)  latex (Urticaria; Rash)  penicillin (Unknown)  sulfa drugs (Unknown)        Vital Signs Last 24 Hrs  T(C): 38 (22 Oct 2017 00:53), Max: 38 (22 Oct 2017 00:53)  T(F): 100.4 (22 Oct 2017 00:53), Max: 100.4 (22 Oct 2017 00:53)  HR: 100 (22 Oct 2017 07:49) (100 - 103)  BP: 94/58 (22 Oct 2017 07:49) (94/58 - 106/70)  BP(mean): --  RR: 15 (22 Oct 2017 07:49) (15 - 16)  SpO2: 97% (22 Oct 2017 07:49) (97% - 99%)    I&O's Summary    21 Oct 2017 07:01  -  22 Oct 2017 07:00  --------------------------------------------------------  IN: 0 mL / OUT: 1300 mL / NET: -1300 mL        ON EXAM:    General: NAD, awake and alert, oriented x 3  HEENT: Mucous membranes are moist, anicteric  Lungs: Non-labored, breath sounds are clear bilaterally, No wheezing, rales or rhonchi  Cardiovascular: Regular, S1 and S2, no murmurs, rubs, or gallops  Gastrointestinal: Bowel Sounds present, soft, nontender.   Lymph: No peripheral edema. No lymphadenopathy.  Skin: No rashes or ulcers  Psych:  Mood & affect appropriate    LABS: All Labs Reviewed:                        7.4    9.9   )-----------( 245      ( 22 Oct 2017 09:44 )             22.4                         7.8    11.8  )-----------( 262      ( 21 Oct 2017 06:45 )             23.2                         8.1    10.6  )-----------( 280      ( 20 Oct 2017 08:25 )             24.0     22 Oct 2017 09:44    137    |  105    |  37     ----------------------------<  88     3.3     |  20     |  1.30   21 Oct 2017 06:45    134    |  105    |  37     ----------------------------<  95     3.6     |  19     |  1.40   20 Oct 2017 08:25    135    |  104    |  42     ----------------------------<  111    3.2     |  19     |  1.60     Ca    8.1        22 Oct 2017 09:44  Ca    8.1        21 Oct 2017 06:45  Ca    8.1        20 Oct 2017 08:25  Mg     1.8       21 Oct 2017 06:45    TPro  5.6    /  Alb  1.6    /  TBili  0.3    /  DBili  x      /  AST  24     /  ALT  17     /  AlkPhos  39     19 Oct 2017 14:02

## 2017-10-22 NOTE — PROGRESS NOTE ADULT - SUBJECTIVE AND OBJECTIVE BOX
Patient is a 32y old  Female who presents with a chief complaint of RLE edema and numbness (13 Oct 2017 16:25)      Vascular Surgery Progress Note    Interval HPI: POD #18 s/p right leg fasciotomies. Patient is comfortable and has no complaints this am. Venous doppler and CT scan of right leg shows no DVT or leg abscesses.    Medications:      Allergies:  Allergies    avocado (Unknown)  latex (Urticaria; Rash)  penicillin (Unknown)  sulfa drugs (Unknown)    Intolerances        Vital Signs Last 24 Hrs  T(C): 38 (22 Oct 2017 00:53), Max: 38 (22 Oct 2017 00:53)  T(F): 100.4 (22 Oct 2017 00:53), Max: 100.4 (22 Oct 2017 00:53)  HR: 100 (22 Oct 2017 07:49) (100 - 103)  BP: 94/58 (22 Oct 2017 07:49) (94/58 - 106/70)  BP(mean): --  RR: 15 (22 Oct 2017 07:49) (15 - 16)  SpO2: 97% (22 Oct 2017 07:49) (97% - 99%)  I&O's Summary    21 Oct 2017 07:01  -  22 Oct 2017 07:00  --------------------------------------------------------  IN: 0 mL / OUT: 1300 mL / NET: -1300 mL        Physical Exam:  Gen: NAD, A&Ox3    Right leg wounds are clean and viable. There is no sign of infections. There is moderate edema of right foot and calf.    Pulses: The pedal pulses in both feet are intact and graded 4/4   Neurologically the right foot has absent sensation and absent motor function.  LABS:                        7.8    11.8  )-----------( 262      ( 21 Oct 2017 06:45 )             23.2     10-21    134<L>  |  105  |  37<H>  ----------------------------<  95  3.6   |  19<L>  |  1.40<H>    Ca    8.1<L>      21 Oct 2017 06:45  Mg     1.8     10-21

## 2017-10-22 NOTE — PROGRESS NOTE ADULT - ASSESSMENT
Stable from a vascular standpoint . I would continue PT therapy and have follow up with Dr. Culver for possible wound closures with skin grafts.

## 2017-10-22 NOTE — PROGRESS NOTE ADULT - SUBJECTIVE AND OBJECTIVE BOX
Date/Time Patient Seen:  		  Referring MD:   Data Reviewed	       Patient is a 32y old  Female who presents with a chief complaint of RLE edema and numbness (13 Oct 2017 16:25)  in bed  seen and examined  cont to have low grade temp  off Oxycontin      Subjective/HPI     PAST MEDICAL & SURGICAL HISTORY:  Low back pain  Hypothyroid  Bipolar 1 disorder  History of spinal fusion  History of laminectomy        Medication list         MEDICATIONS  (STANDING):  BACItracin   Ointment 1 Application(s) Topical two times a day  buDESOnide  80 MICROgram(s)/formoterol 4.5 MICROgram(s) Inhaler 2 Puff(s) Inhalation two times a day  buPROPion XL . 300 milliGRAM(s) Oral daily  fluticasone propionate 50 MICROgram(s)/spray Nasal Spray 1 Spray(s) Both Nostrils two times a day  heparin  Injectable 5000 Unit(s) SubCutaneous every 8 hours  influenza   Vaccine 0.5 milliLiter(s) IntraMuscular once  lamoTRIgine 100 milliGRAM(s) Oral every 8 hours  levothyroxine 150 MICROGram(s) Oral daily  pantoprazole    Tablet 40 milliGRAM(s) Oral before breakfast  risperiDONE   Tablet 8 milliGRAM(s) Oral at bedtime  sertraline 50 milliGRAM(s) Oral daily  topiramate 400 milliGRAM(s) Oral two times a day  Viibryd Oral Tablet 40 milliGRAM(s) 1 Tablet(s) Oral daily    MEDICATIONS  (PRN):  acetaminophen   Tablet 650 milliGRAM(s) Oral every 6 hours PRN For Temp greater than 38 C (100.4 F)  acetaminophen   Tablet. 650 milliGRAM(s) Oral every 6 hours PRN Mild Pain (1 - 3)  ALBUTerol    90 MICROgram(s) HFA Inhaler 2 Puff(s) Inhalation every 6 hours PRN Shortness of Breath and/or Wheezing  clonazePAM Tablet 2 milliGRAM(s) Oral every 8 hours PRN anxiety  docusate sodium 100 milliGRAM(s) Oral two times a day PRN Constipation  HYDROmorphone  Injectable 0.5 milliGRAM(s) IV Push every 3 hours PRN Severe Pain (7 - 10)  ondansetron Injectable 4 milliGRAM(s) IV Push every 4 hours PRN Nausea and/or Vomiting  senna 2 Tablet(s) Oral at bedtime PRN Constipation         Vitals log        ICU Vital Signs Last 24 Hrs  T(C): 38 (22 Oct 2017 00:53), Max: 38 (22 Oct 2017 00:53)  T(F): 100.4 (22 Oct 2017 00:53), Max: 100.4 (22 Oct 2017 00:53)  HR: 103 (22 Oct 2017 00:53) (102 - 103)  BP: 106/66 (22 Oct 2017 00:53) (101/65 - 106/70)  BP(mean): --  ABP: --  ABP(mean): --  RR: 16 (22 Oct 2017 00:53) (16 - 17)  SpO2: 99% (22 Oct 2017 00:53) (95% - 99%)           Input and Output:  I&O's Detail    20 Oct 2017 07:01  -  21 Oct 2017 07:00  --------------------------------------------------------  IN:    Oral Fluid: 420 mL    Solution: 300 mL  Total IN: 720 mL    OUT:    VAC (Vacuum Assisted Closure) System: 1500 mL    Voided: 1750 mL  Total OUT: 3250 mL    Total NET: -2530 mL      21 Oct 2017 07:01  -  22 Oct 2017 06:44  --------------------------------------------------------  IN:  Total IN: 0 mL    OUT:    Voided: 800 mL  Total OUT: 800 mL    Total NET: -800 mL          Lab Data                        7.8    11.8  )-----------( 262      ( 21 Oct 2017 06:45 )             23.2     10-21    134<L>  |  105  |  37<H>  ----------------------------<  95  3.6   |  19<L>  |  1.40<H>    Ca    8.1<L>      21 Oct 2017 06:45  Mg     1.8     10-21              Review of Systems	      Objective     Physical Examination    head at  heart - s1s2  lungs - dec BS  abd - soft      Pertinent Lab findings & Imaging      Erlinda:  NO   Adequate UO     I&O's Detail    20 Oct 2017 07:01  -  21 Oct 2017 07:00  --------------------------------------------------------  IN:    Oral Fluid: 420 mL    Solution: 300 mL  Total IN: 720 mL    OUT:    VAC (Vacuum Assisted Closure) System: 1500 mL    Voided: 1750 mL  Total OUT: 3250 mL    Total NET: -2530 mL      21 Oct 2017 07:01  -  22 Oct 2017 06:44  --------------------------------------------------------  IN:  Total IN: 0 mL    OUT:    Voided: 800 mL  Total OUT: 800 mL    Total NET: -800 mL               Discussed with:     Cultures:	        Radiology

## 2017-10-22 NOTE — PROGRESS NOTE ADULT - PROBLEM SELECTOR PLAN 3
Pt having low grade fever, for CT A/P is normal  Pt is off antibx and leucocytosis improving.  ID consult noted. will follow with lab work as adv.  CT Lower extremity done, no drainable fluid collection, will d/w Dr. Martines

## 2017-10-23 LAB
ANION GAP SERPL CALC-SCNC: 11 MMOL/L — SIGNIFICANT CHANGE UP (ref 5–17)
BUN SERPL-MCNC: 34 MG/DL — HIGH (ref 7–23)
CALCIUM SERPL-MCNC: 8.3 MG/DL — LOW (ref 8.5–10.1)
CHLORIDE SERPL-SCNC: 108 MMOL/L — SIGNIFICANT CHANGE UP (ref 96–108)
CO2 SERPL-SCNC: 20 MMOL/L — LOW (ref 22–31)
CREAT SERPL-MCNC: 1.4 MG/DL — HIGH (ref 0.5–1.3)
GLUCOSE SERPL-MCNC: 99 MG/DL — SIGNIFICANT CHANGE UP (ref 70–99)
HCT VFR BLD CALC: 25.2 % — LOW (ref 34.5–45)
HGB BLD-MCNC: 8.2 G/DL — LOW (ref 11.5–15.5)
MCHC RBC-ENTMCNC: 29.2 PG — SIGNIFICANT CHANGE UP (ref 27–34)
MCHC RBC-ENTMCNC: 32.7 GM/DL — SIGNIFICANT CHANGE UP (ref 32–36)
MCV RBC AUTO: 89.4 FL — SIGNIFICANT CHANGE UP (ref 80–100)
PLATELET # BLD AUTO: 232 K/UL — SIGNIFICANT CHANGE UP (ref 150–400)
POTASSIUM SERPL-MCNC: 3.5 MMOL/L — SIGNIFICANT CHANGE UP (ref 3.5–5.3)
POTASSIUM SERPL-SCNC: 3.5 MMOL/L — SIGNIFICANT CHANGE UP (ref 3.5–5.3)
RBC # BLD: 2.82 M/UL — LOW (ref 3.8–5.2)
RBC # FLD: 12.7 % — SIGNIFICANT CHANGE UP (ref 10.3–14.5)
SODIUM SERPL-SCNC: 139 MMOL/L — SIGNIFICANT CHANGE UP (ref 135–145)
WBC # BLD: 8.3 K/UL — SIGNIFICANT CHANGE UP (ref 3.8–10.5)
WBC # FLD AUTO: 8.3 K/UL — SIGNIFICANT CHANGE UP (ref 3.8–10.5)

## 2017-10-23 PROCEDURE — 99232 SBSQ HOSP IP/OBS MODERATE 35: CPT

## 2017-10-23 PROCEDURE — 76536 US EXAM OF HEAD AND NECK: CPT | Mod: 26

## 2017-10-23 PROCEDURE — 99233 SBSQ HOSP IP/OBS HIGH 50: CPT

## 2017-10-23 RX ORDER — MULTIVIT-MIN/FERROUS GLUCONATE 9 MG/15 ML
1 LIQUID (ML) ORAL DAILY
Qty: 0 | Refills: 0 | Status: CANCELLED | OUTPATIENT
Start: 2017-10-23 | End: 2017-10-27

## 2017-10-23 RX ORDER — POTASSIUM CHLORIDE 20 MEQ
40 PACKET (EA) ORAL DAILY
Qty: 0 | Refills: 0 | Status: DISCONTINUED | OUTPATIENT
Start: 2017-10-23 | End: 2017-10-25

## 2017-10-23 RX ORDER — HYDROMORPHONE HYDROCHLORIDE 2 MG/ML
1 INJECTION INTRAMUSCULAR; INTRAVENOUS; SUBCUTANEOUS ONCE
Qty: 0 | Refills: 0 | Status: DISCONTINUED | OUTPATIENT
Start: 2017-10-23 | End: 2017-10-23

## 2017-10-23 RX ORDER — ASCORBIC ACID 60 MG
500 TABLET,CHEWABLE ORAL
Qty: 0 | Refills: 0 | Status: CANCELLED | OUTPATIENT
Start: 2017-10-23 | End: 2017-10-27

## 2017-10-23 RX ORDER — PREGABALIN 225 MG/1
1000 CAPSULE ORAL DAILY
Qty: 0 | Refills: 0 | Status: DISCONTINUED | OUTPATIENT
Start: 2017-10-23 | End: 2017-10-27

## 2017-10-23 RX ORDER — FOLIC ACID 0.8 MG
1 TABLET ORAL DAILY
Qty: 0 | Refills: 0 | Status: DISCONTINUED | OUTPATIENT
Start: 2017-10-23 | End: 2017-10-27

## 2017-10-23 RX ORDER — FENTANYL CITRATE 50 UG/ML
1 INJECTION INTRAVENOUS
Qty: 0 | Refills: 0 | Status: DISCONTINUED | OUTPATIENT
Start: 2017-10-23 | End: 2017-10-23

## 2017-10-23 RX ORDER — IPRATROPIUM BROMIDE 21 MCG
2 AEROSOL, SPRAY (ML) NASAL EVERY 12 HOURS
Qty: 0 | Refills: 0 | Status: DISCONTINUED | OUTPATIENT
Start: 2017-10-23 | End: 2017-10-27

## 2017-10-23 RX ORDER — FENTANYL CITRATE 50 UG/ML
1 INJECTION INTRAVENOUS
Qty: 0 | Refills: 0 | Status: DISCONTINUED | OUTPATIENT
Start: 2017-10-23 | End: 2017-10-27

## 2017-10-23 RX ORDER — ONDANSETRON 8 MG/1
4 TABLET, FILM COATED ORAL ONCE
Qty: 0 | Refills: 0 | Status: COMPLETED | OUTPATIENT
Start: 2017-10-23 | End: 2017-10-23

## 2017-10-23 RX ADMIN — Medication 40 MILLIEQUIVALENT(S): at 12:48

## 2017-10-23 RX ADMIN — HYDROMORPHONE HYDROCHLORIDE 0.5 MILLIGRAM(S): 2 INJECTION INTRAMUSCULAR; INTRAVENOUS; SUBCUTANEOUS at 09:47

## 2017-10-23 RX ADMIN — Medication 1 SPRAY(S): at 17:33

## 2017-10-23 RX ADMIN — LAMOTRIGINE 100 MILLIGRAM(S): 25 TABLET, ORALLY DISINTEGRATING ORAL at 14:41

## 2017-10-23 RX ADMIN — Medication 150 MICROGRAM(S): at 06:00

## 2017-10-23 RX ADMIN — ONDANSETRON 4 MILLIGRAM(S): 8 TABLET, FILM COATED ORAL at 22:16

## 2017-10-23 RX ADMIN — ONDANSETRON 4 MILLIGRAM(S): 8 TABLET, FILM COATED ORAL at 12:45

## 2017-10-23 RX ADMIN — HYDROMORPHONE HYDROCHLORIDE 0.5 MILLIGRAM(S): 2 INJECTION INTRAMUSCULAR; INTRAVENOUS; SUBCUTANEOUS at 06:11

## 2017-10-23 RX ADMIN — HYDROMORPHONE HYDROCHLORIDE 0.5 MILLIGRAM(S): 2 INJECTION INTRAMUSCULAR; INTRAVENOUS; SUBCUTANEOUS at 02:56

## 2017-10-23 RX ADMIN — HYDROMORPHONE HYDROCHLORIDE 0.5 MILLIGRAM(S): 2 INJECTION INTRAMUSCULAR; INTRAVENOUS; SUBCUTANEOUS at 09:17

## 2017-10-23 RX ADMIN — Medication 400 MILLIGRAM(S): at 06:11

## 2017-10-23 RX ADMIN — HEPARIN SODIUM 5000 UNIT(S): 5000 INJECTION INTRAVENOUS; SUBCUTANEOUS at 14:41

## 2017-10-23 RX ADMIN — PANTOPRAZOLE SODIUM 40 MILLIGRAM(S): 20 TABLET, DELAYED RELEASE ORAL at 06:11

## 2017-10-23 RX ADMIN — Medication 1 APPLICATION(S): at 06:12

## 2017-10-23 RX ADMIN — HYDROMORPHONE HYDROCHLORIDE 0.5 MILLIGRAM(S): 2 INJECTION INTRAMUSCULAR; INTRAVENOUS; SUBCUTANEOUS at 18:03

## 2017-10-23 RX ADMIN — Medication 1 MILLIGRAM(S): at 12:49

## 2017-10-23 RX ADMIN — OXYCODONE HYDROCHLORIDE 60 MILLIGRAM(S): 5 TABLET ORAL at 22:16

## 2017-10-23 RX ADMIN — Medication 2 MILLIGRAM(S): at 02:08

## 2017-10-23 RX ADMIN — LAMOTRIGINE 100 MILLIGRAM(S): 25 TABLET, ORALLY DISINTEGRATING ORAL at 22:17

## 2017-10-23 RX ADMIN — Medication 400 MILLIGRAM(S): at 17:33

## 2017-10-23 RX ADMIN — LAMOTRIGINE 100 MILLIGRAM(S): 25 TABLET, ORALLY DISINTEGRATING ORAL at 06:11

## 2017-10-23 RX ADMIN — HEPARIN SODIUM 5000 UNIT(S): 5000 INJECTION INTRAVENOUS; SUBCUTANEOUS at 06:00

## 2017-10-23 RX ADMIN — HEPARIN SODIUM 5000 UNIT(S): 5000 INJECTION INTRAVENOUS; SUBCUTANEOUS at 22:16

## 2017-10-23 RX ADMIN — Medication 1 SPRAY(S): at 06:00

## 2017-10-23 RX ADMIN — ONDANSETRON 4 MILLIGRAM(S): 8 TABLET, FILM COATED ORAL at 16:34

## 2017-10-23 RX ADMIN — PREGABALIN 1000 MICROGRAM(S): 225 CAPSULE ORAL at 12:49

## 2017-10-23 RX ADMIN — Medication 2 MILLIGRAM(S): at 23:52

## 2017-10-23 RX ADMIN — HYDROMORPHONE HYDROCHLORIDE 0.5 MILLIGRAM(S): 2 INJECTION INTRAMUSCULAR; INTRAVENOUS; SUBCUTANEOUS at 21:48

## 2017-10-23 RX ADMIN — HYDROMORPHONE HYDROCHLORIDE 1 MILLIGRAM(S): 2 INJECTION INTRAMUSCULAR; INTRAVENOUS; SUBCUTANEOUS at 12:54

## 2017-10-23 RX ADMIN — HYDROMORPHONE HYDROCHLORIDE 0.5 MILLIGRAM(S): 2 INJECTION INTRAMUSCULAR; INTRAVENOUS; SUBCUTANEOUS at 17:33

## 2017-10-23 RX ADMIN — HYDROMORPHONE HYDROCHLORIDE 0.5 MILLIGRAM(S): 2 INJECTION INTRAMUSCULAR; INTRAVENOUS; SUBCUTANEOUS at 03:15

## 2017-10-23 RX ADMIN — HYDROMORPHONE HYDROCHLORIDE 0.5 MILLIGRAM(S): 2 INJECTION INTRAMUSCULAR; INTRAVENOUS; SUBCUTANEOUS at 20:36

## 2017-10-23 RX ADMIN — FENTANYL CITRATE 1 PATCH: 50 INJECTION INTRAVENOUS at 12:48

## 2017-10-23 RX ADMIN — SERTRALINE 50 MILLIGRAM(S): 25 TABLET, FILM COATED ORAL at 12:49

## 2017-10-23 RX ADMIN — OXYCODONE HYDROCHLORIDE 60 MILLIGRAM(S): 5 TABLET ORAL at 23:15

## 2017-10-23 RX ADMIN — BUPROPION HYDROCHLORIDE 300 MILLIGRAM(S): 150 TABLET, EXTENDED RELEASE ORAL at 12:49

## 2017-10-23 RX ADMIN — RISPERIDONE 8 MILLIGRAM(S): 4 TABLET ORAL at 22:17

## 2017-10-23 RX ADMIN — OXYCODONE HYDROCHLORIDE 60 MILLIGRAM(S): 5 TABLET ORAL at 14:41

## 2017-10-23 RX ADMIN — OXYCODONE HYDROCHLORIDE 60 MILLIGRAM(S): 5 TABLET ORAL at 15:11

## 2017-10-23 RX ADMIN — HYDROMORPHONE HYDROCHLORIDE 1 MILLIGRAM(S): 2 INJECTION INTRAMUSCULAR; INTRAVENOUS; SUBCUTANEOUS at 13:24

## 2017-10-23 RX ADMIN — ALBUTEROL 2 PUFF(S): 90 AEROSOL, METERED ORAL at 06:25

## 2017-10-23 RX ADMIN — Medication 2 SPRAY(S): at 20:37

## 2017-10-23 RX ADMIN — HYDROMORPHONE HYDROCHLORIDE 0.5 MILLIGRAM(S): 2 INJECTION INTRAMUSCULAR; INTRAVENOUS; SUBCUTANEOUS at 06:44

## 2017-10-23 NOTE — PROGRESS NOTE ADULT - ASSESSMENT
32F PMH Asthma, Bipolar disorder, hypothyroidism, chronic low back pain s/p laminectomy and spinal fusion, opioid-dependent presents with right leg swelling, numbness, pallor, poikilothermia, and pulselessness, found to have ischemic leg due to acute compartment syndrome of right leg with acute rhabdomyolysis, GHAZAL 2/2 ATN, and leukocytosis with bandemia s/p emergent fasciotomy of the 4 leg compartments (anterior, lateral, superficial posterior, and deep posterior) and the lateral thigh muscles. Wound vac in place. Pain management for opioid dependence and abuse.  Low grade temperature. ID and vascular following. Anemic likely ACD and +Neck Lymph Node swelling

## 2017-10-23 NOTE — PROGRESS NOTE ADULT - ASSESSMENT
32F with opiate dependence, anxiety disorder, admitted with leg swelling   S/P fasciotomy for compartment syndrome  S/P Rhabdomyolysis  S/P obtundation/immobility due to drug overdose  GHAZAL, improving  Elevate ESR. Minimally elevated.  +Urine cx, no symptoms. Would not treat at this time  No clear pathology to explain fevers on CT  Intermittent low grade fevers  Atelectasis with narcotics in DDx  Intermittent chemical aspiration (with nacrosis) in DDx as well  No concern of infection on exam presently

## 2017-10-23 NOTE — PROGRESS NOTE ADULT - SUBJECTIVE AND OBJECTIVE BOX
Patient is a 32y old  Female who presents with a chief complaint of RLE edema and numbness (04 Oct 2017 01:13)      Patient seen in follow up for GHAZAL, ATN, Rhabdomyolysis. Improved renal function trend. Still with low potassium.     PAST MEDICAL HISTORY:  Low back pain  Hypothyroi  Bipolar 1 disorder    MEDICATIONS  (STANDING):  BACItracin   Ointment 1 Application(s) Topical two times a day  buDESOnide  80 MICROgram(s)/formoterol 4.5 MICROgram(s) Inhaler 2 Puff(s) Inhalation two times a day  buPROPion XL . 300 milliGRAM(s) Oral daily  cyanocobalamin 1000 MICROGram(s) Oral daily  fentaNYL   Patch  12 MICROgram(s)/Hr 1 Patch Transdermal every 72 hours  fluticasone propionate 50 MICROgram(s)/spray Nasal Spray 1 Spray(s) Both Nostrils two times a day  folic acid 1 milliGRAM(s) Oral daily  heparin  Injectable 5000 Unit(s) SubCutaneous every 8 hours  HYDROmorphone  Injectable 1 milliGRAM(s) IV Push once  influenza   Vaccine 0.5 milliLiter(s) IntraMuscular once  lamoTRIgine 100 milliGRAM(s) Oral every 8 hours  levothyroxine 150 MICROGram(s) Oral daily  oxyCODONE  ER Tablet 60 milliGRAM(s) Oral every 8 hours  pantoprazole    Tablet 40 milliGRAM(s) Oral before breakfast  potassium chloride    Tablet ER 40 milliEquivalent(s) Oral daily  risperiDONE   Tablet 8 milliGRAM(s) Oral at bedtime  sertraline 50 milliGRAM(s) Oral daily  topiramate 400 milliGRAM(s) Oral two times a day  Viibryd Oral Tablet 40 milliGRAM(s) 1 Tablet(s) Oral daily    MEDICATIONS  (PRN):  acetaminophen   Tablet 650 milliGRAM(s) Oral every 6 hours PRN For Temp greater than 38 C (100.4 F)  acetaminophen   Tablet. 650 milliGRAM(s) Oral every 6 hours PRN Mild Pain (1 - 3)  ALBUTerol    90 MICROgram(s) HFA Inhaler 2 Puff(s) Inhalation every 6 hours PRN Shortness of Breath and/or Wheezing  clonazePAM Tablet 2 milliGRAM(s) Oral every 8 hours PRN anxiety  docusate sodium 100 milliGRAM(s) Oral two times a day PRN Constipation  HYDROmorphone  Injectable 0.5 milliGRAM(s) IV Push every 3 hours PRN Severe Pain (7 - 10)  ondansetron Injectable 4 milliGRAM(s) IV Push every 4 hours PRN Nausea and/or Vomiting  senna 2 Tablet(s) Oral at bedtime PRN Constipation    T(C): 37.4 (10-23-17 @ 07:49), Max: 38 (10-22-17 @ 00:53)  HR: 98 (10-23-17 @ 07:49) (87 - 107)  BP: 104/65 (10-23-17 @ 07:49) (91/54 - 110/65)  RR: 17 (10-23-17 @ 07:49)  SpO2: 98% (10-23-17 @ 07:49)  Wt(kg): --  I&O's Detail    22 Oct 2017 07:01  -  23 Oct 2017 07:00  --------------------------------------------------------  IN:    Oral Fluid: 560 mL  Total IN: 560 mL    OUT:    VAC (Vacuum Assisted Closure) System: 500 mL  Total OUT: 500 mL    Total NET: 60 mL      23 Oct 2017 07:01  -  23 Oct 2017 11:28  --------------------------------------------------------  IN:  Total IN: 0 mL    OUT:    VAC (Vacuum Assisted Closure) System: 500 mL  Total OUT: 500 mL    Total NET: -500 mL            PHYSICAL EXAM:  General: NAD  Respiratory: b/l air entry  Cardiovascular: S1 S2  Gastrointestinal: soft  Extremities:  Rt leg dressing    LABORATORY:                        8.2    8.3   )-----------( 232      ( 23 Oct 2017 07:39 )             25.2     10-23    139  |  108  |  34<H>  ----------------------------<  99  3.5   |  20<L>  |  1.40<H>    Ca    8.3<L>      23 Oct 2017 07:39      Sodium, Serum: 139 mmol/L (10-23 @ 07:39)  Sodium, Serum: 137 mmol/L (10-22 @ 09:44)    Potassium, Serum: 3.5 mmol/L (10-23 @ 07:39)  Potassium, Serum: 3.3 mmol/L (10-22 @ 09:44)    Hemoglobin: 8.2 g/dL (10-23 @ 07:39)  Hemoglobin: 7.4 g/dL (10-22 @ 09:44)  Hemoglobin: 7.8 g/dL (10-21 @ 06:45)    Creatinine, Serum 1.40 (10-23 @ 07:39)  Creatinine, Serum 1.30 (10-22 @ 09:44)  Creatinine, Serum 1.40 (10-21 @ 06:45)

## 2017-10-23 NOTE — PROGRESS NOTE ADULT - SUBJECTIVE AND OBJECTIVE BOX
INTERVAL HPI: 32y old  Female who presents with a chief complaint of RLE edema and numbness. Patient admitted for Compartment syndrome, s/p Fasciotomy, was initially in ICU, finished course of IV antibiotics and was transferred to med- surg floors. Patient with ATN likely from rhabdomyolysis with improving creatinine, followed by nephrology.   Dr. Damico consulted for pain management and concern for opoid dependence and abuse. Nurse aware of pain management plan. Renal function improving. Spoke to patient and mother at bedside. Patient is persistently asking for opioids, but she is aware they are being tapered and managed accordingly with pain management Dr. Damico.   Spoke to patient at length in regards to her opioid dependence and need to taper down her dilaudid.  Spoke to pain management doctor Jeffrey Galaviz [phone]) see chart note.    Patient having low grade temperatures. Dr. Montiel consulted, no need for antibiotics at this time.  CT LE done non specific swelling in calf, no drainable fluid collection noted. Discussed with Dr. Martines  Plastic surgery to decide when plan for possible skin grafting.   Patient with anemia, prior transfusion, work up initiated. 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 Discussed with Dr. Jin, feels like a lymph  node will get US.      MEDICATIONS  (STANDING):  BACItracin   Ointment 1 Application(s) Topical two times a day  buDESOnide  80 MICROgram(s)/formoterol 4.5 MICROgram(s) Inhaler 2 Puff(s) Inhalation two times a day  buPROPion XL . 300 milliGRAM(s) Oral daily  cyanocobalamin 1000 MICROGram(s) Oral daily  fentaNYL   Patch  12 MICROgram(s)/Hr 1 Patch Transdermal every 72 hours  fluticasone propionate 50 MICROgram(s)/spray Nasal Spray 1 Spray(s) Both Nostrils two times a day  folic acid 1 milliGRAM(s) Oral daily  heparin  Injectable 5000 Unit(s) SubCutaneous every 8 hours  HYDROmorphone  Injectable 1 milliGRAM(s) IV Push once  influenza   Vaccine 0.5 milliLiter(s) IntraMuscular once  lamoTRIgine 100 milliGRAM(s) Oral every 8 hours  levothyroxine 150 MICROGram(s) Oral daily  oxyCODONE  ER Tablet 60 milliGRAM(s) Oral every 8 hours  pantoprazole    Tablet 40 milliGRAM(s) Oral before breakfast  potassium chloride    Tablet ER 40 milliEquivalent(s) Oral daily  risperiDONE   Tablet 8 milliGRAM(s) Oral at bedtime  sertraline 50 milliGRAM(s) Oral daily  topiramate 400 milliGRAM(s) Oral two times a day  Viibryd Oral Tablet 40 milliGRAM(s) 1 Tablet(s) Oral daily    MEDICATIONS  (PRN):  acetaminophen   Tablet 650 milliGRAM(s) Oral every 6 hours PRN For Temp greater than 38 C (100.4 F)  acetaminophen   Tablet. 650 milliGRAM(s) Oral every 6 hours PRN Mild Pain (1 - 3)  ALBUTerol    90 MICROgram(s) HFA Inhaler 2 Puff(s) Inhalation every 6 hours PRN Shortness of Breath and/or Wheezing  clonazePAM Tablet 2 milliGRAM(s) Oral every 8 hours PRN anxiety  docusate sodium 100 milliGRAM(s) Oral two times a day PRN Constipation  HYDROmorphone  Injectable 0.5 milliGRAM(s) IV Push every 3 hours PRN Severe Pain (7 - 10)  ondansetron Injectable 4 milliGRAM(s) IV Push every 4 hours PRN Nausea and/or Vomiting  senna 2 Tablet(s) Oral at bedtime PRN Constipation          REVIEW OF SYSTEM:    Constitutional: No fever, chills, fatigue  Neuro: No headache, numbness, weakness  Resp: No cough, wheezing, shortness of breath  CVS: No chest pain, palpitations, leg swelling  GI: No abdominal pain, nausea, vomiting, diarrhea   : No dysuria, frequency, incontinence  Skin: No itching, burning, rashes, or lesions   Msk: +pain in RLE, +pain in her back  Psych: No depression, anxiety, mood swings  Skin: + Left swollen lymph node    Vital Signs Last 24 Hrs  T(C): 37.4 (23 Oct 2017 07:49), Max: 37.5 (22 Oct 2017 16:54)  T(F): 99.4 (23 Oct 2017 07:49), Max: 99.5 (22 Oct 2017 16:54)  HR: 98 (23 Oct 2017 07:49) (87 - 107)  BP: 97/65 (23 Oct 2017 09:00) (91/54 - 110/65)  BP(mean): --  RR: 17 (23 Oct 2017 09:00) (16 - 17)  SpO2: 97% (23 Oct 2017 09:00) (96% - 98%)    PHYSICAL EXAM:  GENERAL: NAD  HEENT: NCAT, left sided soft palpable, movable mass, likely swollen lymphnode  HEART: S1S2 RRR  RESPIRATORY: CTA B/L, No wheezing / rhonchi  ABDOMEN: Soft, Nontender, Nondistended; Bowel sounds present  NEUROLOGY: A&Ox3, nonfocal, not able to move RLE, sensation diminished RLE  EXTREMITIES:  +pulses mild edema R foot, LLE is moving + pulse  Skin: warm, well perfused       LABS:                        8.2    8.3   )-----------( 232      ( 23 Oct 2017 07:39 )             25.2     10-23    139  |  108  |  34<H>  ----------------------------<  99  3.5   |  20<L>  |  1.40<H>    Ca    8.3<L>      23 Oct 2017 07:39              Culture Results:   10,000 - 49,000 CFU/mL Coag negative Staphylococcus not Staph  saprophyticus (10.16.17 @ 09:28)    Culture Results:   No growth at 5 days. (10.16.17 @ 09:27)      10-04 @ 10:15   No growth to date.  --  --  10-04 @ 09:59   No growth  --  --    < from: CT Lower Extremity No Cont, Right (10.21.17 @ 08:36) >  IMPRESSION:   1.  Multiple skin defects including the medial thigh, anterolateral calf   and posterior medial calf with absence of the underlying subcutaneous fat   and approximation of the underlying musculature to the skin surface in   keeping with prior fasciotomies.  2.  Nonspecific enlargement of the soleus musculature.  3.  Nonspecific soft tissue swelling about the calf and ankle maybe   postoperative. Differential includes infection. No drainable fluid   collection.  4.  Calcification within the medial gastrocnemius and soleus musculature   may be postoperative or posttraumatic in etiology.    < end of copied text >

## 2017-10-23 NOTE — PROGRESS NOTE ADULT - PROBLEM SELECTOR PLAN 3
on dilaudid PRN iv low dose  reassurance  emotional support  cont oxycontin for long acting management of pain  bowel regimen

## 2017-10-23 NOTE — PROGRESS NOTE ADULT - SUBJECTIVE AND OBJECTIVE BOX
[facility], Division of Infectious Diseases  NATALIO [facility] A. [facility]    Name: ABELARDO CROWLEL  Age: 32y  Gender: Female  MRN: [id number]    Interval History--  Notes reviewed. Sleepy, rouses easily, presumably narcotic-related. No new complaints.     Past Medical History--  Low back pain  Hypothyroid  Bipolar 1 disorder  History of spinal fusion  History of laminectomy      For details regarding the patient's social history, family history, and other miscellaneous elements, please refer the initial infectious diseases consultation and/or the admitting history and physical examination for this admission.    Allergies    avocado (Unknown)  latex (Urticaria; Rash)  penicillin (Unknown)  sulfa drugs (Unknown)    Intolerances        Medications--  Antibiotics:    Immunologic:  influenza   Vaccine 0.5 milliLiter(s) IntraMuscular once    Other:  acetaminophen   Tablet PRN  acetaminophen   Tablet. PRN  ALBUTerol    90 MICROgram(s) HFA Inhaler PRN  BACItracin   Ointment  buDESOnide  80 MICROgram(s)/formoterol 4.5 MICROgram(s) Inhaler  buPROPion XL .  clonazePAM Tablet PRN  cyanocobalamin  docusate sodium PRN  fentaNYL   Patch  12 MICROgram(s)/Hr  fluticasone propionate 50 MICROgram(s)/spray Nasal Spray  folic acid  heparin  Injectable  HYDROmorphone  Injectable  HYDROmorphone  Injectable PRN  lamoTRIgine  levothyroxine  ondansetron Injectable PRN  oxyCODONE  ER Tablet  pantoprazole    Tablet  potassium chloride    Tablet ER  risperiDONE   Tablet  senna PRN  sertraline  topiramate  Viibryd Oral Tablet 40 milliGRAM(s)      Review of Systems--  ROS unchanged compared with prior visit.    Physical Examination--  Vital Signs: T(F): 99.4 (10-23-17 @ 07:49), Max: 99.5 (10-22-17 @ 16:54)  HR: 98 (10-23-17 @ 07:49)  BP: 104/65 (10-23-17 @ 07:49)  RR: 17 (10-23-17 @ 07:49)  SpO2: 98% (10-23-17 @ 07:49)  Wt(kg): --  General: Nontoxic-appearing Female in no acute distress.  HEENT: AT/NC. Anicteric. Conjunctiva pale and moist. Oropharynx pale but clear. Dentition fair.  Neck: Not rigid. No sense of mass.  Nodes: None palpable.  Lungs: Clear bilaterally without rales, wheezing or rhonchi  Heart: RRR. No Murmur. No rub. No gallop. No palpable thrill.  Abdomen: Bowel sounds present and normoactive. Soft. Nondistended. Nontender. No sense of mass. No organomegaly.  Extremities: No cyanosis or clubbing. LLE no edema. RLE wrapped. Foot warm, edematous. VAC under ACE. No IV phelbitis.  Skin: Pale. Dry. Good turgor. No rash. No vasculitic stigmata.  Psychiatric: Sleepy, oriented when roused.      Laboratory Studies--  CBC                        8.2    8.3   )-----------( 232      ( 23 Oct 2017 07:39 )             25.2       Chemistries  10-23    139  |  108  |  34<H>  ----------------------------<  99  3.5   |  20<L>  |  1.40<H>    Ca    8.3<L>      23 Oct 2017 07:39        Culture Data  No new culture data

## 2017-10-23 NOTE — CONSULT NOTE ADULT - SUBJECTIVE AND OBJECTIVE BOX
HPI:  Pt is a 33 yo female with PMHX of Bipolar disorder, hypothyroidism, chronic low back pain on morphine s/p laminectomy and spinal fusion in 2016 who is BIB EMS for right leg swelling and numbness. Patient had fall 18 hours prior to making it into the hospital and was on the floor for at least 8 hours. She had numbness and swelling and coolness of the right leg; emergently on day of admission (10/4/17) she underwent fasciotomy for compartment syndrome with placement of a wound vac and has been noted to have persistent anemia since. Over course 2 weeks of hospitalization she has required transfusion of pRBCs (2 units on 10/16/17 and 1 unit on 10/22/17). Asked now to evaluate for anemia. Additionally patient reports that she has noted enlargement of left neck lymph node and is concerned that "she has a tumor"    ROS:  Negative except for: lymph node enlargement in neck, intermittent low grade fever; chronic pain    PAST MEDICAL & SURGICAL HISTORY:  Low back pain  Hypothyroid  Bipolar 1 disorder  History of spinal fusion  History of laminectomy      SOCIAL HISTORY:  denies ETOH, denies tobacco; no illicit drug use but has abused opiods    FAMILY HISTORY:  Grandfather -  with lung cancer  Grandmother -  with brain cancer  Grandmother - colon cancer  Father - melanoma    MEDICATIONS  (STANDING):  BACItracin   Ointment 1 Application(s) Topical two times a day  buDESOnide  80 MICROgram(s)/formoterol 4.5 MICROgram(s) Inhaler 2 Puff(s) Inhalation two times a day  buPROPion XL . 300 milliGRAM(s) Oral daily  cyanocobalamin 1000 MICROGram(s) Oral daily  fentaNYL   Patch  12 MICROgram(s)/Hr 1 Patch Transdermal every 72 hours  fluticasone propionate 50 MICROgram(s)/spray Nasal Spray 1 Spray(s) Both Nostrils two times a day  heparin  Injectable 5000 Unit(s) SubCutaneous every 8 hours  HYDROmorphone  Injectable 1 milliGRAM(s) IV Push once  influenza   Vaccine 0.5 milliLiter(s) IntraMuscular once  lamoTRIgine 100 milliGRAM(s) Oral every 8 hours  levothyroxine 150 MICROGram(s) Oral daily  oxyCODONE  ER Tablet 60 milliGRAM(s) Oral every 8 hours  pantoprazole    Tablet 40 milliGRAM(s) Oral before breakfast  potassium chloride    Tablet ER 40 milliEquivalent(s) Oral daily  risperiDONE   Tablet 8 milliGRAM(s) Oral at bedtime  sertraline 50 milliGRAM(s) Oral daily  topiramate 400 milliGRAM(s) Oral two times a day  Viibryd Oral Tablet 40 milliGRAM(s) 1 Tablet(s) Oral daily    MEDICATIONS  (PRN):  acetaminophen   Tablet 650 milliGRAM(s) Oral every 6 hours PRN For Temp greater than 38 C (100.4 F)  acetaminophen   Tablet. 650 milliGRAM(s) Oral every 6 hours PRN Mild Pain (1 - 3)  ALBUTerol    90 MICROgram(s) HFA Inhaler 2 Puff(s) Inhalation every 6 hours PRN Shortness of Breath and/or Wheezing  clonazePAM Tablet 2 milliGRAM(s) Oral every 8 hours PRN anxiety  docusate sodium 100 milliGRAM(s) Oral two times a day PRN Constipation  HYDROmorphone  Injectable 0.5 milliGRAM(s) IV Push every 3 hours PRN Severe Pain (7 - 10)  ondansetron Injectable 4 milliGRAM(s) IV Push every 4 hours PRN Nausea and/or Vomiting  senna 2 Tablet(s) Oral at bedtime PRN Constipation      Allergies    avocado (Unknown)  latex (Urticaria; Rash)  penicillin (Unknown)  sulfa drugs (Unknown)      Vital Signs Last 24 Hrs  T(C): 37.4 (23 Oct 2017 07:49), Max: 37.5 (22 Oct 2017 16:54)  T(F): 99.4 (23 Oct 2017 07:49), Max: 99.5 (22 Oct 2017 16:54); Intermittent fever up to 100.9 over course of last 2 weeks  HR: 98 (23 Oct 2017 07:49) (87 - 107)  BP: 104/65 (23 Oct 2017 07:49) (91/54 - 110/65)  RR: 17 (23 Oct 2017 07:49) (16 - 17)  SpO2: 98% (23 Oct 2017 07:49) (96% - 98%)    PHYSICAL EXAM  General: adult in NAD; intermittently dozing off during interview  HEENT: clear oropharynx, anicteric sclera, pink conjunctivae; palpable left posterior LN measuring about 1.5 cm; mobile and rubber  Neck: supple  CV: normal S1S2 with no murmur rubs or gallops  Lungs: clear to auscultation, no wheezes, no rhales  Abdomen: soft non-tender non-distended, no hepato/splenomegaly; obese  Ext: right thigh with dressing/bandages/wound vac   Skin: no rashes and no petichiae  Neuro: alert and oriented X3 no focal deficits      LABS:    CBC Full  -  ( 23 Oct 2017 07:39 )  WBC Count : 8.3 K/uL  Hemoglobin : 8.2 g/dL  Hematocrit : 25.2 %  Platelet Count - Automated : 232 K/uL  Mean Cell Volume : 89.4 fl  Mean Cell Hemoglobin : 29.2 pg  Mean Cell Hemoglobin Concentration : 32.7 gm/dL    Hemoglobin: 8.2 g/dL (10-23 @ 07:39)  Hemoglobin: 7.4 g/dL (10-22 @ 09:44)  Hemoglobin: 7.8 g/dL (10-21 @ 06:45)  Hemoglobin: 8.1 g/dL (10-20 @ 08:25)  Hemoglobin: 8.5 g/dL (10-19 @ 14:02)    At admission Hg >15 g/dL    10-23    139  |  108  |  34<H>  ----------------------------<  99  3.5   |  20<L>  |  1.40<H>    Ca    8.3<L>      23 Oct 2017 07:39    Iron with Total Binding Capacity (10.22.17 @ 16:10)    % Saturation, Iron: 14 %    Iron - Total Binding Capacity.: 167 ug/dL    Iron Total, Serum: 23 ug/dL    Unsaturated Iron Binding Capacity: 144 ug/dL  Ferritin, Serum (10.22.17 @ 16:10)    Ferritin, Serum: 357.0 ng/mL    Vitamin B12, Serum (10.22.17 @ 16:10)    Vitamin B12, Serum: 378 pg/mL  Folate, Serum (10.22.17 @ 16:10)    Folate, Serum: 5.4 ng/mL      BLOOD SMEAR INTERPRETATION:  * RBC - normocytic, normochromic, no anisiocytosis or poikiolocytosis; no evidence of polychromasia  * WBC - neutrophils with normal morphology, small mature lymphs, no evidence of left shift  * Platelets - normal in morphology       RADIOLOGY :  < from: CT Lower Extremity No Cont, Right (10.21.17 @ 08:36) >  IMPRESSION:   1.  Multiple skin defects including the medial thigh, anterolateral calf   and posterior medial calf with absence of the underlying subcutaneous fat   and approximation of the underlying musculature to the skin surface in   keeping with prior fasciotomies.  2.  Nonspecific enlargement of the soleus musculature.  3.  Nonspecific soft tissue swelling about the calf and ankle maybe   postoperative. Differential includes infection. No drainable fluid   collection.  4.  Calcification within the medial gastrocnemius and soleus musculature   may be postoperative or posttraumatic in etiology.      < end of copied text >

## 2017-10-23 NOTE — PROGRESS NOTE ADULT - PROBLEM SELECTOR PLAN 3
Pt having low grade fever, for CT A/P is normal  Pt is off antibx and leucocytosis improving.  ID consult noted.   CT Lower extremity done, no drainable fluid collection,  d/w Dr. Martines

## 2017-10-23 NOTE — PROGRESS NOTE ADULT - ASSESSMENT
32-year-old female now status post right lower extremity  fasciotomy for compartment syndrome. Pt on vent support.     ·	GHAZAL, ATN Septic / ischemic, Rhabdomyolysis  ·	s/p surgery for compartment syndrome  ·	Anemia  ·	Hypokalemia    Improved renal function. Avoid nephrotoxic meds as possible.   Will follow electrolytes and renal function trend.   Surgery follow up. Monitor h/h. On multiple pain meds.   Pain management follow up. Potassium supplements. Surgery follow up.

## 2017-10-23 NOTE — PROGRESS NOTE ADULT - SUBJECTIVE AND OBJECTIVE BOX
HPI:  Pt is a 31 yo female with PMHX of Bipolar disorder, hypothyroidism, chronic low back pain on morphine s/p laminectomy and spinal fusion in 2016 who is BIB EMS for right leg swelling and numbness. Patient is a poor historian as she is on many pain medicines and is currently lethargic, therefore much info provided based on hospital staff and charts. She states that 18 hours prior to arrival, she fell on the floor at her house after taking too much morphine and was lying on floor for 8 hours. She woke up to numbness, swelling, and cool temperature of her right leg, from below the knee onwards. Patient denies any current SOB, CP, f/c, denies head trauma.     In the ED, vitals were Temp 101.4F /91  RR 20  SpO2 93% RA. Significant labs are WBS 24.3, Hb 15.6, Hmt 47.6,  Platelets 118, neutr 93%, sodium 131, BUN 30,  Cr 2.30, glucose 152 AST 1377, , GFR 27, lactate 1.7. Doppler US RLE showed Extremely limited evaluation of the right lower extremity with nonvisualization of the venous structures beyond the mid right femoral vein. No evidence of DVT within the right common femoral, proximal and   mid femoral veins. No evidence of left lower extremity deep venous thrombosis. XR of right tibia and XR of R femur showed no acute fractures. Arterial Doppler of RLE pending. EKG showed sinus tach of 140, with right superior axis deviation. Patient started on aztreonam, Tylenol given for fever, patient made NPO, Dr. Martines consulted for emergent surgery. (04 Oct 2017 01:13)      SUBJECTIVE:  Patient is a 32y old  Female who presents with a chief complaint of RLE edema and numbness (13 Oct 2017 16:25)          OBJECTIVE:  Review Of Systems:  Constitutional: [ ] Fever [ ] Chills [ ] Fatigue [ ] Weight change   HEENT: [ ] Blurred vision [ ] Eye Pain [ ] Headache [ ] Runny nose [ ] Sore Throat   Respiratory: [ ] Cough [ ] Wheezing [ ] Shortness of breath  Cardiovascular: [ ] Chest Pain [ ] Palpitations [ ] BENNETT [ ] PND [ ] Orthopnea  Gastrointestinal: [ ] Abdominal Pain [ ] Diarrhea [ ] Constipation [ ] Hemorrhoids [ ] Nausea [ ] Vomiting  Genitourinary: [ ] Nocturia [ ] Dysuria [ ] Incontinence  Extremities: [ ] Swelling [ ] Joint Pain  Neurologic: [ ] Focal deficit [ ] Paresthesias [ ] Syncope  Lymphatic: [ ] Swelling [ ] Lymphadenopathy   Skin: [ ] Rash [ ] Ecchymoses [ ] Wounds [ ] Lesions  Psychiatry: [ ] Depression [ ] Suicidal/Homicidal Ideation [ ] Anxiety [ ] Sleep Disturbances  [ ] 10 point review of systems is otherwise negative except as mentioned above            [ ]Unable to obtain    Allergy:  Allergies    avocado (Unknown)  latex (Urticaria; Rash)  penicillin (Unknown)  sulfa drugs (Unknown)    Intolerances        Medications:  MEDICATIONS  (STANDING):  BACItracin   Ointment 1 Application(s) Topical two times a day  buDESOnide  80 MICROgram(s)/formoterol 4.5 MICROgram(s) Inhaler 2 Puff(s) Inhalation two times a day  buPROPion XL . 300 milliGRAM(s) Oral daily  cyanocobalamin 1000 MICROGram(s) Oral daily  fentaNYL   Patch  12 MICROgram(s)/Hr 1 Patch Transdermal every 72 hours  fluticasone propionate 50 MICROgram(s)/spray Nasal Spray 1 Spray(s) Both Nostrils two times a day  folic acid 1 milliGRAM(s) Oral daily  heparin  Injectable 5000 Unit(s) SubCutaneous every 8 hours  influenza   Vaccine 0.5 milliLiter(s) IntraMuscular once  lamoTRIgine 100 milliGRAM(s) Oral every 8 hours  levothyroxine 150 MICROGram(s) Oral daily  oxyCODONE  ER Tablet 60 milliGRAM(s) Oral every 8 hours  pantoprazole    Tablet 40 milliGRAM(s) Oral before breakfast  potassium chloride    Tablet ER 40 milliEquivalent(s) Oral daily  risperiDONE   Tablet 8 milliGRAM(s) Oral at bedtime  sertraline 50 milliGRAM(s) Oral daily  topiramate 400 milliGRAM(s) Oral two times a day  Viibryd Oral Tablet 40 milliGRAM(s) 1 Tablet(s) Oral daily    MEDICATIONS  (PRN):  acetaminophen   Tablet 650 milliGRAM(s) Oral every 6 hours PRN For Temp greater than 38 C (100.4 F)  acetaminophen   Tablet. 650 milliGRAM(s) Oral every 6 hours PRN Mild Pain (1 - 3)  ALBUTerol    90 MICROgram(s) HFA Inhaler 2 Puff(s) Inhalation every 6 hours PRN Shortness of Breath and/or Wheezing  clonazePAM Tablet 2 milliGRAM(s) Oral every 8 hours PRN anxiety  docusate sodium 100 milliGRAM(s) Oral two times a day PRN Constipation  HYDROmorphone  Injectable 0.5 milliGRAM(s) IV Push every 3 hours PRN Severe Pain (7 - 10)  ondansetron Injectable 4 milliGRAM(s) IV Push every 4 hours PRN Nausea and/or Vomiting  senna 2 Tablet(s) Oral at bedtime PRN Constipation      PMH/PSH/FH/SH: [ ] Unchanged    Vitals:  T(C): 37.4 (10-23-17 @ 07:49), Max: 37.5 (10-22-17 @ 16:54)  HR: 98 (10-23-17 @ 07:49) (87 - 107)  BP: 97/65 (10-23-17 @ 09:00) (91/54 - 110/65)  BP(mean): --  RR: 17 (10-23-17 @ 09:00) (16 - 17)  SpO2: 97% (10-23-17 @ 09:00) (96% - 98%)  Wt(kg): --  Daily     Daily   I&O's Summary    22 Oct 2017 07:01  -  23 Oct 2017 07:00  --------------------------------------------------------  IN: 560 mL / OUT: 500 mL / NET: 60 mL    23 Oct 2017 07:01  -  23 Oct 2017 14:31  --------------------------------------------------------  IN: 0 mL / OUT: 500 mL / NET: -500 mL        Labs:                        8.2    8.3   )-----------( 232      ( 23 Oct 2017 07:39 )             25.2     10-23    139  |  108  |  34<H>  ----------------------------<  99  3.5   |  20<L>  |  1.40<H>    Ca    8.3<L>      23 Oct 2017 07:39                        ECG:  < from: 12 Lead ECG (10.04.17 @ 08:44) >  Ventricular Rate 122 BPM    Atrial Rate 122 BPM    P-R Interval 136 ms    QRS Duration 94 ms    Q-T Interval 332 ms    QTC Calculation(Bezet) 473 ms    P Axis 35 degrees    R Axis -86 degrees    T Axis 10 degrees    Diagnosis Line Sinus tachycardia  Left axis deviation  Inferior infarct (cited on or before 03-OCT-2017)  Abnormal ECG  When compared with ECG of 03-OCT-2017 21:50, (Unconfirmed)  No significant change was found  Confirmed by Juanito Brink MD ([id number]) on 10/4/2017 1:55:40 PM    < end of copied text >    Echo:  < from: TTE Echo Doppler w/o Cont (10.05.17 @ 12:46) >     EXAM:  ECHO TTE W/O CON COMP W/DOPPLR         PROCEDURE DATE:  10/05/2017        INTERPRETATION:  Ordering Physician: MARISSA RUFF [id number]    Indication: Respiratory failure    Study Quality: Technically difficult in ICU   A complete echocardiographic study was performed utilizing standard   protocol including spectral and color Doppler in all echocardiographic   windows.    Height: 1 75 cm  Weight: 113 kg  BSA: 2.27  Blood Pressure: 104/76    MEASUREMENTS  IVS: 0.9cm  PWT: 0.9cm  LA: 3.5cm  AO: 2.8cm  LVIDd: 5.2cm  LVIDs: 3.5cm      LVEF: 70%    FINDINGS  Left Ventricle: Hyperdynamic left ventricle. Estimated EF 70%. No   segmental wall motion abnormalities  Aortic Valve: Not well-visualized. Probably normal trileaflet aortic valve  Mitral Valve: There is subtle systolic anterior motion of the mitral   valve in the setting of a hyperdynamic left ventricle. No left   ventricular outflow tract gradient is seen. Trace mitral regurgitation is   visualized.  Tricuspid Valve: Not well visualized. Trace tricuspid regurgitation  Pulmonic Valve: Not visualized.  Left Atrium: Grossly normal  Right Ventricle: Not well-visualized. Grossly normal right ventricular   size and function  Right Atrium: Grossly normal  Diastolic Function: Normaldiastolic function  Pericardium/Pleura: No pericardial effusion      CONCLUSIONS:  1. Technically difficult study  2. Hyperdynamic left ventricle. Estimated EF 70%  3. Subtle systolic anterior motion of the mitral valve in the setting of   a hyperdynamic left ventricle, but no outflow tract gradient.  4. Right ventricle is not well visualized.Grossly normal right   ventricular size and function  5. No pericardial effusion    No prior exam for comparison                  ROBERTA MARKS   This document has been electronically signed. Oct  6 2017  1:33PM        < end of copied text >    Stress Testing:     Cath:    Imaging:    < from: US Head + Neck Soft Tissue (10.23.17 @ 11:49) >  EXAM:  US HEAD NECK SOFT TISSUE                            PROCEDURE DATE:  10/23/2017          INTERPRETATION:  History: Palpable lump left posterior neck.    Limited targeted neck ultrasound to region of interest left posterior   neck.    In the area of interest there are 2 cervical lymph nodes noted measuring   0.7 x 0.6, and 0.8 x 0.8 cm respectively. These are nonspecific.   Recommend clinical laboratory correlation, biopsy as warranted.    Impression: As above                GLORIA TURNER M.D., ATTENDING RADIOLOGIST  This document has been electronically signed. Oct 23 2017 12:46PM        < end of copied text >    Interpretation of Telemetry:  Not on Tele    Physical Exam:  Appearance: [ ] Normal  [ ] abnormal [ ] NAD   Eyes: [ ] PERRL [ ] EOMI  HENT: [ ] Normal [ ] Abnormal oral muscosa [ ]NC/AT  Cardiovascular: [ ] S1 [ ] S2 [ ] RRR [ ] m/r/g [ ]edema [ ] JVP  Procedural Access Site: [ ]  hematoma [ ] tender to palpation [ ] 2+ pulse [ ] bruit [ ] Ecchymosis  Respiratory: [ ] Clear to auscultation bilaterally  Gastrointestinal: [ ] Soft [ ] tenderness[ ] distension [ ] BS  Musculoskeletal: [ ] clubbing [ ] joint deformity   Neurologic: [ ] Non-focal  Lymphatic: [ ] lymphadenopathy  Psychiatry: [ ] AAOx3  [ ] confused [ ] disoriented [ ] Mood & affect appropriate  Skin: [ ]  rashes [ ] ecchymoses [ ] cyanosis

## 2017-10-23 NOTE — PROGRESS NOTE ADULT - SUBJECTIVE AND OBJECTIVE BOX
Date/Time Patient Seen:  		  Referring MD:   Data Reviewed	       Patient is a 32y old  Female who presents with a chief complaint of RLE edema and numbness (13 Oct 2017 16:25)  in bed  seen and examined  vs and meds reviewed      Subjective/HPI     PAST MEDICAL & SURGICAL HISTORY:  Low back pain  Hypothyroid  Bipolar 1 disorder  History of spinal fusion  History of laminectomy        Medication list         MEDICATIONS  (STANDING):  BACItracin   Ointment 1 Application(s) Topical two times a day  buDESOnide  80 MICROgram(s)/formoterol 4.5 MICROgram(s) Inhaler 2 Puff(s) Inhalation two times a day  buPROPion XL . 300 milliGRAM(s) Oral daily  fluticasone propionate 50 MICROgram(s)/spray Nasal Spray 1 Spray(s) Both Nostrils two times a day  heparin  Injectable 5000 Unit(s) SubCutaneous every 8 hours  influenza   Vaccine 0.5 milliLiter(s) IntraMuscular once  lamoTRIgine 100 milliGRAM(s) Oral every 8 hours  levothyroxine 150 MICROGram(s) Oral daily  oxyCODONE  ER Tablet 60 milliGRAM(s) Oral every 8 hours  pantoprazole    Tablet 40 milliGRAM(s) Oral before breakfast  risperiDONE   Tablet 8 milliGRAM(s) Oral at bedtime  sertraline 50 milliGRAM(s) Oral daily  topiramate 400 milliGRAM(s) Oral two times a day  Viibryd Oral Tablet 40 milliGRAM(s) 1 Tablet(s) Oral daily    MEDICATIONS  (PRN):  acetaminophen   Tablet 650 milliGRAM(s) Oral every 6 hours PRN For Temp greater than 38 C (100.4 F)  acetaminophen   Tablet. 650 milliGRAM(s) Oral every 6 hours PRN Mild Pain (1 - 3)  ALBUTerol    90 MICROgram(s) HFA Inhaler 2 Puff(s) Inhalation every 6 hours PRN Shortness of Breath and/or Wheezing  clonazePAM Tablet 2 milliGRAM(s) Oral every 8 hours PRN anxiety  docusate sodium 100 milliGRAM(s) Oral two times a day PRN Constipation  HYDROmorphone  Injectable 0.5 milliGRAM(s) IV Push every 3 hours PRN Severe Pain (7 - 10)  ondansetron Injectable 4 milliGRAM(s) IV Push every 4 hours PRN Nausea and/or Vomiting  senna 2 Tablet(s) Oral at bedtime PRN Constipation         Vitals log        ICU Vital Signs Last 24 Hrs  T(C): 37.2 (22 Oct 2017 23:30), Max: 37.5 (22 Oct 2017 16:54)  T(F): 98.9 (22 Oct 2017 23:30), Max: 99.5 (22 Oct 2017 16:54)  HR: 87 (22 Oct 2017 23:30) (87 - 107)  BP: 97/60 (22 Oct 2017 23:30) (91/54 - 110/65)  BP(mean): --  ABP: --  ABP(mean): --  RR: 16 (22 Oct 2017 23:30) (15 - 16)  SpO2: 98% (22 Oct 2017 23:30) (96% - 98%)           Input and Output:  I&O's Detail    21 Oct 2017 07:01  -  22 Oct 2017 07:00  --------------------------------------------------------  IN:  Total IN: 0 mL    OUT:    VAC (Vacuum Assisted Closure) System: 500 mL    Voided: 800 mL  Total OUT: 1300 mL    Total NET: -1300 mL      22 Oct 2017 07:01  -  23 Oct 2017 06:21  --------------------------------------------------------  IN:    Oral Fluid: 560 mL  Total IN: 560 mL    OUT:    VAC (Vacuum Assisted Closure) System: 500 mL  Total OUT: 500 mL    Total NET: 60 mL          Lab Data                        7.4    9.9   )-----------( 245      ( 22 Oct 2017 09:44 )             22.4     10-22    137  |  105  |  37<H>  ----------------------------<  88  3.3<L>   |  20<L>  |  1.30    Ca    8.1<L>      22 Oct 2017 09:44  Mg     1.8     10-21              Review of Systems	      Objective     Physical Examination    head at  heart - s1s2  lungs - dec BS      Pertinent Lab findings & Imaging      Erlinda:  COLLINS   Adequate UO     I&O's Detail    21 Oct 2017 07:01  -  22 Oct 2017 07:00  --------------------------------------------------------  IN:  Total IN: 0 mL    OUT:    VAC (Vacuum Assisted Closure) System: 500 mL    Voided: 800 mL  Total OUT: 1300 mL    Total NET: -1300 mL      22 Oct 2017 07:01  -  23 Oct 2017 06:21  --------------------------------------------------------  IN:    Oral Fluid: 560 mL  Total IN: 560 mL    OUT:    VAC (Vacuum Assisted Closure) System: 500 mL  Total OUT: 500 mL    Total NET: 60 mL               Discussed with:     Cultures:	        Radiology

## 2017-10-23 NOTE — PROGRESS NOTE ADULT - PROBLEM SELECTOR PLAN 1
-compartment syndrome of the right lower extremity   -s/p emergent fasciotomy by Vascular surgery (Bobbi)   -s/p wound vac placement f/u with vascular sx  - Pt is cleared by vascular for Plastic surgery for skin grafting, awaiting plastic surgery recommendations Dr. Arciniega   -Finished course of IV abx aztreonam and clindamycin in ICU  -monitor labs, increase activity

## 2017-10-23 NOTE — PROGRESS NOTE ADULT - ASSESSMENT
32-year-old female now status post right lower extremity fasciotomy for prolonged compression after a fall with lethargy.     - Rhabdo resolving; GHAZAL continuously improving; Avoid nephrotoxics  - No obvious cardiac complications post fasciotomy  - No clear evidence of acute ischemia or volume overload  - HR elevated today, likely secondary to pain and low grade fever.    - Continue Pain control  - TTE with hyperdynamic LV function, EF 70%, no valvular disease noted  - Encourage PO hydration  - Other cardiovascular workup will depend on clinical course.  - Pt is cleared by vascular for Plastic surgery for skin grafting, awaiting plastic surgery recommendations Dr. Arciniega   - Completed ABX as per ID following   - All other workup per primary team  - Will follow      KRYSTIAN Foley  Cardiology

## 2017-10-24 DIAGNOSIS — D64.89 OTHER SPECIFIED ANEMIAS: ICD-10-CM

## 2017-10-24 DIAGNOSIS — R59.1 GENERALIZED ENLARGED LYMPH NODES: ICD-10-CM

## 2017-10-24 LAB
ANION GAP SERPL CALC-SCNC: 11 MMOL/L — SIGNIFICANT CHANGE UP (ref 5–17)
BUN SERPL-MCNC: 32 MG/DL — HIGH (ref 7–23)
CALCIUM SERPL-MCNC: 8.5 MG/DL — SIGNIFICANT CHANGE UP (ref 8.5–10.1)
CHLORIDE SERPL-SCNC: 107 MMOL/L — SIGNIFICANT CHANGE UP (ref 96–108)
CO2 SERPL-SCNC: 21 MMOL/L — LOW (ref 22–31)
CREAT SERPL-MCNC: 1.3 MG/DL — SIGNIFICANT CHANGE UP (ref 0.5–1.3)
GLUCOSE SERPL-MCNC: 87 MG/DL — SIGNIFICANT CHANGE UP (ref 70–99)
HCT VFR BLD CALC: 24.1 % — LOW (ref 34.5–45)
HGB BLD-MCNC: 7.9 G/DL — LOW (ref 11.5–15.5)
HIV 1 & 2 AB SERPL IA.RAPID: SIGNIFICANT CHANGE UP
MCHC RBC-ENTMCNC: 29.5 PG — SIGNIFICANT CHANGE UP (ref 27–34)
MCHC RBC-ENTMCNC: 32.8 GM/DL — SIGNIFICANT CHANGE UP (ref 32–36)
MCV RBC AUTO: 89.9 FL — SIGNIFICANT CHANGE UP (ref 80–100)
PLATELET # BLD AUTO: 215 K/UL — SIGNIFICANT CHANGE UP (ref 150–400)
POTASSIUM SERPL-MCNC: 3.6 MMOL/L — SIGNIFICANT CHANGE UP (ref 3.5–5.3)
POTASSIUM SERPL-SCNC: 3.6 MMOL/L — SIGNIFICANT CHANGE UP (ref 3.5–5.3)
RBC # BLD: 2.68 M/UL — LOW (ref 3.8–5.2)
RBC # FLD: 12.6 % — SIGNIFICANT CHANGE UP (ref 10.3–14.5)
SODIUM SERPL-SCNC: 139 MMOL/L — SIGNIFICANT CHANGE UP (ref 135–145)
WBC # BLD: 9.9 K/UL — SIGNIFICANT CHANGE UP (ref 3.8–10.5)
WBC # FLD AUTO: 9.9 K/UL — SIGNIFICANT CHANGE UP (ref 3.8–10.5)

## 2017-10-24 PROCEDURE — 99233 SBSQ HOSP IP/OBS HIGH 50: CPT

## 2017-10-24 PROCEDURE — 99232 SBSQ HOSP IP/OBS MODERATE 35: CPT

## 2017-10-24 RX ORDER — HYDROMORPHONE HYDROCHLORIDE 2 MG/ML
0.5 INJECTION INTRAMUSCULAR; INTRAVENOUS; SUBCUTANEOUS
Qty: 0 | Refills: 0 | Status: DISCONTINUED | OUTPATIENT
Start: 2017-10-26 | End: 2017-10-27

## 2017-10-24 RX ORDER — CLONAZEPAM 1 MG
2 TABLET ORAL EVERY 8 HOURS
Qty: 0 | Refills: 0 | Status: DISCONTINUED | OUTPATIENT
Start: 2017-10-26 | End: 2017-10-27

## 2017-10-24 RX ORDER — HYDROMORPHONE HYDROCHLORIDE 2 MG/ML
2 INJECTION INTRAMUSCULAR; INTRAVENOUS; SUBCUTANEOUS EVERY 4 HOURS
Qty: 0 | Refills: 0 | Status: DISCONTINUED | OUTPATIENT
Start: 2017-10-24 | End: 2017-10-27

## 2017-10-24 RX ADMIN — OXYCODONE HYDROCHLORIDE 60 MILLIGRAM(S): 5 TABLET ORAL at 05:59

## 2017-10-24 RX ADMIN — HYDROMORPHONE HYDROCHLORIDE 0.5 MILLIGRAM(S): 2 INJECTION INTRAMUSCULAR; INTRAVENOUS; SUBCUTANEOUS at 21:00

## 2017-10-24 RX ADMIN — HEPARIN SODIUM 5000 UNIT(S): 5000 INJECTION INTRAVENOUS; SUBCUTANEOUS at 05:58

## 2017-10-24 RX ADMIN — Medication 2 SPRAY(S): at 05:58

## 2017-10-24 RX ADMIN — OXYCODONE HYDROCHLORIDE 60 MILLIGRAM(S): 5 TABLET ORAL at 06:36

## 2017-10-24 RX ADMIN — Medication 400 MILLIGRAM(S): at 17:12

## 2017-10-24 RX ADMIN — Medication 1 SPRAY(S): at 17:12

## 2017-10-24 RX ADMIN — HYDROMORPHONE HYDROCHLORIDE 0.5 MILLIGRAM(S): 2 INJECTION INTRAMUSCULAR; INTRAVENOUS; SUBCUTANEOUS at 20:36

## 2017-10-24 RX ADMIN — ALBUTEROL 2 PUFF(S): 90 AEROSOL, METERED ORAL at 02:05

## 2017-10-24 RX ADMIN — LAMOTRIGINE 100 MILLIGRAM(S): 25 TABLET, ORALLY DISINTEGRATING ORAL at 05:58

## 2017-10-24 RX ADMIN — OXYCODONE HYDROCHLORIDE 60 MILLIGRAM(S): 5 TABLET ORAL at 22:54

## 2017-10-24 RX ADMIN — PREGABALIN 1000 MICROGRAM(S): 225 CAPSULE ORAL at 11:13

## 2017-10-24 RX ADMIN — BUDESONIDE AND FORMOTEROL FUMARATE DIHYDRATE 2 PUFF(S): 160; 4.5 AEROSOL RESPIRATORY (INHALATION) at 19:33

## 2017-10-24 RX ADMIN — Medication 400 MILLIGRAM(S): at 05:58

## 2017-10-24 RX ADMIN — SERTRALINE 50 MILLIGRAM(S): 25 TABLET, FILM COATED ORAL at 11:10

## 2017-10-24 RX ADMIN — Medication 40 MILLIEQUIVALENT(S): at 11:11

## 2017-10-24 RX ADMIN — Medication 1 MILLIGRAM(S): at 11:10

## 2017-10-24 RX ADMIN — BUPROPION HYDROCHLORIDE 300 MILLIGRAM(S): 150 TABLET, EXTENDED RELEASE ORAL at 11:14

## 2017-10-24 RX ADMIN — HYDROMORPHONE HYDROCHLORIDE 0.5 MILLIGRAM(S): 2 INJECTION INTRAMUSCULAR; INTRAVENOUS; SUBCUTANEOUS at 15:50

## 2017-10-24 RX ADMIN — HYDROMORPHONE HYDROCHLORIDE 0.5 MILLIGRAM(S): 2 INJECTION INTRAMUSCULAR; INTRAVENOUS; SUBCUTANEOUS at 10:45

## 2017-10-24 RX ADMIN — Medication 2 SPRAY(S): at 17:12

## 2017-10-24 RX ADMIN — ONDANSETRON 4 MILLIGRAM(S): 8 TABLET, FILM COATED ORAL at 22:50

## 2017-10-24 RX ADMIN — HYDROMORPHONE HYDROCHLORIDE 0.5 MILLIGRAM(S): 2 INJECTION INTRAMUSCULAR; INTRAVENOUS; SUBCUTANEOUS at 15:35

## 2017-10-24 RX ADMIN — HYDROMORPHONE HYDROCHLORIDE 0.5 MILLIGRAM(S): 2 INJECTION INTRAMUSCULAR; INTRAVENOUS; SUBCUTANEOUS at 02:04

## 2017-10-24 RX ADMIN — OXYCODONE HYDROCHLORIDE 60 MILLIGRAM(S): 5 TABLET ORAL at 23:30

## 2017-10-24 RX ADMIN — HYDROMORPHONE HYDROCHLORIDE 0.5 MILLIGRAM(S): 2 INJECTION INTRAMUSCULAR; INTRAVENOUS; SUBCUTANEOUS at 10:17

## 2017-10-24 RX ADMIN — Medication 1 SPRAY(S): at 05:57

## 2017-10-24 RX ADMIN — Medication 650 MILLIGRAM(S): at 15:32

## 2017-10-24 RX ADMIN — PANTOPRAZOLE SODIUM 40 MILLIGRAM(S): 20 TABLET, DELAYED RELEASE ORAL at 11:10

## 2017-10-24 RX ADMIN — HYDROMORPHONE HYDROCHLORIDE 0.5 MILLIGRAM(S): 2 INJECTION INTRAMUSCULAR; INTRAVENOUS; SUBCUTANEOUS at 02:26

## 2017-10-24 RX ADMIN — LAMOTRIGINE 100 MILLIGRAM(S): 25 TABLET, ORALLY DISINTEGRATING ORAL at 22:53

## 2017-10-24 RX ADMIN — HEPARIN SODIUM 5000 UNIT(S): 5000 INJECTION INTRAVENOUS; SUBCUTANEOUS at 13:44

## 2017-10-24 RX ADMIN — HEPARIN SODIUM 5000 UNIT(S): 5000 INJECTION INTRAVENOUS; SUBCUTANEOUS at 22:54

## 2017-10-24 RX ADMIN — Medication 150 MICROGRAM(S): at 05:58

## 2017-10-24 RX ADMIN — HYDROMORPHONE HYDROCHLORIDE 0.5 MILLIGRAM(S): 2 INJECTION INTRAMUSCULAR; INTRAVENOUS; SUBCUTANEOUS at 06:36

## 2017-10-24 RX ADMIN — RISPERIDONE 8 MILLIGRAM(S): 4 TABLET ORAL at 22:53

## 2017-10-24 RX ADMIN — LAMOTRIGINE 100 MILLIGRAM(S): 25 TABLET, ORALLY DISINTEGRATING ORAL at 13:44

## 2017-10-24 RX ADMIN — HYDROMORPHONE HYDROCHLORIDE 0.5 MILLIGRAM(S): 2 INJECTION INTRAMUSCULAR; INTRAVENOUS; SUBCUTANEOUS at 06:27

## 2017-10-24 NOTE — PROGRESS NOTE ADULT - SUBJECTIVE AND OBJECTIVE BOX
[facility] -- Bonnie Hernandez, April Brink Pannella, Patel, Savella  Office # [phone]      Follow Up:  Tachycardia    Subjective/Observations: Patient seen and examined. Events noted. Resting comfortably in bed. No complaints of chest pain, dyspnea, or palpitations reported.       REVIEW OF SYSTEMS: All other review of systems is negative unless indicated above    PAST MEDICAL & SURGICAL HISTORY:  Low back pain  Hypothyroid  Bipolar 1 disorder  History of spinal fusion  History of laminectomy      MEDICATIONS  (STANDING):  BACItracin   Ointment 1 Application(s) Topical two times a day  buDESOnide  80 MICROgram(s)/formoterol 4.5 MICROgram(s) Inhaler 2 Puff(s) Inhalation two times a day  buPROPion XL . 300 milliGRAM(s) Oral daily  cyanocobalamin 1000 MICROGram(s) Oral daily  fentaNYL   Patch  12 MICROgram(s)/Hr 1 Patch Transdermal every 72 hours  fluticasone propionate 50 MICROgram(s)/spray Nasal Spray 1 Spray(s) Both Nostrils two times a day  folic acid 1 milliGRAM(s) Oral daily  heparin  Injectable 5000 Unit(s) SubCutaneous every 8 hours  influenza   Vaccine 0.5 milliLiter(s) IntraMuscular once  ipratropium 42 MICROgram(s) Nasal 2 Spray(s) Both Nostrils every 12 hours  lamoTRIgine 100 milliGRAM(s) Oral every 8 hours  levothyroxine 150 MICROGram(s) Oral daily  oxyCODONE  ER Tablet 60 milliGRAM(s) Oral every 8 hours  pantoprazole    Tablet 40 milliGRAM(s) Oral before breakfast  potassium chloride    Tablet ER 40 milliEquivalent(s) Oral daily  risperiDONE   Tablet 8 milliGRAM(s) Oral at bedtime  sertraline 50 milliGRAM(s) Oral daily  topiramate 400 milliGRAM(s) Oral two times a day  Viibryd Oral Tablet 40 milliGRAM(s) 1 Tablet(s) Oral daily    MEDICATIONS  (PRN):  acetaminophen   Tablet 650 milliGRAM(s) Oral every 6 hours PRN For Temp greater than 38 C (100.4 F)  acetaminophen   Tablet. 650 milliGRAM(s) Oral every 6 hours PRN Mild Pain (1 - 3)  ALBUTerol    90 MICROgram(s) HFA Inhaler 2 Puff(s) Inhalation every 6 hours PRN Shortness of Breath and/or Wheezing  clonazePAM Tablet 2 milliGRAM(s) Oral every 8 hours PRN anxiety  docusate sodium 100 milliGRAM(s) Oral two times a day PRN Constipation  HYDROmorphone   Tablet 2 milliGRAM(s) Oral every 4 hours PRN Severe Pain (7 - 10)  HYDROmorphone  Injectable 0.5 milliGRAM(s) IV Push every 3 hours PRN Severe Pain (7 - 10)  ondansetron Injectable 4 milliGRAM(s) IV Push every 4 hours PRN Nausea and/or Vomiting  senna 2 Tablet(s) Oral at bedtime PRN Constipation      Allergies    avocado (Unknown)  latex (Urticaria; Rash)  penicillin (Unknown)  sulfa drugs (Unknown)    Intolerances            Vital Signs Last 24 Hrs  T(C): 37.9 (24 Oct 2017 08:00), Max: 37.9 (24 Oct 2017 08:00)  T(F): 100.3 (24 Oct 2017 08:00), Max: 100.3 (24 Oct 2017 08:00)  HR: 97 (24 Oct 2017 08:00) (84 - 105)  BP: 98/67 (24 Oct 2017 08:00) (98/67 - 112/70)  BP(mean): --  RR: 18 (24 Oct 2017 08:00) (16 - 18)  SpO2: 96% (24 Oct 2017 08:00) (96% - 99%)    I&O's Summary    23 Oct 2017 07:01  -  24 Oct 2017 07:00  --------------------------------------------------------  IN: 420 mL / OUT: 1075 mL / NET: -655 mL          PHYSICAL EXAM:     Constitutional: NAD, awake and alert, well-developed  HEENT: Moist Mucous Membranes, Anicteric  Pulmonary: Non-labored, breath sounds are clear bilaterally, No wheezing, rales or rhonchi  Cardiovascular: Regular, S1 and S2, No murmurs, rubs, gallops or clicks  Gastrointestinal: Bowel Sounds present, soft, nontender.   Lymph: No peripheral edema. No lymphadenopathy.  Skin: No visible rashes or ulcers.  Psych:  Mood & affect appropriate    LABS: All Labs Reviewed:                        7.9    9.9   )-----------( 215      ( 24 Oct 2017 09:06 )             24.1                         8.2    8.3   )-----------( 232      ( 23 Oct 2017 07:39 )             25.2                         7.4    9.9   )-----------( 245      ( 22 Oct 2017 09:44 )             22.4     24 Oct 2017 09:06    139    |  107    |  32     ----------------------------<  87     3.6     |  21     |  1.30   23 Oct 2017 07:39    139    |  108    |  34     ----------------------------<  99     3.5     |  20     |  1.40   22 Oct 2017 09:44    137    |  105    |  37     ----------------------------<  88     3.3     |  20     |  1.30     Ca    8.5        24 Oct 2017 09:06  Ca    8.3        23 Oct 2017 07:39  Ca    8.1        22 Oct 2017 09:44

## 2017-10-24 NOTE — PROGRESS NOTE ADULT - ASSESSMENT
32 white female with compartment syndrome S/P surgery.  GHAZAL and severe rhabdomyolysis, pigment nephropathy, now non oliguric.  renal indices are improving towards baseline.   supplement potassium as needed.  medications reviewed,  will follow closely.

## 2017-10-24 NOTE — PROGRESS NOTE ADULT - SUBJECTIVE AND OBJECTIVE BOX
HPI:  Pt is a 33 yo female with PMHX of Bipolar disorder, hypothyroidism, chronic low back pain on morphine s/p laminectomy and spinal fusion in 2016 who is BIB EMS for right leg swelling and numbness. Patient had fall 18 hours prior to making it into the hospital and was on the floor for at least 8 hours. She had numbness and swelling and coolness of the right leg; emergently on day of admission (10/4/17) she underwent fasciotomy for compartment syndrome with placement of a wound vac and has been noted to have persistent anemia since. Over course 2 weeks of hospitalization she has required transfusion of pRBCs (2 units on 10/16/17 and 1 unit on 10/22/17). Asked now to evaluate for anemia. Additionally patient reports that she has noted enlargement of left neck lymph node and is concerned that "she has a tumor"      MEDICATIONS  (STANDING):  BACItracin   Ointment 1 Application(s) Topical two times a day  buDESOnide  80 MICROgram(s)/formoterol 4.5 MICROgram(s) Inhaler 2 Puff(s) Inhalation two times a day  buPROPion XL . 300 milliGRAM(s) Oral daily  cyanocobalamin 1000 MICROGram(s) Oral daily  fentaNYL   Patch  12 MICROgram(s)/Hr 1 Patch Transdermal every 72 hours  fluticasone propionate 50 MICROgram(s)/spray Nasal Spray 1 Spray(s) Both Nostrils two times a day  heparin  Injectable 5000 Unit(s) SubCutaneous every 8 hours  HYDROmorphone  Injectable 1 milliGRAM(s) IV Push once  influenza   Vaccine 0.5 milliLiter(s) IntraMuscular once  lamoTRIgine 100 milliGRAM(s) Oral every 8 hours  levothyroxine 150 MICROGram(s) Oral daily  oxyCODONE  ER Tablet 60 milliGRAM(s) Oral every 8 hours  pantoprazole    Tablet 40 milliGRAM(s) Oral before breakfast  potassium chloride    Tablet ER 40 milliEquivalent(s) Oral daily  risperiDONE   Tablet 8 milliGRAM(s) Oral at bedtime  sertraline 50 milliGRAM(s) Oral daily  topiramate 400 milliGRAM(s) Oral two times a day  Viibryd Oral Tablet 40 milliGRAM(s) 1 Tablet(s) Oral daily    MEDICATIONS  (PRN):  acetaminophen   Tablet 650 milliGRAM(s) Oral every 6 hours PRN For Temp greater than 38 C (100.4 F)  acetaminophen   Tablet. 650 milliGRAM(s) Oral every 6 hours PRN Mild Pain (1 - 3)  ALBUTerol    90 MICROgram(s) HFA Inhaler 2 Puff(s) Inhalation every 6 hours PRN Shortness of Breath and/or Wheezing  clonazePAM Tablet 2 milliGRAM(s) Oral every 8 hours PRN anxiety  docusate sodium 100 milliGRAM(s) Oral two times a day PRN Constipation  HYDROmorphone  Injectable 0.5 milliGRAM(s) IV Push every 3 hours PRN Severe Pain (7 - 10)  ondansetron Injectable 4 milliGRAM(s) IV Push every 4 hours PRN Nausea and/or Vomiting  senna 2 Tablet(s) Oral at bedtime PRN Constipation      Allergies  avocado (Unknown)  latex (Urticaria; Rash)  penicillin (Unknown)  sulfa drugs (Unknown)      Vital Signs Last 24 Hrs  T(C): 37.4 (23 Oct 2017 07:49), Max: 37.5 (22 Oct 2017 16:54)  T(F): 99.4 (23 Oct 2017 07:49), Max: 99.5 (22 Oct 2017 16:54); Intermittent fever up to 100.9 over course of last 2 weeks  HR: 98 (23 Oct 2017 07:49) (87 - 107)  BP: 104/65 (23 Oct 2017 07:49) (91/54 - 110/65)  RR: 17 (23 Oct 2017 07:49) (16 - 17)  SpO2: 98% (23 Oct 2017 07:49) (96% - 98%)    PHYSICAL EXAM  General: adult in NAD; intermittently dozing off during interview  HEENT: clear oropharynx, anicteric sclera, pink conjunctivae; palpable left posterior LN measuring about 1.5 cm; mobile and rubber  Neck: supple  CV: normal S1S2 with no murmur rubs or gallops  Lungs: clear to auscultation, no wheezes, no rhales  Abdomen: soft non-tender non-distended, no hepato/splenomegaly; obese  Ext: right thigh with dressing/bandages/wound vac   Skin: no rashes and no petichiae  Neuro: alert and oriented X3 no focal deficits      LABS:    CBC Full  -  ( 24 Oct 2017 09:06 )  WBC Count : 9.9 K/uL  Hemoglobin : 7.9 g/dL  Hematocrit : 24.1 %  Platelet Count - Automated : 215 K/uL  Mean Cell Volume : 89.9 fl  Mean Cell Hemoglobin : 29.5 pg  Mean Cell Hemoglobin Concentration : 32.8 gm/dL  Auto Neutrophil # : x  Auto Lymphocyte # : x  Auto Monocyte # : x  Auto Eosinophil # : x  Auto Basophil # : x  Auto Neutrophil % : x  Auto Lymphocyte % : x  Auto Monocyte % : x  Auto Eosinophil % : x  Auto Basophil % : x      CBC Full  -  ( 23 Oct 2017 07:39 )  WBC Count : 8.3 K/uL  Hemoglobin : 8.2 g/dL  Hematocrit : 25.2 %  Platelet Count - Automated : 232 K/uL  Mean Cell Volume : 89.4 fl  Mean Cell Hemoglobin : 29.2 pg  Mean Cell Hemoglobin Concentration : 32.7 gm/dL    Hemoglobin: 8.2 g/dL (10-23 @ 07:39)  Hemoglobin: 7.4 g/dL (10-22 @ 09:44)  Hemoglobin: 7.8 g/dL (10-21 @ 06:45)  Hemoglobin: 8.1 g/dL (10-20 @ 08:25)  Hemoglobin: 8.5 g/dL (10-19 @ 14:02)    At admission Hg >15 g/dL    10-23  139  |  108  |  34<H>  ----------------------------<  99  3.5   |  20<L>  |  1.40<H>    Ca    8.3<L>      23 Oct 2017 07:39    Iron with Total Binding Capacity (10.22.17 @ 16:10)    % Saturation, Iron: 14 %    Iron - Total Binding Capacity.: 167 ug/dL    Iron Total, Serum: 23 ug/dL    Unsaturated Iron Binding Capacity: 144 ug/dL  Ferritin, Serum (10.22.17 @ 16:10)    Ferritin, Serum: 357.0 ng/mL    Vitamin B12, Serum (10.22.17 @ 16:10)    Vitamin B12, Serum: 378 pg/mL  Folate, Serum (10.22.17 @ 16:10)    Folate, Serum: 5.4 ng/mL      BLOOD SMEAR INTERPRETATION:  * RBC - normocytic, normochromic, no anisiocytosis or poikiolocytosis; no evidence of polychromasia  * WBC - neutrophils with normal morphology, small mature lymphs, no evidence of left shift  * Platelets - normal in morphology       RADIOLOGY :  < from: CT Lower Extremity No Cont, Right (10.21.17 @ 08:36) >  IMPRESSION:   1.  Multiple skin defects including the medial thigh, anterolateral calf   and posterior medial calf with absence of the underlying subcutaneous fat   and approximation of the underlying musculature to the skin surface in   keeping with prior fasciotomies.  2.  Nonspecific enlargement of the soleus musculature.  3.  Nonspecific soft tissue swelling about the calf and ankle maybe   postoperative. Differential includes infection. No drainable fluid   collection.  4.  Calcification within the medial gastrocnemius and soleus musculature   may be postoperative or posttraumatic in etiology.      < end of copied text > INTERVAL HX:   wound vac continues to drain serous sanginous fluid.   pt sleepy post-opiates, arousable.   No CP, no SOB, denies diarrhea, denies constipation.     HPI:  Pt is a 31 yo female with PMHX of Bipolar disorder, hypothyroidism, chronic low back pain on morphine s/p laminectomy and spinal fusion in 2016 who is BIB EMS for right leg swelling and numbness. Patient had fall 18 hours prior to making it into the hospital and was on the floor for at least 8 hours. She had numbness and swelling and coolness of the right leg; emergently on day of admission (10/4/17) she underwent fasciotomy for compartment syndrome with placement of a wound vac and has been noted to have persistent anemia since. Over course 2 weeks of hospitalization she has required transfusion of pRBCs (2 units on 10/16/17 and 1 unit on 10/22/17). Asked now to evaluate for anemia. Additionally patient reports that she has noted enlargement of left neck lymph node and is concerned that "she has a tumor"      MEDICATIONS  (STANDING):  BACItracin   Ointment 1 Application(s) Topical two times a day  buDESOnide  80 MICROgram(s)/formoterol 4.5 MICROgram(s) Inhaler 2 Puff(s) Inhalation two times a day  buPROPion XL . 300 milliGRAM(s) Oral daily  cyanocobalamin 1000 MICROGram(s) Oral daily  fentaNYL   Patch  12 MICROgram(s)/Hr 1 Patch Transdermal every 72 hours  fluticasone propionate 50 MICROgram(s)/spray Nasal Spray 1 Spray(s) Both Nostrils two times a day  folic acid 1 milliGRAM(s) Oral daily  heparin  Injectable 5000 Unit(s) SubCutaneous every 8 hours  influenza   Vaccine 0.5 milliLiter(s) IntraMuscular once  ipratropium 42 MICROgram(s) Nasal 2 Spray(s) Both Nostrils every 12 hours  lamoTRIgine 100 milliGRAM(s) Oral every 8 hours  levothyroxine 150 MICROGram(s) Oral daily  oxyCODONE  ER Tablet 60 milliGRAM(s) Oral every 8 hours  pantoprazole    Tablet 40 milliGRAM(s) Oral before breakfast  potassium chloride    Tablet ER 40 milliEquivalent(s) Oral daily  risperiDONE   Tablet 8 milliGRAM(s) Oral at bedtime  sertraline 50 milliGRAM(s) Oral daily  topiramate 400 milliGRAM(s) Oral two times a day  Viibryd Oral Tablet 40 milliGRAM(s) 1 Tablet(s) Oral daily    MEDICATIONS  (PRN):  acetaminophen   Tablet 650 milliGRAM(s) Oral every 6 hours PRN For Temp greater than 38 C (100.4 F)  acetaminophen   Tablet. 650 milliGRAM(s) Oral every 6 hours PRN Mild Pain (1 - 3)  ALBUTerol    90 MICROgram(s) HFA Inhaler 2 Puff(s) Inhalation every 6 hours PRN Shortness of Breath and/or Wheezing  clonazePAM Tablet 2 milliGRAM(s) Oral every 8 hours PRN anxiety  docusate sodium 100 milliGRAM(s) Oral two times a day PRN Constipation  HYDROmorphone   Tablet 2 milliGRAM(s) Oral every 4 hours PRN Severe Pain (7 - 10)  HYDROmorphone  Injectable 0.5 milliGRAM(s) IV Push every 3 hours PRN Severe Pain (7 - 10)  ondansetron Injectable 4 milliGRAM(s) IV Push every 4 hours PRN Nausea and/or Vomiting  senna 2 Tablet(s) Oral at bedtime PRN Constipation      Vital Signs Last 24 Hrs  T(C): 38.3 (24 Oct 2017 15:00), Max: 38.3 (24 Oct 2017 15:00)  T(F): 101 (24 Oct 2017 15:00), Max: 101 (24 Oct 2017 15:00)  HR: 97 (24 Oct 2017 08:00) (84 - 97)  BP: 98/67 (24 Oct 2017 08:00) (98/67 - 110/72)  BP(mean): --  RR: 18 (24 Oct 2017 08:00) (16 - 18)  SpO2: 96% (24 Oct 2017 08:00) (96% - 99%)    PHYSICAL EXAM  General: adult in NAD; intermittently dozing off during interview  HEENT: clear oropharynx, anicteric sclera, pink conjunctivae; palpable left posterior LN measuring about 1.5 cm; mobile and rubber  Neck: supple  CV: normal S1S2 with no murmur rubs or gallops  Lungs: clear to auscultation, no wheezes, no rhales  Abdomen: soft non-tender non-distended, no hepato/splenomegaly; obese  Ext: right thigh with dressing/bandages/wound vac   Skin: no rashes and no petichiae  Neuro: alert and oriented X3 no focal deficits      LABS:    CBC Full  -  ( 24 Oct 2017 09:06 )  WBC Count : 9.9 K/uL  Hemoglobin : 7.9 g/dL  Hematocrit : 24.1 %  Platelet Count - Automated : 215 K/uL  Mean Cell Volume : 89.9 fl  Mean Cell Hemoglobin : 29.5 pg  Mean Cell Hemoglobin Concentration : 32.8 gm/dL  Auto Neutrophil # : x  Auto Lymphocyte # : x  Auto Monocyte # : x  Auto Eosinophil # : x  Auto Basophil # : x  Auto Neutrophil % : x  Auto Lymphocyte % : x  Auto Monocyte % : x  Auto Eosinophil % : x  Auto Basophil % : x      CBC Full  -  ( 23 Oct 2017 07:39 )  WBC Count : 8.3 K/uL  Hemoglobin : 8.2 g/dL  Hematocrit : 25.2 %  Platelet Count - Automated : 232 K/uL  Mean Cell Volume : 89.4 fl  Mean Cell Hemoglobin : 29.2 pg  Mean Cell Hemoglobin Concentration : 32.7 gm/dL    Hemoglobin: 8.2 g/dL (10-23 @ 07:39)  Hemoglobin: 7.4 g/dL (10-22 @ 09:44)  Hemoglobin: 7.8 g/dL (10-21 @ 06:45)  Hemoglobin: 8.1 g/dL (10-20 @ 08:25)  Hemoglobin: 8.5 g/dL (10-19 @ 14:02)    At admission Hg >15 g/dL    10-23  139  |  108  |  34<H>  ----------------------------<  99  3.5   |  20<L>  |  1.40<H>    Ca    8.3<L>      23 Oct 2017 07:39    Iron with Total Binding Capacity (10.22.17 @ 16:10)    % Saturation, Iron: 14 %    Iron - Total Binding Capacity.: 167 ug/dL    Iron Total, Serum: 23 ug/dL    Unsaturated Iron Binding Capacity: 144 ug/dL  Ferritin, Serum (10.22.17 @ 16:10)    Ferritin, Serum: 357.0 ng/mL    Vitamin B12, Serum (10.22.17 @ 16:10)    Vitamin B12, Serum: 378 pg/mL  Folate, Serum (10.22.17 @ 16:10)    Folate, Serum: 5.4 ng/mL      BLOOD SMEAR INTERPRETATION:  * RBC - normocytic, normochromic, no anisiocytosis or poikiolocytosis; no evidence of polychromasia  * WBC - neutrophils with normal morphology, small mature lymphs, no evidence of left shift  * Platelets - normal in morphology       RADIOLOGY :  < from: CT Lower Extremity No Cont, Right (10.21.17 @ 08:36) >  IMPRESSION:   1.  Multiple skin defects including the medial thigh, anterolateral calf   and posterior medial calf with absence of the underlying subcutaneous fat   and approximation of the underlying musculature to the skin surface in   keeping with prior fasciotomies.  2.  Nonspecific enlargement of the soleus musculature.  3.  Nonspecific soft tissue swelling about the calf and ankle maybe   postoperative. Differential includes infection. No drainable fluid   collection.  4.  Calcification within the medial gastrocnemius and soleus musculature   may be postoperative or posttraumatic in etiology.      < end of copied text >

## 2017-10-24 NOTE — PROGRESS NOTE ADULT - ASSESSMENT
33 yo woman with history of chronic pain and opioid abuse, brought in by EMS after being found unresponsive for at least 8 hours after fall and injury to right thigh; underwent fasciotomy emergently for compartment syndrome  - anemia multifactorial with low normal B12 level; iron studies c/w chronic disease; also had renal insufficiency with rhabdo  - start po B12 supplement and po folic acid daily  - hold transfusion unless Hg <7; likely predominant cause of anemia is wound/inflammation    - will check sonogram of the neck to evaluate mildly enlarged Lymph Node; suspect reactive. US with <1cm LN. 31 yo woman with history of chronic pain and opioid abuse, brought in by EMS after being found unresponsive for at least 8 hours after fall and injury to right thigh; underwent fasciotomy emergently for compartment syndrome  - anemia multifactorial with low normal B12 and folate level; iron studies c/w chronic disease; also had renal insufficiency with rhabdo.   - start po B12 supplement and po folic acid daily  - hold transfusion unless Hg <7; likely predominant cause of anemia is wound/inflammation    - will check sonogram of the neck to evaluate mildly enlarged Lymph Node; suspect reactive.   Neck US with <1cm LN.

## 2017-10-24 NOTE — PROGRESS NOTE ADULT - ASSESSMENT
32F with opiate dependence, anxiety disorder, admitted with leg swelling   S/P fasciotomy for compartment syndrome  S/P Rhabdomyolysis  S/P obtundation/immobility due to drug overdose  GHAZAL, improving  Elevate ESR. Minimally elevated.  +Urine cx, no symptoms. Would not treat at this time  No clear pathology to explain fevers on CT  Intermittent low grade fevers  Atelectasis with narcotics in DDx  Intermittent chemical aspiration (with nacrosis) in DDx as well  No concern of infection on exam presently  LN nonspecific  Risk for HIV low, prudent to exclude.  Narcotic dependence/addiction not helping anything here, but will not able to be addressed given fasciotomy anytime soon. While she has legitimate needs for pain, she is frequently sedated or has other negative consequences (as in this AM's interaction) related to her narcotic use.

## 2017-10-24 NOTE — PROGRESS NOTE ADULT - SUBJECTIVE AND OBJECTIVE BOX
Date/Time Patient Seen:  		  Referring MD:   Data Reviewed	       Patient is a 32y old  Female who presents with a chief complaint of RLE edema and numbness (13 Oct 2017 16:25)  in bed  overnight events noted  on room air  seen and examined  vs and meds reviewed      Subjective/HPI     PAST MEDICAL & SURGICAL HISTORY:  Low back pain  Hypothyroid  Bipolar 1 disorder  History of spinal fusion  History of laminectomy        Medication list         MEDICATIONS  (STANDING):  BACItracin   Ointment 1 Application(s) Topical two times a day  buDESOnide  80 MICROgram(s)/formoterol 4.5 MICROgram(s) Inhaler 2 Puff(s) Inhalation two times a day  buPROPion XL . 300 milliGRAM(s) Oral daily  cyanocobalamin 1000 MICROGram(s) Oral daily  fentaNYL   Patch  12 MICROgram(s)/Hr 1 Patch Transdermal every 72 hours  fluticasone propionate 50 MICROgram(s)/spray Nasal Spray 1 Spray(s) Both Nostrils two times a day  folic acid 1 milliGRAM(s) Oral daily  heparin  Injectable 5000 Unit(s) SubCutaneous every 8 hours  influenza   Vaccine 0.5 milliLiter(s) IntraMuscular once  ipratropium 42 MICROgram(s) Nasal 2 Spray(s) Both Nostrils every 12 hours  lamoTRIgine 100 milliGRAM(s) Oral every 8 hours  levothyroxine 150 MICROGram(s) Oral daily  oxyCODONE  ER Tablet 60 milliGRAM(s) Oral every 8 hours  pantoprazole    Tablet 40 milliGRAM(s) Oral before breakfast  potassium chloride    Tablet ER 40 milliEquivalent(s) Oral daily  risperiDONE   Tablet 8 milliGRAM(s) Oral at bedtime  sertraline 50 milliGRAM(s) Oral daily  topiramate 400 milliGRAM(s) Oral two times a day  Viibryd Oral Tablet 40 milliGRAM(s) 1 Tablet(s) Oral daily    MEDICATIONS  (PRN):  acetaminophen   Tablet 650 milliGRAM(s) Oral every 6 hours PRN For Temp greater than 38 C (100.4 F)  acetaminophen   Tablet. 650 milliGRAM(s) Oral every 6 hours PRN Mild Pain (1 - 3)  ALBUTerol    90 MICROgram(s) HFA Inhaler 2 Puff(s) Inhalation every 6 hours PRN Shortness of Breath and/or Wheezing  clonazePAM Tablet 2 milliGRAM(s) Oral every 8 hours PRN anxiety  docusate sodium 100 milliGRAM(s) Oral two times a day PRN Constipation  HYDROmorphone   Tablet 2 milliGRAM(s) Oral every 4 hours PRN Severe Pain (7 - 10)  HYDROmorphone  Injectable 0.5 milliGRAM(s) IV Push every 3 hours PRN Severe Pain (7 - 10)  ondansetron Injectable 4 milliGRAM(s) IV Push every 4 hours PRN Nausea and/or Vomiting  senna 2 Tablet(s) Oral at bedtime PRN Constipation         Vitals log        ICU Vital Signs Last 24 Hrs  T(C): 37.7 (23 Oct 2017 23:46), Max: 37.7 (23 Oct 2017 23:46)  T(F): 99.9 (23 Oct 2017 23:46), Max: 99.9 (23 Oct 2017 23:46)  HR: 84 (23 Oct 2017 23:46) (84 - 105)  BP: 110/72 (23 Oct 2017 23:46) (97/65 - 112/70)  BP(mean): --  ABP: --  ABP(mean): --  RR: 16 (23 Oct 2017 23:46) (16 - 18)  SpO2: 99% (23 Oct 2017 23:46) (97% - 99%)           Input and Output:  I&O's Detail    22 Oct 2017 07:01  -  23 Oct 2017 07:00  --------------------------------------------------------  IN:    Oral Fluid: 560 mL  Total IN: 560 mL    OUT:    VAC (Vacuum Assisted Closure) System: 500 mL  Total OUT: 500 mL    Total NET: 60 mL      23 Oct 2017 07:01  -  24 Oct 2017 05:47  --------------------------------------------------------  IN:    Oral Fluid: 420 mL  Total IN: 420 mL    OUT:    VAC (Vacuum Assisted Closure) System: 500 mL    Voided: 575 mL  Total OUT: 1075 mL    Total NET: -655 mL          Lab Data                        8.2    8.3   )-----------( 232      ( 23 Oct 2017 07:39 )             25.2     10-23    139  |  108  |  34<H>  ----------------------------<  99  3.5   |  20<L>  |  1.40<H>    Ca    8.3<L>      23 Oct 2017 07:39              Review of Systems	      Objective     Physical Examination  head at  heart - s1s2  lungs - dec BS  abd - soft        Pertinent Lab findings & Imaging      Erlinda:  NO   Adequate UO     I&O's Detail    22 Oct 2017 07:01  -  23 Oct 2017 07:00  --------------------------------------------------------  IN:    Oral Fluid: 560 mL  Total IN: 560 mL    OUT:    VAC (Vacuum Assisted Closure) System: 500 mL  Total OUT: 500 mL    Total NET: 60 mL      23 Oct 2017 07:01  -  24 Oct 2017 05:47  --------------------------------------------------------  IN:    Oral Fluid: 420 mL  Total IN: 420 mL    OUT:    VAC (Vacuum Assisted Closure) System: 500 mL    Voided: 575 mL  Total OUT: 1075 mL    Total NET: -655 mL               Discussed with:     Cultures:	        Radiology

## 2017-10-24 NOTE — PROGRESS NOTE ADULT - ASSESSMENT
32-year-old female now status post right lower extremity fasciotomy for prolonged compression after a fall with lethargy.     - Rhabdo resolving; GHAZAL continuously improving; Avoid nephrotoxics  - No obvious cardiac complications post fasciotomy  - No clear evidence of acute ischemia or volume overload  - HR overall improved.   - Continue Pain control  - TTE with hyperdynamic LV function, EF 70%, no valvular disease noted  - Encourage PO hydration  - Other cardiovascular workup will depend on clinical course.  - Completed ABX as per ID following   - Monitor and replete electrolytes. Keep K>4.0 and Mg>2.0.   - All other workup per primary team  - Will follow

## 2017-10-24 NOTE — PROGRESS NOTE ADULT - PROBLEM SELECTOR PLAN 3
GHAZAL  Rhabdo  overall better  wound care  vascular follow up  pain managed  supportive care  emotional support  medical optimization

## 2017-10-24 NOTE — PROGRESS NOTE ADULT - PROBLEM SELECTOR PLAN 2
acute and chronic pain  issues with DENISE  will order Po Dilaudid 2 mg PO every 4 hr PRN for severe PAIN  Dilaudid IV renewed at current dose  cont Fentanyl Patch low dose and Oxycontin at current dose  discussed with pt and mother at BS

## 2017-10-24 NOTE — PROGRESS NOTE ADULT - SUBJECTIVE AND OBJECTIVE BOX
ABELARDO CROWELL  32y  Female    Patient is a 32y old  Female who presents with a chief complaint of RLE edema and numbness (13 Oct 2017 16:25)    comfortable.  Denies any chest pain or shortness of breath.      HPI:  Pt is a 31 yo female with PMHX of Bipolar disorder, hypothyroidism, chronic low back pain on morphine s/p laminectomy and spinal fusion in 2016 who is BIB EMS for right leg swelling and numbness. Patient is a poor historian as she is on many pain medicines and is currently lethargic, therefore much info provided based on hospital staff and charts. She states that 18 hours prior to arrival, she fell on the floor at her house after taking too much morphine and was lying on floor for 8 hours. She woke up to numbness, swelling, and cool temperature of her right leg, from below the knee onwards. Patient denies any current SOB, CP, f/c, denies head trauma.     PAST MEDICAL & SURGICAL HISTORY:  Low back pain  Hypothyroid  Bipolar 1 disorder  History of spinal fusion  History of laminectomy      PHYSICAL EXAM:    T(C): 37.9 (10-24-17 @ 08:00), Max: 37.9 (10-24-17 @ 08:00)  HR: 97 (10-24-17 @ 08:00) (84 - 105)  BP: 98/67 (10-24-17 @ 08:00) (98/67 - 112/70)  RR: 18 (10-24-17 @ 08:00) (16 - 18)  SpO2: 96% (10-24-17 @ 08:00) (96% - 99%)  Wt(kg): --    I&O's Detail    23 Oct 2017 07:01  -  24 Oct 2017 07:00  --------------------------------------------------------  IN:    Oral Fluid: 420 mL  Total IN: 420 mL    OUT:    VAC (Vacuum Assisted Closure) System: 500 mL    Voided: 575 mL  Total OUT: 1075 mL    Total NET: -655 mL          Respiratory: clear anteriorly, decreased BS at bases  Cardiovascular: S1 S2  Gastrointestinal: soft NT ND +BS  Extremities: edema   Neuro: Awake and alert    MEDICATIONS  (STANDING):  BACItracin   Ointment 1 Application(s) Topical two times a day  buDESOnide  80 MICROgram(s)/formoterol 4.5 MICROgram(s) Inhaler 2 Puff(s) Inhalation two times a day  buPROPion XL . 300 milliGRAM(s) Oral daily  cyanocobalamin 1000 MICROGram(s) Oral daily  fentaNYL   Patch  12 MICROgram(s)/Hr 1 Patch Transdermal every 72 hours  fluticasone propionate 50 MICROgram(s)/spray Nasal Spray 1 Spray(s) Both Nostrils two times a day  folic acid 1 milliGRAM(s) Oral daily  heparin  Injectable 5000 Unit(s) SubCutaneous every 8 hours  influenza   Vaccine 0.5 milliLiter(s) IntraMuscular once  ipratropium 42 MICROgram(s) Nasal 2 Spray(s) Both Nostrils every 12 hours  lamoTRIgine 100 milliGRAM(s) Oral every 8 hours  levothyroxine 150 MICROGram(s) Oral daily  oxyCODONE  ER Tablet 60 milliGRAM(s) Oral every 8 hours  pantoprazole    Tablet 40 milliGRAM(s) Oral before breakfast  potassium chloride    Tablet ER 40 milliEquivalent(s) Oral daily  risperiDONE   Tablet 8 milliGRAM(s) Oral at bedtime  sertraline 50 milliGRAM(s) Oral daily  topiramate 400 milliGRAM(s) Oral two times a day  Viibryd Oral Tablet 40 milliGRAM(s) 1 Tablet(s) Oral daily    MEDICATIONS  (PRN):  acetaminophen   Tablet 650 milliGRAM(s) Oral every 6 hours PRN For Temp greater than 38 C (100.4 F)  acetaminophen   Tablet. 650 milliGRAM(s) Oral every 6 hours PRN Mild Pain (1 - 3)  ALBUTerol    90 MICROgram(s) HFA Inhaler 2 Puff(s) Inhalation every 6 hours PRN Shortness of Breath and/or Wheezing  clonazePAM Tablet 2 milliGRAM(s) Oral every 8 hours PRN anxiety  docusate sodium 100 milliGRAM(s) Oral two times a day PRN Constipation  HYDROmorphone   Tablet 2 milliGRAM(s) Oral every 4 hours PRN Severe Pain (7 - 10)  HYDROmorphone  Injectable 0.5 milliGRAM(s) IV Push every 3 hours PRN Severe Pain (7 - 10)  ondansetron Injectable 4 milliGRAM(s) IV Push every 4 hours PRN Nausea and/or Vomiting  senna 2 Tablet(s) Oral at bedtime PRN Constipation                            7.9    9.9   )-----------( 215      ( 24 Oct 2017 09:06 )             24.1       10-24    139  |  107  |  32<H>  ----------------------------<  87  3.6   |  21<L>  |  1.30    Ca    8.5      24 Oct 2017 09:06

## 2017-10-24 NOTE — PROGRESS NOTE ADULT - SUBJECTIVE AND OBJECTIVE BOX
[facility], Division of Infectious Diseases  NATALIO [facility] A. Lee    Name: ABELARDO CROWELL  Age: 32y  Gender: Female  MRN: [id number]    Interval History--  Notes reviewed. On arrival holding arms in the air, dazed-appearing, when questioned says she's looking for something, then can't  remember what. Does not remember seeing me yesterday. Emotionally labile, smiling one second, crying the next, fearful the next. Worried about her LN. Worried about HIV test result, though denies any RF save for sex (states same partner x10 years).    Past Medical History--  Low back pain  Hypothyroid  Bipolar 1 disorder  History of spinal fusion  History of laminectomy      For details regarding the patient's social history, family history, and other miscellaneous elements, please refer the initial infectious diseases consultation and/or the admitting history and physical examination for this admission. Single low-grade fever. No chills/rigors. No SOB. No cough. State she can't eat due to nausea.     Allergies    avocado (Unknown)  latex (Urticaria; Rash)  penicillin (Unknown)  sulfa drugs (Unknown)    Intolerances        Medications--  Antibiotics:    Immunologic:  influenza   Vaccine 0.5 milliLiter(s) IntraMuscular once    Other:  acetaminophen   Tablet PRN  acetaminophen   Tablet. PRN  ALBUTerol    90 MICROgram(s) HFA Inhaler PRN  BACItracin   Ointment  buDESOnide  80 MICROgram(s)/formoterol 4.5 MICROgram(s) Inhaler  buPROPion XL .  clonazePAM Tablet PRN  cyanocobalamin  docusate sodium PRN  fentaNYL   Patch  12 MICROgram(s)/Hr  fluticasone propionate 50 MICROgram(s)/spray Nasal Spray  folic acid  heparin  Injectable  HYDROmorphone   Tablet PRN  HYDROmorphone  Injectable PRN  ipratropium 42 MICROgram(s) Nasal  lamoTRIgine  levothyroxine  ondansetron Injectable PRN  oxyCODONE  ER Tablet  pantoprazole    Tablet  potassium chloride    Tablet ER  risperiDONE   Tablet  senna PRN  sertraline  topiramate  Viibryd Oral Tablet 40 milliGRAM(s)      Review of Systems--  A 10-point review of systems was obtained.   Review of systems otherwise negative except as previously noted.    Physical Examination--  Vital Signs: T(F): 100.3 (10-24-17 @ 08:00), Max: 100.3 (10-24-17 @ 08:00)  HR: 97 (10-24-17 @ 08:00)  BP: 98/67 (10-24-17 @ 08:00)  RR: 18 (10-24-17 @ 08:00)  SpO2: 96% (10-24-17 @ 08:00)  Wt(kg): --  General: Nontoxic-appearing Female in no acute distress.  HEENT: AT/NC. Anicteric. Conjunctiva pale and moist. Oropharynx pale but clear. Dentition fair.  Neck: Not rigid. No sense of mass.  Nodes: None palpable.  Lungs: Clear bilaterally without rales, wheezing or rhonchi  Heart: RRR. No Murmur. No rub. No gallop. No palpable thrill.  Abdomen: Bowel sounds present and normoactive. Soft. Nondistended. Nontender. No sense of mass. No organomegaly.  Extremities: No cyanosis or clubbing. LLE no edema. RLE wrapped. Foot warm, edematous. VAC under ACE. No IV phelbitis.  Skin: Pale. Dry. Good turgor. No rash. No vasculitic stigmata.  Psychiatric: See above.      Laboratory Studies--  CBC                        8.2    8.3   )-----------( 232      ( 23 Oct 2017 07:39 )             25.2       Chemistries  10-23    139  |  108  |  34<H>  ----------------------------<  99  3.5   |  20<L>  |  1.40<H>    Ca    8.3<L>      23 Oct 2017 07:39    < from: US Head + Neck Soft Tissue (10.23.17 @ 11:49) >  EXAM:  US HEAD NECK SOFT TISSUE                        PROCEDURE DATE:  10/23/2017    INTERPRETATION:  History: Palpable lump left posterior neck.  Limited targeted neck ultrasound to region of interest left posterior   neck.    In the area of interest there are 2 cervical lymph nodes noted measuring   0.7 x 0.6, and 0.8 x 0.8 cm respectively. These are nonspecific.   Recommend clinical laboratory correlation, biopsy as warranted.    Impression: As above  GLORIA TURNER M.D., ATTENDING RADIOLOGIST  This document has been electronically signed. Oct 23 2017 12:46PM  < end of copied text >      Culture Data  No new data

## 2017-10-25 DIAGNOSIS — R59.9 ENLARGED LYMPH NODES, UNSPECIFIED: ICD-10-CM

## 2017-10-25 LAB
ANION GAP SERPL CALC-SCNC: 11 MMOL/L — SIGNIFICANT CHANGE UP (ref 5–17)
BUN SERPL-MCNC: 32 MG/DL — HIGH (ref 7–23)
CALCIUM SERPL-MCNC: 8.4 MG/DL — LOW (ref 8.5–10.1)
CHLORIDE SERPL-SCNC: 107 MMOL/L — SIGNIFICANT CHANGE UP (ref 96–108)
CO2 SERPL-SCNC: 20 MMOL/L — LOW (ref 22–31)
CREAT SERPL-MCNC: 1.3 MG/DL — SIGNIFICANT CHANGE UP (ref 0.5–1.3)
GLUCOSE SERPL-MCNC: 95 MG/DL — SIGNIFICANT CHANGE UP (ref 70–99)
HCT VFR BLD CALC: 24 % — LOW (ref 34.5–45)
HGB BLD-MCNC: 7.9 G/DL — LOW (ref 11.5–15.5)
MCHC RBC-ENTMCNC: 29 PG — SIGNIFICANT CHANGE UP (ref 27–34)
MCHC RBC-ENTMCNC: 32.8 GM/DL — SIGNIFICANT CHANGE UP (ref 32–36)
MCV RBC AUTO: 88.5 FL — SIGNIFICANT CHANGE UP (ref 80–100)
PLATELET # BLD AUTO: 234 K/UL — SIGNIFICANT CHANGE UP (ref 150–400)
POTASSIUM SERPL-MCNC: 3.3 MMOL/L — LOW (ref 3.5–5.3)
POTASSIUM SERPL-SCNC: 3.3 MMOL/L — LOW (ref 3.5–5.3)
RBC # BLD: 2.71 M/UL — LOW (ref 3.8–5.2)
RBC # FLD: 12.3 % — SIGNIFICANT CHANGE UP (ref 10.3–14.5)
SODIUM SERPL-SCNC: 138 MMOL/L — SIGNIFICANT CHANGE UP (ref 135–145)
WBC # BLD: 8.5 K/UL — SIGNIFICANT CHANGE UP (ref 3.8–10.5)
WBC # FLD AUTO: 8.5 K/UL — SIGNIFICANT CHANGE UP (ref 3.8–10.5)

## 2017-10-25 PROCEDURE — 99232 SBSQ HOSP IP/OBS MODERATE 35: CPT

## 2017-10-25 PROCEDURE — 99233 SBSQ HOSP IP/OBS HIGH 50: CPT

## 2017-10-25 RX ORDER — POTASSIUM CHLORIDE 20 MEQ
40 PACKET (EA) ORAL
Qty: 0 | Refills: 0 | Status: DISCONTINUED | OUTPATIENT
Start: 2017-10-25 | End: 2017-10-27

## 2017-10-25 RX ADMIN — Medication 400 MILLIGRAM(S): at 18:51

## 2017-10-25 RX ADMIN — ONDANSETRON 4 MILLIGRAM(S): 8 TABLET, FILM COATED ORAL at 03:58

## 2017-10-25 RX ADMIN — OXYCODONE HYDROCHLORIDE 60 MILLIGRAM(S): 5 TABLET ORAL at 22:26

## 2017-10-25 RX ADMIN — HYDROMORPHONE HYDROCHLORIDE 0.5 MILLIGRAM(S): 2 INJECTION INTRAMUSCULAR; INTRAVENOUS; SUBCUTANEOUS at 21:00

## 2017-10-25 RX ADMIN — Medication 2 SPRAY(S): at 06:08

## 2017-10-25 RX ADMIN — OXYCODONE HYDROCHLORIDE 60 MILLIGRAM(S): 5 TABLET ORAL at 23:00

## 2017-10-25 RX ADMIN — Medication 1 APPLICATION(S): at 06:09

## 2017-10-25 RX ADMIN — HYDROMORPHONE HYDROCHLORIDE 0.5 MILLIGRAM(S): 2 INJECTION INTRAMUSCULAR; INTRAVENOUS; SUBCUTANEOUS at 11:30

## 2017-10-25 RX ADMIN — HEPARIN SODIUM 5000 UNIT(S): 5000 INJECTION INTRAVENOUS; SUBCUTANEOUS at 22:00

## 2017-10-25 RX ADMIN — BUPROPION HYDROCHLORIDE 300 MILLIGRAM(S): 150 TABLET, EXTENDED RELEASE ORAL at 11:30

## 2017-10-25 RX ADMIN — OXYCODONE HYDROCHLORIDE 60 MILLIGRAM(S): 5 TABLET ORAL at 15:57

## 2017-10-25 RX ADMIN — Medication 40 MILLIEQUIVALENT(S): at 18:53

## 2017-10-25 RX ADMIN — HYDROMORPHONE HYDROCHLORIDE 0.5 MILLIGRAM(S): 2 INJECTION INTRAMUSCULAR; INTRAVENOUS; SUBCUTANEOUS at 16:56

## 2017-10-25 RX ADMIN — BUDESONIDE AND FORMOTEROL FUMARATE DIHYDRATE 2 PUFF(S): 160; 4.5 AEROSOL RESPIRATORY (INHALATION) at 18:49

## 2017-10-25 RX ADMIN — ONDANSETRON 4 MILLIGRAM(S): 8 TABLET, FILM COATED ORAL at 22:26

## 2017-10-25 RX ADMIN — PANTOPRAZOLE SODIUM 40 MILLIGRAM(S): 20 TABLET, DELAYED RELEASE ORAL at 05:41

## 2017-10-25 RX ADMIN — LAMOTRIGINE 100 MILLIGRAM(S): 25 TABLET, ORALLY DISINTEGRATING ORAL at 05:42

## 2017-10-25 RX ADMIN — HYDROMORPHONE HYDROCHLORIDE 0.5 MILLIGRAM(S): 2 INJECTION INTRAMUSCULAR; INTRAVENOUS; SUBCUTANEOUS at 00:57

## 2017-10-25 RX ADMIN — OXYCODONE HYDROCHLORIDE 60 MILLIGRAM(S): 5 TABLET ORAL at 05:44

## 2017-10-25 RX ADMIN — HYDROMORPHONE HYDROCHLORIDE 0.5 MILLIGRAM(S): 2 INJECTION INTRAMUSCULAR; INTRAVENOUS; SUBCUTANEOUS at 17:11

## 2017-10-25 RX ADMIN — Medication 650 MILLIGRAM(S): at 04:00

## 2017-10-25 RX ADMIN — BUDESONIDE AND FORMOTEROL FUMARATE DIHYDRATE 2 PUFF(S): 160; 4.5 AEROSOL RESPIRATORY (INHALATION) at 05:42

## 2017-10-25 RX ADMIN — HEPARIN SODIUM 5000 UNIT(S): 5000 INJECTION INTRAVENOUS; SUBCUTANEOUS at 05:43

## 2017-10-25 RX ADMIN — Medication 1 SPRAY(S): at 05:43

## 2017-10-25 RX ADMIN — OXYCODONE HYDROCHLORIDE 60 MILLIGRAM(S): 5 TABLET ORAL at 16:30

## 2017-10-25 RX ADMIN — Medication 400 MILLIGRAM(S): at 05:42

## 2017-10-25 RX ADMIN — Medication 650 MILLIGRAM(S): at 02:46

## 2017-10-25 RX ADMIN — LAMOTRIGINE 100 MILLIGRAM(S): 25 TABLET, ORALLY DISINTEGRATING ORAL at 15:59

## 2017-10-25 RX ADMIN — OXYCODONE HYDROCHLORIDE 60 MILLIGRAM(S): 5 TABLET ORAL at 07:05

## 2017-10-25 RX ADMIN — Medication 1 MILLIGRAM(S): at 15:58

## 2017-10-25 RX ADMIN — HYDROMORPHONE HYDROCHLORIDE 0.5 MILLIGRAM(S): 2 INJECTION INTRAMUSCULAR; INTRAVENOUS; SUBCUTANEOUS at 00:30

## 2017-10-25 RX ADMIN — LAMOTRIGINE 100 MILLIGRAM(S): 25 TABLET, ORALLY DISINTEGRATING ORAL at 22:00

## 2017-10-25 RX ADMIN — HYDROMORPHONE HYDROCHLORIDE 0.5 MILLIGRAM(S): 2 INJECTION INTRAMUSCULAR; INTRAVENOUS; SUBCUTANEOUS at 08:22

## 2017-10-25 RX ADMIN — RISPERIDONE 8 MILLIGRAM(S): 4 TABLET ORAL at 22:03

## 2017-10-25 RX ADMIN — SERTRALINE 50 MILLIGRAM(S): 25 TABLET, FILM COATED ORAL at 11:30

## 2017-10-25 RX ADMIN — Medication 150 MICROGRAM(S): at 05:37

## 2017-10-25 RX ADMIN — Medication 2 SPRAY(S): at 18:48

## 2017-10-25 RX ADMIN — HYDROMORPHONE HYDROCHLORIDE 0.5 MILLIGRAM(S): 2 INJECTION INTRAMUSCULAR; INTRAVENOUS; SUBCUTANEOUS at 08:37

## 2017-10-25 RX ADMIN — Medication 1 SPRAY(S): at 18:49

## 2017-10-25 RX ADMIN — PREGABALIN 1000 MICROGRAM(S): 225 CAPSULE ORAL at 11:30

## 2017-10-25 RX ADMIN — HYDROMORPHONE HYDROCHLORIDE 0.5 MILLIGRAM(S): 2 INJECTION INTRAMUSCULAR; INTRAVENOUS; SUBCUTANEOUS at 20:13

## 2017-10-25 RX ADMIN — HYDROMORPHONE HYDROCHLORIDE 0.5 MILLIGRAM(S): 2 INJECTION INTRAMUSCULAR; INTRAVENOUS; SUBCUTANEOUS at 03:59

## 2017-10-25 RX ADMIN — HEPARIN SODIUM 5000 UNIT(S): 5000 INJECTION INTRAVENOUS; SUBCUTANEOUS at 15:58

## 2017-10-25 RX ADMIN — HYDROMORPHONE HYDROCHLORIDE 0.5 MILLIGRAM(S): 2 INJECTION INTRAMUSCULAR; INTRAVENOUS; SUBCUTANEOUS at 11:45

## 2017-10-25 NOTE — PROGRESS NOTE ADULT - ASSESSMENT
33 yo woman with history of chronic pain and opioid abuse, brought in by EMS after being found unresponsive for at least 8 hours after fall and injury to right thigh; underwent fasciotomy emergently for compartment syndrome    Consulted for anemia evaluation and neck LAD

## 2017-10-25 NOTE — PROGRESS NOTE ADULT - PROBLEM SELECTOR PLAN 2
s/p US. LN not particularly enlarged.   Unclear cause.   pending  ENT eval, however suspect reactive nature

## 2017-10-25 NOTE — CONSULT NOTE ADULT - ASSESSMENT
A 32-year-old female now status post right lower extremity  fasciotomy for prolonged compression after a fall with lethargy. There is no evidence for an acute atherothrombotic or embolic process. No history of cardiac disease and remains in sinus rhythm.    Recommend:    Wean vent per ICU team  Surgical followup for post fasciotomy care  Echocardiography to evaluate left ventricular function  Antibiotics per ICU team  Further management can be dependent on her clinical course    Time spent 40 minutes of critical care medicine. Discussed extensively with the ICU team and the patient's mother
32-year-old female now status post right lower extremity  fasciotomy for compartment syndrome. Pt on vent support.     ·	GHAZAL, ATN / Rhabdomyolysis. Oliguric.   ·	s/p surgery for compartment syndrome  ·	On vent support - Post op    Continue IV hydration and bicarb. Get repeat labs and decrease bicarb in IVF. Will dose lasix. Avoid nephrotoxic meds as possible. Will follow electrolytes and renal function trend. Monitor UO. D/w ICU staff. Further recommendations pending clinical course. Thank you for the courtesy of this referral.
.
31 yo woman with history of chronic pain and opioid abuse, brought in by EMS after being found unresponsive for at least 8 hours after fall and injury to right thigh; underwent fasciotomy emergently for compartment syndrome  - anemia multifactorial with low normal B12 level; iron studies c/w chronic disease; also had renal insufficiency with rhabdo  - start po B12 supplement and po folic acid daily  - hold transfusion unless Hg <7; likely predominant cause of anemia is wound/inflammation  - will check sonogram of the neck to evaluate mildly enlarged Lymph Node; suspect reactive
32 y.o female presents with a 18 hour hx of ischemic right leg that appears to be secondary to a compartment syndrome. She will need  emergency fasciotomies of the right leg. The ischemic time is greater than 12 hours and she is at risk for a major amputation of the right leg. The patient was given informed consent.
left posterior neck nodes, likely inlfammatory, reactive

## 2017-10-25 NOTE — CONSULT NOTE ADULT - PROBLEM SELECTOR RECOMMENDATION 9
-s/p fasciotomy -- there is movement and sensory proximal in hip region --exam limited do to dressing and edema no rom foot and no sensation which could be from compartment syndrome and neurapraxia from edema -- will need rehab --see how pt motor and sensory is after edema resolves -- may need outpt ncv -- cleared by neurology for discharge
medical management to be continued.  no need for any surgical intervention.
cont medical regimen  monitor sx

## 2017-10-25 NOTE — PROGRESS NOTE ADULT - SUBJECTIVE AND OBJECTIVE BOX
[facility] - Bonnie Hernandez, Keena, Apirl, Ale, Carissa Allen  Office Number:  [phone]    Patient resting comfortably in bed in NAD.  Laying flat with no respiratory distress.  No complaints of chest pain, dyspnea, palpitations, PND, or orthopnea.    MEDICATIONS  (STANDING):  BACItracin   Ointment 1 Application(s) Topical two times a day  buDESOnide  80 MICROgram(s)/formoterol 4.5 MICROgram(s) Inhaler 2 Puff(s) Inhalation two times a day  buPROPion XL . 300 milliGRAM(s) Oral daily  cyanocobalamin 1000 MICROGram(s) Oral daily  fentaNYL   Patch  12 MICROgram(s)/Hr 1 Patch Transdermal every 72 hours  fluticasone propionate 50 MICROgram(s)/spray Nasal Spray 1 Spray(s) Both Nostrils two times a day  folic acid 1 milliGRAM(s) Oral daily  heparin  Injectable 5000 Unit(s) SubCutaneous every 8 hours  influenza   Vaccine 0.5 milliLiter(s) IntraMuscular once  ipratropium 42 MICROgram(s) Nasal 2 Spray(s) Both Nostrils every 12 hours  lamoTRIgine 100 milliGRAM(s) Oral every 8 hours  levothyroxine 150 MICROGram(s) Oral daily  oxyCODONE  ER Tablet 60 milliGRAM(s) Oral every 8 hours  pantoprazole    Tablet 40 milliGRAM(s) Oral before breakfast  potassium chloride    Tablet ER 40 milliEquivalent(s) Oral two times a day  risperiDONE   Tablet 8 milliGRAM(s) Oral at bedtime  sertraline 50 milliGRAM(s) Oral daily  topiramate 400 milliGRAM(s) Oral two times a day  Viibryd Oral Tablet 40 milliGRAM(s) 1 Tablet(s) Oral daily    MEDICATIONS  (PRN):  acetaminophen   Tablet 650 milliGRAM(s) Oral every 6 hours PRN For Temp greater than 38 C (100.4 F)  acetaminophen   Tablet. 650 milliGRAM(s) Oral every 6 hours PRN Mild Pain (1 - 3)  ALBUTerol    90 MICROgram(s) HFA Inhaler 2 Puff(s) Inhalation every 6 hours PRN Shortness of Breath and/or Wheezing  clonazePAM Tablet 2 milliGRAM(s) Oral every 8 hours PRN anxiety  docusate sodium 100 milliGRAM(s) Oral two times a day PRN Constipation  HYDROmorphone   Tablet 2 milliGRAM(s) Oral every 4 hours PRN Severe Pain (7 - 10)  HYDROmorphone  Injectable 0.5 milliGRAM(s) IV Push every 3 hours PRN Severe Pain (7 - 10)  ondansetron Injectable 4 milliGRAM(s) IV Push every 4 hours PRN Nausea and/or Vomiting  senna 2 Tablet(s) Oral at bedtime PRN Constipation      Allergies    avocado (Unknown)  latex (Urticaria; Rash)  penicillin (Unknown)  sulfa drugs (Unknown)      Vital Signs Last 24 Hrs  T(C): 37.6 (25 Oct 2017 16:15), Max: 37.6 (24 Oct 2017 20:24)  T(F): 99.7 (25 Oct 2017 16:15), Max: 99.7 (25 Oct 2017 16:15)  HR: 106 (25 Oct 2017 16:15) (98 - 106)  BP: 103/60 (25 Oct 2017 16:15) (94/60 - 106/70)  BP(mean): --  RR: 16 (25 Oct 2017 16:15) (16 - 18)  SpO2: 99% (25 Oct 2017 16:15) (98% - 99%)    I&O's Summary      ON EXAM:    General: NAD, awake and alert, oriented x 3  HEENT: Mucous membranes are moist, anicteric  Lungs: Non-labored, breath sounds are clear bilaterally, No wheezing, rales or rhonchi  Cardiovascular: Regular, S1 and S2, no murmurs, rubs, or gallops  Gastrointestinal: Bowel Sounds present, soft, nontender.   Lymph: No peripheral edema. No lymphadenopathy.  Skin: No rashes or ulcers  Psych:  Mood & affect appropriate    LABS: All Labs Reviewed:                        7.9    8.5   )-----------( 234      ( 25 Oct 2017 07:58 )             24.0                         7.9    9.9   )-----------( 215      ( 24 Oct 2017 09:06 )             24.1                         8.2    8.3   )-----------( 232      ( 23 Oct 2017 07:39 )             25.2     25 Oct 2017 07:58    138    |  107    |  32     ----------------------------<  95     3.3     |  20     |  1.30   24 Oct 2017 09:06    139    |  107    |  32     ----------------------------<  87     3.6     |  21     |  1.30   23 Oct 2017 07:39    139    |  108    |  34     ----------------------------<  99     3.5     |  20     |  1.40     Ca    8.4        25 Oct 2017 07:58  Ca    8.5        24 Oct 2017 09:06  Ca    8.3        23 Oct 2017 07:39

## 2017-10-25 NOTE — PROGRESS NOTE ADULT - PROBLEM SELECTOR PLAN 2
on long acting and short acting opioids for chronic pain and acute pain  encourage use of PRN PO Dilaudid  reassurance and emotional support

## 2017-10-25 NOTE — PROGRESS NOTE ADULT - SUBJECTIVE AND OBJECTIVE BOX
INTERVAL HPI: 32y old  Female who presents with a chief complaint of RLE edema and numbness. Patient admitted for Compartment syndrome, s/p Fasciotomy, was initially in ICU, finished course of IV antibiotics and was transferred to med- surg floors. Patient with ATN likely from rhabdomyolysis with improving creatinine, followed by nephrology.   Dr. Damico consulted for pain management and concern for opoid dependence and abuse. Nurse aware of pain management plan. Renal function improving. Spoke to patient and mother at bedside. Patient is persistently asking for opioids, but she is aware they are being tapered and managed accordingly with pain management Dr. Damico.   Spoke to patient at length in regards to her opioid dependence and need to taper down her dilaudid.  Spoke to pain management doctor Jeffrey Galaviz [phone]) see chart note.    Patient having low grade temperatures. Dr. Montiel consulted, no need for antibiotics at this time. CT LE done non specific swelling in calf, no drainable fluid collection noted. Discussed with Dr. Martines. Plastic surgery to decide when plan for possible skin grafting. Patient with anemia, prior transfusion, work up initiated. 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 Discussed with Dr. Jin Heme Onc, US done, non specific, if increased in size will biopsy, monitor for now, discussed with hematology. ENT Dr. Schmitz consulted    S: This morning patient wants rx for wheelchair and complains of back pain.      MEDICATIONS  (STANDING):  BACItracin   Ointment 1 Application(s) Topical two times a day  buDESOnide  80 MICROgram(s)/formoterol 4.5 MICROgram(s) Inhaler 2 Puff(s) Inhalation two times a day  buPROPion XL . 300 milliGRAM(s) Oral daily  cyanocobalamin 1000 MICROGram(s) Oral daily  fentaNYL   Patch  12 MICROgram(s)/Hr 1 Patch Transdermal every 72 hours  fluticasone propionate 50 MICROgram(s)/spray Nasal Spray 1 Spray(s) Both Nostrils two times a day  folic acid 1 milliGRAM(s) Oral daily  heparin  Injectable 5000 Unit(s) SubCutaneous every 8 hours  influenza   Vaccine 0.5 milliLiter(s) IntraMuscular once  ipratropium 42 MICROgram(s) Nasal 2 Spray(s) Both Nostrils every 12 hours  lamoTRIgine 100 milliGRAM(s) Oral every 8 hours  levothyroxine 150 MICROGram(s) Oral daily  oxyCODONE  ER Tablet 60 milliGRAM(s) Oral every 8 hours  pantoprazole    Tablet 40 milliGRAM(s) Oral before breakfast  potassium chloride    Tablet ER 40 milliEquivalent(s) Oral two times a day  risperiDONE   Tablet 8 milliGRAM(s) Oral at bedtime  sertraline 50 milliGRAM(s) Oral daily  topiramate 400 milliGRAM(s) Oral two times a day  Viibryd Oral Tablet 40 milliGRAM(s) 1 Tablet(s) Oral daily    MEDICATIONS  (PRN):  acetaminophen   Tablet 650 milliGRAM(s) Oral every 6 hours PRN For Temp greater than 38 C (100.4 F)  acetaminophen   Tablet. 650 milliGRAM(s) Oral every 6 hours PRN Mild Pain (1 - 3)  ALBUTerol    90 MICROgram(s) HFA Inhaler 2 Puff(s) Inhalation every 6 hours PRN Shortness of Breath and/or Wheezing  clonazePAM Tablet 2 milliGRAM(s) Oral every 8 hours PRN anxiety  docusate sodium 100 milliGRAM(s) Oral two times a day PRN Constipation  HYDROmorphone   Tablet 2 milliGRAM(s) Oral every 4 hours PRN Severe Pain (7 - 10)  HYDROmorphone  Injectable 0.5 milliGRAM(s) IV Push every 3 hours PRN Severe Pain (7 - 10)  ondansetron Injectable 4 milliGRAM(s) IV Push every 4 hours PRN Nausea and/or Vomiting  senna 2 Tablet(s) Oral at bedtime PRN Constipation            REVIEW OF SYSTEM:    Constitutional: No fever, chills, fatigue  Neuro: No headache, numbness, weakness  Resp: No cough, wheezing, shortness of breath  CVS: No chest pain, palpitations, leg swelling  GI: No abdominal pain, nausea, vomiting, diarrhea   : No dysuria, frequency, incontinence  Skin: No itching, burning, rashes, or lesions   Msk: +pain in RLE, +pain in her back  Psych: No depression, anxiety, mood swings  Skin: + Left swollen lymph node    Vital Signs Last 24 Hrs  T(C): 37.6 (25 Oct 2017 07:00), Max: 38.3 (24 Oct 2017 15:00)  T(F): 99.6 (25 Oct 2017 07:00), Max: 101 (24 Oct 2017 15:00)  HR: 106 (25 Oct 2017 07:00) (98 - 106)  BP: 94/60 (25 Oct 2017 07:00) (94/60 - 106/70)  BP(mean): --  RR: 16 (25 Oct 2017 07:00) (16 - 18)  SpO2: 98% (25 Oct 2017 07:00) (98% - 98%)    PHYSICAL EXAM:  GENERAL: NAD  HEENT: NCAT, left sided soft palpable, movable mass, likely swollen lymph node  HEART: S1S2 RRR  RESPIRATORY: CTA B/L, No wheezing / rhonchi  ABDOMEN: Soft, Nontender, Nondistended; Bowel sounds present  NEUROLOGY: A&Ox3, nonfocal, not able to move RLE, sensation diminished RLE  EXTREMITIES:  +pulses mild edema R foot, LLE is moving + pulse  Skin: warm, well perfused       LABS:                        7.9    8.5   )-----------( 234      ( 25 Oct 2017 07:58 )             24.0     10-25    138  |  107  |  32<H>  ----------------------------<  95  3.3<L>   |  20<L>  |  1.30    Ca    8.4<L>      25 Oct 2017 07:58                  Culture Results:   10,000 - 49,000 CFU/mL Coag negative Staphylococcus not Staph  saprophyticus (10.16.17 @ 09:28)    Culture Results:   No growth at 5 days. (10.16.17 @ 09:27)      10-04 @ 10:15   No growth to date.  --  --  10-04 @ 09:59   No growth  --  --    < from: CT Lower Extremity No Cont, Right (10.21.17 @ 08:36) >  IMPRESSION:   1.  Multiple skin defects including the medial thigh, anterolateral calf   and posterior medial calf with absence of the underlying subcutaneous fat   and approximation of the underlying musculature to the skin surface in   keeping with prior fasciotomies.  2.  Nonspecific enlargement of the soleus musculature.  3.  Nonspecific soft tissue swelling about the calf and ankle maybe   postoperative. Differential includes infection. No drainable fluid   collection.  4.  Calcification within the medial gastrocnemius and soleus musculature   may be postoperative or posttraumatic in etiology.    < end of copied text >

## 2017-10-25 NOTE — PROGRESS NOTE ADULT - SUBJECTIVE AND OBJECTIVE BOX
INTERVAL HX:   wound vac continues to drain serous sanginous fluid.   pt sleepy post-opiates, arousable.   is concerned that "she has a tumor"      MEDICATIONS    reviewed   Vital Signs Last 24 Hrs  reviewed , low grade fever     PHYSICAL EXAM  General: adult in NAD; somnolent   HEENT: clear oropharynx, anicteric sclera, pink conjunctivae; palpable left posterior LN measuring about 1.5 cm; mobile and rubber  Neck: supple  CV: normal S1S2 with no murmur rubs or gallops  Lungs: clear to auscultation, no wheezes, no rales  Abdomen: soft non-tender non-distended, no hepatosplenomegaly obese  Ext: right thigh with dressing/bandages/wound vac   Skin: no rashes and no petechiae  Neuro: alert and oriented X3 no focal deficits      LABS:    CBC Full  -  ( 24 Oct 2017 09:06 )  WBC Count : 9.9 K/uL  Hemoglobin : 7.9 g/dL  Hematocrit : 24.1 %  Platelet Count - Automated : 215 K/uL        CBC Full  -  ( 25 Oct 2017 07:58 )  WBC Count : 8.5 K/uL  Hemoglobin : 7.9 g/dL  Hematocrit : 24.0 %  Platelet Count - Automated : 234 K/uL  Mean Cell Volume : 88.5 fl  Mean Cell Hemoglobin : 29.0 pg  Mean Cell Hemoglobin Concentration : 32.8 gm/dL  10-25    138  |  107  |  32<H>  ----------------------------<  95  3.3<L>   |  20<L>  |  1.30    Ca    8.4<L>      25 Oct 2017 07:58          Ferritin, Serum: 357.0 ng/mL    Vitamin B12, Serum (10.22.17 @ 16:10)    Vitamin B12, Serum: 378 pg/mL  Folate, Serum (10.22.17 @ 16:10)    Folate, Serum: 5.4 ng/mL          RADIOLOGY :  < from: CT Lower Extremity No Cont, Right (10.21.17 @ 08:36) >  IMPRESSION:   1.  Multiple skin defects including the medial thigh, anterolateral calf   and posterior medial calf with absence of the underlying subcutaneous fat   and approximation of the underlying musculature to the skin surface in   keeping with prior fasciotomies.  2.  Nonspecific enlargement of the soleus musculature.  3.  Nonspecific soft tissue swelling about the calf and ankle maybe   postoperative. Differential includes infection. No drainable fluid   collection.  4.  Calcification within the medial gastrocnemius and soleus musculature   may be postoperative or posttraumatic in etiology.      < end of copied text > INTERVAL HX:   wound vac continues to drain serous sanginous fluid.   pt sleepy post-opiates, arousable.   is concerned that "she has a tumor"      MEDICATIONS    reviewed   Vital Signs Last 24 Hrs  reviewed , low grade fever     PHYSICAL EXAM  General: adult in NAD; somnolent   HEENT: clear oropharynx, anicteric sclera, pink conjunctivae; palpable left posterior LN measuring about 1 cm;   Neck: supple  CV: normal S1S2 with no murmur rubs or gallops  Lungs: clear to auscultation, no wheezes, no rales  Abdomen: soft non-tender non-distended, no hepatosplenomegaly obese  Ext: right thigh with dressing/bandages/wound vac   Skin: no rashes and no petechiae  Neuro: alert and oriented X3 no focal deficits      LABS:    CBC Full  -  ( 24 Oct 2017 09:06 )  WBC Count : 9.9 K/uL  Hemoglobin : 7.9 g/dL  Hematocrit : 24.1 %  Platelet Count - Automated : 215 K/uL        CBC Full  -  ( 25 Oct 2017 07:58 )  WBC Count : 8.5 K/uL  Hemoglobin : 7.9 g/dL  Hematocrit : 24.0 %  Platelet Count - Automated : 234 K/uL  Mean Cell Volume : 88.5 fl  Mean Cell Hemoglobin : 29.0 pg  Mean Cell Hemoglobin Concentration : 32.8 gm/dL  10-25    138  |  107  |  32<H>  ----------------------------<  95  3.3<L>   |  20<L>  |  1.30    Ca    8.4<L>      25 Oct 2017 07:58          Ferritin, Serum: 357.0 ng/mL    Vitamin B12, Serum (10.22.17 @ 16:10)    Vitamin B12, Serum: 378 pg/mL  Folate, Serum (10.22.17 @ 16:10)    Folate, Serum: 5.4 ng/mL          RADIOLOGY :  < from: CT Lower Extremity No Cont, Right (10.21.17 @ 08:36) >  IMPRESSION:   1.  Multiple skin defects including the medial thigh, anterolateral calf   and posterior medial calf with absence of the underlying subcutaneous fat   and approximation of the underlying musculature to the skin surface in   keeping with prior fasciotomies.  2.  Nonspecific enlargement of the soleus musculature.  3.  Nonspecific soft tissue swelling about the calf and ankle maybe   postoperative. Differential includes infection. No drainable fluid   collection.  4.  Calcification within the medial gastrocnemius and soleus musculature   may be postoperative or posttraumatic in etiology.      < end of copied text >

## 2017-10-25 NOTE — CONSULT NOTE ADULT - PROBLEM SELECTOR PROBLEM 1
Compartment syndrome of right lower extremity, initial encounter
Lymphadenopathy of head and neck region
Hypothyroid

## 2017-10-25 NOTE — PROGRESS NOTE ADULT - ASSESSMENT
32-year-old female now status post right lower extremity  fasciotomy for compartment syndrome. Pt on vent support.     ·	GHAZAL, ATN Septic / ischemic, Rhabdomyolysis  ·	s/p surgery for compartment syndrome  ·	Anemia  ·	Hypokalemia    Improved renal function. Avoid nephrotoxic meds as possible.   Will follow electrolytes and renal function trend. Increase KCL to 40 mg bid.   Surgery follow up. Monitor h/h. On multiple pain meds.   Pain management follow up. Surgery & ID follow up.

## 2017-10-25 NOTE — CONSULT NOTE ADULT - CONSULT REASON
GHAZAL
.
Anemia - D64.59
Cold, numb right foot and leg
Fever
left neck nodes
r/o compartment syndrome
syncope, resp failure
fasciotomy  chronic pain  acute pain  anxiety

## 2017-10-25 NOTE — CONSULT NOTE ADULT - SUBJECTIVE AND OBJECTIVE BOX
Patient with multiple medical problems.  developed left neck swollen glands.   Ears - clear  nose - mild congestion  op/oc - mclear.  Neck - mild posterior neck node swelling and tenderness  CT scan neck - non-specific nodes left neck

## 2017-10-25 NOTE — PROGRESS NOTE ADULT - SUBJECTIVE AND OBJECTIVE BOX
Date/Time Patient Seen:  		  Referring MD:   Data Reviewed	       Patient is a 32y old  Female who presents with a chief complaint of RLE edema and numbness (13 Oct 2017 16:25)    in bed  afebrile overnight  vs and meds reviewed      Subjective/HPI     PAST MEDICAL & SURGICAL HISTORY:  Low back pain  Hypothyroid  Bipolar 1 disorder  History of spinal fusion  History of laminectomy        Medication list         MEDICATIONS  (STANDING):  BACItracin   Ointment 1 Application(s) Topical two times a day  buDESOnide  80 MICROgram(s)/formoterol 4.5 MICROgram(s) Inhaler 2 Puff(s) Inhalation two times a day  buPROPion XL . 300 milliGRAM(s) Oral daily  cyanocobalamin 1000 MICROGram(s) Oral daily  fentaNYL   Patch  12 MICROgram(s)/Hr 1 Patch Transdermal every 72 hours  fluticasone propionate 50 MICROgram(s)/spray Nasal Spray 1 Spray(s) Both Nostrils two times a day  folic acid 1 milliGRAM(s) Oral daily  heparin  Injectable 5000 Unit(s) SubCutaneous every 8 hours  influenza   Vaccine 0.5 milliLiter(s) IntraMuscular once  ipratropium 42 MICROgram(s) Nasal 2 Spray(s) Both Nostrils every 12 hours  lamoTRIgine 100 milliGRAM(s) Oral every 8 hours  levothyroxine 150 MICROGram(s) Oral daily  oxyCODONE  ER Tablet 60 milliGRAM(s) Oral every 8 hours  pantoprazole    Tablet 40 milliGRAM(s) Oral before breakfast  potassium chloride    Tablet ER 40 milliEquivalent(s) Oral daily  risperiDONE   Tablet 8 milliGRAM(s) Oral at bedtime  sertraline 50 milliGRAM(s) Oral daily  topiramate 400 milliGRAM(s) Oral two times a day  Viibryd Oral Tablet 40 milliGRAM(s) 1 Tablet(s) Oral daily    MEDICATIONS  (PRN):  acetaminophen   Tablet 650 milliGRAM(s) Oral every 6 hours PRN For Temp greater than 38 C (100.4 F)  acetaminophen   Tablet. 650 milliGRAM(s) Oral every 6 hours PRN Mild Pain (1 - 3)  ALBUTerol    90 MICROgram(s) HFA Inhaler 2 Puff(s) Inhalation every 6 hours PRN Shortness of Breath and/or Wheezing  clonazePAM Tablet 2 milliGRAM(s) Oral every 8 hours PRN anxiety  docusate sodium 100 milliGRAM(s) Oral two times a day PRN Constipation  HYDROmorphone   Tablet 2 milliGRAM(s) Oral every 4 hours PRN Severe Pain (7 - 10)  HYDROmorphone  Injectable 0.5 milliGRAM(s) IV Push every 3 hours PRN Severe Pain (7 - 10)  ondansetron Injectable 4 milliGRAM(s) IV Push every 4 hours PRN Nausea and/or Vomiting  senna 2 Tablet(s) Oral at bedtime PRN Constipation         Vitals log        ICU Vital Signs Last 24 Hrs  T(C): 37.5 (25 Oct 2017 00:56), Max: 38.3 (24 Oct 2017 15:00)  T(F): 99.5 (25 Oct 2017 00:56), Max: 101 (24 Oct 2017 15:00)  HR: 98 (25 Oct 2017 00:56) (97 - 101)  BP: 106/70 (25 Oct 2017 00:56) (94/62 - 106/70)  BP(mean): --  ABP: --  ABP(mean): --  RR: 16 (25 Oct 2017 00:56) (16 - 18)  SpO2: 98% (25 Oct 2017 00:56) (96% - 98%)           Input and Output:  I&O's Detail    23 Oct 2017 07:01  -  24 Oct 2017 07:00  --------------------------------------------------------  IN:    Oral Fluid: 420 mL  Total IN: 420 mL    OUT:    VAC (Vacuum Assisted Closure) System: 500 mL    Voided: 575 mL  Total OUT: 1075 mL    Total NET: -655 mL          Lab Data                        7.9    9.9   )-----------( 215      ( 24 Oct 2017 09:06 )             24.1     10-24    139  |  107  |  32<H>  ----------------------------<  87  3.6   |  21<L>  |  1.30    Ca    8.5      24 Oct 2017 09:06              Review of Systems	      Objective     Physical Examination    head at  heart - s1s2  lungs - dec BS  abd - soft  cn grossly int      Pertinent Lab findings & Imaging      Coffey:  NO   Adequate UO     I&O's Detail    23 Oct 2017 07:01  -  24 Oct 2017 07:00  --------------------------------------------------------  IN:    Oral Fluid: 420 mL  Total IN: 420 mL    OUT:    VAC (Vacuum Assisted Closure) System: 500 mL    Voided: 575 mL  Total OUT: 1075 mL    Total NET: -655 mL               Discussed with:     Cultures:	        Radiology

## 2017-10-25 NOTE — PROGRESS NOTE ADULT - PROBLEM SELECTOR PLAN 1
- anemia multifactorial with low normal B12 and folate level; iron studies c/w chronic disease; also had renal insufficiency 2/2 rhabdo.   - started  po B12 supplement and po folic acid daily  - hold transfusion unless Hg <7; likely predominant cause of anemia is wound/inflammation - anemia multifactorial with low normal B12 and folate level; iron studies c/w chronic disease; also had renal insufficiency 2/2 rhabdo.   - started  po B12 supplement and po folic acid daily  - Hb is stable   - hold transfusion unless Hg <7; likely predominant cause of anemia is wound/inflammation

## 2017-10-25 NOTE — PROGRESS NOTE ADULT - PROBLEM SELECTOR PLAN 3
Pt having low grade fever, for CT A/P is normal  Pt is off antibx and leucocytosis improving.  ID consult noted. HIV negative.  CT Lower extremity done, no drainable fluid collection,  d/w Dr. Martines

## 2017-10-25 NOTE — PROGRESS NOTE ADULT - SUBJECTIVE AND OBJECTIVE BOX
Patient is a 32y old  Female who presents with a chief complaint of RLE edema and numbness (04 Oct 2017 01:13)      Patient seen in follow up for GHAZAL, ATN, Rhabdomyolysis. Improved renal function trend. Still with low potassium.     PAST MEDICAL HISTORY:  Low back pain  Hypothyroi  Bipolar 1 disorder    MEDICATIONS  (STANDING):  BACItracin   Ointment 1 Application(s) Topical two times a day  buDESOnide  80 MICROgram(s)/formoterol 4.5 MICROgram(s) Inhaler 2 Puff(s) Inhalation two times a day  buPROPion XL . 300 milliGRAM(s) Oral daily  cyanocobalamin 1000 MICROGram(s) Oral daily  fentaNYL   Patch  12 MICROgram(s)/Hr 1 Patch Transdermal every 72 hours  fluticasone propionate 50 MICROgram(s)/spray Nasal Spray 1 Spray(s) Both Nostrils two times a day  folic acid 1 milliGRAM(s) Oral daily  heparin  Injectable 5000 Unit(s) SubCutaneous every 8 hours  influenza   Vaccine 0.5 milliLiter(s) IntraMuscular once  ipratropium 42 MICROgram(s) Nasal 2 Spray(s) Both Nostrils every 12 hours  lamoTRIgine 100 milliGRAM(s) Oral every 8 hours  levothyroxine 150 MICROGram(s) Oral daily  oxyCODONE  ER Tablet 60 milliGRAM(s) Oral every 8 hours  pantoprazole    Tablet 40 milliGRAM(s) Oral before breakfast  potassium chloride    Tablet ER 40 milliEquivalent(s) Oral daily  risperiDONE   Tablet 8 milliGRAM(s) Oral at bedtime  sertraline 50 milliGRAM(s) Oral daily  topiramate 400 milliGRAM(s) Oral two times a day  Viibryd Oral Tablet 40 milliGRAM(s) 1 Tablet(s) Oral daily    MEDICATIONS  (PRN):  acetaminophen   Tablet 650 milliGRAM(s) Oral every 6 hours PRN For Temp greater than 38 C (100.4 F)  acetaminophen   Tablet. 650 milliGRAM(s) Oral every 6 hours PRN Mild Pain (1 - 3)  ALBUTerol    90 MICROgram(s) HFA Inhaler 2 Puff(s) Inhalation every 6 hours PRN Shortness of Breath and/or Wheezing  clonazePAM Tablet 2 milliGRAM(s) Oral every 8 hours PRN anxiety  docusate sodium 100 milliGRAM(s) Oral two times a day PRN Constipation  HYDROmorphone   Tablet 2 milliGRAM(s) Oral every 4 hours PRN Severe Pain (7 - 10)  HYDROmorphone  Injectable 0.5 milliGRAM(s) IV Push every 3 hours PRN Severe Pain (7 - 10)  ondansetron Injectable 4 milliGRAM(s) IV Push every 4 hours PRN Nausea and/or Vomiting  senna 2 Tablet(s) Oral at bedtime PRN Constipation    T(C): 37.6 (10-25-17 @ 07:00), Max: 38.3 (10-24-17 @ 15:00)  HR: 106 (10-25-17 @ 07:00) (84 - 106)  BP: 94/60 (10-25-17 @ 07:00) (94/60 - 112/70)  RR: 16 (10-25-17 @ 07:00)  SpO2: 98% (10-25-17 @ 07:00)  Wt(kg): --  I&O's Detail          PHYSICAL EXAM:  General: NAD  Respiratory: b/l air entry  Cardiovascular: S1 S2  Gastrointestinal: soft  Extremities:  Rt leg dressing      LABORATORY:                        7.9    8.5   )-----------( 234      ( 25 Oct 2017 07:58 )             24.0     10-25    138  |  107  |  32<H>  ----------------------------<  95  3.3<L>   |  20<L>  |  1.30    Ca    8.4<L>      25 Oct 2017 07:58      Sodium, Serum: 138 mmol/L (10-25 @ 07:58)  Sodium, Serum: 139 mmol/L (10-24 @ 09:06)    Potassium, Serum: 3.3 mmol/L (10-25 @ 07:58)  Potassium, Serum: 3.6 mmol/L (10-24 @ 09:06)    Hemoglobin: 7.9 g/dL (10-25 @ 07:58)  Hemoglobin: 7.9 g/dL (10-24 @ 09:06)  Hemoglobin: 8.2 g/dL (10-23 @ 07:39)    Creatinine, Serum 1.30 (10-25 @ 07:58)  Creatinine, Serum 1.30 (10-24 @ 09:06)  Creatinine, Serum 1.40 (10-23 @ 07:39)

## 2017-10-25 NOTE — PROGRESS NOTE ADULT - PROBLEM SELECTOR PLAN 1
monitor temp  no obvious infection at present  supportive regimen  skin and oral hygiene  wound care

## 2017-10-25 NOTE — CONSULT NOTE ADULT - CONSULT REQUESTED DATE/TIME
04-Oct-2017 14:10
03-Oct-2017 23:45
04-Oct-2017 00:51
10-Oct-2017 10:29
16-Oct-2017 14:46
23-Oct-2017 10:20
25-Oct-2017
04-Oct-2017 08:31
08-Oct-2017 09:42

## 2017-10-25 NOTE — PROGRESS NOTE ADULT - ASSESSMENT
Stable from a vascular standpoint. Suggest plastic follow up with Dr. Culver to evaluate for wound closures.

## 2017-10-25 NOTE — CONSULT NOTE ADULT - PROVIDER SPECIALTY LIST ADULT
ENT
Heme/Onc
Infectious Disease
Neurology
Orthopedics
Vascular Surgery
Cardiology
Nephrology
Palliative Care

## 2017-10-25 NOTE — PROGRESS NOTE ADULT - SUBJECTIVE AND OBJECTIVE BOX
Patient is a 32y old  Female who presents with a chief complaint of RLE edema and numbness (13 Oct 2017 16:25)      Vascular Surgery Progress Note    Interval HPI: 3 weeks s/p fasciotomies of right leg. The patient claims she was able to move her right 1st toe yesterday but not today. She has no complaints today.    Medications:      Allergies:  Allergies    avocado (Unknown)  latex (Urticaria; Rash)  penicillin (Unknown)  sulfa drugs (Unknown)    Intolerances        Vital Signs Last 24 Hrs  T(C): 37.5 (25 Oct 2017 00:56), Max: 38.3 (24 Oct 2017 15:00)  T(F): 99.5 (25 Oct 2017 00:56), Max: 101 (24 Oct 2017 15:00)  HR: 98 (25 Oct 2017 00:56) (97 - 101)  BP: 106/70 (25 Oct 2017 00:56) (94/62 - 106/70)  BP(mean): --  RR: 16 (25 Oct 2017 00:56) (16 - 18)  SpO2: 98% (25 Oct 2017 00:56) (96% - 98%)  I&O's Summary      Physical Exam:  Gen: NAD, A&Ox  Right leg wounds are clean with viable calf  muscles. No sign of leg infections. There is less edema in the right foot and calf.     Pulses: strong palpable DP and PT pulses in both feet.  Neurologically the right foot has absent motor and sensory functions    LABS:                        7.9    9.9   )-----------( 215      ( 24 Oct 2017 09:06 )             24.1     10-24    139  |  107  |  32<H>  ----------------------------<  87  3.6   |  21<L>  |  1.30    Ca    8.5      24 Oct 2017 09:06

## 2017-10-26 LAB
ANION GAP SERPL CALC-SCNC: 9 MMOL/L — SIGNIFICANT CHANGE UP (ref 5–17)
BUN SERPL-MCNC: 29 MG/DL — HIGH (ref 7–23)
CALCIUM SERPL-MCNC: 8.4 MG/DL — LOW (ref 8.5–10.1)
CHLORIDE SERPL-SCNC: 107 MMOL/L — SIGNIFICANT CHANGE UP (ref 96–108)
CO2 SERPL-SCNC: 22 MMOL/L — SIGNIFICANT CHANGE UP (ref 22–31)
CREAT SERPL-MCNC: 1.2 MG/DL — SIGNIFICANT CHANGE UP (ref 0.5–1.3)
GLUCOSE SERPL-MCNC: 93 MG/DL — SIGNIFICANT CHANGE UP (ref 70–99)
HCT VFR BLD CALC: 23.5 % — LOW (ref 34.5–45)
HGB BLD-MCNC: 7.6 G/DL — LOW (ref 11.5–15.5)
MAGNESIUM SERPL-MCNC: 1.9 MG/DL — SIGNIFICANT CHANGE UP (ref 1.6–2.6)
MCHC RBC-ENTMCNC: 28.9 PG — SIGNIFICANT CHANGE UP (ref 27–34)
MCHC RBC-ENTMCNC: 32.5 GM/DL — SIGNIFICANT CHANGE UP (ref 32–36)
MCV RBC AUTO: 88.9 FL — SIGNIFICANT CHANGE UP (ref 80–100)
PLATELET # BLD AUTO: 239 K/UL — SIGNIFICANT CHANGE UP (ref 150–400)
POTASSIUM SERPL-MCNC: 3.8 MMOL/L — SIGNIFICANT CHANGE UP (ref 3.5–5.3)
POTASSIUM SERPL-SCNC: 3.8 MMOL/L — SIGNIFICANT CHANGE UP (ref 3.5–5.3)
RBC # BLD: 2.64 M/UL — LOW (ref 3.8–5.2)
RBC # FLD: 12.5 % — SIGNIFICANT CHANGE UP (ref 10.3–14.5)
SODIUM SERPL-SCNC: 138 MMOL/L — SIGNIFICANT CHANGE UP (ref 135–145)
WBC # BLD: 8.4 K/UL — SIGNIFICANT CHANGE UP (ref 3.8–10.5)
WBC # FLD AUTO: 8.4 K/UL — SIGNIFICANT CHANGE UP (ref 3.8–10.5)

## 2017-10-26 PROCEDURE — 99233 SBSQ HOSP IP/OBS HIGH 50: CPT

## 2017-10-26 PROCEDURE — 71010: CPT | Mod: 26

## 2017-10-26 PROCEDURE — 93010 ELECTROCARDIOGRAM REPORT: CPT

## 2017-10-26 RX ORDER — ONDANSETRON 8 MG/1
4 TABLET, FILM COATED ORAL ONCE
Qty: 0 | Refills: 0 | Status: COMPLETED | OUTPATIENT
Start: 2017-10-26 | End: 2017-10-26

## 2017-10-26 RX ORDER — CEFEPIME 1 G/1
INJECTION, POWDER, FOR SOLUTION INTRAMUSCULAR; INTRAVENOUS
Qty: 0 | Refills: 0 | Status: DISCONTINUED | OUTPATIENT
Start: 2017-10-26 | End: 2017-10-27

## 2017-10-26 RX ORDER — VANCOMYCIN HCL 1 G
1750 VIAL (EA) INTRAVENOUS EVERY 12 HOURS
Qty: 0 | Refills: 0 | Status: DISCONTINUED | OUTPATIENT
Start: 2017-10-26 | End: 2017-10-27

## 2017-10-26 RX ORDER — CEFEPIME 1 G/1
1000 INJECTION, POWDER, FOR SOLUTION INTRAMUSCULAR; INTRAVENOUS EVERY 8 HOURS
Qty: 0 | Refills: 0 | Status: DISCONTINUED | OUTPATIENT
Start: 2017-10-27 | End: 2017-10-27

## 2017-10-26 RX ORDER — CEFEPIME 1 G/1
1000 INJECTION, POWDER, FOR SOLUTION INTRAMUSCULAR; INTRAVENOUS ONCE
Qty: 0 | Refills: 0 | Status: COMPLETED | OUTPATIENT
Start: 2017-10-26 | End: 2017-10-26

## 2017-10-26 RX ADMIN — HEPARIN SODIUM 5000 UNIT(S): 5000 INJECTION INTRAVENOUS; SUBCUTANEOUS at 06:02

## 2017-10-26 RX ADMIN — HEPARIN SODIUM 5000 UNIT(S): 5000 INJECTION INTRAVENOUS; SUBCUTANEOUS at 21:35

## 2017-10-26 RX ADMIN — CEFEPIME 100 MILLIGRAM(S): 1 INJECTION, POWDER, FOR SOLUTION INTRAMUSCULAR; INTRAVENOUS at 19:58

## 2017-10-26 RX ADMIN — PREGABALIN 1000 MICROGRAM(S): 225 CAPSULE ORAL at 19:08

## 2017-10-26 RX ADMIN — Medication 650 MILLIGRAM(S): at 17:10

## 2017-10-26 RX ADMIN — HYDROMORPHONE HYDROCHLORIDE 0.5 MILLIGRAM(S): 2 INJECTION INTRAMUSCULAR; INTRAVENOUS; SUBCUTANEOUS at 20:40

## 2017-10-26 RX ADMIN — HYDROMORPHONE HYDROCHLORIDE 0.5 MILLIGRAM(S): 2 INJECTION INTRAMUSCULAR; INTRAVENOUS; SUBCUTANEOUS at 16:27

## 2017-10-26 RX ADMIN — HYDROMORPHONE HYDROCHLORIDE 0.5 MILLIGRAM(S): 2 INJECTION INTRAMUSCULAR; INTRAVENOUS; SUBCUTANEOUS at 16:57

## 2017-10-26 RX ADMIN — FENTANYL CITRATE 1 PATCH: 50 INJECTION INTRAVENOUS at 16:34

## 2017-10-26 RX ADMIN — BUPROPION HYDROCHLORIDE 300 MILLIGRAM(S): 150 TABLET, EXTENDED RELEASE ORAL at 19:08

## 2017-10-26 RX ADMIN — Medication 400 MILLIGRAM(S): at 06:02

## 2017-10-26 RX ADMIN — Medication 40 MILLIEQUIVALENT(S): at 06:03

## 2017-10-26 RX ADMIN — PANTOPRAZOLE SODIUM 40 MILLIGRAM(S): 20 TABLET, DELAYED RELEASE ORAL at 06:02

## 2017-10-26 RX ADMIN — BUDESONIDE AND FORMOTEROL FUMARATE DIHYDRATE 2 PUFF(S): 160; 4.5 AEROSOL RESPIRATORY (INHALATION) at 06:10

## 2017-10-26 RX ADMIN — HYDROMORPHONE HYDROCHLORIDE 0.5 MILLIGRAM(S): 2 INJECTION INTRAMUSCULAR; INTRAVENOUS; SUBCUTANEOUS at 21:00

## 2017-10-26 RX ADMIN — HYDROMORPHONE HYDROCHLORIDE 0.5 MILLIGRAM(S): 2 INJECTION INTRAMUSCULAR; INTRAVENOUS; SUBCUTANEOUS at 07:47

## 2017-10-26 RX ADMIN — FENTANYL CITRATE 1 PATCH: 50 INJECTION INTRAVENOUS at 16:27

## 2017-10-26 RX ADMIN — Medication 150 MICROGRAM(S): at 05:12

## 2017-10-26 RX ADMIN — Medication 1 MILLIGRAM(S): at 19:08

## 2017-10-26 RX ADMIN — HYDROMORPHONE HYDROCHLORIDE 0.5 MILLIGRAM(S): 2 INJECTION INTRAMUSCULAR; INTRAVENOUS; SUBCUTANEOUS at 04:26

## 2017-10-26 RX ADMIN — Medication 2 MILLIGRAM(S): at 17:13

## 2017-10-26 RX ADMIN — Medication 2 SPRAY(S): at 19:09

## 2017-10-26 RX ADMIN — Medication 40 MILLIEQUIVALENT(S): at 19:07

## 2017-10-26 RX ADMIN — HYDROMORPHONE HYDROCHLORIDE 0.5 MILLIGRAM(S): 2 INJECTION INTRAMUSCULAR; INTRAVENOUS; SUBCUTANEOUS at 01:01

## 2017-10-26 RX ADMIN — OXYCODONE HYDROCHLORIDE 60 MILLIGRAM(S): 5 TABLET ORAL at 06:02

## 2017-10-26 RX ADMIN — RISPERIDONE 8 MILLIGRAM(S): 4 TABLET ORAL at 21:36

## 2017-10-26 RX ADMIN — LAMOTRIGINE 100 MILLIGRAM(S): 25 TABLET, ORALLY DISINTEGRATING ORAL at 21:36

## 2017-10-26 RX ADMIN — HYDROMORPHONE HYDROCHLORIDE 0.5 MILLIGRAM(S): 2 INJECTION INTRAMUSCULAR; INTRAVENOUS; SUBCUTANEOUS at 01:30

## 2017-10-26 RX ADMIN — Medication 1 SPRAY(S): at 19:09

## 2017-10-26 RX ADMIN — BUDESONIDE AND FORMOTEROL FUMARATE DIHYDRATE 2 PUFF(S): 160; 4.5 AEROSOL RESPIRATORY (INHALATION) at 19:12

## 2017-10-26 RX ADMIN — LAMOTRIGINE 100 MILLIGRAM(S): 25 TABLET, ORALLY DISINTEGRATING ORAL at 06:02

## 2017-10-26 RX ADMIN — Medication 2 SPRAY(S): at 06:03

## 2017-10-26 RX ADMIN — ONDANSETRON 4 MILLIGRAM(S): 8 TABLET, FILM COATED ORAL at 11:04

## 2017-10-26 RX ADMIN — Medication 250 MILLIGRAM(S): at 20:43

## 2017-10-26 RX ADMIN — ONDANSETRON 4 MILLIGRAM(S): 8 TABLET, FILM COATED ORAL at 15:33

## 2017-10-26 RX ADMIN — ONDANSETRON 4 MILLIGRAM(S): 8 TABLET, FILM COATED ORAL at 01:31

## 2017-10-26 RX ADMIN — ONDANSETRON 4 MILLIGRAM(S): 8 TABLET, FILM COATED ORAL at 18:44

## 2017-10-26 RX ADMIN — Medication 400 MILLIGRAM(S): at 19:08

## 2017-10-26 RX ADMIN — Medication 1 SPRAY(S): at 06:03

## 2017-10-26 NOTE — PROGRESS NOTE ADULT - PROBLEM SELECTOR PLAN 2
s/p US. LN not particularly enlarged.   Unclear cause.   pending  ENT eval, however suspect reactive nature and is currently being observed

## 2017-10-26 NOTE — PROGRESS NOTE ADULT - SUBJECTIVE AND OBJECTIVE BOX
HPI:  Pt is a 31 yo female with PMHX of Bipolar disorder, hypothyroidism, chronic low back pain on morphine s/p laminectomy and spinal fusion in 2016 who is BIB EMS for right leg swelling and numbness. Patient is a poor historian as she is on many pain medicines and is currently lethargic, therefore much info provided based on hospital staff and charts. She states that 18 hours prior to arrival, she fell on the floor at her house after taking too much morphine and was lying on floor for 8 hours. She woke up to numbness, swelling, and cool temperature of her right leg, from below the knee onwards. Patient denies any current SOB, CP, f/c, denies head trauma.     In the ED, vitals were Temp 101.4F /91  RR 20  SpO2 93% RA. Significant labs are WBS 24.3, Hb 15.6, Hmt 47.6,  Platelets 118, neutr 93%, sodium 131, BUN 30,  Cr 2.30, glucose 152 AST 1377, , GFR 27, lactate 1.7. Doppler US RLE showed Extremely limited evaluation of the right lower extremity with nonvisualization of the venous structures beyond the mid right femoral vein. No evidence of DVT within the right common femoral, proximal and   mid femoral veins. No evidence of left lower extremity deep venous thrombosis. XR of right tibia and XR of R femur showed no acute fractures. Arterial Doppler of RLE pending. EKG showed sinus tach of 140, with right superior axis deviation. Patient started on aztreonam, Tylenol given for fever, patient made NPO, Dr. Martines consulted for emergent surgery. (04 Oct 2017 01:13)    SUBJECTIVE:  Patient is a 32y old  Female who presents with a chief complaint of RLE edema and numbness (13 Oct 2017 16:25)    Sleeping soundly.  Refuses to answer questions and to be examined.  On room air and not in respiratory discomfort in supine position with HOB lower that the feet    OBJECTIVE:  Review Of Systems:  Constitutional: [ ] Fever [ ] Chills [ ] Fatigue [ ] Weight change   HEENT: [ ] Blurred vision [ ] Eye Pain [ ] Headache [ ] Runny nose [ ] Sore Throat   Respiratory: [ ] Cough [ ] Wheezing [ ] Shortness of breath  Cardiovascular: [ ] Chest Pain [ ] Palpitations [ ] BENNETT [ ] PND [ ] Orthopnea  Gastrointestinal: [ ] Abdominal Pain [ ] Diarrhea [ ] Constipation [ ] Hemorrhoids [ ] Nausea [ ] Vomiting  Genitourinary: [ ] Nocturia [ ] Dysuria [ ] Incontinence  Extremities: [ ] Swelling [ ] Joint Pain  Neurologic: [ ] Focal deficit [ ] Paresthesias [ ] Syncope  Lymphatic: [ ] Swelling [ ] Lymphadenopathy   Skin: [ ] Rash [ ] Ecchymoses [ ] Wounds [ ] Lesions  Psychiatry: [ ] Depression [ ] Suicidal/Homicidal Ideation [ ] Anxiety [ ] Sleep Disturbances  [ ] 10 point review of systems is otherwise negative except as mentioned above            [ x]Unable to obtain    Allergy:  Allergies    avocado (Unknown)  latex (Urticaria; Rash)  penicillin (Unknown)  sulfa drugs (Unknown)    Intolerances    Medications:  MEDICATIONS  (STANDING):  BACItracin   Ointment 1 Application(s) Topical two times a day  buDESOnide  80 MICROgram(s)/formoterol 4.5 MICROgram(s) Inhaler 2 Puff(s) Inhalation two times a day  buPROPion XL . 300 milliGRAM(s) Oral daily  cyanocobalamin 1000 MICROGram(s) Oral daily  fentaNYL   Patch  12 MICROgram(s)/Hr 1 Patch Transdermal every 72 hours  fluticasone propionate 50 MICROgram(s)/spray Nasal Spray 1 Spray(s) Both Nostrils two times a day  folic acid 1 milliGRAM(s) Oral daily  heparin  Injectable 5000 Unit(s) SubCutaneous every 8 hours  influenza   Vaccine 0.5 milliLiter(s) IntraMuscular once  ipratropium 42 MICROgram(s) Nasal 2 Spray(s) Both Nostrils every 12 hours  lamoTRIgine 100 milliGRAM(s) Oral every 8 hours  levothyroxine 150 MICROGram(s) Oral daily  oxyCODONE  ER Tablet 60 milliGRAM(s) Oral every 8 hours  pantoprazole    Tablet 40 milliGRAM(s) Oral before breakfast  potassium chloride    Tablet ER 40 milliEquivalent(s) Oral two times a day  risperiDONE   Tablet 8 milliGRAM(s) Oral at bedtime  sertraline 50 milliGRAM(s) Oral daily  topiramate 400 milliGRAM(s) Oral two times a day  Viibryd Oral Tablet 40 milliGRAM(s) 1 Tablet(s) Oral daily    MEDICATIONS  (PRN):  acetaminophen   Tablet 650 milliGRAM(s) Oral every 6 hours PRN For Temp greater than 38 C (100.4 F)  acetaminophen   Tablet. 650 milliGRAM(s) Oral every 6 hours PRN Mild Pain (1 - 3)  ALBUTerol    90 MICROgram(s) HFA Inhaler 2 Puff(s) Inhalation every 6 hours PRN Shortness of Breath and/or Wheezing  clonazePAM Tablet 2 milliGRAM(s) Oral every 8 hours PRN anxiety  docusate sodium 100 milliGRAM(s) Oral two times a day PRN Constipation  HYDROmorphone   Tablet 2 milliGRAM(s) Oral every 4 hours PRN Severe Pain (7 - 10)  HYDROmorphone  Injectable 0.5 milliGRAM(s) IV Push every 3 hours PRN Severe Pain (7 - 10)  ondansetron Injectable 4 milliGRAM(s) IV Push every 4 hours PRN Nausea and/or Vomiting  senna 2 Tablet(s) Oral at bedtime PRN Constipation      PMH/PSH/FH/SH: [ ] Unchanged    Vitals:  T(C): 37.9 (10-26-17 @ 07:31), Max: 37.9 (10-26-17 @ 07:31)  HR: 107 (10-26-17 @ 07:31) (106 - 107)  BP: 97/60 (10-26-17 @ 07:31) (97/60 - 117/68)  BP(mean): --  RR: 18 (10-26-17 @ 07:31) (16 - 18)  SpO2: 99% (10-26-17 @ 07:31) (97% - 99%)  Wt(kg): --  Daily     Daily   I&O's Summary    25 Oct 2017 07:01  -  26 Oct 2017 07:00  --------------------------------------------------------  IN: 560 mL / OUT: 1950 mL / NET: -1390 mL    Labs:                        7.6    8.4   )-----------( 239      ( 26 Oct 2017 06:05 )             23.5     10-26    138  |  107  |  29<H>  ----------------------------<  93  3.8   |  22  |  1.20    Ca    8.4<L>      26 Oct 2017 06:05  Mg     1.9     10-26    Magnesium, Serum: 1.9 mg/dL (10-26 @ 06:05)    ECG:  < from: 12 Lead ECG (10.04.17 @ 08:44) >    Ventricular Rate 122 BPM    Atrial Rate 122 BPM    P-R Interval 136 ms    QRS Duration 94 ms    Q-T Interval 332 ms    QTC Calculation(Bezet) 473 ms    P Axis 35 degrees    R Axis -86 degrees    T Axis 10 degrees    Diagnosis Line Sinus tachycardia  Left axis deviation  Inferior infarct (cited on or before 03-OCT-2017)  Abnormal ECG  When compared with ECG of 03-OCT-2017 21:50, (Unconfirmed)  No significant change was found  Confirmed by Juanito Brink MD ([id number]) on 10/4/2017 1:55:40 PM    < end of copied text >    Echo:  < from: TTE Echo Doppler w/o Cont (10.05.17 @ 12:46) >     EXAM:  ECHO TTE W/O CON COMP W/DOPPLR         PROCEDURE DATE:  10/05/2017        INTERPRETATION:  Ordering Physician: MARISSA RUFF [id number]    Indication: Respiratory failure    Study Quality: Technically difficult in ICU   A complete echocardiographic study was performed utilizing standard   protocol including spectral and color Doppler in all echocardiographic   windows.    Height: 1 75 cm  Weight: 113 kg  BSA: 2.27  Blood Pressure: 104/76    MEASUREMENTS  IVS: 0.9cm  PWT: 0.9cm  LA: 3.5cm  AO: 2.8cm  LVIDd: 5.2cm  LVIDs: 3.5cm      LVEF: 70%    FINDINGS  Left Ventricle: Hyperdynamic left ventricle. Estimated EF 70%. No   segmental wall motion abnormalities  Aortic Valve: Not well-visualized. Probably normal trileaflet aortic valve  Mitral Valve: There is subtle systolic anterior motion of the mitral   valve in the setting of a hyperdynamic left ventricle. No left   ventricular outflow tract gradient is seen. Trace mitral regurgitation is   visualized.  Tricuspid Valve: Not well visualized. Trace tricuspid regurgitation  Pulmonic Valve: Not visualized.  Left Atrium: Grossly normal  Right Ventricle: Not well-visualized. Grossly normal right ventricular   size and function  Right Atrium: Grossly normal  Diastolic Function: Normaldiastolic function  Pericardium/Pleura: No pericardial effusion      CONCLUSIONS:  1. Technically difficult study  2. Hyperdynamic left ventricle. Estimated EF 70%  3. Subtle systolic anterior motion of the mitral valve in the setting of   a hyperdynamic left ventricle, but no outflow tract gradient.  4. Right ventricle is not well visualized.Grossly normal right   ventricular size and function  5. No pericardial effusion    No prior exam for comparison      ROBERTA MARKS   This document has been electronically signed. Oct  6 2017  1:33PM      < end of copied text >    Stress Testing:     Cath:    Imaging:    Interpretation of Telemetry:  Not on tele    Physical Exam:  Appearance: [ ] Normal  [ ] abnormal [ ] NAD   Eyes: [ ] PERRL [ ] EOMI  HENT: [ ] Normal [ ] Abnormal oral muscosa [ ]NC/AT  Cardiovascular: [ ] S1 [ ] S2 [ ] RRR [ ] m/r/g [ ]edema [ ] JVP  Procedural Access Site: [ ]  hematoma [ ] tender to palpation [ ] 2+ pulse [ ] bruit [ ] Ecchymosis  Respiratory: [ ] Clear to auscultation bilaterally  Gastrointestinal: [ ] Soft [ ] tenderness[ ] distension [ ] BS  Musculoskeletal: [ ] clubbing [ ] joint deformity   Neurologic: [ ] Non-focal  Lymphatic: [ ] lymphadenopathy  Psychiatry: [ ] AAOx3  [ ] confused [ ] disoriented [ ] Mood & affect appropriate  Skin: [ ]  rashes [ ] ecchymoses [ ] cyanosis    Refused to be examined

## 2017-10-26 NOTE — PROGRESS NOTE ADULT - SUBJECTIVE AND OBJECTIVE BOX
Patient is a 32y old  Female who presents with a chief complaint of RLE edema and numbness (04 Oct 2017 01:13)      Patient seen in follow up for GHAZAL, ATN, Rhabdomyolysis. Improved and stable renal function trend. For OR.     PAST MEDICAL HISTORY:  Low back pain  Hypothyroi  Bipolar 1 disorder    MEDICATIONS  (STANDING):  BACItracin   Ointment 1 Application(s) Topical two times a day  buDESOnide  80 MICROgram(s)/formoterol 4.5 MICROgram(s) Inhaler 2 Puff(s) Inhalation two times a day  buPROPion XL . 300 milliGRAM(s) Oral daily  cyanocobalamin 1000 MICROGram(s) Oral daily  fentaNYL   Patch  12 MICROgram(s)/Hr 1 Patch Transdermal every 72 hours  fluticasone propionate 50 MICROgram(s)/spray Nasal Spray 1 Spray(s) Both Nostrils two times a day  folic acid 1 milliGRAM(s) Oral daily  heparin  Injectable 5000 Unit(s) SubCutaneous every 8 hours  influenza   Vaccine 0.5 milliLiter(s) IntraMuscular once  ipratropium 42 MICROgram(s) Nasal 2 Spray(s) Both Nostrils every 12 hours  lamoTRIgine 100 milliGRAM(s) Oral every 8 hours  levothyroxine 150 MICROGram(s) Oral daily  oxyCODONE  ER Tablet 60 milliGRAM(s) Oral every 8 hours  pantoprazole    Tablet 40 milliGRAM(s) Oral before breakfast  potassium chloride    Tablet ER 40 milliEquivalent(s) Oral two times a day  risperiDONE   Tablet 8 milliGRAM(s) Oral at bedtime  sertraline 50 milliGRAM(s) Oral daily  topiramate 400 milliGRAM(s) Oral two times a day  Viibryd Oral Tablet 40 milliGRAM(s) 1 Tablet(s) Oral daily    MEDICATIONS  (PRN):  acetaminophen   Tablet 650 milliGRAM(s) Oral every 6 hours PRN For Temp greater than 38 C (100.4 F)  acetaminophen   Tablet. 650 milliGRAM(s) Oral every 6 hours PRN Mild Pain (1 - 3)  ALBUTerol    90 MICROgram(s) HFA Inhaler 2 Puff(s) Inhalation every 6 hours PRN Shortness of Breath and/or Wheezing  clonazePAM Tablet 2 milliGRAM(s) Oral every 8 hours PRN anxiety  docusate sodium 100 milliGRAM(s) Oral two times a day PRN Constipation  HYDROmorphone   Tablet 2 milliGRAM(s) Oral every 4 hours PRN Severe Pain (7 - 10)  HYDROmorphone  Injectable 0.5 milliGRAM(s) IV Push every 3 hours PRN Severe Pain (7 - 10)  ondansetron Injectable 4 milliGRAM(s) IV Push every 4 hours PRN Nausea and/or Vomiting  senna 2 Tablet(s) Oral at bedtime PRN Constipation    T(C): 37.9 (10-26-17 @ 07:31), Max: 38.3 (10-24-17 @ 15:00)  HR: 107 (10-26-17 @ 07:31) (98 - 107)  BP: 97/60 (10-26-17 @ 07:31) (94/60 - 117/68)  RR: 18 (10-26-17 @ 07:31)  SpO2: 99% (10-26-17 @ 07:31)  Wt(kg): --  I&O's Detail    25 Oct 2017 07:01  -  26 Oct 2017 07:00  --------------------------------------------------------  IN:    Oral Fluid: 560 mL  Total IN: 560 mL    OUT:    Voided: 1950 mL  Total OUT: 1950 mL    Total NET: -1390 mL      26 Oct 2017 07:01  -  26 Oct 2017 12:03  --------------------------------------------------------  IN:  Total IN: 0 mL    OUT:    VAC (Vacuum Assisted Closure) System: 850 mL  Total OUT: 850 mL    Total NET: -850 mL            PHYSICAL EXAM:  General: NAD  Respiratory: b/l air entry  Cardiovascular: S1 S2  Gastrointestinal: soft  Extremities:  Rt leg dressing      LABORATORY:                        7.6    8.4   )-----------( 239      ( 26 Oct 2017 06:05 )             23.5     10-26    138  |  107  |  29<H>  ----------------------------<  93  3.8   |  22  |  1.20    Ca    8.4<L>      26 Oct 2017 06:05  Mg     1.9     10-26      Sodium, Serum: 138 mmol/L (10-26 @ 06:05)  Sodium, Serum: 138 mmol/L (10-25 @ 07:58)    Potassium, Serum: 3.8 mmol/L (10-26 @ 06:05)  Potassium, Serum: 3.3 mmol/L (10-25 @ 07:58)    Hemoglobin: 7.6 g/dL (10-26 @ 06:05)  Hemoglobin: 7.9 g/dL (10-25 @ 07:58)  Hemoglobin: 7.9 g/dL (10-24 @ 09:06)    Creatinine, Serum 1.20 (10-26 @ 06:05)  Creatinine, Serum 1.30 (10-25 @ 07:58)  Creatinine, Serum 1.30 (10-24 @ 09:06)

## 2017-10-26 NOTE — PROGRESS NOTE ADULT - PROBLEM SELECTOR PLAN 3
acute and chronic pain  currently managed with long acting opioids and short acting opioids  bowel regimen  mobilize as tolerated  wound care and skin care  overall better  pt has extensive psych and substance use disorder hx, will need a great deal of emotional and psych support

## 2017-10-26 NOTE — PROGRESS NOTE ADULT - ASSESSMENT
31 yo woman with history of chronic pain and opioid abuse, brought in by EMS after being found unresponsive for at least 8 hours after fall and injury to right thigh; underwent fasciotomy emergently for compartment syndrome    Consulted for anemia evaluation and neck LAD

## 2017-10-26 NOTE — PROGRESS NOTE ADULT - PROBLEM SELECTOR PLAN 1
-compartment syndrome of the right lower extremity   -s/p emergent fasciotomy by Vascular surgery (Bobbi)   -s/p wound vac placement f/u with vascular sx  - Pt is cleared by vascular for Plastic surgery for skin grafting, awaiting plastic surgery recommendations Dr. Arciniega   -Finished course of IV abx aztreonam and clindamycin in ICU  -monitor labs, increase activity -compartment syndrome of the right lower extremity   -s/p emergent fasciotomy by Vascular surgery (Bobbi)   -s/p wound vac placement f/u with vascular sx  - Pt is cleared by vascular for Plastic surgery for skin grafting  Dr. Arciniega   -NPO after midnight  -Finished course of IV abx aztreonam and clindamycin in ICU  -monitor labs, increase activity

## 2017-10-26 NOTE — PROGRESS NOTE ADULT - ASSESSMENT
32-year-old female now status post right lower extremity  fasciotomy for compartment syndrome. Pt on vent support.     ·	GHAZAL, ATN Septic / ischemic, Rhabdomyolysis  ·	s/p surgery for compartment syndrome  ·	Anemia  ·	Hypokalemia    Stable renal function. Improved potassium levels. Continue potassium supps.   Avoid nephrotoxic meds as possible.   Will follow electrolytes and renal function trend.   Surgery follow up. Monitor h/h. On multiple pain meds.   Pain management follow up. Surgery & ID follow up.

## 2017-10-26 NOTE — PROGRESS NOTE ADULT - ASSESSMENT
32F with opiate dependence, anxiety disorder, admitted with leg swelling   S/P fasciotomy for compartment syndrome  S/P Rhabdomyolysis  S/P obtundation/immobility due to drug overdose  GHAZAL, appears resolved  Elevate ESR. Minimally elevated.  No clear pathology to explain fevers on CT  Intermittent low grade fevers  Atelectasis with narcotics in DDx  Intermittent chemical aspiration (with nacrosis) in DDx as well  No concern of infection on exam presently  LN nonspecific  HIV negative  Narcotic dependence/addiction not helping anything here, but will not able to be addressed given fasciotomy anytime soon. While she has legitimate needs for pain, she is frequently sedated or has other negative consequences (as in this AM's interaction) related to her narcotic use.  May be intermittently aspirating, but patient comfortable on RA with no findings on exam  Hand does not appear to have fx etc.  N/V, but states loose stool therefore not obstructed.  Mild R CVAT. ?Pyelo. While it can't be disregarded, pain is a difficult barometer to use in this patient.   Flank pain a new complaint, with N/V will treat vs. pylo pending repeat cx data and clinical course.

## 2017-10-26 NOTE — PROGRESS NOTE ADULT - ASSESSMENT
32F PMH Asthma, Bipolar disorder, hypothyroidism, chronic low back pain s/p laminectomy and spinal fusion, opioid-dependent presents with right leg swelling, numbness, pallor, poikilothermia, and pulselessness, found to have ischemic leg due to acute compartment syndrome of right leg with acute rhabdomyolysis, GHAZAL 2/2 ATN, and leukocytosis with bandemia s/p emergent fasciotomy of the 4 leg compartments (anterior, lateral, superficial posterior, and deep posterior) and the lateral thigh muscles. Wound vac in place. Pain management for opioid dependence and abuse.  Low grade temperature. ID and vascular following. Anemic likely ACD and +Neck Lymph Node swelling 32F PMH Asthma, Bipolar disorder, hypothyroidism, chronic low back pain s/p laminectomy and spinal fusion, opioid-dependent presents with right leg swelling, numbness, pallor, poikilothermia, and pulselessness, found to have ischemic leg due to acute compartment syndrome of right leg with acute rhabdomyolysis, GHAZAL 2/2 ATN, and leukocytosis with bandemia s/p emergent fasciotomy of the 4 leg compartments (anterior, lateral, superficial posterior, and deep posterior) and the lateral thigh muscles. Wound vac in place. Pain management for opioid dependence and abuse.  Low grade temperature. ID and vascular following. Anemic likely ACD and +Neck Lymph Node swelling likely reactive seen by ENT.    Patient remains low-intermidiate risk for intermediate risk procedure, medically optimized for plastic surgery.

## 2017-10-26 NOTE — PROGRESS NOTE ADULT - SUBJECTIVE AND OBJECTIVE BOX
[facility], Division of Infectious Diseases  [facility]    Name: ABELARDO CROWELL  Age: 32y  Gender: Female  MRN: [id number]    Interval History--  Notes reviewed. Complains of nausea and vomiting. Still with intermittent fever, no chills. Loose stools. Denies abdominal pain. Complains of pain in right hand. No trauma. No SOB. No CP . No cough. Has R flank pain. N    Past Medical History--  Low back pain  Hypothyroid  Bipolar 1 disorder  History of spinal fusion  History of laminectomy      For details regarding the patient's social history, family history, and other miscellaneous elements, please refer the initial infectious diseases consultation and/or the admitting history and physical examination for this admission.    Allergies    avocado (Unknown)  latex (Urticaria; Rash)  penicillin (Unknown)  sulfa drugs (Unknown)    Intolerances        Medications--  Antibiotics:    Immunologic:  influenza   Vaccine 0.5 milliLiter(s) IntraMuscular once    Other:  acetaminophen   Tablet PRN  acetaminophen   Tablet. PRN  ALBUTerol    90 MICROgram(s) HFA Inhaler PRN  BACItracin   Ointment  buDESOnide  80 MICROgram(s)/formoterol 4.5 MICROgram(s) Inhaler  buPROPion XL .  clonazePAM Tablet PRN  cyanocobalamin  docusate sodium PRN  fentaNYL   Patch  12 MICROgram(s)/Hr  fluticasone propionate 50 MICROgram(s)/spray Nasal Spray  folic acid  heparin  Injectable  HYDROmorphone   Tablet PRN  HYDROmorphone  Injectable PRN  ipratropium 42 MICROgram(s) Nasal  lamoTRIgine  levothyroxine  ondansetron Injectable PRN  oxyCODONE  ER Tablet  pantoprazole    Tablet  potassium chloride    Tablet ER  risperiDONE   Tablet  senna PRN  sertraline  topiramate  Viibryd Oral Tablet 40 milliGRAM(s)      Review of Systems--  Review of systems otherwise unchanged except as previously noted.    Physical Examination--  Vital Signs: T(F): 101.2 (10-26-17 @ 16:45), Max: 101.2 (10-26-17 @ 16:45)  HR: 115 (10-26-17 @ 16:45)  BP: 106/69 (10-26-17 @ 16:45)  RR: 16 (10-26-17 @ 16:45)  SpO2: 98% (10-26-17 @ 16:45)  Wt(kg): --  General: Nontoxic-appearing Female in no acute distress.  HEENT: AT/NC. Anicteric. Conjunctiva pale and moist. Oropharynx pale but clear. Dentition fair.  Neck: Not rigid. No sense of mass.  Nodes: None palpable.  Lungs: Clear bilaterally without rales, wheezing or rhonchi  Heart: RRR. No Murmur. No rub. No gallop. No palpable thrill.  Abdomen: Bowel sounds present and normoactive. Soft. Nondistended. Minimal tenderness, diffusely, inconsistent, perhaps most in midepigastrium. No sense of mass. No organomegaly.  Back: ?mild R CVAT.  Extremities: No cyanosis or clubbing. LLE no edema. RLE wrapped. Foot warm, edematous. VAC under ACE. No IV phelbitis. R hand with ecchymosis due to IV/phlebotomy. ROM full. No redness.  Skin: Pale. Dry. Good turgor. No rash. No vasculitic stigmata.  Psychiatric: Appropriate.      Laboratory Studies--  CBC                        7.6    8.4   )-----------( 239      ( 26 Oct 2017 06:05 )             23.5       Chemistries  10-26    138  |  107  |  29<H>  ----------------------------<  93  3.8   |  22  |  1.20    Ca    8.4<L>      26 Oct 2017 06:05  Mg     1.9     10-26        Culture Data  No new culture data

## 2017-10-26 NOTE — PROGRESS NOTE ADULT - SUBJECTIVE AND OBJECTIVE BOX
INTERVAL HPI: 32y old  Female who presents with a chief complaint of RLE edema and numbness. Patient admitted for Compartment syndrome, s/p Fasciotomy, was initially in ICU, finished course of IV antibiotics and was transferred to med- surg floors. Patient with ATN likely from rhabdomyolysis with improving creatinine, followed by nephrology.   Dr. Damico consulted for pain management and concern for opoid dependence and abuse. Nurse aware of pain management plan. Renal function improving. Spoke to patient and mother at bedside. Patient is persistently asking for opioids, but she is aware they are being tapered and managed accordingly with pain management Dr. Damico.   Spoke to patient at length in regards to her opioid dependence and need to taper down her dilaudid.  Spoke to pain management doctor Jeffrey Galaviz [phone]) see chart note.    Patient having low grade temperatures. Dr. Montiel consulted, no need for antibiotics at this time. CT LE done non specific swelling in calf, no drainable fluid collection noted. Discussed with Dr. Martines. Plastic surgery to decide when plan for possible skin grafting. Patient with anemia, prior transfusion, work up initiated. 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 Discussed with Dr. Jin Heme Onc, US done, non specific, if increased in size will biopsy, monitor for now, discussed with hematology. ENT Dr. Schmitz consulted, no intervention    S: Patient      MEDICATIONS  (STANDING):  BACItracin   Ointment 1 Application(s) Topical two times a day  buDESOnide  80 MICROgram(s)/formoterol 4.5 MICROgram(s) Inhaler 2 Puff(s) Inhalation two times a day  buPROPion XL . 300 milliGRAM(s) Oral daily  cyanocobalamin 1000 MICROGram(s) Oral daily  fentaNYL   Patch  12 MICROgram(s)/Hr 1 Patch Transdermal every 72 hours  fluticasone propionate 50 MICROgram(s)/spray Nasal Spray 1 Spray(s) Both Nostrils two times a day  folic acid 1 milliGRAM(s) Oral daily  heparin  Injectable 5000 Unit(s) SubCutaneous every 8 hours  influenza   Vaccine 0.5 milliLiter(s) IntraMuscular once  ipratropium 42 MICROgram(s) Nasal 2 Spray(s) Both Nostrils every 12 hours  lamoTRIgine 100 milliGRAM(s) Oral every 8 hours  levothyroxine 150 MICROGram(s) Oral daily  oxyCODONE  ER Tablet 60 milliGRAM(s) Oral every 8 hours  pantoprazole    Tablet 40 milliGRAM(s) Oral before breakfast  potassium chloride    Tablet ER 40 milliEquivalent(s) Oral two times a day  risperiDONE   Tablet 8 milliGRAM(s) Oral at bedtime  sertraline 50 milliGRAM(s) Oral daily  topiramate 400 milliGRAM(s) Oral two times a day  Viibryd Oral Tablet 40 milliGRAM(s) 1 Tablet(s) Oral daily    MEDICATIONS  (PRN):  acetaminophen   Tablet 650 milliGRAM(s) Oral every 6 hours PRN For Temp greater than 38 C (100.4 F)  acetaminophen   Tablet. 650 milliGRAM(s) Oral every 6 hours PRN Mild Pain (1 - 3)  ALBUTerol    90 MICROgram(s) HFA Inhaler 2 Puff(s) Inhalation every 6 hours PRN Shortness of Breath and/or Wheezing  clonazePAM Tablet 2 milliGRAM(s) Oral every 8 hours PRN anxiety  docusate sodium 100 milliGRAM(s) Oral two times a day PRN Constipation  HYDROmorphone   Tablet 2 milliGRAM(s) Oral every 4 hours PRN Severe Pain (7 - 10)  HYDROmorphone  Injectable 0.5 milliGRAM(s) IV Push every 3 hours PRN Severe Pain (7 - 10)  ondansetron Injectable 4 milliGRAM(s) IV Push every 4 hours PRN Nausea and/or Vomiting  senna 2 Tablet(s) Oral at bedtime PRN Constipation            REVIEW OF SYSTEM:    Constitutional: No fever, chills, fatigue  Neuro: No headache, numbness, weakness  Resp: No cough, wheezing, shortness of breath  CVS: No chest pain, palpitations, leg swelling  GI: No abdominal pain, nausea, vomiting, diarrhea   : No dysuria, frequency, incontinence  Skin: No itching, burning, rashes, or lesions   Msk: +pain in RLE, +pain in her back  Psych: No depression, anxiety, mood swings  Skin: + Left swollen lymph node    Vital Signs Last 24 Hrs  T(C): 37.6 (25 Oct 2017 07:00), Max: 38.3 (24 Oct 2017 15:00)  T(F): 99.6 (25 Oct 2017 07:00), Max: 101 (24 Oct 2017 15:00)  HR: 106 (25 Oct 2017 07:00) (98 - 106)  BP: 94/60 (25 Oct 2017 07:00) (94/60 - 106/70)  BP(mean): --  RR: 16 (25 Oct 2017 07:00) (16 - 18)  SpO2: 98% (25 Oct 2017 07:00) (98% - 98%)    PHYSICAL EXAM:  GENERAL: NAD  HEENT: NCAT, left sided soft palpable, movable mass, likely swollen lymph node  HEART: S1S2 RRR  RESPIRATORY: CTA B/L, No wheezing / rhonchi  ABDOMEN: Soft, Nontender, Nondistended; Bowel sounds present  NEUROLOGY: A&Ox3, nonfocal, not able to move RLE, sensation diminished RLE  EXTREMITIES:  +pulses mild edema R foot, LLE is moving + pulse  Skin: warm, well perfused       LABS:                        7.9    8.5   )-----------( 234      ( 25 Oct 2017 07:58 )             24.0     10-25    138  |  107  |  32<H>  ----------------------------<  95  3.3<L>   |  20<L>  |  1.30    Ca    8.4<L>      25 Oct 2017 07:58                  Culture Results:   10,000 - 49,000 CFU/mL Coag negative Staphylococcus not Staph  saprophyticus (10.16.17 @ 09:28)    Culture Results:   No growth at 5 days. (10.16.17 @ 09:27)      10-04 @ 10:15   No growth to date.  --  --  10-04 @ 09:59   No growth  --  --    < from: CT Lower Extremity No Cont, Right (10.21.17 @ 08:36) >  IMPRESSION:   1.  Multiple skin defects including the medial thigh, anterolateral calf   and posterior medial calf with absence of the underlying subcutaneous fat   and approximation of the underlying musculature to the skin surface in   keeping with prior fasciotomies.  2.  Nonspecific enlargement of the soleus musculature.  3.  Nonspecific soft tissue swelling about the calf and ankle maybe   postoperative. Differential includes infection. No drainable fluid   collection.  4.  Calcification within the medial gastrocnemius and soleus musculature   may be postoperative or posttraumatic in etiology.    < end of copied text > INTERVAL HPI: 32y old  Female who presents with a chief complaint of RLE edema and numbness. Patient admitted for Compartment syndrome, s/p Fasciotomy, was initially in ICU, finished course of IV antibiotics and was transferred to med- surg floors. Patient with ATN likely from rhabdomyolysis with improving creatinine, followed by nephrology.   Dr. Damico consulted for pain management and concern for opoid dependence and abuse. Nurse aware of pain management plan. Renal function improving. Spoke to patient and mother at bedside. Patient is persistently asking for opioids, but she is aware they are being tapered and managed accordingly with pain management Dr. Damico.   Spoke to patient at length in regards to her opioid dependence and need to taper down her dilaudid.  Spoke to pain management doctor Jeffrey Galaviz [phone]) see chart note.    Patient having low grade temperatures. Dr. Montiel consulted, no need for antibiotics at this time. CT LE done non specific swelling in calf, no drainable fluid collection noted. Discussed with Dr. Martines. Plastic surgery to decide when plan for possible skin grafting. Patient with anemia, prior transfusion, work up initiated. 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 Discussed with Dr. Jin Heme Onc, US done, non specific, if increased in size will biopsy, monitor for now, discussed with hematology. ENT Dr. Schmitz consulted, no intervention  Patient scheduled for skin grafting with Plastic surgery, postponed for 10/27.    S: Patient seen at bedside awaiting anxiously for surgery       MEDICATIONS  (STANDING):  BACItracin   Ointment 1 Application(s) Topical two times a day  buDESOnide  80 MICROgram(s)/formoterol 4.5 MICROgram(s) Inhaler 2 Puff(s) Inhalation two times a day  buPROPion XL . 300 milliGRAM(s) Oral daily  cyanocobalamin 1000 MICROGram(s) Oral daily  fentaNYL   Patch  12 MICROgram(s)/Hr 1 Patch Transdermal every 72 hours  fluticasone propionate 50 MICROgram(s)/spray Nasal Spray 1 Spray(s) Both Nostrils two times a day  folic acid 1 milliGRAM(s) Oral daily  heparin  Injectable 5000 Unit(s) SubCutaneous every 8 hours  influenza   Vaccine 0.5 milliLiter(s) IntraMuscular once  ipratropium 42 MICROgram(s) Nasal 2 Spray(s) Both Nostrils every 12 hours  lamoTRIgine 100 milliGRAM(s) Oral every 8 hours  levothyroxine 150 MICROGram(s) Oral daily  oxyCODONE  ER Tablet 60 milliGRAM(s) Oral every 8 hours  pantoprazole    Tablet 40 milliGRAM(s) Oral before breakfast  potassium chloride    Tablet ER 40 milliEquivalent(s) Oral two times a day  risperiDONE   Tablet 8 milliGRAM(s) Oral at bedtime  sertraline 50 milliGRAM(s) Oral daily  topiramate 400 milliGRAM(s) Oral two times a day  Viibryd Oral Tablet 40 milliGRAM(s) 1 Tablet(s) Oral daily    MEDICATIONS  (PRN):  acetaminophen   Tablet 650 milliGRAM(s) Oral every 6 hours PRN For Temp greater than 38 C (100.4 F)  acetaminophen   Tablet. 650 milliGRAM(s) Oral every 6 hours PRN Mild Pain (1 - 3)  ALBUTerol    90 MICROgram(s) HFA Inhaler 2 Puff(s) Inhalation every 6 hours PRN Shortness of Breath and/or Wheezing  clonazePAM Tablet 2 milliGRAM(s) Oral every 8 hours PRN anxiety  docusate sodium 100 milliGRAM(s) Oral two times a day PRN Constipation  HYDROmorphone   Tablet 2 milliGRAM(s) Oral every 4 hours PRN Severe Pain (7 - 10)  HYDROmorphone  Injectable 0.5 milliGRAM(s) IV Push every 3 hours PRN Severe Pain (7 - 10)  ondansetron Injectable 4 milliGRAM(s) IV Push every 4 hours PRN Nausea and/or Vomiting  senna 2 Tablet(s) Oral at bedtime PRN Constipation            REVIEW OF SYSTEM:    Constitutional: No fever, chills, fatigue  Neuro: No headache, numbness, weakness  Resp: No cough, wheezing, shortness of breath  CVS: No chest pain, palpitations, leg swelling  GI: No abdominal pain, nausea, vomiting, diarrhea   : No dysuria, frequency, incontinence  Skin: No itching, burning, rashes, or lesions   Msk: +pain in RLE, +pain in her back  Psych: No depression, anxiety, mood swings  Skin: + Left swollen lymph node    Vital Signs Last 24 Hrs  T(C): 37.6 (25 Oct 2017 07:00), Max: 38.3 (24 Oct 2017 15:00)  T(F): 99.6 (25 Oct 2017 07:00), Max: 101 (24 Oct 2017 15:00)  HR: 106 (25 Oct 2017 07:00) (98 - 106)  BP: 94/60 (25 Oct 2017 07:00) (94/60 - 106/70)  BP(mean): --  RR: 16 (25 Oct 2017 07:00) (16 - 18)  SpO2: 98% (25 Oct 2017 07:00) (98% - 98%)    PHYSICAL EXAM:  GENERAL: NAD  HEENT: NCAT, left sided soft palpable, movable mass, likely swollen lymph node  HEART: S1S2 RRR  RESPIRATORY: CTA B/L, No wheezing / rhonchi  ABDOMEN: Soft, Nontender, Nondistended; Bowel sounds present  NEUROLOGY: A&Ox3, nonfocal, not able to move RLE, sensation diminished RLE  EXTREMITIES:  +pulses mild edema R foot, LLE is moving + pulse  Skin: warm, well perfused       LABS:                        7.9    8.5   )-----------( 234      ( 25 Oct 2017 07:58 )             24.0     10-25    138  |  107  |  32<H>  ----------------------------<  95  3.3<L>   |  20<L>  |  1.30    Ca    8.4<L>      25 Oct 2017 07:58          ECG 10/26: NSR low voltage, LAD, rate approx 100. No TWI or STW noted        Culture Results:   10,000 - 49,000 CFU/mL Coag negative Staphylococcus not Staph  saprophyticus (10.16.17 @ 09:28)    Culture Results:   No growth at 5 days. (10.16.17 @ 09:27)      10-04 @ 10:15   No growth to date.  --  --  10-04 @ 09:59   No growth  --  --    < from: CT Lower Extremity No Cont, Right (10.21.17 @ 08:36) >  IMPRESSION:   1.  Multiple skin defects including the medial thigh, anterolateral calf   and posterior medial calf with absence of the underlying subcutaneous fat   and approximation of the underlying musculature to the skin surface in   keeping with prior fasciotomies.  2.  Nonspecific enlargement of the soleus musculature.  3.  Nonspecific soft tissue swelling about the calf and ankle maybe   postoperative. Differential includes infection. No drainable fluid   collection.  4.  Calcification within the medial gastrocnemius and soleus musculature   may be postoperative or posttraumatic in etiology.    < end of copied text >

## 2017-10-26 NOTE — PROGRESS NOTE ADULT - SUBJECTIVE AND OBJECTIVE BOX
Date/Time Patient Seen:  		  Referring MD:   Data Reviewed	       Patient is a 32y old  Female who presents with a chief complaint of RLE edema and numbness (13 Oct 2017 16:25)  in bed  seen and examined  vs and meds reviewed      Subjective/HPI     PAST MEDICAL & SURGICAL HISTORY:  Low back pain  Hypothyroid  Bipolar 1 disorder  History of spinal fusion  History of laminectomy        Medication list         MEDICATIONS  (STANDING):  BACItracin   Ointment 1 Application(s) Topical two times a day  buDESOnide  80 MICROgram(s)/formoterol 4.5 MICROgram(s) Inhaler 2 Puff(s) Inhalation two times a day  buPROPion XL . 300 milliGRAM(s) Oral daily  cyanocobalamin 1000 MICROGram(s) Oral daily  fentaNYL   Patch  12 MICROgram(s)/Hr 1 Patch Transdermal every 72 hours  fluticasone propionate 50 MICROgram(s)/spray Nasal Spray 1 Spray(s) Both Nostrils two times a day  folic acid 1 milliGRAM(s) Oral daily  heparin  Injectable 5000 Unit(s) SubCutaneous every 8 hours  influenza   Vaccine 0.5 milliLiter(s) IntraMuscular once  ipratropium 42 MICROgram(s) Nasal 2 Spray(s) Both Nostrils every 12 hours  lamoTRIgine 100 milliGRAM(s) Oral every 8 hours  levothyroxine 150 MICROGram(s) Oral daily  oxyCODONE  ER Tablet 60 milliGRAM(s) Oral every 8 hours  pantoprazole    Tablet 40 milliGRAM(s) Oral before breakfast  potassium chloride    Tablet ER 40 milliEquivalent(s) Oral two times a day  risperiDONE   Tablet 8 milliGRAM(s) Oral at bedtime  sertraline 50 milliGRAM(s) Oral daily  topiramate 400 milliGRAM(s) Oral two times a day  Viibryd Oral Tablet 40 milliGRAM(s) 1 Tablet(s) Oral daily    MEDICATIONS  (PRN):  acetaminophen   Tablet 650 milliGRAM(s) Oral every 6 hours PRN For Temp greater than 38 C (100.4 F)  acetaminophen   Tablet. 650 milliGRAM(s) Oral every 6 hours PRN Mild Pain (1 - 3)  ALBUTerol    90 MICROgram(s) HFA Inhaler 2 Puff(s) Inhalation every 6 hours PRN Shortness of Breath and/or Wheezing  clonazePAM Tablet 2 milliGRAM(s) Oral every 8 hours PRN anxiety  docusate sodium 100 milliGRAM(s) Oral two times a day PRN Constipation  HYDROmorphone   Tablet 2 milliGRAM(s) Oral every 4 hours PRN Severe Pain (7 - 10)  HYDROmorphone  Injectable 0.5 milliGRAM(s) IV Push every 3 hours PRN Severe Pain (7 - 10)  ondansetron Injectable 4 milliGRAM(s) IV Push every 4 hours PRN Nausea and/or Vomiting  senna 2 Tablet(s) Oral at bedtime PRN Constipation         Vitals log        ICU Vital Signs Last 24 Hrs  T(C): 37.3 (26 Oct 2017 00:58), Max: 37.6 (25 Oct 2017 07:00)  T(F): 99.2 (26 Oct 2017 00:58), Max: 99.7 (25 Oct 2017 16:15)  HR: 106 (26 Oct 2017 00:58) (106 - 106)  BP: 117/68 (26 Oct 2017 00:58) (94/60 - 117/68)  BP(mean): --  ABP: --  ABP(mean): --  RR: 16 (26 Oct 2017 00:58) (16 - 16)  SpO2: 97% (26 Oct 2017 00:58) (97% - 99%)           Input and Output:  I&O's Detail    25 Oct 2017 07:01  -  26 Oct 2017 06:15  --------------------------------------------------------  IN:    Oral Fluid: 560 mL  Total IN: 560 mL    OUT:    Voided: 1950 mL  Total OUT: 1950 mL    Total NET: -1390 mL          Lab Data                        7.9    8.5   )-----------( 234      ( 25 Oct 2017 07:58 )             24.0     10-25    138  |  107  |  32<H>  ----------------------------<  95  3.3<L>   |  20<L>  |  1.30    Ca    8.4<L>      25 Oct 2017 07:58              Review of Systems	      Objective     Physical Examination    head at  heart - s1s2  lungs - dec BS  abd - soft  cn grossly int      Pertinent Lab findings & Imaging      Coffey:  NO   Adequate UO     I&O's Detail    25 Oct 2017 07:01  -  26 Oct 2017 06:15  --------------------------------------------------------  IN:    Oral Fluid: 560 mL  Total IN: 560 mL    OUT:    Voided: 1950 mL  Total OUT: 1950 mL    Total NET: -1390 mL               Discussed with:     Cultures:	        Radiology

## 2017-10-26 NOTE — PROGRESS NOTE ADULT - PROBLEM SELECTOR PLAN 1
- anemia multifactorial with low normal B12 and folate level; iron studies c/w chronic disease; also had renal insufficiency 2/2 rhabdo.   - started  po B12 supplement and po folic acid daily  - Hb is stable   - hold transfusion unless Hg <7; likely predominant cause of anemia is wound/inflammation  can give 1 unit if needed for surgery

## 2017-10-26 NOTE — PROGRESS NOTE ADULT - SUBJECTIVE AND OBJECTIVE BOX
Interval History: remains anxious    Chart reviewed and events noted;   Overnight events:    MEDICATIONS  (STANDING):  BACItracin   Ointment 1 Application(s) Topical two times a day  buDESOnide  80 MICROgram(s)/formoterol 4.5 MICROgram(s) Inhaler 2 Puff(s) Inhalation two times a day  buPROPion XL . 300 milliGRAM(s) Oral daily  cyanocobalamin 1000 MICROGram(s) Oral daily  fentaNYL   Patch  12 MICROgram(s)/Hr 1 Patch Transdermal every 72 hours  fluticasone propionate 50 MICROgram(s)/spray Nasal Spray 1 Spray(s) Both Nostrils two times a day  folic acid 1 milliGRAM(s) Oral daily  heparin  Injectable 5000 Unit(s) SubCutaneous every 8 hours  influenza   Vaccine 0.5 milliLiter(s) IntraMuscular once  ipratropium 42 MICROgram(s) Nasal 2 Spray(s) Both Nostrils every 12 hours  lamoTRIgine 100 milliGRAM(s) Oral every 8 hours  levothyroxine 150 MICROGram(s) Oral daily  oxyCODONE  ER Tablet 60 milliGRAM(s) Oral every 8 hours  pantoprazole    Tablet 40 milliGRAM(s) Oral before breakfast  potassium chloride    Tablet ER 40 milliEquivalent(s) Oral two times a day  risperiDONE   Tablet 8 milliGRAM(s) Oral at bedtime  sertraline 50 milliGRAM(s) Oral daily  topiramate 400 milliGRAM(s) Oral two times a day  Viibryd Oral Tablet 40 milliGRAM(s) 1 Tablet(s) Oral daily    MEDICATIONS  (PRN):  acetaminophen   Tablet 650 milliGRAM(s) Oral every 6 hours PRN For Temp greater than 38 C (100.4 F)  acetaminophen   Tablet. 650 milliGRAM(s) Oral every 6 hours PRN Mild Pain (1 - 3)  ALBUTerol    90 MICROgram(s) HFA Inhaler 2 Puff(s) Inhalation every 6 hours PRN Shortness of Breath and/or Wheezing  clonazePAM Tablet 2 milliGRAM(s) Oral every 8 hours PRN anxiety  docusate sodium 100 milliGRAM(s) Oral two times a day PRN Constipation  HYDROmorphone   Tablet 2 milliGRAM(s) Oral every 4 hours PRN Severe Pain (7 - 10)  HYDROmorphone  Injectable 0.5 milliGRAM(s) IV Push every 3 hours PRN Severe Pain (7 - 10)  ondansetron Injectable 4 milliGRAM(s) IV Push every 4 hours PRN Nausea and/or Vomiting  senna 2 Tablet(s) Oral at bedtime PRN Constipation      Vital Signs Last 24 Hrs  T(C): 38.4 (26 Oct 2017 16:45), Max: 38.4 (26 Oct 2017 16:45)  T(F): 101.2 (26 Oct 2017 16:45), Max: 101.2 (26 Oct 2017 16:45)  HR: 115 (26 Oct 2017 16:45) (106 - 115)  BP: 106/69 (26 Oct 2017 16:45) (97/60 - 117/68)  BP(mean): --  RR: 16 (26 Oct 2017 16:45) (16 - 18)  SpO2: 98% (26 Oct 2017 16:45) (97% - 99%)    PHYSICAL EXAM  General: adult in NAD  HEENT: clear oropharynx, anicteric sclera, pink conjunctivae  Neck: supple  CV: normal S1S2 with no murmur rubs or gallops  Lungs: clear to auscultation, no wheezes, no rhales  Abdomen: soft non-tender non-distended, no hepato/splenomegaly  Ext: bandage and wound vac in place  Skin: no rashes and no petichiae  Neuro: alert and oriented X3 no focal deficits      LABS:  CBC Full  -  ( 26 Oct 2017 06:05 )  WBC Count : 8.4 K/uL  Hemoglobin : 7.6 g/dL  Hematocrit : 23.5 %  Platelet Count - Automated : 239 K/uL  Mean Cell Volume : 88.9 fl  Mean Cell Hemoglobin : 28.9 pg  Mean Cell Hemoglobin Concentration : 32.5 gm/dL  Auto Neutrophil # : x  Auto Lymphocyte # : x  Auto Monocyte # : x  Auto Eosinophil # : x  Auto Basophil # : x  Auto Neutrophil % : x  Auto Lymphocyte % : x  Auto Monocyte % : x  Auto Eosinophil % : x  Auto Basophil % : x    10-26    138  |  107  |  29<H>  ----------------------------<  93  3.8   |  22  |  1.20    Ca    8.4<L>      26 Oct 2017 06:05  Mg     1.9     10-26          fe studies  Ferritin, Serum: 357.0 ng/mL (10-22 @ 16:10)  Iron - Total Binding Capacity.: 167 ug/dL (10-22 @ 16:10)      WBC trend  8.4 K/uL (10-26-17 @ 06:05)  8.5 K/uL (10-25-17 @ 07:58)  9.9 K/uL (10-24-17 @ 09:06)      Hgb trend  7.6 g/dL (10-26-17 @ 06:05)  7.9 g/dL (10-25-17 @ 07:58)  7.9 g/dL (10-24-17 @ 09:06)      plt trend  239 K/uL (10-26-17 @ 06:05)  234 K/uL (10-25-17 @ 07:58)  215 K/uL (10-24-17 @ 09:06)        RADIOLOGY & ADDITIONAL STUDIES:

## 2017-10-26 NOTE — PROGRESS NOTE ADULT - ASSESSMENT
32-year-old female now status post right lower extremity fasciotomy for prolonged compression after a fall with lethargy.     - Rhabdo and GHAZAL resolved.   Avoid nephrotoxics  - No obvious cardiac complications post fasciotomy  - No clear evidence of acute ischemia or volume overload  - HR overall improved, however, slightly tachycardic per flowsheet likely due to low-grade fever or pain  - Continue Pain control  - TTE with hyperdynamic LV function, EF 70%, no valvular disease noted  - Encourage PO hydration  - Other cardiovascular workup will depend on clinical course.  - Completed ABX as per ID following   - Monitor and replete electrolytes. Keep K>4.0 and Mg>2.0.   - Vac dressing intact, post fasciotomy, still draining considerable drainage  - All other workup per primary team  - Will follow      Kathleen Fernandes NP  Cardiology

## 2017-10-27 ENCOUNTER — RESULT REVIEW (OUTPATIENT)
Age: 32
End: 2017-10-27

## 2017-10-27 LAB
ANION GAP SERPL CALC-SCNC: 10 MMOL/L — SIGNIFICANT CHANGE UP (ref 5–17)
APPEARANCE UR: CLEAR — SIGNIFICANT CHANGE UP
BACTERIA # UR AUTO: ABNORMAL
BILIRUB UR-MCNC: NEGATIVE — SIGNIFICANT CHANGE UP
BUN SERPL-MCNC: 20 MG/DL — SIGNIFICANT CHANGE UP (ref 7–23)
CALCIUM SERPL-MCNC: 8.5 MG/DL — SIGNIFICANT CHANGE UP (ref 8.5–10.1)
CHLORIDE SERPL-SCNC: 110 MMOL/L — HIGH (ref 96–108)
CO2 SERPL-SCNC: 19 MMOL/L — LOW (ref 22–31)
COLOR SPEC: YELLOW — SIGNIFICANT CHANGE UP
CREAT SERPL-MCNC: 1.2 MG/DL — SIGNIFICANT CHANGE UP (ref 0.5–1.3)
DIFF PNL FLD: NEGATIVE — SIGNIFICANT CHANGE UP
EPI CELLS # UR: ABNORMAL
GLUCOSE SERPL-MCNC: 93 MG/DL — SIGNIFICANT CHANGE UP (ref 70–99)
GLUCOSE UR QL: NEGATIVE — SIGNIFICANT CHANGE UP
HCG UR QL: NEGATIVE — SIGNIFICANT CHANGE UP
HCT VFR BLD CALC: 23.9 % — LOW (ref 34.5–45)
HGB BLD-MCNC: 7.7 G/DL — LOW (ref 11.5–15.5)
KETONES UR-MCNC: NEGATIVE — SIGNIFICANT CHANGE UP
LEUKOCYTE ESTERASE UR-ACNC: ABNORMAL
MCHC RBC-ENTMCNC: 28.7 PG — SIGNIFICANT CHANGE UP (ref 27–34)
MCHC RBC-ENTMCNC: 32.2 GM/DL — SIGNIFICANT CHANGE UP (ref 32–36)
MCV RBC AUTO: 89.2 FL — SIGNIFICANT CHANGE UP (ref 80–100)
NITRITE UR-MCNC: NEGATIVE — SIGNIFICANT CHANGE UP
PH UR: 5 — SIGNIFICANT CHANGE UP (ref 5–8)
PLATELET # BLD AUTO: 247 K/UL — SIGNIFICANT CHANGE UP (ref 150–400)
POTASSIUM SERPL-MCNC: 4.1 MMOL/L — SIGNIFICANT CHANGE UP (ref 3.5–5.3)
POTASSIUM SERPL-SCNC: 4.1 MMOL/L — SIGNIFICANT CHANGE UP (ref 3.5–5.3)
PROT UR-MCNC: NEGATIVE — SIGNIFICANT CHANGE UP
RBC # BLD: 2.68 M/UL — LOW (ref 3.8–5.2)
RBC # FLD: 12.5 % — SIGNIFICANT CHANGE UP (ref 10.3–14.5)
RBC CASTS # UR COMP ASSIST: ABNORMAL /HPF (ref 0–4)
SODIUM SERPL-SCNC: 139 MMOL/L — SIGNIFICANT CHANGE UP (ref 135–145)
SP GR SPEC: 1.01 — SIGNIFICANT CHANGE UP (ref 1.01–1.02)
UROBILINOGEN FLD QL: NEGATIVE — SIGNIFICANT CHANGE UP
WBC # BLD: 6.8 K/UL — SIGNIFICANT CHANGE UP (ref 3.8–10.5)
WBC # FLD AUTO: 6.8 K/UL — SIGNIFICANT CHANGE UP (ref 3.8–10.5)
WBC UR QL: ABNORMAL

## 2017-10-27 PROCEDURE — 88304 TISSUE EXAM BY PATHOLOGIST: CPT | Mod: 26

## 2017-10-27 PROCEDURE — 99233 SBSQ HOSP IP/OBS HIGH 50: CPT

## 2017-10-27 PROCEDURE — 99232 SBSQ HOSP IP/OBS MODERATE 35: CPT

## 2017-10-27 RX ORDER — TOPIRAMATE 25 MG
400 TABLET ORAL
Qty: 0 | Refills: 0 | Status: DISCONTINUED | OUTPATIENT
Start: 2017-10-27 | End: 2017-11-03

## 2017-10-27 RX ORDER — CEFEPIME 1 G/1
1000 INJECTION, POWDER, FOR SOLUTION INTRAMUSCULAR; INTRAVENOUS ONCE
Qty: 0 | Refills: 0 | Status: COMPLETED | OUTPATIENT
Start: 2017-10-27 | End: 2017-10-27

## 2017-10-27 RX ORDER — CEFEPIME 1 G/1
INJECTION, POWDER, FOR SOLUTION INTRAMUSCULAR; INTRAVENOUS
Qty: 0 | Refills: 0 | Status: COMPLETED | OUTPATIENT
Start: 2017-10-27 | End: 2017-11-01

## 2017-10-27 RX ORDER — RISPERIDONE 4 MG/1
8 TABLET ORAL AT BEDTIME
Qty: 0 | Refills: 0 | Status: DISCONTINUED | OUTPATIENT
Start: 2017-10-27 | End: 2017-11-03

## 2017-10-27 RX ORDER — PREGABALIN 225 MG/1
1000 CAPSULE ORAL DAILY
Qty: 0 | Refills: 0 | Status: DISCONTINUED | OUTPATIENT
Start: 2017-10-27 | End: 2017-11-03

## 2017-10-27 RX ORDER — IPRATROPIUM BROMIDE 21 MCG
2 AEROSOL, SPRAY (ML) NASAL EVERY 12 HOURS
Qty: 0 | Refills: 0 | Status: DISCONTINUED | OUTPATIENT
Start: 2017-10-27 | End: 2017-11-03

## 2017-10-27 RX ORDER — FOLIC ACID 0.8 MG
1 TABLET ORAL DAILY
Qty: 0 | Refills: 0 | Status: DISCONTINUED | OUTPATIENT
Start: 2017-10-27 | End: 2017-11-03

## 2017-10-27 RX ORDER — LEVOTHYROXINE SODIUM 125 MCG
150 TABLET ORAL DAILY
Qty: 0 | Refills: 0 | Status: DISCONTINUED | OUTPATIENT
Start: 2017-10-27 | End: 2017-11-03

## 2017-10-27 RX ORDER — OXYCODONE AND ACETAMINOPHEN 5; 325 MG/1; MG/1
1 TABLET ORAL EVERY 4 HOURS
Qty: 0 | Refills: 0 | Status: DISCONTINUED | OUTPATIENT
Start: 2017-10-27 | End: 2017-10-27

## 2017-10-27 RX ORDER — ONDANSETRON 8 MG/1
4 TABLET, FILM COATED ORAL ONCE
Qty: 0 | Refills: 0 | Status: COMPLETED | OUTPATIENT
Start: 2017-10-27 | End: 2017-10-27

## 2017-10-27 RX ORDER — HYDROMORPHONE HYDROCHLORIDE 2 MG/ML
2 INJECTION INTRAMUSCULAR; INTRAVENOUS; SUBCUTANEOUS EVERY 4 HOURS
Qty: 0 | Refills: 0 | Status: DISCONTINUED | OUTPATIENT
Start: 2017-10-27 | End: 2017-10-28

## 2017-10-27 RX ORDER — CEFEPIME 1 G/1
1000 INJECTION, POWDER, FOR SOLUTION INTRAMUSCULAR; INTRAVENOUS EVERY 8 HOURS
Qty: 0 | Refills: 0 | Status: DISCONTINUED | OUTPATIENT
Start: 2017-10-27 | End: 2017-10-27

## 2017-10-27 RX ORDER — ONDANSETRON 8 MG/1
4 TABLET, FILM COATED ORAL EVERY 4 HOURS
Qty: 0 | Refills: 0 | Status: DISCONTINUED | OUTPATIENT
Start: 2017-10-27 | End: 2017-11-03

## 2017-10-27 RX ORDER — ASCORBIC ACID 60 MG
500 TABLET,CHEWABLE ORAL
Qty: 0 | Refills: 0 | Status: DISCONTINUED | OUTPATIENT
Start: 2017-10-27 | End: 2017-11-03

## 2017-10-27 RX ORDER — OXYCODONE HYDROCHLORIDE 5 MG/1
60 TABLET ORAL EVERY 8 HOURS
Qty: 0 | Refills: 0 | Status: DISCONTINUED | OUTPATIENT
Start: 2017-10-27 | End: 2017-11-03

## 2017-10-27 RX ORDER — FENTANYL CITRATE 50 UG/ML
1 INJECTION INTRAVENOUS
Qty: 0 | Refills: 0 | Status: DISCONTINUED | OUTPATIENT
Start: 2017-10-27 | End: 2017-11-01

## 2017-10-27 RX ORDER — VANCOMYCIN HCL 1 G
1750 VIAL (EA) INTRAVENOUS EVERY 12 HOURS
Qty: 0 | Refills: 0 | Status: DISCONTINUED | OUTPATIENT
Start: 2017-10-27 | End: 2017-10-29

## 2017-10-27 RX ORDER — SODIUM CHLORIDE 9 MG/ML
1000 INJECTION INTRAMUSCULAR; INTRAVENOUS; SUBCUTANEOUS
Qty: 0 | Refills: 0 | Status: DISCONTINUED | OUTPATIENT
Start: 2017-10-27 | End: 2017-10-27

## 2017-10-27 RX ORDER — BACITRACIN ZINC 500 UNIT/G
1 OINTMENT IN PACKET (EA) TOPICAL
Qty: 0 | Refills: 0 | Status: DISCONTINUED | OUTPATIENT
Start: 2017-10-27 | End: 2017-11-03

## 2017-10-27 RX ORDER — FLUTICASONE PROPIONATE 50 MCG
1 SPRAY, SUSPENSION NASAL
Qty: 0 | Refills: 0 | Status: DISCONTINUED | OUTPATIENT
Start: 2017-10-27 | End: 2017-11-03

## 2017-10-27 RX ORDER — SENNA PLUS 8.6 MG/1
2 TABLET ORAL AT BEDTIME
Qty: 0 | Refills: 0 | Status: DISCONTINUED | OUTPATIENT
Start: 2017-10-27 | End: 2017-11-03

## 2017-10-27 RX ORDER — ACETAMINOPHEN 500 MG
650 TABLET ORAL EVERY 6 HOURS
Qty: 0 | Refills: 0 | Status: DISCONTINUED | OUTPATIENT
Start: 2017-10-27 | End: 2017-11-03

## 2017-10-27 RX ORDER — HYDROMORPHONE HYDROCHLORIDE 2 MG/ML
0.5 INJECTION INTRAMUSCULAR; INTRAVENOUS; SUBCUTANEOUS
Qty: 0 | Refills: 0 | Status: DISCONTINUED | OUTPATIENT
Start: 2017-11-03 | End: 2017-11-03

## 2017-10-27 RX ORDER — MULTIVIT-MIN/FERROUS GLUCONATE 9 MG/15 ML
1 LIQUID (ML) ORAL DAILY
Qty: 0 | Refills: 0 | Status: DISCONTINUED | OUTPATIENT
Start: 2017-10-27 | End: 2017-11-03

## 2017-10-27 RX ORDER — LAMOTRIGINE 25 MG/1
100 TABLET, ORALLY DISINTEGRATING ORAL EVERY 8 HOURS
Qty: 0 | Refills: 0 | Status: DISCONTINUED | OUTPATIENT
Start: 2017-10-27 | End: 2017-11-03

## 2017-10-27 RX ORDER — HEPARIN SODIUM 5000 [USP'U]/ML
5000 INJECTION INTRAVENOUS; SUBCUTANEOUS EVERY 8 HOURS
Qty: 0 | Refills: 0 | Status: DISCONTINUED | OUTPATIENT
Start: 2017-10-28 | End: 2017-11-02

## 2017-10-27 RX ORDER — BUDESONIDE AND FORMOTEROL FUMARATE DIHYDRATE 160; 4.5 UG/1; UG/1
2 AEROSOL RESPIRATORY (INHALATION)
Qty: 0 | Refills: 0 | Status: DISCONTINUED | OUTPATIENT
Start: 2017-10-27 | End: 2017-11-03

## 2017-10-27 RX ORDER — BUPROPION HYDROCHLORIDE 150 MG/1
300 TABLET, EXTENDED RELEASE ORAL DAILY
Qty: 0 | Refills: 0 | Status: DISCONTINUED | OUTPATIENT
Start: 2017-10-27 | End: 2017-11-03

## 2017-10-27 RX ORDER — HYDROMORPHONE HYDROCHLORIDE 2 MG/ML
1 INJECTION INTRAMUSCULAR; INTRAVENOUS; SUBCUTANEOUS EVERY 4 HOURS
Qty: 0 | Refills: 0 | Status: DISCONTINUED | OUTPATIENT
Start: 2017-10-27 | End: 2017-10-28

## 2017-10-27 RX ORDER — CEFEPIME 1 G/1
1000 INJECTION, POWDER, FOR SOLUTION INTRAMUSCULAR; INTRAVENOUS EVERY 8 HOURS
Qty: 0 | Refills: 0 | Status: COMPLETED | OUTPATIENT
Start: 2017-10-28 | End: 2017-11-01

## 2017-10-27 RX ORDER — OXYCODONE HYDROCHLORIDE 5 MG/1
10 TABLET ORAL EVERY 6 HOURS
Qty: 0 | Refills: 0 | Status: DISCONTINUED | OUTPATIENT
Start: 2017-10-27 | End: 2017-10-27

## 2017-10-27 RX ORDER — SERTRALINE 25 MG/1
50 TABLET, FILM COATED ORAL DAILY
Qty: 0 | Refills: 0 | Status: DISCONTINUED | OUTPATIENT
Start: 2017-10-27 | End: 2017-11-03

## 2017-10-27 RX ORDER — ALBUTEROL 90 UG/1
2 AEROSOL, METERED ORAL EVERY 6 HOURS
Qty: 0 | Refills: 0 | Status: DISCONTINUED | OUTPATIENT
Start: 2017-10-27 | End: 2017-11-03

## 2017-10-27 RX ORDER — PANTOPRAZOLE SODIUM 20 MG/1
40 TABLET, DELAYED RELEASE ORAL
Qty: 0 | Refills: 0 | Status: DISCONTINUED | OUTPATIENT
Start: 2017-10-27 | End: 2017-11-03

## 2017-10-27 RX ORDER — SODIUM CHLORIDE 9 MG/ML
1000 INJECTION, SOLUTION INTRAVENOUS
Qty: 0 | Refills: 0 | Status: DISCONTINUED | OUTPATIENT
Start: 2017-10-27 | End: 2017-10-27

## 2017-10-27 RX ORDER — HYDROMORPHONE HYDROCHLORIDE 2 MG/ML
0.5 INJECTION INTRAMUSCULAR; INTRAVENOUS; SUBCUTANEOUS
Qty: 0 | Refills: 0 | Status: DISCONTINUED | OUTPATIENT
Start: 2017-10-27 | End: 2017-10-27

## 2017-10-27 RX ORDER — POTASSIUM CHLORIDE 20 MEQ
40 PACKET (EA) ORAL
Qty: 0 | Refills: 0 | Status: DISCONTINUED | OUTPATIENT
Start: 2017-10-27 | End: 2017-10-30

## 2017-10-27 RX ORDER — ONDANSETRON 8 MG/1
4 TABLET, FILM COATED ORAL ONCE
Qty: 0 | Refills: 0 | Status: DISCONTINUED | OUTPATIENT
Start: 2017-10-27 | End: 2017-10-27

## 2017-10-27 RX ORDER — DOCUSATE SODIUM 100 MG
100 CAPSULE ORAL
Qty: 0 | Refills: 0 | Status: DISCONTINUED | OUTPATIENT
Start: 2017-10-27 | End: 2017-11-03

## 2017-10-27 RX ORDER — HYDROMORPHONE HYDROCHLORIDE 2 MG/ML
1 INJECTION INTRAMUSCULAR; INTRAVENOUS; SUBCUTANEOUS
Qty: 0 | Refills: 0 | Status: DISCONTINUED | OUTPATIENT
Start: 2017-10-27 | End: 2017-10-27

## 2017-10-27 RX ORDER — CLONAZEPAM 1 MG
2 TABLET ORAL EVERY 8 HOURS
Qty: 0 | Refills: 0 | Status: DISCONTINUED | OUTPATIENT
Start: 2017-10-27 | End: 2017-11-03

## 2017-10-27 RX ADMIN — HYDROMORPHONE HYDROCHLORIDE 0.5 MILLIGRAM(S): 2 INJECTION INTRAMUSCULAR; INTRAVENOUS; SUBCUTANEOUS at 07:15

## 2017-10-27 RX ADMIN — BUPROPION HYDROCHLORIDE 300 MILLIGRAM(S): 150 TABLET, EXTENDED RELEASE ORAL at 11:41

## 2017-10-27 RX ADMIN — OXYCODONE HYDROCHLORIDE 60 MILLIGRAM(S): 5 TABLET ORAL at 22:09

## 2017-10-27 RX ADMIN — SODIUM CHLORIDE 125 MILLILITER(S): 9 INJECTION, SOLUTION INTRAVENOUS at 18:31

## 2017-10-27 RX ADMIN — CEFEPIME 100 MILLIGRAM(S): 1 INJECTION, POWDER, FOR SOLUTION INTRAMUSCULAR; INTRAVENOUS at 05:52

## 2017-10-27 RX ADMIN — OXYCODONE HYDROCHLORIDE 60 MILLIGRAM(S): 5 TABLET ORAL at 21:31

## 2017-10-27 RX ADMIN — Medication 400 MILLIGRAM(S): at 05:50

## 2017-10-27 RX ADMIN — HYDROMORPHONE HYDROCHLORIDE 0.5 MILLIGRAM(S): 2 INJECTION INTRAMUSCULAR; INTRAVENOUS; SUBCUTANEOUS at 10:15

## 2017-10-27 RX ADMIN — PANTOPRAZOLE SODIUM 40 MILLIGRAM(S): 20 TABLET, DELAYED RELEASE ORAL at 05:51

## 2017-10-27 RX ADMIN — ONDANSETRON 4 MILLIGRAM(S): 8 TABLET, FILM COATED ORAL at 19:10

## 2017-10-27 RX ADMIN — ONDANSETRON 4 MILLIGRAM(S): 8 TABLET, FILM COATED ORAL at 06:02

## 2017-10-27 RX ADMIN — Medication 250 MILLIGRAM(S): at 09:46

## 2017-10-27 RX ADMIN — HYDROMORPHONE HYDROCHLORIDE 0.5 MILLIGRAM(S): 2 INJECTION INTRAMUSCULAR; INTRAVENOUS; SUBCUTANEOUS at 02:30

## 2017-10-27 RX ADMIN — Medication 2 SPRAY(S): at 05:49

## 2017-10-27 RX ADMIN — Medication 150 MICROGRAM(S): at 05:51

## 2017-10-27 RX ADMIN — HYDROMORPHONE HYDROCHLORIDE 1 MILLIGRAM(S): 2 INJECTION INTRAMUSCULAR; INTRAVENOUS; SUBCUTANEOUS at 18:34

## 2017-10-27 RX ADMIN — ONDANSETRON 4 MILLIGRAM(S): 8 TABLET, FILM COATED ORAL at 02:07

## 2017-10-27 RX ADMIN — CEFEPIME 100 MILLIGRAM(S): 1 INJECTION, POWDER, FOR SOLUTION INTRAMUSCULAR; INTRAVENOUS at 21:20

## 2017-10-27 RX ADMIN — RISPERIDONE 8 MILLIGRAM(S): 4 TABLET ORAL at 21:32

## 2017-10-27 RX ADMIN — LAMOTRIGINE 100 MILLIGRAM(S): 25 TABLET, ORALLY DISINTEGRATING ORAL at 05:51

## 2017-10-27 RX ADMIN — PREGABALIN 1000 MICROGRAM(S): 225 CAPSULE ORAL at 11:41

## 2017-10-27 RX ADMIN — HYDROMORPHONE HYDROCHLORIDE 0.5 MILLIGRAM(S): 2 INJECTION INTRAMUSCULAR; INTRAVENOUS; SUBCUTANEOUS at 09:53

## 2017-10-27 RX ADMIN — ONDANSETRON 4 MILLIGRAM(S): 8 TABLET, FILM COATED ORAL at 23:58

## 2017-10-27 RX ADMIN — Medication 40 MILLIEQUIVALENT(S): at 05:51

## 2017-10-27 RX ADMIN — Medication 1 APPLICATION(S): at 05:50

## 2017-10-27 RX ADMIN — Medication 1 SPRAY(S): at 21:32

## 2017-10-27 RX ADMIN — HYDROMORPHONE HYDROCHLORIDE 0.5 MILLIGRAM(S): 2 INJECTION INTRAMUSCULAR; INTRAVENOUS; SUBCUTANEOUS at 06:50

## 2017-10-27 RX ADMIN — FENTANYL CITRATE 1 PATCH: 50 INJECTION INTRAVENOUS at 23:49

## 2017-10-27 RX ADMIN — ONDANSETRON 4 MILLIGRAM(S): 8 TABLET, FILM COATED ORAL at 13:46

## 2017-10-27 RX ADMIN — HYDROMORPHONE HYDROCHLORIDE 1 MILLIGRAM(S): 2 INJECTION INTRAMUSCULAR; INTRAVENOUS; SUBCUTANEOUS at 21:32

## 2017-10-27 RX ADMIN — HYDROMORPHONE HYDROCHLORIDE 0.5 MILLIGRAM(S): 2 INJECTION INTRAMUSCULAR; INTRAVENOUS; SUBCUTANEOUS at 02:07

## 2017-10-27 RX ADMIN — Medication 100 MILLIGRAM(S): at 05:51

## 2017-10-27 RX ADMIN — HYDROMORPHONE HYDROCHLORIDE 1 MILLIGRAM(S): 2 INJECTION INTRAMUSCULAR; INTRAVENOUS; SUBCUTANEOUS at 18:21

## 2017-10-27 RX ADMIN — Medication 2 MILLIGRAM(S): at 23:58

## 2017-10-27 RX ADMIN — Medication 1 SPRAY(S): at 05:50

## 2017-10-27 RX ADMIN — HYDROMORPHONE HYDROCHLORIDE 1 MILLIGRAM(S): 2 INJECTION INTRAMUSCULAR; INTRAVENOUS; SUBCUTANEOUS at 22:00

## 2017-10-27 RX ADMIN — LAMOTRIGINE 100 MILLIGRAM(S): 25 TABLET, ORALLY DISINTEGRATING ORAL at 21:32

## 2017-10-27 RX ADMIN — Medication 650 MILLIGRAM(S): at 08:01

## 2017-10-27 RX ADMIN — LAMOTRIGINE 100 MILLIGRAM(S): 25 TABLET, ORALLY DISINTEGRATING ORAL at 13:46

## 2017-10-27 NOTE — PROGRESS NOTE ADULT - SUBJECTIVE AND OBJECTIVE BOX
HPI:  Pt is a 31 yo female with PMHX of Bipolar disorder, hypothyroidism, chronic low back pain on morphine s/p laminectomy and spinal fusion in 2016 who is BIB EMS for right leg swelling and numbness. Patient is a poor historian as she is on many pain medicines and is currently lethargic, therefore much info provided based on hospital staff and charts. She states that 18 hours prior to arrival, she fell on the floor at her house after taking too much morphine and was lying on floor for 8 hours. She woke up to numbness, swelling, and cool temperature of her right leg, from below the knee onwards. Patient denies any current SOB, CP, f/c, denies head trauma.     In the ED, vitals were Temp 101.4F /91  RR 20  SpO2 93% RA. Significant labs are WBS 24.3, Hb 15.6, Hmt 47.6,  Platelets 118, neutr 93%, sodium 131, BUN 30,  Cr 2.30, glucose 152 AST 1377, , GFR 27, lactate 1.7. Doppler US RLE showed Extremely limited evaluation of the right lower extremity with nonvisualization of the venous structures beyond the mid right femoral vein. No evidence of DVT within the right common femoral, proximal and   mid femoral veins. No evidence of left lower extremity deep venous thrombosis. XR of right tibia and XR of R femur showed no acute fractures. Arterial Doppler of RLE pending. EKG showed sinus tach of 140, with right superior axis deviation. Patient started on aztreonam, Tylenol given for fever, patient made NPO, Dr. Martines consulted for emergent surgery. (04 Oct 2017 01:13)      SUBJECTIVE:  Patient is a 32y old  Female who presents with a chief complaint of RLE edema and numbness (13 Oct 2017 16:25)          OBJECTIVE:  Review Of Systems:  Constitutional: [ ] Fever [ ] Chills [ ] Fatigue [ ] Weight change   HEENT: [ ] Blurred vision [ ] Eye Pain [ ] Headache [ ] Runny nose [ ] Sore Throat   Respiratory: [ ] Cough [ ] Wheezing [ ] Shortness of breath  Cardiovascular: [ ] Chest Pain [ ] Palpitations [ ] BENNETT [ ] PND [ ] Orthopnea  Gastrointestinal: [ ] Abdominal Pain [ ] Diarrhea [ ] Constipation [ ] Hemorrhoids [ ] Nausea [ ] Vomiting  Genitourinary: [ ] Nocturia [ ] Dysuria [ ] Incontinence  Extremities: [ ] Swelling [ ] Joint Pain  Neurologic: [ ] Focal deficit [ ] Paresthesias [ ] Syncope  Lymphatic: [ ] Swelling [ ] Lymphadenopathy   Skin: [ ] Rash [ ] Ecchymoses [ ] Wounds [ ] Lesions  Psychiatry: [ ] Depression [ ] Suicidal/Homicidal Ideation [ ] Anxiety [ ] Sleep Disturbances  [ ] 10 point review of systems is otherwise negative except as mentioned above            [ ]Unable to obtain    Allergy:  Allergies    avocado (Unknown)  latex (Urticaria; Rash)  penicillin (Unknown)  sulfa drugs (Unknown)    Intolerances        Medications:  MEDICATIONS  (STANDING):  BACItracin   Ointment 1 Application(s) Topical two times a day  buDESOnide  80 MICROgram(s)/formoterol 4.5 MICROgram(s) Inhaler 2 Puff(s) Inhalation two times a day  buPROPion XL . 300 milliGRAM(s) Oral daily  cefepime  IVPB 1000 milliGRAM(s) IV Intermittent every 8 hours  cefepime  IVPB      cyanocobalamin 1000 MICROGram(s) Oral daily  fentaNYL   Patch  12 MICROgram(s)/Hr 1 Patch Transdermal every 72 hours  fluticasone propionate 50 MICROgram(s)/spray Nasal Spray 1 Spray(s) Both Nostrils two times a day  folic acid 1 milliGRAM(s) Oral daily  influenza   Vaccine 0.5 milliLiter(s) IntraMuscular once  ipratropium 42 MICROgram(s) Nasal 2 Spray(s) Both Nostrils every 12 hours  lamoTRIgine 100 milliGRAM(s) Oral every 8 hours  levothyroxine 150 MICROGram(s) Oral daily  oxyCODONE  ER Tablet 60 milliGRAM(s) Oral every 8 hours  pantoprazole    Tablet 40 milliGRAM(s) Oral before breakfast  potassium chloride    Tablet ER 40 milliEquivalent(s) Oral two times a day  risperiDONE   Tablet 8 milliGRAM(s) Oral at bedtime  sertraline 50 milliGRAM(s) Oral daily  topiramate 400 milliGRAM(s) Oral two times a day  vancomycin  IVPB 1750 milliGRAM(s) IV Intermittent every 12 hours  Viibryd Oral Tablet 40 milliGRAM(s) 1 Tablet(s) Oral daily    MEDICATIONS  (PRN):  acetaminophen   Tablet 650 milliGRAM(s) Oral every 6 hours PRN For Temp greater than 38 C (100.4 F)  acetaminophen   Tablet. 650 milliGRAM(s) Oral every 6 hours PRN Mild Pain (1 - 3)  ALBUTerol    90 MICROgram(s) HFA Inhaler 2 Puff(s) Inhalation every 6 hours PRN Shortness of Breath and/or Wheezing  clonazePAM Tablet 2 milliGRAM(s) Oral every 8 hours PRN anxiety  docusate sodium 100 milliGRAM(s) Oral two times a day PRN Constipation  HYDROmorphone   Tablet 2 milliGRAM(s) Oral every 4 hours PRN Severe Pain (7 - 10)  HYDROmorphone  Injectable 0.5 milliGRAM(s) IV Push every 3 hours PRN Severe Pain (7 - 10)  ondansetron Injectable 4 milliGRAM(s) IV Push every 4 hours PRN Nausea and/or Vomiting  senna 2 Tablet(s) Oral at bedtime PRN Constipation      PMH/PSH/FH/SH: [ ] Unchanged    Vitals:  T(C): 37.6 (10-27-17 @ 00:00), Max: 38.4 (10-26-17 @ 16:45)  HR: 95 (10-27-17 @ 00:00) (95 - 115)  BP: 96/63 (10-27-17 @ 00:00) (96/63 - 106/69)  BP(mean): --  RR: 16 (10-27-17 @ 00:00) (16 - 16)  SpO2: 99% (10-27-17 @ 00:00) (98% - 99%)  Wt(kg): --  Daily     Daily   I&O's Summary    26 Oct 2017 07:01  -  27 Oct 2017 07:00  --------------------------------------------------------  IN: 600 mL / OUT: 2350 mL / NET: -1750 mL        Labs:                        7.6    8.4   )-----------( 239      ( 26 Oct 2017 06:05 )             23.5     10-26    138  |  107  |  29<H>  ----------------------------<  93  3.8   |  22  |  1.20    Ca    8.4<L>      26 Oct 2017 06:05  Mg     1.9     10-26                        ECG:  < from: 12 Lead ECG (10.04.17 @ 08:44) >  Ventricular Rate 122 BPM    Atrial Rate 122 BPM    P-R Interval 136 ms    QRS Duration 94 ms    Q-T Interval 332 ms    QTC Calculation(Bezet) 473 ms    P Axis 35 degrees    R Axis -86 degrees    T Axis 10 degrees    Diagnosis Line Sinus tachycardia  Left axis deviation  Inferior infarct (cited on or before 03-OCT-2017)  Abnormal ECG  When compared with ECG of 03-OCT-2017 21:50, (Unconfirmed)  No significant change was found  Confirmed by Juanito Brink MD ([id number]) on 10/4/2017 1:55:40 PM    < end of copied text >    Echo:  < from: TTE Echo Doppler w/o Cont (10.05.17 @ 12:46) >   EXAM:  ECHO TTE W/O CON COMP W/DOPPLR         PROCEDURE DATE:  10/05/2017        INTERPRETATION:  Ordering Physician: MARISSA RUFF [id number]    Indication: Respiratory failure    Study Quality: Technically difficult in ICU   A complete echocardiographic study was performed utilizing standard   protocol including spectral and color Doppler in all echocardiographic   windows.    Height: 1 75 cm  Weight: 113 kg  BSA: 2.27  Blood Pressure: 104/76    MEASUREMENTS  IVS: 0.9cm  PWT: 0.9cm  LA: 3.5cm  AO: 2.8cm  LVIDd: 5.2cm  LVIDs: 3.5cm      LVEF: 70%    FINDINGS  Left Ventricle: Hyperdynamic left ventricle. Estimated EF 70%. No   segmental wall motion abnormalities  Aortic Valve: Not well-visualized. Probably normal trileaflet aortic valve  Mitral Valve: There is subtle systolic anterior motion of the mitral   valve in the setting of a hyperdynamic left ventricle. No left   ventricular outflow tract gradient is seen. Trace mitral regurgitation is   visualized.  Tricuspid Valve: Not well visualized. Trace tricuspid regurgitation  Pulmonic Valve: Not visualized.  Left Atrium: Grossly normal  Right Ventricle: Not well-visualized. Grossly normal right ventricular   size and function  Right Atrium: Grossly normal  Diastolic Function: Normaldiastolic function  Pericardium/Pleura: No pericardial effusion      CONCLUSIONS:  1. Technically difficult study  2. Hyperdynamic left ventricle. Estimated EF 70%  3. Subtle systolic anterior motion of the mitral valve in the setting of   a hyperdynamic left ventricle, but no outflow tract gradient.  4. Right ventricle is not well visualized.Grossly normal right   ventricular size and function  5. No pericardial effusion    No prior exam for comparison                  ROBERTA MARKS   This document has been electronically signed. Oct  6 2017  1:33PM                < end of copied text >    Stress Testing:     Cath:    Imaging:  < from: Xray Chest 1 View AP- PORTABLE-Urgent (10.26.17 @ 19:55) >  EXAM:  PORTABLE CHEST URGENT                            PROCEDURE DATE:  10/26/2017          INTERPRETATION:  Clinical information: Fever    Comparison exam dated 10/16/2017    Patient is tilted towards the left.    Portable study, 7:41 PM        No evidence of infiltrate effusion or congestive failure. Heart size   within normal limits. Hilar regions mediastinal contours and bony thorax   are intact. Old left clavicle fracture present.    IMPRESSION: No active disease.                KATHY AMAYA M.D.,ATTENDING RADIOLOGIST  This document has been electronically signed. Oct 26 2017  8:00PM    < end of copied text >    Interpretation of Telemetry:  not on TELE    Physical Exam:  Appearance: [ ] Normal  [ ] abnormal [ ] NAD   Eyes: [ ] PERRL [ ] EOMI  HENT: [ ] Normal [ ] Abnormal oral muscosa [ ]NC/AT  Cardiovascular: [ ] S1 [ ] S2 [ ] RRR [ ] m/r/g [ ]edema [ ] JVP  Procedural Access Site: [ ]  hematoma [ ] tender to palpation [ ] 2+ pulse [ ] bruit [ ] Ecchymosis  Respiratory: [ ] Clear to auscultation bilaterally  Gastrointestinal: [ ] Soft [ ] tenderness[ ] distension [ ] BS  Musculoskeletal: [ ] clubbing [ ] joint deformity   Neurologic: [ ] Non-focal  Lymphatic: [ ] lymphadenopathy  Psychiatry: [ ] AAOx3  [ ] confused [ ] disoriented [ ] Mood & affect appropriate  Skin: [ ]  rashes [ ] ecchymoses [ ] cyanosis HPI:  Pt is a 31 yo female with PMHX of Bipolar disorder, hypothyroidism, chronic low back pain on morphine s/p laminectomy and spinal fusion in 2016 who is BIB EMS for right leg swelling and numbness. Patient is a poor historian as she is on many pain medicines and is currently lethargic, therefore much info provided based on hospital staff and charts. She states that 18 hours prior to arrival, she fell on the floor at her house after taking too much morphine and was lying on floor for 8 hours. She woke up to numbness, swelling, and cool temperature of her right leg, from below the knee onwards. Patient denies any current SOB, CP, f/c, denies head trauma.     In the ED, vitals were Temp 101.4F /91  RR 20  SpO2 93% RA. Significant labs are WBS 24.3, Hb 15.6, Hmt 47.6,  Platelets 118, neutr 93%, sodium 131, BUN 30,  Cr 2.30, glucose 152 AST 1377, , GFR 27, lactate 1.7. Doppler US RLE showed Extremely limited evaluation of the right lower extremity with nonvisualization of the venous structures beyond the mid right femoral vein. No evidence of DVT within the right common femoral, proximal and   mid femoral veins. No evidence of left lower extremity deep venous thrombosis. XR of right tibia and XR of R femur showed no acute fractures. Arterial Doppler of RLE pending. EKG showed sinus tach of 140, with right superior axis deviation. Patient started on aztreonam, Tylenol given for fever, patient made NPO, Dr. Martines consulted for emergent surgery. (04 Oct 2017 01:13)      SUBJECTIVE:  Patient is a 32y old  Female who presents with a chief complaint of RLE edema and numbness (13 Oct 2017 16:25)          OBJECTIVE:  Review Of Systems:  Constitutional: [ ] Fever [ ] Chills [ ] Fatigue [ ] Weight change   HEENT: [ ] Blurred vision [ ] Eye Pain [ ] Headache [ ] Runny nose [ ] Sore Throat   Respiratory: [ ] Cough [ ] Wheezing [ ] Shortness of breath  Cardiovascular: [ ] Chest Pain [ ] Palpitations [ ] BENNETT [ ] PND [ ] Orthopnea  Gastrointestinal: [ ] Abdominal Pain [ ] Diarrhea [ ] Constipation [ ] Hemorrhoids [ ] Nausea [ ] Vomiting  Genitourinary: [ ] Nocturia [ ] Dysuria [ ] Incontinence  Extremities: [ ] Swelling [ ] Joint Pain  Neurologic: [ ] Focal deficit [ ] Paresthesias [ ] Syncope  Lymphatic: [ ] Swelling [ ] Lymphadenopathy   Skin: [ ] Rash [ ] Ecchymoses [ ] Wounds [ ] Lesions  Psychiatry: [ ] Depression [ ] Suicidal/Homicidal Ideation [ ] Anxiety [ ] Sleep Disturbances  [ ] 10 point review of systems is otherwise negative except as mentioned above            [x ]Unable to obtain    Allergy:  Allergies    avocado (Unknown)  latex (Urticaria; Rash)  penicillin (Unknown)  sulfa drugs (Unknown)    Intolerances        Medications:  MEDICATIONS  (STANDING):  BACItracin   Ointment 1 Application(s) Topical two times a day  buDESOnide  80 MICROgram(s)/formoterol 4.5 MICROgram(s) Inhaler 2 Puff(s) Inhalation two times a day  buPROPion XL . 300 milliGRAM(s) Oral daily  cefepime  IVPB 1000 milliGRAM(s) IV Intermittent every 8 hours  cefepime  IVPB      cyanocobalamin 1000 MICROGram(s) Oral daily  fentaNYL   Patch  12 MICROgram(s)/Hr 1 Patch Transdermal every 72 hours  fluticasone propionate 50 MICROgram(s)/spray Nasal Spray 1 Spray(s) Both Nostrils two times a day  folic acid 1 milliGRAM(s) Oral daily  influenza   Vaccine 0.5 milliLiter(s) IntraMuscular once  ipratropium 42 MICROgram(s) Nasal 2 Spray(s) Both Nostrils every 12 hours  lamoTRIgine 100 milliGRAM(s) Oral every 8 hours  levothyroxine 150 MICROGram(s) Oral daily  oxyCODONE  ER Tablet 60 milliGRAM(s) Oral every 8 hours  pantoprazole    Tablet 40 milliGRAM(s) Oral before breakfast  potassium chloride    Tablet ER 40 milliEquivalent(s) Oral two times a day  risperiDONE   Tablet 8 milliGRAM(s) Oral at bedtime  sertraline 50 milliGRAM(s) Oral daily  topiramate 400 milliGRAM(s) Oral two times a day  vancomycin  IVPB 1750 milliGRAM(s) IV Intermittent every 12 hours  Viibryd Oral Tablet 40 milliGRAM(s) 1 Tablet(s) Oral daily    MEDICATIONS  (PRN):  acetaminophen   Tablet 650 milliGRAM(s) Oral every 6 hours PRN For Temp greater than 38 C (100.4 F)  acetaminophen   Tablet. 650 milliGRAM(s) Oral every 6 hours PRN Mild Pain (1 - 3)  ALBUTerol    90 MICROgram(s) HFA Inhaler 2 Puff(s) Inhalation every 6 hours PRN Shortness of Breath and/or Wheezing  clonazePAM Tablet 2 milliGRAM(s) Oral every 8 hours PRN anxiety  docusate sodium 100 milliGRAM(s) Oral two times a day PRN Constipation  HYDROmorphone   Tablet 2 milliGRAM(s) Oral every 4 hours PRN Severe Pain (7 - 10)  HYDROmorphone  Injectable 0.5 milliGRAM(s) IV Push every 3 hours PRN Severe Pain (7 - 10)  ondansetron Injectable 4 milliGRAM(s) IV Push every 4 hours PRN Nausea and/or Vomiting  senna 2 Tablet(s) Oral at bedtime PRN Constipation      PMH/PSH/FH/SH: [ ] Unchanged    Vitals:  T(C): 37.6 (10-27-17 @ 00:00), Max: 38.4 (10-26-17 @ 16:45)  HR: 95 (10-27-17 @ 00:00) (95 - 115)  BP: 96/63 (10-27-17 @ 00:00) (96/63 - 106/69)  BP(mean): --  RR: 16 (10-27-17 @ 00:00) (16 - 16)  SpO2: 99% (10-27-17 @ 00:00) (98% - 99%)  Wt(kg): --  Daily     Daily   I&O's Summary    26 Oct 2017 07:01  -  27 Oct 2017 07:00  --------------------------------------------------------  IN: 600 mL / OUT: 2350 mL / NET: -1750 mL        Labs:                        7.6    8.4   )-----------( 239      ( 26 Oct 2017 06:05 )             23.5     10-26    138  |  107  |  29<H>  ----------------------------<  93  3.8   |  22  |  1.20    Ca    8.4<L>      26 Oct 2017 06:05  Mg     1.9     10-26                        ECG:  < from: 12 Lead ECG (10.04.17 @ 08:44) >  Ventricular Rate 122 BPM    Atrial Rate 122 BPM    P-R Interval 136 ms    QRS Duration 94 ms    Q-T Interval 332 ms    QTC Calculation(Bezet) 473 ms    P Axis 35 degrees    R Axis -86 degrees    T Axis 10 degrees    Diagnosis Line Sinus tachycardia  Left axis deviation  Inferior infarct (cited on or before 03-OCT-2017)  Abnormal ECG  When compared with ECG of 03-OCT-2017 21:50, (Unconfirmed)  No significant change was found  Confirmed by Juanito Brink MD ([id number]) on 10/4/2017 1:55:40 PM    < end of copied text >    Echo:  < from: TTE Echo Doppler w/o Cont (10.05.17 @ 12:46) >   EXAM:  ECHO TTE W/O CON COMP W/DOPPLR         PROCEDURE DATE:  10/05/2017        INTERPRETATION:  Ordering Physician: MARISSA RUFF [id number]    Indication: Respiratory failure    Study Quality: Technically difficult in ICU   A complete echocardiographic study was performed utilizing standard   protocol including spectral and color Doppler in all echocardiographic   windows.    Height: 1 75 cm  Weight: 113 kg  BSA: 2.27  Blood Pressure: 104/76    MEASUREMENTS  IVS: 0.9cm  PWT: 0.9cm  LA: 3.5cm  AO: 2.8cm  LVIDd: 5.2cm  LVIDs: 3.5cm      LVEF: 70%    FINDINGS  Left Ventricle: Hyperdynamic left ventricle. Estimated EF 70%. No   segmental wall motion abnormalities  Aortic Valve: Not well-visualized. Probably normal trileaflet aortic valve  Mitral Valve: There is subtle systolic anterior motion of the mitral   valve in the setting of a hyperdynamic left ventricle. No left   ventricular outflow tract gradient is seen. Trace mitral regurgitation is   visualized.  Tricuspid Valve: Not well visualized. Trace tricuspid regurgitation  Pulmonic Valve: Not visualized.  Left Atrium: Grossly normal  Right Ventricle: Not well-visualized. Grossly normal right ventricular   size and function  Right Atrium: Grossly normal  Diastolic Function: Normaldiastolic function  Pericardium/Pleura: No pericardial effusion      CONCLUSIONS:  1. Technically difficult study  2. Hyperdynamic left ventricle. Estimated EF 70%  3. Subtle systolic anterior motion of the mitral valve in the setting of   a hyperdynamic left ventricle, but no outflow tract gradient.  4. Right ventricle is not well visualized.Grossly normal right   ventricular size and function  5. No pericardial effusion    No prior exam for comparison                  ROBERTA MARKS   This document has been electronically signed. Oct  6 2017  1:33PM                < end of copied text >    Stress Testing:     Cath:    Imaging:  < from: Xray Chest 1 View AP- PORTABLE-Urgent (10.26.17 @ 19:55) >  EXAM:  PORTABLE CHEST URGENT                            PROCEDURE DATE:  10/26/2017          INTERPRETATION:  Clinical information: Fever    Comparison exam dated 10/16/2017    Patient is tilted towards the left.    Portable study, 7:41 PM        No evidence of infiltrate effusion or congestive failure. Heart size   within normal limits. Hilar regions mediastinal contours and bony thorax   are intact. Old left clavicle fracture present.    IMPRESSION: No active disease.                KATHY AMAYA M.D.,ATTENDING RADIOLOGIST  This document has been electronically signed. Oct 26 2017  8:00PM    < end of copied text >    Interpretation of Telemetry:  not on TELE    Physical Exam:  Appearance: [ ] Normal  [ ] abnormal [ ] NAD   Eyes: [ ] PERRL [ ] EOMI  HENT: [ ] Normal [ ] Abnormal oral muscosa [ ]NC/AT  Cardiovascular: [ ] S1 [ ] S2 [ ] RRR [ ] m/r/g [ ]edema [ ] JVP  Procedural Access Site: [ ]  hematoma [ ] tender to palpation [ ] 2+ pulse [ ] bruit [ ] Ecchymosis  Respiratory: [ ] Clear to auscultation bilaterally  Gastrointestinal: [ ] Soft [ ] tenderness[ ] distension [ ] BS  Musculoskeletal: [ ] clubbing [ ] joint deformity   Neurologic: [ ] Non-focal  Lymphatic: [ ] lymphadenopathy  Psychiatry: [ ] AAOx3  [ ] confused [ ] disoriented [ ] Mood & affect appropriate  Skin: [ ]  rashes [ ] ecchymoses [ ] cyanosis  [x ] Unable to assess pt refused to be seen

## 2017-10-27 NOTE — PROVIDER CONTACT NOTE (OTHER) - ACTION/TREATMENT ORDERED:
Tylenol 650mgs given.  Pt. also given pain medication 0.5 dilaudid ivp.  Dr. Cannon to call Dr. [place]
A one time dose of Albuterol inhaler ordered. Doctor did come and see patient.
As per PA Hamid CBC ordered for am, continue with current treatment

## 2017-10-27 NOTE — PROGRESS NOTE ADULT - PROBLEM SELECTOR PLAN 2
- anemia multifactorial with low normal B12 and folate level; iron studies c/w chronic disease; also had renal insufficiency 2/2 rhabdo.   - started  po B12 supplement and po folic acid daily  - Hb is stable   - hold transfusion unless Hg <7; likely predominant cause of anemia is wound/inflammation  can give 1 unit if needed for surgery    No additional recommendations.   transfuse as needed: if Hgb <7.0 or if symptomatic.   Will sign off on case. Please reconsult as needed.   Thank you for consulting us.

## 2017-10-27 NOTE — PROGRESS NOTE ADULT - ASSESSMENT
32F PMH Asthma, Bipolar disorder, hypothyroidism, chronic low back pain s/p laminectomy and spinal fusion, opioid-dependent presents with right leg swelling, numbness, pallor, poikilothermia, and pulselessness, found to have ischemic leg due to acute compartment syndrome of right leg with acute rhabdomyolysis, GHAZAL 2/2 ATN, and leukocytosis with bandemia s/p emergent fasciotomy of the 4 leg compartments (anterior, lateral, superficial posterior, and deep posterior) and the lateral thigh muscles. Wound vac in place. Pain management for opioid dependence and abuse.  Low grade temperature. ID and vascular following. Anemic likely ACD and +Neck Lymph Node swelling likely reactive seen by ENT. Febrile, unknown etiology, on IV antibiotics.    Patient remains low-intermidiate risk for intermediate risk procedure, medically optimized for plastic surgery.

## 2017-10-27 NOTE — BRIEF OPERATIVE NOTE - PROCEDURE
<<-----Click on this checkbox to enter Procedure Debridement  10/27/2017  LATERAL AND MEDIAL CALF WOUND, DEBRIDEMENT OF MUSCLE  WOUND IIRIGATION  DEBRIDEMENT AND PARTIAL CLOSURE RIGHT  LATERAL THIGH WOUND   APPLICATION OF WOUND VAC  Active  HNOOROLLAH

## 2017-10-27 NOTE — PROVIDER CONTACT NOTE (OTHER) - ASSESSMENT
Pt. with no complaints of chest pain.
AAOx4, no c/o any discomfort or faintness or lightheadedness VSS.
BBS are clear and equal. No use of accessory muscles. Resp rate is 16 bpm. See VS flow sheet.

## 2017-10-27 NOTE — PROGRESS NOTE ADULT - PROBLEM SELECTOR PLAN 1
-compartment syndrome of the right lower extremity   -s/p emergent fasciotomy by Vascular surgery (Bobbi)   -s/p wound vac placement f/u with vascular sx  - Pt is cleared by vascular for Plastic surgery for skin grafting  Dr. Arciniega   -NPO after midnight  -Finished course of IV abx aztreonam and clindamycin in ICU  -monitor labs, increase activity

## 2017-10-27 NOTE — PROGRESS NOTE ADULT - ASSESSMENT
32-year-old female now status post right lower extremity fasciotomy for prolonged compression after a fall with lethargy.     - Rhabdo and GHAZAL resolved.   Avoid nephrotoxics  - No obvious cardiac complications post fasciotomy  - No clear evidence of acute ischemia or volume overload  - HR overall improved, however, tachycardic to 115 per flowsheet with fever peaked to 101.2 FUO started on Cefepime and Vanco followed by ID.  BP on the lower end of her range in the presence of fever and tachycardia and the setting of NPO could consider IV hydration and watch for Fluid overload.    - Continue Pain control  - TTE with hyperdynamic LV function, EF 70%, no valvular disease noted  - Encourage PO hydration  - Other cardiovascular workup will depend on clinical course.  - Restarted on ABX for FUO as per ID following   - Monitor and replete electrolytes. Keep K>4.0 and Mg>2.0.   - Vac dressing intact, post fasciotomy, still draining considerable drainage  - All other workup per primary team  - Will follow      Yue Das, NP-C  Cardiology 32-year-old female now status post right lower extremity fasciotomy for prolonged compression after a fall with lethargy.     - Rhabdo and GHAZAL resolved.   Avoid nephrotoxics  - No obvious cardiac complications post fasciotomy  - No clear evidence of acute ischemia or volume overload  - HR overall improved, however, tachycardic to 115 per flowsheet with fever peaked to 101.2 FUO started on Cefepime and Vanco followed by ID.  BP on the lower end of her range in the presence of fever and tachycardia and the setting of NPO for Vascular procedure could consider IV hydration and watch for Fluid overload.    - Continue Pain control  - TTE with hyperdynamic LV function, EF 70%, no valvular disease noted  - Encourage PO hydration  - Other cardiovascular workup will depend on clinical course.  - Restarted on ABX for FUO as per ID following   - Monitor and replete electrolytes. Keep K>4.0 and Mg>2.0.   - Vac dressing intact, post fasciotomy, still draining considerable drainage  - All other workup per primary team  - Will follow      Yue Das, NP-C  Cardiology

## 2017-10-27 NOTE — PROGRESS NOTE ADULT - SUBJECTIVE AND OBJECTIVE BOX
Patient is a 32y old  Female who presents with a chief complaint of RLE edema and numbness (04 Oct 2017 01:13)      Patient seen in follow up for GHAZAL, ATN, Rhabdomyolysis. Improved and stable renal function trend. For OR.     PAST MEDICAL HISTORY:  Low back pain  Hypothyroi  Bipolar 1 disorder    MEDICATIONS  (STANDING):  BACItracin   Ointment 1 Application(s) Topical two times a day  buDESOnide  80 MICROgram(s)/formoterol 4.5 MICROgram(s) Inhaler 2 Puff(s) Inhalation two times a day  buPROPion XL . 300 milliGRAM(s) Oral daily  cefepime  IVPB 1000 milliGRAM(s) IV Intermittent every 8 hours  cefepime  IVPB      cyanocobalamin 1000 MICROGram(s) Oral daily  fentaNYL   Patch  12 MICROgram(s)/Hr 1 Patch Transdermal every 72 hours  fluticasone propionate 50 MICROgram(s)/spray Nasal Spray 1 Spray(s) Both Nostrils two times a day  folic acid 1 milliGRAM(s) Oral daily  influenza   Vaccine 0.5 milliLiter(s) IntraMuscular once  ipratropium 42 MICROgram(s) Nasal 2 Spray(s) Both Nostrils every 12 hours  lamoTRIgine 100 milliGRAM(s) Oral every 8 hours  levothyroxine 150 MICROGram(s) Oral daily  oxyCODONE  ER Tablet 60 milliGRAM(s) Oral every 8 hours  pantoprazole    Tablet 40 milliGRAM(s) Oral before breakfast  potassium chloride    Tablet ER 40 milliEquivalent(s) Oral two times a day  risperiDONE   Tablet 8 milliGRAM(s) Oral at bedtime  sertraline 50 milliGRAM(s) Oral daily  topiramate 400 milliGRAM(s) Oral two times a day  vancomycin  IVPB 1750 milliGRAM(s) IV Intermittent every 12 hours  Viibryd Oral Tablet 40 milliGRAM(s) 1 Tablet(s) Oral daily    MEDICATIONS  (PRN):  acetaminophen   Tablet 650 milliGRAM(s) Oral every 6 hours PRN For Temp greater than 38 C (100.4 F)  acetaminophen   Tablet. 650 milliGRAM(s) Oral every 6 hours PRN Mild Pain (1 - 3)  ALBUTerol    90 MICROgram(s) HFA Inhaler 2 Puff(s) Inhalation every 6 hours PRN Shortness of Breath and/or Wheezing  clonazePAM Tablet 2 milliGRAM(s) Oral every 8 hours PRN anxiety  docusate sodium 100 milliGRAM(s) Oral two times a day PRN Constipation  HYDROmorphone   Tablet 2 milliGRAM(s) Oral every 4 hours PRN Severe Pain (7 - 10)  HYDROmorphone  Injectable 0.5 milliGRAM(s) IV Push every 3 hours PRN Severe Pain (7 - 10)  ondansetron Injectable 4 milliGRAM(s) IV Push every 4 hours PRN Nausea and/or Vomiting  senna 2 Tablet(s) Oral at bedtime PRN Constipation    T(C): 38.3 (10-27-17 @ 08:00), Max: 38.4 (10-26-17 @ 16:45)  HR: 105 (10-27-17 @ 08:00) (95 - 115)  BP: 95/63 (10-27-17 @ 08:00) (95/63 - 117/68)  RR: 15 (10-27-17 @ 08:00)  SpO2: 98% (10-27-17 @ 08:00)  Wt(kg): --  I&O's Detail    26 Oct 2017 07:01  -  27 Oct 2017 07:00  --------------------------------------------------------  IN:    Oral Fluid: 600 mL  Total IN: 600 mL    OUT:    VAC (Vacuum Assisted Closure) System: 1350 mL    Voided: 1000 mL  Total OUT: 2350 mL    Total NET: -1750 mL                PHYSICAL EXAM:  General: NAD  Respiratory: b/l air entry  Cardiovascular: S1 S2  Gastrointestinal: soft  Extremities:  Rt leg dressing      LABORATORY:                        7.7    6.8   )-----------( 247      ( 27 Oct 2017 08:01 )             23.9     10-27    139  |  110<H>  |  20  ----------------------------<  93  4.1   |  19<L>  |  1.20    Ca    8.5      27 Oct 2017 08:05  Mg     1.9     10-26      Sodium, Serum: 139 mmol/L (10-27 @ 08:05)  Sodium, Serum: 138 mmol/L (10-26 @ 06:05)    Potassium, Serum: 4.1 mmol/L (10-27 @ 08:05)  Potassium, Serum: 3.8 mmol/L (10-26 @ 06:05)    Hemoglobin: 7.7 g/dL (10-27 @ 08:01)  Hemoglobin: 7.6 g/dL (10-26 @ 06:05)  Hemoglobin: 7.9 g/dL (10-25 @ 07:58)    Creatinine, Serum 1.20 (10-27 @ 08:05)  Creatinine, Serum 1.20 (10-26 @ 06:05)  Creatinine, Serum 1.30 (10-25 @ 07:58)          Urinalysis Basic - ( 27 Oct 2017 09:59 )    Color: Yellow / Appearance: Clear / S.010 / pH: x  Gluc: x / Ketone: Negative  / Bili: Negative / Urobili: Negative   Blood: x / Protein: Negative / Nitrite: Negative   Leuk Esterase: Trace / RBC: 3-5 /HPF / WBC 6-10   Sq Epi: x / Non Sq Epi: Many / Bacteria: Few

## 2017-10-27 NOTE — PROGRESS NOTE ADULT - PROBLEM SELECTOR PLAN 1
s/p US. LN not particularly enlarged.   Unclear cause.   s/p ENT eval, however suspect reactive nature and is currently being observed.

## 2017-10-27 NOTE — PROGRESS NOTE ADULT - SUBJECTIVE AND OBJECTIVE BOX
[facility], Division of Infectious Diseases  [facility]    Name: ABELARDO CROWELL  Age: 32y  Gender: Female  MRN: [id number]    Interval History--  Notes reviewed. Febrile now to 101F. Patient crying, surgery cancelled due to fever.  Had what sounds like 'red man' syndrome with Vanco infusion  Some nausea. no vomiting. Denies diarrhea.  UA and UC&S not obtained last night. Patient states obtained about 6am today.  Blood cx order does not appear to have gone through for unclear reasons.  States no flank pain presently.    Past Medical History--  Low back pain  Hypothyroid  Bipolar 1 disorder  History of spinal fusion  History of laminectomy      For details regarding the patient's social history, family history, and other miscellaneous elements, please refer the initial infectious diseases consultation and/or the admitting history and physical examination for this admission.    Allergies    avocado (Unknown)  latex (Urticaria; Rash)  penicillin (Unknown)  sulfa drugs (Unknown)    Intolerances        Medications--  Antibiotics:  cefepime  IVPB 1000 milliGRAM(s) IV Intermittent every 8 hours  cefepime  IVPB      vancomycin  IVPB 1750 milliGRAM(s) IV Intermittent every 12 hours    Immunologic:  influenza   Vaccine 0.5 milliLiter(s) IntraMuscular once    Other:  acetaminophen   Tablet PRN  acetaminophen   Tablet. PRN  ALBUTerol    90 MICROgram(s) HFA Inhaler PRN  BACItracin   Ointment  buDESOnide  80 MICROgram(s)/formoterol 4.5 MICROgram(s) Inhaler  buPROPion XL .  clonazePAM Tablet PRN  cyanocobalamin  docusate sodium PRN  fentaNYL   Patch  12 MICROgram(s)/Hr  fluticasone propionate 50 MICROgram(s)/spray Nasal Spray  folic acid  HYDROmorphone   Tablet PRN  HYDROmorphone  Injectable PRN  ipratropium 42 MICROgram(s) Nasal  lamoTRIgine  levothyroxine  ondansetron Injectable PRN  oxyCODONE  ER Tablet  pantoprazole    Tablet  potassium chloride    Tablet ER  risperiDONE   Tablet  senna PRN  sertraline  topiramate  Viibryd Oral Tablet 40 milliGRAM(s)      Review of Systems--  Review of systems otherwise unchanged except as previously noted.    Physical Examination--  Vital Signs: T(F): 99.6 (10-27-17 @ 00:00), Max: 101.2 (10-26-17 @ 16:45)  HR: 95 (10-27-17 @ 00:00)  BP: 96/63 (10-27-17 @ 00:00)  RR: 16 (10-27-17 @ 00:00)  SpO2: 99% (10-27-17 @ 00:00)  Wt(kg): --  General: Nontoxic-appearing Female in no acute distress.  HEENT: AT/NC. Anicteric. Conjunctiva pale and moist. Oropharynx pale but clear. Dentition fair.  Neck: Not rigid. No sense of mass.  Nodes: None palpable.  Lungs: Clear bilaterally without rales, wheezing or rhonchi  Heart: RRR. No Murmur. No rub. No gallop. No palpable thrill.  Abdomen: Bowel sounds present and normoactive. Soft. Nondistended. Minimal tenderness, diffusely, inconsistent, perhaps most in midepigastrium. No sense of mass. No organomegaly.  Back: ?mild R CVAT.  Extremities: No cyanosis or clubbing. LLE no edema. RLE wrapped. Foot warm, edematous. VAC under ACE. No IV phelbitis. R hand not infected appearing.  Skin: Pale. Dry. Good turgor. No rash. No vasculitic stigmata.  Psychiatric: Appropriate.    Laboratory Studies--  CBC                        7.6    8.4   )-----------( 239      ( 26 Oct 2017 06:05 )             23.5       Chemistries  10-26    138  |  107  |  29<H>  ----------------------------<  93  3.8   |  22  |  1.20    Ca    8.4<L>      26 Oct 2017 06:05  Mg     1.9     10-26        Culture Data  No new data

## 2017-10-27 NOTE — PROGRESS NOTE ADULT - SUBJECTIVE AND OBJECTIVE BOX
Date/Time Patient Seen:  		  Referring MD:   Data Reviewed	       Patient is a 32y old  Female who presents with a chief complaint of RLE edema and numbness (13 Oct 2017 16:25)  in bed  vs noted  started on ABX as per ID      Subjective/HPI     PAST MEDICAL & SURGICAL HISTORY:  Low back pain  Hypothyroid  Bipolar 1 disorder  History of spinal fusion  History of laminectomy        Medication list         MEDICATIONS  (STANDING):  BACItracin   Ointment 1 Application(s) Topical two times a day  buDESOnide  80 MICROgram(s)/formoterol 4.5 MICROgram(s) Inhaler 2 Puff(s) Inhalation two times a day  buPROPion XL . 300 milliGRAM(s) Oral daily  cefepime  IVPB 1000 milliGRAM(s) IV Intermittent every 8 hours  cefepime  IVPB      cyanocobalamin 1000 MICROGram(s) Oral daily  fentaNYL   Patch  12 MICROgram(s)/Hr 1 Patch Transdermal every 72 hours  fluticasone propionate 50 MICROgram(s)/spray Nasal Spray 1 Spray(s) Both Nostrils two times a day  folic acid 1 milliGRAM(s) Oral daily  influenza   Vaccine 0.5 milliLiter(s) IntraMuscular once  ipratropium 42 MICROgram(s) Nasal 2 Spray(s) Both Nostrils every 12 hours  lamoTRIgine 100 milliGRAM(s) Oral every 8 hours  levothyroxine 150 MICROGram(s) Oral daily  oxyCODONE  ER Tablet 60 milliGRAM(s) Oral every 8 hours  pantoprazole    Tablet 40 milliGRAM(s) Oral before breakfast  potassium chloride    Tablet ER 40 milliEquivalent(s) Oral two times a day  risperiDONE   Tablet 8 milliGRAM(s) Oral at bedtime  sertraline 50 milliGRAM(s) Oral daily  topiramate 400 milliGRAM(s) Oral two times a day  vancomycin  IVPB 1750 milliGRAM(s) IV Intermittent every 12 hours  Viibryd Oral Tablet 40 milliGRAM(s) 1 Tablet(s) Oral daily    MEDICATIONS  (PRN):  acetaminophen   Tablet 650 milliGRAM(s) Oral every 6 hours PRN For Temp greater than 38 C (100.4 F)  acetaminophen   Tablet. 650 milliGRAM(s) Oral every 6 hours PRN Mild Pain (1 - 3)  ALBUTerol    90 MICROgram(s) HFA Inhaler 2 Puff(s) Inhalation every 6 hours PRN Shortness of Breath and/or Wheezing  clonazePAM Tablet 2 milliGRAM(s) Oral every 8 hours PRN anxiety  docusate sodium 100 milliGRAM(s) Oral two times a day PRN Constipation  HYDROmorphone   Tablet 2 milliGRAM(s) Oral every 4 hours PRN Severe Pain (7 - 10)  HYDROmorphone  Injectable 0.5 milliGRAM(s) IV Push every 3 hours PRN Severe Pain (7 - 10)  ondansetron Injectable 4 milliGRAM(s) IV Push every 4 hours PRN Nausea and/or Vomiting  senna 2 Tablet(s) Oral at bedtime PRN Constipation         Vitals log        ICU Vital Signs Last 24 Hrs  T(C): 37.6 (27 Oct 2017 00:00), Max: 38.4 (26 Oct 2017 16:45)  T(F): 99.6 (27 Oct 2017 00:00), Max: 101.2 (26 Oct 2017 16:45)  HR: 95 (27 Oct 2017 00:00) (95 - 115)  BP: 96/63 (27 Oct 2017 00:00) (96/63 - 106/69)  BP(mean): --  ABP: --  ABP(mean): --  RR: 16 (27 Oct 2017 00:00) (16 - 18)  SpO2: 99% (27 Oct 2017 00:00) (98% - 99%)           Input and Output:  I&O's Detail    25 Oct 2017 07:01  -  26 Oct 2017 07:00  --------------------------------------------------------  IN:    Oral Fluid: 560 mL  Total IN: 560 mL    OUT:    Voided: 1950 mL  Total OUT: 1950 mL    Total NET: -1390 mL      26 Oct 2017 07:01  -  27 Oct 2017 06:10  --------------------------------------------------------  IN:    Oral Fluid: 600 mL  Total IN: 600 mL    OUT:    VAC (Vacuum Assisted Closure) System: 1350 mL    Voided: 1000 mL  Total OUT: 2350 mL    Total NET: -1750 mL          Lab Data                        7.6    8.4   )-----------( 239      ( 26 Oct 2017 06:05 )             23.5     10-26    138  |  107  |  29<H>  ----------------------------<  93  3.8   |  22  |  1.20    Ca    8.4<L>      26 Oct 2017 06:05  Mg     1.9     10-26              Review of Systems	      Objective     Physical Examination    head at  heart - s1s2  lungs - dec BS    Pertinent Lab findings & Imaging      Erlinda:  NO   Adequate UO     I&O's Detail    25 Oct 2017 07:01  -  26 Oct 2017 07:00  --------------------------------------------------------  IN:    Oral Fluid: 560 mL  Total IN: 560 mL    OUT:    Voided: 1950 mL  Total OUT: 1950 mL    Total NET: -1390 mL      26 Oct 2017 07:01  -  27 Oct 2017 06:10  --------------------------------------------------------  IN:    Oral Fluid: 600 mL  Total IN: 600 mL    OUT:    VAC (Vacuum Assisted Closure) System: 1350 mL    Voided: 1000 mL  Total OUT: 2350 mL    Total NET: -1750 mL               Discussed with:     Cultures:	        Radiology

## 2017-10-27 NOTE — CHART NOTE - NSCHARTNOTEFT_GEN_A_CORE
Examined pt from PACU for PACU RN being concerned pt looks very pale with HB of 7.7   pt resting in bed, with head of bed in chair position, very comfortable and alert. Deneis any c/o at this time other than excrusiating pain in leg   Deneis CP, SOB, palpitations, dizziness, feeling of fainting, fatigue, feeing of passing out or any syncope associated aura sx, abd pain  , n.v, chills, HA or dysuria . Pt requats pain control at this time.        T(C): 36.7 (10-27-17 @ 15:56), Max: 38.3 (10-27-17 @ 08:00)  HR: 100 (10-27-17 @ 19:30) (88 - 108)  BP: 100/72 (10-27-17 @ 19:30) (95/63 - 114/62)  RR: 17 (10-27-17 @ 19:30) (12 - 19)  SpO2: 100% (10-27-17 @ 19:30) (97% - 100%)  Wt(kg): --    Physical :        Gen- moderate distress d/t pain from recent sx, ncat  Cardio - s+1,s+2, rrr, no murmur  Lung - cta b/l, no wheeze, no rhonchi, no rales   Abdomen- +BS, NT/ND, no guarding, no rebound, no masses  Ext- no edema, 2+ pulses b/l, rt leg bandaged from thigh to foot in elasitic wrap seems c/d/i   Neuro- axox4  LABS:                        7.7    6.8   )-----------( 247      ( 27 Oct 2017 08:01 )             23.9     10-27    139  |  110<H>  |  20  ----------------------------<  93  4.1   |  19<L>  |  1.20    Ca    8.5      27 Oct 2017 08:05  Mg     1.9     10-26        Urinalysis Basic - ( 27 Oct 2017 09:59 )    Color: Yellow / Appearance: Clear / S.010 / pH: x  Gluc: x / Ketone: Negative  / Bili: Negative / Urobili: Negative   Blood: x / Protein: Negative / Nitrite: Negative   Leuk Esterase: Trace / RBC: 3-5 /HPF / WBC 6-10   Sq Epi: x / Non Sq Epi: Many / Bacteria: Few              Assessment/Plan  Pt is a 31 yo female with PMHX of Bipolar disorder, hypothyroidism, chronic low back pain on morphine s/p laminectomy and spinal fusion in  who is BIB EMS for right leg swelling and numbness.  Admitted with compartment syndrome, emergent OR fasciotomy by Dr. Martines and possible amputation of RLE.   now s/p surgery for compartment sd with dr malagon, leg saved not amputated   1.c/o pain   - pain meds   2. pale   s/p sx with hb 7.7   -no intervention at this time  -pt with relative lack of color in face, cpilary refill , 3 sec   - repeat h/h for am   -mininal intraoperative EBL of 100cc

## 2017-10-27 NOTE — PROGRESS NOTE ADULT - SUBJECTIVE AND OBJECTIVE BOX
Interval History: remains anxious. to go for skin graft today.     Chart reviewed and events noted;   Overnight events:  noen    MEDICATIONS  (STANDING):  BACItracin   Ointment 1 Application(s) Topical two times a day  buDESOnide  80 MICROgram(s)/formoterol 4.5 MICROgram(s) Inhaler 2 Puff(s) Inhalation two times a day  buPROPion XL . 300 milliGRAM(s) Oral daily  cefepime  IVPB 1000 milliGRAM(s) IV Intermittent every 8 hours  cefepime  IVPB      cyanocobalamin 1000 MICROGram(s) Oral daily  fentaNYL   Patch  12 MICROgram(s)/Hr 1 Patch Transdermal every 72 hours  fluticasone propionate 50 MICROgram(s)/spray Nasal Spray 1 Spray(s) Both Nostrils two times a day  folic acid 1 milliGRAM(s) Oral daily  influenza   Vaccine 0.5 milliLiter(s) IntraMuscular once  ipratropium 42 MICROgram(s) Nasal 2 Spray(s) Both Nostrils every 12 hours  lamoTRIgine 100 milliGRAM(s) Oral every 8 hours  levothyroxine 150 MICROGram(s) Oral daily  oxyCODONE  ER Tablet 60 milliGRAM(s) Oral every 8 hours  pantoprazole    Tablet 40 milliGRAM(s) Oral before breakfast  potassium chloride    Tablet ER 40 milliEquivalent(s) Oral two times a day  risperiDONE   Tablet 8 milliGRAM(s) Oral at bedtime  sertraline 50 milliGRAM(s) Oral daily  topiramate 400 milliGRAM(s) Oral two times a day  vancomycin  IVPB 1750 milliGRAM(s) IV Intermittent every 12 hours  Viibryd Oral Tablet 40 milliGRAM(s) 1 Tablet(s) Oral daily    MEDICATIONS  (PRN):  acetaminophen   Tablet 650 milliGRAM(s) Oral every 6 hours PRN For Temp greater than 38 C (100.4 F)  acetaminophen   Tablet. 650 milliGRAM(s) Oral every 6 hours PRN Mild Pain (1 - 3)  ALBUTerol    90 MICROgram(s) HFA Inhaler 2 Puff(s) Inhalation every 6 hours PRN Shortness of Breath and/or Wheezing  clonazePAM Tablet 2 milliGRAM(s) Oral every 8 hours PRN anxiety  docusate sodium 100 milliGRAM(s) Oral two times a day PRN Constipation  HYDROmorphone   Tablet 2 milliGRAM(s) Oral every 4 hours PRN Severe Pain (7 - 10)  HYDROmorphone  Injectable 0.5 milliGRAM(s) IV Push every 3 hours PRN Severe Pain (7 - 10)  ondansetron Injectable 4 milliGRAM(s) IV Push every 4 hours PRN Nausea and/or Vomiting  senna 2 Tablet(s) Oral at bedtime PRN Constipation        Vital Signs Last 24 Hrs  T(C): 38.3 (27 Oct 2017 08:00), Max: 38.4 (26 Oct 2017 16:45)  T(F): 101 (27 Oct 2017 08:00), Max: 101.2 (26 Oct 2017 16:45)  HR: 105 (27 Oct 2017 08:00) (95 - 115)  BP: 95/63 (27 Oct 2017 08:00) (95/63 - 106/69)  BP(mean): --  ABP: --  ABP(mean): --  RR: 15 (27 Oct 2017 08:00) (15 - 16)  SpO2: 98% (27 Oct 2017 08:00) (98% - 99%)      PHYSICAL EXAM  General: adult in NAD  HEENT: clear oropharynx, anicteric sclera, pink conjunctivae  Neck: supple  CV: normal S1S2 with no murmur rubs or gallops  Lungs: clear to auscultation, no wheezes, no rales  Abdomen: soft non-tender non-distended, no hepatosplenomegaly  Ext: bandage and wound vac in place  Skin: no rashes and no petechiae  Neuro: alert and oriented X3 no focal deficits      LABS:    CBC Full  -  ( 27 Oct 2017 08:01 )  WBC Count : 6.8 K/uL  Hemoglobin : 7.7 g/dL  Hematocrit : 23.9 %  Platelet Count - Automated : 247 K/uL  Mean Cell Volume : 89.2 fl  Mean Cell Hemoglobin : 28.7 pg  Mean Cell Hemoglobin Concentration : 32.2 gm/dL  Auto Neutrophil # : x  Auto Lymphocyte # : x  Auto Monocyte # : x  Auto Eosinophil # : x  Auto Basophil # : x  Auto Neutrophil % : x  Auto Lymphocyte % : x  Auto Monocyte % : x  Auto Eosinophil % : x  Auto Basophil % : x      CBC Full  -  ( 26 Oct 2017 06:05 )  WBC Count : 8.4 K/uL  Hemoglobin : 7.6 g/dL  Hematocrit : 23.5 %  Platelet Count - Automated : 239 K/uL  Mean Cell Volume : 88.9 fl  Mean Cell Hemoglobin : 28.9 pg  Mean Cell Hemoglobin Concentration : 32.5 gm/dL  Auto Neutrophil # : x  Auto Lymphocyte # : x  Auto Monocyte # : x  Auto Eosinophil # : x  Auto Basophil # : x  Auto Neutrophil % : x  Auto Lymphocyte % : x  Auto Monocyte % : x  Auto Eosinophil % : x  Auto Basophil % : x    10-27  139  |  110<H>  |  20  ----------------------------<  93  4.1   |  19<L>  |  1.20  Ca    8.5      27 Oct 2017 08:05  Mg     1.9     10-26        10-26  138  |  107  |  29<H>  ----------------------------<  93  3.8   |  22  |  1.20  Ca    8.4<L>      26 Oct 2017 06:05  Mg     1.9     10-26      fe studies  Ferritin, Serum: 357.0 ng/mL (10-22 @ 16:10)  Iron - Total Binding Capacity.: 167 ug/dL (10-22 @ 16:10)      WBC trend  8.4 K/uL (10-26-17 @ 06:05)  8.5 K/uL (10-25-17 @ 07:58)  9.9 K/uL (10-24-17 @ 09:06)      Hgb trend  7.6 g/dL (10-26-17 @ 06:05)  7.9 g/dL (10-25-17 @ 07:58)  7.9 g/dL (10-24-17 @ 09:06)      plt trend  239 K/uL (10-26-17 @ 06:05)  234 K/uL (10-25-17 @ 07:58)  215 K/uL (10-24-17 @ 09:06)        RADIOLOGY & ADDITIONAL STUDIES:

## 2017-10-27 NOTE — PROGRESS NOTE ADULT - SUBJECTIVE AND OBJECTIVE BOX
INTERVAL HPI: 32y old  Female who presents with a chief complaint of RLE edema and numbness. Patient admitted for Compartment syndrome, s/p Fasciotomy, was initially in ICU, finished course of IV antibiotics and was transferred to med- surg floors. Patient with ATN likely from rhabdomyolysis with improving creatinine, followed by nephrology.   Dr. Damico consulted for pain management and concern for opoid dependence and abuse. Nurse aware of pain management plan. Renal function improving. Spoke to patient and mother at bedside. Patient is persistently asking for opioids, but she is aware they are being tapered and managed accordingly with pain management Dr. Damico.   Spoke to patient at length in regards to her opioid dependence and need to taper down her dilaudid.  Spoke to pain management doctor Jeffrey Galaviz [phone]) see chart note.    Patient having low grade temperatures. Dr. Montiel consulted, no need for antibiotics at this time. CT LE done non specific swelling in calf, no drainable fluid collection noted. Discussed with Dr. Martines. Plastic surgery to decide when plan for possible skin grafting. Patient with anemia, prior transfusion, work up initiated. 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 Discussed with Dr. Jin Heme Onc, US done, non specific, if increased in size will biopsy, monitor for now, discussed with hematology. ENT Dr. Schmitz consulted, no intervention  Patient scheduled for skin grafting with Plastic surgery, postponed for 10/27.    S: Patient seen at bedside, had fever overnight again, CXR negative. Started on cefepime/Vancomycin, ID following.      MEDICATIONS  (STANDING):  BACItracin   Ointment 1 Application(s) Topical two times a day  buDESOnide  80 MICROgram(s)/formoterol 4.5 MICROgram(s) Inhaler 2 Puff(s) Inhalation two times a day  buPROPion XL . 300 milliGRAM(s) Oral daily  cefepime  IVPB 1000 milliGRAM(s) IV Intermittent every 8 hours  cefepime  IVPB      cyanocobalamin 1000 MICROGram(s) Oral daily  fentaNYL   Patch  12 MICROgram(s)/Hr 1 Patch Transdermal every 72 hours  fluticasone propionate 50 MICROgram(s)/spray Nasal Spray 1 Spray(s) Both Nostrils two times a day  folic acid 1 milliGRAM(s) Oral daily  influenza   Vaccine 0.5 milliLiter(s) IntraMuscular once  ipratropium 42 MICROgram(s) Nasal 2 Spray(s) Both Nostrils every 12 hours  lamoTRIgine 100 milliGRAM(s) Oral every 8 hours  levothyroxine 150 MICROGram(s) Oral daily  oxyCODONE  ER Tablet 60 milliGRAM(s) Oral every 8 hours  pantoprazole    Tablet 40 milliGRAM(s) Oral before breakfast  potassium chloride    Tablet ER 40 milliEquivalent(s) Oral two times a day  risperiDONE   Tablet 8 milliGRAM(s) Oral at bedtime  sertraline 50 milliGRAM(s) Oral daily  topiramate 400 milliGRAM(s) Oral two times a day  vancomycin  IVPB 1750 milliGRAM(s) IV Intermittent every 12 hours  Viibryd Oral Tablet 40 milliGRAM(s) 1 Tablet(s) Oral daily    MEDICATIONS  (PRN):  acetaminophen   Tablet 650 milliGRAM(s) Oral every 6 hours PRN For Temp greater than 38 C (100.4 F)  acetaminophen   Tablet. 650 milliGRAM(s) Oral every 6 hours PRN Mild Pain (1 - 3)  ALBUTerol    90 MICROgram(s) HFA Inhaler 2 Puff(s) Inhalation every 6 hours PRN Shortness of Breath and/or Wheezing  clonazePAM Tablet 2 milliGRAM(s) Oral every 8 hours PRN anxiety  docusate sodium 100 milliGRAM(s) Oral two times a day PRN Constipation  HYDROmorphone   Tablet 2 milliGRAM(s) Oral every 4 hours PRN Severe Pain (7 - 10)  HYDROmorphone  Injectable 0.5 milliGRAM(s) IV Push every 3 hours PRN Severe Pain (7 - 10)  ondansetron Injectable 4 milliGRAM(s) IV Push every 4 hours PRN Nausea and/or Vomiting  senna 2 Tablet(s) Oral at bedtime PRN Constipation          REVIEW OF SYSTEM:    Constitutional: + fever, no chills, fatigue  Neuro: No headache, numbness, weakness  Resp: No cough, wheezing, shortness of breath  CVS: No chest pain, palpitations, leg swelling  GI: No abdominal pain, nausea, vomiting, diarrhea   : No dysuria, frequency, incontinence  Skin: No itching, burning, rashes, or lesions   Msk: +pain in RLE, +pain in her back  Psych: No depression, anxiety, mood swings  Skin: + Left swollen lymph node    Vital Signs Last 24 Hrs  T(C): 37.6 (25 Oct 2017 07:00), Max: 38.3 (24 Oct 2017 15:00)  T(F): 99.6 (25 Oct 2017 07:00), Max: 101 (24 Oct 2017 15:00)  HR: 106 (25 Oct 2017 07:00) (98 - 106)  BP: 94/60 (25 Oct 2017 07:00) (94/60 - 106/70)  BP(mean): --  RR: 16 (25 Oct 2017 07:00) (16 - 18)  SpO2: 98% (25 Oct 2017 07:00) (98% - 98%)    PHYSICAL EXAM:  GENERAL: NAD  HEENT: NCAT, left sided soft palpable, movable mass, likely swollen lymph node  HEART: S1S2 RRR  RESPIRATORY: CTA B/L, No wheezing / rhonchi  ABDOMEN: Soft, Nontender, Nondistended; Bowel sounds present  NEUROLOGY: A&Ox3, nonfocal, not able to move RLE, sensation diminished RLE  EXTREMITIES:  +pulses mild edema R foot, LLE is moving + pulse  Skin: warm, well perfused       LABS:                        7.7    6.8   )-----------( 247      ( 27 Oct 2017 08:01 )             23.9     10-27    139  |  110<H>  |  20  ----------------------------<  93  4.1   |  19<L>  |  1.20    Ca    8.5      27 Oct 2017 08:05  Mg     1.9     10-26        Urinalysis Basic - ( 27 Oct 2017 09:59 )    Color: Yellow / Appearance: Clear / S.010 / pH: x  Gluc: x / Ketone: Negative  / Bili: Negative / Urobili: Negative   Blood: x / Protein: Negative / Nitrite: Negative   Leuk Esterase: Trace / RBC: 3-5 /HPF / WBC 6-10   Sq Epi: x / Non Sq Epi: Many / Bacteria: Few              ECG 10/26: NSR low voltage, LAD, rate approx 100. No TWI or STW noted        Culture Results:   10,000 - 49,000 CFU/mL Coag negative Staphylococcus not Staph  saprophyticus (10.16.17 @ 09:28)    Culture Results:   No growth at 5 days. (10.16.17 @ 09:27)      10-04 @ 10:15   No growth to date.  --  --  10-04 @ 09:59   No growth  --  --    < from: CT Lower Extremity No Cont, Right (10.21.17 @ 08:36) >  IMPRESSION:   1.  Multiple skin defects including the medial thigh, anterolateral calf   and posterior medial calf with absence of the underlying subcutaneous fat   and approximation of the underlying musculature to the skin surface in   keeping with prior fasciotomies.  2.  Nonspecific enlargement of the soleus musculature.  3.  Nonspecific soft tissue swelling about the calf and ankle maybe   postoperative. Differential includes infection. No drainable fluid   collection.  4.  Calcification within the medial gastrocnemius and soleus musculature   may be postoperative or posttraumatic in etiology.    < end of copied text >

## 2017-10-27 NOTE — PROGRESS NOTE ADULT - PROBLEM SELECTOR PLAN 3
DENISE hx  psych hx  cont pain regimen for acute and chronic pain  fentanyl patch low dose  oxycontin tid  cont dilaudid IV and PO prn dosing  reassurance and emotional support  monitor vs and sat  avoid over sedation  discussed with pt  motivation provided  will follow

## 2017-10-27 NOTE — PROGRESS NOTE ADULT - ASSESSMENT
31 yo woman with history of chronic pain and opioid abuse, brought in by EMS after being found unresponsive for at least 8 hours after fall and injury to right thigh; underwent fasciotomy emergently for compartment syndrome    Consulted for anemia evaluation and neck LAD. Seen by ENT.

## 2017-10-27 NOTE — PROGRESS NOTE ADULT - PROBLEM SELECTOR PLAN 3
Uknown etiology  CXR done negative  F/u urine/ blood Cxs  ID consult noted.   Started on Vancomycin and Cefepime/? pyelo  HIV negative.  CT Lower extremity done, no drainable fluid collection  Had prior CT A/P is normal, may warrant repeat

## 2017-10-28 LAB
ANION GAP SERPL CALC-SCNC: 10 MMOL/L — SIGNIFICANT CHANGE UP (ref 5–17)
BASOPHILS # BLD AUTO: 0.1 K/UL — SIGNIFICANT CHANGE UP (ref 0–0.2)
BASOPHILS NFR BLD AUTO: 0.9 % — SIGNIFICANT CHANGE UP (ref 0–2)
BUN SERPL-MCNC: 16 MG/DL — SIGNIFICANT CHANGE UP (ref 7–23)
CALCIUM SERPL-MCNC: 7.7 MG/DL — LOW (ref 8.5–10.1)
CHLORIDE SERPL-SCNC: 109 MMOL/L — HIGH (ref 96–108)
CO2 SERPL-SCNC: 20 MMOL/L — LOW (ref 22–31)
CREAT SERPL-MCNC: 0.97 MG/DL — SIGNIFICANT CHANGE UP (ref 0.5–1.3)
CULTURE RESULTS: NO GROWTH — SIGNIFICANT CHANGE UP
EOSINOPHIL # BLD AUTO: 0.5 K/UL — SIGNIFICANT CHANGE UP (ref 0–0.5)
EOSINOPHIL NFR BLD AUTO: 6.7 % — HIGH (ref 0–6)
GLUCOSE SERPL-MCNC: 121 MG/DL — HIGH (ref 70–99)
HCT VFR BLD CALC: 19.7 % — CRITICAL LOW (ref 34.5–45)
HGB BLD-MCNC: 6.4 G/DL — CRITICAL LOW (ref 11.5–15.5)
LYMPHOCYTES # BLD AUTO: 1.5 K/UL — SIGNIFICANT CHANGE UP (ref 1–3.3)
LYMPHOCYTES # BLD AUTO: 21.6 % — SIGNIFICANT CHANGE UP (ref 13–44)
MCHC RBC-ENTMCNC: 28.8 PG — SIGNIFICANT CHANGE UP (ref 27–34)
MCHC RBC-ENTMCNC: 32.6 GM/DL — SIGNIFICANT CHANGE UP (ref 32–36)
MCV RBC AUTO: 88.4 FL — SIGNIFICANT CHANGE UP (ref 80–100)
MONOCYTES # BLD AUTO: 0.9 K/UL — SIGNIFICANT CHANGE UP (ref 0–0.9)
MONOCYTES NFR BLD AUTO: 12.9 % — HIGH (ref 1–9)
NEUTROPHILS # BLD AUTO: 4 K/UL — SIGNIFICANT CHANGE UP (ref 1.8–7.4)
NEUTROPHILS NFR BLD AUTO: 58 % — SIGNIFICANT CHANGE UP (ref 43–77)
PLATELET # BLD AUTO: 229 K/UL — SIGNIFICANT CHANGE UP (ref 150–400)
POTASSIUM SERPL-MCNC: 3.6 MMOL/L — SIGNIFICANT CHANGE UP (ref 3.5–5.3)
POTASSIUM SERPL-SCNC: 3.6 MMOL/L — SIGNIFICANT CHANGE UP (ref 3.5–5.3)
RBC # BLD: 2.23 M/UL — LOW (ref 3.8–5.2)
RBC # FLD: 12.6 % — SIGNIFICANT CHANGE UP (ref 10.3–14.5)
SODIUM SERPL-SCNC: 139 MMOL/L — SIGNIFICANT CHANGE UP (ref 135–145)
SPECIMEN SOURCE: SIGNIFICANT CHANGE UP
VANCOMYCIN TROUGH SERPL-MCNC: 21.2 UG/ML — HIGH (ref 10–20)
WBC # BLD: 6.9 K/UL — SIGNIFICANT CHANGE UP (ref 3.8–10.5)
WBC # FLD AUTO: 6.9 K/UL — SIGNIFICANT CHANGE UP (ref 3.8–10.5)

## 2017-10-28 PROCEDURE — 99233 SBSQ HOSP IP/OBS HIGH 50: CPT

## 2017-10-28 PROCEDURE — 99232 SBSQ HOSP IP/OBS MODERATE 35: CPT

## 2017-10-28 RX ORDER — MEDROXYPROGESTERONE ACETATE 150 MG/ML
150 INJECTION, SUSPENSION, EXTENDED RELEASE INTRAMUSCULAR ONCE
Qty: 0 | Refills: 0 | Status: COMPLETED | OUTPATIENT
Start: 2017-10-28 | End: 2017-10-28

## 2017-10-28 RX ORDER — HYDROMORPHONE HYDROCHLORIDE 2 MG/ML
3 INJECTION INTRAMUSCULAR; INTRAVENOUS; SUBCUTANEOUS EVERY 4 HOURS
Qty: 0 | Refills: 0 | Status: DISCONTINUED | OUTPATIENT
Start: 2017-10-28 | End: 2017-10-28

## 2017-10-28 RX ORDER — HYDROMORPHONE HYDROCHLORIDE 2 MG/ML
0.5 INJECTION INTRAMUSCULAR; INTRAVENOUS; SUBCUTANEOUS
Qty: 0 | Refills: 0 | Status: DISCONTINUED | OUTPATIENT
Start: 2017-10-28 | End: 2017-10-29

## 2017-10-28 RX ORDER — ACETAMINOPHEN 500 MG
650 TABLET ORAL ONCE
Qty: 0 | Refills: 0 | Status: COMPLETED | OUTPATIENT
Start: 2017-10-28 | End: 2017-10-28

## 2017-10-28 RX ORDER — HYDROMORPHONE HYDROCHLORIDE 2 MG/ML
3 INJECTION INTRAMUSCULAR; INTRAVENOUS; SUBCUTANEOUS
Qty: 0 | Refills: 0 | Status: DISCONTINUED | OUTPATIENT
Start: 2017-10-28 | End: 2017-10-29

## 2017-10-28 RX ADMIN — HEPARIN SODIUM 5000 UNIT(S): 5000 INJECTION INTRAVENOUS; SUBCUTANEOUS at 13:47

## 2017-10-28 RX ADMIN — HYDROMORPHONE HYDROCHLORIDE 0.5 MILLIGRAM(S): 2 INJECTION INTRAMUSCULAR; INTRAVENOUS; SUBCUTANEOUS at 10:12

## 2017-10-28 RX ADMIN — OXYCODONE HYDROCHLORIDE 60 MILLIGRAM(S): 5 TABLET ORAL at 07:04

## 2017-10-28 RX ADMIN — MEDROXYPROGESTERONE ACETATE 150 MILLIGRAM(S): 150 INJECTION, SUSPENSION, EXTENDED RELEASE INTRAMUSCULAR at 17:52

## 2017-10-28 RX ADMIN — OXYCODONE HYDROCHLORIDE 60 MILLIGRAM(S): 5 TABLET ORAL at 14:15

## 2017-10-28 RX ADMIN — OXYCODONE HYDROCHLORIDE 60 MILLIGRAM(S): 5 TABLET ORAL at 22:20

## 2017-10-28 RX ADMIN — LAMOTRIGINE 100 MILLIGRAM(S): 25 TABLET, ORALLY DISINTEGRATING ORAL at 21:46

## 2017-10-28 RX ADMIN — HYDROMORPHONE HYDROCHLORIDE 1 MILLIGRAM(S): 2 INJECTION INTRAMUSCULAR; INTRAVENOUS; SUBCUTANEOUS at 06:08

## 2017-10-28 RX ADMIN — LAMOTRIGINE 100 MILLIGRAM(S): 25 TABLET, ORALLY DISINTEGRATING ORAL at 06:16

## 2017-10-28 RX ADMIN — HEPARIN SODIUM 5000 UNIT(S): 5000 INJECTION INTRAVENOUS; SUBCUTANEOUS at 06:27

## 2017-10-28 RX ADMIN — Medication 2 MILLIGRAM(S): at 23:15

## 2017-10-28 RX ADMIN — LAMOTRIGINE 100 MILLIGRAM(S): 25 TABLET, ORALLY DISINTEGRATING ORAL at 13:47

## 2017-10-28 RX ADMIN — HEPARIN SODIUM 5000 UNIT(S): 5000 INJECTION INTRAVENOUS; SUBCUTANEOUS at 21:50

## 2017-10-28 RX ADMIN — Medication 500 MILLIGRAM(S): at 17:46

## 2017-10-28 RX ADMIN — CEFEPIME 100 MILLIGRAM(S): 1 INJECTION, POWDER, FOR SOLUTION INTRAMUSCULAR; INTRAVENOUS at 14:28

## 2017-10-28 RX ADMIN — ONDANSETRON 4 MILLIGRAM(S): 8 TABLET, FILM COATED ORAL at 12:15

## 2017-10-28 RX ADMIN — PREGABALIN 1000 MICROGRAM(S): 225 CAPSULE ORAL at 12:33

## 2017-10-28 RX ADMIN — Medication 650 MILLIGRAM(S): at 04:44

## 2017-10-28 RX ADMIN — BUPROPION HYDROCHLORIDE 300 MILLIGRAM(S): 150 TABLET, EXTENDED RELEASE ORAL at 12:33

## 2017-10-28 RX ADMIN — HYDROMORPHONE HYDROCHLORIDE 1 MILLIGRAM(S): 2 INJECTION INTRAMUSCULAR; INTRAVENOUS; SUBCUTANEOUS at 01:37

## 2017-10-28 RX ADMIN — Medication 650 MILLIGRAM(S): at 05:20

## 2017-10-28 RX ADMIN — HYDROMORPHONE HYDROCHLORIDE 0.5 MILLIGRAM(S): 2 INJECTION INTRAMUSCULAR; INTRAVENOUS; SUBCUTANEOUS at 10:41

## 2017-10-28 RX ADMIN — HYDROMORPHONE HYDROCHLORIDE 3 MILLIGRAM(S): 2 INJECTION INTRAMUSCULAR; INTRAVENOUS; SUBCUTANEOUS at 20:48

## 2017-10-28 RX ADMIN — Medication 250 MILLIGRAM(S): at 18:14

## 2017-10-28 RX ADMIN — HYDROMORPHONE HYDROCHLORIDE 1 MILLIGRAM(S): 2 INJECTION INTRAMUSCULAR; INTRAVENOUS; SUBCUTANEOUS at 02:08

## 2017-10-28 RX ADMIN — OXYCODONE HYDROCHLORIDE 60 MILLIGRAM(S): 5 TABLET ORAL at 06:22

## 2017-10-28 RX ADMIN — Medication 2 SPRAY(S): at 06:22

## 2017-10-28 RX ADMIN — Medication 1 MILLIGRAM(S): at 12:38

## 2017-10-28 RX ADMIN — OXYCODONE HYDROCHLORIDE 60 MILLIGRAM(S): 5 TABLET ORAL at 13:47

## 2017-10-28 RX ADMIN — OXYCODONE HYDROCHLORIDE 60 MILLIGRAM(S): 5 TABLET ORAL at 21:46

## 2017-10-28 RX ADMIN — Medication 2 SPRAY(S): at 17:51

## 2017-10-28 RX ADMIN — HYDROMORPHONE HYDROCHLORIDE 3 MILLIGRAM(S): 2 INJECTION INTRAMUSCULAR; INTRAVENOUS; SUBCUTANEOUS at 12:34

## 2017-10-28 RX ADMIN — CEFEPIME 100 MILLIGRAM(S): 1 INJECTION, POWDER, FOR SOLUTION INTRAMUSCULAR; INTRAVENOUS at 21:56

## 2017-10-28 RX ADMIN — SERTRALINE 50 MILLIGRAM(S): 25 TABLET, FILM COATED ORAL at 12:33

## 2017-10-28 RX ADMIN — PANTOPRAZOLE SODIUM 40 MILLIGRAM(S): 20 TABLET, DELAYED RELEASE ORAL at 06:16

## 2017-10-28 RX ADMIN — HYDROMORPHONE HYDROCHLORIDE 3 MILLIGRAM(S): 2 INJECTION INTRAMUSCULAR; INTRAVENOUS; SUBCUTANEOUS at 12:37

## 2017-10-28 RX ADMIN — Medication 40 MILLIEQUIVALENT(S): at 17:46

## 2017-10-28 RX ADMIN — Medication 1 SPRAY(S): at 17:47

## 2017-10-28 RX ADMIN — Medication 400 MILLIGRAM(S): at 06:17

## 2017-10-28 RX ADMIN — CEFEPIME 100 MILLIGRAM(S): 1 INJECTION, POWDER, FOR SOLUTION INTRAMUSCULAR; INTRAVENOUS at 06:27

## 2017-10-28 RX ADMIN — Medication 1 SPRAY(S): at 06:22

## 2017-10-28 RX ADMIN — ONDANSETRON 4 MILLIGRAM(S): 8 TABLET, FILM COATED ORAL at 20:29

## 2017-10-28 RX ADMIN — BUDESONIDE AND FORMOTEROL FUMARATE DIHYDRATE 2 PUFF(S): 160; 4.5 AEROSOL RESPIRATORY (INHALATION) at 07:00

## 2017-10-28 RX ADMIN — Medication 250 MILLIGRAM(S): at 06:59

## 2017-10-28 RX ADMIN — HYDROMORPHONE HYDROCHLORIDE 3 MILLIGRAM(S): 2 INJECTION INTRAMUSCULAR; INTRAVENOUS; SUBCUTANEOUS at 17:46

## 2017-10-28 RX ADMIN — HYDROMORPHONE HYDROCHLORIDE 3 MILLIGRAM(S): 2 INJECTION INTRAMUSCULAR; INTRAVENOUS; SUBCUTANEOUS at 18:15

## 2017-10-28 RX ADMIN — RISPERIDONE 8 MILLIGRAM(S): 4 TABLET ORAL at 21:46

## 2017-10-28 RX ADMIN — Medication 500 MILLIGRAM(S): at 06:27

## 2017-10-28 RX ADMIN — HYDROMORPHONE HYDROCHLORIDE 3 MILLIGRAM(S): 2 INJECTION INTRAMUSCULAR; INTRAVENOUS; SUBCUTANEOUS at 22:20

## 2017-10-28 RX ADMIN — Medication 1 TABLET(S): at 12:38

## 2017-10-28 RX ADMIN — Medication 40 MILLIEQUIVALENT(S): at 06:16

## 2017-10-28 RX ADMIN — Medication 400 MILLIGRAM(S): at 17:46

## 2017-10-28 RX ADMIN — HYDROMORPHONE HYDROCHLORIDE 1 MILLIGRAM(S): 2 INJECTION INTRAMUSCULAR; INTRAVENOUS; SUBCUTANEOUS at 06:38

## 2017-10-28 RX ADMIN — ONDANSETRON 4 MILLIGRAM(S): 8 TABLET, FILM COATED ORAL at 06:12

## 2017-10-28 RX ADMIN — Medication 150 MICROGRAM(S): at 06:16

## 2017-10-28 NOTE — PROGRESS NOTE ADULT - ASSESSMENT
32-year-old female now status post right lower extremity fasciotomy for prolonged compression after a fall with lethargy.     - Rhabdo and GHAZAL resolved.   Avoid nephrotoxics  - No obvious cardiac complications post fasciotomy  - No clear evidence of acute ischemia or volume overload  - Abx per ID  - plastics/vascular f/u  - Continue Pain control  - TTE with hyperdynamic LV function, EF 70%, no valvular disease noted  - Other cardiovascular workup will depend on clinical course.  - Monitor and replete electrolytes. Keep K>4.0 and Mg>2.0.   - All other workup per primary team  - Will follow

## 2017-10-28 NOTE — PROGRESS NOTE ADULT - SUBJECTIVE AND OBJECTIVE BOX
Patient is a 32y old  Female who presents with a chief complaint of RLE edema and numbness (13 Oct 2017 16:25)      Vascular Surgery Progress Note    Interval HPI: Patient is s/p debridement of right leg wounds yesterday by Dr. Culver. She admits to some incisional pain of right lateral thigh      Medications:  cefepime  IVPB   cefepime  IVPB 1000  vancomycin  IVPB 1750      Allergies:  Allergies    avocado (Unknown)  latex (Urticaria; Rash)  penicillin (Unknown)  sulfa drugs (Unknown)    Intolerances        Vital Signs Last 24 Hrs  T(C): 37.4 (28 Oct 2017 08:36), Max: 37.4 (28 Oct 2017 08:36)  T(F): 99.4 (28 Oct 2017 08:36), Max: 99.4 (28 Oct 2017 08:36)  HR: 110 (28 Oct 2017 07:22) (88 - 112)  BP: 99/59 (28 Oct 2017 07:22) (99/59 - 114/62)  BP(mean): --  RR: 16 (28 Oct 2017 07:22) (12 - 71)  SpO2: 98% (28 Oct 2017 07:22) (97% - 100%)  I&O's Summary    27 Oct 2017 07:01  -  28 Oct 2017 07:00  --------------------------------------------------------  IN: 1760 mL / OUT: 1775 mL / NET: -15 mL        Physical Exam:  Gen: NAD, A&Ox3   RIGHT leg wounds have  2  wound VAC in place. The edema in right foot and leg is dramatically improved.  PULSES: 4/4 right femoral, popliteal DP and PT .  NEURO: the right foot remains insensate and with absent motor function    LABS:                        7.7    6.8   )-----------( 247      ( 27 Oct 2017 08:01 )             23.9     10-27    139  |  110<H>  |  20  ----------------------------<  93  4.1   |  19<L>  |  1.20    Ca    8.5      27 Oct 2017 08:05

## 2017-10-28 NOTE — PROGRESS NOTE ADULT - SUBJECTIVE AND OBJECTIVE BOX
Date/Time Patient Seen:  		  Referring MD:   Data Reviewed	       Patient is a 32y old  Female who presents with a chief complaint of RLE edema and numbness (13 Oct 2017 16:25)  in bed  seen and examined  vs and meds reviewed  on ABX  post op day 1      Subjective/HPI     PAST MEDICAL & SURGICAL HISTORY:  Low back pain  Hypothyroid  Bipolar 1 disorder  History of spinal fusion  History of laminectomy        Medication list         MEDICATIONS  (STANDING):  ascorbic acid 500 milliGRAM(s) Oral two times a day  BACItracin   Ointment 1 Application(s) Topical two times a day  buDESOnide  80 MICROgram(s)/formoterol 4.5 MICROgram(s) Inhaler 2 Puff(s) Inhalation two times a day  buPROPion XL . 300 milliGRAM(s) Oral daily  cefepime  IVPB      cefepime  IVPB 1000 milliGRAM(s) IV Intermittent every 8 hours  cyanocobalamin 1000 MICROGram(s) Oral daily  fentaNYL   Patch  12 MICROgram(s)/Hr 1 Patch Transdermal every 72 hours  fentaNYL   Patch  25 MICROgram(s)/Hr 1 Patch Transdermal every 72 hours  fluticasone propionate 50 MICROgram(s)/spray Nasal Spray 1 Spray(s) Both Nostrils two times a day  folic acid 1 milliGRAM(s) Oral daily  heparin  Injectable 5000 Unit(s) SubCutaneous every 8 hours  influenza   Vaccine 0.5 milliLiter(s) IntraMuscular once  ipratropium 42 MICROgram(s) Nasal 2 Spray(s) Both Nostrils every 12 hours  lamoTRIgine 100 milliGRAM(s) Oral every 8 hours  levothyroxine 150 MICROGram(s) Oral daily  multivitamin/minerals 1 Tablet(s) Oral daily  oxyCODONE  ER Tablet 60 milliGRAM(s) Oral every 8 hours  pantoprazole    Tablet 40 milliGRAM(s) Oral before breakfast  potassium chloride    Tablet ER 40 milliEquivalent(s) Oral two times a day  risperiDONE   Tablet 8 milliGRAM(s) Oral at bedtime  sertraline 50 milliGRAM(s) Oral daily  topiramate 400 milliGRAM(s) Oral two times a day  vancomycin  IVPB 1750 milliGRAM(s) IV Intermittent every 12 hours  Viibryd Oral Tablet 40 milliGRAM(s),Viibryd Oral Tablet 40 mg 1 Tablet(s) 1 Tablet(s) Oral daily    MEDICATIONS  (PRN):  acetaminophen   Tablet 650 milliGRAM(s) Oral every 6 hours PRN For Temp greater than 38 C (100.4 F)  acetaminophen   Tablet. 650 milliGRAM(s) Oral every 6 hours PRN Mild Pain (1 - 3)  ALBUTerol    90 MICROgram(s) HFA Inhaler 2 Puff(s) Inhalation every 6 hours PRN Shortness of Breath and/or Wheezing  clonazePAM Tablet 2 milliGRAM(s) Oral every 8 hours PRN anxiety  docusate sodium 100 milliGRAM(s) Oral two times a day PRN Constipation  HYDROmorphone   Tablet 2 milliGRAM(s) Oral every 4 hours PRN Severe Pain (7 - 10)  HYDROmorphone  Injectable 0.5 milliGRAM(s) IV Push every 3 hours PRN Severe Pain (7 - 10)  ondansetron Injectable 4 milliGRAM(s) IV Push every 4 hours PRN Nausea and/or Vomiting  senna 2 Tablet(s) Oral at bedtime PRN Constipation         Vitals log        ICU Vital Signs Last 24 Hrs  T(C): 37.4 (28 Oct 2017 08:36), Max: 37.4 (28 Oct 2017 08:36)  T(F): 99.4 (28 Oct 2017 08:36), Max: 99.4 (28 Oct 2017 08:36)  HR: 110 (28 Oct 2017 07:22) (88 - 112)  BP: 99/59 (28 Oct 2017 07:22) (99/59 - 114/62)  BP(mean): --  ABP: --  ABP(mean): --  RR: 16 (28 Oct 2017 07:22) (12 - 71)  SpO2: 98% (28 Oct 2017 07:22) (97% - 100%)           Input and Output:  I&O's Detail    27 Oct 2017 07:01  -  28 Oct 2017 07:00  --------------------------------------------------------  IN:    lactated ringers.: 200 mL    Oral Fluid: 960 mL    Solution: 500 mL    Solution: 100 mL  Total IN: 1760 mL    OUT:    VAC (Vacuum Assisted Closure) System: 100 mL    Voided: 1675 mL  Total OUT: 1775 mL    Total NET: -15 mL          Lab Data                        7.7    6.8   )-----------( 247      ( 27 Oct 2017 08:01 )             23.9     10-27    139  |  110<H>  |  20  ----------------------------<  93  4.1   |  19<L>  |  1.20    Ca    8.5      27 Oct 2017 08:05              Review of Systems	      Objective     Physical Examination    head at  heart - s1s2  lungs - dec BS  abd - soft  RLE dressed      Pertinent Lab findings & Imaging      Erlinda:  NO   Adequate UO     I&O's Detail    27 Oct 2017 07:01  -  28 Oct 2017 07:00  --------------------------------------------------------  IN:    lactated ringers.: 200 mL    Oral Fluid: 960 mL    Solution: 500 mL    Solution: 100 mL  Total IN: 1760 mL    OUT:    VAC (Vacuum Assisted Closure) System: 100 mL    Voided: 1675 mL  Total OUT: 1775 mL    Total NET: -15 mL               Discussed with:     Cultures:	        Radiology

## 2017-10-28 NOTE — PROGRESS NOTE ADULT - PROBLEM SELECTOR PLAN 2
-discussed pain management with Dr. Damico who has been in charge of patient's opiate regimen during the hospitalization. Trying to wean pt off opiates and especially IV dilaudid; decided to increase po PRN dilaudid to 3mg po q3h PRN to take in place of current 0.5IV q3h PRN. Pt agreed and has been using the po form.  -Continue fentanyl patch, which was increased

## 2017-10-28 NOTE — PROGRESS NOTE ADULT - ASSESSMENT
33yo F w/ PMH of asthma, Bipolar disorder, hypothyroidism, chronic low back pain s/p laminectomy and spinal fusion, opioid dependence presents with right leg swelling, numbness, pallor, poikilothermia, and pulselessness, found to have ischemic leg due to acute compartment syndrome of right leg with acute rhabdomyolysis, GHAZAL 2/2 ATN, and leukocytosis with bandemia s/p emergent fasciotomy of 4 leg compartments (anterior, lateral, superficial posterior, and deep posterior) and the lateral thigh muscles. Course c/b periodic low grade fever.

## 2017-10-28 NOTE — PROGRESS NOTE ADULT - SUBJECTIVE AND OBJECTIVE BOX
Follow up: cv risk    HPI:  Pt is a 31 yo female with PMHX of Bipolar disorder, hypothyroidism, chronic low back pain on morphine s/p laminectomy and spinal fusion in 2016 who is BIB EMS for right leg swelling and numbness. Patient is a poor historian as she is on many pain medicines and is currently lethargic, therefore much info provided based on hospital staff and charts. She states that 18 hours prior to arrival, she fell on the floor at her house after taking too much morphine and was lying on floor for 8 hours. She woke up to numbness, swelling, and cool temperature of her right leg, from below the knee onwards. Patient denies any current SOB, CP, f/c, denies head trauma.     In the ED, vitals were Temp 101.4F /91  RR 20  SpO2 93% RA. Significant labs are WBS 24.3, Hb 15.6, Hmt 47.6,  Platelets 118, neutr 93%, sodium 131, BUN 30,  Cr 2.30, glucose 152 AST 1377, , GFR 27, lactate 1.7. Doppler US RLE showed Extremely limited evaluation of the right lower extremity with nonvisualization of the venous structures beyond the mid right femoral vein. No evidence of DVT within the right common femoral, proximal and   mid femoral veins. No evidence of left lower extremity deep venous thrombosis. XR of right tibia and XR of R femur showed no acute fractures. Arterial Doppler of RLE pending. EKG showed sinus tach of 140, with right superior axis deviation. Patient started on aztreonam, Tylenol given for fever, patient made NPO, Dr. Martines consulted for emergent surgery. (04 Oct 2017 01:13)    Patient with some surgical pain but no other complaint    PAST MEDICAL & SURGICAL HISTORY:  Low back pain  Hypothyroid  Bipolar 1 disorder  History of spinal fusion  History of laminectomy      MEDICATIONS  (STANDING):  ascorbic acid 500 milliGRAM(s) Oral two times a day  BACItracin   Ointment 1 Application(s) Topical two times a day  buDESOnide  80 MICROgram(s)/formoterol 4.5 MICROgram(s) Inhaler 2 Puff(s) Inhalation two times a day  buPROPion XL . 300 milliGRAM(s) Oral daily  cefepime  IVPB      cefepime  IVPB 1000 milliGRAM(s) IV Intermittent every 8 hours  cyanocobalamin 1000 MICROGram(s) Oral daily  fentaNYL   Patch  12 MICROgram(s)/Hr 1 Patch Transdermal every 72 hours  fentaNYL   Patch  25 MICROgram(s)/Hr 1 Patch Transdermal every 72 hours  fluticasone propionate 50 MICROgram(s)/spray Nasal Spray 1 Spray(s) Both Nostrils two times a day  folic acid 1 milliGRAM(s) Oral daily  heparin  Injectable 5000 Unit(s) SubCutaneous every 8 hours  influenza   Vaccine 0.5 milliLiter(s) IntraMuscular once  ipratropium 42 MICROgram(s) Nasal 2 Spray(s) Both Nostrils every 12 hours  lamoTRIgine 100 milliGRAM(s) Oral every 8 hours  levothyroxine 150 MICROGram(s) Oral daily  medroxyPROGESTERone depot Injectable 150 milliGRAM(s) IntraMuscular once  multivitamin/minerals 1 Tablet(s) Oral daily  oxyCODONE  ER Tablet 60 milliGRAM(s) Oral every 8 hours  pantoprazole    Tablet 40 milliGRAM(s) Oral before breakfast  potassium chloride    Tablet ER 40 milliEquivalent(s) Oral two times a day  risperiDONE   Tablet 8 milliGRAM(s) Oral at bedtime  sertraline 50 milliGRAM(s) Oral daily  topiramate 400 milliGRAM(s) Oral two times a day  vancomycin  IVPB 1750 milliGRAM(s) IV Intermittent every 12 hours  Viibryd Oral Tablet 40 milliGRAM(s),Viibryd Oral Tablet 40 mg 1 Tablet(s) 1 Tablet(s) Oral daily    MEDICATIONS  (PRN):  acetaminophen   Tablet 650 milliGRAM(s) Oral every 6 hours PRN For Temp greater than 38 C (100.4 F)  acetaminophen   Tablet. 650 milliGRAM(s) Oral every 6 hours PRN Mild Pain (1 - 3)  ALBUTerol    90 MICROgram(s) HFA Inhaler 2 Puff(s) Inhalation every 6 hours PRN Shortness of Breath and/or Wheezing  clonazePAM Tablet 2 milliGRAM(s) Oral every 8 hours PRN anxiety  docusate sodium 100 milliGRAM(s) Oral two times a day PRN Constipation  HYDROmorphone   Tablet 3 milliGRAM(s) Oral every 3 hours PRN moderate to severe pain  HYDROmorphone  Injectable 0.5 milliGRAM(s) IV Push every 3 hours PRN Severe Pain (7 - 10)  ondansetron Injectable 4 milliGRAM(s) IV Push every 4 hours PRN Nausea and/or Vomiting  senna 2 Tablet(s) Oral at bedtime PRN Constipation    Vital Signs Last 24 Hrs  T(C): 37.5 (28 Oct 2017 16:00), Max: 37.9 (28 Oct 2017 14:31)  T(F): 99.5 (28 Oct 2017 16:00), Max: 100.2 (28 Oct 2017 14:31)  HR: 109 (28 Oct 2017 16:00) (94 - 112)  BP: 110/66 (28 Oct 2017 16:00) (99/59 - 114/62)  BP(mean): --  RR: 16 (28 Oct 2017 16:00) (12 - 71)  SpO2: 99% (28 Oct 2017 16:00) (97% - 100%)    I&O's Summary    27 Oct 2017 07:01  -  28 Oct 2017 07:00  --------------------------------------------------------  IN: 1760 mL / OUT: 1775 mL / NET: -15 mL        PHYSICAL EXAM:    Constitutional: NAD, awake and alert, well-developed  Eyes:   Pupils round, no lesions  ENMT: no exudate or erythema  Pulmonary: Non-labored, breath sounds are clear bilaterally, No wheezing, rales or rhonchi  Cardiovascular: PMI not palpable RRR normal S1 and S2, no murmurs, rubs, gallops or clicks  Gastrointestinal: Bowel Sounds present, soft, nontender.   Lymph: No cervical lymphadenopathy.  Neurological: Alert, no focal deficits  Skin: No rashes.  No cyanosis.  Psych:  Mood & affect appropriate   Ext: No lower ext edema                                6.4    6.9   )-----------( 229      ( 28 Oct 2017 09:45 )             19.7     10-28    139  |  109<H>  |  16  ----------------------------<  121<H>  3.6   |  20<L>  |  0.97    Ca    7.7<L>      28 Oct 2017 09:45      < from: 12 Lead ECG (10.26.17 @ 15:02) >    Ventricular Rate 104 BPM    Atrial Rate 104 BPM    P-R Interval 152 ms    QRS Duration 106 ms    Q-T Interval 358 ms    QTC Calculation(Bezet) 470 ms    P Axis 42 degrees    R Axis -1 degrees    T Axis 29 degrees    Diagnosis Line Sinus tachycardia  Low voltage QRS  Borderline ECG  When compared with ECG of 04-OCT-2017 08:44,  QRS axis shifted right  Nonspecific T wave abnormality no longer evident in Anterior leads  Confirmed by Juanito Brink MD ([id number]) on 10/27/2017 2:02:06 PM    < end of copied text >  < from: Xray Chest 1 View AP- PORTABLE-Urgent (10.26.17 @ 19:55) >    EXAM:  PORTABLE CHEST URGENT                            PROCEDURE DATE:  10/26/2017          INTERPRETATION:  Clinical information: Fever    Comparison exam dated 10/16/2017    Patient is tilted towards the left.    Portable study, 7:41 PM        No evidence of infiltrate effusion or congestive failure. Heart size   within normal limits. Hilar regions mediastinal contours and bony thorax   are intact. Old left clavicle fracture present.    IMPRESSION: No active disease.                KATHY AMAYA M.D.,ATTENDING RADIOLOGIST  This document has been electronically signed. Oct 26 2017  8:00PM                < end of copied text >  < from: TTE Echo Doppler w/o Cont (10.05.17 @ 12:46) >     EXAM:  ECHO TTE W/O CON COMP W/DOPPLR         PROCEDURE DATE:  10/05/2017        INTERPRETATION:  Ordering Physician: MARISSA RUFF [id number]    Indication: Respiratory failure    Study Quality: Technically difficult in ICU   A complete echocardiographic study was performed utilizing standard   protocol including spectral and color Doppler in all echocardiographic   windows.    Height: 1 75 cm  Weight: 113 kg  BSA: 2.27  Blood Pressure: 104/76    MEASUREMENTS  IVS: 0.9cm  PWT: 0.9cm  LA: 3.5cm  AO: 2.8cm  LVIDd: 5.2cm  LVIDs: 3.5cm      LVEF: 70%    FINDINGS  Left Ventricle: Hyperdynamic left ventricle. Estimated EF 70%. No   segmental wall motion abnormalities  Aortic Valve: Not well-visualized. Probably normal trileaflet aortic valve  Mitral Valve: There is subtle systolic anterior motion of the mitral   valve in the setting of a hyperdynamic left ventricle. No left   ventricular outflow tract gradient is seen. Trace mitral regurgitation is   visualized.  Tricuspid Valve: Not well visualized. Trace tricuspid regurgitation  Pulmonic Valve: Not visualized.  Left Atrium: Grossly normal  Right Ventricle: Not well-visualized. Grossly normal right ventricular   size and function  Right Atrium: Grossly normal  Diastolic Function: Normaldiastolic function  Pericardium/Pleura: No pericardial effusion      CONCLUSIONS:  1. Technically difficult study  2. Hyperdynamic left ventricle. Estimated EF 70%  3. Subtle systolic anterior motion of the mitral valve in the setting of   a hyperdynamic left ventricle, but no outflow tract gradient.  4. Right ventricle is not well visualized.Grossly normal right   ventricular size and function  5. No pericardial effusion    No prior exam for comparison                  ROBERTA MARKS   This document has been electronically signed. Oct  6 2017  1:33PM                < end of copied text >

## 2017-10-28 NOTE — PROGRESS NOTE ADULT - SUBJECTIVE AND OBJECTIVE BOX
MERVAT ABELARDO  MRN-[id number]    PLASTIC SURGERY/ DR. HERNANDEZ    Vital Signs Last 24 Hrs  T(C): 37.3 (28 Oct 2017 00:15), Max: 38.3 (27 Oct 2017 08:00)  T(F): 99.2 (28 Oct 2017 00:15), Max: 101 (27 Oct 2017 08:00)  HR: 104 (28 Oct 2017 00:15) (88 - 112)  BP: 104/64 (28 Oct 2017 00:15) (95/63 - 114/62)  BP(mean): --  RR: 16 (28 Oct 2017 00:15) (12 - 71)  SpO2: 99% (28 Oct 2017 00:15) (97% - 100%)    POD # 1    RIGHT LEG    WOUND VAC X2 IN PLACE WITH [employer]  RIGHT THIGH, LOWER LEG MEDIAL NAD LATERAL CALF SOME EDEMA   FLAPS INTACT AND VIABLE    RIGHT FOOT WARM WITH GOOD PEDAL PULSES, SOME EDEMA     A/P S/P DEBRIDEMENT RIGHT THIGH WOUND WITH PARTIAL CLOSURE AND APPLICATION OF WOUND VAC              DEBRIDEMENT OF NECROTIC MUSCLE, IRRIGATION  AND APPLICATION OF WOUND VAC RIGHT LOWER LEG        CONTINUE WITH WOUND VAC THERAPY, ELEVATION , IV ANTIBIOTICS AS PER ID  TRANSFUSE AS NEEDED

## 2017-10-28 NOTE — PROGRESS NOTE ADULT - PROBLEM SELECTOR PLAN 2
post op  dilaudid 0.5 mg IV q 3 hrs prn  dilaudid PO prn  fentanyl Patch / oxycontin long acting pain regimen  bowel regimen  reassurance and counseling

## 2017-10-28 NOTE — PROGRESS NOTE ADULT - SUBJECTIVE AND OBJECTIVE BOX
Patient is a 32y old  Female who presents with a chief complaint of RLE edema and numbness (13 Oct 2017 16:25)      INTERVAL HPI/OVERNIGHT EVENTS: Pt complains of right leg pain, sharp in quality, severe in quantity, non-radiating, worse after debridement yesterday. Discussed with surgery PA who was in the OR, pt had minimal debridement of muscle, mostly irrigation, had a few stitches placed on right thigh.     MEDICATIONS  (STANDING):  ascorbic acid 500 milliGRAM(s) Oral two times a day  BACItracin   Ointment 1 Application(s) Topical two times a day  buDESOnide  80 MICROgram(s)/formoterol 4.5 MICROgram(s) Inhaler 2 Puff(s) Inhalation two times a day  buPROPion XL . 300 milliGRAM(s) Oral daily  cefepime  IVPB      cefepime  IVPB 1000 milliGRAM(s) IV Intermittent every 8 hours  cyanocobalamin 1000 MICROGram(s) Oral daily  fentaNYL   Patch  12 MICROgram(s)/Hr 1 Patch Transdermal every 72 hours  fentaNYL   Patch  25 MICROgram(s)/Hr 1 Patch Transdermal every 72 hours  fluticasone propionate 50 MICROgram(s)/spray Nasal Spray 1 Spray(s) Both Nostrils two times a day  folic acid 1 milliGRAM(s) Oral daily  heparin  Injectable 5000 Unit(s) SubCutaneous every 8 hours  influenza   Vaccine 0.5 milliLiter(s) IntraMuscular once  ipratropium 42 MICROgram(s) Nasal 2 Spray(s) Both Nostrils every 12 hours  lamoTRIgine 100 milliGRAM(s) Oral every 8 hours  levothyroxine 150 MICROGram(s) Oral daily  multivitamin/minerals 1 Tablet(s) Oral daily  oxyCODONE  ER Tablet 60 milliGRAM(s) Oral every 8 hours  pantoprazole    Tablet 40 milliGRAM(s) Oral before breakfast  potassium chloride    Tablet ER 40 milliEquivalent(s) Oral two times a day  risperiDONE   Tablet 8 milliGRAM(s) Oral at bedtime  sertraline 50 milliGRAM(s) Oral daily  topiramate 400 milliGRAM(s) Oral two times a day  vancomycin  IVPB 1500 milliGRAM(s) IV Intermittent every 12 hours  Viibryd Oral Tablet 40 milliGRAM(s),Viibryd Oral Tablet 40 mg 1 Tablet(s) 1 Tablet(s) Oral daily    MEDICATIONS  (PRN):  acetaminophen   Tablet 650 milliGRAM(s) Oral every 6 hours PRN For Temp greater than 38 C (100.4 F)  acetaminophen   Tablet. 650 milliGRAM(s) Oral every 6 hours PRN Mild Pain (1 - 3)  ALBUTerol    90 MICROgram(s) HFA Inhaler 2 Puff(s) Inhalation every 6 hours PRN Shortness of Breath and/or Wheezing  clonazePAM Tablet 2 milliGRAM(s) Oral every 8 hours PRN anxiety  docusate sodium 100 milliGRAM(s) Oral two times a day PRN Constipation  HYDROmorphone   Tablet 3 milliGRAM(s) Oral every 3 hours PRN moderate to severe pain  HYDROmorphone  Injectable 0.5 milliGRAM(s) IV Push every 3 hours PRN Severe Pain (7 - 10)  ondansetron Injectable 4 milliGRAM(s) IV Push every 4 hours PRN Nausea and/or Vomiting  senna 2 Tablet(s) Oral at bedtime PRN Constipation      Allergies    avocado (Unknown)  latex (Urticaria; Rash)  penicillin (Unknown)  sulfa drugs (Unknown)    Intolerances        REVIEW OF SYSTEMS:  CONSTITUTIONAL: No fever or chills  HEENT:  No headache, no sore throat  RESPIRATORY: No cough, wheezing, or shortness of breath  CARDIOVASCULAR: No chest pain, palpitations   GASTROINTESTINAL: No nausea, vomiting, or diarrhea  GENITOURINARY: No dysuria, frequency, or hematuria  NEUROLOGICAL: +weakness and loss of sensation in the right leg (especially below the knee  MUSCULOSKELETAL: +right leg pain (see above)  PSYCHIATRIC: 5 +anxiety    Vital Signs Last 24 Hrs  T(C): 37.2 Max: 37.9 (28 Oct 2017 14:31)  T(F): 98.9  Max: 100.2 (28 Oct 2017 14:31)  HR: 100  (94 - 110)  BP: 103/63  (99/59 - 116/69)  BP(mean): --  RR: 16  (16 - 17)  SpO2: 98%  (97% - 99%)    PHYSICAL EXAM:  GENERAL: tearful, asking for increasing in dilaudid for pain management  HEENT:  EOMI, moist mucous membranes  CHEST/LUNG:  CTA b/l, no rales, wheezes, or rhonchi  HEART:  RRR, S1, S2  ABDOMEN:  BS+, soft, nontender, nondistended  EXTREMITIES: right leg almost entirely bandaged; wound vacs in place; no swelling or calf tenderness in the left leg  NERVOUS SYSTEM: AA&Ox3    LABS:                        6.4    6.9   )-----------( 229      ( 28 Oct 2017 09:45 )             19.7     CBC Full  -  ( 28 Oct 2017 09:45 )  WBC Count : 6.9 K/uL  Hemoglobin : 6.4 g/dL  Hematocrit : 19.7 %  Platelet Count - Automated : 229 K/uL  Mean Cell Volume : 88.4 fl  Mean Cell Hemoglobin : 28.8 pg  Mean Cell Hemoglobin Concentration : 32.6 gm/dL  Auto Neutrophil # : 4.0 K/uL  Auto Lymphocyte # : 1.5 K/uL  Auto Monocyte # : 0.9 K/uL  Auto Eosinophil # : 0.5 K/uL  Auto Basophil # : 0.1 K/uL  Auto Neutrophil % : 58.0 %  Auto Lymphocyte % : 21.6 %  Auto Monocyte % : 12.9 %  Auto Eosinophil % : 6.7 %  Auto Basophil % : 0.9 %    28 Oct 2017 09:45    139    |  109    |  16     ----------------------------<  121    3.6     |  20     |  0.97     Ca    7.7        28 Oct 2017 09:45        Urinalysis Basic - ( 27 Oct 2017 09:59 )    Color: Yellow / Appearance: Clear / S.010 / pH: x  Gluc: x / Ketone: Negative  / Bili: Negative / Urobili: Negative   Blood: x / Protein: Negative / Nitrite: Negative   Leuk Esterase: Trace / RBC: 3-5 /HPF / WBC 6-10   Sq Epi: x / Non Sq Epi: Many / Bacteria: Few      CAPILLARY BLOOD GLUCOSE            Culture - Urine (collected 10-27-17 @ 14:55)  Source: .Urine Catheterized  Final Report (10-28-17 @ 11:49):    No growth    Culture - Blood (collected 10-27-17 @ 13:27)  Source: .Blood Blood-Peripheral  Preliminary Report (10-28-17 @ 14:01):    No growth to date.    Culture - Blood (collected 10-27-17 @ 13:27)  Source: .Blood Blood-Venous  Preliminary Report (10-28-17 @ 14:01):    No growth to date.        RADIOLOGY & ADDITIONAL TESTS:  CXR 10/26: no active disease  Personally reviewed.     Consultant(s) Notes Reviewed:  [x] YES  [ ] NO    Care Discussed with [x] Consultants  [x] Patient  [ ] Family  [ ]      [ ] Other; RN  DVT ppx: JENNIFER Patient is a 32y old  Female who presents with a chief complaint of RLE edema and numbness (13 Oct 2017 16:25)    INTERVAL HPI/OVERNIGHT EVENTS: Pt complains of right leg pain, sharp in quality, severe in quantity, non-radiating, worse after debridement yesterday. Discussed with surgery PA who was in the OR, pt had minimal debridement of muscle, mostly irrigation, had a few stitches placed on right thigh.     MEDICATIONS  (STANDING):  ascorbic acid 500 milliGRAM(s) Oral two times a day  BACItracin   Ointment 1 Application(s) Topical two times a day  buDESOnide  80 MICROgram(s)/formoterol 4.5 MICROgram(s) Inhaler 2 Puff(s) Inhalation two times a day  buPROPion XL . 300 milliGRAM(s) Oral daily  cefepime  IVPB      cefepime  IVPB 1000 milliGRAM(s) IV Intermittent every 8 hours  cyanocobalamin 1000 MICROGram(s) Oral daily  fentaNYL   Patch  12 MICROgram(s)/Hr 1 Patch Transdermal every 72 hours  fentaNYL   Patch  25 MICROgram(s)/Hr 1 Patch Transdermal every 72 hours  fluticasone propionate 50 MICROgram(s)/spray Nasal Spray 1 Spray(s) Both Nostrils two times a day  folic acid 1 milliGRAM(s) Oral daily  heparin  Injectable 5000 Unit(s) SubCutaneous every 8 hours  influenza   Vaccine 0.5 milliLiter(s) IntraMuscular once  ipratropium 42 MICROgram(s) Nasal 2 Spray(s) Both Nostrils every 12 hours  lamoTRIgine 100 milliGRAM(s) Oral every 8 hours  levothyroxine 150 MICROGram(s) Oral daily  multivitamin/minerals 1 Tablet(s) Oral daily  oxyCODONE  ER Tablet 60 milliGRAM(s) Oral every 8 hours  pantoprazole    Tablet 40 milliGRAM(s) Oral before breakfast  potassium chloride    Tablet ER 40 milliEquivalent(s) Oral two times a day  risperiDONE   Tablet 8 milliGRAM(s) Oral at bedtime  sertraline 50 milliGRAM(s) Oral daily  topiramate 400 milliGRAM(s) Oral two times a day  vancomycin  IVPB 1500 milliGRAM(s) IV Intermittent every 12 hours  Viibryd Oral Tablet 40 milliGRAM(s),Viibryd Oral Tablet 40 mg 1 Tablet(s) 1 Tablet(s) Oral daily    MEDICATIONS  (PRN):  acetaminophen   Tablet 650 milliGRAM(s) Oral every 6 hours PRN For Temp greater than 38 C (100.4 F)  acetaminophen   Tablet. 650 milliGRAM(s) Oral every 6 hours PRN Mild Pain (1 - 3)  ALBUTerol    90 MICROgram(s) HFA Inhaler 2 Puff(s) Inhalation every 6 hours PRN Shortness of Breath and/or Wheezing  clonazePAM Tablet 2 milliGRAM(s) Oral every 8 hours PRN anxiety  docusate sodium 100 milliGRAM(s) Oral two times a day PRN Constipation  HYDROmorphone   Tablet 3 milliGRAM(s) Oral every 3 hours PRN moderate to severe pain  HYDROmorphone  Injectable 0.5 milliGRAM(s) IV Push every 3 hours PRN Severe Pain (7 - 10)  ondansetron Injectable 4 milliGRAM(s) IV Push every 4 hours PRN Nausea and/or Vomiting  senna 2 Tablet(s) Oral at bedtime PRN Constipation      Allergies    avocado (Unknown)  latex (Urticaria; Rash)  penicillin (Unknown)  sulfa drugs (Unknown)    Intolerances        REVIEW OF SYSTEMS:  CONSTITUTIONAL: No fever or chills  HEENT:  No headache, no sore throat  RESPIRATORY: No cough, wheezing, or shortness of breath  CARDIOVASCULAR: No chest pain, palpitations   GASTROINTESTINAL: No nausea, vomiting, or diarrhea  GENITOURINARY: No dysuria, frequency, or hematuria  NEUROLOGICAL: +weakness and loss of sensation in the right leg (especially below the knee  MUSCULOSKELETAL: +right leg pain (see above)  PSYCHIATRIC: 5 +anxiety    Vital Signs Last 24 Hrs  T(C): 37.2 Max: 37.9 (28 Oct 2017 14:31)  T(F): 98.9  Max: 100.2 (28 Oct 2017 14:31)  HR: 100  (94 - 110)  BP: 103/63  (99/59 - 116/69)  BP(mean): --  RR: 16  (16 - 17)  SpO2: 98%  (97% - 99%)    PHYSICAL EXAM:  GENERAL: tearful, asking for increasing in dilaudid for pain management  HEENT:  EOMI, moist mucous membranes  CHEST/LUNG:  CTA b/l, no rales, wheezes, or rhonchi  HEART:  RRR, S1, S2  ABDOMEN:  BS+, soft, nontender, nondistended  EXTREMITIES: right leg almost entirely bandaged; wound vacs in place; no swelling or calf tenderness in the left leg  NERVOUS SYSTEM: AA&Ox3    LABS:                        6.4    6.9   )-----------( 229      ( 28 Oct 2017 09:45 )             19.7     CBC Full  -  ( 28 Oct 2017 09:45 )  WBC Count : 6.9 K/uL  Hemoglobin : 6.4 g/dL  Hematocrit : 19.7 %  Platelet Count - Automated : 229 K/uL  Mean Cell Volume : 88.4 fl  Mean Cell Hemoglobin : 28.8 pg  Mean Cell Hemoglobin Concentration : 32.6 gm/dL  Auto Neutrophil # : 4.0 K/uL  Auto Lymphocyte # : 1.5 K/uL  Auto Monocyte # : 0.9 K/uL  Auto Eosinophil # : 0.5 K/uL  Auto Basophil # : 0.1 K/uL  Auto Neutrophil % : 58.0 %  Auto Lymphocyte % : 21.6 %  Auto Monocyte % : 12.9 %  Auto Eosinophil % : 6.7 %  Auto Basophil % : 0.9 %    28 Oct 2017 09:45    139    |  109    |  16     ----------------------------<  121    3.6     |  20     |  0.97     Ca    7.7        28 Oct 2017 09:45        Urinalysis Basic - ( 27 Oct 2017 09:59 )    Color: Yellow / Appearance: Clear / S.010 / pH: x  Gluc: x / Ketone: Negative  / Bili: Negative / Urobili: Negative   Blood: x / Protein: Negative / Nitrite: Negative   Leuk Esterase: Trace / RBC: 3-5 /HPF / WBC 6-10   Sq Epi: x / Non Sq Epi: Many / Bacteria: Few      CAPILLARY BLOOD GLUCOSE            Culture - Urine (collected 10-27-17 @ 14:55)  Source: .Urine Catheterized  Final Report (10-28-17 @ 11:49):    No growth    Culture - Blood (collected 10-27-17 @ 13:27)  Source: .Blood Blood-Peripheral  Preliminary Report (10-28-17 @ 14:01):    No growth to date.    Culture - Blood (collected 10-27-17 @ 13:27)  Source: .Blood Blood-Venous  Preliminary Report (10-28-17 @ 14:01):    No growth to date.        RADIOLOGY & ADDITIONAL TESTS:  CXR 10/26: no active disease  Personally reviewed.     Consultant(s) Notes Reviewed:  [x] YES  [ ] NO    Care Discussed with [x] Consultants  [x] Patient  [ ] Family  [ ]      [ ] Other; RN  DVT ppx: JENNIFER Pt seen ~11:45AM, then subsequently re-evaluated pain management during the day. Vanco dosing decreased after a supratherapeutic level at 8PM (8PM dose held, will restart vanco at lower dose tomorrow if AM trough is less than 15).    Patient is a 32y old  Female who presents with a chief complaint of RLE edema and numbness (13 Oct 2017 16:25)    INTERVAL HPI/OVERNIGHT EVENTS: Pt complains of right leg pain, sharp in quality, severe in quantity, non-radiating, worse after debridement yesterday. Discussed with surgery PA who was in the OR, pt had minimal debridement of muscle, mostly irrigation, had a few stitches placed on right thigh.     MEDICATIONS  (STANDING):  ascorbic acid 500 milliGRAM(s) Oral two times a day  BACItracin   Ointment 1 Application(s) Topical two times a day  buDESOnide  80 MICROgram(s)/formoterol 4.5 MICROgram(s) Inhaler 2 Puff(s) Inhalation two times a day  buPROPion XL . 300 milliGRAM(s) Oral daily  cefepime  IVPB      cefepime  IVPB 1000 milliGRAM(s) IV Intermittent every 8 hours  cyanocobalamin 1000 MICROGram(s) Oral daily  fentaNYL   Patch  12 MICROgram(s)/Hr 1 Patch Transdermal every 72 hours  fentaNYL   Patch  25 MICROgram(s)/Hr 1 Patch Transdermal every 72 hours  fluticasone propionate 50 MICROgram(s)/spray Nasal Spray 1 Spray(s) Both Nostrils two times a day  folic acid 1 milliGRAM(s) Oral daily  heparin  Injectable 5000 Unit(s) SubCutaneous every 8 hours  influenza   Vaccine 0.5 milliLiter(s) IntraMuscular once  ipratropium 42 MICROgram(s) Nasal 2 Spray(s) Both Nostrils every 12 hours  lamoTRIgine 100 milliGRAM(s) Oral every 8 hours  levothyroxine 150 MICROGram(s) Oral daily  multivitamin/minerals 1 Tablet(s) Oral daily  oxyCODONE  ER Tablet 60 milliGRAM(s) Oral every 8 hours  pantoprazole    Tablet 40 milliGRAM(s) Oral before breakfast  potassium chloride    Tablet ER 40 milliEquivalent(s) Oral two times a day  risperiDONE   Tablet 8 milliGRAM(s) Oral at bedtime  sertraline 50 milliGRAM(s) Oral daily  topiramate 400 milliGRAM(s) Oral two times a day  vancomycin  IVPB 1500 milliGRAM(s) IV Intermittent every 12 hours  Viibryd Oral Tablet 40 milliGRAM(s),Viibryd Oral Tablet 40 mg 1 Tablet(s) 1 Tablet(s) Oral daily    MEDICATIONS  (PRN):  acetaminophen   Tablet 650 milliGRAM(s) Oral every 6 hours PRN For Temp greater than 38 C (100.4 F)  acetaminophen   Tablet. 650 milliGRAM(s) Oral every 6 hours PRN Mild Pain (1 - 3)  ALBUTerol    90 MICROgram(s) HFA Inhaler 2 Puff(s) Inhalation every 6 hours PRN Shortness of Breath and/or Wheezing  clonazePAM Tablet 2 milliGRAM(s) Oral every 8 hours PRN anxiety  docusate sodium 100 milliGRAM(s) Oral two times a day PRN Constipation  HYDROmorphone   Tablet 3 milliGRAM(s) Oral every 3 hours PRN moderate to severe pain  HYDROmorphone  Injectable 0.5 milliGRAM(s) IV Push every 3 hours PRN Severe Pain (7 - 10)  ondansetron Injectable 4 milliGRAM(s) IV Push every 4 hours PRN Nausea and/or Vomiting  senna 2 Tablet(s) Oral at bedtime PRN Constipation      Allergies    avocado (Unknown)  latex (Urticaria; Rash)  penicillin (Unknown)  sulfa drugs (Unknown)    Intolerances        REVIEW OF SYSTEMS:  CONSTITUTIONAL: No fever or chills  HEENT:  No headache, no sore throat  RESPIRATORY: No cough, wheezing, or shortness of breath  CARDIOVASCULAR: No chest pain, palpitations   GASTROINTESTINAL: No nausea, vomiting, or diarrhea  GENITOURINARY: No dysuria, frequency, or hematuria  NEUROLOGICAL: +weakness and loss of sensation in the right leg (especially below the knee  MUSCULOSKELETAL: +right leg pain (see above)  PSYCHIATRIC: 5 +anxiety    Vital Signs Last 24 Hrs  T(C): 37.2 Max: 37.9 (28 Oct 2017 14:31)  T(F): 98.9  Max: 100.2 (28 Oct 2017 14:31)  HR: 100  (94 - 110)  BP: 103/63  (99/59 - 116/69)  BP(mean): --  RR: 16  (16 - 17)  SpO2: 98%  (97% - 99%)    PHYSICAL EXAM:  GENERAL: tearful, asking for increasing in dilaudid for pain management  HEENT:  EOMI, moist mucous membranes  CHEST/LUNG:  CTA b/l, no rales, wheezes, or rhonchi  HEART:  RRR, S1, S2  ABDOMEN:  BS+, soft, nontender, nondistended  EXTREMITIES: right leg almost entirely bandaged; wound vacs in place; no swelling or calf tenderness in the left leg  NERVOUS SYSTEM: AA&Ox3    LABS:                        6.4    6.9   )-----------( 229      ( 28 Oct 2017 09:45 )             19.7     CBC Full  -  ( 28 Oct 2017 09:45 )  WBC Count : 6.9 K/uL  Hemoglobin : 6.4 g/dL  Hematocrit : 19.7 %  Platelet Count - Automated : 229 K/uL  Mean Cell Volume : 88.4 fl  Mean Cell Hemoglobin : 28.8 pg  Mean Cell Hemoglobin Concentration : 32.6 gm/dL  Auto Neutrophil # : 4.0 K/uL  Auto Lymphocyte # : 1.5 K/uL  Auto Monocyte # : 0.9 K/uL  Auto Eosinophil # : 0.5 K/uL  Auto Basophil # : 0.1 K/uL  Auto Neutrophil % : 58.0 %  Auto Lymphocyte % : 21.6 %  Auto Monocyte % : 12.9 %  Auto Eosinophil % : 6.7 %  Auto Basophil % : 0.9 %    28 Oct 2017 09:45    139    |  109    |  16     ----------------------------<  121    3.6     |  20     |  0.97     Ca    7.7        28 Oct 2017 09:45        Urinalysis Basic - ( 27 Oct 2017 09:59 )    Color: Yellow / Appearance: Clear / S.010 / pH: x  Gluc: x / Ketone: Negative  / Bili: Negative / Urobili: Negative   Blood: x / Protein: Negative / Nitrite: Negative   Leuk Esterase: Trace / RBC: 3-5 /HPF / WBC 6-10   Sq Epi: x / Non Sq Epi: Many / Bacteria: Few      CAPILLARY BLOOD GLUCOSE            Culture - Urine (collected 10-27-17 @ 14:55)  Source: .Urine Catheterized  Final Report (10-28-17 @ 11:49):    No growth    Culture - Blood (collected 10-27-17 @ 13:27)  Source: .Blood Blood-Peripheral  Preliminary Report (10-28-17 @ 14:01):    No growth to date.    Culture - Blood (collected 10-27-17 @ 13:27)  Source: .Blood Blood-Venous  Preliminary Report (10-28-17 @ 14:01):    No growth to date.        RADIOLOGY & ADDITIONAL TESTS:  CXR 10/26: no active disease  Personally reviewed.     Consultant(s) Notes Reviewed:  [x] YES  [ ] NO    Care Discussed with [x] Consultants  [x] Patient  [ ] Family  [ ]      [ ] Other; RN  DVT ppx: JENNIFER

## 2017-10-28 NOTE — PROGRESS NOTE ADULT - PROBLEM SELECTOR PLAN 1
-compartment syndrome of the right lower extremity   -s/p emergent fasciotomy by Vascular surgery (Bobbi)   -s/p wound vac placement f/u with vascular sx  -10/27 pt went to OR with Dr. Isabel but ended up having just minimal debridement of muscle, mostly irrigation, had a few stitches placed on right thigh  -Finished course of IV abx aztreonam and clindamycin in ICU, but due to low-grade periodic fever, restarted on Abx with cefepime and vanco as per ID recs  -wound vacs in place; f/up surgery recs  -discussed pain management with Dr. Damico who has been in charge of patient's opiate regimen during the hospitalization. Trying to wean pt off opiates and especially IV dilaudid; decided to increase po PRN dilaudid to 3mg po q3h PRN to take in place of current 0.5IV q3h PRN. Pt agreed and has been using the po form.  -c/w fentanyl patch, which was increased post-op  -also c/w standing oxycontin -compartment syndrome of the right lower extremity   -s/p emergent fasciotomy by Vascular surgery (Bobbi)   -s/p wound vac placement f/u with vascular sx  -10/27 pt went to OR with Dr. Isabel but ended up having just minimal debridement of muscle, mostly irrigation, had a few stitches placed on right thigh  -Finished course of IV abx aztreonam and clindamycin in ICU, but due to low-grade periodic fever, restarted on Abx with cefepime and vanco as per ID recs  -wound vacs in place; f/up surgery recs  -discussed pain management with Dr. Damico who has been in charge of patient's opiate regimen during the hospitalization. Trying to wean pt off opiates and especially IV dilaudid; decided to increase po PRN dilaudid to 3mg po q3h PRN to take in place of current 0.5IV q3h PRN. Pt agreed and has been using the po form.  -c/w fentanyl patch, which was increased post-op  -also c/w standing oxycontin  Vanco dosing decreased after a supratherapeutic level at 8PM (8PM dose held, will restart vanco at lower dose tomorrow if AM trough is less than 15).

## 2017-10-28 NOTE — PROGRESS NOTE ADULT - PROBLEM SELECTOR PLAN 3
Unknown etiology  CXR done negative  F/u urine/ blood Cxs  ID consult noted.   Started on Vancomycin and Cefepime -- ?UTI, ?leg wound infection  HIV negative.  CT Lower extremity done, no drainable fluid collection  Had prior CT A/P is normal, may warrant repeat  -if fever persists or pt develops SOB or hypoxia, consider VTE eval Unknown etiology  CXR done negative  F/u urine/ blood Cxs  ID consult noted.   Started on Vancomycin and Cefepime -- ?UTI, ?leg wound infection  HIV negative.  CT Lower extremity done, no drainable fluid collection  Had prior CT A/P is normal, may warrant repeat  -if fever persists or pt develops SOB or hypoxia, consider VTE eval  Vanco dosing decreased after a supratherapeutic level at 8PM (8PM dose held, will restart vanco at lower dose tomorrow if AM trough is less than 15).

## 2017-10-28 NOTE — PROGRESS NOTE ADULT - SUBJECTIVE AND OBJECTIVE BOX
DEPARTMENT OF ANESTHESIA  POST ANESTHETIC EVALUATION    The Patient was interviewed and evaluated    Vital Signs Last 24 Hrs  T(C): 37.9 (28 Oct 2017 14:31), Max: 37.9 (28 Oct 2017 14:31)  T(F): 100.2 (28 Oct 2017 14:31), Max: 100.2 (28 Oct 2017 14:31)  HR: 108 (28 Oct 2017 13:53) (88 - 112)  BP: 112/63 (28 Oct 2017 13:53) (99/59 - 114/62)  BP(mean): --  RR: 16 (28 Oct 2017 13:53) (12 - 71)  SpO2: 99% (28 Oct 2017 13:53) (97% - 100%)    Evaluation:      (x ) No apparent complications or complaints regarding anesthesia care at this time  (x ) All questions were answered    Condition:  (x ) Stable      ( ) Guarded      ( ) Critical    Recommendations:  (x ) None     ( ) Other:

## 2017-10-29 LAB
ANION GAP SERPL CALC-SCNC: 10 MMOL/L — SIGNIFICANT CHANGE UP (ref 5–17)
BUN SERPL-MCNC: 15 MG/DL — SIGNIFICANT CHANGE UP (ref 7–23)
CALCIUM SERPL-MCNC: 8.1 MG/DL — LOW (ref 8.5–10.1)
CHLORIDE SERPL-SCNC: 111 MMOL/L — HIGH (ref 96–108)
CO2 SERPL-SCNC: 21 MMOL/L — LOW (ref 22–31)
CREAT SERPL-MCNC: 0.88 MG/DL — SIGNIFICANT CHANGE UP (ref 0.5–1.3)
GLUCOSE SERPL-MCNC: 84 MG/DL — SIGNIFICANT CHANGE UP (ref 70–99)
HCT VFR BLD CALC: 23.5 % — LOW (ref 34.5–45)
HGB BLD-MCNC: 7.7 G/DL — LOW (ref 11.5–15.5)
MAGNESIUM SERPL-MCNC: 2 MG/DL — SIGNIFICANT CHANGE UP (ref 1.6–2.6)
MCHC RBC-ENTMCNC: 29 PG — SIGNIFICANT CHANGE UP (ref 27–34)
MCHC RBC-ENTMCNC: 32.7 GM/DL — SIGNIFICANT CHANGE UP (ref 32–36)
MCV RBC AUTO: 88.6 FL — SIGNIFICANT CHANGE UP (ref 80–100)
PLATELET # BLD AUTO: 268 K/UL — SIGNIFICANT CHANGE UP (ref 150–400)
POTASSIUM SERPL-MCNC: 3.6 MMOL/L — SIGNIFICANT CHANGE UP (ref 3.5–5.3)
POTASSIUM SERPL-SCNC: 3.6 MMOL/L — SIGNIFICANT CHANGE UP (ref 3.5–5.3)
RBC # BLD: 2.65 M/UL — LOW (ref 3.8–5.2)
RBC # FLD: 12.6 % — SIGNIFICANT CHANGE UP (ref 10.3–14.5)
SODIUM SERPL-SCNC: 142 MMOL/L — SIGNIFICANT CHANGE UP (ref 135–145)
VANCOMYCIN TROUGH SERPL-MCNC: 12.5 UG/ML — SIGNIFICANT CHANGE UP (ref 10–20)
WBC # BLD: 7.2 K/UL — SIGNIFICANT CHANGE UP (ref 3.8–10.5)
WBC # FLD AUTO: 7.2 K/UL — SIGNIFICANT CHANGE UP (ref 3.8–10.5)

## 2017-10-29 PROCEDURE — 99232 SBSQ HOSP IP/OBS MODERATE 35: CPT

## 2017-10-29 PROCEDURE — 99233 SBSQ HOSP IP/OBS HIGH 50: CPT

## 2017-10-29 RX ORDER — VANCOMYCIN HCL 1 G
1500 VIAL (EA) INTRAVENOUS EVERY 12 HOURS
Qty: 0 | Refills: 0 | Status: DISCONTINUED | OUTPATIENT
Start: 2017-10-29 | End: 2017-10-30

## 2017-10-29 RX ORDER — HYDROMORPHONE HYDROCHLORIDE 2 MG/ML
3 INJECTION INTRAMUSCULAR; INTRAVENOUS; SUBCUTANEOUS EVERY 4 HOURS
Qty: 0 | Refills: 0 | Status: DISCONTINUED | OUTPATIENT
Start: 2017-10-29 | End: 2017-11-03

## 2017-10-29 RX ADMIN — LAMOTRIGINE 100 MILLIGRAM(S): 25 TABLET, ORALLY DISINTEGRATING ORAL at 22:15

## 2017-10-29 RX ADMIN — Medication 400 MILLIGRAM(S): at 17:49

## 2017-10-29 RX ADMIN — Medication 2 SPRAY(S): at 17:54

## 2017-10-29 RX ADMIN — Medication 1 MILLIGRAM(S): at 11:29

## 2017-10-29 RX ADMIN — CEFEPIME 100 MILLIGRAM(S): 1 INJECTION, POWDER, FOR SOLUTION INTRAMUSCULAR; INTRAVENOUS at 05:25

## 2017-10-29 RX ADMIN — Medication 400 MILLIGRAM(S): at 05:22

## 2017-10-29 RX ADMIN — PREGABALIN 1000 MICROGRAM(S): 225 CAPSULE ORAL at 11:29

## 2017-10-29 RX ADMIN — Medication 300 MILLIGRAM(S): at 17:50

## 2017-10-29 RX ADMIN — OXYCODONE HYDROCHLORIDE 60 MILLIGRAM(S): 5 TABLET ORAL at 13:29

## 2017-10-29 RX ADMIN — Medication 500 MILLIGRAM(S): at 17:49

## 2017-10-29 RX ADMIN — Medication 300 MILLIGRAM(S): at 08:53

## 2017-10-29 RX ADMIN — Medication 150 MICROGRAM(S): at 05:23

## 2017-10-29 RX ADMIN — Medication 500 MILLIGRAM(S): at 05:23

## 2017-10-29 RX ADMIN — HEPARIN SODIUM 5000 UNIT(S): 5000 INJECTION INTRAVENOUS; SUBCUTANEOUS at 05:24

## 2017-10-29 RX ADMIN — CEFEPIME 100 MILLIGRAM(S): 1 INJECTION, POWDER, FOR SOLUTION INTRAMUSCULAR; INTRAVENOUS at 13:29

## 2017-10-29 RX ADMIN — Medication 1 TABLET(S): at 11:29

## 2017-10-29 RX ADMIN — RISPERIDONE 8 MILLIGRAM(S): 4 TABLET ORAL at 22:15

## 2017-10-29 RX ADMIN — SERTRALINE 50 MILLIGRAM(S): 25 TABLET, FILM COATED ORAL at 11:28

## 2017-10-29 RX ADMIN — HYDROMORPHONE HYDROCHLORIDE 3 MILLIGRAM(S): 2 INJECTION INTRAMUSCULAR; INTRAVENOUS; SUBCUTANEOUS at 17:10

## 2017-10-29 RX ADMIN — OXYCODONE HYDROCHLORIDE 60 MILLIGRAM(S): 5 TABLET ORAL at 14:00

## 2017-10-29 RX ADMIN — OXYCODONE HYDROCHLORIDE 60 MILLIGRAM(S): 5 TABLET ORAL at 05:21

## 2017-10-29 RX ADMIN — LAMOTRIGINE 100 MILLIGRAM(S): 25 TABLET, ORALLY DISINTEGRATING ORAL at 13:29

## 2017-10-29 RX ADMIN — Medication 2 MILLIGRAM(S): at 08:53

## 2017-10-29 RX ADMIN — HYDROMORPHONE HYDROCHLORIDE 3 MILLIGRAM(S): 2 INJECTION INTRAMUSCULAR; INTRAVENOUS; SUBCUTANEOUS at 11:05

## 2017-10-29 RX ADMIN — HEPARIN SODIUM 5000 UNIT(S): 5000 INJECTION INTRAVENOUS; SUBCUTANEOUS at 22:13

## 2017-10-29 RX ADMIN — HEPARIN SODIUM 5000 UNIT(S): 5000 INJECTION INTRAVENOUS; SUBCUTANEOUS at 13:29

## 2017-10-29 RX ADMIN — BUDESONIDE AND FORMOTEROL FUMARATE DIHYDRATE 2 PUFF(S): 160; 4.5 AEROSOL RESPIRATORY (INHALATION) at 05:26

## 2017-10-29 RX ADMIN — LAMOTRIGINE 100 MILLIGRAM(S): 25 TABLET, ORALLY DISINTEGRATING ORAL at 05:24

## 2017-10-29 RX ADMIN — HYDROMORPHONE HYDROCHLORIDE 3 MILLIGRAM(S): 2 INJECTION INTRAMUSCULAR; INTRAVENOUS; SUBCUTANEOUS at 16:20

## 2017-10-29 RX ADMIN — HYDROMORPHONE HYDROCHLORIDE 3 MILLIGRAM(S): 2 INJECTION INTRAMUSCULAR; INTRAVENOUS; SUBCUTANEOUS at 20:26

## 2017-10-29 RX ADMIN — Medication 2 MILLIGRAM(S): at 23:26

## 2017-10-29 RX ADMIN — OXYCODONE HYDROCHLORIDE 60 MILLIGRAM(S): 5 TABLET ORAL at 22:14

## 2017-10-29 RX ADMIN — OXYCODONE HYDROCHLORIDE 60 MILLIGRAM(S): 5 TABLET ORAL at 22:45

## 2017-10-29 RX ADMIN — FENTANYL CITRATE 1 PATCH: 50 INJECTION INTRAVENOUS at 16:24

## 2017-10-29 RX ADMIN — CEFEPIME 100 MILLIGRAM(S): 1 INJECTION, POWDER, FOR SOLUTION INTRAMUSCULAR; INTRAVENOUS at 22:13

## 2017-10-29 RX ADMIN — HYDROMORPHONE HYDROCHLORIDE 3 MILLIGRAM(S): 2 INJECTION INTRAMUSCULAR; INTRAVENOUS; SUBCUTANEOUS at 05:07

## 2017-10-29 RX ADMIN — HYDROMORPHONE HYDROCHLORIDE 3 MILLIGRAM(S): 2 INJECTION INTRAMUSCULAR; INTRAVENOUS; SUBCUTANEOUS at 21:00

## 2017-10-29 RX ADMIN — PANTOPRAZOLE SODIUM 40 MILLIGRAM(S): 20 TABLET, DELAYED RELEASE ORAL at 05:24

## 2017-10-29 RX ADMIN — Medication 2 SPRAY(S): at 05:28

## 2017-10-29 RX ADMIN — Medication 40 MILLIEQUIVALENT(S): at 05:22

## 2017-10-29 RX ADMIN — HYDROMORPHONE HYDROCHLORIDE 3 MILLIGRAM(S): 2 INJECTION INTRAMUSCULAR; INTRAVENOUS; SUBCUTANEOUS at 10:49

## 2017-10-29 RX ADMIN — OXYCODONE HYDROCHLORIDE 60 MILLIGRAM(S): 5 TABLET ORAL at 06:05

## 2017-10-29 RX ADMIN — Medication 1 SPRAY(S): at 05:24

## 2017-10-29 RX ADMIN — Medication 40 MILLIEQUIVALENT(S): at 17:49

## 2017-10-29 RX ADMIN — Medication 1 SPRAY(S): at 17:49

## 2017-10-29 RX ADMIN — HYDROMORPHONE HYDROCHLORIDE 3 MILLIGRAM(S): 2 INJECTION INTRAMUSCULAR; INTRAVENOUS; SUBCUTANEOUS at 06:06

## 2017-10-29 NOTE — PROGRESS NOTE ADULT - ASSESSMENT
31yo F w/ PMH of asthma, Bipolar disorder, hypothyroidism, chronic low back pain s/p laminectomy and spinal fusion, opioid dependence presents with right leg swelling, numbness, pallor, poikilothermia, and pulselessness, found to have ischemic leg due to acute compartment syndrome of right leg with acute rhabdomyolysis, GHAZAL 2/2 ATN, and leukocytosis with bandemia s/p emergent fasciotomy of 4 leg compartments (anterior, lateral, superficial posterior, and deep posterior) and the lateral thigh muscles. Course c/b periodic low grade fever.

## 2017-10-29 NOTE — PROGRESS NOTE ADULT - SUBJECTIVE AND OBJECTIVE BOX
Progress Note 10/28/2017 -- Pt initially seen 10/28 ~ 11:45AM and treated on 10/28; further changes made in pain regimen throughout 10/28.    Patient is a 32y old  Female who presents with a chief complaint of RLE edema and numbness (13 Oct 2017 16:25)      INTERVAL HPI/OVERNIGHT EVENTS: Pt complains of right leg pain, sharp in quality, severe in quantity, non-radiating, worse after debridement yesterday. Discussed with surgery PA who was in the OR, pt had minimal debridement of muscle, mostly irrigation, had a few stitches placed on right thigh.     MEDICATIONS  (STANDING):  ascorbic acid 500 milliGRAM(s) Oral two times a day  BACItracin   Ointment 1 Application(s) Topical two times a day  buDESOnide  80 MICROgram(s)/formoterol 4.5 MICROgram(s) Inhaler 2 Puff(s) Inhalation two times a day  buPROPion XL . 300 milliGRAM(s) Oral daily  cefepime  IVPB      cefepime  IVPB 1000 milliGRAM(s) IV Intermittent every 8 hours  cyanocobalamin 1000 MICROGram(s) Oral daily  fentaNYL   Patch  12 MICROgram(s)/Hr 1 Patch Transdermal every 72 hours  fentaNYL   Patch  25 MICROgram(s)/Hr 1 Patch Transdermal every 72 hours  fluticasone propionate 50 MICROgram(s)/spray Nasal Spray 1 Spray(s) Both Nostrils two times a day  folic acid 1 milliGRAM(s) Oral daily  heparin  Injectable 5000 Unit(s) SubCutaneous every 8 hours  influenza   Vaccine 0.5 milliLiter(s) IntraMuscular once  ipratropium 42 MICROgram(s) Nasal 2 Spray(s) Both Nostrils every 12 hours  lamoTRIgine 100 milliGRAM(s) Oral every 8 hours  levothyroxine 150 MICROGram(s) Oral daily  multivitamin/minerals 1 Tablet(s) Oral daily  oxyCODONE  ER Tablet 60 milliGRAM(s) Oral every 8 hours  pantoprazole    Tablet 40 milliGRAM(s) Oral before breakfast  potassium chloride    Tablet ER 40 milliEquivalent(s) Oral two times a day  risperiDONE   Tablet 8 milliGRAM(s) Oral at bedtime  sertraline 50 milliGRAM(s) Oral daily  topiramate 400 milliGRAM(s) Oral two times a day  vancomycin  IVPB 1500 milliGRAM(s) IV Intermittent every 12 hours  Viibryd Oral Tablet 40 milliGRAM(s),Viibryd Oral Tablet 40 mg 1 Tablet(s) 1 Tablet(s) Oral daily    MEDICATIONS  (PRN):  acetaminophen   Tablet 650 milliGRAM(s) Oral every 6 hours PRN For Temp greater than 38 C (100.4 F)  acetaminophen   Tablet. 650 milliGRAM(s) Oral every 6 hours PRN Mild Pain (1 - 3)  ALBUTerol    90 MICROgram(s) HFA Inhaler 2 Puff(s) Inhalation every 6 hours PRN Shortness of Breath and/or Wheezing  clonazePAM Tablet 2 milliGRAM(s) Oral every 8 hours PRN anxiety  docusate sodium 100 milliGRAM(s) Oral two times a day PRN Constipation  HYDROmorphone   Tablet 3 milliGRAM(s) Oral every 3 hours PRN moderate to severe pain  HYDROmorphone  Injectable 0.5 milliGRAM(s) IV Push every 3 hours PRN Severe Pain (7 - 10)  ondansetron Injectable 4 milliGRAM(s) IV Push every 4 hours PRN Nausea and/or Vomiting  senna 2 Tablet(s) Oral at bedtime PRN Constipation      Allergies    avocado (Unknown)  latex (Urticaria; Rash)  penicillin (Unknown)  sulfa drugs (Unknown)    Intolerances        REVIEW OF SYSTEMS:  CONSTITUTIONAL: No fever or chills  HEENT:  No headache, no sore throat  RESPIRATORY: No cough, wheezing, or shortness of breath  CARDIOVASCULAR: No chest pain, palpitations   GASTROINTESTINAL: No nausea, vomiting, or diarrhea  GENITOURINARY: No dysuria, frequency, or hematuria  NEUROLOGICAL: +weakness and loss of sensation in the right leg (especially below the knee  MUSCULOSKELETAL: +right leg pain (see above)  PSYCHIATRIC: 5 +anxiety    Vital Signs Last 24 Hrs  T(C): 37.2 Max: 37.9 (28 Oct 2017 14:31)  T(F): 98.9  Max: 100.2 (28 Oct 2017 14:31)  HR: 100  (94 - 110)  BP: 103/63  (99/59 - 116/69)  BP(mean): --  RR: 16  (16 - 17)  SpO2: 98%  (97% - 99%)    PHYSICAL EXAM:  GENERAL: tearful, asking for increasing in dilaudid for pain management  HEENT:  EOMI, moist mucous membranes  CHEST/LUNG:  CTA b/l, no rales, wheezes, or rhonchi  HEART:  RRR, S1, S2  ABDOMEN:  BS+, soft, nontender, nondistended  EXTREMITIES: right leg almost entirely bandaged; wound vacs in place; no swelling or calf tenderness in the left leg  NERVOUS SYSTEM: AA&Ox3    LABS:                        6.4    6.9   )-----------( 229      ( 28 Oct 2017 09:45 )             19.7     CBC Full  -  ( 28 Oct 2017 09:45 )  WBC Count : 6.9 K/uL  Hemoglobin : 6.4 g/dL  Hematocrit : 19.7 %  Platelet Count - Automated : 229 K/uL  Mean Cell Volume : 88.4 fl  Mean Cell Hemoglobin : 28.8 pg  Mean Cell Hemoglobin Concentration : 32.6 gm/dL  Auto Neutrophil # : 4.0 K/uL  Auto Lymphocyte # : 1.5 K/uL  Auto Monocyte # : 0.9 K/uL  Auto Eosinophil # : 0.5 K/uL  Auto Basophil # : 0.1 K/uL  Auto Neutrophil % : 58.0 %  Auto Lymphocyte % : 21.6 %  Auto Monocyte % : 12.9 %  Auto Eosinophil % : 6.7 %  Auto Basophil % : 0.9 %    28 Oct 2017 09:45    139    |  109    |  16     ----------------------------<  121    3.6     |  20     |  0.97     Ca    7.7        28 Oct 2017 09:45        Urinalysis Basic - ( 27 Oct 2017 09:59 )    Color: Yellow / Appearance: Clear / S.010 / pH: x  Gluc: x / Ketone: Negative  / Bili: Negative / Urobili: Negative   Blood: x / Protein: Negative / Nitrite: Negative   Leuk Esterase: Trace / RBC: 3-5 /HPF / WBC 6-10   Sq Epi: x / Non Sq Epi: Many / Bacteria: Few      CAPILLARY BLOOD GLUCOSE            Culture - Urine (collected 10-27-17 @ 14:55)  Source: .Urine Catheterized  Final Report (10-28-17 @ 11:49):    No growth    Culture - Blood (collected 10-27-17 @ 13:27)  Source: .Blood Blood-Peripheral  Preliminary Report (10-28-17 @ 14:01):    No growth to date.    Culture - Blood (collected 10-27-17 @ 13:27)  Source: .Blood Blood-Venous  Preliminary Report (10-28-17 @ 14:01):    No growth to date.        RADIOLOGY & ADDITIONAL TESTS:  CXR 10/26: no active disease  Personally reviewed.     Consultant(s) Notes Reviewed:  [x] YES  [ ] NO    Care Discussed with [x] Consultants  [x] Patient  [ ] Family  [ ]      [ ] Other; RN  DVT ppx: JENNIFER invalidated note

## 2017-10-29 NOTE — PROGRESS NOTE ADULT - PROBLEM SELECTOR PLAN 2
-compartment syndrome of the right lower extremity   -s/p emergent fasciotomy by Vascular surgery (Bobbi)   -s/p wound vac placement f/u with vascular sx  -10/27 pt went to OR with Dr. Isabel but ended up having just minimal debridement of muscle, mostly irrigation, had a few stitches placed on right thigh  -Finished course of IV abx aztreonam and clindamycin in ICU, but due to low-grade periodic fever, restarted on Abx with cefepime and vanco as per ID recs  -wound vacs in place; f/up surgery recs  -discussed pain management with Dr. Damico who has been in charge of patient's opiate regimen during the hospitalization. Trying to wean pt off opiates and especially IV dilaudid; pt appears comfortable on exam (earlier pt was somnolent on Dr. Elam's exam). Will d/c IV dilaudid and decrease frequency of po from q3h to q4h PRN.   -c/w fentanyl patch and standing oxycontin

## 2017-10-29 NOTE — PROGRESS NOTE ADULT - ASSESSMENT
33yo F w/ PMH of asthma, Bipolar disorder, hypothyroidism, chronic low back pain s/p laminectomy and spinal fusion, opioid dependence presents with right leg swelling, numbness, pallor, poikilothermia, and pulselessness, found to have ischemic leg due to acute compartment syndrome of right leg with acute rhabdomyolysis, GHAZAL 2/2 ATN, and leukocytosis with bandemia s/p emergent fasciotomy of 4 leg compartments (anterior, lateral, superficial posterior, and deep posterior) and the lateral thigh muscles. Course c/b intermittent low grade fever.

## 2017-10-29 NOTE — PROGRESS NOTE ADULT - SUBJECTIVE AND OBJECTIVE BOX
Date/Time Patient Seen:  		  Referring MD:   Data Reviewed	       Patient is a 32y old  Female who presents with a chief complaint of RLE edema and numbness (13 Oct 2017 16:25)  in bed  seen and examined  using PO Dilaudid at higher dose  tolerating PO Dilaudid well      Subjective/HPI     PAST MEDICAL & SURGICAL HISTORY:  Low back pain  Hypothyroid  Bipolar 1 disorder  History of spinal fusion  History of laminectomy        Medication list         MEDICATIONS  (STANDING):  ascorbic acid 500 milliGRAM(s) Oral two times a day  BACItracin   Ointment 1 Application(s) Topical two times a day  buDESOnide  80 MICROgram(s)/formoterol 4.5 MICROgram(s) Inhaler 2 Puff(s) Inhalation two times a day  buPROPion XL . 300 milliGRAM(s) Oral daily  cefepime  IVPB      cefepime  IVPB 1000 milliGRAM(s) IV Intermittent every 8 hours  cyanocobalamin 1000 MICROGram(s) Oral daily  fentaNYL   Patch  12 MICROgram(s)/Hr 1 Patch Transdermal every 72 hours  fentaNYL   Patch  25 MICROgram(s)/Hr 1 Patch Transdermal every 72 hours  fluticasone propionate 50 MICROgram(s)/spray Nasal Spray 1 Spray(s) Both Nostrils two times a day  folic acid 1 milliGRAM(s) Oral daily  heparin  Injectable 5000 Unit(s) SubCutaneous every 8 hours  influenza   Vaccine 0.5 milliLiter(s) IntraMuscular once  ipratropium 42 MICROgram(s) Nasal 2 Spray(s) Both Nostrils every 12 hours  lamoTRIgine 100 milliGRAM(s) Oral every 8 hours  levothyroxine 150 MICROGram(s) Oral daily  multivitamin/minerals 1 Tablet(s) Oral daily  oxyCODONE  ER Tablet 60 milliGRAM(s) Oral every 8 hours  pantoprazole    Tablet 40 milliGRAM(s) Oral before breakfast  potassium chloride    Tablet ER 40 milliEquivalent(s) Oral two times a day  risperiDONE   Tablet 8 milliGRAM(s) Oral at bedtime  sertraline 50 milliGRAM(s) Oral daily  topiramate 400 milliGRAM(s) Oral two times a day  vancomycin  IVPB 1500 milliGRAM(s) IV Intermittent every 12 hours  Viibryd Oral Tablet 40 milliGRAM(s),Viibryd Oral Tablet 40 mg 1 Tablet(s) 1 Tablet(s) Oral daily    MEDICATIONS  (PRN):  acetaminophen   Tablet 650 milliGRAM(s) Oral every 6 hours PRN For Temp greater than 38 C (100.4 F)  acetaminophen   Tablet. 650 milliGRAM(s) Oral every 6 hours PRN Mild Pain (1 - 3)  ALBUTerol    90 MICROgram(s) HFA Inhaler 2 Puff(s) Inhalation every 6 hours PRN Shortness of Breath and/or Wheezing  clonazePAM Tablet 2 milliGRAM(s) Oral every 8 hours PRN anxiety  docusate sodium 100 milliGRAM(s) Oral two times a day PRN Constipation  HYDROmorphone   Tablet 3 milliGRAM(s) Oral every 3 hours PRN moderate to severe pain  HYDROmorphone  Injectable 0.5 milliGRAM(s) IV Push every 3 hours PRN Severe Pain (7 - 10)  ondansetron Injectable 4 milliGRAM(s) IV Push every 4 hours PRN Nausea and/or Vomiting  senna 2 Tablet(s) Oral at bedtime PRN Constipation         Vitals log        ICU Vital Signs Last 24 Hrs  T(C): 37.4 (29 Oct 2017 07:26), Max: 37.9 (28 Oct 2017 14:31)  T(F): 99.3 (29 Oct 2017 07:26), Max: 100.2 (28 Oct 2017 14:31)  HR: 105 (29 Oct 2017 07:26) (93 - 109)  BP: 97/60 (29 Oct 2017 07:26) (92/61 - 116/69)  BP(mean): --  ABP: --  ABP(mean): --  RR: 16 (29 Oct 2017 07:26) (16 - 17)  SpO2: 97% (29 Oct 2017 07:26) (97% - 99%)           Input and Output:  I&O's Detail    28 Oct 2017 07:01  -  29 Oct 2017 07:00  --------------------------------------------------------  IN:    Oral Fluid: 480 mL    Solution: 100 mL  Total IN: 580 mL    OUT:  Total OUT: 0 mL    Total NET: 580 mL          Lab Data                        7.7    7.2   )-----------( 268      ( 29 Oct 2017 06:38 )             23.5     10-29    142  |  111<H>  |  15  ----------------------------<  84  3.6   |  21<L>  |  0.88    Ca    8.1<L>      29 Oct 2017 06:38  Mg     2.0     10-29              Review of Systems	      Objective     Physical Examination        Pertinent Lab findings & Imaging      Erlinda:  NO   Adequate UO     I&O's Detail    28 Oct 2017 07:01  -  29 Oct 2017 07:00  --------------------------------------------------------  IN:    Oral Fluid: 480 mL    Solution: 100 mL  Total IN: 580 mL    OUT:  Total OUT: 0 mL    Total NET: 580 mL               Discussed with:     Cultures:	        Radiology

## 2017-10-29 NOTE — PROGRESS NOTE ADULT - PROBLEM SELECTOR PLAN 3
-discussed pain management with Dr. Damico who has been in charge of patient's opiate regimen during the hospitalization. Trying to wean pt off opiates and especially IV dilaudid; pt appears comfortable on exam (earlier pt was somnolent on Dr. Elam's exam). Will d/c IV dilaudid and decrease frequency of po from q3h to q4h PRN.   -c/w fentanyl patch and standing oxycontin

## 2017-10-29 NOTE — PROGRESS NOTE ADULT - SUBJECTIVE AND OBJECTIVE BOX
MERVATABELARDO RAMOS  [id number] 32y    PLASTIC SURGERY/ DR. HERNANDEZ    Vital Signs Last 24 Hrs  T(C): 37.4 (29 Oct 2017 07:26), Max: 37.9 (28 Oct 2017 14:31)  T(F): 99.3 (29 Oct 2017 07:26), Max: 100.2 (28 Oct 2017 14:31)  HR: 105 (29 Oct 2017 07:26) (93 - 109)  BP: 97/60 (29 Oct 2017 07:26) (92/61 - 116/69)  BP(mean): --  RR: 16 (29 Oct 2017 07:26) (16 - 17)  SpO2: 97% (29 Oct 2017 07:26) (97% - 99%)    POD # 2     RIGHT LEG    WOUND VAC X2 IN PLACE WITH GOOD SEAL   RIGHT  LATERAL LOWER THIGH WOUND DRY AND INTACT  RIGHT LOWER LEG WITH VIABLE FLAPS AND DECREASED EDEMA  RIGHT FOOT IS WARM, GOOD PEDAL PULSES                           7.7    7.2   )-----------( 268      ( 29 Oct 2017 06:38 )            23.5      10-29    142  |  111<H>  |  15  ----------------------------<  84  3.6   |  21<L>  |  0.88    Ca    8.1<L>      29 Oct 2017 06:38  Mg     2.0     10-29                            ASSESSMENT &  PLAN:  POD # 1  S/P DEBRIDEMENT  LOWER  LEG WOUNDS, MUSCLE DEBRIDEMENT, PARTIAL CLOSURE THIGH WOUND, APPLICATION OF WOUND VAC     MEDICAL, VASCULAR, ID, CARDIOLOGY , PULMONARY F/U NOTED.    PLAN FOR RETURN TO OR NEXT WEEK

## 2017-10-29 NOTE — PROGRESS NOTE ADULT - PROBLEM SELECTOR PLAN 1
unclear etiology  CXR negative; may be having periodic episodes of aspiration pneumonitis given has periods of somnolence. Will continue to try and titrate off sedating medications. No significant evidence for full blown pneumonia  F/u urine/ blood Cxs -- negative so far, but UCx may have been drawn after initiation of Abx -- had a staph earlier on admission  ID recs appreciated.   c/w Vancomycin and Cefepime for now -- ?UTI, ?leg wound infection -- now has had some further debridement  HIV negative.  CT Lower extremity done, no drainable fluid collection  -if fever persists or pt develops SOB or hypoxia, consider VTE eval

## 2017-10-29 NOTE — PROGRESS NOTE ADULT - SUBJECTIVE AND OBJECTIVE BOX
Patient is a 32y old  Female who presents with a chief complaint of RLE edema and numbness (13 Oct 2017 16:25)      Vascular Surgery Progress Note    Interval HPI: No complaints today. Had transfusion yesterday. S/P fasciotomies of right leg.    Medications:  cefepime  IVPB   cefepime  IVPB 1000  vancomycin  IVPB 1500      Allergies:  Allergies    avocado (Unknown)  latex (Urticaria; Rash)  penicillin (Unknown)  sulfa drugs (Unknown)    Intolerances        Vital Signs Last 24 Hrs  T(C): 37.4 (29 Oct 2017 07:26), Max: 37.9 (28 Oct 2017 14:31)  T(F): 99.3 (29 Oct 2017 07:26), Max: 100.2 (28 Oct 2017 14:31)  HR: 105 (29 Oct 2017 07:26) (93 - 109)  BP: 97/60 (29 Oct 2017 07:26) (92/61 - 116/69)  BP(mean): --  RR: 16 (29 Oct 2017 07:26) (16 - 16)  SpO2: 97% (29 Oct 2017 07:26) (97% - 99%)  I&O's Summary    28 Oct 2017 07:01  -  29 Oct 2017 07:00  --------------------------------------------------------  IN: 580 mL / OUT: 0 mL / NET: 580 mL        Physical Exam:  Gen: NAD, A&Ox3. Alert nontoxic appearing female  Right leg is soft and nontender . Wound vac in place.  Neurologically unchanged.    Pulses: palpable strong 4/4 pedal pulses in both feet.    LABS:                        7.7    7.2   )-----------( 268      ( 29 Oct 2017 06:38 )             23.5     10-29    142  |  111<H>  |  15  ----------------------------<  84  3.6   |  21<L>  |  0.88    Ca    8.1<L>      29 Oct 2017 06:38  Mg     2.0     10-29

## 2017-10-29 NOTE — PROGRESS NOTE ADULT - ASSESSMENT
32F with opiate dependence, anxiety disorder, admitted with leg swelling   S/P fasciotomy for compartment syndrome  S/P Rhabdomyolysis  S/P obtundation/immobility due to drug overdose  GHAZAL, appears resolved  Elevate ESR. Minimally elevated.  No clear pathology to explain fevers on prior CT  Intermittent low grade fevers  Atelectasis with narcotics in DDx  Intermittent chemical aspiration (with nacrosis) in DDx as well  LN nonspecific  HIV negative  Narcotic dependence/addiction not helping anything here, but will not able to be addressed given fasciotomy anytime soon. While she has legitimate needs for pain, she is frequently sedated or has other negative consequences (as in this AM's interaction) related to her narcotic use.  May be intermittently aspirating, but patient comfortable on RA with no findings on exam  R/O Pyelo/UTI, cx potentially suppressed  Skeptical re: active pneumonia  Overall temp curve appears to be down

## 2017-10-29 NOTE — PROGRESS NOTE ADULT - SUBJECTIVE AND OBJECTIVE BOX
Patient is a 32y old  Female who presents with a chief complaint of RLE edema and numbness (13 Oct 2017 16:25)      INTERVAL HPI/OVERNIGHT EVENTS: Pt reports pain in the right leg. Has rare cough. Denies fever, chills, dysuria, SOB, CP, palpitations.     MEDICATIONS  (STANDING):  ascorbic acid 500 milliGRAM(s) Oral two times a day  BACItracin   Ointment 1 Application(s) Topical two times a day  buDESOnide  80 MICROgram(s)/formoterol 4.5 MICROgram(s) Inhaler 2 Puff(s) Inhalation two times a day  buPROPion XL . 300 milliGRAM(s) Oral daily  cefepime  IVPB      cefepime  IVPB 1000 milliGRAM(s) IV Intermittent every 8 hours  cyanocobalamin 1000 MICROGram(s) Oral daily  fentaNYL   Patch  12 MICROgram(s)/Hr 1 Patch Transdermal every 72 hours  fentaNYL   Patch  25 MICROgram(s)/Hr 1 Patch Transdermal every 72 hours  fluticasone propionate 50 MICROgram(s)/spray Nasal Spray 1 Spray(s) Both Nostrils two times a day  folic acid 1 milliGRAM(s) Oral daily  heparin  Injectable 5000 Unit(s) SubCutaneous every 8 hours  influenza   Vaccine 0.5 milliLiter(s) IntraMuscular once  ipratropium 42 MICROgram(s) Nasal 2 Spray(s) Both Nostrils every 12 hours  lamoTRIgine 100 milliGRAM(s) Oral every 8 hours  levothyroxine 150 MICROGram(s) Oral daily  multivitamin/minerals 1 Tablet(s) Oral daily  oxyCODONE  ER Tablet 60 milliGRAM(s) Oral every 8 hours  pantoprazole    Tablet 40 milliGRAM(s) Oral before breakfast  potassium chloride    Tablet ER 40 milliEquivalent(s) Oral two times a day  risperiDONE   Tablet 8 milliGRAM(s) Oral at bedtime  sertraline 50 milliGRAM(s) Oral daily  topiramate 400 milliGRAM(s) Oral two times a day  vancomycin  IVPB 1500 milliGRAM(s) IV Intermittent every 12 hours  Viibryd Oral Tablet 40 milliGRAM(s),Viibryd Oral Tablet 40 mg 1 Tablet(s) 1 Tablet(s) Oral daily    MEDICATIONS  (PRN):  acetaminophen   Tablet 650 milliGRAM(s) Oral every 6 hours PRN For Temp greater than 38 C (100.4 F)  acetaminophen   Tablet. 650 milliGRAM(s) Oral every 6 hours PRN Mild Pain (1 - 3)  ALBUTerol    90 MICROgram(s) HFA Inhaler 2 Puff(s) Inhalation every 6 hours PRN Shortness of Breath and/or Wheezing  clonazePAM Tablet 2 milliGRAM(s) Oral every 8 hours PRN anxiety  docusate sodium 100 milliGRAM(s) Oral two times a day PRN Constipation  HYDROmorphone   Tablet 3 milliGRAM(s) Oral every 4 hours PRN moderate to severe pain  ondansetron Injectable 4 milliGRAM(s) IV Push every 4 hours PRN Nausea and/or Vomiting  senna 2 Tablet(s) Oral at bedtime PRN Constipation      Allergies    avocado (Unknown)  latex (Urticaria; Rash)  penicillin (Unknown)  sulfa drugs (Unknown)    Intolerances        REVIEW OF SYSTEMS:  CONSTITUTIONAL: No fever or chills  HEENT:  No headache, no sore throat  RESPIRATORY: +rare cough, No wheezing, or shortness of breath  CARDIOVASCULAR: No chest pain, palpitations   GASTROINTESTINAL: No nausea, vomiting, or diarrhea  GENITOURINARY: No dysuria, frequency, or hematuria  NEUROLOGICAL: +weakness and loss of sensation in the right leg (especially below the knee)  MUSCULOSKELETAL: +right leg pain   PSYCHIATRIC:  +anxiety     Vital Signs Last 24 Hrs  T(C): 37.3 (29 Oct 2017 16:00), Max: 37.4 (28 Oct 2017 20:00)  T(F): 99.2 (29 Oct 2017 16:00), Max: 99.3 (28 Oct 2017 20:00)  HR: 107 (29 Oct 2017 16:00) (93 - 107)  BP: 111/71 (29 Oct 2017 16:00) (92/61 - 116/69)  BP(mean): --  RR: 16 (29 Oct 2017 16:00) (16 - 16)  SpO2: 98% (29 Oct 2017 16:00) (97% - 99%)    PHYSICAL EXAM:  GENERAL: NAD  HEENT:  EOMI, moist mucous membranes  CHEST/LUNG:  CTA b/l, no rales, wheezes, or rhonchi  HEART:  RRR, S1, S2  ABDOMEN:  BS+, soft, nontender, nondistended  EXTREMITIES: right leg almost entirely bandaged; wound vacs in place; no swelling or calf tenderness in the left leg  NERVOUS SYSTEM: AA&Ox3    LABS:                        7.7    7.2   )-----------( 268      ( 29 Oct 2017 06:38 )             23.5     CBC Full  -  ( 29 Oct 2017 06:38 )  WBC Count : 7.2 K/uL  Hemoglobin : 7.7 g/dL  Hematocrit : 23.5 %  Platelet Count - Automated : 268 K/uL  Mean Cell Volume : 88.6 fl  Mean Cell Hemoglobin : 29.0 pg  Mean Cell Hemoglobin Concentration : 32.7 gm/dL  Auto Neutrophil # : x  Auto Lymphocyte # : x  Auto Monocyte # : x  Auto Eosinophil # : x  Auto Basophil # : x  Auto Neutrophil % : x  Auto Lymphocyte % : x  Auto Monocyte % : x  Auto Eosinophil % : x  Auto Basophil % : x    29 Oct 2017 06:38    142    |  111    |  15     ----------------------------<  84     3.6     |  21     |  0.88     Ca    8.1        29 Oct 2017 06:38  Mg     2.0       29 Oct 2017 06:38          CAPILLARY BLOOD GLUCOSE            Culture - Urine (collected 10-27-17 @ 14:55)  Source: .Urine Catheterized  Final Report (10-28-17 @ 11:49):    No growth    Culture - Blood (collected 10-27-17 @ 13:27)  Source: .Blood Blood-Peripheral  Preliminary Report (10-28-17 @ 14:01):    No growth to date.    Culture - Blood (collected 10-27-17 @ 13:27)  Source: .Blood Blood-Venous  Preliminary Report (10-28-17 @ 14:01):    No growth to date.        RADIOLOGY & ADDITIONAL TESTS:  CXR 10/26: no active disease  Personally reviewed.     Consultant(s) Notes Reviewed:  [x] YES  [ ] NO    Care Discussed with [x] Consultants  [x] Patient  [ ] Family  [ ]      [ ] Other; RN  DVT ppx: JENNIFER

## 2017-10-29 NOTE — PROGRESS NOTE ADULT - PROBLEM SELECTOR PLAN 1
acute and chronic pain issues  back and neck pain  compartment syndrome and rhabdo   s/p minor surgery with Dr. Isabel  at present on Long Acting and Short Acting Opioids  Dilaudid PO was increased to 3 mg every hrs as needed  cont current regimen  bowel regimen  monitor for lethargy  reassurance and emotional support  mobilize as tolerated  medical therapy optimization is in progress  hopefully we can transition from IV Dilaudid to PO all together and plan for DELIA soon

## 2017-10-29 NOTE — PROGRESS NOTE ADULT - SUBJECTIVE AND OBJECTIVE BOX
[facility], Division of Infectious Diseases  NATALIO Hopkins A. Lee    Name: ABELARDO CROWELL  Age: 32y  Gender: Female  MRN: [id number]    Interval History--  Notes reviewed. Single isolated low graded fever, which could be postoperative/noninfectious given debridement/parital closure 10/27. On arrival patient somnolent, seemingly trying to use her tablet computer. When spoken to, arouses easily ("Sorry...I'm on enough narcotics for a small horse."). Pain not as issue. No fevers, chills, or rigors. Has some posterior neck tightness. Coughing up greenish phlegm. Flank pain resolved.     Vanco level elevated, spoke with nursing yesterday and asked that it be held.     Past Medical History--  Low back pain  Hypothyroid  Bipolar 1 disorder  History of spinal fusion  History of laminectomy      For details regarding the patient's social history, family history, and other miscellaneous elements, please refer the initial infectious diseases consultation and/or the admitting history and physical examination for this admission.    Allergies    avocado (Unknown)  latex (Urticaria; Rash)  penicillin (Unknown)  sulfa drugs (Unknown)    Intolerances    Medications--  Antibiotics:  cefepime  IVPB      cefepime  IVPB 1000 milliGRAM(s) IV Intermittent every 8 hours  vancomycin  IVPB 1500 milliGRAM(s) IV Intermittent every 12 hours    Immunologic:  influenza   Vaccine 0.5 milliLiter(s) IntraMuscular once    Other:  acetaminophen   Tablet PRN  acetaminophen   Tablet. PRN  ALBUTerol    90 MICROgram(s) HFA Inhaler PRN  ascorbic acid  BACItracin   Ointment  buDESOnide  80 MICROgram(s)/formoterol 4.5 MICROgram(s) Inhaler  buPROPion XL .  clonazePAM Tablet PRN  cyanocobalamin  docusate sodium PRN  fentaNYL   Patch  12 MICROgram(s)/Hr  fentaNYL   Patch  25 MICROgram(s)/Hr  fluticasone propionate 50 MICROgram(s)/spray Nasal Spray  folic acid  heparin  Injectable  HYDROmorphone   Tablet PRN  HYDROmorphone  Injectable PRN  ipratropium 42 MICROgram(s) Nasal  lamoTRIgine  levothyroxine  multivitamin/minerals  ondansetron Injectable PRN  oxyCODONE  ER Tablet  pantoprazole    Tablet  potassium chloride    Tablet ER  risperiDONE   Tablet  senna PRN  sertraline  topiramate  Viibryd Oral Tablet 40 milliGRAM(s),Viibryd Oral Tablet 40 mg 1 Tablet(s)      Review of Systems--  Review of systems otherwise unchanged compared with prior visit except as previously noted.    Physical Examination--  Vital Signs: T(F): 99.3 (10-29-17 @ 07:26), Max: 100.2 (10-28-17 @ 14:31)  HR: 105 (10-29-17 @ 07:26)  BP: 97/60 (10-29-17 @ 07:26)  RR: 16 (10-29-17 @ 07:26)  SpO2: 97% (10-29-17 @ 07:26)  Wt(kg): --  General: Nontoxic-appearing Female in no acute distress.  HEENT: AT/NC. Anicteric. Conjunctiva pale and moist. Oropharynx pale but clear. Dentition fair.  Neck: Not rigid. No sense of mass. Some muscle tension posteriorly  Nodes: None palpable.  Lungs: Clear bilaterally without rales, wheezing or rhonchi  Heart: RRR. No Murmur. No rub. No gallop. No palpable thrill.  Abdomen: Bowel sounds present and normoactive. Soft. Nondistended. Nontender. No sense of mass. No organomegaly.  Extremities: No cyanosis or clubbing. LLE no edema. RLE wrapped. Foot warm, edematous. VAC under ACE.   Skin: Pale. Dry. Good turgor. No rash. No vasculitic stigmata.  Psychiatric: See above, appropriate    Laboratory Studies--  CBC                        7.7    7.2   )-----------( 268      ( 29 Oct 2017 06:38 )             23.5       Chemistries  10-29    142  |  111<H>  |  15  ----------------------------<  84  3.6   |  21<L>  |  0.88    Ca    8.1<L>      29 Oct 2017 06:38  Mg     2.0     10-29    Vancomycin Level, Trough: 12.5:  (10.29.17 @ 06:38)  Vancomycin Level, Trough: 21.2: (10.28.17 @ 18:47)      Culture Data  All cx negative to date (though done after Abx)

## 2017-10-29 NOTE — PROGRESS NOTE ADULT - PROBLEM SELECTOR PLAN 1
-compartment syndrome of the right lower extremity   -s/p emergent fasciotomy by Vascular surgery (Bobbi)   -s/p wound vac placement f/u with vascular sx  -10/27 pt went to OR with Dr. Isabel but ended up having just minimal debridement of muscle, mostly irrigation, had a few stitches placed on right thigh  -Finished course of IV abx aztreonam and clindamycin in ICU, but due to low-grade periodic fever, restarted on Abx with cefepime and vanco as per ID recs  -wound vacs in place; f/up surgery recs  -discussed pain management with Dr. Damico who has been in charge of patient's opiate regimen during the hospitalization. Trying to wean pt off opiates and especially IV dilaudid; decided to increase po PRN dilaudid to 3mg po q3h PRN to take in place of current 0.5IV q3h PRN. Pt agreed and has been using the po form.  -c/w fentanyl patch, which was increased post-op  -also c/w standing oxycontin

## 2017-10-29 NOTE — PROGRESS NOTE ADULT - PROBLEM SELECTOR PLAN 7
the more acute drop today was likely due to blood loss with surgery; the previously   low H&H was likely anemia of chronic disease from chronic wound borderline low B12  -responded appropriately to the unit of PRBC given on 10/28.  Hematology recs appreciated

## 2017-10-29 NOTE — PROGRESS NOTE ADULT - PROBLEM SELECTOR PLAN 3
Unknown etiology  CXR done negative  F/u urine/ blood Cxs  ID consult noted.   Started on Vancomycin and Cefepime -- ?UTI, ?leg wound infection  HIV negative.  CT Lower extremity done, no drainable fluid collection  Had prior CT A/P is normal, may warrant repeat  -if fever persists or pt develops SOB or hypoxia, consider VTE eval

## 2017-10-30 LAB
ANION GAP SERPL CALC-SCNC: 12 MMOL/L — SIGNIFICANT CHANGE UP (ref 5–17)
BUN SERPL-MCNC: 15 MG/DL — SIGNIFICANT CHANGE UP (ref 7–23)
CALCIUM SERPL-MCNC: 8.4 MG/DL — LOW (ref 8.5–10.1)
CHLORIDE SERPL-SCNC: 107 MMOL/L — SIGNIFICANT CHANGE UP (ref 96–108)
CO2 SERPL-SCNC: 21 MMOL/L — LOW (ref 22–31)
CREAT SERPL-MCNC: 1.1 MG/DL — SIGNIFICANT CHANGE UP (ref 0.5–1.3)
GLUCOSE SERPL-MCNC: 88 MG/DL — SIGNIFICANT CHANGE UP (ref 70–99)
HCT VFR BLD CALC: 23 % — LOW (ref 34.5–45)
HGB BLD-MCNC: 7.4 G/DL — LOW (ref 11.5–15.5)
MCHC RBC-ENTMCNC: 28.6 PG — SIGNIFICANT CHANGE UP (ref 27–34)
MCHC RBC-ENTMCNC: 32.2 GM/DL — SIGNIFICANT CHANGE UP (ref 32–36)
MCV RBC AUTO: 88.9 FL — SIGNIFICANT CHANGE UP (ref 80–100)
PLATELET # BLD AUTO: 271 K/UL — SIGNIFICANT CHANGE UP (ref 150–400)
POTASSIUM SERPL-MCNC: 3.5 MMOL/L — SIGNIFICANT CHANGE UP (ref 3.5–5.3)
POTASSIUM SERPL-SCNC: 3.5 MMOL/L — SIGNIFICANT CHANGE UP (ref 3.5–5.3)
RBC # BLD: 2.59 M/UL — LOW (ref 3.8–5.2)
RBC # FLD: 12.6 % — SIGNIFICANT CHANGE UP (ref 10.3–14.5)
SODIUM SERPL-SCNC: 140 MMOL/L — SIGNIFICANT CHANGE UP (ref 135–145)
VANCOMYCIN TROUGH SERPL-MCNC: 21.3 UG/ML — HIGH (ref 10–20)
WBC # BLD: 8.5 K/UL — SIGNIFICANT CHANGE UP (ref 3.8–10.5)
WBC # FLD AUTO: 8.5 K/UL — SIGNIFICANT CHANGE UP (ref 3.8–10.5)

## 2017-10-30 PROCEDURE — 99233 SBSQ HOSP IP/OBS HIGH 50: CPT

## 2017-10-30 PROCEDURE — 99232 SBSQ HOSP IP/OBS MODERATE 35: CPT

## 2017-10-30 RX ORDER — HYDROMORPHONE HYDROCHLORIDE 2 MG/ML
1 INJECTION INTRAMUSCULAR; INTRAVENOUS; SUBCUTANEOUS ONCE
Qty: 0 | Refills: 0 | Status: DISCONTINUED | OUTPATIENT
Start: 2017-10-31 | End: 2017-10-31

## 2017-10-30 RX ADMIN — PANTOPRAZOLE SODIUM 40 MILLIGRAM(S): 20 TABLET, DELAYED RELEASE ORAL at 05:28

## 2017-10-30 RX ADMIN — CEFEPIME 100 MILLIGRAM(S): 1 INJECTION, POWDER, FOR SOLUTION INTRAMUSCULAR; INTRAVENOUS at 21:24

## 2017-10-30 RX ADMIN — Medication 400 MILLIGRAM(S): at 17:15

## 2017-10-30 RX ADMIN — Medication 150 MICROGRAM(S): at 05:27

## 2017-10-30 RX ADMIN — SERTRALINE 50 MILLIGRAM(S): 25 TABLET, FILM COATED ORAL at 11:55

## 2017-10-30 RX ADMIN — LAMOTRIGINE 100 MILLIGRAM(S): 25 TABLET, ORALLY DISINTEGRATING ORAL at 21:24

## 2017-10-30 RX ADMIN — HYDROMORPHONE HYDROCHLORIDE 3 MILLIGRAM(S): 2 INJECTION INTRAMUSCULAR; INTRAVENOUS; SUBCUTANEOUS at 22:40

## 2017-10-30 RX ADMIN — Medication 1 TABLET(S): at 11:55

## 2017-10-30 RX ADMIN — Medication 500 MILLIGRAM(S): at 17:15

## 2017-10-30 RX ADMIN — Medication 1 MILLIGRAM(S): at 11:55

## 2017-10-30 RX ADMIN — PREGABALIN 1000 MICROGRAM(S): 225 CAPSULE ORAL at 11:58

## 2017-10-30 RX ADMIN — Medication 1 SPRAY(S): at 17:15

## 2017-10-30 RX ADMIN — LAMOTRIGINE 100 MILLIGRAM(S): 25 TABLET, ORALLY DISINTEGRATING ORAL at 05:28

## 2017-10-30 RX ADMIN — FENTANYL CITRATE 1 PATCH: 50 INJECTION INTRAVENOUS at 23:03

## 2017-10-30 RX ADMIN — HYDROMORPHONE HYDROCHLORIDE 3 MILLIGRAM(S): 2 INJECTION INTRAMUSCULAR; INTRAVENOUS; SUBCUTANEOUS at 08:45

## 2017-10-30 RX ADMIN — FENTANYL CITRATE 1 PATCH: 50 INJECTION INTRAVENOUS at 23:05

## 2017-10-30 RX ADMIN — HEPARIN SODIUM 5000 UNIT(S): 5000 INJECTION INTRAVENOUS; SUBCUTANEOUS at 21:40

## 2017-10-30 RX ADMIN — OXYCODONE HYDROCHLORIDE 60 MILLIGRAM(S): 5 TABLET ORAL at 22:30

## 2017-10-30 RX ADMIN — Medication 300 MILLIGRAM(S): at 17:43

## 2017-10-30 RX ADMIN — Medication 400 MILLIGRAM(S): at 05:28

## 2017-10-30 RX ADMIN — RISPERIDONE 8 MILLIGRAM(S): 4 TABLET ORAL at 21:40

## 2017-10-30 RX ADMIN — Medication 2 MILLIGRAM(S): at 23:09

## 2017-10-30 RX ADMIN — BUPROPION HYDROCHLORIDE 300 MILLIGRAM(S): 150 TABLET, EXTENDED RELEASE ORAL at 11:58

## 2017-10-30 RX ADMIN — Medication 2 SPRAY(S): at 17:16

## 2017-10-30 RX ADMIN — HEPARIN SODIUM 5000 UNIT(S): 5000 INJECTION INTRAVENOUS; SUBCUTANEOUS at 13:19

## 2017-10-30 RX ADMIN — HYDROMORPHONE HYDROCHLORIDE 3 MILLIGRAM(S): 2 INJECTION INTRAMUSCULAR; INTRAVENOUS; SUBCUTANEOUS at 14:00

## 2017-10-30 RX ADMIN — HYDROMORPHONE HYDROCHLORIDE 3 MILLIGRAM(S): 2 INJECTION INTRAMUSCULAR; INTRAVENOUS; SUBCUTANEOUS at 04:50

## 2017-10-30 RX ADMIN — HEPARIN SODIUM 5000 UNIT(S): 5000 INJECTION INTRAVENOUS; SUBCUTANEOUS at 05:29

## 2017-10-30 RX ADMIN — Medication 2 MILLIGRAM(S): at 13:11

## 2017-10-30 RX ADMIN — Medication 1 SPRAY(S): at 05:30

## 2017-10-30 RX ADMIN — OXYCODONE HYDROCHLORIDE 60 MILLIGRAM(S): 5 TABLET ORAL at 05:26

## 2017-10-30 RX ADMIN — CEFEPIME 100 MILLIGRAM(S): 1 INJECTION, POWDER, FOR SOLUTION INTRAMUSCULAR; INTRAVENOUS at 05:25

## 2017-10-30 RX ADMIN — Medication 40 MILLIEQUIVALENT(S): at 05:27

## 2017-10-30 RX ADMIN — LAMOTRIGINE 100 MILLIGRAM(S): 25 TABLET, ORALLY DISINTEGRATING ORAL at 13:19

## 2017-10-30 RX ADMIN — HYDROMORPHONE HYDROCHLORIDE 3 MILLIGRAM(S): 2 INJECTION INTRAMUSCULAR; INTRAVENOUS; SUBCUTANEOUS at 13:19

## 2017-10-30 RX ADMIN — HYDROMORPHONE HYDROCHLORIDE 3 MILLIGRAM(S): 2 INJECTION INTRAMUSCULAR; INTRAVENOUS; SUBCUTANEOUS at 04:25

## 2017-10-30 RX ADMIN — Medication 500 MILLIGRAM(S): at 05:28

## 2017-10-30 RX ADMIN — OXYCODONE HYDROCHLORIDE 60 MILLIGRAM(S): 5 TABLET ORAL at 05:59

## 2017-10-30 RX ADMIN — ONDANSETRON 4 MILLIGRAM(S): 8 TABLET, FILM COATED ORAL at 04:26

## 2017-10-30 RX ADMIN — HYDROMORPHONE HYDROCHLORIDE 3 MILLIGRAM(S): 2 INJECTION INTRAMUSCULAR; INTRAVENOUS; SUBCUTANEOUS at 21:40

## 2017-10-30 RX ADMIN — CEFEPIME 100 MILLIGRAM(S): 1 INJECTION, POWDER, FOR SOLUTION INTRAMUSCULAR; INTRAVENOUS at 13:23

## 2017-10-30 RX ADMIN — OXYCODONE HYDROCHLORIDE 60 MILLIGRAM(S): 5 TABLET ORAL at 22:40

## 2017-10-30 RX ADMIN — BUDESONIDE AND FORMOTEROL FUMARATE DIHYDRATE 2 PUFF(S): 160; 4.5 AEROSOL RESPIRATORY (INHALATION) at 21:23

## 2017-10-30 RX ADMIN — HYDROMORPHONE HYDROCHLORIDE 3 MILLIGRAM(S): 2 INJECTION INTRAMUSCULAR; INTRAVENOUS; SUBCUTANEOUS at 08:17

## 2017-10-30 RX ADMIN — BUDESONIDE AND FORMOTEROL FUMARATE DIHYDRATE 2 PUFF(S): 160; 4.5 AEROSOL RESPIRATORY (INHALATION) at 05:31

## 2017-10-30 RX ADMIN — Medication 300 MILLIGRAM(S): at 06:26

## 2017-10-30 RX ADMIN — Medication 2 SPRAY(S): at 05:31

## 2017-10-30 NOTE — PROGRESS NOTE ADULT - PROBLEM SELECTOR PLAN 3
-discussed pain management with Dr. Damico who has been in charge of patient's opiate regimen during the hospitalization. Trying to wean pt off opiates and especially IV dilaudid; pt appears comfortable on exam (earlier pt was somnolent on Dr. Elam's exam). d/c'ed IV dilaudid and decrease frequency of po from q3h to q4h PRN.   -c/w fentanyl patch and standing oxycontin -discussed pain management with Dr. Damico who has been in charge of patient's opiate regimen during the hospitalization. Trying to wean pt off opiates and especially IV dilaudid; pt appears comfortable on exam (earlier pt was somnolent on Dr. Elam's exam). d/c'ed IV dilaudid and decreased frequency of po from q3h to q4h PRN.   -c/w fentanyl patch and standing oxycontin

## 2017-10-30 NOTE — PROGRESS NOTE ADULT - PROBLEM SELECTOR PLAN 2
-compartment syndrome of the right lower extremity   -s/p emergent fasciotomy by Vascular surgery (Bobbi)   -s/p wound vac placement f/u with vascular sx  -10/27 pt went to OR with Dr. Isabel but ended up having just minimal debridement of muscle, mostly irrigation, had a few stitches placed on right thigh  -Finished course of IV abx aztreonam and clindamycin in ICU, but due to low-grade periodic fever, restarted on Abx with cefepime and vanco as per ID recs  -wound vacs in place; f/up surgery recs  -discussed pain management with Dr. Damico who has been in charge of patient's opiate regimen during the hospitalization. Trying to wean pt off opiates and especially IV dilaudid; pt appears comfortable on exam (earlier pt was somnolent on Dr. Elam's exam).   -c/w fentanyl patch and standing oxycontin

## 2017-10-30 NOTE — PROGRESS NOTE ADULT - PROBLEM SELECTOR PLAN 2
wound care in progress  vascular following  pain control optimization in progress  cont supportive care

## 2017-10-30 NOTE — PROGRESS NOTE ADULT - ASSESSMENT
32-year-old female now status post right lower extremity  fasciotomy for compartment syndrome. Pt on vent support.     ·	GHAZAL, ATN Septic / ischemic, Rhabdomyolysis  ·	s/p surgery for compartment syndrome  ·	Anemia  ·	Hypokalemia    Stable renal function. Low potassium levels. Pt requesting to stop PO pills and requesting IV potassium as needed.   Avoid nephrotoxic meds as possible. Will follow electrolytes and renal function trend. Monitor vanco levels.   Surgery follow up. Monitor h/h. Transfuse PRN. On multiple pain meds.   Will sign off. Please recall if needed. Thank you.

## 2017-10-30 NOTE — PROGRESS NOTE ADULT - ASSESSMENT
31yo F w/ PMH of asthma, Bipolar disorder, hypothyroidism, chronic low back pain s/p laminectomy and spinal fusion, opioid dependence presents with right leg swelling, numbness, pallor, poikilothermia, and pulselessness, found to have ischemic leg due to acute compartment syndrome of right leg with acute rhabdomyolysis, GHAZAL 2/2 ATN, and leukocytosis with bandemia s/p emergent fasciotomy of 4 leg compartments (anterior, lateral, superficial posterior, and deep posterior) and the lateral thigh muscles. Course c/b intermittent low grade fever.

## 2017-10-30 NOTE — PROGRESS NOTE ADULT - SUBJECTIVE AND OBJECTIVE BOX
Patient is a 32y old  Female who presents with a chief complaint of RLE edema and numbness (13 Oct 2017 16:25)      INTERVAL HPI/OVERNIGHT EVENTS: Pt reports pain in the right leg. Denies fever, chills, dysuria, SOB, CP, palpitations.     MEDICATIONS  (STANDING):  ascorbic acid 500 milliGRAM(s) Oral two times a day  BACItracin   Ointment 1 Application(s) Topical two times a day  buDESOnide  80 MICROgram(s)/formoterol 4.5 MICROgram(s) Inhaler 2 Puff(s) Inhalation two times a day  buPROPion XL . 300 milliGRAM(s) Oral daily  cefepime  IVPB      cefepime  IVPB 1000 milliGRAM(s) IV Intermittent every 8 hours  cyanocobalamin 1000 MICROGram(s) Oral daily  fentaNYL   Patch  12 MICROgram(s)/Hr 1 Patch Transdermal every 72 hours  fentaNYL   Patch  25 MICROgram(s)/Hr 1 Patch Transdermal every 72 hours  fluticasone propionate 50 MICROgram(s)/spray Nasal Spray 1 Spray(s) Both Nostrils two times a day  folic acid 1 milliGRAM(s) Oral daily  heparin  Injectable 5000 Unit(s) SubCutaneous every 8 hours  influenza   Vaccine 0.5 milliLiter(s) IntraMuscular once  ipratropium 42 MICROgram(s) Nasal 2 Spray(s) Both Nostrils every 12 hours  lamoTRIgine 100 milliGRAM(s) Oral every 8 hours  levothyroxine 150 MICROGram(s) Oral daily  multivitamin/minerals 1 Tablet(s) Oral daily  oxyCODONE  ER Tablet 60 milliGRAM(s) Oral every 8 hours  pantoprazole    Tablet 40 milliGRAM(s) Oral before breakfast  risperiDONE   Tablet 8 milliGRAM(s) Oral at bedtime  sertraline 50 milliGRAM(s) Oral daily  topiramate 400 milliGRAM(s) Oral two times a day  Viibryd Oral Tablet 40 milliGRAM(s),Viibryd Oral Tablet 40 mg 1 Tablet(s) 1 Tablet(s) Oral daily    MEDICATIONS  (PRN):  acetaminophen   Tablet 650 milliGRAM(s) Oral every 6 hours PRN For Temp greater than 38 C (100.4 F)  acetaminophen   Tablet. 650 milliGRAM(s) Oral every 6 hours PRN Mild Pain (1 - 3)  ALBUTerol    90 MICROgram(s) HFA Inhaler 2 Puff(s) Inhalation every 6 hours PRN Shortness of Breath and/or Wheezing  clonazePAM Tablet 2 milliGRAM(s) Oral every 8 hours PRN anxiety  docusate sodium 100 milliGRAM(s) Oral two times a day PRN Constipation  HYDROmorphone   Tablet 3 milliGRAM(s) Oral every 4 hours PRN moderate to severe pain  ondansetron Injectable 4 milliGRAM(s) IV Push every 4 hours PRN Nausea and/or Vomiting  senna 2 Tablet(s) Oral at bedtime PRN Constipation        Allergies    avocado (Unknown)  latex (Urticaria; Rash)  penicillin (Unknown)  sulfa drugs (Unknown)    Intolerances        REVIEW OF SYSTEMS:  CONSTITUTIONAL: No fever or chills  HEENT:  No headache, no sore throat  RESPIRATORY: +rare cough, No wheezing, or shortness of breath  CARDIOVASCULAR: No chest pain, palpitations   GASTROINTESTINAL: No nausea, vomiting, or diarrhea  GENITOURINARY: No dysuria, frequency, or hematuria  NEUROLOGICAL: +weakness and loss of sensation in the right leg (especially below the knee)  MUSCULOSKELETAL: +right leg pain   PSYCHIATRIC:  +anxiety     Vital Signs Last 24 Hrs  T(C): 37.4 (30 Oct 2017 16:00), Max: 37.4 (30 Oct 2017 08:00)  T(F): 99.3 (30 Oct 2017 16:00), Max: 99.3 (30 Oct 2017 08:00)  HR: 106 (30 Oct 2017 16:00) (102 - 108)  BP: 107/76 (30 Oct 2017 16:00) (94/59 - 107/76)  BP(mean): --  RR: 16 (30 Oct 2017 16:00) (16 - 17)  SpO2: 98% (30 Oct 2017 16:00) (98% - 100%)    PHYSICAL EXAM:  GENERAL: NAD  HEENT:  EOMI, moist mucous membranes  CHEST/LUNG:  CTA b/l, no rales  HEART:  RRR, S1, S2  ABDOMEN:  BS+, soft, nontender, nondistended  EXTREMITIES: right leg almost entirely bandaged; wound vacs in place; no swelling or calf tenderness in the left leg  NERVOUS SYSTEM: AA&Ox3    LABS:                                   7.4    8.5   )-----------( 271      ( 30 Oct 2017 08:23 )             23.0     CBC Full  -  ( 30 Oct 2017 08:23 )  WBC Count : 8.5 K/uL  Hemoglobin : 7.4 g/dL  Hematocrit : 23.0 %  Platelet Count - Automated : 271 K/uL  Mean Cell Volume : 88.9 fl  Mean Cell Hemoglobin : 28.6 pg  Mean Cell Hemoglobin Concentration : 32.2 gm/dL  Auto Neutrophil # : x  Auto Lymphocyte # : x  Auto Monocyte # : x  Auto Eosinophil # : x  Auto Basophil # : x  Auto Neutrophil % : x  Auto Lymphocyte % : x  Auto Monocyte % : x  Auto Eosinophil % : x  Auto Basophil % : x    10-30    140  |  107  |  15  ----------------------------<  88  3.5   |  21<L>  |  1.10    Ca    8.4<L>      30 Oct 2017 08:23  Mg     2.0     10-29          CAPILLARY BLOOD GLUCOSE            Culture - Urine (collected 10-27-17 @ 14:55)  Source: .Urine Catheterized  Final Report (10-28-17 @ 11:49):    No growth    Culture - Blood (collected 10-27-17 @ 13:27)  Source: .Blood Blood-Peripheral  Preliminary Report (10-28-17 @ 14:01):    No growth to date.    Culture - Blood (collected 10-27-17 @ 13:27)  Source: .Blood Blood-Venous  Preliminary Report (10-28-17 @ 14:01):    No growth to date.        RADIOLOGY & ADDITIONAL TESTS:  CXR 10/26: no active disease  Personally reviewed.     Consultant(s) Notes Reviewed:  [x] YES  [ ] NO    Care Discussed with [x] Consultants  [x] Patient  [x] Family  [ ]      [ ] Other; RN  DVT ppx: JENNIFER Patient is a 32y old  Female who presents with a chief complaint of RLE edema and numbness (13 Oct 2017 16:25)      INTERVAL HPI/OVERNIGHT EVENTS: Pt reports pain in the right leg. Denies fever, chills, dysuria, SOB, CP, palpitations.     MEDICATIONS  (STANDING):  ascorbic acid 500 milliGRAM(s) Oral two times a day  BACItracin   Ointment 1 Application(s) Topical two times a day  buDESOnide  80 MICROgram(s)/formoterol 4.5 MICROgram(s) Inhaler 2 Puff(s) Inhalation two times a day  buPROPion XL . 300 milliGRAM(s) Oral daily  cefepime  IVPB      cefepime  IVPB 1000 milliGRAM(s) IV Intermittent every 8 hours  cyanocobalamin 1000 MICROGram(s) Oral daily  fentaNYL   Patch  12 MICROgram(s)/Hr 1 Patch Transdermal every 72 hours  fentaNYL   Patch  25 MICROgram(s)/Hr 1 Patch Transdermal every 72 hours  fluticasone propionate 50 MICROgram(s)/spray Nasal Spray 1 Spray(s) Both Nostrils two times a day  folic acid 1 milliGRAM(s) Oral daily  heparin  Injectable 5000 Unit(s) SubCutaneous every 8 hours  influenza   Vaccine 0.5 milliLiter(s) IntraMuscular once  ipratropium 42 MICROgram(s) Nasal 2 Spray(s) Both Nostrils every 12 hours  lamoTRIgine 100 milliGRAM(s) Oral every 8 hours  levothyroxine 150 MICROGram(s) Oral daily  multivitamin/minerals 1 Tablet(s) Oral daily  oxyCODONE  ER Tablet 60 milliGRAM(s) Oral every 8 hours  pantoprazole    Tablet 40 milliGRAM(s) Oral before breakfast  risperiDONE   Tablet 8 milliGRAM(s) Oral at bedtime  sertraline 50 milliGRAM(s) Oral daily  topiramate 400 milliGRAM(s) Oral two times a day  Viibryd Oral Tablet 40 milliGRAM(s),Viibryd Oral Tablet 40 mg 1 Tablet(s) 1 Tablet(s) Oral daily    MEDICATIONS  (PRN):  acetaminophen   Tablet 650 milliGRAM(s) Oral every 6 hours PRN For Temp greater than 38 C (100.4 F)  acetaminophen   Tablet. 650 milliGRAM(s) Oral every 6 hours PRN Mild Pain (1 - 3)  ALBUTerol    90 MICROgram(s) HFA Inhaler 2 Puff(s) Inhalation every 6 hours PRN Shortness of Breath and/or Wheezing  clonazePAM Tablet 2 milliGRAM(s) Oral every 8 hours PRN anxiety  docusate sodium 100 milliGRAM(s) Oral two times a day PRN Constipation  HYDROmorphone   Tablet 3 milliGRAM(s) Oral every 4 hours PRN moderate to severe pain  ondansetron Injectable 4 milliGRAM(s) IV Push every 4 hours PRN Nausea and/or Vomiting  senna 2 Tablet(s) Oral at bedtime PRN Constipation        Allergies    avocado (Unknown)  latex (Urticaria; Rash)  penicillin (Unknown)  sulfa drugs (Unknown)    Intolerances        REVIEW OF SYSTEMS:  CONSTITUTIONAL: No fever or chills  HEENT:  No headache, no sore throat  RESPIRATORY: +rare cough, No wheezing, or shortness of breath  CARDIOVASCULAR: No chest pain, palpitations   GASTROINTESTINAL: No nausea, vomiting, or diarrhea  GENITOURINARY: No dysuria, frequency, or hematuria  NEUROLOGICAL: +weakness and loss of sensation in the right leg (especially below the knee)  MUSCULOSKELETAL: +right leg pain   PSYCHIATRIC:  +anxiety     Vital Signs Last 24 Hrs  T(C): 37.4 (30 Oct 2017 16:00), Max: 37.4 (30 Oct 2017 08:00)  T(F): 99.3 (30 Oct 2017 16:00), Max: 99.3 (30 Oct 2017 08:00)  HR: 106 (30 Oct 2017 16:00) (102 - 108)  BP: 107/76 (30 Oct 2017 16:00) (94/59 - 107/76)  BP(mean): --  RR: 16 (30 Oct 2017 16:00) (16 - 17)  SpO2: 98% (30 Oct 2017 16:00) (98% - 100%)    PHYSICAL EXAM:  GENERAL: NAD  HEENT:  EOMI, moist mucous membranes  CHEST/LUNG:  CTA b/l, no rales  HEART:  RRR, S1, S2  ABDOMEN:  BS+, soft, nontender, nondistended  EXTREMITIES: right leg almost entirely bandaged; wound vacs in place; no swelling or calf tenderness in the left leg  NERVOUS SYSTEM: AA&Ox3    LABS:                                   7.4    8.5   )-----------( 271      ( 30 Oct 2017 08:23 )             23.0     CBC Full  -  ( 30 Oct 2017 08:23 )  WBC Count : 8.5 K/uL  Hemoglobin : 7.4 g/dL  Hematocrit : 23.0 %  Platelet Count - Automated : 271 K/uL  Mean Cell Volume : 88.9 fl  Mean Cell Hemoglobin : 28.6 pg  Mean Cell Hemoglobin Concentration : 32.2 gm/dL  Auto Neutrophil # : x  Auto Lymphocyte # : x  Auto Monocyte # : x  Auto Eosinophil # : x  Auto Basophil # : x  Auto Neutrophil % : x  Auto Lymphocyte % : x  Auto Monocyte % : x  Auto Eosinophil % : x  Auto Basophil % : x    10-30    140  |  107  |  15  ----------------------------<  88  3.5   |  21<L>  |  1.10    Ca    8.4<L>      30 Oct 2017 08:23  Mg     2.0     10-29          CAPILLARY BLOOD GLUCOSE          Culture - Urine (collected 10-27-17 @ 14:55)  Source: .Urine Catheterized  Final Report (10-28-17 @ 11:49):    No growth    Culture - Blood (collected 10-27-17 @ 13:27)  Source: .Blood Blood-Peripheral  Preliminary Report (10-28-17 @ 14:01):    No growth to date.    Culture - Blood (collected 10-27-17 @ 13:27)  Source: .Blood Blood-Venous  Preliminary Report (10-28-17 @ 14:01):    No growth to date.        RADIOLOGY & ADDITIONAL TESTS:  CXR 10/26: no active disease  Personally reviewed.     Consultant(s) Notes Reviewed:  [x] YES  [ ] NO    Care Discussed with [x] Consultants  [x] Patient  [x] Family  [ ]      [ ] Other; RN  DVT ppx: JENNIFER

## 2017-10-30 NOTE — PROGRESS NOTE ADULT - SUBJECTIVE AND OBJECTIVE BOX
[facility], Division of Infectious Diseases  NATALIO Hopkins A. Lee    Name: ABELARDO CROWELL  Age: 32y  Gender: Female  MRN: [id number]    Interval History--  Notes reviewed. Awake/alert, smiling, somewhat inappropriate demeanor (flirtatious).   Pain control not an issue.   Some nausea, no vomiting.  No flank pain.   No fevers, chills, or rigors.     Past Medical History--  Low back pain  Hypothyroid  Bipolar 1 disorder  History of spinal fusion  History of laminectomy      For details regarding the patient's social history, family history, and other miscellaneous elements, please refer the initial infectious diseases consultation and/or the admitting history and physical examination for this admission.    Allergies    avocado (Unknown)  latex (Urticaria; Rash)  penicillin (Unknown)  sulfa drugs (Unknown)    Intolerances        Medications--  Antibiotics:  cefepime  IVPB      cefepime  IVPB 1000 milliGRAM(s) IV Intermittent every 8 hours  vancomycin  IVPB 1500 milliGRAM(s) IV Intermittent every 12 hours    Immunologic:  influenza   Vaccine 0.5 milliLiter(s) IntraMuscular once    Other:  acetaminophen   Tablet PRN  acetaminophen   Tablet. PRN  ALBUTerol    90 MICROgram(s) HFA Inhaler PRN  ascorbic acid  BACItracin   Ointment  buDESOnide  80 MICROgram(s)/formoterol 4.5 MICROgram(s) Inhaler  buPROPion XL .  clonazePAM Tablet PRN  cyanocobalamin  docusate sodium PRN  fentaNYL   Patch  12 MICROgram(s)/Hr  fentaNYL   Patch  25 MICROgram(s)/Hr  fluticasone propionate 50 MICROgram(s)/spray Nasal Spray  folic acid  heparin  Injectable  HYDROmorphone   Tablet PRN  ipratropium 42 MICROgram(s) Nasal  lamoTRIgine  levothyroxine  multivitamin/minerals  ondansetron Injectable PRN  oxyCODONE  ER Tablet  pantoprazole    Tablet  potassium chloride    Tablet ER  risperiDONE   Tablet  senna PRN  sertraline  topiramate  Viibryd Oral Tablet 40 milliGRAM(s),Viibryd Oral Tablet 40 mg 1 Tablet(s)      Review of Systems--  Review of systems otherwise unchanged compares with prior visit except as previously noted.    Physical Examination--  Vital Signs: T(F): 99.3 (10-30-17 @ 08:00), Max: 99.8 (10-29-17 @ 23:36)  HR: 108 (10-30-17 @ 08:00)  BP: 101/63 (10-30-17 @ 08:00)  RR: 16 (10-30-17 @ 08:00)  SpO2: 99% (10-30-17 @ 08:00)  Wt(kg): --  General: Nontoxic-appearing Female in no acute distress.  HEENT: AT/NC. Anicteric. Conjunctiva pale and moist. Oropharynx pale but clear. Dentition fair.  Neck: Not rigid. No sense of mass. Some muscle tension posteriorly  Nodes: None palpable.  Lungs: Clear bilaterally without rales, wheezing or rhonchi  Heart: RRR. No Murmur. No rub. No gallop. No palpable thrill.  Abdomen: Bowel sounds present and normoactive. Soft. Nondistended. Nontender. No sense of mass. No organomegaly.  Extremities: No cyanosis or clubbing. LLE no edema. RLE wrapped. Foot warm, edematous. VAC under ACE.   Skin: Pale. Dry. Good turgor. No rash. No vasculitic stigmata.  Psychiatric: As above.      Laboratory Studies--  CBC                        7.4    8.5   )-----------( 271      ( 30 Oct 2017 08:23 )             23.0       Chemistries  10-30    140  |  107  |  15  ----------------------------<  88  3.5   |  21<L>  |  1.10    Ca    8.4<L>      30 Oct 2017 08:23  Mg     2.0     10-29        Culture Data  Culture - Urine (10.27.17 @ 14:55)    Specimen Source: .Urine Catheterized    Culture Results:   No growth    Culture - Blood (10.27.17 @ 13:27)    Specimen Source: .Blood Blood-Peripheral    Culture Results:   No growth to date.    Culture - Blood (10.27.17 @ 13:27)    Specimen Source: .Blood Blood-Venous    Culture Results:   No growth to date.

## 2017-10-30 NOTE — PROGRESS NOTE ADULT - PROBLEM SELECTOR PLAN 1
unclear etiology  CXR negative; may be having periodic episodes of aspiration pneumonitis given has periods of somnolence. Will continue to try and titrate off sedating medications. No significant evidence for full blown pneumonia  Urine/ blood Cxs -- negative, but UCx may have been drawn after initiation of Abx -- had a staph organism earlier on admission  ID recs appreciated.   c/w Vancomycin and Cefepime for now -- ?UTI, ?leg wound infection -- now has had some further debridement  HIV negative.  CT Lower extremity done, no drainable fluid collection  -if fever persists or pt develops SOB or hypoxia, consider VTE eval unclear etiology; fever has now resolved  CXR negative; may be having periodic episodes of aspiration pneumonitis given has periods of somnolence. Will continue to try and titrate off sedating medications. No significant evidence for full blown pneumonia  Urine/ blood Cxs -- negative, but UCx may have been drawn after initiation of Abx -- had a staph organism earlier on admission  ID recs appreciated.   c/w Vancomycin and Cefepime for now -- ?UTI, ?leg wound infection -- now has had some further debridement  HIV negative.  CT Lower extremity done, no drainable fluid collection  -if fever persists or pt develops SOB or hypoxia, consider VTE eval

## 2017-10-30 NOTE — PROGRESS NOTE ADULT - PROBLEM SELECTOR PLAN 3
acute and chronic pain  on long acting opioids and short acting agents  cont to enc using PO prn Dilaudid   preparation for transition into [facility]

## 2017-10-30 NOTE — PROGRESS NOTE ADULT - SUBJECTIVE AND OBJECTIVE BOX
Date/Time Patient Seen:  		  Referring MD:   Data Reviewed	       Patient is a 32y old  Female who presents with a chief complaint of RLE edema and numbness (13 Oct 2017 16:25)  in bed  seen and examined  on pain regimen  on room air  afebrile        Subjective/HPI     PAST MEDICAL & SURGICAL HISTORY:  Low back pain  Hypothyroid  Bipolar 1 disorder  History of spinal fusion  History of laminectomy        Medication list         MEDICATIONS  (STANDING):  ascorbic acid 500 milliGRAM(s) Oral two times a day  BACItracin   Ointment 1 Application(s) Topical two times a day  buDESOnide  80 MICROgram(s)/formoterol 4.5 MICROgram(s) Inhaler 2 Puff(s) Inhalation two times a day  buPROPion XL . 300 milliGRAM(s) Oral daily  cefepime  IVPB      cefepime  IVPB 1000 milliGRAM(s) IV Intermittent every 8 hours  cyanocobalamin 1000 MICROGram(s) Oral daily  fentaNYL   Patch  12 MICROgram(s)/Hr 1 Patch Transdermal every 72 hours  fentaNYL   Patch  25 MICROgram(s)/Hr 1 Patch Transdermal every 72 hours  fluticasone propionate 50 MICROgram(s)/spray Nasal Spray 1 Spray(s) Both Nostrils two times a day  folic acid 1 milliGRAM(s) Oral daily  heparin  Injectable 5000 Unit(s) SubCutaneous every 8 hours  influenza   Vaccine 0.5 milliLiter(s) IntraMuscular once  ipratropium 42 MICROgram(s) Nasal 2 Spray(s) Both Nostrils every 12 hours  lamoTRIgine 100 milliGRAM(s) Oral every 8 hours  levothyroxine 150 MICROGram(s) Oral daily  multivitamin/minerals 1 Tablet(s) Oral daily  oxyCODONE  ER Tablet 60 milliGRAM(s) Oral every 8 hours  pantoprazole    Tablet 40 milliGRAM(s) Oral before breakfast  potassium chloride    Tablet ER 40 milliEquivalent(s) Oral two times a day  risperiDONE   Tablet 8 milliGRAM(s) Oral at bedtime  sertraline 50 milliGRAM(s) Oral daily  topiramate 400 milliGRAM(s) Oral two times a day  vancomycin  IVPB 1500 milliGRAM(s) IV Intermittent every 12 hours  Viibryd Oral Tablet 40 milliGRAM(s),Viibryd Oral Tablet 40 mg 1 Tablet(s) 1 Tablet(s) Oral daily    MEDICATIONS  (PRN):  acetaminophen   Tablet 650 milliGRAM(s) Oral every 6 hours PRN For Temp greater than 38 C (100.4 F)  acetaminophen   Tablet. 650 milliGRAM(s) Oral every 6 hours PRN Mild Pain (1 - 3)  ALBUTerol    90 MICROgram(s) HFA Inhaler 2 Puff(s) Inhalation every 6 hours PRN Shortness of Breath and/or Wheezing  clonazePAM Tablet 2 milliGRAM(s) Oral every 8 hours PRN anxiety  docusate sodium 100 milliGRAM(s) Oral two times a day PRN Constipation  HYDROmorphone   Tablet 3 milliGRAM(s) Oral every 4 hours PRN moderate to severe pain  ondansetron Injectable 4 milliGRAM(s) IV Push every 4 hours PRN Nausea and/or Vomiting  senna 2 Tablet(s) Oral at bedtime PRN Constipation         Vitals log        ICU Vital Signs Last 24 Hrs  T(C): 37.7 (29 Oct 2017 23:36), Max: 37.7 (29 Oct 2017 23:36)  T(F): 99.8 (29 Oct 2017 23:36), Max: 99.8 (29 Oct 2017 23:36)  HR: 100 (29 Oct 2017 23:36) (100 - 107)  BP: 106/64 (29 Oct 2017 23:36) (97/60 - 111/71)  BP(mean): --  ABP: --  ABP(mean): --  RR: 16 (29 Oct 2017 23:36) (16 - 16)  SpO2: 98% (29 Oct 2017 23:36) (97% - 98%)           Input and Output:  I&O's Detail    28 Oct 2017 07:01  -  29 Oct 2017 07:00  --------------------------------------------------------  IN:    Oral Fluid: 480 mL    Solution: 100 mL  Total IN: 580 mL    OUT:  Total OUT: 0 mL    Total NET: 580 mL      29 Oct 2017 07:01  -  30 Oct 2017 05:55  --------------------------------------------------------  IN:  Total IN: 0 mL    OUT:    Voided: 350 mL  Total OUT: 350 mL    Total NET: -350 mL          Lab Data                        7.7    7.2   )-----------( 268      ( 29 Oct 2017 06:38 )             23.5     10-29    142  |  111<H>  |  15  ----------------------------<  84  3.6   |  21<L>  |  0.88    Ca    8.1<L>      29 Oct 2017 06:38  Mg     2.0     10-29              Review of Systems	      Objective     Physical Examination    head at  heart - s1s2  lungs - dec BS  abd - soft  cn grossly int      Pertinent Lab findings & Imaging      Erlinda:  NO   Adequate UO     I&O's Detail    28 Oct 2017 07:01  -  29 Oct 2017 07:00  --------------------------------------------------------  IN:    Oral Fluid: 480 mL    Solution: 100 mL  Total IN: 580 mL    OUT:  Total OUT: 0 mL    Total NET: 580 mL      29 Oct 2017 07:01  -  30 Oct 2017 05:55  --------------------------------------------------------  IN:  Total IN: 0 mL    OUT:    Voided: 350 mL  Total OUT: 350 mL    Total NET: -350 mL               Discussed with:     Cultures:	        Radiology

## 2017-10-30 NOTE — PROGRESS NOTE ADULT - PROBLEM SELECTOR PLAN 1
proventil PRN  symbicort  I darcy  monitor sat  keep sat > 90 pct  mobilize as tolerated  seasonal vaccination

## 2017-10-30 NOTE — PROGRESS NOTE ADULT - SUBJECTIVE AND OBJECTIVE BOX
[facility]    Bonnie Hernandez, Keena, April, Ale, Alejandro, Carissa      [phone]    CHIEF COMPLAINT: Patient is a 32y old  Female who presents with a chief complaint of RLE edema and numbness (13 Oct 2017 16:25)    HPI: Pt is a 33 yo female with PMHX of Bipolar disorder, hypothyroidism, chronic low back pain on morphine s/p laminectomy and spinal fusion in 2016 who is BIB EMS for right leg swelling and numbness. Patient is a poor historian as she is on many pain medicines and is currently lethargic, therefore much info provided based on hospital staff and charts. She states that 18 hours prior to arrival, she fell on the floor at her house after taking too much morphine and was lying on floor for 8 hours. She woke up to numbness, swelling, and cool temperature of her right leg, from below the knee onwards. Patient denies any current SOB, CP, f/c, denies head trauma.     In the ED, vitals were Temp 101.4F /91  RR 20  SpO2 93% RA. Significant labs are WBS 24.3, Hb 15.6, Hmt 47.6,  Platelets 118, neutr 93%, sodium 131, BUN 30,  Cr 2.30, glucose 152 AST 1377, , GFR 27, lactate 1.7. Doppler US RLE showed Extremely limited evaluation of the right lower extremity with nonvisualization of the venous structures beyond the mid right femoral vein. No evidence of DVT within the right common femoral, proximal and   mid femoral veins. No evidence of left lower extremity deep venous thrombosis. XR of right tibia and XR of R femur showed no acute fractures. Arterial Doppler of RLE pending. EKG showed sinus tach of 140, with right superior axis deviation. Patient started on aztreonam, Tylenol given for fever, patient made NPO, Dr. Martines consulted for emergent surgery. (04 Oct 2017 01:13)    Interim history: Patient seen and examined at bedside. No complaints. Awake and alert, pain well controlled with current medication management.    MEDICATIONS  (STANDING):  ascorbic acid 500 milliGRAM(s) Oral two times a day  BACItracin   Ointment 1 Application(s) Topical two times a day  buDESOnide  80 MICROgram(s)/formoterol 4.5 MICROgram(s) Inhaler 2 Puff(s) Inhalation two times a day  buPROPion XL . 300 milliGRAM(s) Oral daily  cefepime  IVPB      cefepime  IVPB 1000 milliGRAM(s) IV Intermittent every 8 hours  cyanocobalamin 1000 MICROGram(s) Oral daily  fentaNYL   Patch  12 MICROgram(s)/Hr 1 Patch Transdermal every 72 hours  fentaNYL   Patch  25 MICROgram(s)/Hr 1 Patch Transdermal every 72 hours  fluticasone propionate 50 MICROgram(s)/spray Nasal Spray 1 Spray(s) Both Nostrils two times a day  folic acid 1 milliGRAM(s) Oral daily  heparin  Injectable 5000 Unit(s) SubCutaneous every 8 hours  influenza   Vaccine 0.5 milliLiter(s) IntraMuscular once  ipratropium 42 MICROgram(s) Nasal 2 Spray(s) Both Nostrils every 12 hours  lamoTRIgine 100 milliGRAM(s) Oral every 8 hours  levothyroxine 150 MICROGram(s) Oral daily  multivitamin/minerals 1 Tablet(s) Oral daily  oxyCODONE  ER Tablet 60 milliGRAM(s) Oral every 8 hours  pantoprazole    Tablet 40 milliGRAM(s) Oral before breakfast  potassium chloride    Tablet ER 40 milliEquivalent(s) Oral two times a day  risperiDONE   Tablet 8 milliGRAM(s) Oral at bedtime  sertraline 50 milliGRAM(s) Oral daily  topiramate 400 milliGRAM(s) Oral two times a day  vancomycin  IVPB 1500 milliGRAM(s) IV Intermittent every 12 hours  Viibryd Oral Tablet 40 milliGRAM(s),Viibryd Oral Tablet 40 mg 1 Tablet(s) 1 Tablet(s) Oral daily    MEDICATIONS  (PRN):  acetaminophen   Tablet 650 milliGRAM(s) Oral every 6 hours PRN For Temp greater than 38 C (100.4 F)  acetaminophen   Tablet. 650 milliGRAM(s) Oral every 6 hours PRN Mild Pain (1 - 3)  ALBUTerol    90 MICROgram(s) HFA Inhaler 2 Puff(s) Inhalation every 6 hours PRN Shortness of Breath and/or Wheezing  clonazePAM Tablet 2 milliGRAM(s) Oral every 8 hours PRN anxiety  docusate sodium 100 milliGRAM(s) Oral two times a day PRN Constipation  HYDROmorphone   Tablet 3 milliGRAM(s) Oral every 4 hours PRN moderate to severe pain  ondansetron Injectable 4 milliGRAM(s) IV Push every 4 hours PRN Nausea and/or Vomiting  senna 2 Tablet(s) Oral at bedtime PRN Constipation      REVIEW OF SYSTEMS:  eye, ent, GI, , allergic, dermatologic, musculoskeletal and neurologic are negative except as described above    Vital Signs Last 24 Hrs  T(C): 37.2 (30 Oct 2017 07:16), Max: 37.7 (29 Oct 2017 23:36)  T(F): 99 (30 Oct 2017 07:16), Max: 99.8 (29 Oct 2017 23:36)  HR: 102 (30 Oct 2017 07:16) (100 - 107)  BP: 94/59 (30 Oct 2017 07:16) (94/59 - 111/71)  BP(mean): --  RR: 17 (30 Oct 2017 07:16) (16 - 17)  SpO2: 100% (30 Oct 2017 07:16) (98% - 100%)    I&O's Summary    29 Oct 2017 07:01  -  30 Oct 2017 07:00  --------------------------------------------------------  IN: 600 mL / OUT: 350 mL / NET: 250 mL        Telemetry past 24h:    PHYSICAL EXAM:    Constitutional: well-nourished, well-developed, NAD   HEENT:  MMM, sclerae anicteric, conjunctivae clear, no oral cyanosis.  Pulmonary: Non-labored, breath sounds are clear bilaterally, No wheezing, rales or rhonchi  Cardiovascular: Regular, S1 and S2.  No murmur.  No rubs, gallops or clicks  Gastrointestinal: Bowel Sounds present, soft, nontender.   Neurological: Alert, no focal deficits  Psych:  Mood & affect appropriate  Extremities: right leg with ace bandage, wound vac in place, minimal edema noted in toes    LABS: All Labs Reviewed:                        7.4    8.5   )-----------( 271      ( 30 Oct 2017 08:23 )             23.0                         7.7    7.2   )-----------( 268      ( 29 Oct 2017 06:38 )             23.5                         6.4    6.9   )-----------( 229      ( 28 Oct 2017 09:45 )             19.7     30 Oct 2017 08:23    140    |  107    |  15     ----------------------------<  88     3.5     |  21     |  1.10   29 Oct 2017 06:38    142    |  111    |  15     ----------------------------<  84     3.6     |  21     |  0.88   28 Oct 2017 09:45    139    |  109    |  16     ----------------------------<  121    3.6     |  20     |  0.97     Ca    8.4        30 Oct 2017 08:23  Ca    8.1        29 Oct 2017 06:38  Ca    7.7        28 Oct 2017 09:45  Mg     2.0       29 Oct 2017 06:38            Blood Culture: Organism --  Gram Stain Blood -- Gram Stain --  Specimen Source .Urine Catheterized  Culture-Blood --    Organism --  Gram Stain Blood -- Gram Stain --  Specimen Source .Blood Blood-Venous  Culture-Blood --

## 2017-10-30 NOTE — PROGRESS NOTE ADULT - ASSESSMENT
32-year-old female now status post right lower extremity fasciotomy for prolonged compression after a fall with lethargy.     - No obvious cardiac complications post fasciotomy. Planned for OR later this week.   - No clear evidence of acute ischemia or volume overload  - Abx per ID  - plastics/vascular f/u  - Continue Pain control  - TTE with hyperdynamic LV function, EF 70%, no valvular disease noted  - Other cardiovascular workup will depend on clinical course.  - Monitor and replete electrolytes. Keep K>4.0 and Mg>2.0.   - All other workup per primary team  - Will follow

## 2017-10-30 NOTE — PROGRESS NOTE ADULT - SUBJECTIVE AND OBJECTIVE BOX
MERVATABELARDO  MRN-[id number] 32y    PLASTIC SURGERY/ DR. HERNANDEZ    Vital Signs Last 24 Hrs  T(C): 37.7 (29 Oct 2017 23:36), Max: 37.7 (29 Oct 2017 23:36)  T(F): 99.8 (29 Oct 2017 23:36), Max: 99.8 (29 Oct 2017 23:36)  HR: 100 (29 Oct 2017 23:36) (100 - 107)  BP: 106/64 (29 Oct 2017 23:36) (97/60 - 111/71)  BP(mean): --  RR: 16 (29 Oct 2017 23:36) (16 - 16)  SpO2: 98% (29 Oct 2017 23:36) (97% - 98%)    POD # 3    RIGHT LEG: WOUND VAC IN PLACE X2 WITH GOOD SEAL                  EDEMA MUCH IMPROVED                  VIABLE FLAPS LOWER THIGH AND LOWER LEG                  FOOT WARM , GOOD PEDAL PULSES                         7.7    7.2   )-----------( 268      ( 29 Oct 2017 06:38 )             23.5      10-29    142  |  111<H>  |  15  ----------------------------<  84  3.6   |  21<L>  |  0.88    Ca    8.1<L>      29 Oct 2017 06:38  Mg     2.0     10-29                              ASSESSMENT &  PLAN:  POD # 3  S/P DEBRIDEMENT/ IRRIGATION RIGHT LOWER LEGS WOUNDS WITH PARTIAL CLOSURE THIGH WOUND AND APPLICATION OF WOUND VAC    CONTINUE WITH VAC THERAPY  MOST LIKELY RETURN TO OR THIS WEEK

## 2017-10-30 NOTE — PROGRESS NOTE ADULT - PROBLEM SELECTOR PLAN 7
the more acute drop today was likely due to blood loss with surgery; the previously   low H&H was likely anemia of chronic disease from chronic wound borderline low B12  -responded appropriately to the unit of PRBC given on 10/28.  Hematology recs appreciated the more acute drop was likely due to blood loss with surgery; the previously   low H&H was likely anemia of chronic disease from chronic wound borderline low B12  -responded appropriately to the unit of PRBC given on 10/28.  Hematology recs appreciated

## 2017-10-30 NOTE — PROGRESS NOTE ADULT - SUBJECTIVE AND OBJECTIVE BOX
Patient is a 32y old  Female who presents with a chief complaint of RLE edema and numbness (13 Oct 2017 16:25)      Vascular Surgery Progress Note    Interval HPI:  Patient is doing well and awaiting wound  closures and skin grafts by Dr. Culver later this week.    Medications:  cefepime  IVPB   cefepime  IVPB 1000  vancomycin  IVPB 1500      Allergies:  Allergies    avocado (Unknown)  latex (Urticaria; Rash)  penicillin (Unknown)  sulfa drugs (Unknown)    Intolerances        Vital Signs Last 24 Hrs  T(C): 37.4 (30 Oct 2017 16:00), Max: 37.7 (29 Oct 2017 23:36)  T(F): 99.3 (30 Oct 2017 16:00), Max: 99.8 (29 Oct 2017 23:36)  HR: 106 (30 Oct 2017 16:00) (100 - 108)  BP: 107/76 (30 Oct 2017 16:00) (94/59 - 107/76)  BP(mean): --  RR: 16 (30 Oct 2017 16:00) (16 - 17)  SpO2: 98% (30 Oct 2017 16:00) (98% - 100%)  I&O's Summary    29 Oct 2017 07:01  -  30 Oct 2017 07:00  --------------------------------------------------------  IN: 600 mL / OUT: 350 mL / NET: 250 mL    30 Oct 2017 07:01  -  30 Oct 2017 18:29  --------------------------------------------------------  IN: 240 mL / OUT: 0 mL / NET: 240 mL        Physical Exam:    Right leg wounds are clean and viable with wound vacs in place   Pulses:4/4 b/l DP and PT PULSES    LABS:                        7.4    8.5   )-----------( 271      ( 30 Oct 2017 08:23 )             23.0     10-30    140  |  107  |  15  ----------------------------<  88  3.5   |  21<L>  |  1.10    Ca    8.4<L>      30 Oct 2017 08:23  Mg     2.0     10-29

## 2017-10-30 NOTE — PROGRESS NOTE ADULT - SUBJECTIVE AND OBJECTIVE BOX
Patient is a 32y old  Female who presents with a chief complaint of RLE edema and numbness (04 Oct 2017 01:13)      Patient seen in follow up for GHAZAL, ATN, Rhabdomyolysis. Improved and stable renal function trend. For OR.     PAST MEDICAL HISTORY:  Low back pain  Hypothyroi  Bipolar 1 disorder     MEDICATIONS  (STANDING):  ascorbic acid 500 milliGRAM(s) Oral two times a day  BACItracin   Ointment 1 Application(s) Topical two times a day  buDESOnide  80 MICROgram(s)/formoterol 4.5 MICROgram(s) Inhaler 2 Puff(s) Inhalation two times a day  buPROPion XL . 300 milliGRAM(s) Oral daily  cefepime  IVPB      cefepime  IVPB 1000 milliGRAM(s) IV Intermittent every 8 hours  cyanocobalamin 1000 MICROGram(s) Oral daily  fentaNYL   Patch  12 MICROgram(s)/Hr 1 Patch Transdermal every 72 hours  fentaNYL   Patch  25 MICROgram(s)/Hr 1 Patch Transdermal every 72 hours  fluticasone propionate 50 MICROgram(s)/spray Nasal Spray 1 Spray(s) Both Nostrils two times a day  folic acid 1 milliGRAM(s) Oral daily  heparin  Injectable 5000 Unit(s) SubCutaneous every 8 hours  influenza   Vaccine 0.5 milliLiter(s) IntraMuscular once  ipratropium 42 MICROgram(s) Nasal 2 Spray(s) Both Nostrils every 12 hours  lamoTRIgine 100 milliGRAM(s) Oral every 8 hours  levothyroxine 150 MICROGram(s) Oral daily  multivitamin/minerals 1 Tablet(s) Oral daily  oxyCODONE  ER Tablet 60 milliGRAM(s) Oral every 8 hours  pantoprazole    Tablet 40 milliGRAM(s) Oral before breakfast  potassium chloride    Tablet ER 40 milliEquivalent(s) Oral two times a day  risperiDONE   Tablet 8 milliGRAM(s) Oral at bedtime  sertraline 50 milliGRAM(s) Oral daily  topiramate 400 milliGRAM(s) Oral two times a day  vancomycin  IVPB 1500 milliGRAM(s) IV Intermittent every 12 hours  Viibryd Oral Tablet 40 milliGRAM(s),Viibryd Oral Tablet 40 mg 1 Tablet(s) 1 Tablet(s) Oral daily    MEDICATIONS  (PRN):  acetaminophen   Tablet 650 milliGRAM(s) Oral every 6 hours PRN For Temp greater than 38 C (100.4 F)  acetaminophen   Tablet. 650 milliGRAM(s) Oral every 6 hours PRN Mild Pain (1 - 3)  ALBUTerol    90 MICROgram(s) HFA Inhaler 2 Puff(s) Inhalation every 6 hours PRN Shortness of Breath and/or Wheezing  clonazePAM Tablet 2 milliGRAM(s) Oral every 8 hours PRN anxiety  docusate sodium 100 milliGRAM(s) Oral two times a day PRN Constipation  HYDROmorphone   Tablet 3 milliGRAM(s) Oral every 4 hours PRN moderate to severe pain  ondansetron Injectable 4 milliGRAM(s) IV Push every 4 hours PRN Nausea and/or Vomiting  senna 2 Tablet(s) Oral at bedtime PRN Constipation    T(C): 37.4 (10-30-17 @ 08:00), Max: 37.9 (10-28-17 @ 14:31)  HR: 108 (10-30-17 @ 08:00) (93 - 109)  BP: 101/63 (10-30-17 @ 08:00) (92/61 - 116/69)  RR: 16 (10-30-17 @ 08:00)  SpO2: 99% (10-30-17 @ 08:00)  Wt(kg): --  I&O's Detail    29 Oct 2017 07:01  -  30 Oct 2017 07:00  --------------------------------------------------------  IN:    Solution: 500 mL    Solution: 100 mL  Total IN: 600 mL    OUT:    Voided: 350 mL  Total OUT: 350 mL    Total NET: 250 mL                  PHYSICAL EXAM:  General: NAD  Respiratory: b/l air entry  Cardiovascular: S1 S2  Gastrointestinal: soft  Extremities:  Rt leg dressing      LABORATORY:                        7.4    8.5   )-----------( 271      ( 30 Oct 2017 08:23 )             23.0     10-30    140  |  107  |  15  ----------------------------<  88  3.5   |  21<L>  |  1.10    Ca    8.4<L>      30 Oct 2017 08:23  Mg     2.0     10-29      Sodium, Serum: 140 mmol/L (10-30 @ 08:23)  Sodium, Serum: 142 mmol/L (10-29 @ 06:38)    Potassium, Serum: 3.5 mmol/L (10-30 @ 08:23)  Potassium, Serum: 3.6 mmol/L (10-29 @ 06:38)    Hemoglobin: 7.4 g/dL (10-30 @ 08:23)  Hemoglobin: 7.7 g/dL (10-29 @ 06:38)  Hemoglobin: 6.4 g/dL (10-28 @ 09:45)    Creatinine, Serum 1.10 (10-30 @ 08:23)  Creatinine, Serum 0.88 (10-29 @ 06:38)  Creatinine, Serum 0.97 (10-28 @ 09:45)

## 2017-10-31 LAB
ANION GAP SERPL CALC-SCNC: 10 MMOL/L — SIGNIFICANT CHANGE UP (ref 5–17)
BUN SERPL-MCNC: 19 MG/DL — SIGNIFICANT CHANGE UP (ref 7–23)
CALCIUM SERPL-MCNC: 8.4 MG/DL — LOW (ref 8.5–10.1)
CHLORIDE SERPL-SCNC: 109 MMOL/L — HIGH (ref 96–108)
CO2 SERPL-SCNC: 22 MMOL/L — SIGNIFICANT CHANGE UP (ref 22–31)
CREAT SERPL-MCNC: 0.84 MG/DL — SIGNIFICANT CHANGE UP (ref 0.5–1.3)
GLUCOSE SERPL-MCNC: 85 MG/DL — SIGNIFICANT CHANGE UP (ref 70–99)
HCT VFR BLD CALC: 22.2 % — LOW (ref 34.5–45)
HGB BLD-MCNC: 7.4 G/DL — LOW (ref 11.5–15.5)
MAGNESIUM SERPL-MCNC: 2.2 MG/DL — SIGNIFICANT CHANGE UP (ref 1.6–2.6)
MCHC RBC-ENTMCNC: 29.4 PG — SIGNIFICANT CHANGE UP (ref 27–34)
MCHC RBC-ENTMCNC: 33.2 GM/DL — SIGNIFICANT CHANGE UP (ref 32–36)
MCV RBC AUTO: 88.4 FL — SIGNIFICANT CHANGE UP (ref 80–100)
PHOSPHATE SERPL-MCNC: 6.2 MG/DL — HIGH (ref 2.5–4.5)
PLATELET # BLD AUTO: 266 K/UL — SIGNIFICANT CHANGE UP (ref 150–400)
POTASSIUM SERPL-MCNC: 3.7 MMOL/L — SIGNIFICANT CHANGE UP (ref 3.5–5.3)
POTASSIUM SERPL-SCNC: 3.7 MMOL/L — SIGNIFICANT CHANGE UP (ref 3.5–5.3)
RBC # BLD: 2.52 M/UL — LOW (ref 3.8–5.2)
RBC # FLD: 13.4 % — SIGNIFICANT CHANGE UP (ref 10.3–14.5)
SODIUM SERPL-SCNC: 141 MMOL/L — SIGNIFICANT CHANGE UP (ref 135–145)
SURGICAL PATHOLOGY FINAL REPORT - CH: SIGNIFICANT CHANGE UP
VANCOMYCIN TROUGH SERPL-MCNC: 16.4 UG/ML — SIGNIFICANT CHANGE UP (ref 10–20)
VANCOMYCIN TROUGH SERPL-MCNC: 7.7 UG/ML — LOW (ref 10–20)
WBC # BLD: 8 K/UL — SIGNIFICANT CHANGE UP (ref 3.8–10.5)
WBC # FLD AUTO: 8 K/UL — SIGNIFICANT CHANGE UP (ref 3.8–10.5)

## 2017-10-31 PROCEDURE — 99233 SBSQ HOSP IP/OBS HIGH 50: CPT

## 2017-10-31 PROCEDURE — 99232 SBSQ HOSP IP/OBS MODERATE 35: CPT

## 2017-10-31 RX ORDER — VANCOMYCIN HCL 1 G
1250 VIAL (EA) INTRAVENOUS EVERY 12 HOURS
Qty: 0 | Refills: 0 | Status: COMPLETED | OUTPATIENT
Start: 2017-10-31 | End: 2017-11-01

## 2017-10-31 RX ADMIN — CEFEPIME 100 MILLIGRAM(S): 1 INJECTION, POWDER, FOR SOLUTION INTRAMUSCULAR; INTRAVENOUS at 15:12

## 2017-10-31 RX ADMIN — PREGABALIN 1000 MICROGRAM(S): 225 CAPSULE ORAL at 13:17

## 2017-10-31 RX ADMIN — OXYCODONE HYDROCHLORIDE 60 MILLIGRAM(S): 5 TABLET ORAL at 06:54

## 2017-10-31 RX ADMIN — ALBUTEROL 2 PUFF(S): 90 AEROSOL, METERED ORAL at 06:31

## 2017-10-31 RX ADMIN — HEPARIN SODIUM 5000 UNIT(S): 5000 INJECTION INTRAVENOUS; SUBCUTANEOUS at 06:15

## 2017-10-31 RX ADMIN — Medication 1 SPRAY(S): at 06:31

## 2017-10-31 RX ADMIN — HYDROMORPHONE HYDROCHLORIDE 3 MILLIGRAM(S): 2 INJECTION INTRAMUSCULAR; INTRAVENOUS; SUBCUTANEOUS at 03:21

## 2017-10-31 RX ADMIN — Medication 2 SPRAY(S): at 20:20

## 2017-10-31 RX ADMIN — HEPARIN SODIUM 5000 UNIT(S): 5000 INJECTION INTRAVENOUS; SUBCUTANEOUS at 21:58

## 2017-10-31 RX ADMIN — CEFEPIME 100 MILLIGRAM(S): 1 INJECTION, POWDER, FOR SOLUTION INTRAMUSCULAR; INTRAVENOUS at 21:57

## 2017-10-31 RX ADMIN — OXYCODONE HYDROCHLORIDE 60 MILLIGRAM(S): 5 TABLET ORAL at 21:58

## 2017-10-31 RX ADMIN — SERTRALINE 50 MILLIGRAM(S): 25 TABLET, FILM COATED ORAL at 13:17

## 2017-10-31 RX ADMIN — OXYCODONE HYDROCHLORIDE 60 MILLIGRAM(S): 5 TABLET ORAL at 22:40

## 2017-10-31 RX ADMIN — RISPERIDONE 8 MILLIGRAM(S): 4 TABLET ORAL at 21:58

## 2017-10-31 RX ADMIN — OXYCODONE HYDROCHLORIDE 60 MILLIGRAM(S): 5 TABLET ORAL at 06:32

## 2017-10-31 RX ADMIN — BUPROPION HYDROCHLORIDE 300 MILLIGRAM(S): 150 TABLET, EXTENDED RELEASE ORAL at 13:17

## 2017-10-31 RX ADMIN — Medication 2 MILLIGRAM(S): at 11:07

## 2017-10-31 RX ADMIN — Medication 400 MILLIGRAM(S): at 20:23

## 2017-10-31 RX ADMIN — HYDROMORPHONE HYDROCHLORIDE 1 MILLIGRAM(S): 2 INJECTION INTRAMUSCULAR; INTRAVENOUS; SUBCUTANEOUS at 11:58

## 2017-10-31 RX ADMIN — OXYCODONE HYDROCHLORIDE 60 MILLIGRAM(S): 5 TABLET ORAL at 13:17

## 2017-10-31 RX ADMIN — HYDROMORPHONE HYDROCHLORIDE 3 MILLIGRAM(S): 2 INJECTION INTRAMUSCULAR; INTRAVENOUS; SUBCUTANEOUS at 13:03

## 2017-10-31 RX ADMIN — Medication 500 MILLIGRAM(S): at 20:23

## 2017-10-31 RX ADMIN — LAMOTRIGINE 100 MILLIGRAM(S): 25 TABLET, ORALLY DISINTEGRATING ORAL at 06:34

## 2017-10-31 RX ADMIN — PANTOPRAZOLE SODIUM 40 MILLIGRAM(S): 20 TABLET, DELAYED RELEASE ORAL at 06:16

## 2017-10-31 RX ADMIN — BUDESONIDE AND FORMOTEROL FUMARATE DIHYDRATE 2 PUFF(S): 160; 4.5 AEROSOL RESPIRATORY (INHALATION) at 06:32

## 2017-10-31 RX ADMIN — LAMOTRIGINE 100 MILLIGRAM(S): 25 TABLET, ORALLY DISINTEGRATING ORAL at 15:13

## 2017-10-31 RX ADMIN — Medication 1 MILLIGRAM(S): at 13:17

## 2017-10-31 RX ADMIN — Medication 500 MILLIGRAM(S): at 06:16

## 2017-10-31 RX ADMIN — CEFEPIME 100 MILLIGRAM(S): 1 INJECTION, POWDER, FOR SOLUTION INTRAMUSCULAR; INTRAVENOUS at 06:21

## 2017-10-31 RX ADMIN — Medication 400 MILLIGRAM(S): at 06:36

## 2017-10-31 RX ADMIN — HYDROMORPHONE HYDROCHLORIDE 3 MILLIGRAM(S): 2 INJECTION INTRAMUSCULAR; INTRAVENOUS; SUBCUTANEOUS at 03:51

## 2017-10-31 RX ADMIN — OXYCODONE HYDROCHLORIDE 60 MILLIGRAM(S): 5 TABLET ORAL at 15:13

## 2017-10-31 RX ADMIN — Medication 2 SPRAY(S): at 06:31

## 2017-10-31 RX ADMIN — LAMOTRIGINE 100 MILLIGRAM(S): 25 TABLET, ORALLY DISINTEGRATING ORAL at 21:57

## 2017-10-31 RX ADMIN — HYDROMORPHONE HYDROCHLORIDE 2 MILLIGRAM(S): 2 INJECTION INTRAMUSCULAR; INTRAVENOUS; SUBCUTANEOUS at 09:55

## 2017-10-31 RX ADMIN — Medication 1 TABLET(S): at 13:17

## 2017-10-31 RX ADMIN — Medication 1 SPRAY(S): at 20:22

## 2017-10-31 RX ADMIN — HYDROMORPHONE HYDROCHLORIDE 1 MILLIGRAM(S): 2 INJECTION INTRAMUSCULAR; INTRAVENOUS; SUBCUTANEOUS at 13:07

## 2017-10-31 RX ADMIN — Medication 166.67 MILLIGRAM(S): at 22:38

## 2017-10-31 RX ADMIN — BUDESONIDE AND FORMOTEROL FUMARATE DIHYDRATE 2 PUFF(S): 160; 4.5 AEROSOL RESPIRATORY (INHALATION) at 20:25

## 2017-10-31 RX ADMIN — HEPARIN SODIUM 5000 UNIT(S): 5000 INJECTION INTRAVENOUS; SUBCUTANEOUS at 15:13

## 2017-10-31 RX ADMIN — Medication 150 MICROGRAM(S): at 06:16

## 2017-10-31 NOTE — PROGRESS NOTE ADULT - PROBLEM SELECTOR PLAN 2
-compartment syndrome of the right lower extremity   -s/p emergent fasciotomy by Vascular surgery (Bobbi)   -s/p wound vac placement f/u with vascular sx  -10/27 pt went to OR with Dr. Isabel and had just minimal debridement of muscle, mostly irrigation, had a few stitches placed on right thigh  -Finished course of IV abx aztreonam and clindamycin in ICU, but due to low-grade periodic fever, restarted on Abx with cefepime and vanco as per ID recs  -wound vacs in place; f/up surgery recs  -discussed pain management with Dr. Damico who has been in charge of patient's opiate regimen during the hospitalization. Trying to wean pt off opiates; pt appears comfortable on exam  -c/w fentanyl patch and standing oxycontin

## 2017-10-31 NOTE — PROGRESS NOTE ADULT - PROBLEM SELECTOR PLAN 1
unclear etiology; fever has now resolved  CXR negative; may be having periodic episodes of aspiration pneumonitis given has periods of somnolence. Will continue to try and titrate off sedating medications. No significant evidence for full blown pneumonia  Urine/ blood Cxs -- negative, but UCx may have been drawn after initiation of Abx -- had a staph organism earlier on admission  ID recs appreciated.   c/w Vancomycin and Cefepime for now -- ?UTI, ?leg wound infection -- now has had some further debridement  HIV negative.  CT Lower extremity done, no drainable fluid collection

## 2017-10-31 NOTE — PROGRESS NOTE ADULT - SUBJECTIVE AND OBJECTIVE BOX
ABELARDO CROWELL  MRN-[id number] 32y    PLASTIC SURGERY/ DR. HERNANDEZ     Vital Signs Last 24 Hrs  T(C): 37.7 (30 Oct 2017 23:15), Max: 37.7 (30 Oct 2017 23:15)  T(F): 99.8 (30 Oct 2017 23:15), Max: 99.8 (30 Oct 2017 23:15)  HR: 96 (30 Oct 2017 23:15) (96 - 108)  BP: 104/68 (30 Oct 2017 23:15) (94/59 - 107/76)  BP(mean): --  RR: 16 (30 Oct 2017 23:15) (16 - 17)  SpO2: 100% (30 Oct 2017 23:15) (98% - 100%)    POD # 4    RIGHT LEG    WOUND VAC X2 IN PLACE WITH GOOD SEAL  SEROSANGUINOUS DISCHARGE NOTED FROM  LOWER CALF DRESSING   VIABLE FLAPS LOWER THIGH , MEDIAL AND LATERAL CALF WOUNDS   RIGHT FOOT WARM  WITH SOME EDEMA AND PALPABLE PULSES                         7.4    8.5   )-----------( 271      ( 30 Oct 2017 08:23 )             23.0      10-30    140  |  107  |  15  ----------------------------<  88  3.5   |  21<L>  |  1.10    Ca    8.4<L>      30 Oct 2017 08:23  Mg     2.0     10-29                            ASSESSMENT &  PLAN:  POD # 4  S/P DEBRIDEMENT AND PARTIAL CLOSURE LOWER RIGHT THIGH WOUND, APPLICATION OF WOUND VAC   AS DISCUSSED WITH DR. HERNANDEZ WOUND VAC CHANGE TODAY BT WOUND NURSE  TO [facility] ON THURSDAY/ FRIDAY FOR DEBRIDEMENT AND POSSIBLE SKIN GRAFT

## 2017-10-31 NOTE — PROVIDER CONTACT NOTE (CHANGE IN STATUS NOTIFICATION) - SITUATION
Wound Care
patient's right foot cold, purplish in color, patient states she cannot feel foot at all.
Wound Care

## 2017-10-31 NOTE — PROVIDER CONTACT NOTE (CHANGE IN STATUS NOTIFICATION) - RECOMMENDATIONS
MD and Wound care visit to be coordinated for next dressing change Thursday october 19th
Continue current plan of care
continue current plan of care  Bill TID
continue with current plan of care

## 2017-10-31 NOTE — PROGRESS NOTE ADULT - PROBLEM SELECTOR PLAN 3
-discussed pain management with Dr. aDmico who has been in charge of patient's opiate regimen during the hospitalization. Trying to wean pt off opiates and has been off IV dilaudid; pt appears comfortable on exam.  -c/w fentanyl patch and standing oxycontin

## 2017-10-31 NOTE — PROVIDER CONTACT NOTE (CHANGE IN STATUS NOTIFICATION) - ASSESSMENT
Negative Pressure Wound Therapy (NPWT) Veraflo dressing changed today right medial gaiter 60ml NS instilled q2h right lateral gaiter 30ml NS instilled q2h  right upper thigh bridged to right lateral gaiter Patient premedicated; Patient tolerated procedure without signs/symptoms of discomfort Patient due to go to OR on Friday November 3 for skin grafting
Dressing to be changed two times weekly negative pressure dropped to 75mmHg;
Negative Pressure Wound Therapy dressing changed today patient slept through most while awake pleasant and conversational wounds are clean with red granulating tissue and good blood supply looked at wounds with PA today who will contact plastics to assess patient for possible grafts
Patient is a 31yo female right lower extremity gaiter status post fasciotomies  dressing reinforced to be changed Wednesday 10/11/17
Patient is a 33yo female presenting with fasciotomies of  4 leg compartments (anterior, lateral, superficial posterior, and deep posterior) and the lateral thigh muscles. Veraflo negative pressure wound therapy applied patient pre-medicated prior to dressing change patient stated pain 6/10 post procedure patient was pleasant an cooperative for the one hour long dressing change
Patient is a 31yo female presenting with fasciotomies of  4 leg compartments (anterior, lateral, superficial posterior, and deep posterior) of the right lateral thigh, and right gaiter. Veraflo negative pressure wound therapy (NPWT) applied patient denied pain and slept through most of the procedure patient was pleasant an cooperative for the dressing change. MD and PA saw patient please refer to document
Unable to find pedal pulse with doppler or palpation

## 2017-10-31 NOTE — PROGRESS NOTE ADULT - PROBLEM SELECTOR PLAN 9
-c/w all home meds  -Appreciate psychiatry consult Dr. Schmidt   -pain management consult Dr. Damico

## 2017-10-31 NOTE — PROGRESS NOTE ADULT - PROBLEM SELECTOR PLAN 7
the more acute drop was likely due to blood loss with surgery; the previously   low H&H was likely anemia of chronic disease from chronic wound borderline low B12  -responded appropriately to the unit of PRBC given on 10/28.  Hematology recs appreciated

## 2017-10-31 NOTE — PROVIDER CONTACT NOTE (CHANGE IN STATUS NOTIFICATION) - ACTION/TREATMENT ORDERED:
RN instructed in trouble shooting Veraflo NPWT
RN instructed in troubleshooting NPWT
RN instructed in troubleshooting negative pressure wound vacuum
Telephone discussion with Dr. Vega and Dr Isabel: Patient made aware
Benedict Nutritionist, RN instructed in troubleshooting negative pressure wound therapy
RN instructed in trouble shooting wound vacuum
Dr Tyler aware, doppler left at bedside for him to evaluate patient

## 2017-10-31 NOTE — PROGRESS NOTE ADULT - SUBJECTIVE AND OBJECTIVE BOX
Patient is a 32y old  Female who presents with a chief complaint of RLE edema and numbness (13 Oct 2017 16:25)      INTERVAL HPI/OVERNIGHT EVENTS: Pt reports pain in the right leg. Just had wound vac change. Has rare cough. Denies fever, chills, dysuria, SOB, CP, palpitations.     MEDICATIONS  (STANDING):  ascorbic acid 500 milliGRAM(s) Oral two times a day  BACItracin   Ointment 1 Application(s) Topical two times a day  buDESOnide  80 MICROgram(s)/formoterol 4.5 MICROgram(s) Inhaler 2 Puff(s) Inhalation two times a day  buPROPion XL . 300 milliGRAM(s) Oral daily  cefepime  IVPB      cefepime  IVPB 1000 milliGRAM(s) IV Intermittent every 8 hours  cyanocobalamin 1000 MICROGram(s) Oral daily  fentaNYL   Patch  12 MICROgram(s)/Hr 1 Patch Transdermal every 72 hours  fentaNYL   Patch  25 MICROgram(s)/Hr 1 Patch Transdermal every 72 hours  fluticasone propionate 50 MICROgram(s)/spray Nasal Spray 1 Spray(s) Both Nostrils two times a day  folic acid 1 milliGRAM(s) Oral daily  heparin  Injectable 5000 Unit(s) SubCutaneous every 8 hours  influenza   Vaccine 0.5 milliLiter(s) IntraMuscular once  ipratropium 42 MICROgram(s) Nasal 2 Spray(s) Both Nostrils every 12 hours  lamoTRIgine 100 milliGRAM(s) Oral every 8 hours  levothyroxine 150 MICROGram(s) Oral daily  multivitamin/minerals 1 Tablet(s) Oral daily  oxyCODONE  ER Tablet 60 milliGRAM(s) Oral every 8 hours  pantoprazole    Tablet 40 milliGRAM(s) Oral before breakfast  risperiDONE   Tablet 8 milliGRAM(s) Oral at bedtime  sertraline 50 milliGRAM(s) Oral daily  topiramate 400 milliGRAM(s) Oral two times a day  Viibryd Oral Tablet 40 milliGRAM(s),Viibryd Oral Tablet 40 mg 1 Tablet(s) 1 Tablet(s) Oral daily    MEDICATIONS  (PRN):  acetaminophen   Tablet 650 milliGRAM(s) Oral every 6 hours PRN For Temp greater than 38 C (100.4 F)  acetaminophen   Tablet. 650 milliGRAM(s) Oral every 6 hours PRN Mild Pain (1 - 3)  ALBUTerol    90 MICROgram(s) HFA Inhaler 2 Puff(s) Inhalation every 6 hours PRN Shortness of Breath and/or Wheezing  clonazePAM Tablet 2 milliGRAM(s) Oral every 8 hours PRN anxiety  docusate sodium 100 milliGRAM(s) Oral two times a day PRN Constipation  HYDROmorphone   Tablet 3 milliGRAM(s) Oral every 4 hours PRN moderate to severe pain  ondansetron Injectable 4 milliGRAM(s) IV Push every 4 hours PRN Nausea and/or Vomiting  senna 2 Tablet(s) Oral at bedtime PRN Constipation        Allergies    avocado (Unknown)  latex (Urticaria; Rash)  penicillin (Unknown)  sulfa drugs (Unknown)    Intolerances        REVIEW OF SYSTEMS:  CONSTITUTIONAL: No fever or chills  HEENT:  No headache, no sore throat  RESPIRATORY: +rare cough, No wheezing, or shortness of breath  CARDIOVASCULAR: No chest pain, palpitations   GASTROINTESTINAL: No nausea, vomiting, or diarrhea  GENITOURINARY: No dysuria, frequency, or hematuria  NEUROLOGICAL: +weakness and loss of sensation in the right leg (especially below the knee)  MUSCULOSKELETAL: +right leg pain   PSYCHIATRIC:  +anxiety     Vital Signs Last 24 Hrs  T(C): 37.2 (31 Oct 2017 17:36), Max: 37.7 (30 Oct 2017 23:15)  T(F): 99 (31 Oct 2017 17:36), Max: 99.8 (30 Oct 2017 23:15)  HR: 114 (31 Oct 2017 17:36) (96 - 114)  BP: 106/69 (31 Oct 2017 17:36) (104/68 - 106/69)  BP(mean): --  RR: 17 (31 Oct 2017 17:36) (16 - 18)  SpO2: 100% (31 Oct 2017 17:36) (100% - 100%)    PHYSICAL EXAM:  GENERAL: NAD  HEENT:  EOMI, moist mucous membranes  CHEST/LUNG:  CTA b/l, no rales  HEART:  RRR, S1, S2  ABDOMEN:  BS+, soft, nontender, nondistended  EXTREMITIES: right leg almost entirely bandaged; wound vacs in place; no swelling or calf tenderness in the left leg  NERVOUS SYSTEM: AA&Ox3    LABS:                                   7.4    8.0   )-----------( 266      ( 31 Oct 2017 07:05 )             22.2     CBC Full  -  ( 31 Oct 2017 07:05 )  WBC Count : 8.0 K/uL  Hemoglobin : 7.4 g/dL  Hematocrit : 22.2 %  Platelet Count - Automated : 266 K/uL  Mean Cell Volume : 88.4 fl  Mean Cell Hemoglobin : 29.4 pg  Mean Cell Hemoglobin Concentration : 33.2 gm/dL  Auto Neutrophil # : x  Auto Lymphocyte # : x  Auto Monocyte # : x  Auto Eosinophil # : x  Auto Basophil # : x  Auto Neutrophil % : x  Auto Lymphocyte % : x  Auto Monocyte % : x  Auto Eosinophil % : x  Auto Basophil % : x    10-31    141  |  109<H>  |  19  ----------------------------<  85  3.7   |  22  |  0.84    Ca    8.4<L>      31 Oct 2017 07:05  Phos  6.2     10-31  Mg     2.2     10-31        CAPILLARY BLOOD GLUCOSE    BCx and UCx from 10/27: all negative        RADIOLOGY & ADDITIONAL TESTS:  CXR 10/26: no active disease  Personally reviewed.     Consultant(s) Notes Reviewed:  [x] YES  [ ] NO    Care Discussed with [x] Consultants  [x] Patient  [ ] Family  [ ]      [ ] Other; RN  DVT ppx: [email or web address]

## 2017-11-01 LAB
ANION GAP SERPL CALC-SCNC: 12 MMOL/L — SIGNIFICANT CHANGE UP (ref 5–17)
BUN SERPL-MCNC: 19 MG/DL — SIGNIFICANT CHANGE UP (ref 7–23)
CALCIUM SERPL-MCNC: 8.5 MG/DL — SIGNIFICANT CHANGE UP (ref 8.5–10.1)
CHLORIDE SERPL-SCNC: 109 MMOL/L — HIGH (ref 96–108)
CO2 SERPL-SCNC: 20 MMOL/L — LOW (ref 22–31)
CREAT SERPL-MCNC: 0.89 MG/DL — SIGNIFICANT CHANGE UP (ref 0.5–1.3)
CULTURE RESULTS: SIGNIFICANT CHANGE UP
CULTURE RESULTS: SIGNIFICANT CHANGE UP
GLUCOSE SERPL-MCNC: 88 MG/DL — SIGNIFICANT CHANGE UP (ref 70–99)
HCT VFR BLD CALC: 23.7 % — LOW (ref 34.5–45)
HGB BLD-MCNC: 7.5 G/DL — LOW (ref 11.5–15.5)
MCHC RBC-ENTMCNC: 28.8 PG — SIGNIFICANT CHANGE UP (ref 27–34)
MCHC RBC-ENTMCNC: 31.7 GM/DL — LOW (ref 32–36)
MCV RBC AUTO: 91 FL — SIGNIFICANT CHANGE UP (ref 80–100)
PLATELET # BLD AUTO: 291 K/UL — SIGNIFICANT CHANGE UP (ref 150–400)
POTASSIUM SERPL-MCNC: 3.5 MMOL/L — SIGNIFICANT CHANGE UP (ref 3.5–5.3)
POTASSIUM SERPL-SCNC: 3.5 MMOL/L — SIGNIFICANT CHANGE UP (ref 3.5–5.3)
RBC # BLD: 2.6 M/UL — LOW (ref 3.8–5.2)
RBC # FLD: 13.8 % — SIGNIFICANT CHANGE UP (ref 10.3–14.5)
SODIUM SERPL-SCNC: 141 MMOL/L — SIGNIFICANT CHANGE UP (ref 135–145)
SPECIMEN SOURCE: SIGNIFICANT CHANGE UP
SPECIMEN SOURCE: SIGNIFICANT CHANGE UP
WBC # BLD: 8.5 K/UL — SIGNIFICANT CHANGE UP (ref 3.8–10.5)
WBC # FLD AUTO: 8.5 K/UL — SIGNIFICANT CHANGE UP (ref 3.8–10.5)

## 2017-11-01 PROCEDURE — 99232 SBSQ HOSP IP/OBS MODERATE 35: CPT

## 2017-11-01 PROCEDURE — 99233 SBSQ HOSP IP/OBS HIGH 50: CPT

## 2017-11-01 RX ORDER — FENTANYL CITRATE 50 UG/ML
1 INJECTION INTRAVENOUS
Qty: 0 | Refills: 0 | Status: DISCONTINUED | OUTPATIENT
Start: 2017-11-01 | End: 2017-11-03

## 2017-11-01 RX ADMIN — LAMOTRIGINE 100 MILLIGRAM(S): 25 TABLET, ORALLY DISINTEGRATING ORAL at 06:07

## 2017-11-01 RX ADMIN — HEPARIN SODIUM 5000 UNIT(S): 5000 INJECTION INTRAVENOUS; SUBCUTANEOUS at 16:02

## 2017-11-01 RX ADMIN — CEFEPIME 100 MILLIGRAM(S): 1 INJECTION, POWDER, FOR SOLUTION INTRAMUSCULAR; INTRAVENOUS at 06:06

## 2017-11-01 RX ADMIN — OXYCODONE HYDROCHLORIDE 60 MILLIGRAM(S): 5 TABLET ORAL at 06:12

## 2017-11-01 RX ADMIN — CEFEPIME 100 MILLIGRAM(S): 1 INJECTION, POWDER, FOR SOLUTION INTRAMUSCULAR; INTRAVENOUS at 16:02

## 2017-11-01 RX ADMIN — HYDROMORPHONE HYDROCHLORIDE 3 MILLIGRAM(S): 2 INJECTION INTRAMUSCULAR; INTRAVENOUS; SUBCUTANEOUS at 06:23

## 2017-11-01 RX ADMIN — OXYCODONE HYDROCHLORIDE 60 MILLIGRAM(S): 5 TABLET ORAL at 21:27

## 2017-11-01 RX ADMIN — Medication 400 MILLIGRAM(S): at 17:56

## 2017-11-01 RX ADMIN — Medication 500 MILLIGRAM(S): at 06:07

## 2017-11-01 RX ADMIN — OXYCODONE HYDROCHLORIDE 60 MILLIGRAM(S): 5 TABLET ORAL at 07:00

## 2017-11-01 RX ADMIN — RISPERIDONE 8 MILLIGRAM(S): 4 TABLET ORAL at 21:27

## 2017-11-01 RX ADMIN — OXYCODONE HYDROCHLORIDE 60 MILLIGRAM(S): 5 TABLET ORAL at 22:00

## 2017-11-01 RX ADMIN — FENTANYL CITRATE 1 PATCH: 50 INJECTION INTRAVENOUS at 17:56

## 2017-11-01 RX ADMIN — HEPARIN SODIUM 5000 UNIT(S): 5000 INJECTION INTRAVENOUS; SUBCUTANEOUS at 21:24

## 2017-11-01 RX ADMIN — OXYCODONE HYDROCHLORIDE 60 MILLIGRAM(S): 5 TABLET ORAL at 17:49

## 2017-11-01 RX ADMIN — BUDESONIDE AND FORMOTEROL FUMARATE DIHYDRATE 2 PUFF(S): 160; 4.5 AEROSOL RESPIRATORY (INHALATION) at 21:26

## 2017-11-01 RX ADMIN — SERTRALINE 50 MILLIGRAM(S): 25 TABLET, FILM COATED ORAL at 12:28

## 2017-11-01 RX ADMIN — Medication 166.67 MILLIGRAM(S): at 21:29

## 2017-11-01 RX ADMIN — Medication 150 MICROGRAM(S): at 06:07

## 2017-11-01 RX ADMIN — HYDROMORPHONE HYDROCHLORIDE 3 MILLIGRAM(S): 2 INJECTION INTRAMUSCULAR; INTRAVENOUS; SUBCUTANEOUS at 22:00

## 2017-11-01 RX ADMIN — HEPARIN SODIUM 5000 UNIT(S): 5000 INJECTION INTRAVENOUS; SUBCUTANEOUS at 06:05

## 2017-11-01 RX ADMIN — Medication 400 MILLIGRAM(S): at 06:07

## 2017-11-01 RX ADMIN — PANTOPRAZOLE SODIUM 40 MILLIGRAM(S): 20 TABLET, DELAYED RELEASE ORAL at 06:07

## 2017-11-01 RX ADMIN — HYDROMORPHONE HYDROCHLORIDE 3 MILLIGRAM(S): 2 INJECTION INTRAMUSCULAR; INTRAVENOUS; SUBCUTANEOUS at 00:45

## 2017-11-01 RX ADMIN — Medication 2 SPRAY(S): at 06:06

## 2017-11-01 RX ADMIN — LAMOTRIGINE 100 MILLIGRAM(S): 25 TABLET, ORALLY DISINTEGRATING ORAL at 17:56

## 2017-11-01 RX ADMIN — HYDROMORPHONE HYDROCHLORIDE 3 MILLIGRAM(S): 2 INJECTION INTRAMUSCULAR; INTRAVENOUS; SUBCUTANEOUS at 13:41

## 2017-11-01 RX ADMIN — Medication 500 MILLIGRAM(S): at 17:56

## 2017-11-01 RX ADMIN — FENTANYL CITRATE 1 PATCH: 50 INJECTION INTRAVENOUS at 17:55

## 2017-11-01 RX ADMIN — CEFEPIME 100 MILLIGRAM(S): 1 INJECTION, POWDER, FOR SOLUTION INTRAMUSCULAR; INTRAVENOUS at 21:24

## 2017-11-01 RX ADMIN — Medication 1 SPRAY(S): at 06:05

## 2017-11-01 RX ADMIN — Medication 166.67 MILLIGRAM(S): at 10:35

## 2017-11-01 RX ADMIN — FENTANYL CITRATE 1 PATCH: 50 INJECTION INTRAVENOUS at 17:48

## 2017-11-01 RX ADMIN — OXYCODONE HYDROCHLORIDE 60 MILLIGRAM(S): 5 TABLET ORAL at 16:02

## 2017-11-01 RX ADMIN — HYDROMORPHONE HYDROCHLORIDE 3 MILLIGRAM(S): 2 INJECTION INTRAMUSCULAR; INTRAVENOUS; SUBCUTANEOUS at 14:46

## 2017-11-01 RX ADMIN — Medication 1 SPRAY(S): at 18:07

## 2017-11-01 RX ADMIN — HYDROMORPHONE HYDROCHLORIDE 3 MILLIGRAM(S): 2 INJECTION INTRAMUSCULAR; INTRAVENOUS; SUBCUTANEOUS at 07:00

## 2017-11-01 RX ADMIN — BUDESONIDE AND FORMOTEROL FUMARATE DIHYDRATE 2 PUFF(S): 160; 4.5 AEROSOL RESPIRATORY (INHALATION) at 08:51

## 2017-11-01 RX ADMIN — Medication 2 SPRAY(S): at 18:07

## 2017-11-01 RX ADMIN — HYDROMORPHONE HYDROCHLORIDE 3 MILLIGRAM(S): 2 INJECTION INTRAMUSCULAR; INTRAVENOUS; SUBCUTANEOUS at 00:11

## 2017-11-01 RX ADMIN — PREGABALIN 1000 MICROGRAM(S): 225 CAPSULE ORAL at 12:28

## 2017-11-01 RX ADMIN — Medication 2 MILLIGRAM(S): at 12:28

## 2017-11-01 RX ADMIN — LAMOTRIGINE 100 MILLIGRAM(S): 25 TABLET, ORALLY DISINTEGRATING ORAL at 21:26

## 2017-11-01 RX ADMIN — Medication 1 TABLET(S): at 12:29

## 2017-11-01 RX ADMIN — Medication 1 MILLIGRAM(S): at 12:29

## 2017-11-01 RX ADMIN — BUPROPION HYDROCHLORIDE 300 MILLIGRAM(S): 150 TABLET, EXTENDED RELEASE ORAL at 12:28

## 2017-11-01 RX ADMIN — HYDROMORPHONE HYDROCHLORIDE 3 MILLIGRAM(S): 2 INJECTION INTRAMUSCULAR; INTRAVENOUS; SUBCUTANEOUS at 21:27

## 2017-11-01 NOTE — PROGRESS NOTE ADULT - SUBJECTIVE AND OBJECTIVE BOX
Patient is a 32y old  Female who presents with a chief complaint of RLE edema and numbness (13 Oct 2017 16:25)      INTERVAL HPI/OVERNIGHT EVENTS: Report right leg pain. Has rare cough. Denies fever, chills, dysuria, SOB, CP, palpitations.     MEDICATIONS  (STANDING):  ascorbic acid 500 milliGRAM(s) Oral two times a day  BACItracin   Ointment 1 Application(s) Topical two times a day  buDESOnide  80 MICROgram(s)/formoterol 4.5 MICROgram(s) Inhaler 2 Puff(s) Inhalation two times a day  buPROPion XL . 300 milliGRAM(s) Oral daily  cefepime  IVPB      cefepime  IVPB 1000 milliGRAM(s) IV Intermittent every 8 hours  cyanocobalamin 1000 MICROGram(s) Oral daily  fentaNYL   Patch  12 MICROgram(s)/Hr 1 Patch Transdermal every 72 hours  fentaNYL   Patch  25 MICROgram(s)/Hr 1 Patch Transdermal every 72 hours  fluticasone propionate 50 MICROgram(s)/spray Nasal Spray 1 Spray(s) Both Nostrils two times a day  folic acid 1 milliGRAM(s) Oral daily  heparin  Injectable 5000 Unit(s) SubCutaneous every 8 hours  influenza   Vaccine 0.5 milliLiter(s) IntraMuscular once  ipratropium 42 MICROgram(s) Nasal 2 Spray(s) Both Nostrils every 12 hours  lamoTRIgine 100 milliGRAM(s) Oral every 8 hours  levothyroxine 150 MICROGram(s) Oral daily  multivitamin/minerals 1 Tablet(s) Oral daily  oxyCODONE  ER Tablet 60 milliGRAM(s) Oral every 8 hours  pantoprazole    Tablet 40 milliGRAM(s) Oral before breakfast  risperiDONE   Tablet 8 milliGRAM(s) Oral at bedtime  sertraline 50 milliGRAM(s) Oral daily  topiramate 400 milliGRAM(s) Oral two times a day  vancomycin  IVPB 1250 milliGRAM(s) IV Intermittent every 12 hours  Viibryd Oral Tablet 40 milliGRAM(s),Viibryd Oral Tablet 40 mg 1 Tablet(s) 1 Tablet(s) Oral daily    MEDICATIONS  (PRN):  acetaminophen   Tablet 650 milliGRAM(s) Oral every 6 hours PRN For Temp greater than 38 C (100.4 F)  acetaminophen   Tablet. 650 milliGRAM(s) Oral every 6 hours PRN Mild Pain (1 - 3)  ALBUTerol    90 MICROgram(s) HFA Inhaler 2 Puff(s) Inhalation every 6 hours PRN Shortness of Breath and/or Wheezing  clonazePAM Tablet 2 milliGRAM(s) Oral every 8 hours PRN anxiety  docusate sodium 100 milliGRAM(s) Oral two times a day PRN Constipation  HYDROmorphone   Tablet 3 milliGRAM(s) Oral every 4 hours PRN moderate to severe pain  ondansetron Injectable 4 milliGRAM(s) IV Push every 4 hours PRN Nausea and/or Vomiting  senna 2 Tablet(s) Oral at bedtime PRN Constipation        Allergies    avocado (Unknown)  latex (Urticaria; Rash)  penicillin (Unknown)  sulfa drugs (Unknown)    Intolerances        REVIEW OF SYSTEMS:  CONSTITUTIONAL: No fever or chills  HEENT:  No headache, no sore throat  RESPIRATORY: +rare cough, No wheezing, or shortness of breath  CARDIOVASCULAR: No chest pain, palpitations   GASTROINTESTINAL: No nausea, vomiting, or diarrhea  GENITOURINARY: No dysuria, frequency, or hematuria  NEUROLOGICAL: +weakness and loss of sensation in the right leg (especially below the knee)  MUSCULOSKELETAL: +right leg pain   PSYCHIATRIC:  +anxiety and depression; no SI    Vital Signs Last 24 Hrs  T(C): 37.8 (01 Nov 2017 15:56), Max: 37.8 (01 Nov 2017 15:56)  T(F): 100 (01 Nov 2017 15:56), Max: 100 (01 Nov 2017 15:56)  HR: 110 (01 Nov 2017 15:56) (96 - 113)  BP: 99/64 (01 Nov 2017 15:56) (99/64 - 107/65)  BP(mean): --  RR: 16 (01 Nov 2017 15:56) (16 - 17)  SpO2: 99% (01 Nov 2017 15:56) (98% - 100%)    Of Note: interviewed and examined the pt with nursing assistant, Shaila, as observer.     PHYSICAL EXAM:  GENERAL: NAD  HEENT:  EOMI, moist mucous membranes  CHEST/LUNG:  CTA b/l, no rales  HEART:  RRR, S1, S2  ABDOMEN:  BS+, soft, nontender, nondistended  EXTREMITIES: right leg almost entirely bandaged; wound vacs in place; no swelling or calf tenderness in the left leg  NERVOUS SYSTEM: AA&Ox3; no sensation or movement in all toes of right foot    LABS:                                              7.5    8.5   )-----------( 291      ( 01 Nov 2017 08:11 )             23.7     CBC Full  -  ( 01 Nov 2017 08:11 )  WBC Count : 8.5 K/uL  Hemoglobin : 7.5 g/dL  Hematocrit : 23.7 %  Platelet Count - Automated : 291 K/uL  Mean Cell Volume : 91.0 fl  Mean Cell Hemoglobin : 28.8 pg  Mean Cell Hemoglobin Concentration : 31.7 gm/dL  Auto Neutrophil # : x  Auto Lymphocyte # : x  Auto Monocyte # : x  Auto Eosinophil # : x  Auto Basophil # : x  Auto Neutrophil % : x  Auto Lymphocyte % : x  Auto Monocyte % : x  Auto Eosinophil % : x  Auto Basophil % : x    11-01    141  |  109<H>  |  19  ----------------------------<  88  3.5   |  20<L>  |  0.89    Ca    8.5      01 Nov 2017 08:11  Phos  6.2     10-31  Mg     2.2     10-31          CAPILLARY BLOOD GLUCOSE    BCx and UCx from 10/27: all negative        RADIOLOGY & ADDITIONAL TESTS:  CXR 10/26: no active disease  Personally reviewed.     Consultant(s) Notes Reviewed:  [x] YES  [ ] NO    Care Discussed with [x] Consultants  [x] Patient  [ ] Family  [ ]      [ ] Other; RN  DVT ppx: HSQ

## 2017-11-01 NOTE — PROGRESS NOTE ADULT - ASSESSMENT
32F with opiate dependence, anxiety disorder, admitted with leg swelling   S/P fasciotomy for compartment syndrome  S/P Rhabdomyolysis  S/P obtundation/immobility due to drug overdose  GHAZAL, appears resolved  Elevate ESR. Minimally elevated.  No clear pathology to explain fevers on prior CT  Intermittent low grade fevers  Atelectasis with narcotics in DDx  Intermittent chemical aspiration (with nacrosis) in DDx as well  LN nonspecific  HIV negative  Narcotic dependence/addiction not helping anything here, but will not able to be addressed given fasciotomy anytime soon. While she has legitimate needs for pain, she is frequently sedated or has other negative consequences (as in this AM's interaction) related to her narcotic use. Would remain vigilant for BZD withdrawal  May be intermittently aspirating, but patient comfortable on RA with no findings on exam  R/O Pyelo/UTI, cx potentially suppressed  Skeptical re: active pneumonia  Overall temp curve appears to be down

## 2017-11-01 NOTE — PROGRESS NOTE ADULT - PROBLEM SELECTOR PLAN 1
unclear etiology; fever has resolved  CXR negative; may be having periodic episodes of aspiration pneumonitis given has periods of somnolence. Will continue to try and titrate off sedating medications. No significant evidence for full blown pneumonia.  Urine/ blood Cxs -- negative, but UCx may have been drawn after initiation of Abx -- had a staph organism earlier on admission  ID recs appreciated.   will complete Vancomycin and Cefepime at the end of today's doses as per ID recs -- ?UTI, ?leg wound infection  HIV negative.  CT Lower extremity done, no drainable fluid collection

## 2017-11-01 NOTE — PROGRESS NOTE ADULT - SUBJECTIVE AND OBJECTIVE BOX
[facility], Division of Infectious Diseases  [facility]    Name: ABELARDO CROWELL  Age: 32y  Gender: Female  MRN: [id number]    Interval History--  Notes reviewed. Seen earlier this am. Was completely calm eating lunch on arrival. WHen she saw me burst out crying and screamed "They're taking away my klonopin!"  Gently but firmly attempted to reassure her. Patient almost immediately went back to her usual demeanor.  No new complaints  Afebrile.    Past Medical History--  Low back pain  Hypothyroid  Bipolar 1 disorder  History of spinal fusion  History of laminectomy      For details regarding the patient's social history, family history, and other miscellaneous elements, please refer the initial infectious diseases consultation and/or the admitting history and physical examination for this admission.    Allergies    avocado (Unknown)  latex (Urticaria; Rash)  penicillin (Unknown)  sulfa drugs (Unknown)    Intolerances        Medications--  Antibiotics:  cefepime  IVPB      cefepime  IVPB 1000 milliGRAM(s) IV Intermittent every 8 hours  vancomycin  IVPB 1250 milliGRAM(s) IV Intermittent every 12 hours    Immunologic:  influenza   Vaccine 0.5 milliLiter(s) IntraMuscular once    Other:  acetaminophen   Tablet PRN  acetaminophen   Tablet. PRN  ALBUTerol    90 MICROgram(s) HFA Inhaler PRN  ascorbic acid  BACItracin   Ointment  buDESOnide  80 MICROgram(s)/formoterol 4.5 MICROgram(s) Inhaler  buPROPion XL .  clonazePAM Tablet PRN  cyanocobalamin  docusate sodium PRN  fentaNYL   Patch  12 MICROgram(s)/Hr  fentaNYL   Patch  25 MICROgram(s)/Hr  fluticasone propionate 50 MICROgram(s)/spray Nasal Spray  folic acid  heparin  Injectable  HYDROmorphone   Tablet PRN  ipratropium 42 MICROgram(s) Nasal  lamoTRIgine  levothyroxine  multivitamin/minerals  ondansetron Injectable PRN  oxyCODONE  ER Tablet  pantoprazole    Tablet  risperiDONE   Tablet  senna PRN  sertraline  topiramate  Viibryd Oral Tablet 40 milliGRAM(s),Viibryd Oral Tablet 40 mg 1 Tablet(s)      Review of Systems--  Review of systems otherwise unchanged except as previously noted.    Physical Examination--  Vital Signs: T(F): 100 (11-01-17 @ 15:56), Max: 100 (11-01-17 @ 15:56)  HR: 110 (11-01-17 @ 15:56)  BP: 99/64 (11-01-17 @ 15:56)  RR: 16 (11-01-17 @ 15:56)  SpO2: 99% (11-01-17 @ 15:56)  Wt(kg): --  General: Nontoxic-appearing Female in no acute distress.  HEENT: AT/NC. Anicteric. Conjunctiva pale and moist. Oropharynx pale but clear. Dentition fair.  Neck: Not rigid. No sense of mass.   Nodes: None palpable.  Lungs: Clear bilaterally without rales, wheezing or rhonchi  Heart: RRR. No Murmur. No rub. No gallop. No palpable thrill.  Abdomen: Bowel sounds present and normoactive. Soft. Nondistended. Nontender. No sense of mass. No organomegaly.  Extremities: No cyanosis or clubbing. LLE no edema. RLE wrapped. Foot warm, edematous. VAC under ACE.   Skin: Pale. Dry. Good turgor. No rash. No vasculitic stigmata.  Psychiatric: As above.    Laboratory Studies--  CBC                        7.5    8.5   )-----------( 291      ( 01 Nov 2017 08:11 )             23.7       Chemistries  11-01    141  |  109<H>  |  19  ----------------------------<  88  3.5   |  20<L>  |  0.89    Ca    8.5      01 Nov 2017 08:11  Phos  6.2     10-31  Mg     2.2     10-31        Culture Data  No new culture data

## 2017-11-01 NOTE — PROGRESS NOTE ADULT - PROBLEM SELECTOR PLAN 3
-discussed pain management with Dr. Damico who has been in charge of patient's opiate regimen during the hospitalization. Trying to wean pt off opiates and has been off IV dilaudid; pt appears comfortable on exam.  -c/w fentanyl patch and standing oxycontin

## 2017-11-01 NOTE — PROGRESS NOTE ADULT - SUBJECTIVE AND OBJECTIVE BOX
ABELARDO CROWELL  MRN-[id number] 32y    PLAST[facility]  SURGERY/ DR. HERNANDEZ     Vital Signs Last 24 Hrs  T(C): 37.4 (31 Oct 2017 23:55), Max: 37.4 (31 Oct 2017 23:55)  T(F): 99.4 (31 Oct 2017 23:55), Max: 99.4 (31 Oct 2017 23:55)  HR: 96 (31 Oct 2017 23:55) (96 - 114)  BP: 107/65 (31 Oct 2017 23:55) (105/70 - 107/65)  BP(mean): --  RR: 17 (31 Oct 2017 23:55) (17 - 18)  SpO2: 98% (31 Oct 2017 23:55) (98% - 100%)    POD # 5    RIGHT LEG: NEW WOUND VAC IN PLACE WITH GOOD SEAL                   SOME EDEMA                    VIABLE FLAPS                    RIGHT FOOT WARM WITH PALPABLE PULSES                           7.4    8.0   )-----------( 266      ( 31 Oct 2017 07:05 )             22.2      10-31    141  |  109<H>  |  19  ----------------------------<  85  3.7   |  22  |  0.84    Ca    8.4<L>      31 Oct 2017 07:05  Phos  6.2     10-31  Mg     2.2     10-31                            ASSESSMENT &  PLAN:  POD # 5 S/P DEBRIDEMENT WITH PARTIAL CLOSURE OF RIGHT  LOWER THIGH WOUND, WOUND VAC                                                   S/P WOUND VAC CHANGE  10/31/2017  DR. HERNANDEZ PROGRESS NOTE REVIEWED GOOD GRANULATION TISSUE PLAN  FOR [facility] ON FRIDAY 11/03/2017

## 2017-11-01 NOTE — PROGRESS NOTE ADULT - PROBLEM SELECTOR PLAN 2
-compartment syndrome of the right lower extremity   -s/p emergent fasciotomy by Vascular surgery (Bobbi)   -s/p wound vac placement f/u with vascular sx  -10/27 pt went to OR with Dr. Isabel and had just minimal debridement of muscle, mostly irrigation, had a few stitches placed on right thigh  -will be go to OR again on Thursday or Friday for likely skin grafts   -pt is a moderate risk patient for a moderate risk procedure and is medically optimized for the surgery  -Finished course of IV abx aztreonam and clindamycin in ICU, but due to low-grade periodic fever, restarted on Abx with cefepime and vanco as per ID recs to end on 11/1 as per ID recs  -wound vacs in place; f/up surgery recs  -discussed pain management with Dr. Damico who has been in charge of patient's opiate regimen during the hospitalization. Trying to wean pt off opiates; pt appears comfortable on exam  -c/w fentanyl patch and standing oxycontin

## 2017-11-01 NOTE — PROGRESS NOTE ADULT - PROBLEM SELECTOR PLAN 1
compartment syndrome  for OR in the near future for skin grafts  on PO dilaudid PRN  on Fentanyl Patch and Oxycontin  on Klonopin for anxiety  reassurance and emotional support  may need IV Dilaudid for Breakthrough pain, discussed with patient, may need several doses for post op pain immediately   fentanyl patches renewed

## 2017-11-01 NOTE — PROGRESS NOTE ADULT - ASSESSMENT
32-year-old female now status post right lower extremity fasciotomy for prolonged compression after a fall with lethargy.     - No obvious cardiac complications post fasciotomy. Planned for OR later this week.   - HR seems to be better controlled.  - No clear evidence of acute ischemia or volume overload  - Abx per ID  - plastics/vascular f/u  - Continue Pain control  - TTE with hyperdynamic LV function, EF 70%, no valvular disease noted  - Other cardiovascular workup will depend on clinical course.  - Monitor and replete electrolytes. Keep K>4.0 and Mg>2.0.   - All other workup per primary team  - Will follow

## 2017-11-01 NOTE — PROGRESS NOTE ADULT - SUBJECTIVE AND OBJECTIVE BOX
[facility] - Bonnie Hernandez, April Brink, Ale, Carissa Allen  Office Number:  [phone]    Patient seen and examined at bedside. No complaints. Awake and alert, pain well controlled with current medication management.  She is upset because someone changed her Klonipin.    ROS: negative unless otherwise mentioned.    Telemetry:  Not on tele    MEDICATIONS  (STANDING):  ascorbic acid 500 milliGRAM(s) Oral two times a day  BACItracin   Ointment 1 Application(s) Topical two times a day  buDESOnide  80 MICROgram(s)/formoterol 4.5 MICROgram(s) Inhaler 2 Puff(s) Inhalation two times a day  buPROPion XL . 300 milliGRAM(s) Oral daily  cefepime  IVPB      cefepime  IVPB 1000 milliGRAM(s) IV Intermittent every 8 hours  cyanocobalamin 1000 MICROGram(s) Oral daily  fentaNYL   Patch  12 MICROgram(s)/Hr 1 Patch Transdermal every 72 hours  fentaNYL   Patch  25 MICROgram(s)/Hr 1 Patch Transdermal every 72 hours  fluticasone propionate 50 MICROgram(s)/spray Nasal Spray 1 Spray(s) Both Nostrils two times a day  folic acid 1 milliGRAM(s) Oral daily  heparin  Injectable 5000 Unit(s) SubCutaneous every 8 hours  influenza   Vaccine 0.5 milliLiter(s) IntraMuscular once  ipratropium 42 MICROgram(s) Nasal 2 Spray(s) Both Nostrils every 12 hours  lamoTRIgine 100 milliGRAM(s) Oral every 8 hours  levothyroxine 150 MICROGram(s) Oral daily  multivitamin/minerals 1 Tablet(s) Oral daily  oxyCODONE  ER Tablet 60 milliGRAM(s) Oral every 8 hours  pantoprazole    Tablet 40 milliGRAM(s) Oral before breakfast  risperiDONE   Tablet 8 milliGRAM(s) Oral at bedtime  sertraline 50 milliGRAM(s) Oral daily  topiramate 400 milliGRAM(s) Oral two times a day  vancomycin  IVPB 1250 milliGRAM(s) IV Intermittent every 12 hours  Viibryd Oral Tablet 40 milliGRAM(s),Viibryd Oral Tablet 40 mg 1 Tablet(s) 1 Tablet(s) Oral daily    MEDICATIONS  (PRN):  acetaminophen   Tablet 650 milliGRAM(s) Oral every 6 hours PRN For Temp greater than 38 C (100.4 F)  acetaminophen   Tablet. 650 milliGRAM(s) Oral every 6 hours PRN Mild Pain (1 - 3)  ALBUTerol    90 MICROgram(s) HFA Inhaler 2 Puff(s) Inhalation every 6 hours PRN Shortness of Breath and/or Wheezing  clonazePAM Tablet 2 milliGRAM(s) Oral every 8 hours PRN anxiety  docusate sodium 100 milliGRAM(s) Oral two times a day PRN Constipation  HYDROmorphone   Tablet 3 milliGRAM(s) Oral every 4 hours PRN moderate to severe pain  ondansetron Injectable 4 milliGRAM(s) IV Push every 4 hours PRN Nausea and/or Vomiting  senna 2 Tablet(s) Oral at bedtime PRN Constipation      Allergies    avocado (Unknown)  latex (Urticaria; Rash)  penicillin (Unknown)  sulfa drugs (Unknown)    Intolerances        Vital Signs Last 24 Hrs  T(C): 37.8 (01 Nov 2017 15:56), Max: 37.8 (01 Nov 2017 15:56)  T(F): 100 (01 Nov 2017 15:56), Max: 100 (01 Nov 2017 15:56)  HR: 110 (01 Nov 2017 15:56) (96 - 114)  BP: 99/64 (01 Nov 2017 15:56) (99/64 - 107/65)  BP(mean): --  RR: 16 (01 Nov 2017 15:56) (16 - 17)  SpO2: 99% (01 Nov 2017 15:56) (98% - 100%)    I&O's Summary    31 Oct 2017 07:01  -  01 Nov 2017 07:00  --------------------------------------------------------  IN: 0 mL / OUT: 1100 mL / NET: -1100 mL        ON EXAM:    Constitutional: well-nourished, well-developed, NAD   HEENT:  MMM, sclerae anicteric, conjunctivae clear, no oral cyanosis.  Pulmonary: Non-labored, breath sounds are clear bilaterally, No wheezing, rales or rhonchi  Cardiovascular: Regular, S1 and S2.  No murmur.  No rubs, gallops or clicks  Gastrointestinal: Bowel Sounds present, soft, nontender.   Neurological: Alert, no focal deficits  Psych:  Mood & affect appropriate  Extremities: right leg with ace bandage, wound vac in place, minimal edema noted in toes    LABS: All Labs Reviewed:                        7.5    8.5   )-----------( 291      ( 01 Nov 2017 08:11 )             23.7                         7.4    8.0   )-----------( 266      ( 31 Oct 2017 07:05 )             22.2                         7.4    8.5   )-----------( 271      ( 30 Oct 2017 08:23 )             23.0     01 Nov 2017 08:11    141    |  109    |  19     ----------------------------<  88     3.5     |  20     |  0.89   31 Oct 2017 07:05    141    |  109    |  19     ----------------------------<  85     3.7     |  22     |  0.84   30 Oct 2017 08:23    140    |  107    |  15     ----------------------------<  88     3.5     |  21     |  1.10     Ca    8.5        01 Nov 2017 08:11  Ca    8.4        31 Oct 2017 07:05  Ca    8.4        30 Oct 2017 08:23  Phos  6.2       31 Oct 2017 07:05  Mg     2.2       31 Oct 2017 07:05            Blood Culture:

## 2017-11-01 NOTE — PROGRESS NOTE ADULT - SUBJECTIVE AND OBJECTIVE BOX
Date/Time Patient Seen:  		  Referring MD:   Data Reviewed	       Patient is a 32y old  Female who presents with a chief complaint of RLE edema and numbness (13 Oct 2017 16:25)    in bed  seen and examined  vs and meds reviewed      Subjective/HPI     PAST MEDICAL & SURGICAL HISTORY:  Low back pain  Hypothyroid  Bipolar 1 disorder  History of spinal fusion  History of laminectomy        Medication list         MEDICATIONS  (STANDING):  ascorbic acid 500 milliGRAM(s) Oral two times a day  BACItracin   Ointment 1 Application(s) Topical two times a day  buDESOnide  80 MICROgram(s)/formoterol 4.5 MICROgram(s) Inhaler 2 Puff(s) Inhalation two times a day  buPROPion XL . 300 milliGRAM(s) Oral daily  cefepime  IVPB      cefepime  IVPB 1000 milliGRAM(s) IV Intermittent every 8 hours  cyanocobalamin 1000 MICROGram(s) Oral daily  fentaNYL   Patch  12 MICROgram(s)/Hr 1 Patch Transdermal every 72 hours  fentaNYL   Patch  25 MICROgram(s)/Hr 1 Patch Transdermal every 72 hours  fluticasone propionate 50 MICROgram(s)/spray Nasal Spray 1 Spray(s) Both Nostrils two times a day  folic acid 1 milliGRAM(s) Oral daily  heparin  Injectable 5000 Unit(s) SubCutaneous every 8 hours  influenza   Vaccine 0.5 milliLiter(s) IntraMuscular once  ipratropium 42 MICROgram(s) Nasal 2 Spray(s) Both Nostrils every 12 hours  lamoTRIgine 100 milliGRAM(s) Oral every 8 hours  levothyroxine 150 MICROGram(s) Oral daily  multivitamin/minerals 1 Tablet(s) Oral daily  oxyCODONE  ER Tablet 60 milliGRAM(s) Oral every 8 hours  pantoprazole    Tablet 40 milliGRAM(s) Oral before breakfast  risperiDONE   Tablet 8 milliGRAM(s) Oral at bedtime  sertraline 50 milliGRAM(s) Oral daily  topiramate 400 milliGRAM(s) Oral two times a day  vancomycin  IVPB 1250 milliGRAM(s) IV Intermittent every 12 hours  Viibryd Oral Tablet 40 milliGRAM(s),Viibryd Oral Tablet 40 mg 1 Tablet(s) 1 Tablet(s) Oral daily    MEDICATIONS  (PRN):  acetaminophen   Tablet 650 milliGRAM(s) Oral every 6 hours PRN For Temp greater than 38 C (100.4 F)  acetaminophen   Tablet. 650 milliGRAM(s) Oral every 6 hours PRN Mild Pain (1 - 3)  ALBUTerol    90 MICROgram(s) HFA Inhaler 2 Puff(s) Inhalation every 6 hours PRN Shortness of Breath and/or Wheezing  clonazePAM Tablet 2 milliGRAM(s) Oral every 8 hours PRN anxiety  docusate sodium 100 milliGRAM(s) Oral two times a day PRN Constipation  HYDROmorphone   Tablet 3 milliGRAM(s) Oral every 4 hours PRN moderate to severe pain  ondansetron Injectable 4 milliGRAM(s) IV Push every 4 hours PRN Nausea and/or Vomiting  senna 2 Tablet(s) Oral at bedtime PRN Constipation         Vitals log        ICU Vital Signs Last 24 Hrs  T(C): 37.6 (01 Nov 2017 07:48), Max: 37.6 (01 Nov 2017 07:48)  T(F): 99.6 (01 Nov 2017 07:48), Max: 99.6 (01 Nov 2017 07:48)  HR: 113 (01 Nov 2017 07:48) (96 - 114)  BP: 102/64 (01 Nov 2017 07:48) (102/64 - 107/65)  BP(mean): --  ABP: --  ABP(mean): --  RR: 16 (01 Nov 2017 07:48) (16 - 17)  SpO2: 100% (01 Nov 2017 07:48) (98% - 100%)           Input and Output:  I&O's Detail    31 Oct 2017 07:01  -  01 Nov 2017 07:00  --------------------------------------------------------  IN:  Total IN: 0 mL    OUT:    Voided: 1100 mL  Total OUT: 1100 mL    Total NET: -1100 mL          Lab Data                        7.5    8.5   )-----------( 291      ( 01 Nov 2017 08:11 )             23.7     11-01    141  |  109<H>  |  19  ----------------------------<  88  3.5   |  20<L>  |  0.89    Ca    8.5      01 Nov 2017 08:11  Phos  6.2     10-31  Mg     2.2     10-31              Review of Systems	      Objective     Physical Examination    head at  heart - s1s2  lungs - dec BS      Pertinent Lab findings & Imaging      Erlinda:  NO   Adequate UO     I&O's Detail    31 Oct 2017 07:01  -  01 Nov 2017 07:00  --------------------------------------------------------  IN:  Total IN: 0 mL    OUT:    Voided: 1100 mL  Total OUT: 1100 mL    Total NET: -1100 mL               Discussed with:     Cultures:	        Radiology

## 2017-11-02 LAB
ANION GAP SERPL CALC-SCNC: 12 MMOL/L — SIGNIFICANT CHANGE UP (ref 5–17)
BUN SERPL-MCNC: 19 MG/DL — SIGNIFICANT CHANGE UP (ref 7–23)
CALCIUM SERPL-MCNC: 8.2 MG/DL — LOW (ref 8.5–10.1)
CHLORIDE SERPL-SCNC: 109 MMOL/L — HIGH (ref 96–108)
CO2 SERPL-SCNC: 20 MMOL/L — LOW (ref 22–31)
CREAT SERPL-MCNC: 0.76 MG/DL — SIGNIFICANT CHANGE UP (ref 0.5–1.3)
GLUCOSE SERPL-MCNC: 89 MG/DL — SIGNIFICANT CHANGE UP (ref 70–99)
HCT VFR BLD CALC: 22.9 % — LOW (ref 34.5–45)
HGB BLD-MCNC: 7.2 G/DL — LOW (ref 11.5–15.5)
MCHC RBC-ENTMCNC: 28.5 PG — SIGNIFICANT CHANGE UP (ref 27–34)
MCHC RBC-ENTMCNC: 31.4 GM/DL — LOW (ref 32–36)
MCV RBC AUTO: 90.9 FL — SIGNIFICANT CHANGE UP (ref 80–100)
PLATELET # BLD AUTO: 276 K/UL — SIGNIFICANT CHANGE UP (ref 150–400)
POTASSIUM SERPL-MCNC: 3.6 MMOL/L — SIGNIFICANT CHANGE UP (ref 3.5–5.3)
POTASSIUM SERPL-SCNC: 3.6 MMOL/L — SIGNIFICANT CHANGE UP (ref 3.5–5.3)
RBC # BLD: 2.52 M/UL — LOW (ref 3.8–5.2)
RBC # FLD: 14.3 % — SIGNIFICANT CHANGE UP (ref 10.3–14.5)
SODIUM SERPL-SCNC: 141 MMOL/L — SIGNIFICANT CHANGE UP (ref 135–145)
WBC # BLD: 7.9 K/UL — SIGNIFICANT CHANGE UP (ref 3.8–10.5)
WBC # FLD AUTO: 7.9 K/UL — SIGNIFICANT CHANGE UP (ref 3.8–10.5)

## 2017-11-02 PROCEDURE — 99233 SBSQ HOSP IP/OBS HIGH 50: CPT

## 2017-11-02 RX ORDER — ACETAMINOPHEN 500 MG
650 TABLET ORAL ONCE
Qty: 0 | Refills: 0 | Status: DISCONTINUED | OUTPATIENT
Start: 2017-11-02 | End: 2017-11-02

## 2017-11-02 RX ORDER — ACETAMINOPHEN 500 MG
650 TABLET ORAL ONCE
Qty: 0 | Refills: 0 | Status: COMPLETED | OUTPATIENT
Start: 2017-11-02 | End: 2017-11-02

## 2017-11-02 RX ADMIN — OXYCODONE HYDROCHLORIDE 60 MILLIGRAM(S): 5 TABLET ORAL at 05:06

## 2017-11-02 RX ADMIN — OXYCODONE HYDROCHLORIDE 60 MILLIGRAM(S): 5 TABLET ORAL at 05:36

## 2017-11-02 RX ADMIN — Medication 650 MILLIGRAM(S): at 19:32

## 2017-11-02 RX ADMIN — HEPARIN SODIUM 5000 UNIT(S): 5000 INJECTION INTRAVENOUS; SUBCUTANEOUS at 05:03

## 2017-11-02 RX ADMIN — RISPERIDONE 8 MILLIGRAM(S): 4 TABLET ORAL at 21:45

## 2017-11-02 RX ADMIN — PREGABALIN 1000 MICROGRAM(S): 225 CAPSULE ORAL at 12:19

## 2017-11-02 RX ADMIN — BUDESONIDE AND FORMOTEROL FUMARATE DIHYDRATE 2 PUFF(S): 160; 4.5 AEROSOL RESPIRATORY (INHALATION) at 10:10

## 2017-11-02 RX ADMIN — HYDROMORPHONE HYDROCHLORIDE 3 MILLIGRAM(S): 2 INJECTION INTRAMUSCULAR; INTRAVENOUS; SUBCUTANEOUS at 09:40

## 2017-11-02 RX ADMIN — HYDROMORPHONE HYDROCHLORIDE 3 MILLIGRAM(S): 2 INJECTION INTRAMUSCULAR; INTRAVENOUS; SUBCUTANEOUS at 08:40

## 2017-11-02 RX ADMIN — OXYCODONE HYDROCHLORIDE 60 MILLIGRAM(S): 5 TABLET ORAL at 15:32

## 2017-11-02 RX ADMIN — Medication 400 MILLIGRAM(S): at 05:05

## 2017-11-02 RX ADMIN — Medication 400 MILLIGRAM(S): at 18:58

## 2017-11-02 RX ADMIN — LAMOTRIGINE 100 MILLIGRAM(S): 25 TABLET, ORALLY DISINTEGRATING ORAL at 05:04

## 2017-11-02 RX ADMIN — Medication 1 SPRAY(S): at 18:59

## 2017-11-02 RX ADMIN — OXYCODONE HYDROCHLORIDE 60 MILLIGRAM(S): 5 TABLET ORAL at 22:45

## 2017-11-02 RX ADMIN — Medication 1 MILLIGRAM(S): at 12:19

## 2017-11-02 RX ADMIN — HEPARIN SODIUM 5000 UNIT(S): 5000 INJECTION INTRAVENOUS; SUBCUTANEOUS at 14:32

## 2017-11-02 RX ADMIN — Medication 500 MILLIGRAM(S): at 05:05

## 2017-11-02 RX ADMIN — Medication 1 SPRAY(S): at 05:04

## 2017-11-02 RX ADMIN — Medication 2 SPRAY(S): at 05:05

## 2017-11-02 RX ADMIN — PANTOPRAZOLE SODIUM 40 MILLIGRAM(S): 20 TABLET, DELAYED RELEASE ORAL at 05:05

## 2017-11-02 RX ADMIN — Medication 1 APPLICATION(S): at 18:58

## 2017-11-02 RX ADMIN — Medication 2 MILLIGRAM(S): at 13:00

## 2017-11-02 RX ADMIN — HYDROMORPHONE HYDROCHLORIDE 3 MILLIGRAM(S): 2 INJECTION INTRAMUSCULAR; INTRAVENOUS; SUBCUTANEOUS at 13:48

## 2017-11-02 RX ADMIN — OXYCODONE HYDROCHLORIDE 60 MILLIGRAM(S): 5 TABLET ORAL at 14:32

## 2017-11-02 RX ADMIN — Medication 2 SPRAY(S): at 18:58

## 2017-11-02 RX ADMIN — LAMOTRIGINE 100 MILLIGRAM(S): 25 TABLET, ORALLY DISINTEGRATING ORAL at 23:52

## 2017-11-02 RX ADMIN — LAMOTRIGINE 100 MILLIGRAM(S): 25 TABLET, ORALLY DISINTEGRATING ORAL at 14:32

## 2017-11-02 RX ADMIN — BUDESONIDE AND FORMOTEROL FUMARATE DIHYDRATE 2 PUFF(S): 160; 4.5 AEROSOL RESPIRATORY (INHALATION) at 18:58

## 2017-11-02 RX ADMIN — Medication 150 MICROGRAM(S): at 05:03

## 2017-11-02 RX ADMIN — HYDROMORPHONE HYDROCHLORIDE 3 MILLIGRAM(S): 2 INJECTION INTRAMUSCULAR; INTRAVENOUS; SUBCUTANEOUS at 12:48

## 2017-11-02 RX ADMIN — Medication 650 MILLIGRAM(S): at 20:32

## 2017-11-02 RX ADMIN — BUPROPION HYDROCHLORIDE 300 MILLIGRAM(S): 150 TABLET, EXTENDED RELEASE ORAL at 12:21

## 2017-11-02 RX ADMIN — Medication 1 TABLET(S): at 12:18

## 2017-11-02 RX ADMIN — OXYCODONE HYDROCHLORIDE 60 MILLIGRAM(S): 5 TABLET ORAL at 21:45

## 2017-11-02 RX ADMIN — Medication 500 MILLIGRAM(S): at 18:58

## 2017-11-02 RX ADMIN — SERTRALINE 50 MILLIGRAM(S): 25 TABLET, FILM COATED ORAL at 12:19

## 2017-11-02 NOTE — PROGRESS NOTE ADULT - PROBLEM SELECTOR PLAN 2
Dilaudid PO prn  Fentanyl Patch  Oxycontin  Bowel regimen  mobilize as tolerated  wound care  may be headed to OR today or tomorrow for skin grafting  may need additional PRN Opioid for [facility] Pain management  discussed with pt

## 2017-11-02 NOTE — PROGRESS NOTE ADULT - SUBJECTIVE AND OBJECTIVE BOX
[facility] - Bonnie Hernandez Grossman, Wachsman, Pannella, Patel, Savella  Office Number:  [phone]    Patient seen and examined. Events noted. Resting comfortably in bed. No complaints of chest pain, dyspnea, or palpitations reported. Plan for OR jennifer for debridement of leg    ROS: negative unless otherwise mentioned.    Telemetry:  Not on tele    MEDICATIONS  (STANDING):  ascorbic acid 500 milliGRAM(s) Oral two times a day  BACItracin   Ointment 1 Application(s) Topical two times a day  buDESOnide  80 MICROgram(s)/formoterol 4.5 MICROgram(s) Inhaler 2 Puff(s) Inhalation two times a day  buPROPion XL . 300 milliGRAM(s) Oral daily  cefepime  IVPB      cefepime  IVPB 1000 milliGRAM(s) IV Intermittent every 8 hours  cyanocobalamin 1000 MICROGram(s) Oral daily  fentaNYL   Patch  12 MICROgram(s)/Hr 1 Patch Transdermal every 72 hours  fentaNYL   Patch  25 MICROgram(s)/Hr 1 Patch Transdermal every 72 hours  fluticasone propionate 50 MICROgram(s)/spray Nasal Spray 1 Spray(s) Both Nostrils two times a day  folic acid 1 milliGRAM(s) Oral daily  heparin  Injectable 5000 Unit(s) SubCutaneous every 8 hours  influenza   Vaccine 0.5 milliLiter(s) IntraMuscular once  ipratropium 42 MICROgram(s) Nasal 2 Spray(s) Both Nostrils every 12 hours  lamoTRIgine 100 milliGRAM(s) Oral every 8 hours  levothyroxine 150 MICROGram(s) Oral daily  multivitamin/minerals 1 Tablet(s) Oral daily  oxyCODONE  ER Tablet 60 milliGRAM(s) Oral every 8 hours  pantoprazole    Tablet 40 milliGRAM(s) Oral before breakfast  risperiDONE   Tablet 8 milliGRAM(s) Oral at bedtime  sertraline 50 milliGRAM(s) Oral daily  topiramate 400 milliGRAM(s) Oral two times a day  vancomycin  IVPB 1250 milliGRAM(s) IV Intermittent every 12 hours  Viibryd Oral Tablet 40 milliGRAM(s),Viibryd Oral Tablet 40 mg 1 Tablet(s) 1 Tablet(s) Oral daily    MEDICATIONS  (PRN):  acetaminophen   Tablet 650 milliGRAM(s) Oral every 6 hours PRN For Temp greater than 38 C (100.4 F)  acetaminophen   Tablet. 650 milliGRAM(s) Oral every 6 hours PRN Mild Pain (1 - 3)  ALBUTerol    90 MICROgram(s) HFA Inhaler 2 Puff(s) Inhalation every 6 hours PRN Shortness of Breath and/or Wheezing  clonazePAM Tablet 2 milliGRAM(s) Oral every 8 hours PRN anxiety  docusate sodium 100 milliGRAM(s) Oral two times a day PRN Constipation  HYDROmorphone   Tablet 3 milliGRAM(s) Oral every 4 hours PRN moderate to severe pain  ondansetron Injectable 4 milliGRAM(s) IV Push every 4 hours PRN Nausea and/or Vomiting  senna 2 Tablet(s) Oral at bedtime PRN Constipation      Allergies    avocado (Unknown)  latex (Urticaria; Rash)  penicillin (Unknown)  sulfa drugs (Unknown)    Intolerances        Vital Signs Last 24 Hrs reviewed           ON EXAM:    Constitutional: well-nourished, well-developed, NAD   HEENT:  MMM, sclerae anicteric, conjunctivae clear, no oral cyanosis.  Pulmonary: Non-labored, breath sounds are clear bilaterally, No wheezing, rales or rhonchi  Cardiovascular: Regular, S1 and S2.  No murmur.  No rubs, gallops or clicks  Gastrointestinal: Bowel Sounds present, soft, nontender.   Neurological: Alert, no focal deficits  Psych:  Mood & affect appropriate  Extremities: right leg with ace bandage, wound vac in place, minimal edema noted in toes    LABS: All Labs Reviewed: 11/2 labs reviewed.                         7.5    8.5   )-----------( 291      ( 01 Nov 2017 08:11 )             23.7                         7.4    8.0   )-----------( 266      ( 31 Oct 2017 07:05 )             22.2                         7.4    8.5   )-----------( 271      ( 30 Oct 2017 08:23 )             23.0     01 Nov 2017 08:11    141    |  109    |  19     ----------------------------<  88     3.5     |  20     |  0.89   31 Oct 2017 07:05    141    |  109    |  19     ----------------------------<  85     3.7     |  22     |  0.84   30 Oct 2017 08:23    140    |  107    |  15     ----------------------------<  88     3.5     |  21     |  1.10     Ca    8.5        01 Nov 2017 08:11  Ca    8.4        31 Oct 2017 07:05  Ca    8.4        30 Oct 2017 08:23  Phos  6.2       31 Oct 2017 07:05  Mg     2.2       31 Oct 2017 07:05            Blood Culture:

## 2017-11-02 NOTE — PROGRESS NOTE ADULT - PROBLEM SELECTOR PLAN 2
-compartment syndrome of the right lower extremity   -s/p emergent fasciotomy by Vascular surgery (Bobbi)   -s/p wound vac placement f/u with vascular sx  -10/27 pt went to OR with Dr. Isabel and had just minimal debridement of muscle, mostly irrigation, had a few stitches placed on right thigh  -will be go to OR again on tomorrow for likely skin grafts   -pt is a moderate risk patient for a moderate risk procedure and is medically optimized for the surgery  -Finished course of IV abx aztreonam and clindamycin in ICU, but due to low-grade periodic fever, restarted on Abx with cefepime and vanco as per ID recs to end on 11/1 as per ID recs  -wound vacs in place; f/up surgery recs  -discussed pain management with Dr. Damico who has been in charge of patient's opiate regimen during the hospitalization. Trying to wean pt off opiates; pt appears comfortable on exam  -c/w fentanyl patch and standing oxycontin

## 2017-11-02 NOTE — PROGRESS NOTE ADULT - ASSESSMENT
32F with opiate dependence, anxiety disorder, admitted with leg swelling   S/P fasciotomy for compartment syndrome  S/P Rhabdomyolysis  S/P obtundation/immobility due to drug overdose  GHAZAL, appears resolved  Elevate ESR. Minimally elevated.  No clear pathology to explain fevers on prior CT  Intermittent low grade fevers  Atelectasis with narcotics in DDx  Intermittent chemical aspiration (with nacrosis) in DDx as well  LN nonspecific  HIV negative  Narcotic dependence/addiction not helping anything here, but will not able to be addressed given fasciotomy anytime soon. While she has legitimate needs for pain, she is frequently sedated or has other negative consequences (as in this AM's interaction) related to her narcotic use. Would remain vigilant for BZD withdrawal  May be intermittently aspirating, but patient comfortable on RA with no findings on exam  R/O Pyelo/UTI, cx potentially suppressed  Skeptical re: active pneumonia  Overall temp curve appears to be down  Isolated low grade fever

## 2017-11-02 NOTE — PROGRESS NOTE ADULT - ATTENDING COMMENTS
Note written by attending, see above.    -will be go to OR again tomorrow for likely skin grafts   -pt is a moderate risk patient for a moderate risk procedure and is medically optimized for the surgery

## 2017-11-02 NOTE — PROGRESS NOTE ADULT - SUBJECTIVE AND OBJECTIVE BOX
Date/Time Patient Seen:  		  Referring MD:   Data Reviewed	       Patient is a 32y old  Female who presents with a chief complaint of RLE edema and numbness (13 Oct 2017 16:25)  in bed  seen and examined  vs and meds reviewed  afebrile overnight        Subjective/HPI     PAST MEDICAL & SURGICAL HISTORY:  Low back pain  Hypothyroid  Bipolar 1 disorder  History of spinal fusion  History of laminectomy        Medication list         MEDICATIONS  (STANDING):  ascorbic acid 500 milliGRAM(s) Oral two times a day  BACItracin   Ointment 1 Application(s) Topical two times a day  buDESOnide  80 MICROgram(s)/formoterol 4.5 MICROgram(s) Inhaler 2 Puff(s) Inhalation two times a day  buPROPion XL . 300 milliGRAM(s) Oral daily  cyanocobalamin 1000 MICROGram(s) Oral daily  fentaNYL   Patch  12 MICROgram(s)/Hr 1 Patch Transdermal every 72 hours  fentaNYL   Patch  25 MICROgram(s)/Hr 1 Patch Transdermal every 72 hours  fluticasone propionate 50 MICROgram(s)/spray Nasal Spray 1 Spray(s) Both Nostrils two times a day  folic acid 1 milliGRAM(s) Oral daily  heparin  Injectable 5000 Unit(s) SubCutaneous every 8 hours  influenza   Vaccine 0.5 milliLiter(s) IntraMuscular once  ipratropium 42 MICROgram(s) Nasal 2 Spray(s) Both Nostrils every 12 hours  lamoTRIgine 100 milliGRAM(s) Oral every 8 hours  levothyroxine 150 MICROGram(s) Oral daily  multivitamin/minerals 1 Tablet(s) Oral daily  oxyCODONE  ER Tablet 60 milliGRAM(s) Oral every 8 hours  pantoprazole    Tablet 40 milliGRAM(s) Oral before breakfast  risperiDONE   Tablet 8 milliGRAM(s) Oral at bedtime  sertraline 50 milliGRAM(s) Oral daily  topiramate 400 milliGRAM(s) Oral two times a day  Viibryd Oral Tablet 40 milliGRAM(s),Viibryd Oral Tablet 40 mg 1 Tablet(s) 1 Tablet(s) Oral daily    MEDICATIONS  (PRN):  acetaminophen   Tablet 650 milliGRAM(s) Oral every 6 hours PRN For Temp greater than 38 C (100.4 F)  acetaminophen   Tablet. 650 milliGRAM(s) Oral every 6 hours PRN Mild Pain (1 - 3)  ALBUTerol    90 MICROgram(s) HFA Inhaler 2 Puff(s) Inhalation every 6 hours PRN Shortness of Breath and/or Wheezing  clonazePAM Tablet 2 milliGRAM(s) Oral every 8 hours PRN anxiety  docusate sodium 100 milliGRAM(s) Oral two times a day PRN Constipation  HYDROmorphone   Tablet 3 milliGRAM(s) Oral every 4 hours PRN moderate to severe pain  ondansetron Injectable 4 milliGRAM(s) IV Push every 4 hours PRN Nausea and/or Vomiting  senna 2 Tablet(s) Oral at bedtime PRN Constipation         Vitals log        ICU Vital Signs Last 24 Hrs  T(C): 37.7 (02 Nov 2017 00:00), Max: 37.8 (01 Nov 2017 15:56)  T(F): 99.8 (02 Nov 2017 00:00), Max: 100 (01 Nov 2017 15:56)  HR: 91 (02 Nov 2017 00:00) (91 - 113)  BP: 99/62 (02 Nov 2017 00:00) (99/62 - 102/64)  BP(mean): --  ABP: --  ABP(mean): --  RR: 16 (02 Nov 2017 00:00) (16 - 16)  SpO2: 98% (02 Nov 2017 00:00) (98% - 100%)           Input and Output:  I&O's Detail    31 Oct 2017 07:01  -  01 Nov 2017 07:00  --------------------------------------------------------  IN:  Total IN: 0 mL    OUT:    Voided: 1100 mL  Total OUT: 1100 mL    Total NET: -1100 mL      01 Nov 2017 07:01  -  02 Nov 2017 06:51  --------------------------------------------------------  IN:    Oral Fluid: 540 mL  Total IN: 540 mL    OUT:    Voided: 1500 mL  Total OUT: 1500 mL    Total NET: -960 mL          Lab Data                        7.5    8.5   )-----------( 291      ( 01 Nov 2017 08:11 )             23.7     11-02    141  |  109<H>  |  19  ----------------------------<  89  3.6   |  20<L>  |  0.76    Ca    8.2<L>      02 Nov 2017 06:13  Phos  6.2     10-31  Mg     2.2     10-31              Review of Systems	      Objective     Physical Examination    head at  heart - s1s2  lungs - dec BS      Pertinent Lab findings & Imaging      Erlinda:  NO   Adequate UO     I&O's Detail    31 Oct 2017 07:01  -  01 Nov 2017 07:00  --------------------------------------------------------  IN:  Total IN: 0 mL    OUT:    Voided: 1100 mL  Total OUT: 1100 mL    Total NET: -1100 mL      01 Nov 2017 07:01  -  02 Nov 2017 06:51  --------------------------------------------------------  IN:    Oral Fluid: 540 mL  Total IN: 540 mL    OUT:    Voided: 1500 mL  Total OUT: 1500 mL    Total NET: -960 mL               Discussed with:     Cultures:	        Radiology

## 2017-11-02 NOTE — PROGRESS NOTE ADULT - SUBJECTIVE AND OBJECTIVE BOX
[facility], Division of Infectious Diseases  [facility]    Name: ABELARDO CROWELL  Age: 32y  Gender: Female  MRN: [id number]    Interval History--  Notes reviewed. Somnolent, resumably due to narcotics. Rouses briefly.    Past Medical History--  Low back pain  Hypothyroid  Bipolar 1 disorder  History of spinal fusion  History of laminectomy      For details regarding the patient's social history, family history, and other miscellaneous elements, please refer the initial infectious diseases consultation and/or the admitting history and physical examination for this admission.    Allergies    avocado (Unknown)  latex (Urticaria; Rash)  penicillin (Unknown)  sulfa drugs (Unknown)    Intolerances        Medications--  Antibiotics:    Immunologic:  influenza   Vaccine 0.5 milliLiter(s) IntraMuscular once    Other:  acetaminophen   Tablet PRN  acetaminophen   Tablet. PRN  ALBUTerol    90 MICROgram(s) HFA Inhaler PRN  ascorbic acid  BACItracin   Ointment  buDESOnide  80 MICROgram(s)/formoterol 4.5 MICROgram(s) Inhaler  buPROPion XL .  clonazePAM Tablet PRN  cyanocobalamin  docusate sodium PRN  fentaNYL   Patch  12 MICROgram(s)/Hr  fentaNYL   Patch  25 MICROgram(s)/Hr  fluticasone propionate 50 MICROgram(s)/spray Nasal Spray  folic acid  heparin  Injectable  HYDROmorphone   Tablet PRN  ipratropium 42 MICROgram(s) Nasal  lamoTRIgine  levothyroxine  multivitamin/minerals  ondansetron Injectable PRN  oxyCODONE  ER Tablet  pantoprazole    Tablet  risperiDONE   Tablet  senna PRN  sertraline  topiramate  Viibryd Oral Tablet 40 milliGRAM(s),Viibryd Oral Tablet 40 mg 1 Tablet(s)      Review of Systems--  Review of systems limited due to somnolence    Physical Examination--  Vital Signs: T(F): 99.5 (11-02-17 @ 07:38), Max: 100 (11-01-17 @ 15:56)  HR: 103 (11-02-17 @ 07:38)  BP: 96/61 (11-02-17 @ 07:38)  RR: 16 (11-02-17 @ 07:38)  SpO2: 97% (11-02-17 @ 07:38)  Wt(kg): --  General: Nontoxic-appearing Female in no acute distress. Somnolent.   HEENT: AT/NC. Anicteric. Conjunctiva pale and moist. Oropharynx unable.   Neck: Not rigid. No sense of mass.   Nodes: None palpable.  Lungs: Clear bilaterally without rales, wheezing or rhonchi  Heart: RRR. No Murmur. No rub. No gallop. No palpable thrill.  Abdomen: Bowel sounds present and normoactive. Soft. Nondistended. Nontender. No sense of mass. No organomegaly.  Extremities: No cyanosis or clubbing. LLE no edema. RLE wrapped. Foot warm, edematous. VAC under ACE.   Skin: Pale. Dry. Good turgor. No rash. No vasculitic stigmata.  Psychiatric: As above.      Laboratory Studies--  CBC                        7.2    7.9   )-----------( 276      ( 02 Nov 2017 06:13 )             22.9       Chemistries  11-02    141  |  109<H>  |  19  ----------------------------<  89  3.6   |  20<L>  |  0.76    Ca    8.2<L>      02 Nov 2017 06:13        Culture Data

## 2017-11-02 NOTE — PROGRESS NOTE ADULT - ASSESSMENT
32-year-old female now status post right lower extremity fasciotomy for prolonged compression after a fall with lethargy.     - Currently no active cardiac conditions. No signs of ischemia, ADHF, clinical exam not consistent with stenotic valvular disease, no unstable arrhythmias noted. Therefore able to proceed with this planned plastic  surgery without any further cardiac workup. Routine hemodynamic monitoring is suggested during the procedure.   - HR seems to be better controlled.  - Abx per ID  - plastics/vascular f/u  - Continue Pain control  - TTE with hyperdynamic LV function, EF 70%, no valvular disease noted  - Other cardiovascular workup will depend on clinical course.  - Monitor and replete electrolytes. Keep K>4.0 and Mg>2.0.   - All other workup per primary team  - Will follow

## 2017-11-02 NOTE — PROGRESS NOTE ADULT - PROBLEM SELECTOR PLAN 1
unclear etiology; fever has resolved  CXR negative; may be having periodic episodes of aspiration pneumonitis given has periods of somnolence. Will continue to try and titrate off sedating medications. No significant evidence for full blown pneumonia.  Urine/ blood Cxs -- negative, but UCx may have been drawn after initiation of Abx -- had a staph organism earlier on admission  ID recs appreciated.   now off Abx as per ID recs  unclear etiology prior -- ?UTI, ?leg wound infection  HIV negative.  CT Lower extremity done, no drainable fluid collection

## 2017-11-02 NOTE — PROGRESS NOTE ADULT - SUBJECTIVE AND OBJECTIVE BOX
Patient is a 32y old  Female who presents with a chief complaint of RLE edema and numbness (13 Oct 2017 16:25)      INTERVAL HPI/OVERNIGHT EVENTS: Report right leg pain. Has rare cough. Denies fever, chills, dysuria, SOB, CP, palpitations.     MEDICATIONS  (STANDING):  ascorbic acid 500 milliGRAM(s) Oral two times a day  BACItracin   Ointment 1 Application(s) Topical two times a day  buDESOnide  80 MICROgram(s)/formoterol 4.5 MICROgram(s) Inhaler 2 Puff(s) Inhalation two times a day  buPROPion XL . 300 milliGRAM(s) Oral daily  cefepime  IVPB      cefepime  IVPB 1000 milliGRAM(s) IV Intermittent every 8 hours  cyanocobalamin 1000 MICROGram(s) Oral daily  fentaNYL   Patch  12 MICROgram(s)/Hr 1 Patch Transdermal every 72 hours  fentaNYL   Patch  25 MICROgram(s)/Hr 1 Patch Transdermal every 72 hours  fluticasone propionate 50 MICROgram(s)/spray Nasal Spray 1 Spray(s) Both Nostrils two times a day  folic acid 1 milliGRAM(s) Oral daily  heparin  Injectable 5000 Unit(s) SubCutaneous every 8 hours  influenza   Vaccine 0.5 milliLiter(s) IntraMuscular once  ipratropium 42 MICROgram(s) Nasal 2 Spray(s) Both Nostrils every 12 hours  lamoTRIgine 100 milliGRAM(s) Oral every 8 hours  levothyroxine 150 MICROGram(s) Oral daily  multivitamin/minerals 1 Tablet(s) Oral daily  oxyCODONE  ER Tablet 60 milliGRAM(s) Oral every 8 hours  pantoprazole    Tablet 40 milliGRAM(s) Oral before breakfast  risperiDONE   Tablet 8 milliGRAM(s) Oral at bedtime  sertraline 50 milliGRAM(s) Oral daily  topiramate 400 milliGRAM(s) Oral two times a day  vancomycin  IVPB 1250 milliGRAM(s) IV Intermittent every 12 hours  Viibryd Oral Tablet 40 milliGRAM(s),Viibryd Oral Tablet 40 mg 1 Tablet(s) 1 Tablet(s) Oral daily    MEDICATIONS  (PRN):  acetaminophen   Tablet 650 milliGRAM(s) Oral every 6 hours PRN For Temp greater than 38 C (100.4 F)  acetaminophen   Tablet. 650 milliGRAM(s) Oral every 6 hours PRN Mild Pain (1 - 3)  ALBUTerol    90 MICROgram(s) HFA Inhaler 2 Puff(s) Inhalation every 6 hours PRN Shortness of Breath and/or Wheezing  clonazePAM Tablet 2 milliGRAM(s) Oral every 8 hours PRN anxiety  docusate sodium 100 milliGRAM(s) Oral two times a day PRN Constipation  HYDROmorphone   Tablet 3 milliGRAM(s) Oral every 4 hours PRN moderate to severe pain  ondansetron Injectable 4 milliGRAM(s) IV Push every 4 hours PRN Nausea and/or Vomiting  senna 2 Tablet(s) Oral at bedtime PRN Constipation        Allergies    avocado (Unknown)  latex (Urticaria; Rash)  penicillin (Unknown)  sulfa drugs (Unknown)    Intolerances        REVIEW OF SYSTEMS:  CONSTITUTIONAL: No fever or chills  HEENT:  No headache, no sore throat  RESPIRATORY: +rare cough, No wheezing, or shortness of breath  CARDIOVASCULAR: No chest pain, palpitations   GASTROINTESTINAL: No nausea, vomiting, or diarrhea  GENITOURINARY: No dysuria, frequency, or hematuria  NEUROLOGICAL: +weakness and loss of sensation in the right leg (especially below the knee)  MUSCULOSKELETAL: +right leg pain   PSYCHIATRIC:  +anxiety and depression; no SI    Vital Signs Last 24 Hrs  T(C): 37.7  Max: 37.9 (02 Nov 2017 17:12)  T(F): 99.9  Max: 100.2 (02 Nov 2017 17:12)  HR: 91  (91 - 104)  BP: 95/58  (95/58 - 102/58)  BP(mean): --  RR: 18  (16 - 18)  SpO2: 98% (95% - 98%)      PHYSICAL EXAM:  GENERAL: NAD  HEENT:  EOMI, moist mucous membranes  CHEST/LUNG:  CTA b/l, no rales  HEART:  RRR, S1, S2  ABDOMEN:  BS+, soft, nontender, nondistended  EXTREMITIES: right leg almost entirely bandaged; wound vacs in place; no swelling or calf tenderness in the left leg  NERVOUS SYSTEM: AA&Ox3; no sensation or movement in all toes of right foot    LABS:                                              7.2    7.9   )-----------( 276      ( 02 Nov 2017 06:13 )             22.9     CBC Full  -  ( 02 Nov 2017 06:13 )  WBC Count : 7.9 K/uL  Hemoglobin : 7.2 g/dL  Hematocrit : 22.9 %  Platelet Count - Automated : 276 K/uL  Mean Cell Volume : 90.9 fl  Mean Cell Hemoglobin : 28.5 pg  Mean Cell Hemoglobin Concentration : 31.4 gm/dL  Auto Neutrophil # : x  Auto Lymphocyte # : x  Auto Monocyte # : x  Auto Eosinophil # : x  Auto Basophil # : x  Auto Neutrophil % : x  Auto Lymphocyte % : x  Auto Monocyte % : x  Auto Eosinophil % : x  Auto Basophil % : x    11-02    141  |  109<H>  |  19  ----------------------------<  89  3.6   |  20<L>  |  0.76    Ca    8.2<L>      02 Nov 2017 06:13            CAPILLARY BLOOD GLUCOSE    BCx and UCx from 10/27: all negative        RADIOLOGY & ADDITIONAL TESTS:  CXR 10/26: no active disease  Personally reviewed.     Consultant(s) Notes Reviewed:  [x] YES  [ ] NO    Care Discussed with [x] Consultants  [x] Patient  [x] Family  [ ]      [ ] Other; RN  DVT ppx: JENNIFER Patient is a 32y old  Female who presents with a chief complaint of RLE edema and numbness (13 Oct 2017 16:25)    INTERVAL HPI/OVERNIGHT EVENTS: Report right leg pain. Has rare cough. Denies fever, chills, dysuria, SOB, CP, palpitations.     MEDICATIONS  (STANDING):  ascorbic acid 500 milliGRAM(s) Oral two times a day  BACItracin   Ointment 1 Application(s) Topical two times a day  buDESOnide  80 MICROgram(s)/formoterol 4.5 MICROgram(s) Inhaler 2 Puff(s) Inhalation two times a day  buPROPion XL . 300 milliGRAM(s) Oral daily  cefepime  IVPB      cefepime  IVPB 1000 milliGRAM(s) IV Intermittent every 8 hours  cyanocobalamin 1000 MICROGram(s) Oral daily  fentaNYL   Patch  12 MICROgram(s)/Hr 1 Patch Transdermal every 72 hours  fentaNYL   Patch  25 MICROgram(s)/Hr 1 Patch Transdermal every 72 hours  fluticasone propionate 50 MICROgram(s)/spray Nasal Spray 1 Spray(s) Both Nostrils two times a day  folic acid 1 milliGRAM(s) Oral daily  heparin  Injectable 5000 Unit(s) SubCutaneous every 8 hours  influenza   Vaccine 0.5 milliLiter(s) IntraMuscular once  ipratropium 42 MICROgram(s) Nasal 2 Spray(s) Both Nostrils every 12 hours  lamoTRIgine 100 milliGRAM(s) Oral every 8 hours  levothyroxine 150 MICROGram(s) Oral daily  multivitamin/minerals 1 Tablet(s) Oral daily  oxyCODONE  ER Tablet 60 milliGRAM(s) Oral every 8 hours  pantoprazole    Tablet 40 milliGRAM(s) Oral before breakfast  risperiDONE   Tablet 8 milliGRAM(s) Oral at bedtime  sertraline 50 milliGRAM(s) Oral daily  topiramate 400 milliGRAM(s) Oral two times a day  vancomycin  IVPB 1250 milliGRAM(s) IV Intermittent every 12 hours  Viibryd Oral Tablet 40 milliGRAM(s),Viibryd Oral Tablet 40 mg 1 Tablet(s) 1 Tablet(s) Oral daily    MEDICATIONS  (PRN):  acetaminophen   Tablet 650 milliGRAM(s) Oral every 6 hours PRN For Temp greater than 38 C (100.4 F)  acetaminophen   Tablet. 650 milliGRAM(s) Oral every 6 hours PRN Mild Pain (1 - 3)  ALBUTerol    90 MICROgram(s) HFA Inhaler 2 Puff(s) Inhalation every 6 hours PRN Shortness of Breath and/or Wheezing  clonazePAM Tablet 2 milliGRAM(s) Oral every 8 hours PRN anxiety  docusate sodium 100 milliGRAM(s) Oral two times a day PRN Constipation  HYDROmorphone   Tablet 3 milliGRAM(s) Oral every 4 hours PRN moderate to severe pain  ondansetron Injectable 4 milliGRAM(s) IV Push every 4 hours PRN Nausea and/or Vomiting  senna 2 Tablet(s) Oral at bedtime PRN Constipation        Allergies    avocado (Unknown)  latex (Urticaria; Rash)  penicillin (Unknown)  sulfa drugs (Unknown)    Intolerances        REVIEW OF SYSTEMS:  CONSTITUTIONAL: No fever or chills  HEENT:  No headache, no sore throat  RESPIRATORY: +rare cough, No wheezing, or shortness of breath  CARDIOVASCULAR: No chest pain, palpitations   GASTROINTESTINAL: No nausea, vomiting, or diarrhea  GENITOURINARY: No dysuria, frequency, or hematuria  NEUROLOGICAL: +weakness and loss of sensation in the right leg (especially below the knee)  MUSCULOSKELETAL: +right leg pain   PSYCHIATRIC:  +anxiety and depression; no SI    Vital Signs Last 24 Hrs  T(C): 37.7  Max: 37.9 (02 Nov 2017 17:12)  T(F): 99.9  Max: 100.2 (02 Nov 2017 17:12)  HR: 91  (91 - 104)  BP: 95/58  (95/58 - 102/58)  BP(mean): --  RR: 18  (16 - 18)  SpO2: 98% (95% - 98%)      PHYSICAL EXAM:  GENERAL: NAD  HEENT:  EOMI, moist mucous membranes  CHEST/LUNG:  CTA b/l, no rales  HEART:  RRR, S1, S2  ABDOMEN:  BS+, soft, nontender, nondistended  EXTREMITIES: right leg almost entirely bandaged; wound vacs in place; no swelling or calf tenderness in the left leg  NERVOUS SYSTEM: AA&Ox3; no sensation or movement in all toes of right foot    LABS:                                              7.2    7.9   )-----------( 276      ( 02 Nov 2017 06:13 )             22.9     CBC Full  -  ( 02 Nov 2017 06:13 )  WBC Count : 7.9 K/uL  Hemoglobin : 7.2 g/dL  Hematocrit : 22.9 %  Platelet Count - Automated : 276 K/uL  Mean Cell Volume : 90.9 fl  Mean Cell Hemoglobin : 28.5 pg  Mean Cell Hemoglobin Concentration : 31.4 gm/dL  Auto Neutrophil # : x  Auto Lymphocyte # : x  Auto Monocyte # : x  Auto Eosinophil # : x  Auto Basophil # : x  Auto Neutrophil % : x  Auto Lymphocyte % : x  Auto Monocyte % : x  Auto Eosinophil % : x  Auto Basophil % : x    11-02    141  |  109<H>  |  19  ----------------------------<  89  3.6   |  20<L>  |  0.76    Ca    8.2<L>      02 Nov 2017 06:13            CAPILLARY BLOOD GLUCOSE    BCx and UCx from 10/27: all negative        RADIOLOGY & ADDITIONAL TESTS:  CXR 10/26: no active disease  Personally reviewed.     Consultant(s) Notes Reviewed:  [x] YES  [ ] NO    Care Discussed with [x] Consultants  [x] Patient  [x] Family  [ ]      [ ] Other; RN  DVT ppx: JENNIFER

## 2017-11-02 NOTE — PROGRESS NOTE ADULT - SUBJECTIVE AND OBJECTIVE BOX
ABELARDO CROWELL  MRN-[id number] 32y    GENERAL SURGERY/ DR. HERNANDEZ    Vital Signs Last 24 Hrs  T(C): 37.5 (02 Nov 2017 07:38), Max: 37.8 (01 Nov 2017 15:56)  T(F): 99.5 (02 Nov 2017 07:38), Max: 100 (01 Nov 2017 15:56)  HR: 103 (02 Nov 2017 07:38) (91 - 110)  BP: 96/61 (02 Nov 2017 07:38) (96/61 - 99/64)  BP(mean): --  RR: 16 (02 Nov 2017 07:38) (16 - 16)  SpO2: 97% (02 Nov 2017 07:38) (97% - 99%)    POD # 6I    RIGHT LEG WOUND VAC X2 IN PLACE WITH [employer]  EDEMA  NOTED TO THE RIGHT FOOT, FOOT IS WARM   VIABLE FLAPS                        7.2    7.9   )-----------( 276      ( 02 Nov 2017 06:13 )             22.9      11-02    141  |  109<H>  |  19  ----------------------------<  89  3.6   |  20<L>  |  0.76    Ca    8.2<L>      02 Nov 2017 06:13                             ASSESSMENT &  PLAN:  POD # 6 POST  DEBRIDEMENT AND WOUND VAC   TO OR TOMORROW FOR DEBRIDEMENT AND STSG RIGHT LEG   NEEDS BLOOD TRANSFUSION  PRIOR TO THE PROCEDURE TOMORROW  AND MOST LIKELY POSTOP , WILL DISCUSS WITH MEDICINE    NPO AFTER MIDNIGHT

## 2017-11-02 NOTE — PROGRESS NOTE ADULT - PROBLEM SELECTOR PLAN 7
the more acute drop was likely due to blood loss with surgery; the previously   low H&H was likely anemia of chronic disease from chronic wound borderline low B12  -responded appropriately to the unit of PRBC given on 10/28.  Hematology recs appreciated  give 1un PRBC today as Hb in low-7s and going to OR tomorrow

## 2017-11-03 ENCOUNTER — RESULT REVIEW (OUTPATIENT)
Age: 32
End: 2017-11-03

## 2017-11-03 LAB
ANION GAP SERPL CALC-SCNC: 13 MMOL/L — SIGNIFICANT CHANGE UP (ref 5–17)
BUN SERPL-MCNC: 22 MG/DL — SIGNIFICANT CHANGE UP (ref 7–23)
CALCIUM SERPL-MCNC: 8.6 MG/DL — SIGNIFICANT CHANGE UP (ref 8.5–10.1)
CHLORIDE SERPL-SCNC: 110 MMOL/L — HIGH (ref 96–108)
CO2 SERPL-SCNC: 19 MMOL/L — LOW (ref 22–31)
CREAT SERPL-MCNC: 0.82 MG/DL — SIGNIFICANT CHANGE UP (ref 0.5–1.3)
GLUCOSE SERPL-MCNC: 95 MG/DL — SIGNIFICANT CHANGE UP (ref 70–99)
HCT VFR BLD CALC: 24.7 % — LOW (ref 34.5–45)
HCT VFR BLD CALC: 26.9 % — LOW (ref 34.5–45)
HGB BLD-MCNC: 7.9 G/DL — LOW (ref 11.5–15.5)
HGB BLD-MCNC: 8.4 G/DL — LOW (ref 11.5–15.5)
MCHC RBC-ENTMCNC: 28 PG — SIGNIFICANT CHANGE UP (ref 27–34)
MCHC RBC-ENTMCNC: 28.2 PG — SIGNIFICANT CHANGE UP (ref 27–34)
MCHC RBC-ENTMCNC: 31.4 GM/DL — LOW (ref 32–36)
MCHC RBC-ENTMCNC: 31.9 GM/DL — LOW (ref 32–36)
MCV RBC AUTO: 88.3 FL — SIGNIFICANT CHANGE UP (ref 80–100)
MCV RBC AUTO: 89.1 FL — SIGNIFICANT CHANGE UP (ref 80–100)
PLATELET # BLD AUTO: 232 K/UL — SIGNIFICANT CHANGE UP (ref 150–400)
PLATELET # BLD AUTO: 259 K/UL — SIGNIFICANT CHANGE UP (ref 150–400)
POTASSIUM SERPL-MCNC: 3.5 MMOL/L — SIGNIFICANT CHANGE UP (ref 3.5–5.3)
POTASSIUM SERPL-SCNC: 3.5 MMOL/L — SIGNIFICANT CHANGE UP (ref 3.5–5.3)
RBC # BLD: 2.79 M/UL — LOW (ref 3.8–5.2)
RBC # BLD: 3.02 M/UL — LOW (ref 3.8–5.2)
RBC # FLD: 13.9 % — SIGNIFICANT CHANGE UP (ref 10.3–14.5)
RBC # FLD: 14.1 % — SIGNIFICANT CHANGE UP (ref 10.3–14.5)
SODIUM SERPL-SCNC: 142 MMOL/L — SIGNIFICANT CHANGE UP (ref 135–145)
WBC # BLD: 12.4 K/UL — HIGH (ref 3.8–10.5)
WBC # BLD: 7.7 K/UL — SIGNIFICANT CHANGE UP (ref 3.8–10.5)
WBC # FLD AUTO: 12.4 K/UL — HIGH (ref 3.8–10.5)
WBC # FLD AUTO: 7.7 K/UL — SIGNIFICANT CHANGE UP (ref 3.8–10.5)

## 2017-11-03 PROCEDURE — 99233 SBSQ HOSP IP/OBS HIGH 50: CPT

## 2017-11-03 PROCEDURE — 88304 TISSUE EXAM BY PATHOLOGIST: CPT | Mod: 26

## 2017-11-03 RX ORDER — METOCLOPRAMIDE HCL 10 MG
10 TABLET ORAL ONCE
Qty: 0 | Refills: 0 | Status: DISCONTINUED | OUTPATIENT
Start: 2017-11-03 | End: 2017-11-03

## 2017-11-03 RX ORDER — ONDANSETRON 8 MG/1
4 TABLET, FILM COATED ORAL EVERY 4 HOURS
Qty: 0 | Refills: 0 | Status: DISCONTINUED | OUTPATIENT
Start: 2017-11-03 | End: 2017-11-10

## 2017-11-03 RX ORDER — HYDROMORPHONE HYDROCHLORIDE 2 MG/ML
1 INJECTION INTRAMUSCULAR; INTRAVENOUS; SUBCUTANEOUS
Qty: 0 | Refills: 0 | Status: DISCONTINUED | OUTPATIENT
Start: 2017-11-03 | End: 2017-11-03

## 2017-11-03 RX ORDER — PANTOPRAZOLE SODIUM 20 MG/1
40 TABLET, DELAYED RELEASE ORAL
Qty: 0 | Refills: 0 | Status: DISCONTINUED | OUTPATIENT
Start: 2017-11-03 | End: 2017-11-10

## 2017-11-03 RX ORDER — MEPERIDINE HYDROCHLORIDE 50 MG/ML
12.5 INJECTION INTRAMUSCULAR; INTRAVENOUS; SUBCUTANEOUS
Qty: 0 | Refills: 0 | Status: DISCONTINUED | OUTPATIENT
Start: 2017-11-03 | End: 2017-11-03

## 2017-11-03 RX ORDER — CLONAZEPAM 1 MG
2 TABLET ORAL EVERY 8 HOURS
Qty: 0 | Refills: 0 | Status: DISCONTINUED | OUTPATIENT
Start: 2017-11-03 | End: 2017-11-10

## 2017-11-03 RX ORDER — FENTANYL CITRATE 50 UG/ML
1 INJECTION INTRAVENOUS
Qty: 0 | Refills: 0 | Status: DISCONTINUED | OUTPATIENT
Start: 2017-11-03 | End: 2017-11-03

## 2017-11-03 RX ORDER — SENNA PLUS 8.6 MG/1
2 TABLET ORAL AT BEDTIME
Qty: 0 | Refills: 0 | Status: DISCONTINUED | OUTPATIENT
Start: 2017-11-03 | End: 2017-11-10

## 2017-11-03 RX ORDER — LEVOTHYROXINE SODIUM 125 MCG
150 TABLET ORAL DAILY
Qty: 0 | Refills: 0 | Status: DISCONTINUED | OUTPATIENT
Start: 2017-11-03 | End: 2017-11-10

## 2017-11-03 RX ORDER — HYDROMORPHONE HYDROCHLORIDE 2 MG/ML
1 INJECTION INTRAMUSCULAR; INTRAVENOUS; SUBCUTANEOUS
Qty: 0 | Refills: 0 | Status: DISCONTINUED | OUTPATIENT
Start: 2017-11-03 | End: 2017-11-07

## 2017-11-03 RX ORDER — HYDROMORPHONE HYDROCHLORIDE 2 MG/ML
3 INJECTION INTRAMUSCULAR; INTRAVENOUS; SUBCUTANEOUS EVERY 4 HOURS
Qty: 0 | Refills: 0 | Status: DISCONTINUED | OUTPATIENT
Start: 2017-11-03 | End: 2017-11-10

## 2017-11-03 RX ORDER — TOPIRAMATE 25 MG
400 TABLET ORAL
Qty: 0 | Refills: 0 | Status: DISCONTINUED | OUTPATIENT
Start: 2017-11-03 | End: 2017-11-10

## 2017-11-03 RX ORDER — ASCORBIC ACID 60 MG
500 TABLET,CHEWABLE ORAL
Qty: 0 | Refills: 0 | Status: DISCONTINUED | OUTPATIENT
Start: 2017-11-03 | End: 2017-11-10

## 2017-11-03 RX ORDER — HYDROMORPHONE HYDROCHLORIDE 2 MG/ML
0.5 INJECTION INTRAMUSCULAR; INTRAVENOUS; SUBCUTANEOUS
Qty: 0 | Refills: 0 | Status: DISCONTINUED | OUTPATIENT
Start: 2017-11-03 | End: 2017-11-08

## 2017-11-03 RX ORDER — SERTRALINE 25 MG/1
50 TABLET, FILM COATED ORAL DAILY
Qty: 0 | Refills: 0 | Status: DISCONTINUED | OUTPATIENT
Start: 2017-11-03 | End: 2017-11-10

## 2017-11-03 RX ORDER — ACETAMINOPHEN 500 MG
650 TABLET ORAL EVERY 6 HOURS
Qty: 0 | Refills: 0 | Status: DISCONTINUED | OUTPATIENT
Start: 2017-11-03 | End: 2017-11-10

## 2017-11-03 RX ORDER — SODIUM CHLORIDE 9 MG/ML
1000 INJECTION, SOLUTION INTRAVENOUS
Qty: 0 | Refills: 0 | Status: DISCONTINUED | OUTPATIENT
Start: 2017-11-03 | End: 2017-11-03

## 2017-11-03 RX ORDER — IPRATROPIUM BROMIDE 21 MCG
2 AEROSOL, SPRAY (ML) NASAL EVERY 12 HOURS
Qty: 0 | Refills: 0 | Status: DISCONTINUED | OUTPATIENT
Start: 2017-11-03 | End: 2017-11-10

## 2017-11-03 RX ORDER — FOLIC ACID 0.8 MG
1 TABLET ORAL DAILY
Qty: 0 | Refills: 0 | Status: DISCONTINUED | OUTPATIENT
Start: 2017-11-03 | End: 2017-11-10

## 2017-11-03 RX ORDER — SODIUM CHLORIDE 9 MG/ML
1000 INJECTION, SOLUTION INTRAVENOUS
Qty: 0 | Refills: 0 | Status: DISCONTINUED | OUTPATIENT
Start: 2017-11-03 | End: 2017-11-05

## 2017-11-03 RX ORDER — FLUTICASONE PROPIONATE 50 MCG
1 SPRAY, SUSPENSION NASAL
Qty: 0 | Refills: 0 | Status: DISCONTINUED | OUTPATIENT
Start: 2017-11-03 | End: 2017-11-10

## 2017-11-03 RX ORDER — ALBUTEROL 90 UG/1
2 AEROSOL, METERED ORAL EVERY 6 HOURS
Qty: 0 | Refills: 0 | Status: DISCONTINUED | OUTPATIENT
Start: 2017-11-03 | End: 2017-11-10

## 2017-11-03 RX ORDER — BACITRACIN ZINC 500 UNIT/G
1 OINTMENT IN PACKET (EA) TOPICAL
Qty: 0 | Refills: 0 | Status: DISCONTINUED | OUTPATIENT
Start: 2017-11-03 | End: 2017-11-10

## 2017-11-03 RX ORDER — PREGABALIN 225 MG/1
1000 CAPSULE ORAL DAILY
Qty: 0 | Refills: 0 | Status: DISCONTINUED | OUTPATIENT
Start: 2017-11-03 | End: 2017-11-10

## 2017-11-03 RX ORDER — FENTANYL CITRATE 50 UG/ML
1 INJECTION INTRAVENOUS
Qty: 0 | Refills: 0 | Status: DISCONTINUED | OUTPATIENT
Start: 2017-11-03 | End: 2017-11-09

## 2017-11-03 RX ORDER — OXYCODONE HYDROCHLORIDE 5 MG/1
5 TABLET ORAL EVERY 4 HOURS
Qty: 0 | Refills: 0 | Status: DISCONTINUED | OUTPATIENT
Start: 2017-11-03 | End: 2017-11-03

## 2017-11-03 RX ORDER — BUPROPION HYDROCHLORIDE 150 MG/1
300 TABLET, EXTENDED RELEASE ORAL DAILY
Qty: 0 | Refills: 0 | Status: DISCONTINUED | OUTPATIENT
Start: 2017-11-03 | End: 2017-11-10

## 2017-11-03 RX ORDER — HEPARIN SODIUM 5000 [USP'U]/ML
5000 INJECTION INTRAVENOUS; SUBCUTANEOUS EVERY 8 HOURS
Qty: 0 | Refills: 0 | Status: DISCONTINUED | OUTPATIENT
Start: 2017-11-04 | End: 2017-11-10

## 2017-11-03 RX ORDER — DOCUSATE SODIUM 100 MG
100 CAPSULE ORAL
Qty: 0 | Refills: 0 | Status: DISCONTINUED | OUTPATIENT
Start: 2017-11-03 | End: 2017-11-10

## 2017-11-03 RX ORDER — LAMOTRIGINE 25 MG/1
100 TABLET, ORALLY DISINTEGRATING ORAL EVERY 8 HOURS
Qty: 0 | Refills: 0 | Status: DISCONTINUED | OUTPATIENT
Start: 2017-11-03 | End: 2017-11-10

## 2017-11-03 RX ORDER — MULTIVIT-MIN/FERROUS GLUCONATE 9 MG/15 ML
1 LIQUID (ML) ORAL DAILY
Qty: 0 | Refills: 0 | Status: DISCONTINUED | OUTPATIENT
Start: 2017-11-03 | End: 2017-11-10

## 2017-11-03 RX ORDER — HYDROMORPHONE HYDROCHLORIDE 2 MG/ML
3 INJECTION INTRAMUSCULAR; INTRAVENOUS; SUBCUTANEOUS EVERY 4 HOURS
Qty: 0 | Refills: 0 | Status: DISCONTINUED | OUTPATIENT
Start: 2017-11-03 | End: 2017-11-03

## 2017-11-03 RX ORDER — BUDESONIDE AND FORMOTEROL FUMARATE DIHYDRATE 160; 4.5 UG/1; UG/1
2 AEROSOL RESPIRATORY (INHALATION)
Qty: 0 | Refills: 0 | Status: DISCONTINUED | OUTPATIENT
Start: 2017-11-03 | End: 2017-11-10

## 2017-11-03 RX ORDER — FENTANYL CITRATE 50 UG/ML
1 INJECTION INTRAVENOUS
Qty: 0 | Refills: 0 | Status: DISCONTINUED | OUTPATIENT
Start: 2017-11-03 | End: 2017-11-08

## 2017-11-03 RX ORDER — ONDANSETRON 8 MG/1
4 TABLET, FILM COATED ORAL ONCE
Qty: 0 | Refills: 0 | Status: DISCONTINUED | OUTPATIENT
Start: 2017-11-03 | End: 2017-11-03

## 2017-11-03 RX ORDER — SODIUM CHLORIDE 9 MG/ML
1000 INJECTION INTRAMUSCULAR; INTRAVENOUS; SUBCUTANEOUS
Qty: 0 | Refills: 0 | Status: DISCONTINUED | OUTPATIENT
Start: 2017-11-03 | End: 2017-11-03

## 2017-11-03 RX ORDER — RISPERIDONE 4 MG/1
8 TABLET ORAL AT BEDTIME
Qty: 0 | Refills: 0 | Status: DISCONTINUED | OUTPATIENT
Start: 2017-11-03 | End: 2017-11-10

## 2017-11-03 RX ORDER — OXYCODONE HYDROCHLORIDE 5 MG/1
60 TABLET ORAL EVERY 8 HOURS
Qty: 0 | Refills: 0 | Status: DISCONTINUED | OUTPATIENT
Start: 2017-11-03 | End: 2017-11-03

## 2017-11-03 RX ORDER — HYDROMORPHONE HYDROCHLORIDE 2 MG/ML
0.5 INJECTION INTRAMUSCULAR; INTRAVENOUS; SUBCUTANEOUS
Qty: 0 | Refills: 0 | Status: DISCONTINUED | OUTPATIENT
Start: 2017-11-03 | End: 2017-11-03

## 2017-11-03 RX ORDER — OXYCODONE HYDROCHLORIDE 5 MG/1
60 TABLET ORAL EVERY 8 HOURS
Qty: 0 | Refills: 0 | Status: COMPLETED | OUTPATIENT
Start: 2017-11-03 | End: 2017-11-10

## 2017-11-03 RX ADMIN — OXYCODONE HYDROCHLORIDE 60 MILLIGRAM(S): 5 TABLET ORAL at 05:23

## 2017-11-03 RX ADMIN — Medication 150 MICROGRAM(S): at 05:24

## 2017-11-03 RX ADMIN — HYDROMORPHONE HYDROCHLORIDE 0.5 MILLIGRAM(S): 2 INJECTION INTRAMUSCULAR; INTRAVENOUS; SUBCUTANEOUS at 23:30

## 2017-11-03 RX ADMIN — Medication 2 SPRAY(S): at 05:23

## 2017-11-03 RX ADMIN — HYDROMORPHONE HYDROCHLORIDE 1 MILLIGRAM(S): 2 INJECTION INTRAMUSCULAR; INTRAVENOUS; SUBCUTANEOUS at 20:30

## 2017-11-03 RX ADMIN — Medication 400 MILLIGRAM(S): at 05:23

## 2017-11-03 RX ADMIN — HYDROMORPHONE HYDROCHLORIDE 1 MILLIGRAM(S): 2 INJECTION INTRAMUSCULAR; INTRAVENOUS; SUBCUTANEOUS at 18:04

## 2017-11-03 RX ADMIN — HYDROMORPHONE HYDROCHLORIDE 0.5 MILLIGRAM(S): 2 INJECTION INTRAMUSCULAR; INTRAVENOUS; SUBCUTANEOUS at 17:45

## 2017-11-03 RX ADMIN — Medication 1 SPRAY(S): at 05:23

## 2017-11-03 RX ADMIN — HYDROMORPHONE HYDROCHLORIDE 0.5 MILLIGRAM(S): 2 INJECTION INTRAMUSCULAR; INTRAVENOUS; SUBCUTANEOUS at 22:48

## 2017-11-03 RX ADMIN — FENTANYL CITRATE 1 PATCH: 50 INJECTION INTRAVENOUS at 17:38

## 2017-11-03 RX ADMIN — HYDROMORPHONE HYDROCHLORIDE 3 MILLIGRAM(S): 2 INJECTION INTRAMUSCULAR; INTRAVENOUS; SUBCUTANEOUS at 09:10

## 2017-11-03 RX ADMIN — HYDROMORPHONE HYDROCHLORIDE 1 MILLIGRAM(S): 2 INJECTION INTRAMUSCULAR; INTRAVENOUS; SUBCUTANEOUS at 19:39

## 2017-11-03 RX ADMIN — ONDANSETRON 4 MILLIGRAM(S): 8 TABLET, FILM COATED ORAL at 03:58

## 2017-11-03 RX ADMIN — ONDANSETRON 4 MILLIGRAM(S): 8 TABLET, FILM COATED ORAL at 10:29

## 2017-11-03 RX ADMIN — LAMOTRIGINE 100 MILLIGRAM(S): 25 TABLET, ORALLY DISINTEGRATING ORAL at 05:24

## 2017-11-03 RX ADMIN — HYDROMORPHONE HYDROCHLORIDE 3 MILLIGRAM(S): 2 INJECTION INTRAMUSCULAR; INTRAVENOUS; SUBCUTANEOUS at 05:09

## 2017-11-03 RX ADMIN — OXYCODONE HYDROCHLORIDE 60 MILLIGRAM(S): 5 TABLET ORAL at 06:23

## 2017-11-03 RX ADMIN — Medication 1 APPLICATION(S): at 05:26

## 2017-11-03 RX ADMIN — Medication 1 APPLICATION(S): at 21:27

## 2017-11-03 RX ADMIN — SODIUM CHLORIDE 75 MILLILITER(S): 9 INJECTION, SOLUTION INTRAVENOUS at 07:05

## 2017-11-03 RX ADMIN — HYDROMORPHONE HYDROCHLORIDE 1 MILLIGRAM(S): 2 INJECTION INTRAMUSCULAR; INTRAVENOUS; SUBCUTANEOUS at 18:19

## 2017-11-03 RX ADMIN — Medication 500 MILLIGRAM(S): at 05:24

## 2017-11-03 RX ADMIN — HYDROMORPHONE HYDROCHLORIDE 0.5 MILLIGRAM(S): 2 INJECTION INTRAMUSCULAR; INTRAVENOUS; SUBCUTANEOUS at 17:55

## 2017-11-03 RX ADMIN — PANTOPRAZOLE SODIUM 40 MILLIGRAM(S): 20 TABLET, DELAYED RELEASE ORAL at 05:26

## 2017-11-03 RX ADMIN — BUDESONIDE AND FORMOTEROL FUMARATE DIHYDRATE 2 PUFF(S): 160; 4.5 AEROSOL RESPIRATORY (INHALATION) at 05:28

## 2017-11-03 RX ADMIN — HYDROMORPHONE HYDROCHLORIDE 2 MILLIGRAM(S): 2 INJECTION INTRAMUSCULAR; INTRAVENOUS; SUBCUTANEOUS at 04:08

## 2017-11-03 RX ADMIN — RISPERIDONE 8 MILLIGRAM(S): 4 TABLET ORAL at 21:27

## 2017-11-03 RX ADMIN — LAMOTRIGINE 100 MILLIGRAM(S): 25 TABLET, ORALLY DISINTEGRATING ORAL at 21:26

## 2017-11-03 RX ADMIN — HYDROMORPHONE HYDROCHLORIDE 3 MILLIGRAM(S): 2 INJECTION INTRAMUSCULAR; INTRAVENOUS; SUBCUTANEOUS at 08:40

## 2017-11-03 NOTE — PRE-OP CHECKLIST - SELECT TESTS ORDERED
Type and Screen
Type and Screen/Urinalysis/CBC/INR/EKG/CMP/PT/PTT/CXR
EKG/CBC/CMP/Type and Screen/UCG

## 2017-11-03 NOTE — PROGRESS NOTE ADULT - SUBJECTIVE AND OBJECTIVE BOX
Date/Time Patient Seen:  		  Referring MD:   Data Reviewed	       Patient is a 32y old  Female who presents with a chief complaint of RLE edema and numbness (13 Oct 2017 16:25)  in bed  seen and examined  for OR today  vs and meds reviewed  NPO  on IVF        Subjective/HPI     PAST MEDICAL & SURGICAL HISTORY:  Low back pain  Hypothyroid  Bipolar 1 disorder  History of spinal fusion  History of laminectomy        Medication list         MEDICATIONS  (STANDING):  ascorbic acid 500 milliGRAM(s) Oral two times a day  BACItracin   Ointment 1 Application(s) Topical two times a day  buDESOnide  80 MICROgram(s)/formoterol 4.5 MICROgram(s) Inhaler 2 Puff(s) Inhalation two times a day  buPROPion XL . 300 milliGRAM(s) Oral daily  cyanocobalamin 1000 MICROGram(s) Oral daily  fentaNYL   Patch  12 MICROgram(s)/Hr 1 Patch Transdermal every 72 hours  fentaNYL   Patch  25 MICROgram(s)/Hr 1 Patch Transdermal every 72 hours  fluticasone propionate 50 MICROgram(s)/spray Nasal Spray 1 Spray(s) Both Nostrils two times a day  folic acid 1 milliGRAM(s) Oral daily  influenza   Vaccine 0.5 milliLiter(s) IntraMuscular once  ipratropium 42 MICROgram(s) Nasal 2 Spray(s) Both Nostrils every 12 hours  lamoTRIgine 100 milliGRAM(s) Oral every 8 hours  levothyroxine 150 MICROGram(s) Oral daily  multivitamin/minerals 1 Tablet(s) Oral daily  oxyCODONE  ER Tablet 60 milliGRAM(s) Oral every 8 hours  pantoprazole    Tablet 40 milliGRAM(s) Oral before breakfast  risperiDONE   Tablet 8 milliGRAM(s) Oral at bedtime  sertraline 50 milliGRAM(s) Oral daily  sodium chloride 0.9% 1000 milliLiter(s) (75 mL/Hr) IV Continuous <Continuous>  topiramate 400 milliGRAM(s) Oral two times a day  Viibryd Oral Tablet 40 milliGRAM(s),Viibryd Oral Tablet 40 mg 1 Tablet(s) 1 Tablet(s) Oral daily    MEDICATIONS  (PRN):  acetaminophen   Tablet 650 milliGRAM(s) Oral every 6 hours PRN For Temp greater than 38 C (100.4 F)  acetaminophen   Tablet. 650 milliGRAM(s) Oral every 6 hours PRN Mild Pain (1 - 3)  ALBUTerol    90 MICROgram(s) HFA Inhaler 2 Puff(s) Inhalation every 6 hours PRN Shortness of Breath and/or Wheezing  clonazePAM Tablet 2 milliGRAM(s) Oral every 8 hours PRN anxiety  docusate sodium 100 milliGRAM(s) Oral two times a day PRN Constipation  HYDROmorphone   Tablet 3 milliGRAM(s) Oral every 4 hours PRN moderate to severe pain  HYDROmorphone  Injectable 1 milliGRAM(s) IV Push every 3 hours PRN for severe breakthrough pain, after surgery today  ondansetron Injectable 4 milliGRAM(s) IV Push every 4 hours PRN Nausea and/or Vomiting  senna 2 Tablet(s) Oral at bedtime PRN Constipation         Vitals log        ICU Vital Signs Last 24 Hrs  T(C): 37.7 (03 Nov 2017 00:25), Max: 37.9 (02 Nov 2017 17:12)  T(F): 99.9 (03 Nov 2017 00:25), Max: 100.2 (02 Nov 2017 17:12)  HR: 91 (03 Nov 2017 00:25) (91 - 104)  BP: 95/58 (03 Nov 2017 00:25) (95/58 - 102/58)  BP(mean): --  ABP: --  ABP(mean): --  RR: 18 (03 Nov 2017 00:25) (16 - 18)  SpO2: 98% (03 Nov 2017 00:25) (95% - 98%)           Input and Output:  I&O's Detail    01 Nov 2017 07:01  -  02 Nov 2017 07:00  --------------------------------------------------------  IN:    Oral Fluid: 540 mL  Total IN: 540 mL    OUT:    Voided: 1500 mL  Total OUT: 1500 mL    Total NET: -960 mL      02 Nov 2017 07:01  -  03 Nov 2017 06:50  --------------------------------------------------------  IN:    Packed Red Blood Cells: 300 mL  Total IN: 300 mL    OUT:    Voided: 1000 mL  Total OUT: 1000 mL    Total NET: -700 mL          Lab Data                        7.9    7.7   )-----------( 259      ( 03 Nov 2017 05:50 )             24.7     11-03    142  |  110<H>  |  22  ----------------------------<  95  3.5   |  19<L>  |  0.82    Ca    8.6      03 Nov 2017 05:50              Review of Systems	      Objective     Physical Examination    head at  heart - s1s2  lungs - dec BS  abd - soft      Pertinent Lab findings & Imaging      Erlinda:  NO   Adequate UO     I&O's Detail    01 Nov 2017 07:01  -  02 Nov 2017 07:00  --------------------------------------------------------  IN:    Oral Fluid: 540 mL  Total IN: 540 mL    OUT:    Voided: 1500 mL  Total OUT: 1500 mL    Total NET: -960 mL      02 Nov 2017 07:01  -  03 Nov 2017 06:50  --------------------------------------------------------  IN:    Packed Red Blood Cells: 300 mL  Total IN: 300 mL    OUT:    Voided: 1000 mL  Total OUT: 1000 mL    Total NET: -700 mL               Discussed with:     Cultures:	        Radiology

## 2017-11-03 NOTE — PROGRESS NOTE ADULT - PROBLEM SELECTOR PLAN 3
-discussed pain management with Dr. Damico who has been in charge of patient's opiate regimen during the hospitalization. Trying to wean pt off opiates -- will receive PRN IV dilaudid in the immediate post-op period but will soon transition back to oral  -c/w fentanyl patch and standing oxycontin

## 2017-11-03 NOTE — PROGRESS NOTE ADULT - SUBJECTIVE AND OBJECTIVE BOX
[facility], Division of Infectious Diseases  [facility]    Name: ABELARDO CROWELL  Age: 32y  Gender: Female  MRN: [id number]    Interval History--  Notes reviewed. Seen earlier today just prior to being taken to OR. Isolated low grade fever, otherwise afebrile. No new complaints.     Past Medical History--  Low back pain  Hypothyroid  Bipolar 1 disorder  History of spinal fusion  History of laminectomy      For details regarding the patient's social history, family history, and other miscellaneous elements, please refer the initial infectious diseases consultation and/or the admitting history and physical examination for this admission.    Allergies    avocado (Unknown)  latex (Urticaria; Rash)  penicillin (Unknown)  sulfa drugs (Unknown)    Intolerances      Medications--  Antibiotics:    Immunologic:  influenza   Vaccine 0.5 milliLiter(s) IntraMuscular once    Other:      Review of Systems--  Review of systems otherwise unchanged except as previously noted.    Physical Examination--  Vital Signs: T(F): 98.4 (11-03-17 @ 13:27), Max: 100.2 (11-02-17 @ 17:12)  HR: 104 (11-03-17 @ 13:27)  BP: 103/52 (11-03-17 @ 13:27)  RR: 14 (11-03-17 @ 13:27)  SpO2: 97% (11-03-17 @ 13:27)  Wt(kg): --  General: Nontoxic-appearing Female in no acute distress.  HEENT: AT/NC. Anicteric. Conjunctiva pale and moist. Oropharynx unable.   Neck: Not rigid. No sense of mass.   Nodes: None palpable.  Lungs: Clear bilaterally without rales, wheezing or rhonchi  Heart: RRR. No Murmur. No rub. No gallop. No palpable thrill.  Abdomen: Bowel sounds present and normoactive. Soft. Nondistended. Nontender. No sense of mass. No organomegaly.  Extremities: No cyanosis or clubbing. LLE no edema. RLE wrapped. Foot warm, edematous. VAC under ACE.   Skin: Pale. Dry. Good turgor. No rash. No vasculitic stigmata.  Psychiatric: Appropriate mood/affect presently        Laboratory Studies--  CBC                        7.9    7.7   )-----------( 259      ( 03 Nov 2017 05:50 )             24.7       Chemistries  11-03    142  |  110<H>  |  22  ----------------------------<  95  3.5   |  19<L>  |  0.82    Ca    8.6      03 Nov 2017 05:50      Culture Data  No new data

## 2017-11-03 NOTE — PRE-OP CHECKLIST - ISOLATION PRECAUTIONS
40 yo F s/p above procedure   -Pain/nausea control  -BCLD, IVF  -Lipitor 40mg qHS  -Protonix  -ISS  -ASA 81mg   -SCDs, OOB/A, IS  -AM labs
41yFemale s/p above procedure    BCLD  IVF  UGI  DVT ppx  Pain, nausea control  IS  OOBA
42 yo female with PMHx of Htn, Hld, GERD, (previously diagnosed with DM and placed on metformin, but patient says this is resolved and not taking), with morbid obesity (BMI 40.6) who was admitted to [facility] after elective lap sleeve gastrectomy. The patient failed sustained weight loss on medical therapy alone as an outpatient.  - admit to regional floor  - IVF  - pain/nausea control  - BCLD  - SCD  - UGI POD1  - protonix  - ooba  - IS  - 6hr post op CBC
none

## 2017-11-03 NOTE — BRIEF OPERATIVE NOTE - PROCEDURE
<<-----Click on this checkbox to enter Procedure Wound closure using advancement flap  11/03/2017  RIGHT LOWER THIGH WOUND  Active  HNOOROLLAH  Application of split-thickness skin graft (STSG)  11/03/2017  MEDIAL AND LATERAL CALF WOUNDS  Active  HNOOROLLAH

## 2017-11-03 NOTE — PROGRESS NOTE ADULT - PROBLEM SELECTOR PLAN 1
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 Isabel and had just minimal debridement of muscle, mostly irrigation, had a few stitches placed on right thigh  -today pt went to OR with Dr. Isabel and had wound closure with advancement flap of the R thigh wound and skin grafts in the R calf wounds  -wound vacs in place on the calf wounds; f/up surgery recs  -pain management recs by Dr. Damico appreciated -- giving IV dilaudid in the post-op period now, but will soon transition back to just oral  -c/w fentanyl patch and standing oxycontin

## 2017-11-03 NOTE — PROGRESS NOTE ADULT - ATTENDING COMMENTS
Call if ID input needed over the weekend, Dr. Jackelyn Costa is on call.    Raúl Elam MD  [phone]

## 2017-11-03 NOTE — PROGRESS NOTE ADULT - SUBJECTIVE AND OBJECTIVE BOX
HPI:  Pt is a 31 yo female with PMHX of Bipolar disorder, hypothyroidism, chronic low back pain on morphine s/p laminectomy and spinal fusion in 2016 who is BIB EMS for right leg swelling and numbness. Patient is a poor historian as she is on many pain medicines and is currently lethargic, therefore much info provided based on hospital staff and charts. She states that 18 hours prior to arrival, she fell on the floor at her house after taking too much morphine and was lying on floor for 8 hours. She woke up to numbness, swelling, and cool temperature of her right leg, from below the knee onwards. Patient denies any current SOB, CP, f/c, denies head trauma.     In the ED, vitals were Temp 101.4F /91  RR 20  SpO2 93% RA. Significant labs are WBS 24.3, Hb 15.6, Hmt 47.6,  Platelets 118, neutr 93%, sodium 131, BUN 30,  Cr 2.30, glucose 152 AST 1377, , GFR 27, lactate 1.7. Doppler US RLE showed Extremely limited evaluation of the right lower extremity with nonvisualization of the venous structures beyond the mid right femoral vein. No evidence of DVT within the right common femoral, proximal and   mid femoral veins. No evidence of left lower extremity deep venous thrombosis. XR of right tibia and XR of R femur showed no acute fractures. Arterial Doppler of RLE pending. EKG showed sinus tach of 140, with right superior axis deviation. Patient started on aztreonam, Tylenol given for fever, patient made NPO, Dr. Martines consulted for emergent surgery. (04 Oct 2017 01:13)    SUBJECTIVE:  Patient is a 32y old  Female who presents with a chief complaint of RLE edema and numbness (13 Oct 2017 16:25)    Awake and alert.  Comfortable laying flat on RA.  Denies CP, palpitations, SOB, or BENNETT.  Denies leg pain at this time.    OBJECTIVE:  Review Of Systems:  Constitutional: [ ] Fever [ ] Chills [ ] Fatigue [ ] Weight change   HEENT: [ ] Blurred vision [ ] Eye Pain [ ] Headache [ ] Runny nose [ ] Sore Throat   Respiratory: [ ] Cough [ ] Wheezing [ ] Shortness of breath  Cardiovascular: [ ] Chest Pain [ ] Palpitations [ ] BENNETT [ ] PND [ ] Orthopnea  Gastrointestinal: [ ] Abdominal Pain [ ] Diarrhea [ ] Constipation [ ] Hemorrhoids [ ] Nausea [ ] Vomiting  Genitourinary: [ ] Nocturia [ ] Dysuria [ ] Incontinence  Extremities: [ ] Swelling [ ] Joint Pain  Neurologic: [ ] Focal deficit [ ] Paresthesias [ ] Syncope  Lymphatic: [ ] Swelling [ ] Lymphadenopathy   Skin: [ ] Rash [ ] Ecchymoses [ ] Wounds [ ] Lesions  Psychiatry: [ ] Depression [ ] Suicidal/Homicidal Ideation [ ] Anxiety [ ] Sleep Disturbances  [ ] 10 point review of systems is otherwise negative except as mentioned above            [ ]Unable to obtain    Allergy:  Allergies    avocado (Unknown)  latex (Urticaria; Rash)  penicillin (Unknown)  sulfa drugs (Unknown)    Intolerances    Medications:  MEDICATIONS  (STANDING):  ascorbic acid 500 milliGRAM(s) Oral two times a day  BACItracin   Ointment 1 Application(s) Topical two times a day  buDESOnide  80 MICROgram(s)/formoterol 4.5 MICROgram(s) Inhaler 2 Puff(s) Inhalation two times a day  buPROPion XL . 300 milliGRAM(s) Oral daily  cyanocobalamin 1000 MICROGram(s) Oral daily  fentaNYL   Patch  12 MICROgram(s)/Hr 1 Patch Transdermal every 72 hours  fentaNYL   Patch  25 MICROgram(s)/Hr 1 Patch Transdermal every 72 hours  fluticasone propionate 50 MICROgram(s)/spray Nasal Spray 1 Spray(s) Both Nostrils two times a day  folic acid 1 milliGRAM(s) Oral daily  influenza   Vaccine 0.5 milliLiter(s) IntraMuscular once  ipratropium 42 MICROgram(s) Nasal 2 Spray(s) Both Nostrils every 12 hours  lamoTRIgine 100 milliGRAM(s) Oral every 8 hours  levothyroxine 150 MICROGram(s) Oral daily  multivitamin/minerals 1 Tablet(s) Oral daily  oxyCODONE  ER Tablet 60 milliGRAM(s) Oral every 8 hours  pantoprazole    Tablet 40 milliGRAM(s) Oral before breakfast  risperiDONE   Tablet 8 milliGRAM(s) Oral at bedtime  sertraline 50 milliGRAM(s) Oral daily  sodium chloride 0.9% 1000 milliLiter(s) (75 mL/Hr) IV Continuous <Continuous>  topiramate 400 milliGRAM(s) Oral two times a day  Viibryd Oral Tablet 40 milliGRAM(s),Viibryd Oral Tablet 40 mg 1 Tablet(s) 1 Tablet(s) Oral daily    MEDICATIONS  (PRN):  acetaminophen   Tablet 650 milliGRAM(s) Oral every 6 hours PRN For Temp greater than 38 C (100.4 F)  acetaminophen   Tablet. 650 milliGRAM(s) Oral every 6 hours PRN Mild Pain (1 - 3)  ALBUTerol    90 MICROgram(s) HFA Inhaler 2 Puff(s) Inhalation every 6 hours PRN Shortness of Breath and/or Wheezing  clonazePAM Tablet 2 milliGRAM(s) Oral every 8 hours PRN anxiety  docusate sodium 100 milliGRAM(s) Oral two times a day PRN Constipation  HYDROmorphone   Tablet 3 milliGRAM(s) Oral every 4 hours PRN moderate to severe pain  HYDROmorphone  Injectable 1 milliGRAM(s) IV Push every 3 hours PRN for severe breakthrough pain, after surgery today  ondansetron Injectable 4 milliGRAM(s) IV Push every 4 hours PRN Nausea and/or Vomiting  senna 2 Tablet(s) Oral at bedtime PRN Constipation    PMH/PSH/FH/SH: [ ] Unchanged    Vitals:  T(C): 37.6 (11-03-17 @ 07:11), Max: 37.9 (11-02-17 @ 17:12)  HR: 100 (11-03-17 @ 07:11) (91 - 104)  BP: 104/67 (11-03-17 @ 07:11) (95/58 - 104/67)  BP(mean): --  RR: 16 (11-03-17 @ 07:11) (16 - 18)  SpO2: 98% (11-03-17 @ 07:11) (95% - 98%)  Wt(kg): --  Daily     Daily   I&O's Summary    02 Nov 2017 07:01  -  03 Nov 2017 07:00  --------------------------------------------------------  IN: 300 mL / OUT: 1000 mL / NET: -700 mL    Labs:                        7.9    7.7   )-----------( 259      ( 03 Nov 2017 05:50 )             24.7     11-03    142  |  110<H>  |  22  ----------------------------<  95  3.5   |  19<L>  |  0.82    Ca    8.6      03 Nov 2017 05:50        ECG:  < from: 12 Lead ECG (10.26.17 @ 15:02) >    Ventricular Rate 104 BPM    Atrial Rate 104 BPM    P-R Interval 152 ms    QRS Duration 106 ms    Q-T Interval 358 ms    QTC Calculation(Bezet) 470 ms    P Axis 42 degrees    R Axis -1 degrees    T Axis 29 degrees    Diagnosis Line Sinus tachycardia  Low voltage QRS  Borderline ECG  When compared with ECG of 04-OCT-2017 08:44,  QRS axis shifted right  Nonspecific T wave abnormality no longer evident in Anterior leads  Confirmed by Juanito Brink MD ([id number]) on 10/27/2017 2:02:06 PM    < end of copied text >    Echo:  < from: TTE Echo Doppler w/o Cont (10.05.17 @ 12:46) >     EXAM:  ECHO TTE W/O CON COMP W/DOPPLR         PROCEDURE DATE:  10/05/2017        INTERPRETATION:  Ordering Physician: MARISSA RUFF [id number]    Indication: Respiratory failure    Study Quality: Technically difficult in ICU   A complete echocardiographic study was performed utilizing standard   protocol including spectral and color Doppler in all echocardiographic   windows.    Height: 1 75 cm  Weight: 113 kg  BSA: 2.27  Blood Pressure: 104/76    MEASUREMENTS  IVS: 0.9cm  PWT: 0.9cm  LA: 3.5cm  AO: 2.8cm  LVIDd: 5.2cm  LVIDs: 3.5cm      LVEF: 70%    FINDINGS  Left Ventricle: Hyperdynamic left ventricle. Estimated EF 70%. No   segmental wall motion abnormalities  Aortic Valve: Not well-visualized. Probably normal trileaflet aortic valve  Mitral Valve: There is subtle systolic anterior motion of the mitral   valve in the setting of a hyperdynamic left ventricle. No left   ventricular outflow tract gradient is seen. Trace mitral regurgitation is   visualized.  Tricuspid Valve: Not well visualized. Trace tricuspid regurgitation  Pulmonic Valve: Not visualized.  Left Atrium: Grossly normal  Right Ventricle: Not well-visualized. Grossly normal right ventricular   size and function  Right Atrium: Grossly normal  Diastolic Function: Normaldiastolic function  Pericardium/Pleura: No pericardial effusion    CONCLUSIONS:  1. Technically difficult study  2. Hyperdynamic left ventricle. Estimated EF 70%  3. Subtle systolic anterior motion of the mitral valve in the setting of   a hyperdynamic left ventricle, but no outflow tract gradient.  4. Right ventricle is not well visualized.Grossly normal right   ventricular size and function  5. No pericardial effusion    No prior exam for comparison      ROBERTA MARKS   This document has been electronically signed. Oct  6 2017  1:33PM      < end of copied text >    Stress Testing:     Cath:    Imaging:    < from: CT Chest No Cont (10.19.17 @ 09:49) >    EXAM:  CT ABDOMEN AND PELVIS OC                          EXAM:  CT CHEST                            PROCEDURE DATE:  10/19/2017          INTERPRETATION:  CLINICAL INFORMATION:  Fever. Status post recent   fasciotomy for compartment syndrome.    PROCEDURE:  Using multislice helical CT, 2.5 mm sections were obtained   from the thoracic inlet through the lung bases.  Multiplanar reformatted images.    COMPARISON: Chest radiograph 10/14/2017.  CT angiogram of the chest 4/8/2017.    FINDINGS:      There are a few, poorly defined peribronchiolar groundglass nodular   opacities in the superior segment of the right upper lobe, largest   measuring up to 10 mm. There are isolated scattered small peripheral   nodular opacities right middle and right lower lobe measuring up to 3 mm.  There is a 5 mm nodule superior segment left lower lobe.  There are a few small subpleural nodular opacities at the left lung apex,   as well as a 3 mm nodule left upper lobe.    The central airways remain patent. No endobronchial lesion is noted.  No lobar lung consolidation is noted.    No enlarged mediastinal or hilar lymphadenopathy is noted.    No pleural effusion is noted.    The evaluation of the solid organ parenchyma is limited without   intravenous contrast.    The patient is status post cholecystectomy.  No intra or extrahepatic biliary ductal dilatation is noted.  The spleen is enlarged measuring 14 cm in length.    The adrenal glands and nonenhanced pancreas are unremarkable in   appearance.    No hydronephrosis is noted.    The abdominal aorta is normal in caliber.  No enlarged retroperitoneal lymphadenopathy is noted.    Evaluation of the stomach is limited without distention.  No bowel obstruction is noted.  No localized intra-abdominal fluid collection or pneumoperitoneum is   noted.    There is a normal-appearing appendix.    There is infiltration with stranding in the subcutaneous soft tissues of   the right flank, buttock and proximal thigh.  No encapsulated, localized fluid collection is noted.    No free fluid is present within the pelvis. There is possible mild   bladder wall thickening, correlate for cystitis.    Patient is status post posterior lumbar fusion L4-5 and L5-S1 with   bilateral pedicle screw and quang instrumentation interbody graft at L4-5   and disc spacer at L5-S1.    There are chronic appearing right lower rib fracture deformities.    Impression:    Peribronchiolar groundglass nodular opacities right lung as discussed may   be secondary to infectious or inflammatory small airway disease.  Small scattered pulmonary nodules bilaterally as discussed above.  Recommend short interval follow-up chest CT examination in 6-8 weeks.    Subcutaneous stranding/infiltration right posterior lateral flank,   buttock and thigh without localized fluid collection noted.    No localized intra-abdominal fluid collection noted.    Possible mild bladder wall thickening, correlate clinically for cystitis.    Splenomegaly.    SIMON LEIVA M.D., ATTENDING RADIOLOGIST  This document has been electronically signed. Oct 19 2017 10:38AM      < end of copied text >  Interpretation of Telemetry:  Not on tele    Physical Exam:  Appearance: [ ] Normal  [ ] abnormal [x ] NAD [x] Obese  Eyes: [ ] PERRL [ ] EOMI  HENT: [x ] Normal [ ] Abnormal oral muscosa [ ]NC/AT  Cardiovascular: [x ] S1 [x ] S2 [x ] RRR [ ] m/r/g [ ]edema [ ] JVP  Procedural Access Site: [ ]  hematoma [ ] tender to palpation [ ] 2+ pulse [ ] bruit [ ] Ecchymosis  Respiratory: [x ] Clear to auscultation bilaterally  Gastrointestinal: [x ] Soft [ ] tenderness[ ] distension [x ] BS  Musculoskeletal: [ ] clubbing [ ] joint deformity  [x] Vac dressings on right LE  Neurologic: [x ] Non-focal  Lymphatic: [ ] lymphadenopathy  Psychiatry: [x ] AAOx3  [ ] confused [ ] disoriented [x ] Mood & affect appropriate but uncooperative  Skin: [ ]  rashes [ ] ecchymoses [ ] cyanosis

## 2017-11-03 NOTE — PROGRESS NOTE ADULT - PROBLEM SELECTOR PLAN 1
for OR today, skin grafting  will add Dilaudid IV x 4 doses, PRN to be used every 3 hrs as needed for post op breakthrough pain  discussed with patient  will consider Fentanyl Patch dose increase in the future, if pt cont to have escalating pain  for now cont current dose of Fentanyl P and Oxycontin and oral Dilaudid PRN  bowel regimen  reassurance and emotional support  will follow

## 2017-11-03 NOTE — BRIEF OPERATIVE NOTE - OPERATION/FINDINGS
HEALING WOUND LOWER LEG, GOOD GRANULATION TISSUE, FEW NECROTIC MUSCLE MEDIAL NAD LATERAL CALF WOUND
RIGHT LOWER THIGH WOUND WITH GOOD GRANULATION TISSUE   MEDIAL AND LATERAL CALF WOUNDS WITH GOOD GRANULATION, SMALL AREA OF NECROTIC MUSCLE MID LATERAL WOUND CALF
SEE DICTATED OP NOTE
Tense compartments of the right lower extremity with no palpable distal pulses and cold foot/ankle/distal leg.  Diffuse edema throughout the leg noted.  4 compartment (anterior, lateral, superficial posterior, and deep posterior) fasciotomy performed, as well as exploration of lateral thigh muscles.  Muscles within all 4 compartments noted to be edematous and ischemic.  Upon opening compartments, perfusion visibly improved through all muscles of the lower leg.  Cap refill post-procedure improved throughout the lower extremity to the toes.  Right lateral thigh incision 17cm, right lateral calf incision 28 cm, right medial calf incision 45 cm.

## 2017-11-03 NOTE — PROGRESS NOTE ADULT - SUBJECTIVE AND OBJECTIVE BOX
Patient is a 32y old  Female who presents with a chief complaint of RLE edema and numbness (13 Oct 2017 16:25)    INTERVAL HPI/OVERNIGHT EVENTS: Now s/p wound closure with advancement flap of the R thigh wound and skin grafts in the R calf wounds. Pt reports severe right leg pain. Denies fever, chills, dysuria, SOB, CP, palpitations.     MEDICATIONS  (STANDING):  ascorbic acid 500 milliGRAM(s) Oral two times a day  BACItracin   Ointment 1 Application(s) Topical two times a day  buDESOnide  80 MICROgram(s)/formoterol 4.5 MICROgram(s) Inhaler 2 Puff(s) Inhalation two times a day  buPROPion XL . 300 milliGRAM(s) Oral daily  cyanocobalamin 1000 MICROGram(s) Oral daily  fentaNYL   Patch  12 MICROgram(s)/Hr 1 Patch Transdermal every 72 hours  fentaNYL   Patch  25 MICROgram(s)/Hr 1 Patch Transdermal every 72 hours  fluticasone propionate 50 MICROgram(s)/spray Nasal Spray 1 Spray(s) Both Nostrils two times a day  folic acid 1 milliGRAM(s) Oral daily  influenza   Vaccine 0.5 milliLiter(s) IntraMuscular once  ipratropium 42 MICROgram(s) Nasal 2 Spray(s) Both Nostrils every 12 hours  lactated ringers. 1000 milliLiter(s) (75 mL/Hr) IV Continuous <Continuous>  lamoTRIgine 100 milliGRAM(s) Oral every 8 hours  levothyroxine 150 MICROGram(s) Oral daily  multivitamin/minerals 1 Tablet(s) Oral daily  oxyCODONE  ER Tablet 60 milliGRAM(s) Oral every 8 hours  pantoprazole    Tablet 40 milliGRAM(s) Oral before breakfast  risperiDONE   Tablet 8 milliGRAM(s) Oral at bedtime  sertraline 50 milliGRAM(s) Oral daily  topiramate 400 milliGRAM(s) Oral two times a day  Viibryd Oral Tablet 40 milliGRAM(s),Viibryd Oral Tablet 40 mg 1 Tablet(s),Viibryd Oral Tablet 40 mg 1 Tablet(s) 1 Tablet(s) Oral daily    MEDICATIONS  (PRN):  acetaminophen   Tablet 650 milliGRAM(s) Oral every 6 hours PRN For Temp greater than 38 C (100.4 F)  acetaminophen   Tablet. 650 milliGRAM(s) Oral every 6 hours PRN Mild Pain (1 - 3)  ALBUTerol    90 MICROgram(s) HFA Inhaler 2 Puff(s) Inhalation every 6 hours PRN Shortness of Breath and/or Wheezing  clonazePAM Tablet 2 milliGRAM(s) Oral every 8 hours PRN anxiety  docusate sodium 100 milliGRAM(s) Oral two times a day PRN Constipation  HYDROmorphone   Tablet 3 milliGRAM(s) Oral every 4 hours PRN moderate to severe pain  HYDROmorphone  Injectable 1 milliGRAM(s) IV Push every 3 hours PRN for severe breakthrough pain, after surgery today  HYDROmorphone  Injectable 0.5 milliGRAM(s) IV Push every 3 hours PRN Severe Pain (7 - 10)  ondansetron Injectable 4 milliGRAM(s) IV Push every 4 hours PRN Nausea and/or Vomiting  senna 2 Tablet(s) Oral at bedtime PRN Constipation        Allergies    avocado (Unknown)  latex (Urticaria; Rash)  penicillin (Unknown)  sulfa drugs (Unknown)    Intolerances        REVIEW OF SYSTEMS:  CONSTITUTIONAL: No fever or chills  HEENT:  No headache, no sore throat  RESPIRATORY: +rare cough, No wheezing, or shortness of breath  CARDIOVASCULAR: No chest pain, palpitations   GASTROINTESTINAL: No nausea, vomiting, or diarrhea  GENITOURINARY: No dysuria, frequency, or hematuria  NEUROLOGICAL: +weakness and loss of sensation in the right leg (especially below the knee)  MUSCULOSKELETAL: +right leg pain especially now that she is post-op  PSYCHIATRIC:  +anxiety and depression; no SI    Vital Signs Last 24 Hrs  T(C): 36.8  Max: 37.6 (03 Nov 2017 07:11)  T(F): 98.3  Max: 99.6 (03 Nov 2017 07:11)  HR: 95  (95 - 136)  BP: 116/73  (96/57 - 136/98)  BP(mean): --  RR: 16  (13 - 18)  SpO2: 97%  (96% - 98%)      PHYSICAL EXAM:  GENERAL: pale, moderate distress due to pain  HEENT:  EOMI, moist mucous membranes  CHEST/LUNG:  CTA b/l, no wheezes or rales  HEART:  RRR, S1, S2  ABDOMEN:  BS+, soft, nontender, nondistended  EXTREMITIES: wound vacs in place over the R calf wounds; R thigh with dressing clean, dry, and intact; no swelling or calf tenderness in the left leg  NERVOUS SYSTEM: AA&Ox3; no sensation or movement in all toes of right foot    LABS:                                              8.4    12.4  )-----------( 232      ( 03 Nov 2017 18:39 )             26.9     CBC Full  -  ( 03 Nov 2017 18:39 )  WBC Count : 12.4 K/uL  Hemoglobin : 8.4 g/dL  Hematocrit : 26.9 %  Platelet Count - Automated : 232 K/uL  Mean Cell Volume : 89.1 fl  Mean Cell Hemoglobin : 28.0 pg  Mean Cell Hemoglobin Concentration : 31.4 gm/dL  Auto Neutrophil # : x  Auto Lymphocyte # : x  Auto Monocyte # : x  Auto Eosinophil # : x  Auto Basophil # : x  Auto Neutrophil % : x  Auto Lymphocyte % : x  Auto Monocyte % : x  Auto Eosinophil % : x  Auto Basophil % : x    11-03    142  |  110<H>  |  22  ----------------------------<  95  3.5   |  19<L>  |  0.82    Ca    8.6      03 Nov 2017 05:50          CAPILLARY BLOOD GLUCOSE    BCx and UCx from 10/27: all negative        RADIOLOGY & ADDITIONAL TESTS:  CXR 10/26: no active disease  Personally reviewed.     Consultant(s) Notes Reviewed:  [x] YES  [ ] NO    Care Discussed with [x] Consultants  [x] Patient  [x] Family  [ ]      [ ] Other; RN  DVT ppx: HSQ Patient is a 32y old  Female who presents with a chief complaint of RLE edema and numbness (13 Oct 2017 16:25)      INTERVAL HPI/OVERNIGHT EVENTS: Now s/p wound closure with advancement flap of the R thigh wound and skin grafts in the R calf wounds. Pt reports severe right leg pain. Denies fever, chills, dysuria, SOB, CP, palpitations.     MEDICATIONS  (STANDING):  ascorbic acid 500 milliGRAM(s) Oral two times a day  BACItracin   Ointment 1 Application(s) Topical two times a day  buDESOnide  80 MICROgram(s)/formoterol 4.5 MICROgram(s) Inhaler 2 Puff(s) Inhalation two times a day  buPROPion XL . 300 milliGRAM(s) Oral daily  cyanocobalamin 1000 MICROGram(s) Oral daily  fentaNYL   Patch  12 MICROgram(s)/Hr 1 Patch Transdermal every 72 hours  fentaNYL   Patch  25 MICROgram(s)/Hr 1 Patch Transdermal every 72 hours  fluticasone propionate 50 MICROgram(s)/spray Nasal Spray 1 Spray(s) Both Nostrils two times a day  folic acid 1 milliGRAM(s) Oral daily  influenza   Vaccine 0.5 milliLiter(s) IntraMuscular once  ipratropium 42 MICROgram(s) Nasal 2 Spray(s) Both Nostrils every 12 hours  lactated ringers. 1000 milliLiter(s) (75 mL/Hr) IV Continuous <Continuous>  lamoTRIgine 100 milliGRAM(s) Oral every 8 hours  levothyroxine 150 MICROGram(s) Oral daily  multivitamin/minerals 1 Tablet(s) Oral daily  oxyCODONE  ER Tablet 60 milliGRAM(s) Oral every 8 hours  pantoprazole    Tablet 40 milliGRAM(s) Oral before breakfast  risperiDONE   Tablet 8 milliGRAM(s) Oral at bedtime  sertraline 50 milliGRAM(s) Oral daily  topiramate 400 milliGRAM(s) Oral two times a day  Viibryd Oral Tablet 40 milliGRAM(s),Viibryd Oral Tablet 40 mg 1 Tablet(s),Viibryd Oral Tablet 40 mg 1 Tablet(s) 1 Tablet(s) Oral daily    MEDICATIONS  (PRN):  acetaminophen   Tablet 650 milliGRAM(s) Oral every 6 hours PRN For Temp greater than 38 C (100.4 F)  acetaminophen   Tablet. 650 milliGRAM(s) Oral every 6 hours PRN Mild Pain (1 - 3)  ALBUTerol    90 MICROgram(s) HFA Inhaler 2 Puff(s) Inhalation every 6 hours PRN Shortness of Breath and/or Wheezing  clonazePAM Tablet 2 milliGRAM(s) Oral every 8 hours PRN anxiety  docusate sodium 100 milliGRAM(s) Oral two times a day PRN Constipation  HYDROmorphone   Tablet 3 milliGRAM(s) Oral every 4 hours PRN moderate to severe pain  HYDROmorphone  Injectable 1 milliGRAM(s) IV Push every 3 hours PRN for severe breakthrough pain, after surgery today  HYDROmorphone  Injectable 0.5 milliGRAM(s) IV Push every 3 hours PRN Severe Pain (7 - 10)  ondansetron Injectable 4 milliGRAM(s) IV Push every 4 hours PRN Nausea and/or Vomiting  senna 2 Tablet(s) Oral at bedtime PRN Constipation        Allergies    avocado (Unknown)  latex (Urticaria; Rash)  penicillin (Unknown)  sulfa drugs (Unknown)    Intolerances        REVIEW OF SYSTEMS:  CONSTITUTIONAL: No fever or chills  HEENT:  No headache, no sore throat  RESPIRATORY: +rare cough, No wheezing, or shortness of breath  CARDIOVASCULAR: No chest pain, palpitations   GASTROINTESTINAL: No nausea, vomiting, or diarrhea  GENITOURINARY: No dysuria, frequency, or hematuria  NEUROLOGICAL: +weakness and loss of sensation in the right leg (especially below the knee)  MUSCULOSKELETAL: +right leg pain especially now that she is post-op  PSYCHIATRIC:  +anxiety and depression; no SI    Vital Signs Last 24 Hrs  T(C): 36.8  Max: 37.6 (03 Nov 2017 07:11)  T(F): 98.3  Max: 99.6 (03 Nov 2017 07:11)  HR: 95  (95 - 136)  BP: 116/73  (96/57 - 136/98)  BP(mean): --  RR: 16  (13 - 18)  SpO2: 97%  (96% - 98%)      PHYSICAL EXAM:  GENERAL: pale, moderate distress due to pain  HEENT:  EOMI, moist mucous membranes  CHEST/LUNG:  CTA b/l, no wheezes or rales  HEART:  RRR, S1, S2  ABDOMEN:  BS+, soft, nontender, nondistended  EXTREMITIES: wound vacs in place over the R calf wounds; R thigh with dressing clean, dry, and intact; no swelling or calf tenderness in the left leg  NERVOUS SYSTEM: AA&Ox3; no sensation or movement in all toes of right foot    LABS:                                              8.4    12.4  )-----------( 232      ( 03 Nov 2017 18:39 )             26.9     CBC Full  -  ( 03 Nov 2017 18:39 )  WBC Count : 12.4 K/uL  Hemoglobin : 8.4 g/dL  Hematocrit : 26.9 %  Platelet Count - Automated : 232 K/uL  Mean Cell Volume : 89.1 fl  Mean Cell Hemoglobin : 28.0 pg  Mean Cell Hemoglobin Concentration : 31.4 gm/dL  Auto Neutrophil # : x  Auto Lymphocyte # : x  Auto Monocyte # : x  Auto Eosinophil # : x  Auto Basophil # : x  Auto Neutrophil % : x  Auto Lymphocyte % : x  Auto Monocyte % : x  Auto Eosinophil % : x  Auto Basophil % : x    11-03    142  |  110<H>  |  22  ----------------------------<  95  3.5   |  19<L>  |  0.82    Ca    8.6      03 Nov 2017 05:50          CAPILLARY BLOOD GLUCOSE    BCx and UCx from 10/27: all negative        RADIOLOGY & ADDITIONAL TESTS:  CXR 10/26: no active disease  Personally reviewed.     Consultant(s) Notes Reviewed:  [x] YES  [ ] NO    Care Discussed with [x] Consultants  [x] Patient  [x] Family  [ ]      [ ] Other; RN  DVT ppx: HSQ

## 2017-11-03 NOTE — PROGRESS NOTE ADULT - PROBLEM SELECTOR PLAN 7
the more acute drop was likely due to blood loss with surgery; the previously   low H&H was likely anemia of chronic disease from chronic wound borderline low B12  -gave 1un PRBC pre-op and was given 1un intra-op  -pt's Hb now post-op is 8.4; monitor, transfuse for Hb < 7.  Hematology recs appreciated

## 2017-11-03 NOTE — PROGRESS NOTE ADULT - PROBLEM SELECTOR PLAN 2
had fever of unclear etiology; which has resolved  CXR negative; may be having periodic episodes of aspiration pneumonitis given has periods of somnolence. Will continue to try and titrate off sedating medications. No significant evidence for full blown pneumonia.  Urine/ blood Cxs -- negative, but UCx may have been drawn after initiation of Abx -- had a staph organism earlier on admission  ID recs appreciated.   now off Abx as per ID recs  unclear etiology prior -- ?UTI, ?leg wound infection or the necrosis of muscle  HIV negative.  CT Lower extremity done earlier in admission, no drainable fluid collection

## 2017-11-03 NOTE — PROGRESS NOTE ADULT - ASSESSMENT
32-year-old female now status post right lower extremity fasciotomy for prolonged compression after a fall with lethargy.     - Currently no active cardiac conditions. No signs of ischemia, ADHF, clinical exam not consistent with stenotic valvular disease.  - May proceed with wound debridement and skin grafting as planned without any further cardiac workup. Routine hemodynamic monitoring is suggested during the procedure and immediate postop.  - HR is better controlled.  With low grade temp  - plastics/vascular f/u  - Continue pain control  - TTE with hyperdynamic LV function, EF 70%, no valvular disease noted; IV hydration is appropriate  - Other cardiovascular workup will depend on clinical course.  - Monitor and replete electrolytes. Keep K>4.0 and Mg>2.0.   - All other workup per primary team  - Will follow      Kathleen Fernandes NP  Cardiology 32-year-old female now status post right lower extremity fasciotomy for prolonged compression after a fall with lethargy.     - Currently no active cardiac conditions. No signs of ischemia, ADHF, clinical exam not consistent with stenotic valvular disease.  - May proceed with wound debridement and skin grafting as planned without any further cardiac workup. Routine hemodynamic monitoring is suggested during the procedure and immediate postop.  - HR is better controlled.  With low grade temp  - plastics/vascular f/u  - Continue pain control  - TTE with hyperdynamic LV function, EF 70%, no valvular disease noted; IV hydration is appropriate  - Other cardiovascular workup will depend on clinical course.  - Monitor and replete electrolytes. Keep K>4.0 and Mg>2.0.  Hypokalemic but receiving IV with K additive  - All other workup per primary team  - Will follow      Kathleen Fernandes NP  Cardiology

## 2017-11-03 NOTE — BRIEF OPERATIVE NOTE - POST-OP DX
Compartment syndrome of right lower extremity, initial encounter  10/04/2017    Active  Manfred Delcid
Compartment syndrome of right lower extremity, initial encounter  10/04/2017    Active  Manfred Delcid  Open wound of lower leg  10/06/2017  RIGHT MEDIAL AND LATERAL CALF AND RIGHT LATERAL LOWER THIGH  Active  Janet Munson

## 2017-11-04 LAB
ANION GAP SERPL CALC-SCNC: 12 MMOL/L — SIGNIFICANT CHANGE UP (ref 5–17)
BUN SERPL-MCNC: 14 MG/DL — SIGNIFICANT CHANGE UP (ref 7–23)
CALCIUM SERPL-MCNC: 8.4 MG/DL — LOW (ref 8.5–10.1)
CHLORIDE SERPL-SCNC: 114 MMOL/L — HIGH (ref 96–108)
CO2 SERPL-SCNC: 17 MMOL/L — LOW (ref 22–31)
CREAT SERPL-MCNC: 0.89 MG/DL — SIGNIFICANT CHANGE UP (ref 0.5–1.3)
GLUCOSE SERPL-MCNC: 109 MG/DL — HIGH (ref 70–99)
HCT VFR BLD CALC: 24.1 % — LOW (ref 34.5–45)
HGB BLD-MCNC: 8 G/DL — LOW (ref 11.5–15.5)
MCHC RBC-ENTMCNC: 28.7 PG — SIGNIFICANT CHANGE UP (ref 27–34)
MCHC RBC-ENTMCNC: 33.2 GM/DL — SIGNIFICANT CHANGE UP (ref 32–36)
MCV RBC AUTO: 86.4 FL — SIGNIFICANT CHANGE UP (ref 80–100)
PLATELET # BLD AUTO: 267 K/UL — SIGNIFICANT CHANGE UP (ref 150–400)
POTASSIUM SERPL-MCNC: 4 MMOL/L — SIGNIFICANT CHANGE UP (ref 3.5–5.3)
POTASSIUM SERPL-SCNC: 4 MMOL/L — SIGNIFICANT CHANGE UP (ref 3.5–5.3)
RBC # BLD: 2.79 M/UL — LOW (ref 3.8–5.2)
RBC # FLD: 13.8 % — SIGNIFICANT CHANGE UP (ref 10.3–14.5)
SODIUM SERPL-SCNC: 143 MMOL/L — SIGNIFICANT CHANGE UP (ref 135–145)
WBC # BLD: 10.2 K/UL — SIGNIFICANT CHANGE UP (ref 3.8–10.5)
WBC # FLD AUTO: 10.2 K/UL — SIGNIFICANT CHANGE UP (ref 3.8–10.5)

## 2017-11-04 PROCEDURE — 99233 SBSQ HOSP IP/OBS HIGH 50: CPT

## 2017-11-04 RX ADMIN — HYDROMORPHONE HYDROCHLORIDE 1 MILLIGRAM(S): 2 INJECTION INTRAMUSCULAR; INTRAVENOUS; SUBCUTANEOUS at 17:55

## 2017-11-04 RX ADMIN — HYDROMORPHONE HYDROCHLORIDE 1 MILLIGRAM(S): 2 INJECTION INTRAMUSCULAR; INTRAVENOUS; SUBCUTANEOUS at 18:10

## 2017-11-04 RX ADMIN — FENTANYL CITRATE 1 PATCH: 50 INJECTION INTRAVENOUS at 17:38

## 2017-11-04 RX ADMIN — HYDROMORPHONE HYDROCHLORIDE 0.5 MILLIGRAM(S): 2 INJECTION INTRAMUSCULAR; INTRAVENOUS; SUBCUTANEOUS at 12:18

## 2017-11-04 RX ADMIN — OXYCODONE HYDROCHLORIDE 60 MILLIGRAM(S): 5 TABLET ORAL at 14:10

## 2017-11-04 RX ADMIN — OXYCODONE HYDROCHLORIDE 60 MILLIGRAM(S): 5 TABLET ORAL at 22:04

## 2017-11-04 RX ADMIN — HYDROMORPHONE HYDROCHLORIDE 0.5 MILLIGRAM(S): 2 INJECTION INTRAMUSCULAR; INTRAVENOUS; SUBCUTANEOUS at 05:53

## 2017-11-04 RX ADMIN — LAMOTRIGINE 100 MILLIGRAM(S): 25 TABLET, ORALLY DISINTEGRATING ORAL at 22:04

## 2017-11-04 RX ADMIN — BUPROPION HYDROCHLORIDE 300 MILLIGRAM(S): 150 TABLET, EXTENDED RELEASE ORAL at 12:00

## 2017-11-04 RX ADMIN — Medication 1 SPRAY(S): at 06:06

## 2017-11-04 RX ADMIN — Medication 1 MILLIGRAM(S): at 12:01

## 2017-11-04 RX ADMIN — Medication 500 MILLIGRAM(S): at 17:49

## 2017-11-04 RX ADMIN — HYDROMORPHONE HYDROCHLORIDE 1 MILLIGRAM(S): 2 INJECTION INTRAMUSCULAR; INTRAVENOUS; SUBCUTANEOUS at 01:48

## 2017-11-04 RX ADMIN — BUDESONIDE AND FORMOTEROL FUMARATE DIHYDRATE 2 PUFF(S): 160; 4.5 AEROSOL RESPIRATORY (INHALATION) at 22:03

## 2017-11-04 RX ADMIN — Medication 1 APPLICATION(S): at 06:18

## 2017-11-04 RX ADMIN — HEPARIN SODIUM 5000 UNIT(S): 5000 INJECTION INTRAVENOUS; SUBCUTANEOUS at 22:04

## 2017-11-04 RX ADMIN — LAMOTRIGINE 100 MILLIGRAM(S): 25 TABLET, ORALLY DISINTEGRATING ORAL at 13:11

## 2017-11-04 RX ADMIN — Medication 400 MILLIGRAM(S): at 17:49

## 2017-11-04 RX ADMIN — Medication 1 SPRAY(S): at 17:46

## 2017-11-04 RX ADMIN — OXYCODONE HYDROCHLORIDE 60 MILLIGRAM(S): 5 TABLET ORAL at 13:10

## 2017-11-04 RX ADMIN — RISPERIDONE 8 MILLIGRAM(S): 4 TABLET ORAL at 22:04

## 2017-11-04 RX ADMIN — HYDROMORPHONE HYDROCHLORIDE 0.5 MILLIGRAM(S): 2 INJECTION INTRAMUSCULAR; INTRAVENOUS; SUBCUTANEOUS at 15:44

## 2017-11-04 RX ADMIN — PREGABALIN 1000 MICROGRAM(S): 225 CAPSULE ORAL at 12:01

## 2017-11-04 RX ADMIN — HYDROMORPHONE HYDROCHLORIDE 1 MILLIGRAM(S): 2 INJECTION INTRAMUSCULAR; INTRAVENOUS; SUBCUTANEOUS at 01:04

## 2017-11-04 RX ADMIN — PANTOPRAZOLE SODIUM 40 MILLIGRAM(S): 20 TABLET, DELAYED RELEASE ORAL at 06:07

## 2017-11-04 RX ADMIN — HEPARIN SODIUM 5000 UNIT(S): 5000 INJECTION INTRAVENOUS; SUBCUTANEOUS at 06:07

## 2017-11-04 RX ADMIN — ALBUTEROL 2 PUFF(S): 90 AEROSOL, METERED ORAL at 22:03

## 2017-11-04 RX ADMIN — HYDROMORPHONE HYDROCHLORIDE 0.5 MILLIGRAM(S): 2 INJECTION INTRAMUSCULAR; INTRAVENOUS; SUBCUTANEOUS at 15:28

## 2017-11-04 RX ADMIN — HYDROMORPHONE HYDROCHLORIDE 0.5 MILLIGRAM(S): 2 INJECTION INTRAMUSCULAR; INTRAVENOUS; SUBCUTANEOUS at 12:40

## 2017-11-04 RX ADMIN — HYDROMORPHONE HYDROCHLORIDE 0.5 MILLIGRAM(S): 2 INJECTION INTRAMUSCULAR; INTRAVENOUS; SUBCUTANEOUS at 09:12

## 2017-11-04 RX ADMIN — HEPARIN SODIUM 5000 UNIT(S): 5000 INJECTION INTRAVENOUS; SUBCUTANEOUS at 13:11

## 2017-11-04 RX ADMIN — BUDESONIDE AND FORMOTEROL FUMARATE DIHYDRATE 2 PUFF(S): 160; 4.5 AEROSOL RESPIRATORY (INHALATION) at 08:11

## 2017-11-04 RX ADMIN — Medication 2 MILLIGRAM(S): at 08:38

## 2017-11-04 RX ADMIN — LAMOTRIGINE 100 MILLIGRAM(S): 25 TABLET, ORALLY DISINTEGRATING ORAL at 06:07

## 2017-11-04 RX ADMIN — SERTRALINE 50 MILLIGRAM(S): 25 TABLET, FILM COATED ORAL at 12:01

## 2017-11-04 RX ADMIN — Medication 500 MILLIGRAM(S): at 06:07

## 2017-11-04 RX ADMIN — Medication 150 MICROGRAM(S): at 06:07

## 2017-11-04 RX ADMIN — Medication 2 SPRAY(S): at 06:18

## 2017-11-04 RX ADMIN — Medication 1 TABLET(S): at 12:00

## 2017-11-04 RX ADMIN — Medication 2 SPRAY(S): at 17:47

## 2017-11-04 RX ADMIN — Medication 400 MILLIGRAM(S): at 06:07

## 2017-11-04 NOTE — PROGRESS NOTE ADULT - SUBJECTIVE AND OBJECTIVE BOX
MERVATABELARDO RAMOS  [id number] [id number]    GENERAL SURGERY/ DR. HERNANDEZ    Vital Signs Last 24 Hrs  T(C): 36.8 (03 Nov 2017 23:18), Max: 37.6 (03 Nov 2017 07:11)  T(F): 98.3 (03 Nov 2017 23:18), Max: 99.6 (03 Nov 2017 07:11)  HR: 95 (03 Nov 2017 23:18) (95 - 136)  BP: 116/73 (03 Nov 2017 23:18) (96/57 - 136/98)  BP(mean): --  RR: 16 (03 Nov 2017 23:18) (13 - 18)  SpO2: 97% (03 Nov 2017 23:18) (96% - 98%)    POD # 1      LEFT UPPER THIGH DONOR SITE DRY AND INTACT, ACE WRAP IN PLACE     RIGHT LATERAL LOWER  THIGH WOUND AQUACEL DRESSING I PLACE, DRY AND INTACT  RIGHT LOWER LEG POST STSG WOUND VAC IN PLACE WITH GOOD SEAL AND VIABLE SKIN  RIGHT FOOT WARM WITH UNCHANGED EDEMA   RIGHT ACHILLES ESCHAR NOTED                         8.0    10.2  )-----------( 267      ( 04 Nov 2017 04:33 )  S/P ONE UNIT PRBC INTRA- OP                24.1      11-04    143  |  114<H>  |  14  ----------------------------<  109<H>  4.0   |  17<L>  |  0.89    Ca    8.4<L>      04 Nov 2017 04:33                              ASSESSMENT &  PLAN:  POD # 1  S/P SPLIT THICKNESS SKIN GRAFT TO RIGHT LOWER LEG WOUNDS                                                     ADVANCEMENT FLAPS AND COMPLEX WOUND CLOSURE RIGHT LOWER THIGH WOUND    CONTINUE WITH VAC THERAPY  KEEP RIGHT LOWER EXT ELEVATED  KEEP ACE WRAP TO LEFT UPPER THIGH DONOR SITE   WOUND VAC NOT TO BE REMOVED/ CHANGED / DISTURBED,  WILL CHECK WOUNDS/ VIABILITY OF SKIN GRAFT MOST LIKELY ON WED/ THURSDAY OF NEXT WEEK

## 2017-11-04 NOTE — PROGRESS NOTE ADULT - SUBJECTIVE AND OBJECTIVE BOX
Date/Time Patient Seen:  		  Referring MD:   Data Reviewed	       Patient is a 32y old  Female who presents with a chief complaint of RLE edema and numbness (13 Oct 2017 16:25)  in bed  seen and examined  vs and meds reviewed      Subjective/HPI     PAST MEDICAL & SURGICAL HISTORY:  Low back pain  Hypothyroid  Bipolar 1 disorder  History of spinal fusion  History of laminectomy        Medication list         MEDICATIONS  (STANDING):  ascorbic acid 500 milliGRAM(s) Oral two times a day  BACItracin   Ointment 1 Application(s) Topical two times a day  buDESOnide  80 MICROgram(s)/formoterol 4.5 MICROgram(s) Inhaler 2 Puff(s) Inhalation two times a day  buPROPion XL . 300 milliGRAM(s) Oral daily  cyanocobalamin 1000 MICROGram(s) Oral daily  fentaNYL   Patch  12 MICROgram(s)/Hr 1 Patch Transdermal every 72 hours  fentaNYL   Patch  25 MICROgram(s)/Hr 1 Patch Transdermal every 72 hours  fluticasone propionate 50 MICROgram(s)/spray Nasal Spray 1 Spray(s) Both Nostrils two times a day  folic acid 1 milliGRAM(s) Oral daily  influenza   Vaccine 0.5 milliLiter(s) IntraMuscular once  ipratropium 42 MICROgram(s) Nasal 2 Spray(s) Both Nostrils every 12 hours  lactated ringers. 1000 milliLiter(s) (75 mL/Hr) IV Continuous <Continuous>  lamoTRIgine 100 milliGRAM(s) Oral every 8 hours  levothyroxine 150 MICROGram(s) Oral daily  multivitamin/minerals 1 Tablet(s) Oral daily  oxyCODONE  ER Tablet 60 milliGRAM(s) Oral every 8 hours  pantoprazole    Tablet 40 milliGRAM(s) Oral before breakfast  risperiDONE   Tablet 8 milliGRAM(s) Oral at bedtime  sertraline 50 milliGRAM(s) Oral daily  topiramate 400 milliGRAM(s) Oral two times a day  Viibryd Oral Tablet 40 milliGRAM(s),Viibryd Oral Tablet 40 mg 1 Tablet(s),Viibryd Oral Tablet 40 mg 1 Tablet(s) 1 Tablet(s) Oral daily    MEDICATIONS  (PRN):  acetaminophen   Tablet 650 milliGRAM(s) Oral every 6 hours PRN For Temp greater than 38 C (100.4 F)  acetaminophen   Tablet. 650 milliGRAM(s) Oral every 6 hours PRN Mild Pain (1 - 3)  ALBUTerol    90 MICROgram(s) HFA Inhaler 2 Puff(s) Inhalation every 6 hours PRN Shortness of Breath and/or Wheezing  clonazePAM Tablet 2 milliGRAM(s) Oral every 8 hours PRN anxiety  docusate sodium 100 milliGRAM(s) Oral two times a day PRN Constipation  HYDROmorphone   Tablet 3 milliGRAM(s) Oral every 4 hours PRN moderate to severe pain  HYDROmorphone  Injectable 1 milliGRAM(s) IV Push every 3 hours PRN for severe breakthrough pain, after surgery today  HYDROmorphone  Injectable 0.5 milliGRAM(s) IV Push every 3 hours PRN Severe Pain (7 - 10)  ondansetron Injectable 4 milliGRAM(s) IV Push every 4 hours PRN Nausea and/or Vomiting  senna 2 Tablet(s) Oral at bedtime PRN Constipation         Vitals log        ICU Vital Signs Last 24 Hrs  T(C): 36.8 (03 Nov 2017 23:18), Max: 37.6 (03 Nov 2017 07:11)  T(F): 98.3 (03 Nov 2017 23:18), Max: 99.6 (03 Nov 2017 07:11)  HR: 95 (03 Nov 2017 23:18) (95 - 136)  BP: 116/73 (03 Nov 2017 23:18) (96/57 - 136/98)  BP(mean): --  ABP: --  ABP(mean): --  RR: 16 (03 Nov 2017 23:18) (13 - 18)  SpO2: 97% (03 Nov 2017 23:18) (96% - 98%)           Input and Output:  I&O's Detail    02 Nov 2017 07:01  -  03 Nov 2017 07:00  --------------------------------------------------------  IN:    Packed Red Blood Cells: 300 mL  Total IN: 300 mL    OUT:    Voided: 1000 mL  Total OUT: 1000 mL    Total NET: -700 mL      03 Nov 2017 07:01  -  04 Nov 2017 05:59  --------------------------------------------------------  IN:    lactated ringers.: 150 mL    Oral Fluid: 240 mL  Total IN: 390 mL    OUT:    Voided: 600 mL  Total OUT: 600 mL    Total NET: -210 mL          Lab Data                        8.0    10.2  )-----------( 267      ( 04 Nov 2017 04:33 )             24.1     11-04    143  |  114<H>  |  14  ----------------------------<  109<H>  4.0   |  17<L>  |  0.89    Ca    8.4<L>      04 Nov 2017 04:33              Review of Systems	      Objective     Physical Examination    head at  heart - s1s2  lungs - dec BS  abd - soft      Pertinent Lab findings & Imaging      Erlinda:  NO   Adequate UO     I&O's Detail    02 Nov 2017 07:01  -  03 Nov 2017 07:00  --------------------------------------------------------  IN:    Packed Red Blood Cells: 300 mL  Total IN: 300 mL    OUT:    Voided: 1000 mL  Total OUT: 1000 mL    Total NET: -700 mL      03 Nov 2017 07:01  -  04 Nov 2017 05:59  --------------------------------------------------------  IN:    lactated ringers.: 150 mL    Oral Fluid: 240 mL  Total IN: 390 mL    OUT:    Voided: 600 mL  Total OUT: 600 mL    Total NET: -210 mL               Discussed with:     Cultures:	        Radiology

## 2017-11-04 NOTE — PROGRESS NOTE ADULT - PROBLEM SELECTOR PLAN 1
compartment syndrome  pain acute and chronic  pt has Dilaudid 1 mg IVP prn for breakthrough pain x 4 doses total  cont PO Dilaudid PRN  cont Fentanyl Patch  cont Oxycontin  cont reassurance  cont emotional support

## 2017-11-04 NOTE — PROGRESS NOTE ADULT - SUBJECTIVE AND OBJECTIVE BOX
The patient was interviewed and evaluated    32y Female    T(C): 36.7 (11-04-17 @ 07:40), Max: 36.8 (11-03-17 @ 17:11)  HR: 94 (11-04-17 @ 07:40) (94 - 136)  BP: 102/67 (11-04-17 @ 07:40) (96/57 - 136/98)  RR: 16 (11-04-17 @ 07:40) (13 - 18)  SpO2: 98% (11-04-17 @ 07:40) (96% - 98%)  Wt(kg): --    No Nausea/vomiting, recall, sore throat or headache.    No anesthesia related complaints or sequelae.    No additional recommendations.

## 2017-11-04 NOTE — PROGRESS NOTE ADULT - SUBJECTIVE AND OBJECTIVE BOX
HPI:  Pt is a 31 yo female with PMHX of Bipolar disorder, hypothyroidism, chronic low back pain on morphine s/p laminectomy and spinal fusion in 2016 who is BIB EMS for right leg swelling and numbness. Patient is a poor historian as she is on many pain medicines and is currently lethargic, therefore much info provided based on hospital staff and charts. She states that 18 hours prior to arrival, she fell on the floor at her house after taking too much morphine and was lying on floor for 8 hours. She woke up to numbness, swelling, and cool temperature of her right leg, from below the knee onwards. Patient denies any current SOB, CP, f/c, denies head trauma.     In the ED, vitals were Temp 101.4F /91  RR 20  SpO2 93% RA. Significant labs are WBS 24.3, Hb 15.6, Hmt 47.6,  Platelets 118, neutr 93%, sodium 131, BUN 30,  Cr 2.30, glucose 152 AST 1377, , GFR 27, lactate 1.7. Doppler US RLE showed Extremely limited evaluation of the right lower extremity with nonvisualization of the venous structures beyond the mid right femoral vein. No evidence of DVT within the right common femoral, proximal and   mid femoral veins. No evidence of left lower extremity deep venous thrombosis. XR of right tibia and XR of R femur showed no acute fractures. Arterial Doppler of RLE pending. EKG showed sinus tach of 140, with right superior axis deviation. Patient started on aztreonam, Tylenol given for fever, patient made NPO, Dr. Martines consulted for emergent surgery. (04 Oct 2017 01:13)      SUBJECTIVE:  Patient is a 32y old  Female who presents with a chief complaint of RLE edema and numbness (13 Oct 2017 16:25)          OBJECTIVE:  Review Of Systems:  Constitutional: [ ] Fever [ ] Chills [ ] Fatigue [ ] Weight change   HEENT: [ ] Blurred vision [ ] Eye Pain [ ] Headache [ ] Runny nose [ ] Sore Throat   Respiratory: [ ] Cough [ ] Wheezing [ ] Shortness of breath  Cardiovascular: [ ] Chest Pain [ ] Palpitations [ ] BENNETT [ ] PND [ ] Orthopnea  Gastrointestinal: [ ] Abdominal Pain [ ] Diarrhea [ ] Constipation [ ] Hemorrhoids [ ] Nausea [ ] Vomiting  Genitourinary: [ ] Nocturia [ ] Dysuria [ ] Incontinence  Extremities: [ ] Swelling [ ] Joint Pain  Neurologic: [ ] Focal deficit [ ] Paresthesias [ ] Syncope  Lymphatic: [ ] Swelling [ ] Lymphadenopathy   Skin: [ ] Rash [ ] Ecchymoses [ ] Wounds [ ] Lesions  Psychiatry: [ ] Depression [ ] Suicidal/Homicidal Ideation [ ] Anxiety [ ] Sleep Disturbances  [ ] 10 point review of systems is otherwise negative except as mentioned above            [ ]Unable to obtain    Allergy:  Allergies    avocado (Unknown)  latex (Urticaria; Rash)  penicillin (Unknown)  sulfa drugs (Unknown)    Intolerances        Medications:  MEDICATIONS  (STANDING):  ascorbic acid 500 milliGRAM(s) Oral two times a day  BACItracin   Ointment 1 Application(s) Topical two times a day  buDESOnide  80 MICROgram(s)/formoterol 4.5 MICROgram(s) Inhaler 2 Puff(s) Inhalation two times a day  buPROPion XL . 300 milliGRAM(s) Oral daily  cyanocobalamin 1000 MICROGram(s) Oral daily  fentaNYL   Patch  12 MICROgram(s)/Hr 1 Patch Transdermal every 72 hours  fentaNYL   Patch  25 MICROgram(s)/Hr 1 Patch Transdermal every 72 hours  fluticasone propionate 50 MICROgram(s)/spray Nasal Spray 1 Spray(s) Both Nostrils two times a day  folic acid 1 milliGRAM(s) Oral daily  influenza   Vaccine 0.5 milliLiter(s) IntraMuscular once  ipratropium 42 MICROgram(s) Nasal 2 Spray(s) Both Nostrils every 12 hours  lactated ringers. 1000 milliLiter(s) (75 mL/Hr) IV Continuous <Continuous>  lamoTRIgine 100 milliGRAM(s) Oral every 8 hours  levothyroxine 150 MICROGram(s) Oral daily  multivitamin/minerals 1 Tablet(s) Oral daily  oxyCODONE  ER Tablet 60 milliGRAM(s) Oral every 8 hours  pantoprazole    Tablet 40 milliGRAM(s) Oral before breakfast  risperiDONE   Tablet 8 milliGRAM(s) Oral at bedtime  sertraline 50 milliGRAM(s) Oral daily  topiramate 400 milliGRAM(s) Oral two times a day  Viibryd Oral Tablet 40 milliGRAM(s),Viibryd Oral Tablet 40 mg 1 Tablet(s),Viibryd Oral Tablet 40 mg 1 Tablet(s) 1 Tablet(s) Oral daily    MEDICATIONS  (PRN):  acetaminophen   Tablet 650 milliGRAM(s) Oral every 6 hours PRN For Temp greater than 38 C (100.4 F)  acetaminophen   Tablet. 650 milliGRAM(s) Oral every 6 hours PRN Mild Pain (1 - 3)  ALBUTerol    90 MICROgram(s) HFA Inhaler 2 Puff(s) Inhalation every 6 hours PRN Shortness of Breath and/or Wheezing  clonazePAM Tablet 2 milliGRAM(s) Oral every 8 hours PRN anxiety  docusate sodium 100 milliGRAM(s) Oral two times a day PRN Constipation  HYDROmorphone   Tablet 3 milliGRAM(s) Oral every 4 hours PRN moderate to severe pain  HYDROmorphone  Injectable 1 milliGRAM(s) IV Push every 3 hours PRN for severe breakthrough pain, after surgery today  HYDROmorphone  Injectable 0.5 milliGRAM(s) IV Push every 3 hours PRN Severe Pain (7 - 10)  ondansetron Injectable 4 milliGRAM(s) IV Push every 4 hours PRN Nausea and/or Vomiting  senna 2 Tablet(s) Oral at bedtime PRN Constipation      PMH/PSH/FH/SH: [ ] Unchanged    Vitals:  T(C): 36.7 (11-04-17 @ 07:40), Max: 36.8 (11-03-17 @ 17:11)  HR: 94 (11-04-17 @ 07:40) (94 - 136)  BP: 102/67 (11-04-17 @ 07:40) (96/57 - 136/98)  BP(mean): --  RR: 16 (11-04-17 @ 07:40) (13 - 18)  SpO2: 98% (11-04-17 @ 07:40) (96% - 98%)  Wt(kg): --  Daily     Daily   I&O's Summary    03 Nov 2017 07:01  -  04 Nov 2017 07:00  --------------------------------------------------------  IN: 390 mL / OUT: 600 mL / NET: -210 mL        Labs:                        8.0    10.2  )-----------( 267      ( 04 Nov 2017 04:33 )             24.1     11-04    143  |  114<H>  |  14  ----------------------------<  109<H>  4.0   |  17<L>  |  0.89    Ca    8.4<L>      04 Nov 2017 04:33                        ECG:  < from: 12 Lead ECG (10.26.17 @ 15:02) >  Ventricular Rate 104 BPM    Atrial Rate 104 BPM    P-R Interval 152 ms    QRS Duration 106 ms    Q-T Interval 358 ms    QTC Calculation(Bezet) 470 ms    P Axis 42 degrees    R Axis -1 degrees    T Axis 29 degrees    Diagnosis Line Sinus tachycardia  Low voltage QRS  Borderline ECG  When compared with ECG of 04-OCT-2017 08:44,  QRS axis shifted right  Nonspecific T wave abnormality no longer evident in Anterior leads  Confirmed by Juanito Brink MD ([id number]) on 10/27/2017 2:02:06 PM    < end of copied text >    Echo:  < from: TTE Echo Doppler w/o Cont (10.05.17 @ 12:46) >   EXAM:  ECHO TTE W/O CON COMP W/DOPPLR         PROCEDURE DATE:  10/05/2017        INTERPRETATION:  Ordering Physician: MARISSA RUFF [id number]    Indication: Respiratory failure    Study Quality: Technically difficult in ICU   A complete echocardiographic study was performed utilizing standard   protocol including spectral and color Doppler in all echocardiographic   windows.    Height: 1 75 cm  Weight: 113 kg  BSA: 2.27  Blood Pressure: 104/76    MEASUREMENTS  IVS: 0.9cm  PWT: 0.9cm  LA: 3.5cm  AO: 2.8cm  LVIDd: 5.2cm  LVIDs: 3.5cm      LVEF: 70%    FINDINGS  Left Ventricle: Hyperdynamic left ventricle. Estimated EF 70%. No   segmental wall motion abnormalities  Aortic Valve: Not well-visualized. Probably normal trileaflet aortic valve  Mitral Valve: There is subtle systolic anterior motion of the mitral   valve in the setting of a hyperdynamic left ventricle. No left   ventricular outflow tract gradient is seen. Trace mitral regurgitation is   visualized.  Tricuspid Valve: Not well visualized. Trace tricuspid regurgitation  Pulmonic Valve: Not visualized.  Left Atrium: Grossly normal    < end of copied text >    Stress Testing:     Cath:    Imaging:  < from: Xray Chest 1 View AP- PORTABLE-Urgent (10.26.17 @ 19:55) >    EXAM:  PORTABLE CHEST URGENT                            PROCEDURE DATE:  10/26/2017          INTERPRETATION:  Clinical information: Fever    Comparison exam dated 10/16/2017    Patient is tilted towards the left.    Portable study, 7:41 PM        No evidence of infiltrate effusion or congestive failure. Heart size   within normal limits. Hilar regions mediastinal contours and bony thorax   are intact. Old left clavicle fracture present.    IMPRESSION: No active disease.                KATHY AMAYA M.D.,ATTENDING RADIOLOGIST  This document has been electronically signed. Oct 26 2017  8:00PM    < end of copied text >  < from: US Head + Neck Soft Tissue (10.23.17 @ 11:49) >  EXAM:  US HEAD NECK SOFT TISSUE                            PROCEDURE DATE:  10/23/2017          INTERPRETATION:  History: Palpable lump left posterior neck.    Limited targeted neck ultrasound to region of interest left posterior   neck.    In the area of interest there are 2 cervical lymph nodes noted measuring   0.7 x 0.6, and 0.8 x 0.8 cm respectively. These are nonspecific.   Recommend clinical laboratory correlation, biopsy as warranted.    Impression: As above                GLORIA TURNER M.D., ATTENDING RADIOLOGIST  This document has been electronically signed. Oct 23 2017 12:46PM              < end of copied text >    Interpretation of Telemetry:  Not on Tele    Physical Exam:  Appearance: [ ] Normal  [ ] abnormal [ ] NAD   Eyes: [ ] PERRL [ ] EOMI  HENT: [ ] Normal [ ] Abnormal oral muscosa [ ]NC/AT  Cardiovascular: [ ] S1 [ ] S2 [ ] RRR [ ] m/r/g [ ]edema [ ] JVP  Procedural Access Site: [ ]  hematoma [ ] tender to palpation [ ] 2+ pulse [ ] bruit [ ] Ecchymosis  Respiratory: [ ] Clear to auscultation bilaterally  Gastrointestinal: [ ] Soft [ ] tenderness[ ] distension [ ] BS  Musculoskeletal: [ ] clubbing [ ] joint deformity   Neurologic: [ ] Non-focal  Lymphatic: [ ] lymphadenopathy  Psychiatry: [ ] AAOx3  [ ] confused [ ] disoriented [ ] Mood & affect appropriate  Skin: [ ]  rashes [ ] ecchymoses [ ] cyanosis HPI:  Pt is a 31 yo female with PMHX of Bipolar disorder, hypothyroidism, chronic low back pain on morphine s/p laminectomy and spinal fusion in 2016 who is BIB EMS for right leg swelling and numbness. Patient is a poor historian as she is on many pain medicines and is currently lethargic, therefore much info provided based on hospital staff and charts. She states that 18 hours prior to arrival, she fell on the floor at her house after taking too much morphine and was lying on floor for 8 hours. She woke up to numbness, swelling, and cool temperature of her right leg, from below the knee onwards. Patient denies any current SOB, CP, f/c, denies head trauma.     In the ED, vitals were Temp 101.4F /91  RR 20  SpO2 93% RA. Significant labs are WBS 24.3, Hb 15.6, Hmt 47.6,  Platelets 118, neutr 93%, sodium 131, BUN 30,  Cr 2.30, glucose 152 AST 1377, , GFR 27, lactate 1.7. Doppler US RLE showed Extremely limited evaluation of the right lower extremity with nonvisualization of the venous structures beyond the mid right femoral vein. No evidence of DVT within the right common femoral, proximal and   mid femoral veins. No evidence of left lower extremity deep venous thrombosis. XR of right tibia and XR of R femur showed no acute fractures. Arterial Doppler of RLE pending. EKG showed sinus tach of 140, with right superior axis deviation. Patient started on aztreonam, Tylenol given for fever, patient made NPO, Dr. Martines consulted for emergent surgery. (04 Oct 2017 01:13)      SUBJECTIVE:  Patient is a 32y old  Female who presents with a chief complaint of RLE edema and numbness (13 Oct 2017 16:25)          OBJECTIVE:  Review Of Systems:  Constitutional: [ ] Fever [ ] Chills [ ] Fatigue [ ] Weight change   HEENT: [ ] Blurred vision [ ] Eye Pain [ ] Headache [ ] Runny nose [ ] Sore Throat   Respiratory: [ ] Cough [ ] Wheezing [ ] Shortness of breath  Cardiovascular: [ ] Chest Pain [ ] Palpitations [ ] BENNETT [ ] PND [ ] Orthopnea  Gastrointestinal: [ ] Abdominal Pain [ ] Diarrhea [ ] Constipation [ ] Hemorrhoids [ ] Nausea [ ] Vomiting  Genitourinary: [ ] Nocturia [ ] Dysuria [ ] Incontinence  Extremities: [ ] Swelling [ ] Joint Pain  Neurologic: [ ] Focal deficit [ ] Paresthesias [ ] Syncope  Lymphatic: [ ] Swelling [ ] Lymphadenopathy   Skin: [ ] Rash [ ] Ecchymoses [ ] Wounds [ ] Lesions  Psychiatry: [ ] Depression [ ] Suicidal/Homicidal Ideation [ ] Anxiety [ ] Sleep Disturbances  [ ] 10 point review of systems is otherwise negative except as mentioned above            [ ]Unable to obtain    Allergy:  Allergies    avocado (Unknown)  latex (Urticaria; Rash)  penicillin (Unknown)  sulfa drugs (Unknown)    Intolerances        Medications:  MEDICATIONS  (STANDING):  ascorbic acid 500 milliGRAM(s) Oral two times a day  BACItracin   Ointment 1 Application(s) Topical two times a day  buDESOnide  80 MICROgram(s)/formoterol 4.5 MICROgram(s) Inhaler 2 Puff(s) Inhalation two times a day  buPROPion XL . 300 milliGRAM(s) Oral daily  cyanocobalamin 1000 MICROGram(s) Oral daily  fentaNYL   Patch  12 MICROgram(s)/Hr 1 Patch Transdermal every 72 hours  fentaNYL   Patch  25 MICROgram(s)/Hr 1 Patch Transdermal every 72 hours  fluticasone propionate 50 MICROgram(s)/spray Nasal Spray 1 Spray(s) Both Nostrils two times a day  folic acid 1 milliGRAM(s) Oral daily  influenza   Vaccine 0.5 milliLiter(s) IntraMuscular once  ipratropium 42 MICROgram(s) Nasal 2 Spray(s) Both Nostrils every 12 hours  lactated ringers. 1000 milliLiter(s) (75 mL/Hr) IV Continuous <Continuous>  lamoTRIgine 100 milliGRAM(s) Oral every 8 hours  levothyroxine 150 MICROGram(s) Oral daily  multivitamin/minerals 1 Tablet(s) Oral daily  oxyCODONE  ER Tablet 60 milliGRAM(s) Oral every 8 hours  pantoprazole    Tablet 40 milliGRAM(s) Oral before breakfast  risperiDONE   Tablet 8 milliGRAM(s) Oral at bedtime  sertraline 50 milliGRAM(s) Oral daily  topiramate 400 milliGRAM(s) Oral two times a day  Viibryd Oral Tablet 40 milliGRAM(s),Viibryd Oral Tablet 40 mg 1 Tablet(s),Viibryd Oral Tablet 40 mg 1 Tablet(s) 1 Tablet(s) Oral daily    MEDICATIONS  (PRN):  acetaminophen   Tablet 650 milliGRAM(s) Oral every 6 hours PRN For Temp greater than 38 C (100.4 F)  acetaminophen   Tablet. 650 milliGRAM(s) Oral every 6 hours PRN Mild Pain (1 - 3)  ALBUTerol    90 MICROgram(s) HFA Inhaler 2 Puff(s) Inhalation every 6 hours PRN Shortness of Breath and/or Wheezing  clonazePAM Tablet 2 milliGRAM(s) Oral every 8 hours PRN anxiety  docusate sodium 100 milliGRAM(s) Oral two times a day PRN Constipation  HYDROmorphone   Tablet 3 milliGRAM(s) Oral every 4 hours PRN moderate to severe pain  HYDROmorphone  Injectable 1 milliGRAM(s) IV Push every 3 hours PRN for severe breakthrough pain, after surgery today  HYDROmorphone  Injectable 0.5 milliGRAM(s) IV Push every 3 hours PRN Severe Pain (7 - 10)  ondansetron Injectable 4 milliGRAM(s) IV Push every 4 hours PRN Nausea and/or Vomiting  senna 2 Tablet(s) Oral at bedtime PRN Constipation      PMH/PSH/FH/SH: [ ] Unchanged    Vitals:  T(C): 36.7 (11-04-17 @ 07:40), Max: 36.8 (11-03-17 @ 17:11)  HR: 94 (11-04-17 @ 07:40) (94 - 136)  BP: 102/67 (11-04-17 @ 07:40) (96/57 - 136/98)  BP(mean): --  RR: 16 (11-04-17 @ 07:40) (13 - 18)  SpO2: 98% (11-04-17 @ 07:40) (96% - 98%)  Wt(kg): --  Daily     Daily   I&O's Summary    03 Nov 2017 07:01  -  04 Nov 2017 07:00  --------------------------------------------------------  IN: 390 mL / OUT: 600 mL / NET: -210 mL        Labs:                        8.0    10.2  )-----------( 267      ( 04 Nov 2017 04:33 )             24.1     11-04    143  |  114<H>  |  14  ----------------------------<  109<H>  4.0   |  17<L>  |  0.89    Ca    8.4<L>      04 Nov 2017 04:33                        ECG:  < from: 12 Lead ECG (10.26.17 @ 15:02) >  Ventricular Rate 104 BPM    Atrial Rate 104 BPM    P-R Interval 152 ms    QRS Duration 106 ms    Q-T Interval 358 ms    QTC Calculation(Bezet) 470 ms    P Axis 42 degrees    R Axis -1 degrees    T Axis 29 degrees    Diagnosis Line Sinus tachycardia  Low voltage QRS  Borderline ECG  When compared with ECG of 04-OCT-2017 08:44,  QRS axis shifted right  Nonspecific T wave abnormality no longer evident in Anterior leads  Confirmed by Juanito Brink MD ([id number]) on 10/27/2017 2:02:06 PM    < end of copied text >    Echo:  < from: TTE Echo Doppler w/o Cont (10.05.17 @ 12:46) >   EXAM:  ECHO TTE W/O CON COMP W/DOPPLR         PROCEDURE DATE:  10/05/2017        INTERPRETATION:  Ordering Physician: MARISSA RUFF [id number]    Indication: Respiratory failure    Study Quality: Technically difficult in ICU   A complete echocardiographic study was performed utilizing standard   protocol including spectral and color Doppler in all echocardiographic   windows.    Height: 1 75 cm  Weight: 113 kg  BSA: 2.27  Blood Pressure: 104/76    MEASUREMENTS  IVS: 0.9cm  PWT: 0.9cm  LA: 3.5cm  AO: 2.8cm  LVIDd: 5.2cm  LVIDs: 3.5cm      LVEF: 70%    FINDINGS  Left Ventricle: Hyperdynamic left ventricle. Estimated EF 70%. No   segmental wall motion abnormalities  Aortic Valve: Not well-visualized. Probably normal trileaflet aortic valve  Mitral Valve: There is subtle systolic anterior motion of the mitral   valve in the setting of a hyperdynamic left ventricle. No left   ventricular outflow tract gradient is seen. Trace mitral regurgitation is   visualized.  Tricuspid Valve: Not well visualized. Trace tricuspid regurgitation  Pulmonic Valve: Not visualized.  Left Atrium: Grossly normal    < end of copied text >    Stress Testing:     Cath:    Imaging:  < from: Xray Chest 1 View AP- PORTABLE-Urgent (10.26.17 @ 19:55) >    EXAM:  PORTABLE CHEST URGENT                            PROCEDURE DATE:  10/26/2017          INTERPRETATION:  Clinical information: Fever    Comparison exam dated 10/16/2017    Patient is tilted towards the left.    Portable study, 7:41 PM        No evidence of infiltrate effusion or congestive failure. Heart size   within normal limits. Hilar regions mediastinal contours and bony thorax   are intact. Old left clavicle fracture present.    IMPRESSION: No active disease.                KATHY AMAYA M.D.,ATTENDING RADIOLOGIST  This document has been electronically signed. Oct 26 2017  8:00PM    < end of copied text >  < from: US Head + Neck Soft Tissue (10.23.17 @ 11:49) >  EXAM:  US HEAD NECK SOFT TISSUE                            PROCEDURE DATE:  10/23/2017          INTERPRETATION:  History: Palpable lump left posterior neck.    Limited targeted neck ultrasound to region of interest left posterior   neck.    In the area of interest there are 2 cervical lymph nodes noted measuring   0.7 x 0.6, and 0.8 x 0.8 cm respectively. These are nonspecific.   Recommend clinical laboratory correlation, biopsy as warranted.    Impression: As above                GLORIA TURNER M.D., ATTENDING RADIOLOGIST  This document has been electronically signed. Oct 23 2017 12:46PM              < end of copied text >    Interpretation of Telemetry:  Not on Tele    Physical Exam:  Appearance: [ ] Normal  [ ] abnormal [x ] NAD   Eyes: [ ] PERRL [x ] EOMI  HENT: x[ ] Normal [ ] Abnormal oral muscosa [ ]NC/AT  Cardiovascular: [x ] S1 [x ] S2 [x ] RRR [ ] m/r/g [ ]edema [ ] JVP tachy  Procedural Access Site: [ ]  hematoma [ ] tender to palpation [ ] 2+ pulse [ ] bruit [ ] Ecchymosis  Respiratory: [x ] Clear to auscultation bilaterally  Gastrointestinal: [x ] Soft [ ] tenderness[ ] distension [ ] BS  Musculoskeletal: [ ] clubbing [ ] joint deformity   Neurologic: x[ ] Non-focal  Lymphatic: [ ] lymphadenopathy  Psychiatry: [x ] AAOx3  [ ] confused [ ] disoriented [ ] Mood & affect appropriate  Skin: [ ]  rashes [ ] ecchymoses [ ] cyanosis

## 2017-11-04 NOTE — PROGRESS NOTE ADULT - ASSESSMENT
32-year-old female now status post right lower extremity fasciotomy for prolonged compression after a fall with lethargy.     - Currently no active cardiac conditions. No signs of ischemia, ADHF, clinical exam not consistent with stenotic valvular disease.  - Pt s/p Split thickness skin graft, right lower extremity wound closure tolerated procedure well with no cardiac complications.    - HR is better controlled however did go up transiently to 136 charted.  Pt s/p procedure possible pain related.  Pt afebrile.    - plastics/vascular f/u  - Continue pain control  - TTE with hyperdynamic LV function, EF 70%, no valvular disease noted; IV hydration is appropriate  - Other cardiovascular workup will depend on clinical course.  - Monitor and replete electrolytes. Keep K>4.0 and Mg>2.0.  Hypokalemia is resolved.  - Anemia managed by Heme for transfusion if hgb <7 currently 8.4  - All other workup per primary team  - Will follow      KRYSTIAN Foley  Cardiology

## 2017-11-04 NOTE — PROGRESS NOTE ADULT - ATTENDING COMMENTS
D/W patient . Also d/w /Maggi, advised not to change or add any more narcotics. Patient counseling and education provided.

## 2017-11-04 NOTE — PROGRESS NOTE ADULT - SUBJECTIVE AND OBJECTIVE BOX
Patient is a 32y old  Female who presents with a chief complaint of RLE edema and numbness (13 Oct 2017 16:25)       INTERVAL HPI/ OVERNIGHT EVENTS: Patient seen and examined at bedside. Crying for more pain medications.     MEDICATIONS  (STANDING):  ascorbic acid 500 milliGRAM(s) Oral two times a day  BACItracin   Ointment 1 Application(s) Topical two times a day  buDESOnide  80 MICROgram(s)/formoterol 4.5 MICROgram(s) Inhaler 2 Puff(s) Inhalation two times a day  buPROPion XL . 300 milliGRAM(s) Oral daily  cyanocobalamin 1000 MICROGram(s) Oral daily  fentaNYL   Patch  12 MICROgram(s)/Hr 1 Patch Transdermal every 72 hours  fentaNYL   Patch  25 MICROgram(s)/Hr 1 Patch Transdermal every 72 hours  fluticasone propionate 50 MICROgram(s)/spray Nasal Spray 1 Spray(s) Both Nostrils two times a day  folic acid 1 milliGRAM(s) Oral daily  influenza   Vaccine 0.5 milliLiter(s) IntraMuscular once  ipratropium 42 MICROgram(s) Nasal 2 Spray(s) Both Nostrils every 12 hours  lactated ringers. 1000 milliLiter(s) (75 mL/Hr) IV Continuous <Continuous>  lamoTRIgine 100 milliGRAM(s) Oral every 8 hours  levothyroxine 150 MICROGram(s) Oral daily  multivitamin/minerals 1 Tablet(s) Oral daily  oxyCODONE  ER Tablet 60 milliGRAM(s) Oral every 8 hours  pantoprazole    Tablet 40 milliGRAM(s) Oral before breakfast  risperiDONE   Tablet 8 milliGRAM(s) Oral at bedtime  sertraline 50 milliGRAM(s) Oral daily  topiramate 400 milliGRAM(s) Oral two times a day  Viibryd Oral Tablet 40 milliGRAM(s),Viibryd Oral Tablet 40 mg 1 Tablet(s),Viibryd Oral Tablet 40 mg 1 Tablet(s) 1 Tablet(s) Oral daily    MEDICATIONS  (PRN):  acetaminophen   Tablet 650 milliGRAM(s) Oral every 6 hours PRN For Temp greater than 38 C (100.4 F)  acetaminophen   Tablet. 650 milliGRAM(s) Oral every 6 hours PRN Mild Pain (1 - 3)  ALBUTerol    90 MICROgram(s) HFA Inhaler 2 Puff(s) Inhalation every 6 hours PRN Shortness of Breath and/or Wheezing  clonazePAM Tablet 2 milliGRAM(s) Oral every 8 hours PRN anxiety  docusate sodium 100 milliGRAM(s) Oral two times a day PRN Constipation  HYDROmorphone   Tablet 3 milliGRAM(s) Oral every 4 hours PRN moderate to severe pain  HYDROmorphone  Injectable 1 milliGRAM(s) IV Push every 3 hours PRN for severe breakthrough pain, after surgery today  HYDROmorphone  Injectable 0.5 milliGRAM(s) IV Push every 3 hours PRN Severe Pain (7 - 10)  ondansetron Injectable 4 milliGRAM(s) IV Push every 4 hours PRN Nausea and/or Vomiting  senna 2 Tablet(s) Oral at bedtime PRN Constipation      Allergies    avocado (Unknown)  latex (Urticaria; Rash)  penicillin (Unknown)  sulfa drugs (Unknown)    Intolerances        REVIEW OF SYSTEMS:  CONSTITUTIONAL: No fever,  or fatigue  EYES: No eye pain, visual disturbances, or discharge  ENMT:  No difficulty hearing, tinnitus, vertigo; No sinus or throat pain  NECK: No pain or stiffness  RESPIRATORY: No cough, wheezing, chills or hemoptysis; No shortness of breath  CARDIOVASCULAR: No chest pain, palpitations, dizziness, or leg swelling  GASTROINTESTINAL: No abdominal or epigastric pain. No nausea, vomiting, or hematemesis; No diarrhea or constipation. No melena or hematochezia.  GENITOURINARY: No dysuria, frequency, hematuria, or incontinence  NEUROLOGICAL: No headaches, memory loss, loss of strength, numbness, or tremors  SKIN: No itching, burning, rashes, or lesions   LYMPH NODES: No enlarged glands  ENDOCRINE: No heat or cold intolerance; No hair loss; No polydipsia or polyuria  MUSCULOSKELETAL: + leg pain  HEME/LYMPH: No easy bruising, or bleeding gums  ALLERGY AND IMMUNOLOGIC: No hives or eczema    Vital Signs Last 24 Hrs  T(C): 36.7 (04 Nov 2017 07:40), Max: 36.9 (03 Nov 2017 13:27)  T(F): 98.1 (04 Nov 2017 07:40), Max: 98.4 (03 Nov 2017 13:27)  HR: 94 (04 Nov 2017 07:40) (94 - 136)  BP: 102/67 (04 Nov 2017 07:40) (96/57 - 136/98)  BP(mean): --  RR: 16 (04 Nov 2017 07:40) (13 - 18)  SpO2: 98% (04 Nov 2017 07:40) (96% - 98%)    PHYSICAL EXAM:  GENERAL: NAD,  well-developed  HEAD:  Atraumatic, Normocephalic  EYES: EOMI, PERRLA, conjunctiva and sclera clear  ENMT: No tonsillar erythema, exudates, or enlargement; Moist mucous membranes  NECK: Supple, No JVD, Normal thyroid  NERVOUS SYSTEM:  Alert & Oriented X3, Good concentration; Non focal   CHEST/LUNG: Clear to auscultation bilaterally; No rales, rhonchi, wheezing, or rubs  HEART: Regular rate and rhythm; No murmurs, rubs, or gallops  ABDOMEN: Soft, Nontender, Nondistended; Bowel sounds present  EXTREMITIES:    LYMPH: No lymphadenopathy noted    LABS:                        8.0    10.2  )-----------( 267      ( 04 Nov 2017 04:33 )             24.1     04 Nov 2017 04:33    143    |  114    |  14     ----------------------------<  109    4.0     |  17     |  0.89     Ca    8.4        04 Nov 2017 04:33        CAPILLARY BLOOD GLUCOSE        BLOOD CULTURE    RADIOLOGY & ADDITIONAL TESTS:    Imaging Personally Reviewed:  [ ] YES     Consultant(s) Notes Reviewed:      Care Discussed with Consultants/Other Providers: Patient is a 32y old  Female who presents with a chief complaint of RLE edema and numbness (13 Oct 2017 16:25)       INTERVAL HPI/ OVERNIGHT EVENTS: Patient seen and examined at bedside. Crying for more pain medications.     MEDICATIONS  (STANDING):  ascorbic acid 500 milliGRAM(s) Oral two times a day  BACItracin   Ointment 1 Application(s) Topical two times a day  buDESOnide  80 MICROgram(s)/formoterol 4.5 MICROgram(s) Inhaler 2 Puff(s) Inhalation two times a day  buPROPion XL . 300 milliGRAM(s) Oral daily  cyanocobalamin 1000 MICROGram(s) Oral daily  fentaNYL   Patch  12 MICROgram(s)/Hr 1 Patch Transdermal every 72 hours  fentaNYL   Patch  25 MICROgram(s)/Hr 1 Patch Transdermal every 72 hours  fluticasone propionate 50 MICROgram(s)/spray Nasal Spray 1 Spray(s) Both Nostrils two times a day  folic acid 1 milliGRAM(s) Oral daily  influenza   Vaccine 0.5 milliLiter(s) IntraMuscular once  ipratropium 42 MICROgram(s) Nasal 2 Spray(s) Both Nostrils every 12 hours  lactated ringers. 1000 milliLiter(s) (75 mL/Hr) IV Continuous <Continuous>  lamoTRIgine 100 milliGRAM(s) Oral every 8 hours  levothyroxine 150 MICROGram(s) Oral daily  multivitamin/minerals 1 Tablet(s) Oral daily  oxyCODONE  ER Tablet 60 milliGRAM(s) Oral every 8 hours  pantoprazole    Tablet 40 milliGRAM(s) Oral before breakfast  risperiDONE   Tablet 8 milliGRAM(s) Oral at bedtime  sertraline 50 milliGRAM(s) Oral daily  topiramate 400 milliGRAM(s) Oral two times a day  Viibryd Oral Tablet 40 milliGRAM(s),Viibryd Oral Tablet 40 mg 1 Tablet(s),Viibryd Oral Tablet 40 mg 1 Tablet(s) 1 Tablet(s) Oral daily    MEDICATIONS  (PRN):  acetaminophen   Tablet 650 milliGRAM(s) Oral every 6 hours PRN For Temp greater than 38 C (100.4 F)  acetaminophen   Tablet. 650 milliGRAM(s) Oral every 6 hours PRN Mild Pain (1 - 3)  ALBUTerol    90 MICROgram(s) HFA Inhaler 2 Puff(s) Inhalation every 6 hours PRN Shortness of Breath and/or Wheezing  clonazePAM Tablet 2 milliGRAM(s) Oral every 8 hours PRN anxiety  docusate sodium 100 milliGRAM(s) Oral two times a day PRN Constipation  HYDROmorphone   Tablet 3 milliGRAM(s) Oral every 4 hours PRN moderate to severe pain  HYDROmorphone  Injectable 1 milliGRAM(s) IV Push every 3 hours PRN for severe breakthrough pain, after surgery today  HYDROmorphone  Injectable 0.5 milliGRAM(s) IV Push every 3 hours PRN Severe Pain (7 - 10)  ondansetron Injectable 4 milliGRAM(s) IV Push every 4 hours PRN Nausea and/or Vomiting  senna 2 Tablet(s) Oral at bedtime PRN Constipation      Allergies    avocado (Unknown)  latex (Urticaria; Rash)  penicillin (Unknown)  sulfa drugs (Unknown)    Intolerances        REVIEW OF SYSTEMS:  CONSTITUTIONAL: No fever,  or fatigue  EYES: No eye pain, visual disturbances, or discharge  ENMT:  No difficulty hearing, tinnitus, vertigo; No sinus or throat pain  NECK: No pain or stiffness  RESPIRATORY: No cough, wheezing, chills or hemoptysis; No shortness of breath  CARDIOVASCULAR: No chest pain, palpitations, dizziness, or leg swelling  GASTROINTESTINAL: No abdominal or epigastric pain. No nausea, vomiting, or hematemesis; No diarrhea or constipation. No melena or hematochezia.  GENITOURINARY: No dysuria, frequency, hematuria, or incontinence  NEUROLOGICAL: No headaches, memory loss, loss of strength, numbness, or tremors  SKIN: No itching, burning, rashes, or lesions   LYMPH NODES: No enlarged glands  ENDOCRINE: No heat or cold intolerance; No hair loss; No polydipsia or polyuria  MUSCULOSKELETAL: + leg pain  HEME/LYMPH: No easy bruising, or bleeding gums  ALLERGY AND IMMUNOLOGIC: No hives or eczema    Vital Signs Last 24 Hrs  T(C): 36.7 (04 Nov 2017 07:40), Max: 36.9 (03 Nov 2017 13:27)  T(F): 98.1 (04 Nov 2017 07:40), Max: 98.4 (03 Nov 2017 13:27)  HR: 94 (04 Nov 2017 07:40) (94 - 136)  BP: 102/67 (04 Nov 2017 07:40) (96/57 - 136/98)  BP(mean): --  RR: 16 (04 Nov 2017 07:40) (13 - 18)  SpO2: 98% (04 Nov 2017 07:40) (96% - 98%)    PHYSICAL EXAM:  GENERAL: NAD,  well-developed  HEAD:  Atraumatic, Normocephalic  EYES: EOMI, PERRLA, conjunctiva and sclera clear  ENMT: No tonsillar erythema, exudates, or enlargement; Moist mucous membranes  NECK: Supple, No JVD, Normal thyroid  NERVOUS SYSTEM:  Alert & Oriented X3, Good concentration; Non focal   CHEST/LUNG: Clear to auscultation bilaterally; No rales, rhonchi, wheezing, or rubs  HEART: Regular rate and rhythm; No murmurs, rubs, or gallops  ABDOMEN: Soft, Nontender, Nondistended; Bowel sounds present  EXTREMITIES:  Wound vacs in place over the R calf wounds; R thigh with dressing clean, dry, and intact; no swelling or calf tenderness in the left leg  LYMPH: No lymphadenopathy noted    LABS:                        8.0    10.2  )-----------( 267      ( 04 Nov 2017 04:33 )             24.1     04 Nov 2017 04:33    143    |  114    |  14     ----------------------------<  109    4.0     |  17     |  0.89     Ca    8.4        04 Nov 2017 04:33        CAPILLARY BLOOD GLUCOSE        BLOOD CULTURE    RADIOLOGY & ADDITIONAL TESTS:    Imaging Personally Reviewed:  [ ] YES     Consultant(s) Notes Reviewed:      Care Discussed with Consultants/Other Providers:

## 2017-11-05 LAB
ANION GAP SERPL CALC-SCNC: 12 MMOL/L — SIGNIFICANT CHANGE UP (ref 5–17)
BUN SERPL-MCNC: 16 MG/DL — SIGNIFICANT CHANGE UP (ref 7–23)
CALCIUM SERPL-MCNC: 8.6 MG/DL — SIGNIFICANT CHANGE UP (ref 8.5–10.1)
CHLORIDE SERPL-SCNC: 111 MMOL/L — HIGH (ref 96–108)
CO2 SERPL-SCNC: 18 MMOL/L — LOW (ref 22–31)
CREAT SERPL-MCNC: 0.82 MG/DL — SIGNIFICANT CHANGE UP (ref 0.5–1.3)
GLUCOSE SERPL-MCNC: 87 MG/DL — SIGNIFICANT CHANGE UP (ref 70–99)
HCT VFR BLD CALC: 24.5 % — LOW (ref 34.5–45)
HGB BLD-MCNC: 7.6 G/DL — LOW (ref 11.5–15.5)
MCHC RBC-ENTMCNC: 27.6 PG — SIGNIFICANT CHANGE UP (ref 27–34)
MCHC RBC-ENTMCNC: 31 GM/DL — LOW (ref 32–36)
MCV RBC AUTO: 89.1 FL — SIGNIFICANT CHANGE UP (ref 80–100)
PLATELET # BLD AUTO: 243 K/UL — SIGNIFICANT CHANGE UP (ref 150–400)
POTASSIUM SERPL-MCNC: 3.7 MMOL/L — SIGNIFICANT CHANGE UP (ref 3.5–5.3)
POTASSIUM SERPL-SCNC: 3.7 MMOL/L — SIGNIFICANT CHANGE UP (ref 3.5–5.3)
RBC # BLD: 2.75 M/UL — LOW (ref 3.8–5.2)
RBC # FLD: 15.1 % — HIGH (ref 10.3–14.5)
SODIUM SERPL-SCNC: 141 MMOL/L — SIGNIFICANT CHANGE UP (ref 135–145)
WBC # BLD: 8.6 K/UL — SIGNIFICANT CHANGE UP (ref 3.8–10.5)
WBC # FLD AUTO: 8.6 K/UL — SIGNIFICANT CHANGE UP (ref 3.8–10.5)

## 2017-11-05 PROCEDURE — 99232 SBSQ HOSP IP/OBS MODERATE 35: CPT

## 2017-11-05 PROCEDURE — 99233 SBSQ HOSP IP/OBS HIGH 50: CPT

## 2017-11-05 RX ADMIN — HYDROMORPHONE HYDROCHLORIDE 0.5 MILLIGRAM(S): 2 INJECTION INTRAMUSCULAR; INTRAVENOUS; SUBCUTANEOUS at 11:15

## 2017-11-05 RX ADMIN — Medication 150 MICROGRAM(S): at 05:08

## 2017-11-05 RX ADMIN — Medication 1 MILLIGRAM(S): at 11:01

## 2017-11-05 RX ADMIN — OXYCODONE HYDROCHLORIDE 60 MILLIGRAM(S): 5 TABLET ORAL at 06:10

## 2017-11-05 RX ADMIN — HYDROMORPHONE HYDROCHLORIDE 0.5 MILLIGRAM(S): 2 INJECTION INTRAMUSCULAR; INTRAVENOUS; SUBCUTANEOUS at 17:41

## 2017-11-05 RX ADMIN — LAMOTRIGINE 100 MILLIGRAM(S): 25 TABLET, ORALLY DISINTEGRATING ORAL at 22:19

## 2017-11-05 RX ADMIN — Medication 2 SPRAY(S): at 05:09

## 2017-11-05 RX ADMIN — HEPARIN SODIUM 5000 UNIT(S): 5000 INJECTION INTRAVENOUS; SUBCUTANEOUS at 22:19

## 2017-11-05 RX ADMIN — OXYCODONE HYDROCHLORIDE 60 MILLIGRAM(S): 5 TABLET ORAL at 14:39

## 2017-11-05 RX ADMIN — Medication 400 MILLIGRAM(S): at 05:08

## 2017-11-05 RX ADMIN — HYDROMORPHONE HYDROCHLORIDE 0.5 MILLIGRAM(S): 2 INJECTION INTRAMUSCULAR; INTRAVENOUS; SUBCUTANEOUS at 08:15

## 2017-11-05 RX ADMIN — Medication 500 MILLIGRAM(S): at 05:08

## 2017-11-05 RX ADMIN — OXYCODONE HYDROCHLORIDE 60 MILLIGRAM(S): 5 TABLET ORAL at 13:39

## 2017-11-05 RX ADMIN — HYDROMORPHONE HYDROCHLORIDE 0.5 MILLIGRAM(S): 2 INJECTION INTRAMUSCULAR; INTRAVENOUS; SUBCUTANEOUS at 03:55

## 2017-11-05 RX ADMIN — OXYCODONE HYDROCHLORIDE 60 MILLIGRAM(S): 5 TABLET ORAL at 05:10

## 2017-11-05 RX ADMIN — Medication 1 TABLET(S): at 11:01

## 2017-11-05 RX ADMIN — BUDESONIDE AND FORMOTEROL FUMARATE DIHYDRATE 2 PUFF(S): 160; 4.5 AEROSOL RESPIRATORY (INHALATION) at 19:31

## 2017-11-05 RX ADMIN — LAMOTRIGINE 100 MILLIGRAM(S): 25 TABLET, ORALLY DISINTEGRATING ORAL at 05:08

## 2017-11-05 RX ADMIN — Medication 400 MILLIGRAM(S): at 18:17

## 2017-11-05 RX ADMIN — BUPROPION HYDROCHLORIDE 300 MILLIGRAM(S): 150 TABLET, EXTENDED RELEASE ORAL at 11:00

## 2017-11-05 RX ADMIN — RISPERIDONE 8 MILLIGRAM(S): 4 TABLET ORAL at 22:19

## 2017-11-05 RX ADMIN — HYDROMORPHONE HYDROCHLORIDE 0.5 MILLIGRAM(S): 2 INJECTION INTRAMUSCULAR; INTRAVENOUS; SUBCUTANEOUS at 14:19

## 2017-11-05 RX ADMIN — OXYCODONE HYDROCHLORIDE 60 MILLIGRAM(S): 5 TABLET ORAL at 22:20

## 2017-11-05 RX ADMIN — HYDROMORPHONE HYDROCHLORIDE 0.5 MILLIGRAM(S): 2 INJECTION INTRAMUSCULAR; INTRAVENOUS; SUBCUTANEOUS at 07:56

## 2017-11-05 RX ADMIN — HYDROMORPHONE HYDROCHLORIDE 0.5 MILLIGRAM(S): 2 INJECTION INTRAMUSCULAR; INTRAVENOUS; SUBCUTANEOUS at 11:01

## 2017-11-05 RX ADMIN — BUDESONIDE AND FORMOTEROL FUMARATE DIHYDRATE 2 PUFF(S): 160; 4.5 AEROSOL RESPIRATORY (INHALATION) at 09:52

## 2017-11-05 RX ADMIN — Medication 1 SPRAY(S): at 17:40

## 2017-11-05 RX ADMIN — Medication 500 MILLIGRAM(S): at 17:40

## 2017-11-05 RX ADMIN — LAMOTRIGINE 100 MILLIGRAM(S): 25 TABLET, ORALLY DISINTEGRATING ORAL at 13:39

## 2017-11-05 RX ADMIN — Medication 2 SPRAY(S): at 18:17

## 2017-11-05 RX ADMIN — HYDROMORPHONE HYDROCHLORIDE 0.5 MILLIGRAM(S): 2 INJECTION INTRAMUSCULAR; INTRAVENOUS; SUBCUTANEOUS at 03:10

## 2017-11-05 RX ADMIN — Medication 2 MILLIGRAM(S): at 09:52

## 2017-11-05 RX ADMIN — HYDROMORPHONE HYDROCHLORIDE 0.5 MILLIGRAM(S): 2 INJECTION INTRAMUSCULAR; INTRAVENOUS; SUBCUTANEOUS at 17:56

## 2017-11-05 RX ADMIN — OXYCODONE HYDROCHLORIDE 60 MILLIGRAM(S): 5 TABLET ORAL at 22:19

## 2017-11-05 RX ADMIN — ALBUTEROL 2 PUFF(S): 90 AEROSOL, METERED ORAL at 19:39

## 2017-11-05 RX ADMIN — PANTOPRAZOLE SODIUM 40 MILLIGRAM(S): 20 TABLET, DELAYED RELEASE ORAL at 05:08

## 2017-11-05 RX ADMIN — SERTRALINE 50 MILLIGRAM(S): 25 TABLET, FILM COATED ORAL at 11:01

## 2017-11-05 RX ADMIN — HEPARIN SODIUM 5000 UNIT(S): 5000 INJECTION INTRAVENOUS; SUBCUTANEOUS at 13:39

## 2017-11-05 RX ADMIN — PREGABALIN 1000 MICROGRAM(S): 225 CAPSULE ORAL at 11:01

## 2017-11-05 RX ADMIN — HEPARIN SODIUM 5000 UNIT(S): 5000 INJECTION INTRAVENOUS; SUBCUTANEOUS at 05:08

## 2017-11-05 RX ADMIN — Medication 1 SPRAY(S): at 05:08

## 2017-11-05 RX ADMIN — HYDROMORPHONE HYDROCHLORIDE 0.5 MILLIGRAM(S): 2 INJECTION INTRAMUSCULAR; INTRAVENOUS; SUBCUTANEOUS at 14:39

## 2017-11-05 NOTE — PROGRESS NOTE ADULT - PROBLEM SELECTOR PLAN 1
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 Isabel and had just minimal debridement of muscle, mostly irrigation, had a few stitches placed on right thigh  -today pt went to OR with Dr. Isabel and had wound closure with advancement flap of the R thigh wound and skin grafts in the R calf wounds  -wound vacs in place on the calf wounds; f/up surgery recs  -pain management recs by Dr. Damico appreciated , continue current medications.  -c/w fentanyl patch and standing oxycontin

## 2017-11-05 NOTE — PROGRESS NOTE ADULT - SUBJECTIVE AND OBJECTIVE BOX
Patient is a 32y old  Female who presents with a chief complaint of RLE edema and numbness (13 Oct 2017 16:25)       INTERVAL HPI/OVERNIGHT EVENTS: No new events, complaints     MEDICATIONS  (STANDING):  ascorbic acid 500 milliGRAM(s) Oral two times a day  BACItracin   Ointment 1 Application(s) Topical two times a day  buDESOnide  80 MICROgram(s)/formoterol 4.5 MICROgram(s) Inhaler 2 Puff(s) Inhalation two times a day  buPROPion XL . 300 milliGRAM(s) Oral daily  cyanocobalamin 1000 MICROGram(s) Oral daily  fentaNYL   Patch  12 MICROgram(s)/Hr 1 Patch Transdermal every 72 hours  fentaNYL   Patch  25 MICROgram(s)/Hr 1 Patch Transdermal every 72 hours  fluticasone propionate 50 MICROgram(s)/spray Nasal Spray 1 Spray(s) Both Nostrils two times a day  folic acid 1 milliGRAM(s) Oral daily  influenza   Vaccine 0.5 milliLiter(s) IntraMuscular once  ipratropium 42 MICROgram(s) Nasal 2 Spray(s) Both Nostrils every 12 hours  lactated ringers. 1000 milliLiter(s) (75 mL/Hr) IV Continuous <Continuous>  lamoTRIgine 100 milliGRAM(s) Oral every 8 hours  levothyroxine 150 MICROGram(s) Oral daily  multivitamin/minerals 1 Tablet(s) Oral daily  oxyCODONE  ER Tablet 60 milliGRAM(s) Oral every 8 hours  pantoprazole    Tablet 40 milliGRAM(s) Oral before breakfast  risperiDONE   Tablet 8 milliGRAM(s) Oral at bedtime  sertraline 50 milliGRAM(s) Oral daily  topiramate 400 milliGRAM(s) Oral two times a day  Viibryd Oral Tablet 40 milliGRAM(s),Viibryd Oral Tablet 40 mg 1 Tablet(s),Viibryd Oral Tablet 40 mg 1 Tablet(s) 1 Tablet(s) Oral daily    MEDICATIONS  (PRN):  acetaminophen   Tablet 650 milliGRAM(s) Oral every 6 hours PRN For Temp greater than 38 C (100.4 F)  acetaminophen   Tablet. 650 milliGRAM(s) Oral every 6 hours PRN Mild Pain (1 - 3)  ALBUTerol    90 MICROgram(s) HFA Inhaler 2 Puff(s) Inhalation every 6 hours PRN Shortness of Breath and/or Wheezing  clonazePAM Tablet 2 milliGRAM(s) Oral every 8 hours PRN anxiety  docusate sodium 100 milliGRAM(s) Oral two times a day PRN Constipation  HYDROmorphone   Tablet 3 milliGRAM(s) Oral every 4 hours PRN moderate to severe pain  HYDROmorphone  Injectable 1 milliGRAM(s) IV Push every 3 hours PRN for severe breakthrough pain, after surgery today  HYDROmorphone  Injectable 0.5 milliGRAM(s) IV Push every 3 hours PRN Severe Pain (7 - 10)  ondansetron Injectable 4 milliGRAM(s) IV Push every 4 hours PRN Nausea and/or Vomiting  senna 2 Tablet(s) Oral at bedtime PRN Constipation      Allergies    avocado (Unknown)  latex (Urticaria; Rash)  penicillin (Unknown)  sulfa drugs (Unknown)    Intolerances        REVIEW OF SYSTEMS:  CONSTITUTIONAL: No fever, or fatigue  EYES: No eye pain, visual disturbances, or discharge  ENMT:  No difficulty hearing, tinnitus, vertigo; No sinus or throat pain  NECK: No pain or stiffness  RESPIRATORY: No cough, wheezing, chills or hemoptysis; No shortness of breath  CARDIOVASCULAR: No chest pain, palpitations, dizziness, or leg swelling  GASTROINTESTINAL: No abdominal or epigastric pain. No nausea, vomiting, or hematemesis; No diarrhea or constipation. No melena or hematochezia.  GENITOURINARY: No dysuria, frequency, hematuria, or incontinence  NEUROLOGICAL: No headaches, memory loss, loss of strength, numbness, or tremors  SKIN: No itching, burning, rashes, or lesions   LYMPH NODES: No enlarged glands  ENDOCRINE: No heat or cold intolerance; No hair loss; No polydipsia or polyuria  MUSCULOSKELETAL: RLE pain   HEME/LYMPH: No easy bruising, or bleeding gums  ALLERGY AND IMMUNOLOGIC: No hives or eczema    Vital Signs Last 24 Hrs  T(C): 36.8 (05 Nov 2017 07:33), Max: 37.3 (05 Nov 2017 00:18)  T(F): 98.3 (05 Nov 2017 07:33), Max: 99.1 (05 Nov 2017 00:18)  HR: 103 (05 Nov 2017 07:33) (97 - 108)  BP: 107/57 (05 Nov 2017 07:33) (103/61 - 107/57)  BP(mean): --  RR: 16 (05 Nov 2017 07:33) (16 - 16)  SpO2: 99% (05 Nov 2017 07:33) (97% - 99%)    PHYSICAL EXAM:  GENERAL: NAD, well-groomed, well-developed  HEAD:  Atraumatic, Normocephalic  EYES: EOMI, PERRLA, conjunctiva and sclera clear  ENMT: No tonsillar erythema, exudates, or enlargement; Moist mucous membranes, Good dentition, No lesions  NECK: Supple, No JVD, Normal thyroid  NERVOUS SYSTEM:  Alert & Oriented X3, Good concentration; Motor Strength 5/5 B/L upper and lower extremities; DTRs 2+ intact and symmetric  CHEST/LUNG: Clear to auscultation bilaterally; No rales, rhonchi, wheezing, or rubs  HEART: Regular rate and rhythm; No murmurs, rubs, or gallops  ABDOMEN: Soft, Nontender, Nondistended; Bowel sounds present  EXTREMITIES:  Wound vacs in place over the R calf wounds; R thigh with dressing clean, dry, and intact; no swelling or calf tenderness in the left leg  LYMPH: No lymphadenopathy noted  SKIN: No rashes or lesions    LABS:                        7.6    8.6   )-----------( 243      ( 05 Nov 2017 09:31 )             24.5       Ca    8.4        04 Nov 2017 04:33        CAPILLARY BLOOD GLUCOSE        BLOOD CULTURE    RADIOLOGY & ADDITIONAL TESTS:    Imaging Personally Reviewed:  [ ] YES     Consultant(s) Notes Reviewed:      Care Discussed with Consultants/Other Providers:

## 2017-11-05 NOTE — PROGRESS NOTE ADULT - SUBJECTIVE AND OBJECTIVE BOX
MERVATABELARDO RAMOS  [id number] 32y    PLASTIC  SURGERY/ DR. HERNANDEZ    Vital Signs Last 24 Hrs  T(C): 37.3 (05 Nov 2017 00:18), Max: 37.3 (05 Nov 2017 00:18)  T(F): 99.1 (05 Nov 2017 00:18), Max: 99.1 (05 Nov 2017 00:18)  HR: 97 (05 Nov 2017 00:18) (94 - 108)  BP: 103/61 (05 Nov 2017 00:18) (102/67 - 104/67)  BP(mean): --  RR: 16 (05 Nov 2017 00:18) (16 - 16)  SpO2: 97% (05 Nov 2017 00:18) (97% - 98%)    POD # 1    LEFT UPPER THIGH DONOR SITE WITH SEROSANGUINOUS DISCHARGE , ACE WRAP IN PLACE     RIGHT LATERAL LOWER  THIGH WOUND AQUACEL DRESSING IN PLACE, DRY AND INTACT  RIGHT LOWER LEG POST STSG WOUND VAC IN PLACE WITH GOOD SEAL AND VIABLE SKIN  RIGHT FOOT WARM WITH UNCHANGED EDEMA   RIGHT ACHILLES ESCHAR UNCHANGED                         8.0    10.2  )-----------( 267      ( 04 Nov 2017 04:33 )  S/P ONE UNIT PRBC INTRA- OP                24.1      11-04    143  |  114<H>  |  14  ----------------------------<  109<H>  4.0   |  17<L>  |  0.89    Ca    8.4<L>      04 Nov 2017 04:33                              ASSESSMENT &  PLAN:  POD # 2  S/P SPLIT THICKNESS SKIN GRAFT TO RIGHT LOWER LEG WOUNDS                                                     ADVANCEMENT FLAPS AND COMPLEX WOUND CLOSURE RIGHT LOWER THIGH WOUND    CONTINUE WITH VAC THERAPY  KEEP RIGHT LOWER EXT ELEVATED   DONOR SITE DRESSING CHANGED, KEEP ACE WRAP TO LEFT UPPER THIGH DONOR SITE , REINFORCE AS NEEDED   WOUND VAC NOT TO BE REMOVED/ CHANGED / DISTURBED,  WILL CHECK WOUNDS/ VIABILITY OF SKIN GRAFT MOST LIKELY ON WED/ THURSDAY OF NEXT WEEK

## 2017-11-05 NOTE — PROGRESS NOTE ADULT - SUBJECTIVE AND OBJECTIVE BOX
[facility]    Bonnie Hernandez, Keena, April, Ale, Alejandro, Carissa      [phone]    CHIEF COMPLAINT: Patient is a 32y old  Female who presents with a chief complaint of RLE edema and numbness (13 Oct 2017 16:25)      Follow Up: st, s/pfasciotomy    Interim history: inadequate pain control, no cp or sob    MEDICATIONS  (STANDING):  ascorbic acid 500 milliGRAM(s) Oral two times a day  BACItracin   Ointment 1 Application(s) Topical two times a day  buDESOnide  80 MICROgram(s)/formoterol 4.5 MICROgram(s) Inhaler 2 Puff(s) Inhalation two times a day  buPROPion XL . 300 milliGRAM(s) Oral daily  cyanocobalamin 1000 MICROGram(s) Oral daily  fentaNYL   Patch  12 MICROgram(s)/Hr 1 Patch Transdermal every 72 hours  fentaNYL   Patch  25 MICROgram(s)/Hr 1 Patch Transdermal every 72 hours  fluticasone propionate 50 MICROgram(s)/spray Nasal Spray 1 Spray(s) Both Nostrils two times a day  folic acid 1 milliGRAM(s) Oral daily  influenza   Vaccine 0.5 milliLiter(s) IntraMuscular once  ipratropium 42 MICROgram(s) Nasal 2 Spray(s) Both Nostrils every 12 hours  lactated ringers. 1000 milliLiter(s) (75 mL/Hr) IV Continuous <Continuous>  lamoTRIgine 100 milliGRAM(s) Oral every 8 hours  levothyroxine 150 MICROGram(s) Oral daily  multivitamin/minerals 1 Tablet(s) Oral daily  oxyCODONE  ER Tablet 60 milliGRAM(s) Oral every 8 hours  pantoprazole    Tablet 40 milliGRAM(s) Oral before breakfast  risperiDONE   Tablet 8 milliGRAM(s) Oral at bedtime  sertraline 50 milliGRAM(s) Oral daily  topiramate 400 milliGRAM(s) Oral two times a day  Viibryd Oral Tablet 40 milliGRAM(s),Viibryd Oral Tablet 40 mg 1 Tablet(s),Viibryd Oral Tablet 40 mg 1 Tablet(s) 1 Tablet(s) Oral daily    MEDICATIONS  (PRN):  acetaminophen   Tablet 650 milliGRAM(s) Oral every 6 hours PRN For Temp greater than 38 C (100.4 F)  acetaminophen   Tablet. 650 milliGRAM(s) Oral every 6 hours PRN Mild Pain (1 - 3)  ALBUTerol    90 MICROgram(s) HFA Inhaler 2 Puff(s) Inhalation every 6 hours PRN Shortness of Breath and/or Wheezing  clonazePAM Tablet 2 milliGRAM(s) Oral every 8 hours PRN anxiety  docusate sodium 100 milliGRAM(s) Oral two times a day PRN Constipation  HYDROmorphone   Tablet 3 milliGRAM(s) Oral every 4 hours PRN moderate to severe pain  HYDROmorphone  Injectable 1 milliGRAM(s) IV Push every 3 hours PRN for severe breakthrough pain, after surgery today  HYDROmorphone  Injectable 0.5 milliGRAM(s) IV Push every 3 hours PRN Severe Pain (7 - 10)  ondansetron Injectable 4 milliGRAM(s) IV Push every 4 hours PRN Nausea and/or Vomiting  senna 2 Tablet(s) Oral at bedtime PRN Constipation      REVIEW OF SYSTEMS:  eye, ent, GI, , allergic, dermatologic, musculoskeletal and neurologic are negative except as described above    Vital Signs Last 24 Hrs  T(C): 36.8 (05 Nov 2017 07:33), Max: 37.3 (05 Nov 2017 00:18)  T(F): 98.3 (05 Nov 2017 07:33), Max: 99.1 (05 Nov 2017 00:18)  HR: 103 (05 Nov 2017 07:33) (97 - 108)  BP: 107/57 (05 Nov 2017 07:33) (103/61 - 107/57)  BP(mean): --  RR: 16 (05 Nov 2017 07:33) (16 - 16)  SpO2: 99% (05 Nov 2017 07:33) (97% - 99%)    I&O's Summary      Telemetry past 24h:    PHYSICAL EXAM:    Constitutional: well-nourished, well-developed, NAD   HEENT:  MMM, sclerae anicteric, conjunctivae clear, no oral cyanosis.  Pulmonary: Non-labored, breath sounds are clear bilaterally, No wheezing, rales or rhonchi  Cardiovascular: Regular, S1 and S2.  tachy No murmur.  No rubs, gallops or clicks  Gastrointestinal: Bowel Sounds present, soft, nontender.   Neurological: Alert, no focal deficits  Skin: No rashes.  Psych:  Mood & affect appropriate    LABS: All Labs Reviewed:                        7.6    8.6   )-----------( 243      ( 05 Nov 2017 09:31 )             24.5                         8.0    10.2  )-----------( 267      ( 04 Nov 2017 04:33 )             24.1                         8.4    12.4  )-----------( 232      ( 03 Nov 2017 18:39 )             26.9     05 Nov 2017 09:31    141    |  111    |  16     ----------------------------<  87     3.7     |  18     |  0.82   04 Nov 2017 04:33    143    |  114    |  14     ----------------------------<  109    4.0     |  17     |  0.89   03 Nov 2017 05:50    142    |  110    |  22     ----------------------------<  95     3.5     |  19     |  0.82     Ca    8.6        05 Nov 2017 09:31  Ca    8.4        04 Nov 2017 04:33  Ca    8.6        03 Nov 2017 05:50            Blood Culture:         RADIOLOGY:    EKG:    Echo:

## 2017-11-05 NOTE — PROGRESS NOTE ADULT - PROBLEM SELECTOR PLAN 3
-discussed pain management with Dr. Damico who has been in charge of patient's opiate regimen during the hospitalization. Trying to wean pt off opiates.  -c/w fentanyl patch and standing oxycontin

## 2017-11-05 NOTE — PROGRESS NOTE ADULT - ASSESSMENT
32-year-old female now status post right lower extremity fasciotomy for prolonged compression after a fall with lethargy.     - Pt s/p Split thickness skin graft, right lower extremity wound closure tolerated procedure well with no cardiac complications.    - HR is better controlled  - plastics/vascular f/u  - pain control  - TTE with hyperdynamic LV function, EF 70%, no valvular disease noted; IV hydration is appropriate  - Other cardiovascular workup will depend on clinical course.  - Monitor and replete electrolytes. Keep K>4.0 and Mg>2.0.    - transfuse prn  - All other workup per primary team  - Will follow

## 2017-11-06 LAB
ANION GAP SERPL CALC-SCNC: 11 MMOL/L — SIGNIFICANT CHANGE UP (ref 5–17)
BUN SERPL-MCNC: 18 MG/DL — SIGNIFICANT CHANGE UP (ref 7–23)
CALCIUM SERPL-MCNC: 8.8 MG/DL — SIGNIFICANT CHANGE UP (ref 8.5–10.1)
CHLORIDE SERPL-SCNC: 111 MMOL/L — HIGH (ref 96–108)
CO2 SERPL-SCNC: 19 MMOL/L — LOW (ref 22–31)
CREAT SERPL-MCNC: 0.85 MG/DL — SIGNIFICANT CHANGE UP (ref 0.5–1.3)
GLUCOSE SERPL-MCNC: 92 MG/DL — SIGNIFICANT CHANGE UP (ref 70–99)
HCT VFR BLD CALC: 25.1 % — LOW (ref 34.5–45)
HGB BLD-MCNC: 8.1 G/DL — LOW (ref 11.5–15.5)
MCHC RBC-ENTMCNC: 28.8 PG — SIGNIFICANT CHANGE UP (ref 27–34)
MCHC RBC-ENTMCNC: 32.4 GM/DL — SIGNIFICANT CHANGE UP (ref 32–36)
MCV RBC AUTO: 88.8 FL — SIGNIFICANT CHANGE UP (ref 80–100)
PLATELET # BLD AUTO: 208 K/UL — SIGNIFICANT CHANGE UP (ref 150–400)
POTASSIUM SERPL-MCNC: 3.4 MMOL/L — LOW (ref 3.5–5.3)
POTASSIUM SERPL-SCNC: 3.4 MMOL/L — LOW (ref 3.5–5.3)
RBC # BLD: 2.83 M/UL — LOW (ref 3.8–5.2)
RBC # FLD: 14.5 % — SIGNIFICANT CHANGE UP (ref 10.3–14.5)
SODIUM SERPL-SCNC: 141 MMOL/L — SIGNIFICANT CHANGE UP (ref 135–145)
WBC # BLD: 8 K/UL — SIGNIFICANT CHANGE UP (ref 3.8–10.5)
WBC # FLD AUTO: 8 K/UL — SIGNIFICANT CHANGE UP (ref 3.8–10.5)

## 2017-11-06 PROCEDURE — 99233 SBSQ HOSP IP/OBS HIGH 50: CPT

## 2017-11-06 RX ORDER — POTASSIUM CHLORIDE 20 MEQ
40 PACKET (EA) ORAL ONCE
Qty: 0 | Refills: 0 | Status: COMPLETED | OUTPATIENT
Start: 2017-11-06 | End: 2017-11-06

## 2017-11-06 RX ADMIN — Medication 1 SPRAY(S): at 18:20

## 2017-11-06 RX ADMIN — OXYCODONE HYDROCHLORIDE 60 MILLIGRAM(S): 5 TABLET ORAL at 05:05

## 2017-11-06 RX ADMIN — HYDROMORPHONE HYDROCHLORIDE 3 MILLIGRAM(S): 2 INJECTION INTRAMUSCULAR; INTRAVENOUS; SUBCUTANEOUS at 17:10

## 2017-11-06 RX ADMIN — Medication 500 MILLIGRAM(S): at 05:05

## 2017-11-06 RX ADMIN — Medication 2 SPRAY(S): at 05:04

## 2017-11-06 RX ADMIN — LAMOTRIGINE 100 MILLIGRAM(S): 25 TABLET, ORALLY DISINTEGRATING ORAL at 21:47

## 2017-11-06 RX ADMIN — HYDROMORPHONE HYDROCHLORIDE 0.5 MILLIGRAM(S): 2 INJECTION INTRAMUSCULAR; INTRAVENOUS; SUBCUTANEOUS at 00:41

## 2017-11-06 RX ADMIN — HEPARIN SODIUM 5000 UNIT(S): 5000 INJECTION INTRAVENOUS; SUBCUTANEOUS at 21:47

## 2017-11-06 RX ADMIN — OXYCODONE HYDROCHLORIDE 60 MILLIGRAM(S): 5 TABLET ORAL at 22:15

## 2017-11-06 RX ADMIN — BUDESONIDE AND FORMOTEROL FUMARATE DIHYDRATE 2 PUFF(S): 160; 4.5 AEROSOL RESPIRATORY (INHALATION) at 19:19

## 2017-11-06 RX ADMIN — Medication 500 MILLIGRAM(S): at 18:19

## 2017-11-06 RX ADMIN — HYDROMORPHONE HYDROCHLORIDE 3 MILLIGRAM(S): 2 INJECTION INTRAMUSCULAR; INTRAVENOUS; SUBCUTANEOUS at 20:51

## 2017-11-06 RX ADMIN — Medication 2 MILLIGRAM(S): at 20:54

## 2017-11-06 RX ADMIN — Medication 2 SPRAY(S): at 18:20

## 2017-11-06 RX ADMIN — HYDROMORPHONE HYDROCHLORIDE 0.5 MILLIGRAM(S): 2 INJECTION INTRAMUSCULAR; INTRAVENOUS; SUBCUTANEOUS at 08:57

## 2017-11-06 RX ADMIN — HEPARIN SODIUM 5000 UNIT(S): 5000 INJECTION INTRAVENOUS; SUBCUTANEOUS at 05:04

## 2017-11-06 RX ADMIN — PANTOPRAZOLE SODIUM 40 MILLIGRAM(S): 20 TABLET, DELAYED RELEASE ORAL at 05:05

## 2017-11-06 RX ADMIN — Medication 100 MILLIGRAM(S): at 13:17

## 2017-11-06 RX ADMIN — HYDROMORPHONE HYDROCHLORIDE 3 MILLIGRAM(S): 2 INJECTION INTRAMUSCULAR; INTRAVENOUS; SUBCUTANEOUS at 21:25

## 2017-11-06 RX ADMIN — Medication 400 MILLIGRAM(S): at 05:04

## 2017-11-06 RX ADMIN — HEPARIN SODIUM 5000 UNIT(S): 5000 INJECTION INTRAVENOUS; SUBCUTANEOUS at 13:17

## 2017-11-06 RX ADMIN — HYDROMORPHONE HYDROCHLORIDE 3 MILLIGRAM(S): 2 INJECTION INTRAMUSCULAR; INTRAVENOUS; SUBCUTANEOUS at 16:51

## 2017-11-06 RX ADMIN — Medication 1 MILLIGRAM(S): at 13:17

## 2017-11-06 RX ADMIN — SERTRALINE 50 MILLIGRAM(S): 25 TABLET, FILM COATED ORAL at 13:17

## 2017-11-06 RX ADMIN — LAMOTRIGINE 100 MILLIGRAM(S): 25 TABLET, ORALLY DISINTEGRATING ORAL at 13:17

## 2017-11-06 RX ADMIN — OXYCODONE HYDROCHLORIDE 60 MILLIGRAM(S): 5 TABLET ORAL at 14:18

## 2017-11-06 RX ADMIN — HYDROMORPHONE HYDROCHLORIDE 0.5 MILLIGRAM(S): 2 INJECTION INTRAMUSCULAR; INTRAVENOUS; SUBCUTANEOUS at 01:11

## 2017-11-06 RX ADMIN — RISPERIDONE 8 MILLIGRAM(S): 4 TABLET ORAL at 21:47

## 2017-11-06 RX ADMIN — BUDESONIDE AND FORMOTEROL FUMARATE DIHYDRATE 2 PUFF(S): 160; 4.5 AEROSOL RESPIRATORY (INHALATION) at 08:27

## 2017-11-06 RX ADMIN — OXYCODONE HYDROCHLORIDE 60 MILLIGRAM(S): 5 TABLET ORAL at 13:18

## 2017-11-06 RX ADMIN — FENTANYL CITRATE 1 PATCH: 50 INJECTION INTRAVENOUS at 19:16

## 2017-11-06 RX ADMIN — PREGABALIN 1000 MICROGRAM(S): 225 CAPSULE ORAL at 13:17

## 2017-11-06 RX ADMIN — Medication 40 MILLIEQUIVALENT(S): at 13:17

## 2017-11-06 RX ADMIN — Medication 400 MILLIGRAM(S): at 18:19

## 2017-11-06 RX ADMIN — OXYCODONE HYDROCHLORIDE 60 MILLIGRAM(S): 5 TABLET ORAL at 21:47

## 2017-11-06 RX ADMIN — LAMOTRIGINE 100 MILLIGRAM(S): 25 TABLET, ORALLY DISINTEGRATING ORAL at 05:05

## 2017-11-06 RX ADMIN — Medication 150 MICROGRAM(S): at 05:05

## 2017-11-06 RX ADMIN — HYDROMORPHONE HYDROCHLORIDE 0.5 MILLIGRAM(S): 2 INJECTION INTRAMUSCULAR; INTRAVENOUS; SUBCUTANEOUS at 08:27

## 2017-11-06 RX ADMIN — BUPROPION HYDROCHLORIDE 300 MILLIGRAM(S): 150 TABLET, EXTENDED RELEASE ORAL at 13:17

## 2017-11-06 RX ADMIN — Medication 1 SPRAY(S): at 05:04

## 2017-11-06 RX ADMIN — Medication 1 TABLET(S): at 13:17

## 2017-11-06 NOTE — PROGRESS NOTE ADULT - SUBJECTIVE AND OBJECTIVE BOX
Patient is a 32y old  Female who presents with a chief complaint of RLE edema and numbness (13 Oct 2017 16:25)       INTERVAL HPI/OVERNIGHT EVENTS: No new events, complaints     MEDICATIONS  (STANDING):  ascorbic acid 500 milliGRAM(s) Oral two times a day  BACItracin   Ointment 1 Application(s) Topical two times a day  buDESOnide  80 MICROgram(s)/formoterol 4.5 MICROgram(s) Inhaler 2 Puff(s) Inhalation two times a day  buPROPion XL . 300 milliGRAM(s) Oral daily  cyanocobalamin 1000 MICROGram(s) Oral daily  fentaNYL   Patch  12 MICROgram(s)/Hr 1 Patch Transdermal every 72 hours  fentaNYL   Patch  25 MICROgram(s)/Hr 1 Patch Transdermal every 72 hours  fluticasone propionate 50 MICROgram(s)/spray Nasal Spray 1 Spray(s) Both Nostrils two times a day  folic acid 1 milliGRAM(s) Oral daily  influenza   Vaccine 0.5 milliLiter(s) IntraMuscular once  ipratropium 42 MICROgram(s) Nasal 2 Spray(s) Both Nostrils every 12 hours  lamoTRIgine 100 milliGRAM(s) Oral every 8 hours  levothyroxine 150 MICROGram(s) Oral daily  multivitamin/minerals 1 Tablet(s) Oral daily  oxyCODONE  ER Tablet 60 milliGRAM(s) Oral every 8 hours  pantoprazole    Tablet 40 milliGRAM(s) Oral before breakfast  potassium chloride    Tablet ER 40 milliEquivalent(s) Oral once  risperiDONE   Tablet 8 milliGRAM(s) Oral at bedtime  sertraline 50 milliGRAM(s) Oral daily  topiramate 400 milliGRAM(s) Oral two times a day  Viibryd Oral Tablet 40 milliGRAM(s),Viibryd Oral Tablet 40 mg 1 Tablet(s),Viibryd Oral Tablet 40 mg 1 Tablet(s) 1 Tablet(s) Oral daily    MEDICATIONS  (PRN):  acetaminophen   Tablet 650 milliGRAM(s) Oral every 6 hours PRN For Temp greater than 38 C (100.4 F)  acetaminophen   Tablet. 650 milliGRAM(s) Oral every 6 hours PRN Mild Pain (1 - 3)  ALBUTerol    90 MICROgram(s) HFA Inhaler 2 Puff(s) Inhalation every 6 hours PRN Shortness of Breath and/or Wheezing  clonazePAM Tablet 2 milliGRAM(s) Oral every 8 hours PRN anxiety  docusate sodium 100 milliGRAM(s) Oral two times a day PRN Constipation  HYDROmorphone   Tablet 3 milliGRAM(s) Oral every 4 hours PRN moderate to severe pain  HYDROmorphone  Injectable 1 milliGRAM(s) IV Push every 3 hours PRN for severe breakthrough pain, after surgery today  HYDROmorphone  Injectable 0.5 milliGRAM(s) IV Push every 3 hours PRN Severe Pain (7 - 10)  ondansetron Injectable 4 milliGRAM(s) IV Push every 4 hours PRN Nausea and/or Vomiting  senna 2 Tablet(s) Oral at bedtime PRN Constipation      Allergies    avocado (Unknown)  latex (Urticaria; Rash)  penicillin (Unknown)  sulfa drugs (Unknown)    Intolerances        REVIEW OF SYSTEMS:  CONSTITUTIONAL: No fever, or fatigue  EYES: No eye pain, visual disturbances, or discharge  ENMT:  No difficulty hearing, tinnitus, vertigo; No sinus or throat pain  NECK: No pain or stiffness  RESPIRATORY: No cough, wheezing, chills or hemoptysis; No shortness of breath  CARDIOVASCULAR: No chest pain, palpitations, dizziness, or leg swelling  GASTROINTESTINAL: No abdominal or epigastric pain. No nausea, vomiting, or hematemesis; No diarrhea or constipation. No melena or hematochezia.  GENITOURINARY: No dysuria, frequency, hematuria, or incontinence  NEUROLOGICAL: No headaches, memory loss, loss of strength, numbness, or tremors  SKIN: No itching, burning, rashes, or lesions   LYMPH NODES: No enlarged glands  ENDOCRINE: No heat or cold intolerance; No hair loss; No polydipsia or polyuria  MUSCULOSKELETAL: No joint pain or swelling; No back pain. + RLE pain  HEME/LYMPH: No easy bruising, or bleeding gums  ALLERGY AND IMMUNOLOGIC: No hives or eczema    Vital Signs Last 24 Hrs  T(C): 37.1 (06 Nov 2017 07:33), Max: 37.5 (06 Nov 2017 00:02)  T(F): 98.8 (06 Nov 2017 07:33), Max: 99.5 (06 Nov 2017 00:02)  HR: 109 (06 Nov 2017 07:33) (96 - 109)  BP: 107/70 (06 Nov 2017 07:33) (99/61 - 107/70)  BP(mean): --  RR: 16 (06 Nov 2017 07:33) (16 - 16)  SpO2: 98% (06 Nov 2017 07:33) (95% - 98%)    PHYSICAL EXAM:  GENERAL: NAD, well-groomed, well-developed  HEAD:  Atraumatic, Normocephalic  EYES: EOMI, PERRLA, conjunctiva and sclera clear  ENMT: No tonsillar erythema, exudates, or enlargement; Moist mucous membranes, Good dentition, No lesions  NECK: Supple, No JVD, Normal thyroid  NERVOUS SYSTEM:  Alert & Oriented X3, Good concentration; Non focal  CHEST/LUNG: Clear to auscultation bilaterally; No rales, rhonchi, wheezing, or rubs  HEART: Regular rate and rhythm; No murmurs, rubs, or gallops  ABDOMEN: Soft, Nontender, Nondistended; Bowel sounds present  EXTREMITIES:  Wound vacs in place over the R calf wounds; R thigh with dressing clean, dry, and intact; no swelling or calf tenderness in the left leg  LYMPH: No lymphadenopathy noted      LABS:                        8.1    8.0   )-----------( 208      ( 06 Nov 2017 08:33 )             25.1     06 Nov 2017 08:33    141    |  111    |  18     ----------------------------<  92     3.4     |  19     |  0.85     Ca    8.8        06 Nov 2017 08:33        CAPILLARY BLOOD GLUCOSE        BLOOD CULTURE    RADIOLOGY & ADDITIONAL TESTS:    Imaging Personally Reviewed:  [ ] YES     Consultant(s) Notes Reviewed:      Care Discussed with Consultants/Other Providers:

## 2017-11-06 NOTE — PROGRESS NOTE ADULT - PROBLEM SELECTOR PLAN 1
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 Isabel and had just minimal debridement of muscle, mostly irrigation, had a few stitches placed on right thigh  -today pt went to OR with Dr. Isabel and had wound closure with advancement flap of the R thigh wound and skin grafts in the R calf wounds  -wound vacs in place on the calf wounds; f/up surgery recs  -pain management per Dr. Strong, surgery/ anesthesia.

## 2017-11-06 NOTE — PROGRESS NOTE ADULT - SUBJECTIVE AND OBJECTIVE BOX
infectious diseases progress note:    ABELARDO CROWELL is a 32y y. o. Female patient    Patient reports: feeling poorly being depressed    ROS:    EYES:  Negative  blurry vision or double vision  GASTROINTESTINAL:  Negative for nausea, vomiting, diarrhea  -otherwise negative except for subjective    Allergies    avocado (Unknown)  latex (Urticaria; Rash)  penicillin (Unknown)  sulfa drugs (Unknown)    Intolerances        ANTIBIOTICS/RELEVANT:  antimicrobials    immunologic:  influenza   Vaccine 0.5 milliLiter(s) IntraMuscular once    OTHER:  acetaminophen   Tablet 650 milliGRAM(s) Oral every 6 hours PRN  acetaminophen   Tablet. 650 milliGRAM(s) Oral every 6 hours PRN  ALBUTerol    90 MICROgram(s) HFA Inhaler 2 Puff(s) Inhalation every 6 hours PRN  ascorbic acid 500 milliGRAM(s) Oral two times a day  BACItracin   Ointment 1 Application(s) Topical two times a day  buDESOnide  80 MICROgram(s)/formoterol 4.5 MICROgram(s) Inhaler 2 Puff(s) Inhalation two times a day  buPROPion XL . 300 milliGRAM(s) Oral daily  clonazePAM Tablet 2 milliGRAM(s) Oral every 8 hours PRN  cyanocobalamin 1000 MICROGram(s) Oral daily  docusate sodium 100 milliGRAM(s) Oral two times a day PRN  fentaNYL   Patch  12 MICROgram(s)/Hr 1 Patch Transdermal every 72 hours  fentaNYL   Patch  25 MICROgram(s)/Hr 1 Patch Transdermal every 72 hours  fluticasone propionate 50 MICROgram(s)/spray Nasal Spray 1 Spray(s) Both Nostrils two times a day  folic acid 1 milliGRAM(s) Oral daily  HYDROmorphone   Tablet 3 milliGRAM(s) Oral every 4 hours PRN  HYDROmorphone  Injectable 1 milliGRAM(s) IV Push every 3 hours PRN  HYDROmorphone  Injectable 0.5 milliGRAM(s) IV Push every 3 hours PRN  ipratropium 42 MICROgram(s) Nasal 2 Spray(s) Both Nostrils every 12 hours  lamoTRIgine 100 milliGRAM(s) Oral every 8 hours  levothyroxine 150 MICROGram(s) Oral daily  multivitamin/minerals 1 Tablet(s) Oral daily  ondansetron Injectable 4 milliGRAM(s) IV Push every 4 hours PRN  oxyCODONE  ER Tablet 60 milliGRAM(s) Oral every 8 hours  pantoprazole    Tablet 40 milliGRAM(s) Oral before breakfast  risperiDONE   Tablet 8 milliGRAM(s) Oral at bedtime  senna 2 Tablet(s) Oral at bedtime PRN  sertraline 50 milliGRAM(s) Oral daily  topiramate 400 milliGRAM(s) Oral two times a day  Viibryd Oral Tablet 40 milliGRAM(s),Viibryd Oral Tablet 40 mg 1 Tablet(s),Viibryd Oral Tablet 40 mg 1 Tablet(s) 1 Tablet(s) Oral daily      Objective:  Last 24-Vital Signs Last 24 Hrs  T(C): 37.2 (06 Nov 2017 16:03), Max: 37.5 (06 Nov 2017 00:02)  T(F): 98.9 (06 Nov 2017 16:03), Max: 99.5 (06 Nov 2017 00:02)  HR: 91 (06 Nov 2017 16:03) (91 - 109)  BP: 100/62 (06 Nov 2017 16:03) (99/61 - 107/70)  BP(mean): --  RR: 16 (06 Nov 2017 16:03) (16 - 24)  SpO2: 98% (06 Nov 2017 16:03) (95% - 98%)    T(C): 37.2 (11-06-17 @ 16:03), Max: 37.5 (11-06-17 @ 00:02)  T(F): 98.9 (11-06-17 @ 16:03), Max: 99.5 (11-06-17 @ 00:02)  T(C): 37.2 (11-06-17 @ 16:03), Max: 37.5 (11-06-17 @ 00:02)  T(F): 98.9 (11-06-17 @ 16:03), Max: 99.5 (11-06-17 @ 00:02)  T(C): 37.2 (11-06-17 @ 16:03), Max: 37.9 (11-02-17 @ 17:12)  T(F): 98.9 (11-06-17 @ 16:03), Max: 100.2 (11-02-17 @ 17:12)    PHYSICAL EXAM:  Constitutional:Well-developed, well nourished  Eyes:PERRLA, EOMI  Ear/Nose/Throat: oropharynx normal	  Neck:no JVD, no lymphadenopathy, supple  Respiratory: no accessory muscle use, lung fields bilaterally clear  Cardiovascular:RRR, normal S1, S2 no m/r/g  Gastrointestinal:soft, NT, no HSM, BS-normal  Extremities:no clubbing, no cyanosis, edema absent  Neuro-patient alert, oriented and appropriate  Skin-no sig lesions      LABS:                        8.1    8.0   )-----------( 208      ( 06 Nov 2017 08:33 )             25.1       WBC 8.0  11-06 @ 08:33  WBC 8.6  11-05 @ 09:31  WBC 10.2  11-04 @ 04:33  WBC 12.4  11-03 @ 18:39  WBC 7.7  11-03 @ 05:50  WBC 7.9  11-02 @ 06:13  WBC 8.5  11-01 @ 08:11  WBC 8.0  10-31 @ 07:05      11-06    141  |  111<H>  |  18  ----------------------------<  92  3.4<L>   |  19<L>  |  0.85    Ca    8.8      06 Nov 2017 08:33              MICROBIOLOGY:        RADIOLOGY & ADDITIONAL STUDIES:

## 2017-11-06 NOTE — PROGRESS NOTE ADULT - ASSESSMENT
33yo F w/ PMH of asthma, Bipolar disorder, hypothyroidism, chronic low back pain s/p laminectomy and spinal fusion, opioid dependence presents with right leg swelling, numbness, pallor, poikilothermia, and pulselessness, found to have ischemic leg due to acute compartment syndrome of right leg with acute rhabdomyolysis, GHAZAL 2/2 ATN, and leukocytosis with bandemia s/p emergent fasciotomy of 4 leg compartments (anterior, lateral, superficial posterior, and deep posterior) and the lateral thigh muscles. S/p skin graft.

## 2017-11-06 NOTE — PROGRESS NOTE ADULT - SUBJECTIVE AND OBJECTIVE BOX
MERVATABELARDO RAMOS  MRN-[id number] 32y    GENERAL SURGERY/ DR. HERNANDEZ    Vital Signs Last 24 Hrs  T(C): 37.5 (06 Nov 2017 00:02), Max: 37.5 (06 Nov 2017 00:02)  T(F): 99.5 (06 Nov 2017 00:02), Max: 99.5 (06 Nov 2017 00:02)  HR: 96 (06 Nov 2017 00:02) (96 - 103)  BP: 99/61 (06 Nov 2017 00:02) (99/61 - 107/57)  BP(mean): --  RR: 16 (06 Nov 2017 00:02) (16 - 16)  SpO2: 95% (06 Nov 2017 00:02) (95% - 99%)    POD # 3    LEFT UPPER THIGH DONOR SITE WITH  MINIMAL SEROSANGUINOUS DISCHARGE , ACE WRAP IN PLACE     RIGHT LATERAL LOWER  THIGH WOUND AQUACEL DRESSING IN PLACE, DRY AND INTACT  RIGHT LOWER LEG POST STSG WOUND VAC IN PLACE WITH GOOD SEAL AND VIABLE SKIN  RIGHT FOOT WARM WITH UNCHANGED EDEMA   RIGHT ACHILLES ESCHAR UNCHANGED                       7.6    8.6   )-----------( 243      ( 05 Nov 2017 09:31 )             24.5      11-05    141  |  111<H>  |  16  ----------------------------<  87  3.7   |  18<L>  |  0.82    Ca    8.6      05 Nov 2017 09:31    ASSESSMENT &  PLAN:  POD # 3  S/P SPLIT THICKNESS SKIN GRAFT TO RIGHT LOWER LEG WOUNDS                                                     ADVANCEMENT FLAPS AND COMPLEX WOUND CLOSURE RIGHT LOWER THIGH WOUND    CONTINUE WITH VAC THERAPY  KEEP RIGHT LOWER EXT ELEVATED   DONOR SITE DRESSING CHANGED, KEEP ACE WRAP TO LEFT UPPER THIGH DONOR SITE , REINFORCE AS NEEDED   WOUND VAC NOT TO BE REMOVED/ CHANGED / DISTURBED,  WILL CHECK WOUNDS/ VIABILITY OF SKIN GRAFT MOST LIKELY ON WED/ THURSDAY OF NEXT WEEK

## 2017-11-06 NOTE — PROGRESS NOTE ADULT - SUBJECTIVE AND OBJECTIVE BOX
[facility] - Bonnie Hernandez, Keena, April, Ale, Carissa Allen  Office Number:  [phone]    Patient resting comfortably in bed in NAD.  Laying flat with no respiratory distress.  No complaints of chest pain, dyspnea, palpitations, PND, or orthopnea.    MEDICATIONS  (STANDING):  ascorbic acid 500 milliGRAM(s) Oral two times a day  BACItracin   Ointment 1 Application(s) Topical two times a day  buDESOnide  80 MICROgram(s)/formoterol 4.5 MICROgram(s) Inhaler 2 Puff(s) Inhalation two times a day  buPROPion XL . 300 milliGRAM(s) Oral daily  cyanocobalamin 1000 MICROGram(s) Oral daily  fentaNYL   Patch  12 MICROgram(s)/Hr 1 Patch Transdermal every 72 hours  fentaNYL   Patch  25 MICROgram(s)/Hr 1 Patch Transdermal every 72 hours  fluticasone propionate 50 MICROgram(s)/spray Nasal Spray 1 Spray(s) Both Nostrils two times a day  folic acid 1 milliGRAM(s) Oral daily  influenza   Vaccine 0.5 milliLiter(s) IntraMuscular once  ipratropium 42 MICROgram(s) Nasal 2 Spray(s) Both Nostrils every 12 hours  lamoTRIgine 100 milliGRAM(s) Oral every 8 hours  levothyroxine 150 MICROGram(s) Oral daily  multivitamin/minerals 1 Tablet(s) Oral daily  oxyCODONE  ER Tablet 60 milliGRAM(s) Oral every 8 hours  pantoprazole    Tablet 40 milliGRAM(s) Oral before breakfast  potassium chloride    Tablet ER 40 milliEquivalent(s) Oral once  risperiDONE   Tablet 8 milliGRAM(s) Oral at bedtime  sertraline 50 milliGRAM(s) Oral daily  topiramate 400 milliGRAM(s) Oral two times a day  Viibryd Oral Tablet 40 milliGRAM(s),Viibryd Oral Tablet 40 mg 1 Tablet(s),Viibryd Oral Tablet 40 mg 1 Tablet(s) 1 Tablet(s) Oral daily    MEDICATIONS  (PRN):  acetaminophen   Tablet 650 milliGRAM(s) Oral every 6 hours PRN For Temp greater than 38 C (100.4 F)  acetaminophen   Tablet. 650 milliGRAM(s) Oral every 6 hours PRN Mild Pain (1 - 3)  ALBUTerol    90 MICROgram(s) HFA Inhaler 2 Puff(s) Inhalation every 6 hours PRN Shortness of Breath and/or Wheezing  clonazePAM Tablet 2 milliGRAM(s) Oral every 8 hours PRN anxiety  docusate sodium 100 milliGRAM(s) Oral two times a day PRN Constipation  HYDROmorphone   Tablet 3 milliGRAM(s) Oral every 4 hours PRN moderate to severe pain  HYDROmorphone  Injectable 1 milliGRAM(s) IV Push every 3 hours PRN for severe breakthrough pain, after surgery today  HYDROmorphone  Injectable 0.5 milliGRAM(s) IV Push every 3 hours PRN Severe Pain (7 - 10)  ondansetron Injectable 4 milliGRAM(s) IV Push every 4 hours PRN Nausea and/or Vomiting  senna 2 Tablet(s) Oral at bedtime PRN Constipation      Allergies    avocado (Unknown)  latex (Urticaria; Rash)  penicillin (Unknown)  sulfa drugs (Unknown)    Intolerances        Vital Signs Last 24 Hrs  T(C): 37 (06 Nov 2017 10:10), Max: 37.5 (06 Nov 2017 00:02)  T(F): 98.6 (06 Nov 2017 10:10), Max: 99.5 (06 Nov 2017 00:02)  HR: 103 (06 Nov 2017 10:10) (96 - 109)  BP: 105/64 (06 Nov 2017 10:10) (99/61 - 107/70)  BP(mean): --  RR: 24 (06 Nov 2017 10:10) (16 - 24)  SpO2: 95% (06 Nov 2017 10:10) (95% - 98%)    I&O's Summary      ON EXAM:    General: NAD, awake and alert, oriented x 3  HEENT: Mucous membranes are moist, anicteric  Lungs: Non-labored, breath sounds are clear bilaterally, No wheezing, rales or rhonchi  Cardiovascular: Regular, S1 and S2, no murmurs, rubs, or gallops  Gastrointestinal: Bowel Sounds present, soft, nontender.   Lymph: No peripheral edema. No lymphadenopathy.  Skin: No rashes or ulcers  Psych:  Mood & affect appropriate    LABS: All Labs Reviewed:                        8.1    8.0   )-----------( 208      ( 06 Nov 2017 08:33 )             25.1                         7.6    8.6   )-----------( 243      ( 05 Nov 2017 09:31 )             24.5                         8.0    10.2  )-----------( 267      ( 04 Nov 2017 04:33 )             24.1     06 Nov 2017 08:33    141    |  111    |  18     ----------------------------<  92     3.4     |  19     |  0.85   05 Nov 2017 09:31    141    |  111    |  16     ----------------------------<  87     3.7     |  18     |  0.82   04 Nov 2017 04:33    143    |  114    |  14     ----------------------------<  109    4.0     |  17     |  0.89     Ca    8.8        06 Nov 2017 08:33  Ca    8.6        05 Nov 2017 09:31  Ca    8.4        04 Nov 2017 04:33            Blood Culture:

## 2017-11-07 LAB
ANION GAP SERPL CALC-SCNC: 11 MMOL/L — SIGNIFICANT CHANGE UP (ref 5–17)
BUN SERPL-MCNC: 15 MG/DL — SIGNIFICANT CHANGE UP (ref 7–23)
CALCIUM SERPL-MCNC: 8.6 MG/DL — SIGNIFICANT CHANGE UP (ref 8.5–10.1)
CHLORIDE SERPL-SCNC: 112 MMOL/L — HIGH (ref 96–108)
CO2 SERPL-SCNC: 19 MMOL/L — LOW (ref 22–31)
CREAT SERPL-MCNC: 0.84 MG/DL — SIGNIFICANT CHANGE UP (ref 0.5–1.3)
GLUCOSE SERPL-MCNC: 95 MG/DL — SIGNIFICANT CHANGE UP (ref 70–99)
HCT VFR BLD CALC: 26.4 % — LOW (ref 34.5–45)
HGB BLD-MCNC: 8.3 G/DL — LOW (ref 11.5–15.5)
MCHC RBC-ENTMCNC: 28.4 PG — SIGNIFICANT CHANGE UP (ref 27–34)
MCHC RBC-ENTMCNC: 31.5 GM/DL — LOW (ref 32–36)
MCV RBC AUTO: 90.1 FL — SIGNIFICANT CHANGE UP (ref 80–100)
PLATELET # BLD AUTO: 205 K/UL — SIGNIFICANT CHANGE UP (ref 150–400)
POTASSIUM SERPL-MCNC: 3.5 MMOL/L — SIGNIFICANT CHANGE UP (ref 3.5–5.3)
POTASSIUM SERPL-SCNC: 3.5 MMOL/L — SIGNIFICANT CHANGE UP (ref 3.5–5.3)
RBC # BLD: 2.93 M/UL — LOW (ref 3.8–5.2)
RBC # FLD: 14.8 % — HIGH (ref 10.3–14.5)
SODIUM SERPL-SCNC: 142 MMOL/L — SIGNIFICANT CHANGE UP (ref 135–145)
SURGICAL PATHOLOGY FINAL REPORT - CH: SIGNIFICANT CHANGE UP
WBC # BLD: 7.1 K/UL — SIGNIFICANT CHANGE UP (ref 3.8–10.5)
WBC # FLD AUTO: 7.1 K/UL — SIGNIFICANT CHANGE UP (ref 3.8–10.5)

## 2017-11-07 PROCEDURE — 99233 SBSQ HOSP IP/OBS HIGH 50: CPT

## 2017-11-07 RX ADMIN — HYDROMORPHONE HYDROCHLORIDE 3 MILLIGRAM(S): 2 INJECTION INTRAMUSCULAR; INTRAVENOUS; SUBCUTANEOUS at 17:45

## 2017-11-07 RX ADMIN — Medication 2 SPRAY(S): at 06:06

## 2017-11-07 RX ADMIN — LAMOTRIGINE 100 MILLIGRAM(S): 25 TABLET, ORALLY DISINTEGRATING ORAL at 06:04

## 2017-11-07 RX ADMIN — Medication 150 MICROGRAM(S): at 06:04

## 2017-11-07 RX ADMIN — Medication 500 MILLIGRAM(S): at 06:04

## 2017-11-07 RX ADMIN — HYDROMORPHONE HYDROCHLORIDE 0.5 MILLIGRAM(S): 2 INJECTION INTRAMUSCULAR; INTRAVENOUS; SUBCUTANEOUS at 20:20

## 2017-11-07 RX ADMIN — Medication 2 MILLIGRAM(S): at 23:33

## 2017-11-07 RX ADMIN — HYDROMORPHONE HYDROCHLORIDE 0.5 MILLIGRAM(S): 2 INJECTION INTRAMUSCULAR; INTRAVENOUS; SUBCUTANEOUS at 00:22

## 2017-11-07 RX ADMIN — OXYCODONE HYDROCHLORIDE 60 MILLIGRAM(S): 5 TABLET ORAL at 22:03

## 2017-11-07 RX ADMIN — HYDROMORPHONE HYDROCHLORIDE 3 MILLIGRAM(S): 2 INJECTION INTRAMUSCULAR; INTRAVENOUS; SUBCUTANEOUS at 16:44

## 2017-11-07 RX ADMIN — HYDROMORPHONE HYDROCHLORIDE 0.5 MILLIGRAM(S): 2 INJECTION INTRAMUSCULAR; INTRAVENOUS; SUBCUTANEOUS at 19:58

## 2017-11-07 RX ADMIN — OXYCODONE HYDROCHLORIDE 60 MILLIGRAM(S): 5 TABLET ORAL at 22:30

## 2017-11-07 RX ADMIN — BUDESONIDE AND FORMOTEROL FUMARATE DIHYDRATE 2 PUFF(S): 160; 4.5 AEROSOL RESPIRATORY (INHALATION) at 18:32

## 2017-11-07 RX ADMIN — HYDROMORPHONE HYDROCHLORIDE 0.5 MILLIGRAM(S): 2 INJECTION INTRAMUSCULAR; INTRAVENOUS; SUBCUTANEOUS at 10:19

## 2017-11-07 RX ADMIN — PANTOPRAZOLE SODIUM 40 MILLIGRAM(S): 20 TABLET, DELAYED RELEASE ORAL at 06:04

## 2017-11-07 RX ADMIN — HEPARIN SODIUM 5000 UNIT(S): 5000 INJECTION INTRAVENOUS; SUBCUTANEOUS at 06:04

## 2017-11-07 RX ADMIN — Medication 1 SPRAY(S): at 18:13

## 2017-11-07 RX ADMIN — Medication 400 MILLIGRAM(S): at 18:12

## 2017-11-07 RX ADMIN — HYDROMORPHONE HYDROCHLORIDE 0.5 MILLIGRAM(S): 2 INJECTION INTRAMUSCULAR; INTRAVENOUS; SUBCUTANEOUS at 10:33

## 2017-11-07 RX ADMIN — HEPARIN SODIUM 5000 UNIT(S): 5000 INJECTION INTRAVENOUS; SUBCUTANEOUS at 15:08

## 2017-11-07 RX ADMIN — OXYCODONE HYDROCHLORIDE 60 MILLIGRAM(S): 5 TABLET ORAL at 16:10

## 2017-11-07 RX ADMIN — HEPARIN SODIUM 5000 UNIT(S): 5000 INJECTION INTRAVENOUS; SUBCUTANEOUS at 22:03

## 2017-11-07 RX ADMIN — OXYCODONE HYDROCHLORIDE 60 MILLIGRAM(S): 5 TABLET ORAL at 15:09

## 2017-11-07 RX ADMIN — Medication 1 SPRAY(S): at 06:07

## 2017-11-07 RX ADMIN — BUPROPION HYDROCHLORIDE 300 MILLIGRAM(S): 150 TABLET, EXTENDED RELEASE ORAL at 12:22

## 2017-11-07 RX ADMIN — BUDESONIDE AND FORMOTEROL FUMARATE DIHYDRATE 2 PUFF(S): 160; 4.5 AEROSOL RESPIRATORY (INHALATION) at 06:07

## 2017-11-07 RX ADMIN — HYDROMORPHONE HYDROCHLORIDE 1 MILLIGRAM(S): 2 INJECTION INTRAMUSCULAR; INTRAVENOUS; SUBCUTANEOUS at 21:43

## 2017-11-07 RX ADMIN — Medication 400 MILLIGRAM(S): at 06:04

## 2017-11-07 RX ADMIN — Medication 1 TABLET(S): at 12:22

## 2017-11-07 RX ADMIN — LAMOTRIGINE 100 MILLIGRAM(S): 25 TABLET, ORALLY DISINTEGRATING ORAL at 15:09

## 2017-11-07 RX ADMIN — RISPERIDONE 8 MILLIGRAM(S): 4 TABLET ORAL at 22:03

## 2017-11-07 RX ADMIN — HYDROMORPHONE HYDROCHLORIDE 1 MILLIGRAM(S): 2 INJECTION INTRAMUSCULAR; INTRAVENOUS; SUBCUTANEOUS at 21:17

## 2017-11-07 RX ADMIN — LAMOTRIGINE 100 MILLIGRAM(S): 25 TABLET, ORALLY DISINTEGRATING ORAL at 22:08

## 2017-11-07 RX ADMIN — OXYCODONE HYDROCHLORIDE 60 MILLIGRAM(S): 5 TABLET ORAL at 06:04

## 2017-11-07 RX ADMIN — Medication 500 MILLIGRAM(S): at 18:13

## 2017-11-07 RX ADMIN — PREGABALIN 1000 MICROGRAM(S): 225 CAPSULE ORAL at 12:22

## 2017-11-07 RX ADMIN — Medication 2 SPRAY(S): at 18:13

## 2017-11-07 RX ADMIN — HYDROMORPHONE HYDROCHLORIDE 0.5 MILLIGRAM(S): 2 INJECTION INTRAMUSCULAR; INTRAVENOUS; SUBCUTANEOUS at 01:00

## 2017-11-07 RX ADMIN — Medication 1 MILLIGRAM(S): at 12:22

## 2017-11-07 RX ADMIN — SERTRALINE 50 MILLIGRAM(S): 25 TABLET, FILM COATED ORAL at 12:22

## 2017-11-07 RX ADMIN — OXYCODONE HYDROCHLORIDE 60 MILLIGRAM(S): 5 TABLET ORAL at 06:30

## 2017-11-07 NOTE — PROGRESS NOTE ADULT - PROBLEM SELECTOR PLAN 1
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 Isabel and had just minimal debridement of muscle, mostly irrigation, had a few stitches placed on right thigh  -today pt went to OR with Dr. Isabel and had wound closure with advancement flap of the R thigh wound and skin grafts in the R calf wounds  -wound vacs in place on the calf wounds; f/up surgery recs  -pain management per Dr. Srtong, surgery/ anesthesia.

## 2017-11-07 NOTE — PROGRESS NOTE ADULT - SUBJECTIVE AND OBJECTIVE BOX
Patient is a 32y old  Female who presents with a chief complaint of RLE edema and numbness (13 Oct 2017 16:25)       INTERVAL HPI/OVERNIGHT EVENTS: Patient seen and examined at bedside. No new events, complaints     MEDICATIONS  (STANDING):  ascorbic acid 500 milliGRAM(s) Oral two times a day  BACItracin   Ointment 1 Application(s) Topical two times a day  buDESOnide  80 MICROgram(s)/formoterol 4.5 MICROgram(s) Inhaler 2 Puff(s) Inhalation two times a day  buPROPion XL . 300 milliGRAM(s) Oral daily  cyanocobalamin 1000 MICROGram(s) Oral daily  fentaNYL   Patch  12 MICROgram(s)/Hr 1 Patch Transdermal every 72 hours  fentaNYL   Patch  25 MICROgram(s)/Hr 1 Patch Transdermal every 72 hours  fluticasone propionate 50 MICROgram(s)/spray Nasal Spray 1 Spray(s) Both Nostrils two times a day  folic acid 1 milliGRAM(s) Oral daily  influenza   Vaccine 0.5 milliLiter(s) IntraMuscular once  ipratropium 42 MICROgram(s) Nasal 2 Spray(s) Both Nostrils every 12 hours  lamoTRIgine 100 milliGRAM(s) Oral every 8 hours  levothyroxine 150 MICROGram(s) Oral daily  multivitamin/minerals 1 Tablet(s) Oral daily  oxyCODONE  ER Tablet 60 milliGRAM(s) Oral every 8 hours  pantoprazole    Tablet 40 milliGRAM(s) Oral before breakfast  risperiDONE   Tablet 8 milliGRAM(s) Oral at bedtime  sertraline 50 milliGRAM(s) Oral daily  topiramate 400 milliGRAM(s) Oral two times a day  Viibryd Oral Tablet 40 milliGRAM(s),Viibryd Oral Tablet 40 mg 1 Tablet(s),Viibryd Oral Tablet 40 mg 1 Tablet(s) 1 Tablet(s) Oral daily    MEDICATIONS  (PRN):  acetaminophen   Tablet 650 milliGRAM(s) Oral every 6 hours PRN For Temp greater than 38 C (100.4 F)  acetaminophen   Tablet. 650 milliGRAM(s) Oral every 6 hours PRN Mild Pain (1 - 3)  ALBUTerol    90 MICROgram(s) HFA Inhaler 2 Puff(s) Inhalation every 6 hours PRN Shortness of Breath and/or Wheezing  clonazePAM Tablet 2 milliGRAM(s) Oral every 8 hours PRN anxiety  docusate sodium 100 milliGRAM(s) Oral two times a day PRN Constipation  HYDROmorphone   Tablet 3 milliGRAM(s) Oral every 4 hours PRN moderate to severe pain  HYDROmorphone  Injectable 1 milliGRAM(s) IV Push every 3 hours PRN for severe breakthrough pain, after surgery today  HYDROmorphone  Injectable 0.5 milliGRAM(s) IV Push every 3 hours PRN Severe Pain (7 - 10)  ondansetron Injectable 4 milliGRAM(s) IV Push every 4 hours PRN Nausea and/or Vomiting  senna 2 Tablet(s) Oral at bedtime PRN Constipation      Allergies    avocado (Unknown)  latex (Urticaria; Rash)  penicillin (Unknown)  sulfa drugs (Unknown)    Intolerances        REVIEW OF SYSTEMS:  CONSTITUTIONAL: No fever, or fatigue  EYES: No eye pain, visual disturbances, or discharge  ENMT:  No difficulty hearing, tinnitus, vertigo; No sinus or throat pain  NECK: No pain or stiffness  RESPIRATORY: No cough, wheezing, chills or hemoptysis; No shortness of breath  CARDIOVASCULAR: No chest pain, palpitations, dizziness, or leg swelling  GASTROINTESTINAL: No abdominal or epigastric pain. No nausea, vomiting, or hematemesis; No diarrhea or constipation. No melena or hematochezia.  GENITOURINARY: No dysuria, frequency, hematuria, or incontinence  NEUROLOGICAL: No headaches, memory loss, loss of strength, numbness, or tremors  SKIN: No itching, burning, rashes, or lesions   LYMPH NODES: No enlarged glands  ENDOCRINE: No heat or cold intolerance; No hair loss; No polydipsia or polyuria  MUSCULOSKELETAL: No joint pain or swelling; No muscle, back, or extremity pain  HEME/LYMPH: No easy bruising, or bleeding gums  ALLERGY AND IMMUNOLOGIC: No hives or eczema    Vital Signs Last 24 Hrs  T(C): 37.1 (07 Nov 2017 07:12), Max: 37.4 (06 Nov 2017 23:04)  T(F): 98.7 (07 Nov 2017 07:12), Max: 99.4 (06 Nov 2017 23:04)  HR: 81 (07 Nov 2017 07:12) (81 - 103)  BP: 93/58 (07 Nov 2017 07:12) (93/58 - 106/66)  BP(mean): --  RR: 16 (07 Nov 2017 07:12) (15 - 24)  SpO2: 98% (07 Nov 2017 07:12) (95% - 98%)    PHYSICAL EXAM:  GENERAL: NAD,  well-developed  HEAD:  Atraumatic, Normocephalic  EYES: EOMI, PERRLA, conjunctiva and sclera clear  ENMT: No tonsillar erythema, exudates, or enlargement; Moist mucous membranes.  NECK: Supple, No JVD, Normal thyroid  NERVOUS SYSTEM:  Alert & Oriented X3, No focal deficits   CHEST/LUNG: Clear to auscultation bilaterally; No rales, rhonchi, wheezing, or rubs  HEART: Regular rate and rhythm; No murmurs, rubs, or gallops  ABDOMEN: Soft, Nontender, Nondistended; Bowel sounds present  EXTREMITIES: Wound vacs in place over the R calf wounds; R thigh with dressing clean, dry, and intact; no swelling or calf tenderness in the left leg  LYMPH: No lymphadenopathy noted  SKIN: No rashes or lesions    LABS:                        8.3    7.1   )-----------( 205      ( 07 Nov 2017 08:27 )             26.4       Ca    8.8        06 Nov 2017 08:33        CAPILLARY BLOOD GLUCOSE        BLOOD CULTURE    RADIOLOGY & ADDITIONAL TESTS:    Imaging Personally Reviewed:  [ ] YES     Consultant(s) Notes Reviewed:      Care Discussed with Consultants/Other Providers:

## 2017-11-07 NOTE — PROGRESS NOTE ADULT - SUBJECTIVE AND OBJECTIVE BOX
ABELARDO CROWELL  [id number] 32y    PLASTIC  SURGERY/ DR. HERNANDEZ     Vital Signs Last 24 Hrs  T(C): 37.1 (07 Nov 2017 07:12), Max: 37.4 (06 Nov 2017 23:04)  T(F): 98.7 (07 Nov 2017 07:12), Max: 99.4 (06 Nov 2017 23:04)  HR: 81 (07 Nov 2017 07:12) (81 - 103)  BP: 93/58 (07 Nov 2017 07:12) (93/58 - 106/66)  BP(mean): --  RR: 16 (07 Nov 2017 07:12) (15 - 24)  SpO2: 98% (07 Nov 2017 07:12) (95% - 98%)    POD # 4       EXAM UNCHANGED   LEFT UPPER THIGH DONOR SITE WITH MUCH LESS SEROSANGUINOUS DISCHARGE , ACE WRAP IN PLACE     RIGHT LATERAL LOWER  THIGH WOUND AQUACEL DRESSING IN PLACE, DRY AND INTACT  RIGHT LOWER LEG POST STSG WOUND VAC IN PLACE WITH GOOD SEAL AND VIABLE SKIN  RIGHT FOOT WARM WITH UNCHANGED EDEMA   RIGHT ACHILLES ESCHAR UNCHANGED                       7.6    8.6   )-----------( 243      ( 05 Nov 2017 09:31 )             24.5      11-05    141  |  111<H>  |  16  ----------------------------<  87  3.7   |  18<L>  |  0.82    Ca    8.6      05 Nov 2017 09:31    ASSESSMENT &  PLAN:  POD # 3  S/P SPLIT THICKNESS SKIN GRAFT TO RIGHT LOWER LEG WOUNDS                                                     ADVANCEMENT FLAPS AND COMPLEX WOUND CLOSURE RIGHT LOWER THIGH WOUND    CONTINUE WITH VAC THERAPY  KEEP RIGHT LOWER EXT ELEVATED   DONOR SITE DRESSING CHANGED, KEEP ACE WRAP TO LEFT UPPER THIGH DONOR SITE , REINFORCE AS NEEDED   WOUND VAC NOT TO BE REMOVED/ CHANGED / DISTURBED,  WILL CHECK WOUNDS/ VIABILITY OF SKIN GRAFT MOST LIKELY TOMORROW/  THURSDAY , WILL CHECK WITH DR. HERNANDEZ                                                                    Electronic Signatures:  Janet Munson)  (Signed 06-Nov-2017 07:02)  	Authored: Progress Note, Subjective and Objective      Last Updated: 06-Nov-2017 07:02 by Janet Munson)                                8.1    8.0   )-----------( 208      ( 06 Nov 2017 08:33 )             25.1      11-06    141  |  111<H>  |  18  ----------------------------<  92  3.4<L>   |  19<L>  |  0.85    Ca    8.8      06 Nov 2017 08:33

## 2017-11-08 LAB
ANION GAP SERPL CALC-SCNC: 12 MMOL/L — SIGNIFICANT CHANGE UP (ref 5–17)
BUN SERPL-MCNC: 19 MG/DL — SIGNIFICANT CHANGE UP (ref 7–23)
CALCIUM SERPL-MCNC: 9.2 MG/DL — SIGNIFICANT CHANGE UP (ref 8.5–10.1)
CHLORIDE SERPL-SCNC: 110 MMOL/L — HIGH (ref 96–108)
CO2 SERPL-SCNC: 18 MMOL/L — LOW (ref 22–31)
CREAT SERPL-MCNC: 0.92 MG/DL — SIGNIFICANT CHANGE UP (ref 0.5–1.3)
GLUCOSE SERPL-MCNC: 96 MG/DL — SIGNIFICANT CHANGE UP (ref 70–99)
HCT VFR BLD CALC: 27.1 % — LOW (ref 34.5–45)
HGB BLD-MCNC: 8.5 G/DL — LOW (ref 11.5–15.5)
MCHC RBC-ENTMCNC: 28.1 PG — SIGNIFICANT CHANGE UP (ref 27–34)
MCHC RBC-ENTMCNC: 31.4 GM/DL — LOW (ref 32–36)
MCV RBC AUTO: 89.5 FL — SIGNIFICANT CHANGE UP (ref 80–100)
PLATELET # BLD AUTO: 211 K/UL — SIGNIFICANT CHANGE UP (ref 150–400)
POTASSIUM SERPL-MCNC: 3.5 MMOL/L — SIGNIFICANT CHANGE UP (ref 3.5–5.3)
POTASSIUM SERPL-SCNC: 3.5 MMOL/L — SIGNIFICANT CHANGE UP (ref 3.5–5.3)
RBC # BLD: 3.03 M/UL — LOW (ref 3.8–5.2)
RBC # FLD: 14.2 % — SIGNIFICANT CHANGE UP (ref 10.3–14.5)
SODIUM SERPL-SCNC: 140 MMOL/L — SIGNIFICANT CHANGE UP (ref 135–145)
WBC # BLD: 7.2 K/UL — SIGNIFICANT CHANGE UP (ref 3.8–10.5)
WBC # FLD AUTO: 7.2 K/UL — SIGNIFICANT CHANGE UP (ref 3.8–10.5)

## 2017-11-08 PROCEDURE — 99232 SBSQ HOSP IP/OBS MODERATE 35: CPT

## 2017-11-08 PROCEDURE — 99233 SBSQ HOSP IP/OBS HIGH 50: CPT

## 2017-11-08 RX ORDER — RISPERIDONE 4 MG/1
2 TABLET ORAL
Qty: 0 | Refills: 0 | DISCHARGE
Start: 2017-11-08

## 2017-11-08 RX ORDER — MULTIVIT-MIN/FERROUS GLUCONATE 9 MG/15 ML
1 LIQUID (ML) ORAL
Qty: 0 | Refills: 0 | DISCHARGE
Start: 2017-11-08

## 2017-11-08 RX ORDER — DOCUSATE SODIUM 100 MG
1 CAPSULE ORAL
Qty: 0 | Refills: 0 | DISCHARGE
Start: 2017-11-08

## 2017-11-08 RX ORDER — BUDESONIDE AND FORMOTEROL FUMARATE DIHYDRATE 160; 4.5 UG/1; UG/1
2 AEROSOL RESPIRATORY (INHALATION)
Qty: 0 | Refills: 0 | DISCHARGE
Start: 2017-11-08

## 2017-11-08 RX ORDER — ASCORBIC ACID 60 MG
1 TABLET,CHEWABLE ORAL
Qty: 0 | Refills: 0 | DISCHARGE
Start: 2017-11-08

## 2017-11-08 RX ORDER — PREGABALIN 225 MG/1
1 CAPSULE ORAL
Qty: 0 | Refills: 0 | DISCHARGE
Start: 2017-11-08

## 2017-11-08 RX ORDER — BACITRACIN ZINC 500 UNIT/G
1 OINTMENT IN PACKET (EA) TOPICAL
Qty: 0 | Refills: 0 | DISCHARGE
Start: 2017-11-08

## 2017-11-08 RX ORDER — CLONAZEPAM 1 MG
1 TABLET ORAL
Qty: 0 | Refills: 0 | DISCHARGE
Start: 2017-11-08

## 2017-11-08 RX ORDER — IPRATROPIUM BROMIDE 21 MCG
2 AEROSOL, SPRAY (ML) NASAL
Qty: 0 | Refills: 0 | DISCHARGE
Start: 2017-11-08

## 2017-11-08 RX ORDER — TOPIRAMATE 25 MG
2 TABLET ORAL
Qty: 0 | Refills: 0 | DISCHARGE
Start: 2017-11-08

## 2017-11-08 RX ORDER — FENTANYL CITRATE 50 UG/ML
1 INJECTION INTRAVENOUS
Qty: 0 | Refills: 0 | DISCHARGE
Start: 2017-11-08

## 2017-11-08 RX ORDER — FOLIC ACID 0.8 MG
1 TABLET ORAL
Qty: 0 | Refills: 0 | DISCHARGE
Start: 2017-11-08

## 2017-11-08 RX ORDER — SENNA PLUS 8.6 MG/1
2 TABLET ORAL
Qty: 0 | Refills: 0 | DISCHARGE
Start: 2017-11-08

## 2017-11-08 RX ORDER — LAMOTRIGINE 25 MG/1
1 TABLET, ORALLY DISINTEGRATING ORAL
Qty: 0 | Refills: 0 | DISCHARGE
Start: 2017-11-08

## 2017-11-08 RX ADMIN — PREGABALIN 1000 MICROGRAM(S): 225 CAPSULE ORAL at 13:26

## 2017-11-08 RX ADMIN — HYDROMORPHONE HYDROCHLORIDE 3 MILLIGRAM(S): 2 INJECTION INTRAMUSCULAR; INTRAVENOUS; SUBCUTANEOUS at 14:20

## 2017-11-08 RX ADMIN — HYDROMORPHONE HYDROCHLORIDE 3 MILLIGRAM(S): 2 INJECTION INTRAMUSCULAR; INTRAVENOUS; SUBCUTANEOUS at 21:02

## 2017-11-08 RX ADMIN — HYDROMORPHONE HYDROCHLORIDE 0.5 MILLIGRAM(S): 2 INJECTION INTRAMUSCULAR; INTRAVENOUS; SUBCUTANEOUS at 17:00

## 2017-11-08 RX ADMIN — HYDROMORPHONE HYDROCHLORIDE 0.5 MILLIGRAM(S): 2 INJECTION INTRAMUSCULAR; INTRAVENOUS; SUBCUTANEOUS at 16:45

## 2017-11-08 RX ADMIN — Medication 500 MILLIGRAM(S): at 19:13

## 2017-11-08 RX ADMIN — Medication 2 SPRAY(S): at 19:13

## 2017-11-08 RX ADMIN — OXYCODONE HYDROCHLORIDE 60 MILLIGRAM(S): 5 TABLET ORAL at 14:27

## 2017-11-08 RX ADMIN — LAMOTRIGINE 100 MILLIGRAM(S): 25 TABLET, ORALLY DISINTEGRATING ORAL at 22:05

## 2017-11-08 RX ADMIN — BUPROPION HYDROCHLORIDE 300 MILLIGRAM(S): 150 TABLET, EXTENDED RELEASE ORAL at 13:26

## 2017-11-08 RX ADMIN — HYDROMORPHONE HYDROCHLORIDE 0.5 MILLIGRAM(S): 2 INJECTION INTRAMUSCULAR; INTRAVENOUS; SUBCUTANEOUS at 03:42

## 2017-11-08 RX ADMIN — Medication 500 MILLIGRAM(S): at 06:14

## 2017-11-08 RX ADMIN — HYDROMORPHONE HYDROCHLORIDE 0.5 MILLIGRAM(S): 2 INJECTION INTRAMUSCULAR; INTRAVENOUS; SUBCUTANEOUS at 10:14

## 2017-11-08 RX ADMIN — Medication 400 MILLIGRAM(S): at 19:13

## 2017-11-08 RX ADMIN — HYDROMORPHONE HYDROCHLORIDE 0.5 MILLIGRAM(S): 2 INJECTION INTRAMUSCULAR; INTRAVENOUS; SUBCUTANEOUS at 04:16

## 2017-11-08 RX ADMIN — OXYCODONE HYDROCHLORIDE 60 MILLIGRAM(S): 5 TABLET ORAL at 22:30

## 2017-11-08 RX ADMIN — HEPARIN SODIUM 5000 UNIT(S): 5000 INJECTION INTRAVENOUS; SUBCUTANEOUS at 22:05

## 2017-11-08 RX ADMIN — OXYCODONE HYDROCHLORIDE 60 MILLIGRAM(S): 5 TABLET ORAL at 06:30

## 2017-11-08 RX ADMIN — Medication 2 SPRAY(S): at 06:15

## 2017-11-08 RX ADMIN — HEPARIN SODIUM 5000 UNIT(S): 5000 INJECTION INTRAVENOUS; SUBCUTANEOUS at 14:27

## 2017-11-08 RX ADMIN — OXYCODONE HYDROCHLORIDE 60 MILLIGRAM(S): 5 TABLET ORAL at 06:50

## 2017-11-08 RX ADMIN — HEPARIN SODIUM 5000 UNIT(S): 5000 INJECTION INTRAVENOUS; SUBCUTANEOUS at 05:58

## 2017-11-08 RX ADMIN — BUDESONIDE AND FORMOTEROL FUMARATE DIHYDRATE 2 PUFF(S): 160; 4.5 AEROSOL RESPIRATORY (INHALATION) at 20:00

## 2017-11-08 RX ADMIN — Medication 1 MILLIGRAM(S): at 13:26

## 2017-11-08 RX ADMIN — Medication 1 TABLET(S): at 13:26

## 2017-11-08 RX ADMIN — SERTRALINE 50 MILLIGRAM(S): 25 TABLET, FILM COATED ORAL at 13:26

## 2017-11-08 RX ADMIN — Medication 1 SPRAY(S): at 19:13

## 2017-11-08 RX ADMIN — PANTOPRAZOLE SODIUM 40 MILLIGRAM(S): 20 TABLET, DELAYED RELEASE ORAL at 05:58

## 2017-11-08 RX ADMIN — Medication 1 SPRAY(S): at 06:14

## 2017-11-08 RX ADMIN — OXYCODONE HYDROCHLORIDE 60 MILLIGRAM(S): 5 TABLET ORAL at 22:05

## 2017-11-08 RX ADMIN — OXYCODONE HYDROCHLORIDE 60 MILLIGRAM(S): 5 TABLET ORAL at 15:15

## 2017-11-08 RX ADMIN — BUDESONIDE AND FORMOTEROL FUMARATE DIHYDRATE 2 PUFF(S): 160; 4.5 AEROSOL RESPIRATORY (INHALATION) at 06:15

## 2017-11-08 RX ADMIN — HYDROMORPHONE HYDROCHLORIDE 3 MILLIGRAM(S): 2 INJECTION INTRAMUSCULAR; INTRAVENOUS; SUBCUTANEOUS at 13:24

## 2017-11-08 RX ADMIN — LAMOTRIGINE 100 MILLIGRAM(S): 25 TABLET, ORALLY DISINTEGRATING ORAL at 05:58

## 2017-11-08 RX ADMIN — HYDROMORPHONE HYDROCHLORIDE 3 MILLIGRAM(S): 2 INJECTION INTRAMUSCULAR; INTRAVENOUS; SUBCUTANEOUS at 21:30

## 2017-11-08 RX ADMIN — OXYCODONE HYDROCHLORIDE 60 MILLIGRAM(S): 5 TABLET ORAL at 05:58

## 2017-11-08 RX ADMIN — Medication 400 MILLIGRAM(S): at 05:58

## 2017-11-08 RX ADMIN — Medication 150 MICROGRAM(S): at 05:57

## 2017-11-08 RX ADMIN — RISPERIDONE 8 MILLIGRAM(S): 4 TABLET ORAL at 22:05

## 2017-11-08 RX ADMIN — HYDROMORPHONE HYDROCHLORIDE 0.5 MILLIGRAM(S): 2 INJECTION INTRAMUSCULAR; INTRAVENOUS; SUBCUTANEOUS at 10:30

## 2017-11-08 RX ADMIN — LAMOTRIGINE 100 MILLIGRAM(S): 25 TABLET, ORALLY DISINTEGRATING ORAL at 14:27

## 2017-11-08 NOTE — PROGRESS NOTE ADULT - ASSESSMENT
31yo F w/ PMH of asthma, Bipolar disorder, hypothyroidism, chronic low back pain s/p laminectomy and spinal fusion, opioid dependence presents with right leg swelling, numbness, pallor, poikilothermia, and pulselessness, found to have ischemic leg due to acute compartment syndrome of right leg with acute rhabdomyolysis, GHAZAL 2/2 ATN, and leukocytosis with bandemia s/p emergent fasciotomy of 4 leg compartments (anterior, lateral, superficial posterior, and deep posterior) and the lateral thigh muscles. S/p skin graft.

## 2017-11-08 NOTE — PROGRESS NOTE ADULT - SUBJECTIVE AND OBJECTIVE BOX
ABELARDO CROWELL  [id number]    PLASTIC SURGERY/ DR. HERNANDEZ     Vital Signs Last 24 Hrs  T(C): 36.9 (08 Nov 2017 00:00), Max: 37.1 (07 Nov 2017 07:12)  T(F): 98.4 (08 Nov 2017 00:00), Max: 98.7 (07 Nov 2017 07:12)  HR: 90 (08 Nov 2017 00:00) (81 - 104)  BP: 93/58 (08 Nov 2017 00:00) (93/58 - 105/62)  BP(mean): --  RR: 16 (08 Nov 2017 00:35) (16 - 16)  SpO2: 95% (08 Nov 2017 00:35) (95% - 98%)    POD # 5    LEFT UPPER THIGH DONOR SITE WITH MUCH LESS SEROSANGUINOUS DISCHARGE , DRESSING CHANGED   RIGHT LATERAL LOWER  THIGH WOUND DRY AND INTACT , SUTURES IN PLACE WITH GOOD APPROXIMATION , NEW AQUACEL DRESSING APPLIED    RIGHT LOWER LEG POST STSG WOUND VAC IN PLACE WITH GOOD SEAL AND VIABLE SKIN  RIGHT FOOT WARM WITH UNCHANGED EDEMA   RIGHT ACHILLES ESCHAR UNCHANGED                       7.6    8.6   )-----------( 243      ( 05 Nov 2017 09:31 )             24.5      11-05    141  |  111<H>  |  16  ----------------------------<  87  3.7   |  18<L>  |  0.82    Ca    8.6      05 Nov 2017 09:31    ASSESSMENT &  PLAN:  POD # 5    S/P SPLIT THICKNESS SKIN GRAFT TO RIGHT LOWER LEG WOUNDS                                                     ADVANCEMENT FLAPS AND COMPLEX WOUND CLOSURE RIGHT LOWER THIGH WOUND    CONTINUE WITH VAC THERAPY  KEEP RIGHT LOWER EXT ELEVATED  NEW AQUACEL DRESSING APPLIED TO RIGHT LOWER THIGH WOUND    DONOR SITE DRESSING CHANGED, KEEP ACE WRAP TO LEFT UPPER THIGH DONOR SITE , REINFORCE AS NEEDED   WOUND VAC NOT TO BE REMOVED/ CHANGED / DISTURBED,  WILL CHECK WOUNDS/ VIABILITY OF SKIN GRAFT THIS AFTERNOON                                                                  Electronic Signatures:  Janet Munson)  (Signed 06-Nov-2017 07:02)  	Authored: Progress Note, Subjective and Objective      Last Updated: 06-Nov-2017 07:02 by Janet Munson)                                8.1    8.0   )-----------( 208      ( 06 Nov 2017 08:33 )             25.1      11-06    141  |  111<H>  |  18  ----------------------------<  92  3.4<L>   |  19<L>  |  0.85    Ca    8.8      06 Nov 2017 08:33                                                 Electronic Signatures:  Janet Munson)  (Signed 07-Nov-2017 07:43)  	Authored: Progress Note, Subjective and Objective      Last Updated: 07-Nov-2017 07:43 by Janet Munson)                            8.3    7.1   )-----------( 205      ( 07 Nov 2017 08:27 )             26.4            11-07    142  |  112<H>  |  15  ----------------------------<  95  3.5   |  19<L>  |  0.84    Ca    8.6      07 Nov 2017 08:27                                    ASSESSMENT &  PLAN:  POD #  S/P MERVATABELARDO RAMOS  [id number] 32y    PLASTIC SURGERY/ DR. HERNANDEZ     Vital Signs Last 24 Hrs  T(C): 36.9 (08 Nov 2017 00:00), Max: 37.1 (07 Nov 2017 07:12)  T(F): 98.4 (08 Nov 2017 00:00), Max: 98.7 (07 Nov 2017 07:12)  HR: 90 (08 Nov 2017 00:00) (81 - 104)  BP: 93/58 (08 Nov 2017 00:00) (93/58 - 105/62)  BP(mean): --  RR: 16 (08 Nov 2017 00:35) (16 - 16)  SpO2: 95% (08 Nov 2017 00:35) (95% - 98%)    POD # 5    LEFT UPPER THIGH DONOR SITE WITH MUCH LESS SEROSANGUINOUS DISCHARGE , DRESSING CHANGED   RIGHT LATERAL LOWER  THIGH WOUND DRY AND INTACT , SUTURES IN PLACE WITH GOOD APPROXIMATION , NEW AQUACEL DRESSING APPLIED    RIGHT LOWER LEG POST STSG WOUND VAC IN PLACE WITH GOOD SEAL AND VIABLE SKIN  RIGHT FOOT WARM WITH UNCHANGED EDEMA   RIGHT ACHILLES ESCHAR UNCHANGED                       7.6    8.6   )-----------( 243      ( 05 Nov 2017 09:31 )             24.5      11-05    141  |  111<H>  |  16  ----------------------------<  87  3.7   |  18<L>  |  0.82    Ca    8.6      05 Nov 2017 09:31    ASSESSMENT &  PLAN:  POD # 5    S/P SPLIT THICKNESS SKIN GRAFT TO RIGHT LOWER LEG WOUNDS                                                     ADVANCEMENT FLAPS AND COMPLEX WOUND CLOSURE RIGHT LOWER THIGH WOUND    CONTINUE WITH VAC THERAPY  KEEP RIGHT LOWER EXT ELEVATED  NEW AQUACEL DRESSING APPLIED TO RIGHT LOWER THIGH WOUND    DONOR SITE DRESSING CHANGED, KEEP ACE WRAP TO LEFT UPPER THIGH DONOR SITE , REINFORCE AS NEEDED   WOUND VAC NOT TO BE REMOVED/ CHANGED / DISTURBED,  WILL CHECK WOUNDS/ VIABILITY OF SKIN GRAFT THIS AFTERNOON

## 2017-11-08 NOTE — CHART NOTE - NSCHARTNOTEFT_GEN_A_CORE
PATIENT SEEN, EXAMINED AND DRESSING CHANGED WITH DR. HERNANDEZ     RIGHT LOWER LEG MEDIAL AND LATERAL CALF WOUND : WOUND VAC REMOVED SKIN GRAFT 100% TAKEN   DRESSING  XEROFORM AND ABD WITH ACE WRAP LOOSELY  APPLIED   DRESSING NOT BE REMOVED/ CHANGED/ DISTURBED    NEED A SECOND  LOOK ON FRIDAY BEFORE CLEAR FOR DISCHARGE AS PER DR. HERNANDEZ

## 2017-11-08 NOTE — PROGRESS NOTE ADULT - ATTENDING COMMENTS
Plan for Skin graft evaluation this afternoon. Plan for Skin graft evaluation this afternoon. D/W Surgical PA. Plan for Skin graft evaluation this afternoon. D/W Surgical PA. Dc Planning to [facility] once bed available.

## 2017-11-08 NOTE — PROGRESS NOTE ADULT - ASSESSMENT
32-year-old female now status post right lower extremity fasciotomy for prolonged compression after a fall with lethargy.     - Pt s/p Split thickness skin graft, right lower extremity wound closure tolerated procedure well with no cardiac complications.    - HR is better controlled.  SBP at 90's but asymptomatic  - plastics/vascular f/u  - pain control  - TTE with hyperdynamic LV function, EF 70%, no valvular disease noted; IV hydration is appropriate  - Other cardiovascular workup will depend on clinical course.  - Monitor and replete electrolytes. Keep K>4.0 and Mg>2.0.    - transfuse prn  - DVT ppx  - All other workup per primary team  - Will follow      Kathleen Fernandes NP  Cardiology 32-year-old female now status post right lower extremity fasciotomy for prolonged compression after a fall with lethargy.     - Pt s/p Split thickness skin graft, right lower extremity wound closure tolerated procedure well with no cardiac complications.    - HR is better controlled.  SBP at 90's but asymptomatic  - plastics/vascular f/u  - pain control  - TTE with hyperdynamic LV function, EF 70%, no valvular disease noted; IV hydration is appropriate  - Other cardiovascular workup will depend on clinical course.  - Monitor and replete electrolytes. Keep K>4.0 and Mg>2.0.    - transfuse prn  - DVT ppx  - All other workup per primary team  - dc planning to [facility] this week    Kathleen Fernandes NP  Cardiology

## 2017-11-08 NOTE — PROGRESS NOTE ADULT - SUBJECTIVE AND OBJECTIVE BOX
Patient is a 32y old  Female who presents with a chief complaint of RLE edema and numbness (13 Oct 2017 16:25)       INTERVAL HPI/OVERNIGHT EVENTS: No new events, complaints     MEDICATIONS  (STANDING):  ascorbic acid 500 milliGRAM(s) Oral two times a day  BACItracin   Ointment 1 Application(s) Topical two times a day  buDESOnide  80 MICROgram(s)/formoterol 4.5 MICROgram(s) Inhaler 2 Puff(s) Inhalation two times a day  buPROPion XL . 300 milliGRAM(s) Oral daily  cyanocobalamin 1000 MICROGram(s) Oral daily  fentaNYL   Patch  12 MICROgram(s)/Hr 1 Patch Transdermal every 72 hours  fentaNYL   Patch  25 MICROgram(s)/Hr 1 Patch Transdermal every 72 hours  fluticasone propionate 50 MICROgram(s)/spray Nasal Spray 1 Spray(s) Both Nostrils two times a day  folic acid 1 milliGRAM(s) Oral daily  influenza   Vaccine 0.5 milliLiter(s) IntraMuscular once  ipratropium 42 MICROgram(s) Nasal 2 Spray(s) Both Nostrils every 12 hours  lamoTRIgine 100 milliGRAM(s) Oral every 8 hours  levothyroxine 150 MICROGram(s) Oral daily  multivitamin/minerals 1 Tablet(s) Oral daily  oxyCODONE  ER Tablet 60 milliGRAM(s) Oral every 8 hours  pantoprazole    Tablet 40 milliGRAM(s) Oral before breakfast  risperiDONE   Tablet 8 milliGRAM(s) Oral at bedtime  sertraline 50 milliGRAM(s) Oral daily  topiramate 400 milliGRAM(s) Oral two times a day  Viibryd Oral Tablet 40 milliGRAM(s),Viibryd Oral Tablet 40 mg 1 Tablet(s),Viibryd Oral Tablet 40 mg 1 Tablet(s) 1 Tablet(s) Oral daily    MEDICATIONS  (PRN):  acetaminophen   Tablet 650 milliGRAM(s) Oral every 6 hours PRN For Temp greater than 38 C (100.4 F)  acetaminophen   Tablet. 650 milliGRAM(s) Oral every 6 hours PRN Mild Pain (1 - 3)  ALBUTerol    90 MICROgram(s) HFA Inhaler 2 Puff(s) Inhalation every 6 hours PRN Shortness of Breath and/or Wheezing  clonazePAM Tablet 2 milliGRAM(s) Oral every 8 hours PRN anxiety  docusate sodium 100 milliGRAM(s) Oral two times a day PRN Constipation  HYDROmorphone   Tablet 3 milliGRAM(s) Oral every 4 hours PRN moderate to severe pain  HYDROmorphone  Injectable 0.5 milliGRAM(s) IV Push every 3 hours PRN Severe Pain (7 - 10)  ondansetron Injectable 4 milliGRAM(s) IV Push every 4 hours PRN Nausea and/or Vomiting  senna 2 Tablet(s) Oral at bedtime PRN Constipation      Allergies    avocado (Unknown)  latex (Urticaria; Rash)  penicillin (Unknown)  sulfa drugs (Unknown)    Intolerances        REVIEW OF SYSTEMS:  CONSTITUTIONAL: No fever,  or fatigue  EYES: No eye pain, visual disturbances, or discharge  ENMT:  No difficulty hearing, tinnitus, vertigo; No sinus or throat pain  NECK: No pain or stiffness  RESPIRATORY: No cough, wheezing, chills or hemoptysis; No shortness of breath  CARDIOVASCULAR: No chest pain, palpitations, dizziness, or leg swelling  GASTROINTESTINAL: No abdominal or epigastric pain. No nausea, vomiting, or hematemesis; No diarrhea or constipation. No melena or hematochezia.  GENITOURINARY: No dysuria, frequency, hematuria, or incontinence  NEUROLOGICAL: No headaches, memory loss, loss of strength, numbness, or tremors  SKIN: No itching, burning, rashes, or lesions   LYMPH NODES: No enlarged glands  ENDOCRINE: No heat or cold intolerance; No hair loss; No polydipsia or polyuria  MUSCULOSKELETAL: No joint pain or swelling; No muscle, back, or extremity pain  HEME/LYMPH: No easy bruising, or bleeding gums  ALLERGY AND IMMUNOLOGIC: No hives or eczema    Vital Signs Last 24 Hrs  T(C): 37.1 (08 Nov 2017 07:20), Max: 37.1 (07 Nov 2017 16:50)  T(F): 98.7 (08 Nov 2017 07:20), Max: 98.7 (07 Nov 2017 16:50)  HR: 89 (08 Nov 2017 07:20) (89 - 104)  BP: 92/56 (08 Nov 2017 07:20) (92/56 - 105/62)  BP(mean): --  RR: 16 (08 Nov 2017 07:20) (16 - 16)  SpO2: 98% (08 Nov 2017 07:20) (95% - 98%)    PHYSICAL EXAM:  GENERAL: NAD, well-developed  HEAD:  Atraumatic, Normocephalic  EYES: EOMI, PERRLA, conjunctiva and sclera clear  ENMT: No tonsillar erythema, exudates, or enlargement; Moist mucous membranes  NECK: Supple, No JVD, Normal thyroid  NERVOUS SYSTEM:  Alert & Oriented X3, Good concentration; Motor Strength 5/5 B/L upper and lower extremities; DTRs 2+ intact and symmetric  CHEST/LUNG: Clear to auscultation bilaterally; No rales, rhonchi, wheezing, or rubs  HEART: Regular rate and rhythm; No murmurs, rubs, or gallops  ABDOMEN: Soft, Nontender, Nondistended; Bowel sounds present  EXTREMITIES: Wound vacs in place over the R calf wounds; R thigh with dressing clean, dry, and intact; no swelling or calf tenderness in the left leg  LYMPH: No lymphadenopathy noted    LABS:                        8.5    7.2   )-----------( 211      ( 08 Nov 2017 08:05 )             27.1     08 Nov 2017 08:05    140    |  110    |  19     ----------------------------<  96     3.5     |  18     |  0.92     Ca    9.2        08 Nov 2017 08:05        CAPILLARY BLOOD GLUCOSE        BLOOD CULTURE    RADIOLOGY & ADDITIONAL TESTS:    Imaging Personally Reviewed:  [ ] YES     Consultant(s) Notes Reviewed:      Care Discussed with Consultants/Other Providers:

## 2017-11-08 NOTE — PROGRESS NOTE ADULT - SUBJECTIVE AND OBJECTIVE BOX
HPI:  Pt is a 31 yo female with PMHX of Bipolar disorder, hypothyroidism, chronic low back pain on morphine s/p laminectomy and spinal fusion in 2016 who is BIB EMS for right leg swelling and numbness. Patient is a poor historian as she is on many pain medicines and is currently lethargic, therefore much info provided based on hospital staff and charts. She states that 18 hours prior to arrival, she fell on the floor at her house after taking too much morphine and was lying on floor for 8 hours. She woke up to numbness, swelling, and cool temperature of her right leg, from below the knee onwards. Patient denies any current SOB, CP, f/c, denies head trauma.     In the ED, vitals were Temp 101.4F /91  RR 20  SpO2 93% RA. Significant labs are WBS 24.3, Hb 15.6, Hmt 47.6,  Platelets 118, neutr 93%, sodium 131, BUN 30,  Cr 2.30, glucose 152 AST 1377, , GFR 27, lactate 1.7. Doppler US RLE showed Extremely limited evaluation of the right lower extremity with nonvisualization of the venous structures beyond the mid right femoral vein. No evidence of DVT within the right common femoral, proximal and   mid femoral veins. No evidence of left lower extremity deep venous thrombosis. XR of right tibia and XR of R femur showed no acute fractures. Arterial Doppler of RLE pending. EKG showed sinus tach of 140, with right superior axis deviation. Patient started on aztreonam, Tylenol given for fever, patient made NPO, Dr. Martines consulted for emergent surgery. (04 Oct 2017 01:13)    SUBJECTIVE:  Patient is a 32y old  Female who presents with a chief complaint of RLE edema and numbness (13 Oct 2017 16:25)    Awake and alert.  Denies CP, palpitations, SOB, dizziness, or lightheadedness.  Still c/o leg pain.  Refused to be examined.  Comfortable on RA    OBJECTIVE:  Review Of Systems:  Constitutional: [ ] Fever [ ] Chills [ ] Fatigue [ ] Weight change   HEENT: [ ] Blurred vision [ ] Eye Pain [ ] Headache [ ] Runny nose [ ] Sore Throat   Respiratory: [ ] Cough [ ] Wheezing [ ] Shortness of breath  Cardiovascular: [ ] Chest Pain [ ] Palpitations [ ] BENNETT [ ] PND [ ] Orthopnea  Gastrointestinal: [ ] Abdominal Pain [ ] Diarrhea [ ] Constipation [ ] Hemorrhoids [ ] Nausea [ ] Vomiting  Genitourinary: [ ] Nocturia [ ] Dysuria [ ] Incontinence  Extremities: [ ] Swelling [ ] Joint Pain  Neurologic: [ ] Focal deficit [ ] Paresthesias [ ] Syncope  Lymphatic: [ ] Swelling [ ] Lymphadenopathy   Skin: [ ] Rash [ ] Ecchymoses [ ] Wounds [ ] Lesions  Psychiatry: [ ] Depression [ ] Suicidal/Homicidal Ideation [ ] Anxiety [ ] Sleep Disturbances  [x ] 10 point review of systems is otherwise negative except as mentioned above            [ ]Unable to obtain    Allergy:  Allergies    avocado (Unknown)  latex (Urticaria; Rash)  penicillin (Unknown)  sulfa drugs (Unknown)    Intolerances    Medications:  MEDICATIONS  (STANDING):  ascorbic acid 500 milliGRAM(s) Oral two times a day  BACItracin   Ointment 1 Application(s) Topical two times a day  buDESOnide  80 MICROgram(s)/formoterol 4.5 MICROgram(s) Inhaler 2 Puff(s) Inhalation two times a day  buPROPion XL . 300 milliGRAM(s) Oral daily  cyanocobalamin 1000 MICROGram(s) Oral daily  fentaNYL   Patch  12 MICROgram(s)/Hr 1 Patch Transdermal every 72 hours  fentaNYL   Patch  25 MICROgram(s)/Hr 1 Patch Transdermal every 72 hours  fluticasone propionate 50 MICROgram(s)/spray Nasal Spray 1 Spray(s) Both Nostrils two times a day  folic acid 1 milliGRAM(s) Oral daily  influenza   Vaccine 0.5 milliLiter(s) IntraMuscular once  ipratropium 42 MICROgram(s) Nasal 2 Spray(s) Both Nostrils every 12 hours  lamoTRIgine 100 milliGRAM(s) Oral every 8 hours  levothyroxine 150 MICROGram(s) Oral daily  multivitamin/minerals 1 Tablet(s) Oral daily  oxyCODONE  ER Tablet 60 milliGRAM(s) Oral every 8 hours  pantoprazole    Tablet 40 milliGRAM(s) Oral before breakfast  risperiDONE   Tablet 8 milliGRAM(s) Oral at bedtime  sertraline 50 milliGRAM(s) Oral daily  topiramate 400 milliGRAM(s) Oral two times a day  Viibryd Oral Tablet 40 milliGRAM(s),Viibryd Oral Tablet 40 mg 1 Tablet(s),Viibryd Oral Tablet 40 mg 1 Tablet(s) 1 Tablet(s) Oral daily    MEDICATIONS  (PRN):  acetaminophen   Tablet 650 milliGRAM(s) Oral every 6 hours PRN For Temp greater than 38 C (100.4 F)  acetaminophen   Tablet. 650 milliGRAM(s) Oral every 6 hours PRN Mild Pain (1 - 3)  ALBUTerol    90 MICROgram(s) HFA Inhaler 2 Puff(s) Inhalation every 6 hours PRN Shortness of Breath and/or Wheezing  clonazePAM Tablet 2 milliGRAM(s) Oral every 8 hours PRN anxiety  docusate sodium 100 milliGRAM(s) Oral two times a day PRN Constipation  HYDROmorphone   Tablet 3 milliGRAM(s) Oral every 4 hours PRN moderate to severe pain  HYDROmorphone  Injectable 0.5 milliGRAM(s) IV Push every 3 hours PRN Severe Pain (7 - 10)  ondansetron Injectable 4 milliGRAM(s) IV Push every 4 hours PRN Nausea and/or Vomiting  senna 2 Tablet(s) Oral at bedtime PRN Constipation    PMH/PSH/FH/SH: [ ] Unchanged    Vitals:  T(C): 37.1 (11-08-17 @ 07:20), Max: 37.1 (11-07-17 @ 16:50)  HR: 89 (11-08-17 @ 07:20) (89 - 104)  BP: 92/56 (11-08-17 @ 07:20) (92/56 - 105/62)  BP(mean): --  RR: 16 (11-08-17 @ 07:20) (16 - 16)  SpO2: 98% (11-08-17 @ 07:20) (95% - 98%)  Wt(kg): --  Daily     Daily   I&O's Summary    07 Nov 2017 07:01  -  08 Nov 2017 07:00  --------------------------------------------------------  IN: 0 mL / OUT: 400 mL / NET: -400 mL    Labs:                        8.5    7.2   )-----------( 211      ( 08 Nov 2017 08:05 )             27.1     11-08    140  |  110<H>  |  19  ----------------------------<  96  3.5   |  18<L>  |  0.92    Ca    9.2      08 Nov 2017 08:05    ECG:  < from: 12 Lead ECG (10.26.17 @ 15:02) >    Ventricular Rate 104 BPM    Atrial Rate 104 BPM    P-R Interval 152 ms    QRS Duration 106 ms    Q-T Interval 358 ms    QTC Calculation(Bezet) 470 ms    P Axis 42 degrees    R Axis -1 degrees    T Axis 29 degrees    Diagnosis Line Sinus tachycardia  Low voltage QRS  Borderline ECG  When compared with ECG of 04-OCT-2017 08:44,  QRS axis shifted right  Nonspecific T wave abnormality no longer evident in Anterior leads  Confirmed by Juanito Brink MD ([id number]) on 10/27/2017 2:02:06 PM    < end of copied text >    Echo:  < from: TTE Echo Doppler w/o Cont (10.05.17 @ 12:46) >     EXAM:  ECHO TTE W/O CON COMP W/DOPPLR         PROCEDURE DATE:  10/05/2017        INTERPRETATION:  Ordering Physician: MARISSA RUFF [id number]    Indication: Respiratory failure    Study Quality: Technically difficult in ICU   A complete echocardiographic study was performed utilizing standard   protocol including spectral and color Doppler in all echocardiographic   windows.    Height: 1 75 cm  Weight: 113 kg  BSA: 2.27  Blood Pressure: 104/76    MEASUREMENTS  IVS: 0.9cm  PWT: 0.9cm  LA: 3.5cm  AO: 2.8cm  LVIDd: 5.2cm  LVIDs: 3.5cm      LVEF: 70%    FINDINGS  Left Ventricle: Hyperdynamic left ventricle. Estimated EF 70%. No   segmental wall motion abnormalities  Aortic Valve: Not well-visualized. Probably normal trileaflet aortic valve  Mitral Valve: There is subtle systolic anterior motion of the mitral   valve in the setting of a hyperdynamic left ventricle. No left   ventricular outflow tract gradient is seen. Trace mitral regurgitation is   visualized.  Tricuspid Valve: Not well visualized. Trace tricuspid regurgitation  Pulmonic Valve: Not visualized.  Left Atrium: Grossly normal  Right Ventricle: Not well-visualized. Grossly normal right ventricular   size and function  Right Atrium: Grossly normal  Diastolic Function: Normaldiastolic function  Pericardium/Pleura: No pericardial effusion      CONCLUSIONS:  1. Technically difficult study  2. Hyperdynamic left ventricle. Estimated EF 70%  3. Subtle systolic anterior motion of the mitral valve in the setting of   a hyperdynamic left ventricle, but no outflow tract gradient.  4. Right ventricle is not well visualized.Grossly normal right   ventricular size and function  5. No pericardial effusion    No prior exam for comparison      ROBERTA MARKS   This document has been electronically signed. Oct  6 2017  1:33PM      < end of copied text >    Stress Testing:     Cath:    Imaging:    Interpretation of Telemetry:    Physical Exam:  Appearance: [ ] Normal  [ ] abnormal [x ] NAD [x] Obese  Eyes: [ ] PERRL [ ] EOMI  HENT: [ ] Normal [ ] Abnormal oral muscosa [ ]NC/AT  Cardiovascular: [ ] S1 [ ] S2 [ ] RRR [ ] m/r/g [ ]edema [ ] JVP  Procedural Access Site: [ ]  hematoma [ ] tender to palpation [ ] 2+ pulse [ ] bruit [ ] Ecchymosis  Respiratory: [ ] Clear to auscultation bilaterally  Gastrointestinal: [ ] Soft [ ] tenderness[ ] distension [ ] BS  Musculoskeletal: [ ] clubbing [ ] joint deformity   Neurologic: [ ] Non-focal  Lymphatic: [ ] lymphadenopathy  Psychiatry: [ ] AAOx3  [ ] confused [ ] disoriented [ ] Mood & affect appropriate  Skin: [ ]  rashes [ ] ecchymoses [ ] cyanosis    Patient strongly refuses physical exam

## 2017-11-09 LAB
ANION GAP SERPL CALC-SCNC: 13 MMOL/L — SIGNIFICANT CHANGE UP (ref 5–17)
BUN SERPL-MCNC: 20 MG/DL — SIGNIFICANT CHANGE UP (ref 7–23)
CALCIUM SERPL-MCNC: 8.9 MG/DL — SIGNIFICANT CHANGE UP (ref 8.5–10.1)
CHLORIDE SERPL-SCNC: 108 MMOL/L — SIGNIFICANT CHANGE UP (ref 96–108)
CO2 SERPL-SCNC: 18 MMOL/L — LOW (ref 22–31)
CREAT SERPL-MCNC: 0.88 MG/DL — SIGNIFICANT CHANGE UP (ref 0.5–1.3)
GLUCOSE SERPL-MCNC: 95 MG/DL — SIGNIFICANT CHANGE UP (ref 70–99)
HCT VFR BLD CALC: 27.2 % — LOW (ref 34.5–45)
HGB BLD-MCNC: 8.6 G/DL — LOW (ref 11.5–15.5)
MCHC RBC-ENTMCNC: 28.4 PG — SIGNIFICANT CHANGE UP (ref 27–34)
MCHC RBC-ENTMCNC: 31.8 GM/DL — LOW (ref 32–36)
MCV RBC AUTO: 89.2 FL — SIGNIFICANT CHANGE UP (ref 80–100)
PLATELET # BLD AUTO: 206 K/UL — SIGNIFICANT CHANGE UP (ref 150–400)
POTASSIUM SERPL-MCNC: 3.4 MMOL/L — LOW (ref 3.5–5.3)
POTASSIUM SERPL-SCNC: 3.4 MMOL/L — LOW (ref 3.5–5.3)
RBC # BLD: 3.04 M/UL — LOW (ref 3.8–5.2)
RBC # FLD: 14.2 % — SIGNIFICANT CHANGE UP (ref 10.3–14.5)
SODIUM SERPL-SCNC: 139 MMOL/L — SIGNIFICANT CHANGE UP (ref 135–145)
WBC # BLD: 7.8 K/UL — SIGNIFICANT CHANGE UP (ref 3.8–10.5)
WBC # FLD AUTO: 7.8 K/UL — SIGNIFICANT CHANGE UP (ref 3.8–10.5)

## 2017-11-09 PROCEDURE — 99233 SBSQ HOSP IP/OBS HIGH 50: CPT

## 2017-11-09 PROCEDURE — 99232 SBSQ HOSP IP/OBS MODERATE 35: CPT

## 2017-11-09 RX ORDER — POTASSIUM CHLORIDE 20 MEQ
40 PACKET (EA) ORAL ONCE
Qty: 0 | Refills: 0 | Status: COMPLETED | OUTPATIENT
Start: 2017-11-09 | End: 2017-11-09

## 2017-11-09 RX ADMIN — Medication 1 APPLICATION(S): at 18:07

## 2017-11-09 RX ADMIN — Medication 1 SPRAY(S): at 06:48

## 2017-11-09 RX ADMIN — Medication 1 SPRAY(S): at 18:07

## 2017-11-09 RX ADMIN — Medication 500 MILLIGRAM(S): at 18:08

## 2017-11-09 RX ADMIN — ALBUTEROL 2 PUFF(S): 90 AEROSOL, METERED ORAL at 09:25

## 2017-11-09 RX ADMIN — HEPARIN SODIUM 5000 UNIT(S): 5000 INJECTION INTRAVENOUS; SUBCUTANEOUS at 06:48

## 2017-11-09 RX ADMIN — HYDROMORPHONE HYDROCHLORIDE 3 MILLIGRAM(S): 2 INJECTION INTRAMUSCULAR; INTRAVENOUS; SUBCUTANEOUS at 10:50

## 2017-11-09 RX ADMIN — Medication 40 MILLIEQUIVALENT(S): at 10:51

## 2017-11-09 RX ADMIN — HEPARIN SODIUM 5000 UNIT(S): 5000 INJECTION INTRAVENOUS; SUBCUTANEOUS at 15:16

## 2017-11-09 RX ADMIN — FENTANYL CITRATE 1 PATCH: 50 INJECTION INTRAVENOUS at 18:07

## 2017-11-09 RX ADMIN — HYDROMORPHONE HYDROCHLORIDE 3 MILLIGRAM(S): 2 INJECTION INTRAMUSCULAR; INTRAVENOUS; SUBCUTANEOUS at 16:14

## 2017-11-09 RX ADMIN — HYDROMORPHONE HYDROCHLORIDE 3 MILLIGRAM(S): 2 INJECTION INTRAMUSCULAR; INTRAVENOUS; SUBCUTANEOUS at 22:30

## 2017-11-09 RX ADMIN — LAMOTRIGINE 100 MILLIGRAM(S): 25 TABLET, ORALLY DISINTEGRATING ORAL at 13:35

## 2017-11-09 RX ADMIN — OXYCODONE HYDROCHLORIDE 60 MILLIGRAM(S): 5 TABLET ORAL at 21:35

## 2017-11-09 RX ADMIN — Medication 400 MILLIGRAM(S): at 06:49

## 2017-11-09 RX ADMIN — SERTRALINE 50 MILLIGRAM(S): 25 TABLET, FILM COATED ORAL at 13:35

## 2017-11-09 RX ADMIN — Medication 500 MILLIGRAM(S): at 06:49

## 2017-11-09 RX ADMIN — OXYCODONE HYDROCHLORIDE 60 MILLIGRAM(S): 5 TABLET ORAL at 06:49

## 2017-11-09 RX ADMIN — Medication 2 SPRAY(S): at 18:08

## 2017-11-09 RX ADMIN — LAMOTRIGINE 100 MILLIGRAM(S): 25 TABLET, ORALLY DISINTEGRATING ORAL at 21:01

## 2017-11-09 RX ADMIN — Medication 400 MILLIGRAM(S): at 18:07

## 2017-11-09 RX ADMIN — BUPROPION HYDROCHLORIDE 300 MILLIGRAM(S): 150 TABLET, EXTENDED RELEASE ORAL at 13:35

## 2017-11-09 RX ADMIN — Medication 1 MILLIGRAM(S): at 11:46

## 2017-11-09 RX ADMIN — LAMOTRIGINE 100 MILLIGRAM(S): 25 TABLET, ORALLY DISINTEGRATING ORAL at 06:49

## 2017-11-09 RX ADMIN — Medication 2 SPRAY(S): at 06:49

## 2017-11-09 RX ADMIN — BUDESONIDE AND FORMOTEROL FUMARATE DIHYDRATE 2 PUFF(S): 160; 4.5 AEROSOL RESPIRATORY (INHALATION) at 06:48

## 2017-11-09 RX ADMIN — HEPARIN SODIUM 5000 UNIT(S): 5000 INJECTION INTRAVENOUS; SUBCUTANEOUS at 21:02

## 2017-11-09 RX ADMIN — Medication 1 TABLET(S): at 11:46

## 2017-11-09 RX ADMIN — OXYCODONE HYDROCHLORIDE 60 MILLIGRAM(S): 5 TABLET ORAL at 13:38

## 2017-11-09 RX ADMIN — Medication 150 MICROGRAM(S): at 06:49

## 2017-11-09 RX ADMIN — HYDROMORPHONE HYDROCHLORIDE 3 MILLIGRAM(S): 2 INJECTION INTRAMUSCULAR; INTRAVENOUS; SUBCUTANEOUS at 21:32

## 2017-11-09 RX ADMIN — OXYCODONE HYDROCHLORIDE 60 MILLIGRAM(S): 5 TABLET ORAL at 21:01

## 2017-11-09 RX ADMIN — PREGABALIN 1000 MICROGRAM(S): 225 CAPSULE ORAL at 11:46

## 2017-11-09 RX ADMIN — HYDROMORPHONE HYDROCHLORIDE 3 MILLIGRAM(S): 2 INJECTION INTRAMUSCULAR; INTRAVENOUS; SUBCUTANEOUS at 15:14

## 2017-11-09 RX ADMIN — RISPERIDONE 8 MILLIGRAM(S): 4 TABLET ORAL at 21:02

## 2017-11-09 RX ADMIN — OXYCODONE HYDROCHLORIDE 60 MILLIGRAM(S): 5 TABLET ORAL at 07:00

## 2017-11-09 RX ADMIN — BUDESONIDE AND FORMOTEROL FUMARATE DIHYDRATE 2 PUFF(S): 160; 4.5 AEROSOL RESPIRATORY (INHALATION) at 18:58

## 2017-11-09 RX ADMIN — OXYCODONE HYDROCHLORIDE 60 MILLIGRAM(S): 5 TABLET ORAL at 14:38

## 2017-11-09 RX ADMIN — PANTOPRAZOLE SODIUM 40 MILLIGRAM(S): 20 TABLET, DELAYED RELEASE ORAL at 06:49

## 2017-11-09 RX ADMIN — ONDANSETRON 4 MILLIGRAM(S): 8 TABLET, FILM COATED ORAL at 09:26

## 2017-11-09 RX ADMIN — ONDANSETRON 4 MILLIGRAM(S): 8 TABLET, FILM COATED ORAL at 04:09

## 2017-11-09 RX ADMIN — HYDROMORPHONE HYDROCHLORIDE 3 MILLIGRAM(S): 2 INJECTION INTRAMUSCULAR; INTRAVENOUS; SUBCUTANEOUS at 11:52

## 2017-11-09 NOTE — PROGRESS NOTE ADULT - SUBJECTIVE AND OBJECTIVE BOX
Patient is a 32y old  Female who presents with a chief complaint of RLE edema and numbness (13 Oct 2017 16:25)       INTERVAL HPI/ OVERNIGHT EVENTS: No new events, complaints     MEDICATIONS  (STANDING):  ascorbic acid 500 milliGRAM(s) Oral two times a day  BACItracin   Ointment 1 Application(s) Topical two times a day  buDESOnide  80 MICROgram(s)/formoterol 4.5 MICROgram(s) Inhaler 2 Puff(s) Inhalation two times a day  buPROPion XL . 300 milliGRAM(s) Oral daily  cyanocobalamin 1000 MICROGram(s) Oral daily  fentaNYL   Patch  25 MICROgram(s)/Hr 1 Patch Transdermal every 72 hours  fluticasone propionate 50 MICROgram(s)/spray Nasal Spray 1 Spray(s) Both Nostrils two times a day  folic acid 1 milliGRAM(s) Oral daily  influenza   Vaccine 0.5 milliLiter(s) IntraMuscular once  ipratropium 42 MICROgram(s) Nasal 2 Spray(s) Both Nostrils every 12 hours  lamoTRIgine 100 milliGRAM(s) Oral every 8 hours  levothyroxine 150 MICROGram(s) Oral daily  multivitamin/minerals 1 Tablet(s) Oral daily  oxyCODONE  ER Tablet 60 milliGRAM(s) Oral every 8 hours  pantoprazole    Tablet 40 milliGRAM(s) Oral before breakfast  potassium chloride    Tablet ER 40 milliEquivalent(s) Oral once  risperiDONE   Tablet 8 milliGRAM(s) Oral at bedtime  sertraline 50 milliGRAM(s) Oral daily  topiramate 400 milliGRAM(s) Oral two times a day  Viibryd Oral Tablet 40 milliGRAM(s),Viibryd Oral Tablet 40 mg 1 Tablet(s),Viibryd Oral Tablet 40 mg 1 Tablet(s) 1 Tablet(s) Oral daily    MEDICATIONS  (PRN):  acetaminophen   Tablet 650 milliGRAM(s) Oral every 6 hours PRN For Temp greater than 38 C (100.4 F)  acetaminophen   Tablet. 650 milliGRAM(s) Oral every 6 hours PRN Mild Pain (1 - 3)  ALBUTerol    90 MICROgram(s) HFA Inhaler 2 Puff(s) Inhalation every 6 hours PRN Shortness of Breath and/or Wheezing  clonazePAM Tablet 2 milliGRAM(s) Oral every 8 hours PRN anxiety  docusate sodium 100 milliGRAM(s) Oral two times a day PRN Constipation  HYDROmorphone   Tablet 3 milliGRAM(s) Oral every 4 hours PRN moderate to severe pain  HYDROmorphone  Injectable 0.5 milliGRAM(s) IV Push every 3 hours PRN Severe Pain (7 - 10)  ondansetron Injectable 4 milliGRAM(s) IV Push every 4 hours PRN Nausea and/or Vomiting  senna 2 Tablet(s) Oral at bedtime PRN Constipation      Allergies    avocado (Unknown)  latex (Urticaria; Rash)  penicillin (Unknown)  sulfa drugs (Unknown)    Intolerances        REVIEW OF SYSTEMS:  CONSTITUTIONAL: No fever, or fatigue  EYES: No eye pain, visual disturbances, or discharge  ENMT:  No difficulty hearing, tinnitus, vertigo; No sinus or throat pain  NECK: No pain or stiffness  RESPIRATORY: No cough, wheezing, chills or hemoptysis; No shortness of breath  CARDIOVASCULAR: No chest pain, palpitations, dizziness, or leg swelling  GASTROINTESTINAL: No abdominal or epigastric pain. No nausea, vomiting, or hematemesis; No diarrhea or constipation. No melena or hematochezia.  GENITOURINARY: No dysuria, frequency, hematuria, or incontinence  NEUROLOGICAL: No headaches, memory loss, loss of strength, numbness, or tremors  SKIN: No itching, burning, rashes, or lesions   LYMPH NODES: No enlarged glands  ENDOCRINE: No heat or cold intolerance; No hair loss; No polydipsia or polyuria  MUSCULOSKELETAL: No joint pain or swelling; No muscle, back, or extremity pain  HEME/LYMPH: No easy bruising, or bleeding gums  ALLERGY AND IMMUNOLOGIC: No hives or eczema    Vital Signs Last 24 Hrs  T(C): 36.9 (09 Nov 2017 07:40), Max: 37.4 (08 Nov 2017 23:58)  T(F): 98.4 (09 Nov 2017 07:40), Max: 99.3 (08 Nov 2017 23:58)  HR: 97 (09 Nov 2017 07:40) (95 - 113)  BP: 90/59 (09 Nov 2017 07:40) (90/59 - 103/63)  BP(mean): --  RR: 16 (09 Nov 2017 07:40) (16 - 16)  SpO2: 98% (09 Nov 2017 07:40) (97% - 98%)    PHYSICAL EXAM:  GENERAL: NAD,  well-developed  HEAD:  Atraumatic, Normocephalic  EYES: EOMI, PERRLA, conjunctiva and sclera clear  ENMT: No tonsillar erythema, exudates, or enlargement; Moist mucous membranes  NECK: Supple, No JVD, Normal thyroid  NERVOUS SYSTEM:  Alert & Oriented X3, Good concentration; Non focal   CHEST/LUNG: Clear to auscultation bilaterally; No rales, rhonchi, wheezing, or rubs  HEART: Regular rate and rhythm; No murmurs, rubs, or gallops  ABDOMEN: Soft, Nontender, Nondistended; Bowel sounds present  EXTREMITIES:  R calf wounds; R thigh with dressing clean, dry, and intact; no swelling or calf tenderness in the left leg, + bandage left thigh.   LYMPH: No lymphadenopathy noted  SKIN: No rashes or lesions    LABS:                        8.6    7.8   )-----------( 206      ( 09 Nov 2017 06:59 )             27.2     09 Nov 2017 06:59    139    |  108    |  20     ----------------------------<  95     3.4     |  18     |  0.88     Ca    8.9        09 Nov 2017 06:59        CAPILLARY BLOOD GLUCOSE        BLOOD CULTURE    RADIOLOGY & ADDITIONAL TESTS:    Imaging Personally Reviewed:  [ ] YES     Consultant(s) Notes Reviewed:      Care Discussed with Consultants/Other Providers:

## 2017-11-09 NOTE — PROGRESS NOTE ADULT - PROBLEM SELECTOR PLAN 9
-c/w all home meds  -Appreciate psychiatry consult Dr. Schmidt   -pain management consult Dr. Damico -c/w all home meds  - Psych f/u requested for dc planning purpose.  -pain management consult Dr. Damico

## 2017-11-09 NOTE — PROGRESS NOTE ADULT - MINUTES
20
20
15
15
20
45
40
42
36
40
36
40
45
40
45
45
40
45
50
60

## 2017-11-09 NOTE — PROGRESS NOTE ADULT - SUBJECTIVE AND OBJECTIVE BOX
ABELARDO CROWELL  [id number] 32y    PLASTIC SURGERY / DR. HERNANDEZ    Vital Signs Last 24 Hrs  T(C): 36.9 (09 Nov 2017 07:40), Max: 37.4 (08 Nov 2017 23:58)  T(F): 98.4 (09 Nov 2017 07:40), Max: 99.3 (08 Nov 2017 23:58)  HR: 97 (09 Nov 2017 07:40) (95 - 113)  BP: 90/59 (09 Nov 2017 07:40) (90/59 - 103/63)  BP(mean): --  RR: 16 (09 Nov 2017 07:40) (16 - 16)  SpO2: 98% (09 Nov 2017 07:40) (97% - 98%)    POD # 6    DRESSING CHANGED WITH DAVID GARVEY  LAST PM STSG TO RIGHT LOWER LEG MEDIAL AND LATERAL CALF WOUNDS 100% TAKEN  DRESSING TO LOWER RIGHT LEG SOO WOUNDS IS DRY AND INTACT  NEW AQUACEL DRESSING TO RIGHT LOWER THIGH WOUND IS DRY AND INTACT  LEFT UPPER THIGH WOUND DONOR SITE INTACT                          8.5    7.2   )-----------( 211      ( 08 Nov 2017 08:05 )             27.1      11-09    139  |  108  |  20  ----------------------------<  95  3.4<L>   |  18<L>  |  0.88    Ca    8.9      09 Nov 2017 06:59                            ASSESSMENT &  PLAN:  POD # 6  S/P STSG RIGHT LOWER LEG/ CALF WOUNDS AND COMPLEX CLOSURE WITH ADVANCEMENT FLAPS RIGHT                             LOWER THIGH WOUND     CONTINUE WITH  RIGHT LEG ELEVATION, DO NOT CHANGE/ REMOVE/ DISTURB RIGHT  LOWER LEG SKIN  GRAFT SITE     AS PER DR. HERNANDEZ TO REEVALUATE THE SKIN GRAFT SITE ON FRIDAY THEN WOULD CLEAR FOR DISCHARGE FROM PLASTIC SURGERY STAND POINT

## 2017-11-09 NOTE — PROGRESS NOTE ADULT - ASSESSMENT
32-year-old female now status post right lower extremity fasciotomy for prolonged compression after a fall with lethargy.     - Pt s/p Split thickness skin graft, right lower extremity wound closure tolerated procedure well with no cardiac complications.    - HR is better controlled.  SBP at 90's-100's but asymptomatic  - plastics/vascular f/u  - pain control  - TTE with hyperdynamic LV function, EF 70%, no valvular disease noted; IV hydration is appropriate  - Other cardiovascular workup will depend on clinical course.  - Monitor and replete electrolytes. Keep K>4.0 and Mg>2.0.    - transfuse prn  - DVT ppx  - All other workup per primary team  - dc planning to [facility] this week    Kathleen Fernandes NP  Cardiology

## 2017-11-09 NOTE — PROGRESS NOTE ADULT - SUBJECTIVE AND OBJECTIVE BOX
HPI:  Pt is a 31 yo female with PMHX of Bipolar disorder, hypothyroidism, chronic low back pain on morphine s/p laminectomy and spinal fusion in 2016 who is BIB EMS for right leg swelling and numbness. Patient is a poor historian as she is on many pain medicines and is currently lethargic, therefore much info provided based on hospital staff and charts. She states that 18 hours prior to arrival, she fell on the floor at her house after taking too much morphine and was lying on floor for 8 hours. She woke up to numbness, swelling, and cool temperature of her right leg, from below the knee onwards. Patient denies any current SOB, CP, f/c, denies head trauma.     In the ED, vitals were Temp 101.4F /91  RR 20  SpO2 93% RA. Significant labs are WBS 24.3, Hb 15.6, Hmt 47.6,  Platelets 118, neutr 93%, sodium 131, BUN 30,  Cr 2.30, glucose 152 AST 1377, , GFR 27, lactate 1.7. Doppler US RLE showed Extremely limited evaluation of the right lower extremity with nonvisualization of the venous structures beyond the mid right femoral vein. No evidence of DVT within the right common femoral, proximal and   mid femoral veins. No evidence of left lower extremity deep venous thrombosis. XR of right tibia and XR of R femur showed no acute fractures. Arterial Doppler of RLE pending. EKG showed sinus tach of 140, with right superior axis deviation. Patient started on aztreonam, Tylenol given for fever, patient made NPO, Dr. Martines consulted for emergent surgery. (04 Oct 2017 01:13)    SUBJECTIVE:  Patient is a 32y old  Female who presents with a chief complaint of RLE edema and numbness (13 Oct 2017 16:25)    Awake and alert.  Comfortable on RA.  Denies CP, SOB, orthopnea, or palpitations.  Strongly refused to be examined.    OBJECTIVE:  Review Of Systems:  Constitutional: [ ] Fever [ ] Chills [ ] Fatigue [ ] Weight change   HEENT: [ ] Blurred vision [ ] Eye Pain [ ] Headache [ ] Runny nose [ ] Sore Throat   Respiratory: [ ] Cough [ ] Wheezing [ ] Shortness of breath  Cardiovascular: [ ] Chest Pain [ ] Palpitations [ ] BENNETT [ ] PND [ ] Orthopnea  Gastrointestinal: [ ] Abdominal Pain [ ] Diarrhea [ ] Constipation [ ] Hemorrhoids [ ] Nausea [ ] Vomiting  Genitourinary: [ ] Nocturia [ ] Dysuria [ ] Incontinence  Extremities: [ ] Swelling [ ] Joint Pain  Neurologic: [ ] Focal deficit [ ] Paresthesias [ ] Syncope  Lymphatic: [ ] Swelling [ ] Lymphadenopathy   Skin: [ ] Rash [ ] Ecchymoses [ ] Wounds [ ] Lesions  Psychiatry: [ ] Depression [ ] Suicidal/Homicidal Ideation [ ] Anxiety [ ] Sleep Disturbances  [x ] 10 point review of systems is otherwise negative except as mentioned above            [ ]Unable to obtain    Allergy:  Allergies    avocado (Unknown)  latex (Urticaria; Rash)  penicillin (Unknown)  sulfa drugs (Unknown)    Intolerances    Medications:  MEDICATIONS  (STANDING):  ascorbic acid 500 milliGRAM(s) Oral two times a day  BACItracin   Ointment 1 Application(s) Topical two times a day  buDESOnide  80 MICROgram(s)/formoterol 4.5 MICROgram(s) Inhaler 2 Puff(s) Inhalation two times a day  buPROPion XL . 300 milliGRAM(s) Oral daily  cyanocobalamin 1000 MICROGram(s) Oral daily  fentaNYL   Patch  25 MICROgram(s)/Hr 1 Patch Transdermal every 72 hours  fluticasone propionate 50 MICROgram(s)/spray Nasal Spray 1 Spray(s) Both Nostrils two times a day  folic acid 1 milliGRAM(s) Oral daily  heparin  Injectable 5000 Unit(s) SubCutaneous every 8 hours  influenza   Vaccine 0.5 milliLiter(s) IntraMuscular once  ipratropium 42 MICROgram(s) Nasal 2 Spray(s) Both Nostrils every 12 hours  lamoTRIgine 100 milliGRAM(s) Oral every 8 hours  levothyroxine 150 MICROGram(s) Oral daily  multivitamin/minerals 1 Tablet(s) Oral daily  oxyCODONE  ER Tablet 60 milliGRAM(s) Oral every 8 hours  pantoprazole    Tablet 40 milliGRAM(s) Oral before breakfast  potassium chloride    Tablet ER 40 milliEquivalent(s) Oral once  risperiDONE   Tablet 8 milliGRAM(s) Oral at bedtime  sertraline 50 milliGRAM(s) Oral daily  Spiriva Respimat 2 Inhalation 2 Inhalation Inhalation daily  topiramate 400 milliGRAM(s) Oral two times a day  Viibryd Oral Tablet 40 milliGRAM(s),Viibryd Oral Tablet 40 mg 1 Tablet(s),Viibryd Oral Tablet 40 mg 1 Tablet(s) 1 Tablet(s) Oral daily    MEDICATIONS  (PRN):  acetaminophen   Tablet 650 milliGRAM(s) Oral every 6 hours PRN For Temp greater than 38 C (100.4 F)  acetaminophen   Tablet. 650 milliGRAM(s) Oral every 6 hours PRN Mild Pain (1 - 3)  ALBUTerol    90 MICROgram(s) HFA Inhaler 2 Puff(s) Inhalation every 6 hours PRN Shortness of Breath and/or Wheezing  clonazePAM Tablet 2 milliGRAM(s) Oral every 8 hours PRN anxiety  docusate sodium 100 milliGRAM(s) Oral two times a day PRN Constipation  HYDROmorphone   Tablet 3 milliGRAM(s) Oral every 4 hours PRN moderate to severe pain  HYDROmorphone  Injectable 0.5 milliGRAM(s) IV Push every 3 hours PRN Severe Pain (7 - 10)  ondansetron Injectable 4 milliGRAM(s) IV Push every 4 hours PRN Nausea and/or Vomiting  senna 2 Tablet(s) Oral at bedtime PRN Constipation    PMH/PSH/FH/SH: [ ] Unchanged    Vitals:  T(C): 36.9 (11-09-17 @ 07:40), Max: 37.4 (11-08-17 @ 23:58)  HR: 97 (11-09-17 @ 07:40) (96 - 113)  BP: 90/59 (11-09-17 @ 07:40) (90/59 - 103/63)  BP(mean): --  RR: 16 (11-09-17 @ 07:40) (16 - 16)  SpO2: 98% (11-09-17 @ 07:40) (97% - 98%)  Wt(kg): --  Daily     Daily   I&O's Summary    Labs:                        8.6    7.8   )-----------( 206      ( 09 Nov 2017 06:59 )             27.2     11-09    139  |  108  |  20  ----------------------------<  95  3.4<L>   |  18<L>  |  0.88    Ca    8.9      09 Nov 2017 06:59    ECG:  < from: 12 Lead ECG (10.26.17 @ 15:02) >    Ventricular Rate 104 BPM    Atrial Rate 104 BPM    P-R Interval 152 ms    QRS Duration 106 ms    Q-T Interval 358 ms    QTC Calculation(Bezet) 470 ms    P Axis 42 degrees    R Axis -1 degrees    T Axis 29 degrees    Diagnosis Line Sinus tachycardia  Low voltage QRS  Borderline ECG  When compared with ECG of 04-OCT-2017 08:44,  QRS axis shifted right  Nonspecific T wave abnormality no longer evident in Anterior leads  Confirmed by Juanito Brink MD ([id number]) on 10/27/2017 2:02:06 PM    < end of copied text >    Echo:  < from: TTE Echo Doppler w/o Cont (10.05.17 @ 12:46) >     EXAM:  ECHO TTE W/O CON COMP W/DOPPLR      PROCEDURE DATE:  10/05/2017      INTERPRETATION:  Ordering Physician: MARISSA RUFF [id number]    Indication: Respiratory failure    Study Quality: Technically difficult in ICU   A complete echocardiographic study was performed utilizing standard   protocol including spectral and color Doppler in all echocardiographic   windows.    Height: 1 75 cm  Weight: 113 kg  BSA: 2.27  Blood Pressure: 104/76    MEASUREMENTS  IVS: 0.9cm  PWT: 0.9cm  LA: 3.5cm  AO: 2.8cm  LVIDd: 5.2cm  LVIDs: 3.5cm    LVEF: 70%    FINDINGS  Left Ventricle: Hyperdynamic left ventricle. Estimated EF 70%. No   segmental wall motion abnormalities  Aortic Valve: Not well-visualized. Probably normal trileaflet aortic valve  Mitral Valve: There is subtle systolic anterior motion of the mitral   valve in the setting of a hyperdynamic left ventricle. No left   ventricular outflow tract gradient is seen. Trace mitral regurgitation is   visualized.  Tricuspid Valve: Not well visualized. Trace tricuspid regurgitation  Pulmonic Valve: Not visualized.  Left Atrium: Grossly normal  Right Ventricle: Not well-visualized. Grossly normal right ventricular   size and function  Right Atrium: Grossly normal  Diastolic Function: Normaldiastolic function  Pericardium/Pleura: No pericardial effusion      CONCLUSIONS:  1. Technically difficult study  2. Hyperdynamic left ventricle. Estimated EF 70%  3. Subtle systolic anterior motion of the mitral valve in the setting of   a hyperdynamic left ventricle, but no outflow tract gradient.  4. Right ventricle is not well visualized.Grossly normal right   ventricular size and function  5. No pericardial effusion    No prior exam for comparison      ROBERTA MARKS   This document has been electronically signed. Oct  6 2017  1:33PM      < end of copied text >    Stress Testing:     Cath:    Imaging:  < from: Xray Chest 1 View AP- PORTABLE-Urgent (10.26.17 @ 19:55) >    EXAM:  PORTABLE CHEST URGENT                            PROCEDURE DATE:  10/26/2017      INTERPRETATION:  Clinical information: Fever    Comparison exam dated 10/16/2017    Patient is tilted towards the left.    Portable study, 7:41 PM  No evidence of infiltrate effusion or congestive failure. Heart size   within normal limits. Hilar regions mediastinal contours and bony thorax   are intact. Old left clavicle fracture present.    IMPRESSION: No active disease.    KATHY AMAYA M.D.,ATTENDING RADIOLOGIST  This document has been electronically signed. Oct 26 2017  8:00PM      < end of copied text >    Interpretation of Telemetry: Not on tele    Physical Exam:  Appearance: [x ] Normal  [ ] abnormal [ x] NAD   Eyes: [ ] PERRL [ ] EOMI  HENT: [ ] Normal [ ] Abnormal oral muscosa [ ]NC/AT  Cardiovascular: [ ] S1 [ ] S2 [ ] RRR [ ] m/r/g [ ]edema [ ] JVP  Procedural Access Site: [ ]  hematoma [ ] tender to palpation [ ] 2+ pulse [ ] bruit [ ] Ecchymosis  Respiratory: [ ] Clear to auscultation bilaterally  Gastrointestinal: [ ] Soft [ ] tenderness[ ] distension [ ] BS  Musculoskeletal: [ ] clubbing [ ] joint deformity   Neurologic: [ ] Non-focal  Lymphatic: [ ] lymphadenopathy  Psychiatry: [x ] AAOx3  [ ] confused [ ] disoriented [ ] Mood & affect appropriate  [x] Uncooperative  Skin: [ ]  rashes [ ] ecchymoses [ ] cyanosis    Refused to be examined

## 2017-11-10 VITALS
RESPIRATION RATE: 16 BRPM | DIASTOLIC BLOOD PRESSURE: 65 MMHG | OXYGEN SATURATION: 100 % | SYSTOLIC BLOOD PRESSURE: 93 MMHG | HEART RATE: 98 BPM

## 2017-11-10 LAB
ANION GAP SERPL CALC-SCNC: 14 MMOL/L — SIGNIFICANT CHANGE UP (ref 5–17)
BUN SERPL-MCNC: 20 MG/DL — SIGNIFICANT CHANGE UP (ref 7–23)
CALCIUM SERPL-MCNC: 8.7 MG/DL — SIGNIFICANT CHANGE UP (ref 8.5–10.1)
CHLORIDE SERPL-SCNC: 107 MMOL/L — SIGNIFICANT CHANGE UP (ref 96–108)
CO2 SERPL-SCNC: 17 MMOL/L — LOW (ref 22–31)
CREAT SERPL-MCNC: 0.85 MG/DL — SIGNIFICANT CHANGE UP (ref 0.5–1.3)
GLUCOSE SERPL-MCNC: 98 MG/DL — SIGNIFICANT CHANGE UP (ref 70–99)
HCT VFR BLD CALC: 25.8 % — LOW (ref 34.5–45)
HGB BLD-MCNC: 8.2 G/DL — LOW (ref 11.5–15.5)
MCHC RBC-ENTMCNC: 28 PG — SIGNIFICANT CHANGE UP (ref 27–34)
MCHC RBC-ENTMCNC: 31.9 GM/DL — LOW (ref 32–36)
MCV RBC AUTO: 87.7 FL — SIGNIFICANT CHANGE UP (ref 80–100)
PLATELET # BLD AUTO: 216 K/UL — SIGNIFICANT CHANGE UP (ref 150–400)
POTASSIUM SERPL-MCNC: 3.3 MMOL/L — LOW (ref 3.5–5.3)
POTASSIUM SERPL-SCNC: 3.3 MMOL/L — LOW (ref 3.5–5.3)
RBC # BLD: 2.95 M/UL — LOW (ref 3.8–5.2)
RBC # FLD: 13.6 % — SIGNIFICANT CHANGE UP (ref 10.3–14.5)
SODIUM SERPL-SCNC: 138 MMOL/L — SIGNIFICANT CHANGE UP (ref 135–145)
WBC # BLD: 6.7 K/UL — SIGNIFICANT CHANGE UP (ref 3.8–10.5)
WBC # FLD AUTO: 6.7 K/UL — SIGNIFICANT CHANGE UP (ref 3.8–10.5)

## 2017-11-10 PROCEDURE — 99239 HOSP IP/OBS DSCHRG MGMT >30: CPT

## 2017-11-10 PROCEDURE — 99232 SBSQ HOSP IP/OBS MODERATE 35: CPT

## 2017-11-10 RX ORDER — OXYCODONE HYDROCHLORIDE 5 MG/1
1 TABLET ORAL
Qty: 0 | Refills: 0 | DISCHARGE
Start: 2017-11-10

## 2017-11-10 RX ORDER — HEPARIN SODIUM 5000 [USP'U]/ML
5000 INJECTION INTRAVENOUS; SUBCUTANEOUS
Qty: 0 | Refills: 0 | DISCHARGE
Start: 2017-11-10

## 2017-11-10 RX ADMIN — HYDROMORPHONE HYDROCHLORIDE 3 MILLIGRAM(S): 2 INJECTION INTRAMUSCULAR; INTRAVENOUS; SUBCUTANEOUS at 05:09

## 2017-11-10 RX ADMIN — INFLUENZA VIRUS VACCINE 0.5 MILLILITER(S): 15; 15; 15; 15 SUSPENSION INTRAMUSCULAR at 15:55

## 2017-11-10 RX ADMIN — LAMOTRIGINE 100 MILLIGRAM(S): 25 TABLET, ORALLY DISINTEGRATING ORAL at 06:41

## 2017-11-10 RX ADMIN — BUPROPION HYDROCHLORIDE 300 MILLIGRAM(S): 150 TABLET, EXTENDED RELEASE ORAL at 11:25

## 2017-11-10 RX ADMIN — PANTOPRAZOLE SODIUM 40 MILLIGRAM(S): 20 TABLET, DELAYED RELEASE ORAL at 06:41

## 2017-11-10 RX ADMIN — PREGABALIN 1000 MICROGRAM(S): 225 CAPSULE ORAL at 11:25

## 2017-11-10 RX ADMIN — Medication 400 MILLIGRAM(S): at 17:15

## 2017-11-10 RX ADMIN — OXYCODONE HYDROCHLORIDE 60 MILLIGRAM(S): 5 TABLET ORAL at 06:41

## 2017-11-10 RX ADMIN — Medication 400 MILLIGRAM(S): at 06:41

## 2017-11-10 RX ADMIN — Medication 1 MILLIGRAM(S): at 11:25

## 2017-11-10 RX ADMIN — Medication 1 SPRAY(S): at 06:51

## 2017-11-10 RX ADMIN — HYDROMORPHONE HYDROCHLORIDE 3 MILLIGRAM(S): 2 INJECTION INTRAMUSCULAR; INTRAVENOUS; SUBCUTANEOUS at 09:54

## 2017-11-10 RX ADMIN — Medication 1 TABLET(S): at 11:25

## 2017-11-10 RX ADMIN — OXYCODONE HYDROCHLORIDE 60 MILLIGRAM(S): 5 TABLET ORAL at 07:30

## 2017-11-10 RX ADMIN — Medication 500 MILLIGRAM(S): at 17:16

## 2017-11-10 RX ADMIN — Medication 500 MILLIGRAM(S): at 06:41

## 2017-11-10 RX ADMIN — HYDROMORPHONE HYDROCHLORIDE 3 MILLIGRAM(S): 2 INJECTION INTRAMUSCULAR; INTRAVENOUS; SUBCUTANEOUS at 04:39

## 2017-11-10 RX ADMIN — HEPARIN SODIUM 5000 UNIT(S): 5000 INJECTION INTRAVENOUS; SUBCUTANEOUS at 06:46

## 2017-11-10 RX ADMIN — HEPARIN SODIUM 5000 UNIT(S): 5000 INJECTION INTRAVENOUS; SUBCUTANEOUS at 13:47

## 2017-11-10 RX ADMIN — Medication 1 APPLICATION(S): at 06:45

## 2017-11-10 RX ADMIN — Medication 150 MICROGRAM(S): at 05:03

## 2017-11-10 RX ADMIN — HYDROMORPHONE HYDROCHLORIDE 3 MILLIGRAM(S): 2 INJECTION INTRAMUSCULAR; INTRAVENOUS; SUBCUTANEOUS at 10:54

## 2017-11-10 RX ADMIN — OXYCODONE HYDROCHLORIDE 60 MILLIGRAM(S): 5 TABLET ORAL at 13:46

## 2017-11-10 RX ADMIN — Medication 2 SPRAY(S): at 06:45

## 2017-11-10 RX ADMIN — Medication 2 MILLIGRAM(S): at 09:23

## 2017-11-10 RX ADMIN — SERTRALINE 50 MILLIGRAM(S): 25 TABLET, FILM COATED ORAL at 11:25

## 2017-11-10 RX ADMIN — LAMOTRIGINE 100 MILLIGRAM(S): 25 TABLET, ORALLY DISINTEGRATING ORAL at 13:47

## 2017-11-10 NOTE — PROGRESS NOTE ADULT - SUBJECTIVE AND OBJECTIVE BOX
ABELARDO CROWELL  [id number] 32y    PLASTIC SURGERY/ DR. HERNANDEZ    Vital Signs Last 24 Hrs  T(C): 37.1 (10 Nov 2017 00:22), Max: 37.2 (09 Nov 2017 18:08)  T(F): 98.7 (10 Nov 2017 00:22), Max: 98.9 (09 Nov 2017 18:08)  HR: 95 (10 Nov 2017 00:22) (95 - 110)  BP: 99/61 (10 Nov 2017 00:22) (90/55 - 99/61)  BP(mean): --  RR: 16 (10 Nov 2017 00:22) (16 - 16)  SpO2: 97% (10 Nov 2017 00:22) (97% - 98%)    POD # 7    STSG TO RIGHT LOWER LEG MEDIAL AND LATERAL CALF WOUNDS 100% TAKEN, NO DISGRACE/  BLEEDING   RIGHT LOWER THIGH WOUND IS DRY AND INTACT, SUTURES IN PLACE WITH GOOD APPROXIMATION   LEFT UPPER THIGH WOUND DONOR SITE INTACT                 ASSESSMENT &  PLAN:  POD # 7  S/P STSG RIGHT LOWER LEG/ CALF WOUNDS AND COMPLEX CLOSURE WITH ADVANCEMENT FLAPS RIGHT                             LOWER THIGH WOUND     DRESSING TO DONOR SITE , SKIN GRAFT SITE AND LOWER THIGH WOUND CHANGED  LEFT THIGH DONOR SITE XEROFORM, ABD AND ACE BANDAGE  EVERY 2-3 DAYS / AS NEEDED IF SATURATED   SKIN GRAFT SITE XEROFORM, ABD AND ACE BANDAGE LOOSELY CHANGE EVERY 2-3 DAYS UNTIL FULL GRANULATION   RIGHT LOWER THIGH WOUND AQUACEL DRESSING/ DRY 4X4 , MAY LEAVE AQUACEL FOR 5 DAYS / DRY 4X4 CHANGE DAILY, SUTURE TO BE REMOVED IN ONE WEEK     WILL DISCUSS THE PLAN WITH DR. HERNANDEZ THIS MORNING FOR DISCHARGE CLEARANCE              Electronic Signatures:  Janet Munson (PA)  (Signed 09-Nov-2017 08:13)  	Authored: Progress Note, Subjective and Objective      Last Updated: 09-Nov-2017 08:13 by Janet Munson (PA) ABELARDO CROWELL  [id number] 32y    PLASTIC SURGERY/ DR. HERNANDEZ    Vital Signs Last 24 Hrs  T(C): 37.1 (10 Nov 2017 00:22), Max: 37.2 (09 Nov 2017 18:08)  T(F): 98.7 (10 Nov 2017 00:22), Max: 98.9 (09 Nov 2017 18:08)  HR: 95 (10 Nov 2017 00:22) (95 - 110)  BP: 99/61 (10 Nov 2017 00:22) (90/55 - 99/61)  BP(mean): --  RR: 16 (10 Nov 2017 00:22) (16 - 16)  SpO2: 97% (10 Nov 2017 00:22) (97% - 98%)    POD # 7    STSG TO RIGHT LOWER LEG MEDIAL AND LATERAL CALF WOUNDS 100% TAKEN, NO DISGRACE/  BLEEDING   RIGHT LOWER THIGH WOUND IS DRY AND INTACT, SUTURES IN PLACE WITH GOOD APPROXIMATION   LEFT UPPER THIGH WOUND DONOR SITE INTACT                 ASSESSMENT &  PLAN:  POD # 7  S/P STSG RIGHT LOWER LEG/ CALF WOUNDS AND COMPLEX CLOSURE WITH ADVANCEMENT FLAPS RIGHT                             LOWER THIGH WOUND     DRESSING TO DONOR SITE , SKIN GRAFT SITE AND LOWER THIGH WOUND CHANGED  LEFT THIGH DONOR SITE XEROFORM, ABD AND ACE BANDAGE  EVERY 2-3 DAYS / AS NEEDED IF SATURATED   SKIN GRAFT SITE XEROFORM, ABD AND ACE BANDAGE LOOSELY CHANGE EVERY 2-3 DAYS UNTIL FULL GRANULATION   RIGHT LOWER THIGH WOUND AQUACEL DRESSING/ DRY 4X4 , MAY LEAVE AQUACEL FOR 5 DAYS / DRY 4X4 CHANGE DAILY, SUTURE TO BE REMOVED IN TWO WEEKS.     WILL DISCUSS THE PLAN WITH DR. HERNANDEZ THIS MORNING FOR DISCHARGE CLEARANCE              Electronic Signatures:  Janet Munson (PA)  (Signed 09-Nov-2017 08:13)  	Authored: Progress Note, Subjective and Objective      Last Updated: 09-Nov-2017 08:13 by Janet Munson (PA)

## 2017-11-10 NOTE — PROGRESS NOTE ADULT - ATTENDING COMMENTS
Patient will be revaluated by plastic surgeon Dr. malagon today. Psych f/u requested, Dr. Schmidt to see patient. DC planning to [facility] once cleared by Plastics , Psych, and has bed available.

## 2017-11-10 NOTE — PROGRESS NOTE ADULT - PROBLEM SELECTOR PROBLEM 3
Acute respiratory failure following trauma and surgery
Polysubstance dependence
Acute respiratory failure following trauma and surgery
Acute pain
Compartment syndrome of right lower extremity, initial encounter
Fever
Acute pain
Acute pain
GHAZAL (acute kidney injury)
Polysubstance dependence
Acute pain
Acute respiratory failure following trauma and surgery
Asthma
Asthma
Chronic pain
Fever
GHAZAL (acute kidney injury)
Opioid abuse
Opioid abuse
Polysubstance dependence
Polysubstance dependence
Rhabdomyolysis
Acute respiratory failure following trauma and surgery
Fever
GHAZAL (acute kidney injury)
Non-traumatic rhabdomyolysis
Opioid abuse
Asthma
GHAZAL (acute kidney injury)
Rhabdomyolysis
GHAZAL (acute kidney injury)
Opioid abuse
Opioid abuse
Fever

## 2017-11-10 NOTE — PROGRESS NOTE ADULT - PROBLEM SELECTOR PROBLEM 9
Bipolar 1 disorder
Need for prophylactic measure
Bipolar 1 disorder

## 2017-11-10 NOTE — PROGRESS NOTE ADULT - PROBLEM SELECTOR PLAN 5
wound care  vascular follow up  overall better  hygiene  reassurance  emotional support  may need to recall psych for follow up
stable H/H now  2 units RBCs transfusion, and monitor H/H
-c/w home meds
-c/w home meds
wound care  vascular follow up  skin hygiene  pain regimen in place
-GHAZAL 2/2 ATN from rhabdomyolysis. improving  - Avoid nephrotixic agents, NO NSAIDS and renally dose all meds  - strict I/O's  - Hypokalemia replete with Po Kcl  -monitor BMP
Dropping H/H  Likely ACD from chronic wound borderline low b12  Lymphadenopathy+ on neck exam, US of neck non specific, will monitor for now if increased size may need biopsy, discussed with Hem Onc  Hematology Dr. Jin consulted, appreciate recs
Dropping H/H  Likely ACD from chronic wound borderline low b12  Lymphadenopathy+ on neck exam, will get US of neck  Hematology Dr. Jin consulted, appreciate recs
Gradually decline in H/h.   2 units RBCs transfusion, and monitor H/H
Gradually decline in H/h.   may consider blood transfusion if Hb is <7 gm
Hematology consulted recs appreciated  US done of neck, consulted ENT Dr. Schmitz no need for FNA at this time as per specialists on the case
back and neck pain  chronic issues  discussed with pt - pain management doc - Dr. Anastacia john oxycontin tid, renewed this am  in addition pt has fentanyl 50mcg   will follow  bowel regimen  monitor vs and sedation
symbicort  proventil PRN  seasonal vaccination  I darcy  monitor sat
work up in progress  RVP neg  monitor wbc  I darcy  dvt p  ID follow up
-GHAZAL 2/2 ATN from rhabdomyolysis. improved  - Avoid nephrotixic agents, NO NSAIDS  - Hypokalemia replete prn  -monitor BMP
-GHAZAL 2/2 ATN from rhabdomyolysis. improving  - Avoid nephrotixic agents, NO NSAIDS and renally dose all meds  - strict I/O's  - Hypokalemia replete with Po Kcl  -monitor BMP
-c/w all home meds.
-c/w home meds
Dropping H/H  Get iron panel, TIBC, ferriting, b12, folate  1 unit prbc
Gradually decline in H/h.   2 units RBCs transfusion, and monitor H/H
Gradually decline in H/h.   2 units RBCs transfusion, and monitor H/H
Hematology consulted recs appreciated  US done of neck, consulted ENT Dr. Schmitz no need for FNA at this time as per specialists on the case
Hematology consulted recs appreciated  US done of neck, consulted ENT Dr. cShmitz no need for FNA at this time as per specialists on the case
Stable.  Hematology following
stable H/H now  2 units RBCs transfusion, and monitor H/H
stable H/H now  2 units RBCs transfusion, and monitor H/H
Improving. Continue to trend LFT's. Tylenol and ASA levels were stable.
Hematology consulted recs appreciated  US done of neck, consulted ENT Dr. Schmitz no need for FNA at this time as per specialists on the case
Hematology consulted recs appreciated  US done of neck, consulted ENT Dr. Schmitz no need for FNA at this time as per specialists on the case

## 2017-11-10 NOTE — PROGRESS NOTE ADULT - SUBJECTIVE AND OBJECTIVE BOX
32yFemale admitted to med/surg with following history:  HPI:  Pt is a 33 yo female with PMHX of Bipolar disorder, hypothyroidism, chronic low back pain on morphine s/p laminectomy and spinal fusion in 2016 who is BIB EMS for right leg swelling and numbness. Patient is a poor historian as she is on many pain medicines and is currently lethargic, therefore much info provided based on hospital staff and charts. She states that 18 hours prior to arrival, she fell on the floor at her house after taking too much morphine and was lying on floor for 8 hours. She woke up to numbness, swelling, and cool temperature of her right leg, from below the knee onwards. Patient denies any current SOB, CP, f/c, denies head trauma.     In the ED, vitals were Temp 101.4F /91  RR 20  SpO2 93% RA. Significant labs are WBS 24.3, Hb 15.6, Hmt 47.6,  Platelets 118, neutr 93%, sodium 131, BUN 30,  Cr 2.30, glucose 152 AST 1377, , GFR 27, lactate 1.7. Doppler US RLE showed Extremely limited evaluation of the right lower extremity with nonvisualization of the venous structures beyond the mid right femoral vein. No evidence of DVT within the right common femoral, proximal and   mid femoral veins. No evidence of left lower extremity deep venous thrombosis. XR of right tibia and XR of R femur showed no acute fractures. Arterial Doppler of RLE pending. EKG showed sinus tach of 140, with right superior axis deviation. Patient started on aztreonam, Tylenol given for fever, patient made NPO, Dr. Martines consulted for emergent surgery. (04 Oct 2017 01:13)      Psych HPI: Patient seen, evaluated and chart reviewed. Patient at this time is calm and cooperative, but presents with labile affect. She at this time denies symptoms of psychosis, candie or depression. She reports being ready for transfer to rehab.    PAST MEDICAL & SURGICAL HISTORY:  Low back pain  Hypothyroid  Bipolar 1 disorder  History of spinal fusion  History of laminectomy      Allergies    avocado (Unknown)  latex (Urticaria; Rash)  penicillin (Unknown)  sulfa drugs (Unknown)    Intolerances      MEDICATIONS  (STANDING):  ascorbic acid 500 milliGRAM(s) Oral two times a day  BACItracin   Ointment 1 Application(s) Topical two times a day  buDESOnide  80 MICROgram(s)/formoterol 4.5 MICROgram(s) Inhaler 2 Puff(s) Inhalation two times a day  buPROPion XL . 300 milliGRAM(s) Oral daily  cyanocobalamin 1000 MICROGram(s) Oral daily  fluticasone propionate 50 MICROgram(s)/spray Nasal Spray 1 Spray(s) Both Nostrils two times a day  folic acid 1 milliGRAM(s) Oral daily  heparin  Injectable 5000 Unit(s) SubCutaneous every 8 hours  influenza   Vaccine 0.5 milliLiter(s) IntraMuscular once  ipratropium 42 MICROgram(s) Nasal 2 Spray(s) Both Nostrils every 12 hours  lamoTRIgine 100 milliGRAM(s) Oral every 8 hours  levothyroxine 150 MICROGram(s) Oral daily  multivitamin/minerals 1 Tablet(s) Oral daily  oxyCODONE  ER Tablet 60 milliGRAM(s) Oral every 8 hours  pantoprazole    Tablet 40 milliGRAM(s) Oral before breakfast  risperiDONE   Tablet 8 milliGRAM(s) Oral at bedtime  sertraline 50 milliGRAM(s) Oral daily  Spiriva Respimat 2 Inhalation 2 Inhalation Inhalation daily  topiramate 400 milliGRAM(s) Oral two times a day  Viibryd Oral Tablet 40 milliGRAM(s),Viibryd Oral Tablet 40 mg 1 Tablet(s),Viibryd Oral Tablet 40 mg 1 Tablet(s) 1 Tablet(s) Oral daily    MEDICATIONS  (PRN):  acetaminophen   Tablet 650 milliGRAM(s) Oral every 6 hours PRN For Temp greater than 38 C (100.4 F)  acetaminophen   Tablet. 650 milliGRAM(s) Oral every 6 hours PRN Mild Pain (1 - 3)  ALBUTerol    90 MICROgram(s) HFA Inhaler 2 Puff(s) Inhalation every 6 hours PRN Shortness of Breath and/or Wheezing  clonazePAM Tablet 2 milliGRAM(s) Oral every 8 hours PRN anxiety  docusate sodium 100 milliGRAM(s) Oral two times a day PRN Constipation  HYDROmorphone   Tablet 3 milliGRAM(s) Oral every 4 hours PRN moderate to severe pain  HYDROmorphone  Injectable 0.5 milliGRAM(s) IV Push every 3 hours PRN Severe Pain (7 - 10)  ondansetron Injectable 4 milliGRAM(s) IV Push every 4 hours PRN Nausea and/or Vomiting  senna 2 Tablet(s) Oral at bedtime PRN Constipation        ROS: Psych: See HPI.  All other systems negative.                          8.6    7.8   )-----------( 206      ( 09 Nov 2017 06:59 )             27.2   10 Nov 2017 08:05    138    |  107    |  20     ----------------------------<  98     3.3     |  17     |  0.85     Ca    8.7        10 Nov 2017 08:05      TSH:     Utox:  Imaging:  Other Tests:    Old Records reviewed:    EXAM:  Vital Signs Last 24 Hrs  T(C): 36.3 (11-10-17 @ 07:39), Max: 37.2 (11-09-17 @ 18:08)  T(F): 97.4 (11-10-17 @ 07:39), Max: 98.9 (11-09-17 @ 18:08)  HR: 92 (11-10-17 @ 07:39) (92 - 110)  BP: 102/67 (11-10-17 @ 07:39) (90/55 - 102/67)  BP(mean): --  RR: 16 (11-10-17 @ 07:39) (16 - 16)  SpO2: 97% (11-10-17 @ 07:39) (97% - 98%)  Gen Appearance: Fair hygiene and grooming, laying comfortably in the bed  Gait/Station/Muscle Tone: WNL  Abnl Movements: Absent  Speech: Normoproductive, relevant  TP; WNL  Associations: WNL  TC: WNL  Mood: Fair  Affect: Labile  Consciousness/orientation: WNL  Memory:   Recent:  Intact  Remote: Intact  Attention/Concentration: WNL  Language: WNL  Fund of Knowledge: Average  Insight: Fair  Judgment: Fair    Suicide Risk Assessment: At this time patient is future-oriented, denies suicidal ideation, is able to contract for safety.      DX: Polysubstance dependence    REC: Continue current treatment  Observation Status: regular  Follow-up: As needed

## 2017-11-10 NOTE — PROGRESS NOTE ADULT - PROBLEM/PLAN-1
DISPLAY PLAN FREE TEXT

## 2017-11-10 NOTE — PROGRESS NOTE ADULT - PROBLEM/PLAN-3
DISPLAY PLAN FREE TEXT

## 2017-11-10 NOTE — PROGRESS NOTE ADULT - ASSESSMENT
32-year-old female now status post right lower extremity fasciotomy for prolonged compression after a fall with lethargy.     - Pt s/p Split thickness skin graft, right lower extremity wound closure tolerated procedure well with no cardiac complications.    - HR is better controlled.  SBP at 90's-100's but asymptomatic  - plastics/vascular f/u  - pain control  - TTE with hyperdynamic LV function, EF 70%, no valvular disease noted; IV hydration is appropriate  - Monitor and replete electrolytes. Keep K>4.0 and Mg>2.0.    - transfuse prn  - DVT ppx  - All other workup per primary team  - dc planning to mati this week

## 2017-11-10 NOTE — PROGRESS NOTE ADULT - PROVIDER SPECIALTY LIST ADULT
Anesthesia
Cardiology
Critical Care
Heme/Onc
Hospitalist
Infectious Disease
Nephrology
Neurology
Palliative Care
Plastic Surgery
Psychiatry
Pulmonology
Surgery
Vascular Surgery
Cardiology
Critical Care
Cardiology
Nephrology
Nephrology
Pulmonology
Pulmonology
Vascular Surgery
Pulmonology
Hospitalist
Pulmonology
Heme/Onc
Pulmonology
Palliative Care
Hospitalist
Palliative Care
Palliative Care
Infectious Disease
Pulmonology
Infectious Disease
Hospitalist

## 2017-11-10 NOTE — PROGRESS NOTE ADULT - PROBLEM SELECTOR PLAN 8
-c/w synthroid
DVT ppx: Heparin subq
-c/w synthroid
DVT ppx: Heparin subq
-c/w all home meds  -Appreciate psychiatry consult Dr. Schmidt   -pain management consulted Dr. Damico
-c/w synthroid
DVT ppx: Heparin subq
Continue current pain management regimen, hold morphine for now in setting of GHAZAL. May need a revision of outpatient pain regimen as this event appears to have been precipitated by inappropriate pain medication administration.
-c/w synthroid

## 2017-11-10 NOTE — PROGRESS NOTE ADULT - PROBLEM SELECTOR PROBLEM 4
Compartment syndrome of right lower extremity, initial encounter
Non-traumatic rhabdomyolysis
GHAZAL (acute kidney injury)
Lymphadenopathy of head and neck region
Chronic pain
Compartment syndrome of right lower extremity, initial encounter
GHAZAL (acute kidney injury)
Acute pain
Asthma
Asthma
Compartment syndrome of right lower extremity, initial encounter
Fever
GHAZAL (acute kidney injury)
Non-traumatic rhabdomyolysis
Rhabdomyolysis
Rhabdomyolysis
Transaminitis
Compartment syndrome of right lower extremity, initial encounter
GHAZAL (acute kidney injury)
Hypothyroidism
Lymphadenopathy of head and neck region
Non-traumatic rhabdomyolysis
Anemia
Rhabdomyolysis
Fever
GHAZAL (acute kidney injury)
GHAZAL (acute kidney injury)

## 2017-11-10 NOTE — PROGRESS NOTE ADULT - PROBLEM SELECTOR PLAN 6
-due to prolonged compression of the right leg that then had resulted in compartment syndrome  -rhabdo now resolved
-c/w home meds
-c/w all home meds  -Appreciate psychiatry consult Dr. Schmidt   -pain management consulted Dr. Damico
-c/w all home meds  -will get psychiatry consult Dr. Schmidt   -pain management consulted Dr. Damico
Dropping H/H  Likely ACD from chronic wound borderline low b12  Lymphadenopathy+ on neck exam, US of neck non specific, will monitor for now if increased size may need biopsy, discussed with Hem Onc  Hematology Dr. Jin consulted, appreciate recs
-c/w home meds
-due to prolonged compression of the right leg that then had resulted in compartment syndrome  -rhabdo now resolved
-c/w all home meds  -Appreciate psychiatry consult Dr. Schmidt   -pain management consulted Dr. Damico
-c/w all home meds  -Appreciate psychiatry consult Dr. Schmidt   -pain management consulted Dr. Damico
-c/w all home meds  -Appreciate psychiatry consult Dr. cShmidt   -pain management consulted Dr. Damico
-c/w all home meds  -will get psychiatry consult Dr. Schmidt
-c/w all home meds  -will get psychiatry consult Dr. Schmidt
-c/w home meds
-due to prolonged compression of the right leg that then had resulted in compartment syndrome  - Resolved
-due to prolonged compression of the right leg that then had resulted in compartment syndrome  -rhabdo now resolved
Dropping H/H  Likely ACD from chronic wound borderline low b12  Lymphadenopathy+ on neck exam, US of neck non specific, will monitor for now if increased size may need biopsy, discussed with Hem Onc  Hematology Dr. Jin consulted, appreciate recs
Dropping H/H  Likely ACD from chronic wound borderline low b12  Lymphadenopathy+ on neck exam, US of neck non specific, will monitor for now if increased size may need biopsy, discussed with Hem Onc  Hematology Dr. Jin consulted, appreciate recs
On pain meds
the more acute drop today was likely due to blood loss with surgery; the previously   low H&H was likely anemia of chronic disease from chronic wound borderline low B12  -will transfuse 1un PRBC today and monitor H&H; pt does not endorse symptoms of anemia, but given Hb is less than 7, will transfuse.  Hematology recs appreciated
the more acute drop today was likely due to blood loss with surgery; the previously   low H&H was likely anemia of chronic disease from chronic wound borderline low B12  -will transfuse 1un PRBC today and monitor H&H; pt does not endorse symptoms of anemia, but given Hb is less than 7, will transfuse.  Hematology recs appreciated
Continue synthroid.
-due to prolonged compression of the right leg that then had resulted in compartment syndrome  -rhabdo now resolved

## 2017-11-10 NOTE — PROGRESS NOTE ADULT - PROBLEM SELECTOR PROBLEM 1
Asthma
Compartment syndrome of right lower extremity
Asthma
Asthma
FUO (fever of unknown origin)
Lymphadenopathy of head and neck region
Opioid abuse
Acute pain
Asthma
Compartment syndrome of right lower extremity
FUO (fever of unknown origin)
Opioid abuse
Polysubstance dependence
Polysubstance dependence
Anemia due to other cause
Anemia due to other cause
Compartment syndrome of right lower extremity
Compartment syndrome of right lower extremity, initial encounter
FUO (fever of unknown origin)
Fever
Lymphadenopathy of head and neck
Compartment syndrome of right lower extremity
FUO (fever of unknown origin)
Asthma
Anemia due to other cause
FUO (fever of unknown origin)
FUO (fever of unknown origin)
Fever
FUO (fever of unknown origin)
Fever
Compartment syndrome of right lower extremity

## 2017-11-10 NOTE — PROGRESS NOTE ADULT - PROBLEM SELECTOR PROBLEM 7
Anemia
Bipolar 1 disorder
Need for prophylactic measure
Need for prophylactic measure
Hypothyroidism
Anemia
Bipolar 1 disorder
Anemia
Bipolar 1 disorder
Hypothyroidism
Need for prophylactic measure
Bipolar 1 disorder
Anemia

## 2017-11-10 NOTE — PROGRESS NOTE ADULT - SUBJECTIVE AND OBJECTIVE BOX
Patient is a 32y old  Female who presents with a chief complaint of RLE edema and numbness (13 Oct 2017 16:25)       INTERVAL HPI/ OVERNIGHT EVENTS: No new events, complaints. Seen and examined at bedside.     MEDICATIONS  (STANDING):  ascorbic acid 500 milliGRAM(s) Oral two times a day  BACItracin   Ointment 1 Application(s) Topical two times a day  buDESOnide  80 MICROgram(s)/formoterol 4.5 MICROgram(s) Inhaler 2 Puff(s) Inhalation two times a day  buPROPion XL . 300 milliGRAM(s) Oral daily  cyanocobalamin 1000 MICROGram(s) Oral daily  fluticasone propionate 50 MICROgram(s)/spray Nasal Spray 1 Spray(s) Both Nostrils two times a day  folic acid 1 milliGRAM(s) Oral daily  heparin  Injectable 5000 Unit(s) SubCutaneous every 8 hours  influenza   Vaccine 0.5 milliLiter(s) IntraMuscular once  ipratropium 42 MICROgram(s) Nasal 2 Spray(s) Both Nostrils every 12 hours  lamoTRIgine 100 milliGRAM(s) Oral every 8 hours  levothyroxine 150 MICROGram(s) Oral daily  multivitamin/minerals 1 Tablet(s) Oral daily  oxyCODONE  ER Tablet 60 milliGRAM(s) Oral every 8 hours  pantoprazole    Tablet 40 milliGRAM(s) Oral before breakfast  risperiDONE   Tablet 8 milliGRAM(s) Oral at bedtime  sertraline 50 milliGRAM(s) Oral daily  Spiriva Respimat 2 Inhalation 2 Inhalation Inhalation daily  topiramate 400 milliGRAM(s) Oral two times a day  Viibryd Oral Tablet 40 milliGRAM(s),Viibryd Oral Tablet 40 mg 1 Tablet(s),Viibryd Oral Tablet 40 mg 1 Tablet(s) 1 Tablet(s) Oral daily    MEDICATIONS  (PRN):  acetaminophen   Tablet 650 milliGRAM(s) Oral every 6 hours PRN For Temp greater than 38 C (100.4 F)  acetaminophen   Tablet. 650 milliGRAM(s) Oral every 6 hours PRN Mild Pain (1 - 3)  ALBUTerol    90 MICROgram(s) HFA Inhaler 2 Puff(s) Inhalation every 6 hours PRN Shortness of Breath and/or Wheezing  clonazePAM Tablet 2 milliGRAM(s) Oral every 8 hours PRN anxiety  docusate sodium 100 milliGRAM(s) Oral two times a day PRN Constipation  HYDROmorphone   Tablet 3 milliGRAM(s) Oral every 4 hours PRN moderate to severe pain  HYDROmorphone  Injectable 0.5 milliGRAM(s) IV Push every 3 hours PRN Severe Pain (7 - 10)  ondansetron Injectable 4 milliGRAM(s) IV Push every 4 hours PRN Nausea and/or Vomiting  senna 2 Tablet(s) Oral at bedtime PRN Constipation      Allergies    avocado (Unknown)  latex (Urticaria; Rash)  penicillin (Unknown)  sulfa drugs (Unknown)    Intolerances        REVIEW OF SYSTEMS:  CONSTITUTIONAL: No fever, or fatigue  EYES: No eye pain, visual disturbances, or discharge  ENMT:  No difficulty hearing, tinnitus, vertigo; No sinus or throat pain  NECK: No pain or stiffness  RESPIRATORY: No cough, wheezing, chills or hemoptysis; No shortness of breath  CARDIOVASCULAR: No chest pain, palpitations, dizziness, or leg swelling  GASTROINTESTINAL: No abdominal or epigastric pain. No nausea, vomiting, or hematemesis; No diarrhea or constipation. No melena or hematochezia.  GENITOURINARY: No dysuria, frequency, hematuria, or incontinence  NEUROLOGICAL: No headaches, memory loss, loss of strength, numbness, or tremors  SKIN: No itching, burning, rashes, or lesions   LYMPH NODES: No enlarged glands  ENDOCRINE: No heat or cold intolerance; No hair loss; No polydipsia or polyuria  MUSCULOSKELETAL: No joint pain or swelling; No muscle, back, or extremity pain  HEME/LYMPH: No easy bruising, or bleeding gums  ALLERGY AND IMMUNOLOGIC: No hives or eczema    Vital Signs Last 24 Hrs  T(C): 36.3 (10 Nov 2017 07:39), Max: 37.2 (09 Nov 2017 18:08)  T(F): 97.4 (10 Nov 2017 07:39), Max: 98.9 (09 Nov 2017 18:08)  HR: 92 (10 Nov 2017 07:39) (92 - 110)  BP: 102/67 (10 Nov 2017 07:39) (90/55 - 102/67)  BP(mean): --  RR: 16 (10 Nov 2017 07:39) (16 - 16)  SpO2: 97% (10 Nov 2017 07:39) (97% - 98%)    PHYSICAL EXAM:  GENERAL: NAD,  well-developed  HEAD:  Atraumatic, Normocephalic  EYES: EOMI, PERRLA, conjunctiva and sclera clear  ENMT: No tonsillar erythema, exudates, or enlargement; Moist mucous membranes, Good dentition, No lesions  NECK: Supple, No JVD, Normal thyroid  NERVOUS SYSTEM:  Alert & Oriented X3, Good concentration; grossly intact neuro exam   CHEST/LUNG: Clear to auscultation bilaterally; No rales, rhonchi, wheezing, or rubs  HEART: Regular rate and rhythm; No murmurs, rubs, or gallops  ABDOMEN: Soft, Nontender, Nondistended; Bowel sounds present  EXTREMITIES:   R calf wounds; R thigh with dressing clean, dry, and intact; no swelling or calf tenderness in the left leg, + bandage left thigh., left thigh bandage  LYMPH: No lymphadenopathy noted  SKIN: No rashes or lesions    LABS:      Ca    8.9        09 Nov 2017 06:59        CAPILLARY BLOOD GLUCOSE        BLOOD CULTURE    RADIOLOGY & ADDITIONAL TESTS:    Imaging Personally Reviewed:  [ ] YES     Consultant(s) Notes Reviewed:      Care Discussed with Consultants/Other Providers:

## 2017-11-10 NOTE — PROGRESS NOTE ADULT - PROBLEM SELECTOR PROBLEM 10
Need for prophylactic measure

## 2017-11-10 NOTE — PROGRESS NOTE ADULT - SUBJECTIVE AND OBJECTIVE BOX
[facility] - Bonnie Hernandez Grossman, April, Ale, Carissa Allen  Office Number:  [phone]    Patient resting comfortably in bed in NAD.  Laying flat with no respiratory distress.  No complaints of chest pain, dyspnea, palpitations, PND, or orthopnea.  Not feeling well.    ROS: negative unless otherwise mentioned.    Telemetry:  Not on tele    MEDICATIONS  (STANDING):  ascorbic acid 500 milliGRAM(s) Oral two times a day  BACItracin   Ointment 1 Application(s) Topical two times a day  buDESOnide  80 MICROgram(s)/formoterol 4.5 MICROgram(s) Inhaler 2 Puff(s) Inhalation two times a day  buPROPion XL . 300 milliGRAM(s) Oral daily  cyanocobalamin 1000 MICROGram(s) Oral daily  fluticasone propionate 50 MICROgram(s)/spray Nasal Spray 1 Spray(s) Both Nostrils two times a day  folic acid 1 milliGRAM(s) Oral daily  heparin  Injectable 5000 Unit(s) SubCutaneous every 8 hours  influenza   Vaccine 0.5 milliLiter(s) IntraMuscular once  ipratropium 42 MICROgram(s) Nasal 2 Spray(s) Both Nostrils every 12 hours  lamoTRIgine 100 milliGRAM(s) Oral every 8 hours  levothyroxine 150 MICROGram(s) Oral daily  multivitamin/minerals 1 Tablet(s) Oral daily  oxyCODONE  ER Tablet 60 milliGRAM(s) Oral every 8 hours  pantoprazole    Tablet 40 milliGRAM(s) Oral before breakfast  risperiDONE   Tablet 8 milliGRAM(s) Oral at bedtime  sertraline 50 milliGRAM(s) Oral daily  Spiriva Respimat 2 Inhalation 2 Inhalation Inhalation daily  topiramate 400 milliGRAM(s) Oral two times a day  Viibryd Oral Tablet 40 milliGRAM(s),Viibryd Oral Tablet 40 mg 1 Tablet(s),Viibryd Oral Tablet 40 mg 1 Tablet(s) 1 Tablet(s) Oral daily    MEDICATIONS  (PRN):  acetaminophen   Tablet 650 milliGRAM(s) Oral every 6 hours PRN For Temp greater than 38 C (100.4 F)  acetaminophen   Tablet. 650 milliGRAM(s) Oral every 6 hours PRN Mild Pain (1 - 3)  ALBUTerol    90 MICROgram(s) HFA Inhaler 2 Puff(s) Inhalation every 6 hours PRN Shortness of Breath and/or Wheezing  clonazePAM Tablet 2 milliGRAM(s) Oral every 8 hours PRN anxiety  docusate sodium 100 milliGRAM(s) Oral two times a day PRN Constipation  HYDROmorphone   Tablet 3 milliGRAM(s) Oral every 4 hours PRN moderate to severe pain  HYDROmorphone  Injectable 0.5 milliGRAM(s) IV Push every 3 hours PRN Severe Pain (7 - 10)  ondansetron Injectable 4 milliGRAM(s) IV Push every 4 hours PRN Nausea and/or Vomiting  senna 2 Tablet(s) Oral at bedtime PRN Constipation      Allergies    avocado (Unknown)  latex (Urticaria; Rash)  penicillin (Unknown)  sulfa drugs (Unknown)    Intolerances        Vital Signs Last 24 Hrs  T(C): 36.3 (10 Nov 2017 07:39), Max: 37.2 (09 Nov 2017 18:08)  T(F): 97.4 (10 Nov 2017 07:39), Max: 98.9 (09 Nov 2017 18:08)  HR: 92 (10 Nov 2017 07:39) (92 - 110)  BP: 102/67 (10 Nov 2017 07:39) (90/55 - 102/67)  BP(mean): --  RR: 16 (10 Nov 2017 07:39) (16 - 16)  SpO2: 97% (10 Nov 2017 07:39) (97% - 98%)    I&O's Summary    09 Nov 2017 07:01  -  10 Nov 2017 07:00  --------------------------------------------------------  IN: 0 mL / OUT: 800 mL / NET: -800 mL        ON EXAM:    GAppearance: [x ] Normal  [ ] abnormal [ x] NAD   Eyes: [ ] PERRL [ ] EOMI  HENT: [ ] Normal [ ] Abnormal oral muscosa [ ]NC/AT  Cardiovascular: [ ] S1 [ ] S2 [ ] RRR [ ] m/r/g [ ]edema [ ] JVP  Procedural Access Site: [ ]  hematoma [ ] tender to palpation [ ] 2+ pulse [ ] bruit [ ] Ecchymosis  Respiratory: [ ] Clear to auscultation bilaterally  Gastrointestinal: [ ] Soft [ ] tenderness[ ] distension [ ] BS  Musculoskeletal: [ ] clubbing [ ] joint deformity   Neurologic: [ ] Non-focal  Lymphatic: [ ] lymphadenopathy  Psychiatry: [x ] AAOx3  [ ] confused [ ] disoriented [ ] Mood & affect appropriate  [x] Uncooperative  Skin: [ ]  rashes [ ] ecchymoses [ ] cyanosis    LABS: All Labs Reviewed:                        8.2    6.7   )-----------( 216      ( 10 Nov 2017 08:05 )             25.8                         8.6    7.8   )-----------( 206      ( 09 Nov 2017 06:59 )             27.2                         8.5    7.2   )-----------( 211      ( 08 Nov 2017 08:05 )             27.1     10 Nov 2017 08:05    138    |  107    |  20     ----------------------------<  98     3.3     |  17     |  0.85   09 Nov 2017 06:59    139    |  108    |  20     ----------------------------<  95     3.4     |  18     |  0.88   08 Nov 2017 08:05    140    |  110    |  19     ----------------------------<  96     3.5     |  18     |  0.92     Ca    8.7        10 Nov 2017 08:05  Ca    8.9        09 Nov 2017 06:59  Ca    9.2        08 Nov 2017 08:05            Blood Culture:

## 2017-11-10 NOTE — PROGRESS NOTE ADULT - PROBLEM/PLAN-4
DISPLAY PLAN FREE TEXT

## 2017-11-10 NOTE — PROGRESS NOTE ADULT - PROBLEM SELECTOR PROBLEM 2
Opioid abuse
Lymphadenopathy of head and neck
Asthma
Opioid abuse
Polysubstance dependence
Acute pain
Chronic pain
FUO (fever of unknown origin)
Polysubstance dependence
Polysubstance dependence
Acute pain
Anemia
Asthma
Borderline personality disorder
Chronic pain
Chronic pain
Compartment syndrome of right lower extremity, initial encounter
FUO (fever of unknown origin)
Fever
GHAZAL (acute kidney injury)
Opioid abuse
Polysubstance dependence
Rhabdomyolysis
Acute respiratory failure following trauma and surgery
Anemia due to other cause
Compartment syndrome of right lower extremity
Fever
Lymphadenopathy of head and neck
Lymphadenopathy of head and neck
Opioid abuse
Polysubstance dependence
Acute pain
Acute respiratory failure following trauma and surgery
Polysubstance dependence
Asthma
Polysubstance dependence
Compartment syndrome of right lower extremity
Compartment syndrome of right lower extremity
Opioid abuse
Opioid abuse

## 2017-11-10 NOTE — PROGRESS NOTE ADULT - PROBLEM SELECTOR PLAN 1
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 Isabel and had just minimal debridement of muscle, mostly irrigation, had a few stitches placed on right thigh  - Wound vac removed. Plastics to come and revaluate again today for discharge clearance purpose.   -pain management per Dr. Strong, surgery/ anesthesia.

## 2017-11-10 NOTE — PROGRESS NOTE ADULT - PROBLEM SELECTOR PLAN 4
monitor VS and HD  monitor Hgb
-GHAZAL 2/2 ATN from rhabdomyolysis. improving  - Avoid nephrotixic agents, NO NSAIDS and renally dose all meds  - strict I/O's  - Hypokalemia replete with Po Kcl  -monitor BMP
As per vascular    Thank you for consulting us and involving us in the management of this most interesting and challenging case.     Please Call with any further questions
GHAZAL 2/2 ATN from rhabdomyolysis  - Avoid nephrotixic agents, NO NSAIDS and renally dose all meds  - strict I/O's  -monitor BMP/Mg/Phos, resolved hyperkalemia  -No dialysis at this time as per renal
GHAZAL 2/2 ATN from rhabdomyolysis  - Avoid nephrotixic agents, NO NSAIDS and renally dose all meds  - strict I/O's  -monitor BMP/Mg/Phos, resolved hyperkalemia  -No dialysis at this time as per renal
GHAZAL  resolving  monitor labs, lytes, replete as needed
ID consulted, unlikely infectious  Hematology consulted recs appreciated  US done of neck, consulted ENT Dr. Schmitz to consider if need for monitor FNA
As per vascular
cont oxycontin ATC - long acting  cont emotional support and PT as tolerated  wound care  GHAZAL  Rhabdo  back and neck pain issues  will follow
monitor renal function  IVF  caution with high doses of opioids in renal function compromise
-GHAZAL 2/2 ATN from rhabdomyolysis. improving  - Avoid nephrotixic agents, NO NSAIDS and renally dose all meds  - strict I/O's  - Hypokalemia replete with Po Kcl  -monitor BMP
-GHAZAL 2/2 ATN from rhabdomyolysis. improving  - Avoid nephrotixic agents, NO NSAIDS and renally dose all meds  - strict I/O's  - Hypokalemia replete with Po Kcl  -monitor BMP
-GHAZAL 2/2 ATN from rhabdomyolysis. improving  - Avoid nephrotixic agents, NO NSAIDS and renally dose all meds  - strict I/O's  -monitor BMP/Mg/Phos, resolved hyperkalemia  -No dialysis at this time as per renal
-GHAZAL 2/2 ATN from rhabdomyolysis. improving  - Avoid nephrotixic agents, NO NSAIDS and renally dose all meds  - strict I/O's  -monitor BMP/Mg/Phos, resolved hyperkalemia  -No dialysis at this time as per renal
-had GHAZAL 2/2 ATN from rhabdomyolysis. now resolved  - Avoid nephrotoxic agents  -monitor BMP
As per vascular
GHAZAL  getting better  renal follow up  monitor renal indices
GHAZAL  monitor ck  monitor labs  IVF  renal follow up  caution with opioids that are renally excreted
GHAZAL  monitor labs  serial cr  renal following
GHAZAL resolved  compartment syndrome, post op - wound care, vascular follow up  rhabd - resolved
acute pain  rhabdo  GHAZAL  compartment syndrome  fentanyl was adjusted   dilaudid PRN IV and PO  cont current regimen  bowel regimen  discussed with pt and nursing and pt family
etiology unclear  rising WBC  check CRP and Procalcitonin  cxr report official pending  cx pending  monitor sx  wound care  skin care  prognosis guarded
proventil and symbicort  monitor sat  I darcy  seasonal vaccination
symbicort  proventil PRN  monitor sat  keep sat > 90 pct  seasonal vaccination  out of bed  I darcy
- Worsening GHAZAL 2/2 ATN from rhabdomyolysis  - Avoid nephrotixic agents, NO NSAIDS and renally dose all meds  - strict I/O's  -monitor BMP/Mg/Phos, resolved hyperkalemia  -No dialysis at this time as per renal
- Worsening GHAZAL 2/2 ATN from rhabdomyolysis  - Avoid nephrotixic agents, NO NSAIDS and renally dose all meds  - strict I/O's  -monitor BMP/Mg/Phos, resolved hyperkalemia  -No dialysis at this time as per renal
-GHAZAL 2/2 ATN from rhabdomyolysis. improving  - Avoid nephrotixic agents, NO NSAIDS and renally dose all meds  - strict I/O's  - Hypokalemia replete with Po Kcl  -monitor BMP
-GHAZAL 2/2 ATN from rhabdomyolysis. improving  - Avoid nephrotixic agents, NO NSAIDS and renally dose all meds  - strict I/O's  -monitor BMP/Mg/Phos, resolved hyperkalemia  -No dialysis at this time as per renal
-GHAZAL 2/2 ATN from rhabdomyolysis. improving  - Avoid nephrotixic agents, NO NSAIDS and renally dose all meds  - strict I/O's  -monitor BMP/Mg/Phos, resolved hyperkalemia  -No dialysis at this time as per renal
-GHAZAL 2/2 ATN from rhabdomyolysis. now resolved  - Avoid nephrotoxic agents  -monitor BMP
-c/w home meds
-had GHAZAL 2/2 ATN from rhabdomyolysis. now resolved  - Avoid nephrotoxic agents  -monitor BMP
As per vascular
GHAZAL 2/2 ATN from rhabdomyolysis  - Avoid nephrotixic agents, NO NSAIDS and renally dose all meds  - strict I/O's  -monitor BMP/Mg/Phos, resolved hyperkalemia  -No dialysis at this time as per renal
GHAZAL 2/2 ATN from rhabdomyolysis  - Avoid nephrotixic agents, NO NSAIDS and renally dose all meds  - strict I/O's  -monitor BMP/Mg/Phos, resolved hyperkalemia  -No dialysis at this time as per renal
Hematology consulted recs appreciated  US done of neck, consulted ENT Dr. Schmitz no need for FNA at this time
ID consulted, unlikely infectious  Hematology consulted recs appreciated  US done of neck, consulted ENT Dr. Schmitz to consider if need for monitor FNA
Resolved: had GHAZAL 2/2 ATN from rhabdomyolysis  - Avoid nephrotoxic agents  -monitor BMP
CPK: 39K --> 26K --> 13K. Continue to trend. Urine output improving and becoming clearer and less dark.
Trend -  Will Check Tylenol and ASA levels poss related to co-ingestion  DVT Ppx
work up under way  RVP neg  Cx neg  urine cx - possible contamination  ID follow up  am labs pending, monitor WBC  source unclear at present
-GHAZAL 2/2 ATN from rhabdomyolysis. now resolved  - Avoid nephrotoxic agents  -monitor BMP
-had GHAZAL 2/2 ATN from rhabdomyolysis. now resolved  - Avoid nephrotoxic agents  -monitor BMP

## 2017-11-10 NOTE — PROGRESS NOTE ADULT - PROBLEM SELECTOR PLAN 10
DVT ppx: Heparin subq

## 2017-11-10 NOTE — PROGRESS NOTE ADULT - PROBLEM SELECTOR PROBLEM 5
Compartment syndrome of right lower extremity
Anemia
Hypothyroidism
Hypothyroidism
Compartment syndrome of right lower extremity, initial encounter
Non-traumatic rhabdomyolysis
Anemia
Asthma
Chronic pain
Fever
Lymphadenopathy of head and neck region
Anemia
Bipolar 1 disorder
Hypothyroidism
Lymphadenopathy of head and neck region
Non-traumatic rhabdomyolysis
Transaminitis
Lymphadenopathy of head and neck region
Lymphadenopathy of head and neck region

## 2017-11-10 NOTE — PROGRESS NOTE ADULT - PROBLEM SELECTOR PROBLEM 8
Hypothyroidism
Need for prophylactic measure
Bipolar 1 disorder
Hypothyroidism
Need for prophylactic measure
Bipolar 1 disorder
Hypothyroidism
Need for prophylactic measure
Chronic back pain
Hypothyroidism

## 2017-11-10 NOTE — PROGRESS NOTE ADULT - PROBLEM/PLAN-2
DISPLAY PLAN FREE TEXT

## 2017-12-19 PROCEDURE — 86920 COMPATIBILITY TEST SPIN: CPT

## 2017-12-19 PROCEDURE — 76775 US EXAM ABDO BACK WALL LIM: CPT

## 2017-12-19 PROCEDURE — 94002 VENT MGMT INPAT INIT DAY: CPT

## 2017-12-19 PROCEDURE — 74176 CT ABD & PELVIS W/O CONTRAST: CPT

## 2017-12-19 PROCEDURE — 88304 TISSUE EXAM BY PATHOLOGIST: CPT

## 2017-12-19 PROCEDURE — 86900 BLOOD TYPING SEROLOGIC ABO: CPT

## 2017-12-19 PROCEDURE — 87040 BLOOD CULTURE FOR BACTERIA: CPT

## 2017-12-19 PROCEDURE — 86850 RBC ANTIBODY SCREEN: CPT

## 2017-12-19 PROCEDURE — 71250 CT THORAX DX C-: CPT

## 2017-12-19 PROCEDURE — 81001 URINALYSIS AUTO W/SCOPE: CPT

## 2017-12-19 PROCEDURE — 96376 TX/PRO/DX INJ SAME DRUG ADON: CPT

## 2017-12-19 PROCEDURE — 87633 RESP VIRUS 12-25 TARGETS: CPT

## 2017-12-19 PROCEDURE — 90686 IIV4 VACC NO PRSV 0.5 ML IM: CPT

## 2017-12-19 PROCEDURE — 36430 TRANSFUSION BLD/BLD COMPNT: CPT

## 2017-12-19 PROCEDURE — 80202 ASSAY OF VANCOMYCIN: CPT

## 2017-12-19 PROCEDURE — 93971 EXTREMITY STUDY: CPT

## 2017-12-19 PROCEDURE — 83605 ASSAY OF LACTIC ACID: CPT

## 2017-12-19 PROCEDURE — 94760 N-INVAS EAR/PLS OXIMETRY 1: CPT

## 2017-12-19 PROCEDURE — 81003 URINALYSIS AUTO W/O SCOPE: CPT

## 2017-12-19 PROCEDURE — 87581 M.PNEUMON DNA AMP PROBE: CPT

## 2017-12-19 PROCEDURE — 93926 LOWER EXTREMITY STUDY: CPT

## 2017-12-19 PROCEDURE — 97530 THERAPEUTIC ACTIVITIES: CPT

## 2017-12-19 PROCEDURE — 93306 TTE W/DOPPLER COMPLETE: CPT

## 2017-12-19 PROCEDURE — 81025 URINE PREGNANCY TEST: CPT

## 2017-12-19 PROCEDURE — P9047: CPT

## 2017-12-19 PROCEDURE — 86036 ANCA SCREEN EACH ANTIBODY: CPT

## 2017-12-19 PROCEDURE — 82607 VITAMIN B-12: CPT

## 2017-12-19 PROCEDURE — 83550 IRON BINDING TEST: CPT

## 2017-12-19 PROCEDURE — 80048 BASIC METABOLIC PNL TOTAL CA: CPT

## 2017-12-19 PROCEDURE — C1889: CPT

## 2017-12-19 PROCEDURE — 96375 TX/PRO/DX INJ NEW DRUG ADDON: CPT

## 2017-12-19 PROCEDURE — 83036 HEMOGLOBIN GLYCOSYLATED A1C: CPT

## 2017-12-19 PROCEDURE — 73552 X-RAY EXAM OF FEMUR 2/>: CPT

## 2017-12-19 PROCEDURE — 85610 PROTHROMBIN TIME: CPT

## 2017-12-19 PROCEDURE — 87086 URINE CULTURE/COLONY COUNT: CPT

## 2017-12-19 PROCEDURE — 87486 CHLMYD PNEUM DNA AMP PROBE: CPT

## 2017-12-19 PROCEDURE — 82746 ASSAY OF FOLIC ACID SERUM: CPT

## 2017-12-19 PROCEDURE — 86901 BLOOD TYPING SEROLOGIC RH(D): CPT

## 2017-12-19 PROCEDURE — 82728 ASSAY OF FERRITIN: CPT

## 2017-12-19 PROCEDURE — 83735 ASSAY OF MAGNESIUM: CPT

## 2017-12-19 PROCEDURE — 85652 RBC SED RATE AUTOMATED: CPT

## 2017-12-19 PROCEDURE — 86038 ANTINUCLEAR ANTIBODIES: CPT

## 2017-12-19 PROCEDURE — 94640 AIRWAY INHALATION TREATMENT: CPT

## 2017-12-19 PROCEDURE — 86431 RHEUMATOID FACTOR QUANT: CPT

## 2017-12-19 PROCEDURE — 85730 THROMBOPLASTIN TIME PARTIAL: CPT

## 2017-12-19 PROCEDURE — 86140 C-REACTIVE PROTEIN: CPT

## 2017-12-19 PROCEDURE — 96374 THER/PROPH/DIAG INJ IV PUSH: CPT

## 2017-12-19 PROCEDURE — 85027 COMPLETE CBC AUTOMATED: CPT

## 2017-12-19 PROCEDURE — 82803 BLOOD GASES ANY COMBINATION: CPT

## 2017-12-19 PROCEDURE — 84145 PROCALCITONIN (PCT): CPT

## 2017-12-19 PROCEDURE — 97162 PT EVAL MOD COMPLEX 30 MIN: CPT

## 2017-12-19 PROCEDURE — 80307 DRUG TEST PRSMV CHEM ANLYZR: CPT

## 2017-12-19 PROCEDURE — 73700 CT LOWER EXTREMITY W/O DYE: CPT

## 2017-12-19 PROCEDURE — 82550 ASSAY OF CK (CPK): CPT

## 2017-12-19 PROCEDURE — 36415 COLL VENOUS BLD VENIPUNCTURE: CPT

## 2017-12-19 PROCEDURE — 73590 X-RAY EXAM OF LOWER LEG: CPT

## 2017-12-19 PROCEDURE — 93005 ELECTROCARDIOGRAM TRACING: CPT

## 2017-12-19 PROCEDURE — 86703 HIV-1/HIV-2 1 RESULT ANTBDY: CPT

## 2017-12-19 PROCEDURE — 99261: CPT

## 2017-12-19 PROCEDURE — 80053 COMPREHEN METABOLIC PANEL: CPT

## 2017-12-19 PROCEDURE — 97110 THERAPEUTIC EXERCISES: CPT

## 2017-12-19 PROCEDURE — 76536 US EXAM OF HEAD AND NECK: CPT

## 2017-12-19 PROCEDURE — 93970 EXTREMITY STUDY: CPT

## 2017-12-19 PROCEDURE — 84100 ASSAY OF PHOSPHORUS: CPT

## 2017-12-19 PROCEDURE — 71045 X-RAY EXAM CHEST 1 VIEW: CPT

## 2017-12-19 PROCEDURE — 84702 CHORIONIC GONADOTROPIN TEST: CPT

## 2017-12-19 PROCEDURE — 99291 CRITICAL CARE FIRST HOUR: CPT | Mod: 25

## 2017-12-19 PROCEDURE — 87798 DETECT AGENT NOS DNA AMP: CPT

## 2017-12-19 PROCEDURE — P9016: CPT

## 2017-12-26 RX ORDER — CLONAZEPAM 1 MG
1 TABLET ORAL
Qty: 0 | Refills: 0 | COMMUNITY

## 2017-12-26 RX ORDER — CYCLOBENZAPRINE HYDROCHLORIDE 10 MG/1
10 TABLET, FILM COATED ORAL
Qty: 0 | Refills: 0 | COMMUNITY

## 2017-12-26 RX ORDER — BUDESONIDE AND FORMOTEROL FUMARATE DIHYDRATE 160; 4.5 UG/1; UG/1
2 AEROSOL RESPIRATORY (INHALATION)
Qty: 0 | Refills: 0 | COMMUNITY

## 2017-12-26 RX ORDER — TOPIRAMATE 25 MG
2 TABLET ORAL
Qty: 0 | Refills: 0 | COMMUNITY

## 2017-12-26 RX ORDER — VILAZODONE HYDROCHLORIDE 20 MG/1
1 TABLET, FILM COATED ORAL
Qty: 0 | Refills: 0 | COMMUNITY

## 2017-12-26 RX ORDER — BREXPIPRAZOLE 0.25 MG/1
1 TABLET ORAL
Qty: 0 | Refills: 0 | COMMUNITY

## 2017-12-26 RX ORDER — MORPHINE SULFATE 50 MG/1
1 CAPSULE, EXTENDED RELEASE ORAL
Qty: 0 | Refills: 0 | COMMUNITY

## 2017-12-26 RX ORDER — IPRATROPIUM BROMIDE 0.2 MG/ML
0 SOLUTION, NON-ORAL INHALATION
Qty: 0 | Refills: 0 | COMMUNITY

## 2017-12-26 RX ORDER — RISPERIDONE 4 MG/1
4 TABLET ORAL
Qty: 0 | Refills: 0 | COMMUNITY

## 2017-12-26 RX ORDER — TIOTROPIUM BROMIDE 18 UG/1
2 CAPSULE ORAL; RESPIRATORY (INHALATION)
Qty: 0 | Refills: 0 | COMMUNITY

## 2017-12-26 RX ORDER — TIZANIDINE 4 MG/1
1 TABLET ORAL
Qty: 0 | Refills: 0 | COMMUNITY

## 2017-12-26 RX ORDER — TOPIRAMATE 25 MG
1 TABLET ORAL
Qty: 0 | Refills: 0 | COMMUNITY

## 2017-12-26 RX ORDER — SERTRALINE 25 MG/1
1 TABLET, FILM COATED ORAL
Qty: 0 | Refills: 0 | COMMUNITY

## 2017-12-26 RX ORDER — MEDROXYPROGESTERONE ACETATE 150 MG/ML
0 INJECTION, SUSPENSION, EXTENDED RELEASE INTRAMUSCULAR
Qty: 0 | Refills: 0 | COMMUNITY

## 2017-12-26 RX ORDER — RISPERIDONE 4 MG/1
2 TABLET ORAL
Qty: 0 | Refills: 0 | COMMUNITY

## 2018-12-28 NOTE — PATIENT PROFILE ADULT. - MARITAL STATUS
Dr. Pablo.  I couldn't get results to forward to you.  Please review and address labs from 12/28/2018   Single

## 2020-02-26 ENCOUNTER — ASOB RESULT (OUTPATIENT)
Age: 35
End: 2020-02-26

## 2020-02-26 ENCOUNTER — APPOINTMENT (OUTPATIENT)
Dept: ANTEPARTUM | Facility: CLINIC | Age: 35
End: 2020-02-26

## 2020-02-26 PROBLEM — M54.5 LOW BACK PAIN: Chronic | Status: ACTIVE | Noted: 2017-04-03

## 2020-04-03 NOTE — PROGRESS NOTE ADULT - PROBLEM SELECTOR PLAN 2
Pain management Dr. Damico consulted  Tapering prn IV and PO dilaudid as per Dr. Damico  Continue fentanyl patch.  Hold dilaudid and oxycodone for lethargy. 68

## 2020-07-08 ENCOUNTER — ASOB RESULT (OUTPATIENT)
Age: 35
End: 2020-07-08

## 2020-07-08 ENCOUNTER — APPOINTMENT (OUTPATIENT)
Dept: ANTEPARTUM | Facility: CLINIC | Age: 35
End: 2020-07-08

## 2020-07-15 NOTE — ED PROVIDER NOTE - PMH
There are no preventive care reminders to display for this patient.    Patient is up to date, no discussion needed.             Bipolar 1 disorder    Hypothyroid    Low back pain

## 2020-11-12 NOTE — DISCHARGE NOTE ADULT - MEDICATION SUMMARY - MEDICATIONS TO TAKE
no
I will START or STAY ON the medications listed below when I get home from the hospital:    Rolling Walker  -- 1 Rolling Walker, MYRIAM: 99, ICD 10: M96.0 Height: 180 cm, Weight: 95.7kg.  -- Indication: For for home    Commode  -- 1 Commode, MYRIAM: 99, ICD 10: M96.0 Height: 180 cm, Weight: 95.7kg.  -- Indication: For for home    Dilaudid 8 mg oral tablet  -- 1 tab(s) by mouth every 4 hours  -- Indication: For per pain managment    morphine 100 mg/12 hours oral tablet, extended release  -- 1 tab(s) by mouth every 8 hours  -- Indication: For per pain management    Topamax 200 mg oral tablet  -- 1 tab(s) by mouth 2 times a day  -- Indication: For per pmd    LaMICtal 100 mg oral tablet  --  by mouth 3 times a day  -- Indication: For per pmd    KlonoPIN 2 mg oral tablet  -- 1 tab(s) by mouth 4 times a day  -- Indication: For per pmd    sertraline 50 mg oral tablet  -- 1 tab(s) by mouth once a day  -- Indication: For per pmd    Viibryd 40 mg oral tablet  -- 1 tab(s) by mouth once a day (in the morning)  -- Indication: For per pmd    Depo-Provera Contraceptive 150 mg/mL intramuscular suspension  --  intramuscular once every 3month   -- Indication: For per pmd    RisperDAL 4 mg oral tablet  -- 2 tab(s) by mouth once (at bedtime)  -- Indication: For per pmd    ipratropium  --  inhaled , into nose solution daily  -- Indication: For per pmd    ProAir HFA 90 mcg/inh inhalation aerosol  -- 2 puff(s) inhaled 4 times a day  -- Indication: For per pmd    Symbicort 160 mcg-4.5 mcg/inh inhalation aerosol  -- 2 puff(s) inhaled 2 times a day  -- Indication: For per pmd    Spiriva Respimat 2.5 mcg/inh inhalation aerosol  -- 2 puff(s) inhaled once a day  -- Indication: For per pmd    Colace 100 mg oral capsule  -- 1 cap(s) by mouth 3 times a day, As Needed -for constipation MDD:3  -- Medication should be taken with plenty of water.    -- Indication: For Take while on opiods for constipation    cyclobenzaprine 5 mg oral tablet  -- 1 tab(s) by mouth every 8 hours  -- Some non-prescription drugs may aggravate your condition.  Read all labels carefully.  If a warning appears, check with your doctor before taking.  This drug may impair the ability to drive or operate machinery.  Use care until you become familiar with its effects.    -- Indication: For muscle spasms    Flexeril  -- 10 milligram(s) by mouth 3 times a day  -- Indication: For muscle spasms    Flonase 50 mcg/inh nasal spray  --  into nose twice daily  -- Indication: For per pmd    buPROPion 24 hour extended release  -- 200 milligram(s) 2 pill am  -- Indication: For per pmd    levothyroxine 150 mcg (0.15 mg) oral tablet  -- 1 tab(s) by mouth once a day  -- Indication: For per pmd
Burow's Advancement Flap Text: The defect edges were debeveled with a #15 scalpel blade.  Given the location of the defect and the proximity to free margins a Burow's advancement flap was deemed most appropriate.  Using a sterile surgical marker, the appropriate advancement flap was drawn incorporating the defect and placing the expected incisions within the relaxed skin tension lines where possible.    The area thus outlined was incised deep to adipose tissue with a #15 scalpel blade.  The skin margins were undermined to an appropriate distance in all directions utilizing iris scissors.

## 2021-02-24 ENCOUNTER — APPOINTMENT (OUTPATIENT)
Dept: ANTEPARTUM | Facility: CLINIC | Age: 36
End: 2021-02-24

## 2021-03-17 ENCOUNTER — APPOINTMENT (OUTPATIENT)
Dept: ANTEPARTUM | Facility: CLINIC | Age: 36
End: 2021-03-17
Payer: MEDICARE

## 2021-03-17 ENCOUNTER — ASOB RESULT (OUTPATIENT)
Age: 36
End: 2021-03-17

## 2021-03-17 PROCEDURE — 76856 US EXAM PELVIC COMPLETE: CPT | Mod: 59

## 2021-03-17 PROCEDURE — 76830 TRANSVAGINAL US NON-OB: CPT | Mod: 59

## 2021-08-19 NOTE — PRE-OP CHECKLIST - 1.
Patient's last menstrual period was 02/26/2021.   Please reference prenatal and OB flow chart for further information  PT here today for routine prenatal care  Pt endorses fetal movement and denies loss of fluid, contractions or vaginal bleeding  Pt without complaints  ROS:  Pt denies headache, dysuria, nausea/vomiting  PE:  /62   Pulse 81   Wt 129 lb (58.5 kg)   LMP 02/26/2021   BMI 19.61 kg/m²   Gen - Alert and oriented x 3  HEENT- NC/AT, CVS - RRR, Lungs - CTAB  Abd - FH 26  Appropriate fetal growth  1hr and US pending pt has 2 wound vacs

## 2022-03-03 NOTE — PROGRESS NOTE ADULT - PROBLEM SELECTOR PLAN 1
Most likely compartment syndrome of the right lower extremity below the knee  without any orthopedic injury  -s/p emergent fasciotomy by Vascular surgery (Bobbi)   -pt is vented and sedated post op.  -has leucocytosis with bandemia. f/u with blood cx.  C/w IV aztreonam and clinadamycin.  -monitor labs vomiting, no urine for 4 hrs as per father, was here  early this morning with same problem

## 2022-08-28 NOTE — PROGRESS NOTE ADULT - PROBLEM SELECTOR PLAN 1
symbicort  proventil PRN  monitor sat  seasonal vaccination  oral and skin care  I darcy 28-Aug-2022 04:34

## 2023-08-30 ENCOUNTER — ASOB RESULT (OUTPATIENT)
Age: 38
End: 2023-08-30

## 2023-08-30 ENCOUNTER — APPOINTMENT (OUTPATIENT)
Dept: ANTEPARTUM | Facility: CLINIC | Age: 38
End: 2023-08-30
Payer: MEDICARE

## 2023-08-30 PROCEDURE — 76856 US EXAM PELVIC COMPLETE: CPT | Mod: 59

## 2023-08-30 PROCEDURE — 76830 TRANSVAGINAL US NON-OB: CPT

## 2023-09-05 NOTE — PROGRESS NOTE ADULT - PROBLEM SELECTOR PROBLEM 6
Non-traumatic rhabdomyolysis
Hypothyroidism
Bipolar 1 disorder
Bipolar 1 disorder
Anemia
Compartment syndrome of right lower extremity, initial encounter
Hypothyroidism
Non-traumatic rhabdomyolysis
Anemia
Bipolar 1 disorder
Chronic back pain
Hypothyroidism
Non-traumatic rhabdomyolysis
Hypothyroid
Non-traumatic rhabdomyolysis
no

## 2023-10-31 NOTE — PROGRESS NOTE ADULT - ASSESSMENT
HPI   Chief Complaint   Patient presents with    Vaginal Bleeding     Pt states that she is 6 months post partum and has had vaginal bleeding since giving birth. She reports 1 tampon x 2 hours. 0 sob, alert and oriented       Patient is a healthy nontoxic-appearing 17-year-old female with past medical history of UTI, postpartum vaginal bleeding, presents to the emergency room today for complaint of vaginal bleeding.  Patient states she is 6 months postpartum and has been having intermittent vaginal bleeding during this time.  Patient states bleeding has become more heavy recently.  Patient states she has been going through 1 tampon every 2 hours.  Patient states she contacted her OB/GYN and was encouraged to go to emergency room for evaluation.  Patient denies any lightheadedness, dizziness, concern for STD.  Patient is unsure if she is pregnant and is requesting evaluation.  Patient complains of pelvic cramping.  Patient denies any injuries trauma or falls, nausea, vomit, diarrhea or constipation.  Patient denies any back pain.  Patient denies any urinary symptoms.  Patient denies any fever, shaking, or chills.                          No data recorded                Patient History   No past medical history on file.  No past surgical history on file.  No family history on file.  Social History     Tobacco Use    Smoking status: Not on file    Smokeless tobacco: Not on file   Substance Use Topics    Alcohol use: Not on file    Drug use: Not on file       Physical Exam   ED Triage Vitals [10/31/23 1530]   Temp Heart Rate Resp BP   36.1 °C (97 °F) 88 20 131/81      SpO2 Temp src Heart Rate Source Patient Position   100 % -- -- --      BP Location FiO2 (%)     -- --       Physical Exam  Vitals and nursing note reviewed.   Constitutional:       General: She is not in acute distress.     Appearance: She is well-developed.   HENT:      Head: Normocephalic and atraumatic.   Eyes:      Conjunctiva/sclera: Conjunctivae  normal.   Cardiovascular:      Rate and Rhythm: Normal rate and regular rhythm.      Heart sounds: No murmur heard.     No friction rub. No gallop.   Pulmonary:      Effort: Pulmonary effort is normal. No respiratory distress.      Breath sounds: Normal breath sounds. No stridor. No wheezing, rhonchi or rales.   Chest:      Chest wall: No tenderness.   Abdominal:      General: Abdomen is flat. Bowel sounds are normal. There is no distension.      Palpations: Abdomen is soft. There is no mass.      Tenderness: There is no abdominal tenderness. There is no right CVA tenderness, left CVA tenderness, guarding or rebound.      Hernia: No hernia is present.   Musculoskeletal:         General: No swelling, tenderness, deformity or signs of injury.      Cervical back: Neck supple.      Right lower leg: No edema.      Left lower leg: No edema.   Skin:     General: Skin is warm and dry.      Capillary Refill: Capillary refill takes less than 2 seconds.   Neurological:      General: No focal deficit present.      Mental Status: She is alert and oriented to person, place, and time.   Psychiatric:         Mood and Affect: Mood normal.         ED Course & MDM   Diagnoses as of 10/31/23 1940   Acute cystitis without hematuria   Postpartum hemorrhage, unspecified type       Medical Decision Making  Given patient's complaint presentation a thorough exam was performed.  Patient is independently ambulatory without any difficulty, remains hemodynamically stable during emergency evaluation, has no reproduced abdominal tenderness upon palpation, does have mild amount discomfort upon palpation over the pelvis region.  Patient has no CVA tenderness upon palpation, denies any nausea or vomiting, has been going through 1 tampon every 2 hours.  Lab work was ordered and revealed several irregularities however no critical lab values. Urinalysis reveals trace leukocytes, positive nitrates, 11-20 WBCs on microscopic I do believe is consistent  32-year-old female now status post right lower extremity  fasciotomy for compartment syndrome. Pt on vent support.     ·	GHAZAL, ATN Septic / ischemic, Rhabdomyolysis  ·	s/p surgery for compartment syndrome  ·	Hyperphosphatemia  ·	Anemia    Improving renal function. Off IVF. Avoid nephrotoxic meds as possible. Will follow electrolytes and renal function trend. Surgery follow up. On phos binders. Improving phos levels. Low phos diet. Potassium supps. Will stop phos binders in 1-2 days. with acute cystitis.  Patient received prescription for Macrobid.  Pelvic exam was performed reveals moderate amount of blood pooling in the back of the vaginal vault from cervix.  Patient has no cervical motion tenderness I have a low suspicion for PID.  Pregnancy test was negative.  I encourage patient to monitor symptoms and follow-up with OB/GYN as needed however symptoms become worse return the emergency room for further evaluation.  Patient was agreeable this plan discharged home in stable condition.      SANTIAGO Hughes-CNP     Portions of this note were generated using digital voice recognition software, and may contain grammatical errors       SANTIAGO Hughes-KALLIE  10/31/23 1941     32-year-old female now status post right lower extremity  fasciotomy for compartment syndrome. Pt on vent support.     ·	GHAZAL, ATN Septic / ischemic, Rhabdomyolysis  ·	s/p surgery for compartment syndrome  ·	Hyperphosphatemia  ·	Anemia  ·	Hypokalemia    Improving renal function. Off IVF. Avoid nephrotoxic meds as possible. Will follow electrolytes and renal function trend. Surgery follow up. On phos binders. Improving phos levels. Low phos diet. Potassium supps. Will stop phos binders in 1-2 days.

## 2023-11-08 NOTE — ED ADULT NURSE NOTE - NS TRANSFER PATIENT BELONGINGS
COLONOSCOPY: SUTAB     [facility] [address]  [facility] [address]    Provider: Malick Hurst MD    Your colon must be cleaned of stool to have a good view. You should follow these directions to have a successful colonoscopy.     Your exam is scheduled as an outpatient procedure on:     Day: Friday    Date: MARCH 8 2024    Arrival Time: 9:30 AM(You will receive a confirmation message 3 days before your appointment, if you do not receive a message or have questions, contact [phone] or visit the patient portal for details.). Be aware your procedure time is subject to change.)     You will be receiving sedation for your procedure and MUST have an adult over 18 to drive you home and be with you after procedure.     Location: [facility], [address] [place] [id number] - Directions: Come all the way into the main lobby of the building and take the interior elevator down to the lower level. Turn left off the elevator and walk straight ahead to the Endoscopy reception area.     You will need the following in order to complete your prep:                  1. SUTAB Bowel Prep QTY 24 TABLETS       2. Two Simethicone tablets (OVER THE COUNTER)       3. Two Dulcolax (Bisacodyl) tablets (OVER THE COUNTER)    Your prep kit has been sent to   [id number] - [address] AT [address] [address]  Phone: [phone] Fax: [phone]  . Please  the kit today. If not picked up within 7 days contact your pharmacy directly as prescription would been placed back and re-stock.        If you are diabetic: Not applicable        7 days before colonoscopy: Review your colonoscopy instructions. (Instructions can also be found on [employer] under the letters tab under communication)  • Stop eating popcorn, nuts, flax/sesame seeds, or any food that contains seeds        3 BUSINESS DAYS BEFORE your  procedure:  • Stop taking all anti-inflammatory medicines. These include Advil, Aleve, Naprosyn, (Tylenol is okay to take)    1 DAY BEFORE the procedure:     •Start a strict, clear liquid diet from the moment you wake. A clear liquid diet can include: Apple juice, white grape juice, and white cranberry juice; Beef or chicken broths that are clear - no noodles, vegetables, rice, etc.Tea and coffee without milk; -Soda pop, Gatorade, [place]-Aid and various -Jell-O Flavors (any color except red or purple)  •Avoid: juices with pulp, milk, cream, solid food, alcohol, Gum, and hard candy.  6:00pm:  Open one bottle of 12 tablets.   Fill the provided container with 16 ounces of water (up to the fill line).   Swallow each tablet every 1 to 2 minutes with a sip of water and drink the entire amount over 20 minutes. You should finish the 12 tablets and the entire 16 ounces of water within 20 minutes  Approximately one hour after the last tablet is ingested, fill the provided container a second time with 16 ounces of water (up to the fill line) and drink the entire amount over 30 minutes.   Approximately 30 minutes after finishing the second container of water, fill the provided container again with 16 ounces of water (up to the fill line) and drink the entire amount over 30 minutes. Chew one Simethicone (GasX) tablet.   If you experience preparation-related symptoms (e.g. nausea, bloating, cramping), pause or slow the rate of drinking the additional water until symptoms diminish.  Continue to drink clear liquids throughout the evening but do not eat any solid food.  7 hours prior to arrival time: Open one bottle of 12 tablets.   Fill the provided container with 16 ounces of water (up to the fill line).   Swallow each tablet every 1 to 2 minutes with a sip of water and drink the entire amount over 20 minutes. You should finish the 12 tablets and the entire 16 ounces of water within 20 minutes  Approximately one hour after the last  tablet is ingested, fill the provided container a second time with 16 ounces of water (up to the fill line) and drink the entire amount over 30 minutes.   Approximately 30 minutes after finishing the second container of water, fill the provided container again with 16 ounces of water (up to the fill line) and drink the entire amount over 30 minutes. Chew one Simethicone (GasX) tablet and swallow two Dulcolax (Bisacodyl) tablets.  4 hours prior to your arrival time, STOP ALL LIQUIDS INCLUDING WATER AT THIS TIME.}    • Take your medications; atenolol (TENORMIN), if taken in the morning, with a sip of water 4 hours prior to your arrival time. STOP ALL LIQUIDS INCLUDING WATER AT THIS TIME.    -Remove ALL jewelry and piercings (rings,necklaces, all body piercings, etc)  prior to arrival for procedure.    If you are still having solid/formed stools or trouble completing this preparation, please call the doctor's office at [phone].       Clothing

## 2023-12-21 NOTE — H&P ADULT - PSH
Likely 2/2 EtOH abuse. He has not been drinking.    History of laminectomy    History of spinal fusion

## 2024-01-14 ENCOUNTER — INPATIENT (INPATIENT)
Facility: HOSPITAL | Age: 39
LOS: 1 days | Discharge: HOME | DRG: 93 | End: 2024-01-16
Attending: STUDENT IN AN ORGANIZED HEALTH CARE EDUCATION/TRAINING PROGRAM | Admitting: STUDENT IN AN ORGANIZED HEALTH CARE EDUCATION/TRAINING PROGRAM
Payer: MEDICARE

## 2024-01-14 VITALS
SYSTOLIC BLOOD PRESSURE: 111 MMHG | HEART RATE: 84 BPM | TEMPERATURE: 99 F | DIASTOLIC BLOOD PRESSURE: 71 MMHG | OXYGEN SATURATION: 98 % | WEIGHT: 182.1 LBS | RESPIRATION RATE: 16 BRPM

## 2024-01-14 DIAGNOSIS — F31.9 BIPOLAR DISORDER, UNSPECIFIED: ICD-10-CM

## 2024-01-14 DIAGNOSIS — G62.9 POLYNEUROPATHY, UNSPECIFIED: ICD-10-CM

## 2024-01-14 DIAGNOSIS — Z98.89 OTHER SPECIFIED POSTPROCEDURAL STATES: Chronic | ICD-10-CM

## 2024-01-14 DIAGNOSIS — G43.909 MIGRAINE, UNSPECIFIED, NOT INTRACTABLE, WITHOUT STATUS MIGRAINOSUS: ICD-10-CM

## 2024-01-14 DIAGNOSIS — E78.5 HYPERLIPIDEMIA, UNSPECIFIED: ICD-10-CM

## 2024-01-14 DIAGNOSIS — Z98.1 ARTHRODESIS STATUS: Chronic | ICD-10-CM

## 2024-01-14 DIAGNOSIS — Z29.9 ENCOUNTER FOR PROPHYLACTIC MEASURES, UNSPECIFIED: ICD-10-CM

## 2024-01-14 DIAGNOSIS — R41.82 ALTERED MENTAL STATUS, UNSPECIFIED: ICD-10-CM

## 2024-01-14 DIAGNOSIS — E03.9 HYPOTHYROIDISM, UNSPECIFIED: ICD-10-CM

## 2024-01-14 DIAGNOSIS — K21.9 GASTRO-ESOPHAGEAL REFLUX DISEASE WITHOUT ESOPHAGITIS: ICD-10-CM

## 2024-01-14 LAB
ALBUMIN SERPL ELPH-MCNC: 3.1 G/DL — LOW (ref 3.3–5)
ALBUMIN SERPL ELPH-MCNC: 3.1 G/DL — LOW (ref 3.3–5)
ALP SERPL-CCNC: 33 U/L — LOW (ref 40–120)
ALP SERPL-CCNC: 33 U/L — LOW (ref 40–120)
ALT FLD-CCNC: 23 U/L — SIGNIFICANT CHANGE UP (ref 12–78)
ALT FLD-CCNC: 23 U/L — SIGNIFICANT CHANGE UP (ref 12–78)
ANION GAP SERPL CALC-SCNC: 7 MMOL/L — SIGNIFICANT CHANGE UP (ref 5–17)
ANION GAP SERPL CALC-SCNC: 7 MMOL/L — SIGNIFICANT CHANGE UP (ref 5–17)
APAP SERPL-MCNC: 4 UG/ML — LOW (ref 10–30)
APAP SERPL-MCNC: 4 UG/ML — LOW (ref 10–30)
APPEARANCE UR: CLEAR — SIGNIFICANT CHANGE UP
APPEARANCE UR: CLEAR — SIGNIFICANT CHANGE UP
AST SERPL-CCNC: 13 U/L — LOW (ref 15–37)
AST SERPL-CCNC: 13 U/L — LOW (ref 15–37)
BASOPHILS # BLD AUTO: 0.02 K/UL — SIGNIFICANT CHANGE UP (ref 0–0.2)
BASOPHILS # BLD AUTO: 0.02 K/UL — SIGNIFICANT CHANGE UP (ref 0–0.2)
BASOPHILS NFR BLD AUTO: 0.2 % — SIGNIFICANT CHANGE UP (ref 0–2)
BASOPHILS NFR BLD AUTO: 0.2 % — SIGNIFICANT CHANGE UP (ref 0–2)
BILIRUB SERPL-MCNC: 0.2 MG/DL — SIGNIFICANT CHANGE UP (ref 0.2–1.2)
BILIRUB SERPL-MCNC: 0.2 MG/DL — SIGNIFICANT CHANGE UP (ref 0.2–1.2)
BILIRUB UR-MCNC: NEGATIVE — SIGNIFICANT CHANGE UP
BILIRUB UR-MCNC: NEGATIVE — SIGNIFICANT CHANGE UP
BUN SERPL-MCNC: 23 MG/DL — SIGNIFICANT CHANGE UP (ref 7–23)
BUN SERPL-MCNC: 23 MG/DL — SIGNIFICANT CHANGE UP (ref 7–23)
CALCIUM SERPL-MCNC: 8.3 MG/DL — LOW (ref 8.5–10.1)
CALCIUM SERPL-MCNC: 8.3 MG/DL — LOW (ref 8.5–10.1)
CHLORIDE SERPL-SCNC: 114 MMOL/L — HIGH (ref 96–108)
CHLORIDE SERPL-SCNC: 114 MMOL/L — HIGH (ref 96–108)
CO2 SERPL-SCNC: 20 MMOL/L — LOW (ref 22–31)
CO2 SERPL-SCNC: 20 MMOL/L — LOW (ref 22–31)
COLOR SPEC: YELLOW — SIGNIFICANT CHANGE UP
COLOR SPEC: YELLOW — SIGNIFICANT CHANGE UP
CREAT SERPL-MCNC: 0.88 MG/DL — SIGNIFICANT CHANGE UP (ref 0.5–1.3)
CREAT SERPL-MCNC: 0.88 MG/DL — SIGNIFICANT CHANGE UP (ref 0.5–1.3)
DIFF PNL FLD: NEGATIVE — SIGNIFICANT CHANGE UP
DIFF PNL FLD: NEGATIVE — SIGNIFICANT CHANGE UP
EGFR: 86 ML/MIN/1.73M2 — SIGNIFICANT CHANGE UP
EGFR: 86 ML/MIN/1.73M2 — SIGNIFICANT CHANGE UP
EOSINOPHIL # BLD AUTO: 0.09 K/UL — SIGNIFICANT CHANGE UP (ref 0–0.5)
EOSINOPHIL # BLD AUTO: 0.09 K/UL — SIGNIFICANT CHANGE UP (ref 0–0.5)
EOSINOPHIL NFR BLD AUTO: 0.8 % — SIGNIFICANT CHANGE UP (ref 0–6)
EOSINOPHIL NFR BLD AUTO: 0.8 % — SIGNIFICANT CHANGE UP (ref 0–6)
ETHANOL SERPL-MCNC: <10 MG/DL — SIGNIFICANT CHANGE UP (ref 0–10)
ETHANOL SERPL-MCNC: <10 MG/DL — SIGNIFICANT CHANGE UP (ref 0–10)
GLUCOSE SERPL-MCNC: 93 MG/DL — SIGNIFICANT CHANGE UP (ref 70–99)
GLUCOSE SERPL-MCNC: 93 MG/DL — SIGNIFICANT CHANGE UP (ref 70–99)
GLUCOSE UR QL: NEGATIVE MG/DL — SIGNIFICANT CHANGE UP
GLUCOSE UR QL: NEGATIVE MG/DL — SIGNIFICANT CHANGE UP
HCG SERPL-ACNC: <1 MIU/ML — SIGNIFICANT CHANGE UP
HCG SERPL-ACNC: <1 MIU/ML — SIGNIFICANT CHANGE UP
HCT VFR BLD CALC: 39.6 % — SIGNIFICANT CHANGE UP (ref 34.5–45)
HCT VFR BLD CALC: 39.6 % — SIGNIFICANT CHANGE UP (ref 34.5–45)
HGB BLD-MCNC: 13.3 G/DL — SIGNIFICANT CHANGE UP (ref 11.5–15.5)
HGB BLD-MCNC: 13.3 G/DL — SIGNIFICANT CHANGE UP (ref 11.5–15.5)
IMM GRANULOCYTES NFR BLD AUTO: 0.4 % — SIGNIFICANT CHANGE UP (ref 0–0.9)
IMM GRANULOCYTES NFR BLD AUTO: 0.4 % — SIGNIFICANT CHANGE UP (ref 0–0.9)
KETONES UR-MCNC: NEGATIVE MG/DL — SIGNIFICANT CHANGE UP
KETONES UR-MCNC: NEGATIVE MG/DL — SIGNIFICANT CHANGE UP
LEUKOCYTE ESTERASE UR-ACNC: NEGATIVE — SIGNIFICANT CHANGE UP
LEUKOCYTE ESTERASE UR-ACNC: NEGATIVE — SIGNIFICANT CHANGE UP
LITHIUM SERPL-MCNC: <0.2 MMOL/L — LOW (ref 0.6–1.2)
LITHIUM SERPL-MCNC: <0.2 MMOL/L — LOW (ref 0.6–1.2)
LYMPHOCYTES # BLD AUTO: 2.74 K/UL — SIGNIFICANT CHANGE UP (ref 1–3.3)
LYMPHOCYTES # BLD AUTO: 2.74 K/UL — SIGNIFICANT CHANGE UP (ref 1–3.3)
LYMPHOCYTES # BLD AUTO: 22.9 % — SIGNIFICANT CHANGE UP (ref 13–44)
LYMPHOCYTES # BLD AUTO: 22.9 % — SIGNIFICANT CHANGE UP (ref 13–44)
MAGNESIUM SERPL-MCNC: 2.1 MG/DL — SIGNIFICANT CHANGE UP (ref 1.6–2.6)
MAGNESIUM SERPL-MCNC: 2.1 MG/DL — SIGNIFICANT CHANGE UP (ref 1.6–2.6)
MCHC RBC-ENTMCNC: 31.8 PG — SIGNIFICANT CHANGE UP (ref 27–34)
MCHC RBC-ENTMCNC: 31.8 PG — SIGNIFICANT CHANGE UP (ref 27–34)
MCHC RBC-ENTMCNC: 33.6 GM/DL — SIGNIFICANT CHANGE UP (ref 32–36)
MCHC RBC-ENTMCNC: 33.6 GM/DL — SIGNIFICANT CHANGE UP (ref 32–36)
MCV RBC AUTO: 94.7 FL — SIGNIFICANT CHANGE UP (ref 80–100)
MCV RBC AUTO: 94.7 FL — SIGNIFICANT CHANGE UP (ref 80–100)
MONOCYTES # BLD AUTO: 0.85 K/UL — SIGNIFICANT CHANGE UP (ref 0–0.9)
MONOCYTES # BLD AUTO: 0.85 K/UL — SIGNIFICANT CHANGE UP (ref 0–0.9)
MONOCYTES NFR BLD AUTO: 7.1 % — SIGNIFICANT CHANGE UP (ref 2–14)
MONOCYTES NFR BLD AUTO: 7.1 % — SIGNIFICANT CHANGE UP (ref 2–14)
NEUTROPHILS # BLD AUTO: 8.21 K/UL — HIGH (ref 1.8–7.4)
NEUTROPHILS # BLD AUTO: 8.21 K/UL — HIGH (ref 1.8–7.4)
NEUTROPHILS NFR BLD AUTO: 68.6 % — SIGNIFICANT CHANGE UP (ref 43–77)
NEUTROPHILS NFR BLD AUTO: 68.6 % — SIGNIFICANT CHANGE UP (ref 43–77)
NITRITE UR-MCNC: NEGATIVE — SIGNIFICANT CHANGE UP
NITRITE UR-MCNC: NEGATIVE — SIGNIFICANT CHANGE UP
NRBC # BLD: 0 /100 WBCS — SIGNIFICANT CHANGE UP (ref 0–0)
NRBC # BLD: 0 /100 WBCS — SIGNIFICANT CHANGE UP (ref 0–0)
PCP SPEC-MCNC: SIGNIFICANT CHANGE UP
PCP SPEC-MCNC: SIGNIFICANT CHANGE UP
PH UR: 5.5 — SIGNIFICANT CHANGE UP (ref 5–8)
PH UR: 5.5 — SIGNIFICANT CHANGE UP (ref 5–8)
PHOSPHATE SERPL-MCNC: 3.4 MG/DL — SIGNIFICANT CHANGE UP (ref 2.5–4.5)
PHOSPHATE SERPL-MCNC: 3.4 MG/DL — SIGNIFICANT CHANGE UP (ref 2.5–4.5)
PLATELET # BLD AUTO: 170 K/UL — SIGNIFICANT CHANGE UP (ref 150–400)
PLATELET # BLD AUTO: 170 K/UL — SIGNIFICANT CHANGE UP (ref 150–400)
POTASSIUM SERPL-MCNC: 3.7 MMOL/L — SIGNIFICANT CHANGE UP (ref 3.5–5.3)
POTASSIUM SERPL-MCNC: 3.7 MMOL/L — SIGNIFICANT CHANGE UP (ref 3.5–5.3)
POTASSIUM SERPL-SCNC: 3.7 MMOL/L — SIGNIFICANT CHANGE UP (ref 3.5–5.3)
POTASSIUM SERPL-SCNC: 3.7 MMOL/L — SIGNIFICANT CHANGE UP (ref 3.5–5.3)
PROT SERPL-MCNC: 6.2 G/DL — SIGNIFICANT CHANGE UP (ref 6–8.3)
PROT SERPL-MCNC: 6.2 G/DL — SIGNIFICANT CHANGE UP (ref 6–8.3)
PROT UR-MCNC: NEGATIVE MG/DL — SIGNIFICANT CHANGE UP
PROT UR-MCNC: NEGATIVE MG/DL — SIGNIFICANT CHANGE UP
RBC # BLD: 4.18 M/UL — SIGNIFICANT CHANGE UP (ref 3.8–5.2)
RBC # BLD: 4.18 M/UL — SIGNIFICANT CHANGE UP (ref 3.8–5.2)
RBC # FLD: 12.4 % — SIGNIFICANT CHANGE UP (ref 10.3–14.5)
RBC # FLD: 12.4 % — SIGNIFICANT CHANGE UP (ref 10.3–14.5)
SALICYLATES SERPL-MCNC: <1.7 MG/DL — LOW (ref 2.8–20)
SALICYLATES SERPL-MCNC: <1.7 MG/DL — LOW (ref 2.8–20)
SODIUM SERPL-SCNC: 141 MMOL/L — SIGNIFICANT CHANGE UP (ref 135–145)
SODIUM SERPL-SCNC: 141 MMOL/L — SIGNIFICANT CHANGE UP (ref 135–145)
SP GR SPEC: 1.01 — SIGNIFICANT CHANGE UP (ref 1–1.03)
SP GR SPEC: 1.01 — SIGNIFICANT CHANGE UP (ref 1–1.03)
TSH SERPL-MCNC: 2.21 UIU/ML — SIGNIFICANT CHANGE UP (ref 0.36–3.74)
TSH SERPL-MCNC: 2.21 UIU/ML — SIGNIFICANT CHANGE UP (ref 0.36–3.74)
UROBILINOGEN FLD QL: 0.2 MG/DL — SIGNIFICANT CHANGE UP (ref 0.2–1)
UROBILINOGEN FLD QL: 0.2 MG/DL — SIGNIFICANT CHANGE UP (ref 0.2–1)
WBC # BLD: 11.96 K/UL — HIGH (ref 3.8–10.5)
WBC # BLD: 11.96 K/UL — HIGH (ref 3.8–10.5)
WBC # FLD AUTO: 11.96 K/UL — HIGH (ref 3.8–10.5)
WBC # FLD AUTO: 11.96 K/UL — HIGH (ref 3.8–10.5)

## 2024-01-14 PROCEDURE — 99222 1ST HOSP IP/OBS MODERATE 55: CPT

## 2024-01-14 PROCEDURE — 99285 EMERGENCY DEPT VISIT HI MDM: CPT | Mod: FS

## 2024-01-14 PROCEDURE — 70450 CT HEAD/BRAIN W/O DYE: CPT | Mod: 26,MA

## 2024-01-14 PROCEDURE — 72125 CT NECK SPINE W/O DYE: CPT | Mod: 26,MA

## 2024-01-14 PROCEDURE — 93010 ELECTROCARDIOGRAM REPORT: CPT

## 2024-01-14 RX ORDER — RISPERIDONE 4 MG/1
2 TABLET ORAL
Refills: 0 | DISCHARGE

## 2024-01-14 RX ORDER — MELOXICAM 15 MG/1
1 TABLET ORAL
Refills: 0 | DISCHARGE

## 2024-01-14 RX ORDER — GABAPENTIN 400 MG/1
1200 CAPSULE ORAL
Refills: 0 | Status: DISCONTINUED | OUTPATIENT
Start: 2024-01-14 | End: 2024-01-16

## 2024-01-14 RX ORDER — OMEPRAZOLE 10 MG/1
1 CAPSULE, DELAYED RELEASE ORAL
Refills: 0 | DISCHARGE

## 2024-01-14 RX ORDER — METHENAMINE MANDELATE 1 G
1 TABLET ORAL
Refills: 0 | DISCHARGE

## 2024-01-14 RX ORDER — TIOTROPIUM BROMIDE 18 UG/1
2 CAPSULE ORAL; RESPIRATORY (INHALATION)
Qty: 0 | Refills: 0 | DISCHARGE

## 2024-01-14 RX ORDER — ALBUTEROL 90 UG/1
2 AEROSOL, METERED ORAL
Qty: 0 | Refills: 0 | DISCHARGE

## 2024-01-14 RX ORDER — SERTRALINE 25 MG/1
1 TABLET, FILM COATED ORAL
Qty: 0 | Refills: 0 | DISCHARGE

## 2024-01-14 RX ORDER — VILAZODONE HYDROCHLORIDE 20 MG/1
1 TABLET, FILM COATED ORAL
Qty: 0 | Refills: 0 | DISCHARGE

## 2024-01-14 RX ORDER — SODIUM CHLORIDE 9 MG/ML
1000 INJECTION INTRAMUSCULAR; INTRAVENOUS; SUBCUTANEOUS ONCE
Refills: 0 | Status: COMPLETED | OUTPATIENT
Start: 2024-01-14 | End: 2024-01-14

## 2024-01-14 RX ORDER — ATORVASTATIN CALCIUM 80 MG/1
20 TABLET, FILM COATED ORAL AT BEDTIME
Refills: 0 | Status: DISCONTINUED | OUTPATIENT
Start: 2024-01-14 | End: 2024-01-16

## 2024-01-14 RX ORDER — LEVOTHYROXINE SODIUM 125 MCG
1 TABLET ORAL
Qty: 0 | Refills: 0 | DISCHARGE

## 2024-01-14 RX ORDER — GABAPENTIN 400 MG/1
2 CAPSULE ORAL
Refills: 0 | DISCHARGE

## 2024-01-14 RX ORDER — ATORVASTATIN CALCIUM 80 MG/1
1 TABLET, FILM COATED ORAL
Refills: 0 | DISCHARGE

## 2024-01-14 RX ORDER — TRAZODONE HCL 50 MG
2 TABLET ORAL
Refills: 0 | DISCHARGE

## 2024-01-14 RX ORDER — TOPIRAMATE 25 MG
1 TABLET ORAL
Refills: 0 | DISCHARGE

## 2024-01-14 RX ORDER — BUPROPION HYDROCHLORIDE 150 MG/1
1 TABLET, EXTENDED RELEASE ORAL
Refills: 0 | DISCHARGE

## 2024-01-14 RX ORDER — OMEPRAZOLE 10 MG/1
1 CAPSULE, DELAYED RELEASE ORAL
Qty: 0 | Refills: 0 | DISCHARGE

## 2024-01-14 RX ORDER — RISPERIDONE 4 MG/1
6 TABLET ORAL AT BEDTIME
Refills: 0 | Status: DISCONTINUED | OUTPATIENT
Start: 2024-01-14 | End: 2024-01-16

## 2024-01-14 RX ORDER — PANTOPRAZOLE SODIUM 20 MG/1
40 TABLET, DELAYED RELEASE ORAL
Refills: 0 | Status: DISCONTINUED | OUTPATIENT
Start: 2024-01-14 | End: 2024-01-16

## 2024-01-14 RX ORDER — TOPIRAMATE 25 MG
100 TABLET ORAL
Refills: 0 | Status: DISCONTINUED | OUTPATIENT
Start: 2024-01-14 | End: 2024-01-16

## 2024-01-14 RX ORDER — LEVOTHYROXINE SODIUM 125 MCG
1 TABLET ORAL
Refills: 0 | DISCHARGE

## 2024-01-14 RX ORDER — CLONAZEPAM 1 MG
1 TABLET ORAL
Refills: 0 | Status: DISCONTINUED | OUTPATIENT
Start: 2024-01-14 | End: 2024-01-16

## 2024-01-14 RX ORDER — LAMOTRIGINE 25 MG/1
1 TABLET, ORALLY DISINTEGRATING ORAL
Refills: 0 | DISCHARGE

## 2024-01-14 RX ORDER — BREXPIPRAZOLE 0.25 MG/1
2 TABLET ORAL DAILY
Refills: 0 | Status: DISCONTINUED | OUTPATIENT
Start: 2024-01-14 | End: 2024-01-16

## 2024-01-14 RX ORDER — MONTELUKAST 4 MG/1
1 TABLET, CHEWABLE ORAL
Refills: 0 | DISCHARGE

## 2024-01-14 RX ORDER — BUPROPION HYDROCHLORIDE 150 MG/1
300 TABLET, EXTENDED RELEASE ORAL DAILY
Refills: 0 | Status: DISCONTINUED | OUTPATIENT
Start: 2024-01-14 | End: 2024-01-16

## 2024-01-14 RX ORDER — BENZTROPINE MESYLATE 1 MG
2 TABLET ORAL
Refills: 0 | Status: DISCONTINUED | OUTPATIENT
Start: 2024-01-14 | End: 2024-01-16

## 2024-01-14 RX ORDER — NORETHINDRONE AND ETHINYL ESTRADIOL 0.4-0.035
1 KIT ORAL
Refills: 0 | DISCHARGE

## 2024-01-14 RX ORDER — MONTELUKAST 4 MG/1
10 TABLET, CHEWABLE ORAL AT BEDTIME
Refills: 0 | Status: DISCONTINUED | OUTPATIENT
Start: 2024-01-14 | End: 2024-01-16

## 2024-01-14 RX ORDER — LEVOTHYROXINE SODIUM 125 MCG
88 TABLET ORAL DAILY
Refills: 0 | Status: DISCONTINUED | OUTPATIENT
Start: 2024-01-14 | End: 2024-01-16

## 2024-01-14 RX ORDER — LAMOTRIGINE 25 MG/1
100 TABLET, ORALLY DISINTEGRATING ORAL
Refills: 0 | Status: DISCONTINUED | OUTPATIENT
Start: 2024-01-14 | End: 2024-01-16

## 2024-01-14 RX ORDER — CLONAZEPAM 1 MG
0.5 TABLET ORAL
Refills: 0 | DISCHARGE

## 2024-01-14 RX ORDER — BUPROPION HYDROCHLORIDE 150 MG/1
200 TABLET, EXTENDED RELEASE ORAL
Qty: 0 | Refills: 0 | DISCHARGE

## 2024-01-14 RX ORDER — FLUTICASONE PROPIONATE 50 MCG
0 SPRAY, SUSPENSION NASAL
Qty: 0 | Refills: 0 | DISCHARGE

## 2024-01-14 RX ORDER — TRAZODONE HCL 50 MG
200 TABLET ORAL AT BEDTIME
Refills: 0 | Status: DISCONTINUED | OUTPATIENT
Start: 2024-01-14 | End: 2024-01-16

## 2024-01-14 RX ORDER — SUMATRIPTAN SUCCINATE 4 MG/.5ML
20 INJECTION, SOLUTION SUBCUTANEOUS
Refills: 0 | DISCHARGE

## 2024-01-14 RX ADMIN — ATORVASTATIN CALCIUM 20 MILLIGRAM(S): 80 TABLET, FILM COATED ORAL at 22:41

## 2024-01-14 RX ADMIN — SODIUM CHLORIDE 1000 MILLILITER(S): 9 INJECTION INTRAMUSCULAR; INTRAVENOUS; SUBCUTANEOUS at 17:13

## 2024-01-14 RX ADMIN — RISPERIDONE 6 MILLIGRAM(S): 4 TABLET ORAL at 22:42

## 2024-01-14 RX ADMIN — SODIUM CHLORIDE 1000 MILLILITER(S): 9 INJECTION INTRAMUSCULAR; INTRAVENOUS; SUBCUTANEOUS at 19:04

## 2024-01-14 RX ADMIN — GABAPENTIN 1200 MILLIGRAM(S): 400 CAPSULE ORAL at 22:41

## 2024-01-14 RX ADMIN — MONTELUKAST 10 MILLIGRAM(S): 4 TABLET, CHEWABLE ORAL at 22:41

## 2024-01-14 RX ADMIN — Medication 2 MILLIGRAM(S): at 19:02

## 2024-01-14 RX ADMIN — Medication 200 MILLIGRAM(S): at 22:41

## 2024-01-14 RX ADMIN — BUPROPION HYDROCHLORIDE 300 MILLIGRAM(S): 150 TABLET, EXTENDED RELEASE ORAL at 22:40

## 2024-01-14 RX ADMIN — Medication 1 MILLIGRAM(S): at 22:41

## 2024-01-14 NOTE — ED ADULT NURSE NOTE - CCCP TRG CHIEF CMPLNT
see chief complaint quote
I have personally seen and examined this patient.  I have fully participated in the care of this patient. I have reviewed all pertinent clinical information, including history, physical exam, plan and the Resident’s note and agree except as noted.

## 2024-01-14 NOTE — H&P ADULT - ATTENDING COMMENTS
38-year-old female with past medical history of bipolar disorder, migraines, hypothyroidism, chronic back pain presents with altered mental status s/p recent change in bipolar meds. Admitted for AMS now resolved. Needs to be seen by Psychiatry tomorrow for possible disposition tomorrow. Rest per resident team.

## 2024-01-14 NOTE — H&P ADULT - NSHPSOCIALHISTORY_GEN_ALL_CORE
Lives: with parents and brother  ADLs: dependent on family  Alcohol Use: denies  Tobacco Use: denies  Recreational Drug Use: denies

## 2024-01-14 NOTE — ED ADULT NURSE NOTE - NSFALLRISKINTERV_ED_ALL_ED
Assistance OOB with selected safe patient handling equipment if applicable/Assistance with ambulation/Communicate fall risk and risk factors to all staff, patient, and family/Monitor gait and stability/Monitor for mental status changes and reorient to person, place, and time, as needed/Provide visual cue: yellow wristband, yellow gown, etc/Reinforce activity limits and safety measures with patient and family/Toileting schedule using arm’s reach rule for commode and bathroom/Use of alarms - bed, stretcher, chair and/or video monitoring/Call bell, personal items and telephone in reach/Instruct patient to call for assistance before getting out of bed/chair/stretcher/Non-slip footwear applied when patient is off stretcher/[place] to call system/Physically safe environment - no spills, clutter or unnecessary equipment/Purposeful Proactive Rounding/Room/bathroom lighting operational, light cord in reach Assistance OOB with selected safe patient handling equipment if applicable/Assistance with ambulation/Communicate fall risk and risk factors to all staff, patient, and family/Monitor gait and stability/Monitor for mental status changes and reorient to person, place, and time, as needed/Provide visual cue: yellow wristband, yellow gown, etc/Reinforce activity limits and safety measures with patient and family/Toileting schedule using arm’s reach rule for commode and bathroom/Use of alarms - bed, stretcher, chair and/or video monitoring/Call bell, personal items and telephone in reach/Instruct patient to call for assistance before getting out of bed/chair/stretcher/Non-slip footwear applied when patient is off stretcher/[place] to call system/Physically safe environment - no spills, clutter or unnecessary equipment/Purposeful Proactive Rounding/Room/bathroom lighting operational, light cord in reach Assistance OOB with selected safe patient handling equipment if applicable/Assistance with ambulation/Communicate fall risk and risk factors to all staff, patient, and family/Monitor gait and stability/Monitor for mental status changes and reorient to person, place, and time, as needed/Provide visual cue: yellow wristband, yellow gown, etc/Reinforce activity limits and safety measures with patient and family/Toileting schedule using arm’s reach rule for commode and bathroom/Use of alarms - bed, stretcher, chair and/or video monitoring/Call bell, personal items and telephone in reach/Instruct patient to call for assistance before getting out of bed/chair/stretcher/Non-slip footwear applied when patient is off stretcher/[place] to call system/Physically safe environment - no spills, clutter or unnecessary equipment/Purposeful Proactive Rounding/Room/bathroom lighting operational, light cord in reach

## 2024-01-14 NOTE — ED ADULT NURSE REASSESSMENT NOTE - NS ED NURSE REASSESS COMMENT FT1
2235: Pt received from previous RN. Pt confused and manic, word salad. Pt speaking, incoherently and not making sense. Alert and oriented x1. No facial droop noted. Pt with history of bipolar. Denies any SI, HI, AH, VH. Pt fidgety and anxious in bed. Pt medicated as per orders. CO maintained at bedside for elopement risk and safety. No acute distress noted. Pt assisted to restroom. Pending bed placement. Will continue to monitor.

## 2024-01-14 NOTE — H&P ADULT - HISTORY OF PRESENT ILLNESS
38-year-old female with past medical history of bipolar disorder, migraines, hypothyroidism, chronic back pain presents with altered mental status. Mother reports approximately 6 days ago patient saw her psychiatrist Dr. Serna who increased dose of Rexulti daily.  Patient takes multiple medications for bipolar daily. Mother noted change in mental status the past day or so - becoming worse today. Pts mother states she is rambling, repeating herself, super agitated, not able to find words/making up words, not sleeping well.   Patient possibly fell this morning - no known headstrike.  Patient is without complaints however not making sense and is confused. Mother states patients current behavior is similar to manic episode that pt has had in the past. Has required inpatient psych hospitalizations in the past -- although not for a number of years.    IN THE ED:  Temp  98.8 F , HR 84 , BP  111/71  ,RR 16  , SpO2 98% on RA  S/P 1L NS bolus x2 and  Ativan 2mg IVP x1  EKG: ordered  Labs significant for: WBC 11.96,   Imaging: CT head and CT c-spine negative

## 2024-01-14 NOTE — ED PROVIDER NOTE - CLINICAL SUMMARY MEDICAL DECISION MAKING FREE TEXT BOX
38 F history bipolar disorder, multiple spinal surgeries, prior fasciotomy here with mom for several weeks of confusion worse over the past few days. differential diagnosis inclusive of polypharmacy, electrolyte abnormality, intracranial abnormality, infection, psychosis. check labs, CTs, ekg, fluids, plan for admission.

## 2024-01-14 NOTE — H&P ADULT - PROBLEM SELECTOR PLAN 2
-On multiple medications for bipolar  -Will continue Rexulti 2mg once daily (prior to change to 3mg last week which may have caused AMS)  -Pharmacy does not have Viibryd and Hipprex -- will need to be brought in from home

## 2024-01-14 NOTE — ED PROVIDER NOTE - NEUROLOGICAL, MLM
Alert and oriented, no focal deficits, no motor or sensory deficits. Libtayo Pregnancy And Lactation Text: This medication is contraindicated in pregnancy and when breast feeding.

## 2024-01-14 NOTE — H&P ADULT - NSHPPHYSICALEXAM_GEN_ALL_CORE
T(C): 36.6 (01-14-24 @ 20:03), Max: 37.1 (01-14-24 @ 16:09)  HR: 81 (01-14-24 @ 20:03) (81 - 84)  BP: 112/78 (01-14-24 @ 20:03) (111/71 - 112/78)  RR: 17 (01-14-24 @ 20:03) (16 - 17)  SpO2: 98% (01-14-24 @ 20:03) (98% - 98%)    gen: awake, confused, NAD  heent: nc/at, eomi, perrla, mmm  neck: supple  resp: cta b/l, no wheezes, rales or rhonchi  cardiac: +s1, s2, RRR, no murmurs appreciated  abd: soft, nt, nd, no rebound/guarding  mskt: no clubbing, cyanosis or edema of LE extremities, s/p right calf fasciotomy   vasc: 2+ radial pulses  skin: warm and dry  neuro: a&ox1, right drop foot  psych: confused, difficultly finding words, manic

## 2024-01-14 NOTE — ED ADULT TRIAGE NOTE - CHIEF COMPLAINT QUOTE
Patient has H/O bipolar. Taking lots of Psych meds as per mother. According to mother patient doesn't make sense and saying inappropriate things for couple of days. Today is worse. Denies any suicidal ideation

## 2024-01-14 NOTE — ED PROVIDER NOTE - OBJECTIVE STATEMENT
Patient is a 38-year-old female with past medical history of bipolar disorder, migraines, hypothyroidism, chronic back pain presents with altered mental status.  Mother reports approximately 6 days ago patient saw her psychiatrist Dr. hanson who changed her medications.  Patient takes multiple medications for bipolar daily.  Patient added new medication and increased dose of Rexulti daily.  Mother noted change in mental status upon starting of these medications.  Patient possibly fell yesterday?  Patient is without complaints and appears confused.

## 2024-01-14 NOTE — ED PROVIDER NOTE - ATTENDING APP SHARED VISIT CONTRIBUTION OF CARE
This was a shared visit with KRISTA. I reviewed and verified the documentation and independently performed the documented MDM.

## 2024-01-14 NOTE — ED PROVIDER NOTE - DURATION
day(s) Colchicine Counseling:  Patient counseled regarding adverse effects including but not limited to stomach upset (nausea, vomiting, stomach pain, or diarrhea).  Patient instructed to limit alcohol consumption while taking this medication.  Colchicine may reduce blood counts especially with prolonged use.  The patient understands that monitoring of kidney function and blood counts may be required, especially at baseline. The patient verbalized understanding of the proper use and possible adverse effects of colchicine.  All of the patient's questions and concerns were addressed.

## 2024-01-14 NOTE — ED ADULT NURSE NOTE - OBJECTIVE STATEMENT
Pt's mother states that pt has been having worsening AMS over the past few days. pt noted to be confused and has inappropriate speech. pt in acute distress. pt and pt's mother updated on plan of care.

## 2024-01-14 NOTE — ED PROVIDER NOTE - INTERPRETATION
normal sinus rhythm, Normal axis, Normal MD interval and QRS complex. There are no acute ischemic ST or T-wave changes. normal sinus rhythm, Normal axis, Normal LA interval and QRS complex. There are no acute ischemic ST or T-wave changes.

## 2024-01-14 NOTE — H&P ADULT - ASSESSMENT
38-year-old female with past medical history of bipolar disorder, migraines, hypothyroidism, chronic back pain presents with altered mental status s/p recent change in bipolar meds. Admitted for AMS.

## 2024-01-14 NOTE — H&P ADULT - PROBLEM SELECTOR PLAN 1
Pt presenting with AMS (increased agitation, confusion, rambling, not making sense) as per mother x1-2 days  -Recent increase in bipolar meds -- Rexulti recently increased to 3mg in the AM (from 2mg)  -Pt follows with outpatient psych Dr. Serna via telehealth  -S/p 1L NS bolus x2, Ativan 2mg IVP x1  -?fall this morning, no known headstrike  - CT Head and C-spine negative  - Appears in be in a manic-like state  - 1:1 ordered  - Will continue to montior  -Psych (Dr. Schmidt) to be consulted in the morning Pt presenting with AMS (increased agitation, confusion, rambling, not making sense) as per mother x1-2 days  -Recent increase in bipolar meds -- Rexulti recently increased to 3mg in the AM (from 2mg)  -Pt follows with outpatient psych Dr. Serna via telehealth  -S/p 1L NS bolus x2, Ativan 2mg IVP x1  -?fall this morning, no known headstrike  - CT Head and C-spine negative  - Appears in be in a manic-like state  - 1:1 ordered  - Will continue to montior  -Psych (Dr. Schmidt) consulted

## 2024-01-14 NOTE — ED PROVIDER NOTE - WET READ LAUNCH FT
Call, mail, call in the Rx, recheck fasting lipids in 2 months.  Your screening test for hepatitis C is negative which is normal.  Your bad cholesterol level is high.  Your good cholesterol level is low and triglycerides are too high.  I recommend that you begin atorvastatin 40 mg daily (30 pills with 6 refills) and recheck your fasting cholesterol levels in 2 months.  I also recommend that you eat a low-fat low-cholesterol diet and get 30 minutes of exercise daily   There are no Wet Read(s) to document.

## 2024-01-15 LAB
ALBUMIN SERPL ELPH-MCNC: 3.4 G/DL — SIGNIFICANT CHANGE UP (ref 3.3–5)
ALBUMIN SERPL ELPH-MCNC: 3.4 G/DL — SIGNIFICANT CHANGE UP (ref 3.3–5)
ALP SERPL-CCNC: 31 U/L — LOW (ref 40–120)
ALP SERPL-CCNC: 31 U/L — LOW (ref 40–120)
ALT FLD-CCNC: 23 U/L — SIGNIFICANT CHANGE UP (ref 12–78)
ALT FLD-CCNC: 23 U/L — SIGNIFICANT CHANGE UP (ref 12–78)
ANION GAP SERPL CALC-SCNC: 5 MMOL/L — SIGNIFICANT CHANGE UP (ref 5–17)
ANION GAP SERPL CALC-SCNC: 5 MMOL/L — SIGNIFICANT CHANGE UP (ref 5–17)
AST SERPL-CCNC: 13 U/L — LOW (ref 15–37)
AST SERPL-CCNC: 13 U/L — LOW (ref 15–37)
BASOPHILS # BLD AUTO: 0.03 K/UL — SIGNIFICANT CHANGE UP (ref 0–0.2)
BASOPHILS # BLD AUTO: 0.03 K/UL — SIGNIFICANT CHANGE UP (ref 0–0.2)
BASOPHILS NFR BLD AUTO: 0.3 % — SIGNIFICANT CHANGE UP (ref 0–2)
BASOPHILS NFR BLD AUTO: 0.3 % — SIGNIFICANT CHANGE UP (ref 0–2)
BILIRUB SERPL-MCNC: 0.3 MG/DL — SIGNIFICANT CHANGE UP (ref 0.2–1.2)
BILIRUB SERPL-MCNC: 0.3 MG/DL — SIGNIFICANT CHANGE UP (ref 0.2–1.2)
BUN SERPL-MCNC: 16 MG/DL — SIGNIFICANT CHANGE UP (ref 7–23)
BUN SERPL-MCNC: 16 MG/DL — SIGNIFICANT CHANGE UP (ref 7–23)
CALCIUM SERPL-MCNC: 8.9 MG/DL — SIGNIFICANT CHANGE UP (ref 8.5–10.1)
CALCIUM SERPL-MCNC: 8.9 MG/DL — SIGNIFICANT CHANGE UP (ref 8.5–10.1)
CHLORIDE SERPL-SCNC: 112 MMOL/L — HIGH (ref 96–108)
CHLORIDE SERPL-SCNC: 112 MMOL/L — HIGH (ref 96–108)
CO2 SERPL-SCNC: 24 MMOL/L — SIGNIFICANT CHANGE UP (ref 22–31)
CO2 SERPL-SCNC: 24 MMOL/L — SIGNIFICANT CHANGE UP (ref 22–31)
CREAT SERPL-MCNC: 0.9 MG/DL — SIGNIFICANT CHANGE UP (ref 0.5–1.3)
CREAT SERPL-MCNC: 0.9 MG/DL — SIGNIFICANT CHANGE UP (ref 0.5–1.3)
EGFR: 83 ML/MIN/1.73M2 — SIGNIFICANT CHANGE UP
EGFR: 83 ML/MIN/1.73M2 — SIGNIFICANT CHANGE UP
EOSINOPHIL # BLD AUTO: 0.13 K/UL — SIGNIFICANT CHANGE UP (ref 0–0.5)
EOSINOPHIL # BLD AUTO: 0.13 K/UL — SIGNIFICANT CHANGE UP (ref 0–0.5)
EOSINOPHIL NFR BLD AUTO: 1.1 % — SIGNIFICANT CHANGE UP (ref 0–6)
EOSINOPHIL NFR BLD AUTO: 1.1 % — SIGNIFICANT CHANGE UP (ref 0–6)
GLUCOSE SERPL-MCNC: 88 MG/DL — SIGNIFICANT CHANGE UP (ref 70–99)
GLUCOSE SERPL-MCNC: 88 MG/DL — SIGNIFICANT CHANGE UP (ref 70–99)
HCT VFR BLD CALC: 37.9 % — SIGNIFICANT CHANGE UP (ref 34.5–45)
HCT VFR BLD CALC: 37.9 % — SIGNIFICANT CHANGE UP (ref 34.5–45)
HGB BLD-MCNC: 13.1 G/DL — SIGNIFICANT CHANGE UP (ref 11.5–15.5)
HGB BLD-MCNC: 13.1 G/DL — SIGNIFICANT CHANGE UP (ref 11.5–15.5)
IMM GRANULOCYTES NFR BLD AUTO: 0.4 % — SIGNIFICANT CHANGE UP (ref 0–0.9)
IMM GRANULOCYTES NFR BLD AUTO: 0.4 % — SIGNIFICANT CHANGE UP (ref 0–0.9)
LYMPHOCYTES # BLD AUTO: 28.2 % — SIGNIFICANT CHANGE UP (ref 13–44)
LYMPHOCYTES # BLD AUTO: 28.2 % — SIGNIFICANT CHANGE UP (ref 13–44)
LYMPHOCYTES # BLD AUTO: 3.31 K/UL — HIGH (ref 1–3.3)
LYMPHOCYTES # BLD AUTO: 3.31 K/UL — HIGH (ref 1–3.3)
MCHC RBC-ENTMCNC: 32 PG — SIGNIFICANT CHANGE UP (ref 27–34)
MCHC RBC-ENTMCNC: 32 PG — SIGNIFICANT CHANGE UP (ref 27–34)
MCHC RBC-ENTMCNC: 34.6 GM/DL — SIGNIFICANT CHANGE UP (ref 32–36)
MCHC RBC-ENTMCNC: 34.6 GM/DL — SIGNIFICANT CHANGE UP (ref 32–36)
MCV RBC AUTO: 92.4 FL — SIGNIFICANT CHANGE UP (ref 80–100)
MCV RBC AUTO: 92.4 FL — SIGNIFICANT CHANGE UP (ref 80–100)
MONOCYTES # BLD AUTO: 0.8 K/UL — SIGNIFICANT CHANGE UP (ref 0–0.9)
MONOCYTES # BLD AUTO: 0.8 K/UL — SIGNIFICANT CHANGE UP (ref 0–0.9)
MONOCYTES NFR BLD AUTO: 6.8 % — SIGNIFICANT CHANGE UP (ref 2–14)
MONOCYTES NFR BLD AUTO: 6.8 % — SIGNIFICANT CHANGE UP (ref 2–14)
NEUTROPHILS # BLD AUTO: 7.43 K/UL — HIGH (ref 1.8–7.4)
NEUTROPHILS # BLD AUTO: 7.43 K/UL — HIGH (ref 1.8–7.4)
NEUTROPHILS NFR BLD AUTO: 63.2 % — SIGNIFICANT CHANGE UP (ref 43–77)
NEUTROPHILS NFR BLD AUTO: 63.2 % — SIGNIFICANT CHANGE UP (ref 43–77)
NRBC # BLD: 0 /100 WBCS — SIGNIFICANT CHANGE UP (ref 0–0)
NRBC # BLD: 0 /100 WBCS — SIGNIFICANT CHANGE UP (ref 0–0)
PLATELET # BLD AUTO: 172 K/UL — SIGNIFICANT CHANGE UP (ref 150–400)
PLATELET # BLD AUTO: 172 K/UL — SIGNIFICANT CHANGE UP (ref 150–400)
POTASSIUM SERPL-MCNC: 3.7 MMOL/L — SIGNIFICANT CHANGE UP (ref 3.5–5.3)
POTASSIUM SERPL-MCNC: 3.7 MMOL/L — SIGNIFICANT CHANGE UP (ref 3.5–5.3)
POTASSIUM SERPL-SCNC: 3.7 MMOL/L — SIGNIFICANT CHANGE UP (ref 3.5–5.3)
POTASSIUM SERPL-SCNC: 3.7 MMOL/L — SIGNIFICANT CHANGE UP (ref 3.5–5.3)
PROT SERPL-MCNC: 6.5 G/DL — SIGNIFICANT CHANGE UP (ref 6–8.3)
PROT SERPL-MCNC: 6.5 G/DL — SIGNIFICANT CHANGE UP (ref 6–8.3)
RBC # BLD: 4.1 M/UL — SIGNIFICANT CHANGE UP (ref 3.8–5.2)
RBC # BLD: 4.1 M/UL — SIGNIFICANT CHANGE UP (ref 3.8–5.2)
RBC # FLD: 12.2 % — SIGNIFICANT CHANGE UP (ref 10.3–14.5)
RBC # FLD: 12.2 % — SIGNIFICANT CHANGE UP (ref 10.3–14.5)
SODIUM SERPL-SCNC: 141 MMOL/L — SIGNIFICANT CHANGE UP (ref 135–145)
SODIUM SERPL-SCNC: 141 MMOL/L — SIGNIFICANT CHANGE UP (ref 135–145)
WBC # BLD: 11.75 K/UL — HIGH (ref 3.8–10.5)
WBC # BLD: 11.75 K/UL — HIGH (ref 3.8–10.5)
WBC # FLD AUTO: 11.75 K/UL — HIGH (ref 3.8–10.5)
WBC # FLD AUTO: 11.75 K/UL — HIGH (ref 3.8–10.5)

## 2024-01-15 PROCEDURE — 99233 SBSQ HOSP IP/OBS HIGH 50: CPT | Mod: GC

## 2024-01-15 RX ORDER — NORETHINDRONE AND ETHINYL ESTRADIOL 0.4-0.035
1 KIT ORAL
Refills: 0 | Status: DISCONTINUED | OUTPATIENT
Start: 2024-01-15 | End: 2024-01-16

## 2024-01-15 RX ORDER — VILAZODONE HYDROCHLORIDE 20 MG/1
40 TABLET, FILM COATED ORAL
Refills: 0 | Status: DISCONTINUED | OUTPATIENT
Start: 2024-01-15 | End: 2024-01-16

## 2024-01-15 RX ORDER — ACETAMINOPHEN 500 MG
650 TABLET ORAL EVERY 6 HOURS
Refills: 0 | Status: DISCONTINUED | OUTPATIENT
Start: 2024-01-15 | End: 2024-01-16

## 2024-01-15 RX ADMIN — PANTOPRAZOLE SODIUM 40 MILLIGRAM(S): 20 TABLET, DELAYED RELEASE ORAL at 06:58

## 2024-01-15 RX ADMIN — GABAPENTIN 1200 MILLIGRAM(S): 400 CAPSULE ORAL at 21:26

## 2024-01-15 RX ADMIN — Medication 88 MICROGRAM(S): at 06:56

## 2024-01-15 RX ADMIN — GABAPENTIN 1200 MILLIGRAM(S): 400 CAPSULE ORAL at 08:36

## 2024-01-15 RX ADMIN — Medication 200 MILLIGRAM(S): at 21:26

## 2024-01-15 RX ADMIN — RISPERIDONE 6 MILLIGRAM(S): 4 TABLET ORAL at 21:25

## 2024-01-15 RX ADMIN — Medication 650 MILLIGRAM(S): at 15:30

## 2024-01-15 RX ADMIN — Medication 1 MILLIGRAM(S): at 07:07

## 2024-01-15 RX ADMIN — Medication 650 MILLIGRAM(S): at 14:34

## 2024-01-15 RX ADMIN — LAMOTRIGINE 100 MILLIGRAM(S): 25 TABLET, ORALLY DISINTEGRATING ORAL at 18:14

## 2024-01-15 RX ADMIN — BUPROPION HYDROCHLORIDE 300 MILLIGRAM(S): 150 TABLET, EXTENDED RELEASE ORAL at 11:50

## 2024-01-15 RX ADMIN — LAMOTRIGINE 100 MILLIGRAM(S): 25 TABLET, ORALLY DISINTEGRATING ORAL at 06:56

## 2024-01-15 RX ADMIN — Medication 1 MILLIGRAM(S): at 12:54

## 2024-01-15 RX ADMIN — Medication 2 MILLIGRAM(S): at 19:09

## 2024-01-15 RX ADMIN — MONTELUKAST 10 MILLIGRAM(S): 4 TABLET, CHEWABLE ORAL at 21:26

## 2024-01-15 RX ADMIN — Medication 100 MILLIGRAM(S): at 06:57

## 2024-01-15 RX ADMIN — BREXPIPRAZOLE 2 MILLIGRAM(S): 0.25 TABLET ORAL at 11:49

## 2024-01-15 RX ADMIN — Medication 2 MILLIGRAM(S): at 06:57

## 2024-01-15 RX ADMIN — ATORVASTATIN CALCIUM 20 MILLIGRAM(S): 80 TABLET, FILM COATED ORAL at 21:27

## 2024-01-15 RX ADMIN — Medication 100 MILLIGRAM(S): at 18:14

## 2024-01-15 RX ADMIN — Medication 1 MILLIGRAM(S): at 21:26

## 2024-01-15 NOTE — CONSULT NOTE ADULT - SUBJECTIVE AND OBJECTIVE BOX
AMS  suspect underlying psych issues less likely primary CNS event   psych follow up  neurologic wise stable dc planning  called mom went to voicemail

## 2024-01-15 NOTE — PROGRESS NOTE ADULT - SUBJECTIVE AND OBJECTIVE BOX
Patient is a 38y old  Female who presents with a chief complaint of AMS (15 Rafita 2024 10:01)    pt seen and examine today having manic episode  , no confusion , no other c/o.  INTERVAL HPI/OVERNIGHT EVENTS:     T(C): 36.9 (01-15-24 @ 13:16), Max: 37.1 (01-14-24 @ 16:09)  HR: 79 (01-15-24 @ 13:16) (60 - 84)  BP: 108/69 (01-15-24 @ 13:16) (108/69 - 119/70)  RR: 17 (01-15-24 @ 13:16) (16 - 19)  SpO2: 96% (01-15-24 @ 13:16) (95% - 98%)  Wt(kg): --  I&O's Summary      REVIEW OF SYSTEMS:  CONSTITUTIONAL: No fever, weight loss, or fatigue  EYES: No eye pain, visual disturbances, or discharge  ENMT:  No difficulty hearing, tinnitus, vertigo; No sinus or throat pain  NECK: No pain or stiffness  BREASTS: No pain, no masses  RESPIRATORY: No cough, wheezing, chills or hemoptysis; No shortness of breath  CARDIOVASCULAR: No chest pain, palpitations, dizziness, or leg swelling  GASTROINTESTINAL: No abdominal or epigastric pain. No nausea, vomiting, or hematemesis; No diarrhea or constipation. .  GENITOURINARY: No dysuria, frequency, hematuria, or incontinence  NEUROLOGICAL: No headaches, memory loss, loss of strength, numbness, or tremors  SKIN: No itching, burning, rashes, or lesions   MUSCULOSKELETAL: No joint pain or swelling; No muscle, back, or extremity pain  mood   manic + pressure    PHYSICAL EXAM:  GENERAL: NAD, well-groomed, well-developed  HEAD:  Atraumatic, Normocephalic  EYES: EOMI, PERRLA, conjunctiva and sclera clear  ENMT: No tonsillar erythema, exudates, or enlargement; Moist mucous membranes  NECK: Supple, No JVD  NERVOUS SYSTEM:  Alert & Oriented X3; Motor Strength 5/5 B/L upper and lower extremities; DTRs 2+ intact and symmetric  CHEST/LUNG: Clear to percussion bilaterally; No rales, rhonchi, wheezing, or rubs  HEART: Regular rate and rhythm; No murmurs, rubs, or gallops  ABDOMEN: Soft, Nontender, Nondistended; Bowel sounds present  EXTREMITIES:  2+ Peripheral Pulses, No clubbing, cyanosis, or edema  SKIN: No rashes or lesions      MEDICATIONS  (STANDING):  atorvastatin 20 milliGRAM(s) Oral at bedtime  benztropine 2 milliGRAM(s) Oral two times a day  brexpiprazole 2 milliGRAM(s) Oral daily  buPROPion XL (24-Hour) . 300 milliGRAM(s) Oral daily  gabapentin 1200 milliGRAM(s) Oral <User Schedule>  lamoTRIgine 100 milliGRAM(s) Oral two times a day  levothyroxine 88 MICROGram(s) Oral daily  montelukast 10 milliGRAM(s) Oral at bedtime  pantoprazole    Tablet 40 milliGRAM(s) Oral before breakfast  risperiDONE   Tablet 6 milliGRAM(s) Oral at bedtime  topiramate 100 milliGRAM(s) Oral two times a day  traZODone 200 milliGRAM(s) Oral at bedtime    MEDICATIONS  (PRN):  clonazePAM  Tablet 1 milliGRAM(s) Oral four times a day PRN anxiety      LABS:                        13.1   11.75 )-----------( 172      ( 15 Rafita 2024 05:52 )             37.9     01-15    141  |  112<H>  |  16  ----------------------------<  88  3.7   |  24  |  0.90    Ca    8.9      15 Rafita 2024 05:52  Phos  3.4     01-14  Mg     2.1     01-14    TPro  6.5  /  Alb  3.4  /  TBili  0.3  /  DBili  x   /  AST  13<L>  /  ALT  23  /  AlkPhos  31<L>  01-15      Urinalysis Basic - ( 15 Rafita 2024 05:52 )    Color: x / Appearance: x / SG: x / pH: x  Gluc: 88 mg/dL / Ketone: x  / Bili: x / Urobili: x   Blood: x / Protein: x / Nitrite: x   Leuk Esterase: x / RBC: x / WBC x   Sq Epi: x / Non Sq Epi: x / Bacteria: x                    RADIOLOGY & ADDITIONAL TESTS:    Imaging Personally Reviewed:   no new test    Patient is a 38y old  Female who presents with a chief complaint of AMS (15 Arfita 2024 10:01)    pt seen and examine today having manic episode  , no confusion , no other c/o.  INTERVAL HPI/OVERNIGHT EVENTS:     T(C): 36.9 (01-15-24 @ 13:16), Max: 37.1 (01-14-24 @ 16:09)  HR: 79 (01-15-24 @ 13:16) (60 - 84)  BP: 108/69 (01-15-24 @ 13:16) (108/69 - 119/70)  RR: 17 (01-15-24 @ 13:16) (16 - 19)  SpO2: 96% (01-15-24 @ 13:16) (95% - 98%)  Wt(kg): --  I&O's Summary      REVIEW OF SYSTEMS:  CONSTITUTIONAL: No fever, weight loss, or fatigue  EYES: No eye pain, visual disturbances, or discharge  ENMT:  No difficulty hearing, tinnitus, vertigo; No sinus or throat pain  NECK: No pain or stiffness  BREASTS: No pain, no masses  RESPIRATORY: No cough, wheezing, chills or hemoptysis; No shortness of breath  CARDIOVASCULAR: No chest pain, palpitations, dizziness, or leg swelling  GASTROINTESTINAL: No abdominal or epigastric pain. No nausea, vomiting, or hematemesis; No diarrhea or constipation. .  GENITOURINARY: No dysuria, frequency, hematuria, or incontinence  NEUROLOGICAL: No headaches, memory loss, loss of strength, numbness, or tremors  SKIN: No itching, burning, rashes, or lesions   MUSCULOSKELETAL: No joint pain or swelling; No muscle, back, or extremity pain  mood   manic + pressure    PHYSICAL EXAM:  GENERAL: NAD, well-groomed, well-developed  HEAD:  Atraumatic, Normocephalic  EYES: EOMI, PERRLA, conjunctiva and sclera clear  ENMT: No tonsillar erythema, exudates, or enlargement; Moist mucous membranes  NECK: Supple, No JVD  NERVOUS SYSTEM:  Alert & Oriented X3; Motor Strength 5/5 B/L upper and lower extremities; DTRs 2+ intact and symmetric  CHEST/LUNG: Clear to percussion bilaterally; No rales, rhonchi, wheezing, or rubs  HEART: Regular rate and rhythm; No murmurs, rubs, or gallops  ABDOMEN: Soft, Nontender, Nondistended; Bowel sounds present  EXTREMITIES:  2+ Peripheral Pulses, No clubbing, cyanosis, or edema  SKIN: No rashes or lesions      MEDICATIONS  (STANDING):  atorvastatin 20 milliGRAM(s) Oral at bedtime  benztropine 2 milliGRAM(s) Oral two times a day  brexpiprazole 2 milliGRAM(s) Oral daily  buPROPion XL (24-Hour) . 300 milliGRAM(s) Oral daily  gabapentin 1200 milliGRAM(s) Oral <User Schedule>  lamoTRIgine 100 milliGRAM(s) Oral two times a day  levothyroxine 88 MICROGram(s) Oral daily  montelukast 10 milliGRAM(s) Oral at bedtime  pantoprazole    Tablet 40 milliGRAM(s) Oral before breakfast  risperiDONE   Tablet 6 milliGRAM(s) Oral at bedtime  topiramate 100 milliGRAM(s) Oral two times a day  traZODone 200 milliGRAM(s) Oral at bedtime    MEDICATIONS  (PRN):  clonazePAM  Tablet 1 milliGRAM(s) Oral four times a day PRN anxiety      LABS:                        13.1   11.75 )-----------( 172      ( 15 Rafita 2024 05:52 )             37.9     01-15    141  |  112<H>  |  16  ----------------------------<  88  3.7   |  24  |  0.90    Ca    8.9      15 Rafita 2024 05:52  Phos  3.4     01-14  Mg     2.1     01-14    TPro  6.5  /  Alb  3.4  /  TBili  0.3  /  DBili  x   /  AST  13<L>  /  ALT  23  /  AlkPhos  31<L>  01-15      Urinalysis Basic - ( 15 Rafita 2024 05:52 )    Color: x / Appearance: x / SG: x / pH: x  Gluc: 88 mg/dL / Ketone: x  / Bili: x / Urobili: x   Blood: x / Protein: x / Nitrite: x   Leuk Esterase: x / RBC: x / WBC x   Sq Epi: x / Non Sq Epi: x / Bacteria: x                    RADIOLOGY & ADDITIONAL TESTS:    Imaging Personally Reviewed:   no new test

## 2024-01-15 NOTE — PROGRESS NOTE ADULT - PROBLEM SELECTOR PLAN 1
Pt presenting with AMS (increased agitation, confusion, rambling, not making sense) as per mother x1-2 days  -Recent increase in bipolar meds -- Rexulti recently increased to 3mg in the AM (from 2mg)  -Pt follows with outpatient psych Dr. Serna via telehealth  -S/p 1L NS bolus x2, Ativan 2mg IVP x1  -?fall this morning, no known headstrike  - CT Head and C-spine negative  - Appears in be in a manic-like state  - 1:1 ordered  -Psych (Dr. Schmidt) consulted pending

## 2024-01-15 NOTE — PROGRESS NOTE ADULT - TIME BILLING
38-year-old female with past medical history of bipolar disorder, migraines, hypothyroidism, chronic back pain presents with altered mental status s/p recent change in bipolar meds. Admitted for AMS now resolved. Needs to be seen by Psychiatry tomorrow for possible disposition tomorrow. mother bedside aware with plan .

## 2024-01-15 NOTE — PATIENT PROFILE ADULT - FALL HARM RISK - HARM RISK INTERVENTIONS
Communicate Risk of Fall with Harm to all staff/Reinforce activity limits and safety measures with patient and family/Tailored Fall Risk Interventions/Visual Cue: Yellow wristband and red socks/Bed in lowest position, wheels locked, appropriate side rails in place/Call bell, personal items and telephone in reach/Instruct patient to call for assistance before getting out of bed or chair/Non-slip footwear when patient is out of bed/[place] to call system/Physically safe environment - no spills, clutter or unnecessary equipment/Purposeful Proactive Rounding/Room/bathroom lighting operational, light cord in reach Communicate Risk of Fall with Harm to all staff/Reinforce activity limits and safety measures with patient and family/Tailored Fall Risk Interventions/Visual Cue: Yellow wristband and red socks/Bed in lowest position, wheels locked, appropriate side rails in place/Call bell, personal items and telephone in reach/Instruct patient to call for assistance before getting out of bed or chair/Non-slip footwear when patient is out of bed/[place] to call system/Physically safe environment - no spills, clutter or unnecessary equipment/Purposeful Proactive Rounding/Room/bathroom lighting operational, light cord in reach Communicate Risk of Fall with Harm to all staff/Reinforce activity limits and safety measures with patient and family/Tailored Fall Risk Interventions/Visual Cue: Yellow wristband and red socks/Bed in lowest position, wheels locked, appropriate side rails in place/Call bell, personal items and telephone in reach/Instruct patient to call for assistance before getting out of bed or chair/Non-slip footwear when patient is out of bed/[place] to call system/Physically safe environment - no spills, clutter or unnecessary equipment/Purposeful Proactive Rounding/Room/bathroom lighting operational, light cord in reach

## 2024-01-16 ENCOUNTER — TRANSCRIPTION ENCOUNTER (OUTPATIENT)
Age: 39
End: 2024-01-16

## 2024-01-16 VITALS
HEART RATE: 102 BPM | RESPIRATION RATE: 17 BRPM | TEMPERATURE: 99 F | OXYGEN SATURATION: 96 % | SYSTOLIC BLOOD PRESSURE: 94 MMHG | DIASTOLIC BLOOD PRESSURE: 68 MMHG

## 2024-01-16 LAB
ANION GAP SERPL CALC-SCNC: 9 MMOL/L — SIGNIFICANT CHANGE UP (ref 5–17)
BUN SERPL-MCNC: 20 MG/DL — SIGNIFICANT CHANGE UP (ref 7–23)
CALCIUM SERPL-MCNC: 9.3 MG/DL — SIGNIFICANT CHANGE UP (ref 8.5–10.1)
CHLORIDE SERPL-SCNC: 105 MMOL/L — SIGNIFICANT CHANGE UP (ref 96–108)
CO2 SERPL-SCNC: 22 MMOL/L — SIGNIFICANT CHANGE UP (ref 22–31)
CREAT SERPL-MCNC: 1.1 MG/DL — SIGNIFICANT CHANGE UP (ref 0.5–1.3)
EGFR: 66 ML/MIN/1.73M2 — SIGNIFICANT CHANGE UP
GLUCOSE SERPL-MCNC: 150 MG/DL — HIGH (ref 70–99)
POTASSIUM SERPL-MCNC: 3.8 MMOL/L — SIGNIFICANT CHANGE UP (ref 3.5–5.3)
POTASSIUM SERPL-SCNC: 3.8 MMOL/L — SIGNIFICANT CHANGE UP (ref 3.5–5.3)
SODIUM SERPL-SCNC: 136 MMOL/L — SIGNIFICANT CHANGE UP (ref 135–145)

## 2024-01-16 PROCEDURE — 99239 HOSP IP/OBS DSCHRG MGMT >30: CPT

## 2024-01-16 PROCEDURE — 85025 COMPLETE CBC W/AUTO DIFF WBC: CPT

## 2024-01-16 PROCEDURE — 36415 COLL VENOUS BLD VENIPUNCTURE: CPT

## 2024-01-16 PROCEDURE — 81003 URINALYSIS AUTO W/O SCOPE: CPT

## 2024-01-16 PROCEDURE — 84702 CHORIONIC GONADOTROPIN TEST: CPT

## 2024-01-16 PROCEDURE — 70450 CT HEAD/BRAIN W/O DYE: CPT | Mod: MA

## 2024-01-16 PROCEDURE — 99285 EMERGENCY DEPT VISIT HI MDM: CPT

## 2024-01-16 PROCEDURE — 80178 ASSAY OF LITHIUM: CPT

## 2024-01-16 PROCEDURE — 96374 THER/PROPH/DIAG INJ IV PUSH: CPT

## 2024-01-16 PROCEDURE — 84443 ASSAY THYROID STIM HORMONE: CPT

## 2024-01-16 PROCEDURE — 99221 1ST HOSP IP/OBS SF/LOW 40: CPT

## 2024-01-16 PROCEDURE — 93005 ELECTROCARDIOGRAM TRACING: CPT

## 2024-01-16 PROCEDURE — 83735 ASSAY OF MAGNESIUM: CPT

## 2024-01-16 PROCEDURE — 84100 ASSAY OF PHOSPHORUS: CPT

## 2024-01-16 PROCEDURE — 80048 BASIC METABOLIC PNL TOTAL CA: CPT

## 2024-01-16 PROCEDURE — 80307 DRUG TEST PRSMV CHEM ANLYZR: CPT

## 2024-01-16 PROCEDURE — 80053 COMPREHEN METABOLIC PANEL: CPT

## 2024-01-16 PROCEDURE — 72125 CT NECK SPINE W/O DYE: CPT | Mod: MA

## 2024-01-16 RX ORDER — BENZTROPINE MESYLATE 1 MG
1 TABLET ORAL
Qty: 0 | Refills: 0 | DISCHARGE
Start: 2024-01-16

## 2024-01-16 RX ORDER — BENZTROPINE MESYLATE 1 MG
2 TABLET ORAL AT BEDTIME
Refills: 0 | Status: DISCONTINUED | OUTPATIENT
Start: 2024-01-16 | End: 2024-01-16

## 2024-01-16 RX ORDER — BREXPIPRAZOLE 0.25 MG/1
1 TABLET ORAL
Refills: 0 | DISCHARGE

## 2024-01-16 RX ORDER — BENZTROPINE MESYLATE 1 MG
1 TABLET ORAL
Refills: 0 | DISCHARGE

## 2024-01-16 RX ADMIN — Medication 2 MILLIGRAM(S): at 06:14

## 2024-01-16 RX ADMIN — BUPROPION HYDROCHLORIDE 300 MILLIGRAM(S): 150 TABLET, EXTENDED RELEASE ORAL at 12:14

## 2024-01-16 RX ADMIN — Medication 88 MICROGRAM(S): at 06:14

## 2024-01-16 RX ADMIN — LAMOTRIGINE 100 MILLIGRAM(S): 25 TABLET, ORALLY DISINTEGRATING ORAL at 06:14

## 2024-01-16 RX ADMIN — GABAPENTIN 1200 MILLIGRAM(S): 400 CAPSULE ORAL at 08:42

## 2024-01-16 RX ADMIN — PANTOPRAZOLE SODIUM 40 MILLIGRAM(S): 20 TABLET, DELAYED RELEASE ORAL at 06:13

## 2024-01-16 RX ADMIN — BREXPIPRAZOLE 2 MILLIGRAM(S): 0.25 TABLET ORAL at 13:57

## 2024-01-16 RX ADMIN — Medication 100 MILLIGRAM(S): at 06:14

## 2024-01-16 NOTE — DISCHARGE NOTE PROVIDER - NSDCFUADDAPPT_GEN_ALL_CORE_FT
follow up your  psychiatrist   DR Serna office  with in 1wk.    your psych  med Rexulti  stopped by dr nemesio mg and also decrease your benztropine   to once day - you need revaluation of your psych meds.

## 2024-01-16 NOTE — DISCHARGE NOTE PROVIDER - HOSPITAL COURSE
38-year-old female with past medical history of bipolar disorder, migraines, hypothyroidism, chronic back pain presents with altered mental status s/p recent change in bipolar meds. Admitted for AMS -Pt presenting with AMS (increased agitation, confusion, rambling, not making sense) as per mother x1-2 days  -Recent increase in bipolar meds -- Rexulti recently increased to 3mg in the AM (from 2mg)  -Pt follows with outpatient psych Dr. Serna via telehealth  -S/p 1L NS bolus x2, Ativan 2mg IVP x1  -fall this morning  no sure , no known headstrike  CT Head and C-spine negative  - Appears in be in a manic-like state - 1:1 ordered , no suicidal thought   -Psych (Dr. Herr)  , pt might have ams sec to meds induce  acute toxic metabolic encephalopathy  which later resolved  , seen by neuro dr mccain no cva or neurological issue this time .pt seen by dr herr psych recommended  Will discontinue Rexulti, decrease Cogentin to 2 mg po at HS  No psychiatric contraindications to discharge  Follow-up outpatient psychiatry with Dr. Serna  with in 1wk  .  pts mother bedside aware .   medically   stable to be dc  physical day of dc 1/16/24  Vital Signs Last 24 Hrs  T(C): 37.1 (16 Jan 2024 13:51), Max: 37.1 (15 Rafita 2024 20:53)  T(F): 98.8 (16 Jan 2024 13:51), Max: 98.8 (16 Jan 2024 13:51)  HR: 102 (16 Jan 2024 13:51) (72 - 102)  BP: 94/68 (16 Jan 2024 13:51) (89/56 - 96/60)  BP(mean): --  RR: 17 (16 Jan 2024 13:51) (17 - 17)  SpO2: 96% (16 Jan 2024 13:51) (95% - 97%)    Parameters below as of 16 Jan 2024 13:51  Patient On (Oxygen Delivery Method): room air    PHYSICAL EXAM:  GENERAL: NAD, well-groomed, well-developed  HEAD:  Atraumatic, Normocephalic  EYES: EOMI, PERRLA, conjunctiva and sclera clear  ENMT: No tonsillar erythema, exudates, or enlargement; Moist mucous membranes  NECK: Supple, No JVD  NERVOUS SYSTEM:  Alert & Oriented X3; Motor Strength 5/5 B/L upper and lower extremities; DTRs 2+ intact and symmetric  CHEST/LUNG: Clear to percussion bilaterally; No rales, rhonchi, wheezing, or rubs  HEART: Regular rate and rhythm; No murmurs, rubs, or gallops  ABDOMEN: Soft, Nontender, Nondistended; Bowel sounds present  EXTREMITIES:  2+ Peripheral Pulses, No clubbing, cyanosis, or edema  SKIN: No rashes or lesions  total time spend 40 min.

## 2024-01-16 NOTE — CARE COORDINATION ASSESSMENT. - NSPASTMEDSURGHISTORY_GEN_ALL_CORE_FT
PAST MEDICAL & SURGICAL HISTORY:  Hypothyroid      Bipolar 1 disorder      History of spinal fusion      History of laminectomy      Low back pain

## 2024-01-16 NOTE — DISCHARGE NOTE NURSING/CASE MANAGEMENT/SOCIAL WORK - PATIENT PORTAL LINK FT
You can access the FollowMyHealth Patient Portal offered by [facility] by registering at the following website: [email or web address]. By joining [facility]’s FollowMyHealth portal, you will also be able to view your health information using other applications (apps) compatible with our system. You can access the FollowMyHealth Patient Portal offered by [facility] by registering at the following website: [email or web address]. By joining [facility]’s FollowMyHealth portal, you will also be able to view your health information using other applications (apps) compatible with our system.

## 2024-01-16 NOTE — CARE COORDINATION ASSESSMENT. - NSDCPLANSERVICES_GEN_ALL_CORE
ret home w parents whom are supportive /ret to psychologist and psychiatrist in community weekly/Other

## 2024-01-16 NOTE — BH CONSULTATION LIAISON ASSESSMENT NOTE - RISK ASSESSMENT
Patient is future-oriented, has good social support, no suicidal ideation, good therapeutic relationship

## 2024-01-16 NOTE — DISCHARGE NOTE NURSING/CASE MANAGEMENT/SOCIAL WORK - NSDCPEFALRISK_GEN_ALL_CORE
For information on Fall & Injury Prevention, visit: [email or web address] OR  [email or web address] OR  https://www.cdc.gov/steadi/patient.html For information on Fall & Injury Prevention, visit: [email or web address] OR  [email or web address] OR  https://www.cdc.gov/steadi/patient.html

## 2024-01-16 NOTE — CARE COORDINATION ASSESSMENT. - NSCAREPROVIDERS_GEN_ALL_CORE_FT
CARE PROVIDERS:  Accepting Physician: Maximo Dobbs  Administration: Swati Miller  Administration: Osiris Roy  Administration: Maximo Dobbs  Administration: Agnes Michelle  Admitting: Maximo Dobbs  Attending: Maximo Dobbs  Case Management: Denisa Mendez  Consultant: Clyde Lovett  Consultant: Raman Farnsworth  Covering Team: Jane Atkins  Covering Team: Palmira Avila  ED ACP: Elicia Swanson  ED Attending: Ian Quinones  ED Nurse: Flower Brock  Nurse: June Olmos  Nurse: Vannessa Swanson  Nurse: Dannielle Herman  Ordered: ADM, User  Ordered: Doctor, Unknown  Outpatient Provider: Clyde Lovett  PCA/Nursing Assistant: Shaila Lawler  PCA/Nursing Assistant: Carmelina Haddad  PCA/Nursing Assistant: Anita Woodall  Physical Therapy: Uma Montgomery  Primary Team: Munira Beard  Primary Team: Helen Jarquin  Primary Team: Tim Maguire  Registered Dietitian: Smitha Granda  : Laura Riggins  : Talisha Jaquez  Team: [facility], Team   CARE PROVIDERS:  Accepting Physician: Maximo Dobbs  Administration: Swati Miller  Administration: Osiris Roy  Administration: Maximo Dobbs  Administration: Agnes Michelle  Admitting: Maximo Dobbs  Attending: Maximo Dobbs  Case Management: Denisa Mendez  Consultant: Clyde Lovett  Consultant: Raman Farnsworth  Covering Team: Jane Atkins  Covering Team: Palmira Avila  ED ACP: Elicia Swanson  ED Attending: Ian Quinones  ED Nurse: Flower Brock  Nurse: June Olmos  Nurse: Vannessa Swanson  Nurse: Dannielle Herman  Ordered: ADM, User  Ordered: Doctor, Unknown  Outpatient Provider: Clyde Lovett  PCA/Nursing Assistant: Shaila Lawler  PCA/Nursing Assistant: Carmelina Haddad  PCA/Nursing Assistant: Anita Woodall  Physical Therapy: Uma Montgomery  Primary Team: Munira Beard  Primary Team: Helen Jarquin  Primary Team: Tim Maugire  Registered Dietitian: Smitha Granda  : Laura Riggins  : Talisha Jaquez  Team: [facility], Team

## 2024-01-16 NOTE — PROGRESS NOTE ADULT - SUBJECTIVE AND OBJECTIVE BOX
Neurology follow up note    ABELARDO [id number]      Interval History:    Patient feels ok no new complaints.    Allergies    latex (Urticaria; Rash)  penicillin (Unknown)  sulfa drugs (Unknown)  avocado (Unknown)    Intolerances        MEDICATIONS    acetaminophen     Tablet .. 650 milliGRAM(s) Oral every 6 hours PRN  atorvastatin 20 milliGRAM(s) Oral at bedtime  benztropine 2 milliGRAM(s) Oral two times a day  brexpiprazole 2 milliGRAM(s) Oral daily  buPROPion XL (24-Hour) . 300 milliGRAM(s) Oral daily  clonazePAM  Tablet 1 milliGRAM(s) Oral four times a day PRN  ethinyl  estradiol-norethindrone (ALYACEN) 1 Tablet(s) Oral <User Schedule>  gabapentin 1200 milliGRAM(s) Oral <User Schedule>  lamoTRIgine 100 milliGRAM(s) Oral two times a day  levothyroxine 88 MICROGram(s) Oral daily  montelukast 10 milliGRAM(s) Oral at bedtime  pantoprazole    Tablet 40 milliGRAM(s) Oral before breakfast  risperiDONE   Tablet 6 milliGRAM(s) Oral at bedtime  topiramate 100 milliGRAM(s) Oral two times a day  traZODone 200 milliGRAM(s) Oral at bedtime  vilazodone 40 milliGRAM(s) Oral <User Schedule>              Vital Signs Last 24 Hrs  T(C): 36.9 (16 Jan 2024 05:30), Max: 37.1 (15 Rafita 2024 20:53)  T(F): 98.4 (16 Jan 2024 05:30), Max: 98.7 (15 Rafita 2024 20:53)  HR: 99 (16 Jan 2024 05:30) (72 - 99)  BP: 96/60 (16 Jan 2024 05:30) (89/56 - 108/69)  BP(mean): --  RR: 17 (16 Jan 2024 05:30) (17 - 17)  SpO2: 95% (16 Jan 2024 05:30) (95% - 97%)    Parameters below as of 16 Jan 2024 05:30  Patient On (Oxygen Delivery Method): room air      REVIEW OF SYSTEMS:  Constitutional:  The patient denies fever, chills, or night sweats.  Head:  No headache.  Eyes:  No double vision or blurry vision.  Ears:  No ringing in the ears.  Neck:  No neck pain.  Respiratory:  No shortness of breath.  Cardiovascular:  No chest pain.  Abdomen:  No nausea, vomiting, or abdominal pain.  Extremities/Neurological:  No numbness or tingling.  Musculoskeletal:  No joint pain.    PHYSICAL EXAMINATION:  HEENT:  Head:  Normocephalic and atraumatic.  Eyes:  No scleral icterus.  Ears:  Hearing bilaterally intact.  NECK:  Supple.  RESPIRATORY:  Air entry bilaterally.  CARDIOVASCULAR:  S1 and S2 heard.  ABDOMEN:  Soft and nontender.  EXTREMITIES:  No clubbing or cyanosis was noted.      NEUROLOGIC:  The patient is awake and alert.  Location was [facility], year was 2024, month was January.  The patient had difficulty with math but this is not her strong suit.  Recall was 3/3 within 3 minutes.  Was able to name simple objects.  Was able to tell correct time on the clock.  Extraocular movements were intact.  Full visual fields.  Speech was fluent.  Smile symmetric.  Motor:  Bilateral upper 5/5.  Right lower and her calf had a fasciotomy from compartment syndrome, decreased range of motion of her foot, overall proximal strength was 4/5.  Left lower was 4/5.  Slight movement of dorsi and plantar flexation of the right foot.  Unable to feel on her right foot, but this appeared new.      LABS:  CBC Full  -  ( 15 Rafita 2024 05:52 )  WBC Count : 11.75 K/uL  RBC Count : 4.10 M/uL  Hemoglobin : 13.1 g/dL  Hematocrit : 37.9 %  Platelet Count - Automated : 172 K/uL  Mean Cell Volume : 92.4 fl  Mean Cell Hemoglobin : 32.0 pg  Mean Cell Hemoglobin Concentration : 34.6 gm/dL  Auto Neutrophil # : 7.43 K/uL  Auto Lymphocyte # : 3.31 K/uL  Auto Monocyte # : 0.80 K/uL  Auto Eosinophil # : 0.13 K/uL  Auto Basophil # : 0.03 K/uL  Auto Neutrophil % : 63.2 %  Auto Lymphocyte % : 28.2 %  Auto Monocyte % : 6.8 %  Auto Eosinophil % : 1.1 %  Auto Basophil % : 0.3 %    Urinalysis Basic - ( 15 Rafita 2024 05:52 )    Color: x / Appearance: x / SG: x / pH: x  Gluc: 88 mg/dL / Ketone: x  / Bili: x / Urobili: x   Blood: x / Protein: x / Nitrite: x   Leuk Esterase: x / RBC: x / WBC x   Sq Epi: x / Non Sq Epi: x / Bacteria: x      01-15    141  |  112<H>  |  16  ----------------------------<  88  3.7   |  24  |  0.90    Ca    8.9      15 Rafita 2024 05:52  Phos  3.4     01-14  Mg     2.1     01-14    TPro  6.5  /  Alb  3.4  /  TBili  0.3  /  DBili  x   /  AST  13<L>  /  ALT  23  /  AlkPhos  31<L>  01-15    Hemoglobin A1C:     LIVER FUNCTIONS - ( 15 Rafita 2024 05:52 )  Alb: 3.4 g/dL / Pro: 6.5 g/dL / ALK PHOS: 31 U/L / ALT: 23 U/L / AST: 13 U/L / GGT: x           Vitamin B12         RADIOLOGY    CAT scan of the head shows no intracerebral hemorrhage or cerebrovascular accident.    ANALYSIS AND PLAN:  This is a 38-year-old with episode of altered mental status.  For episode of altered mental status, suspect most likely secondary to underlying psychiatric issues, suspect less likely this is a primary central nervous system event.  The patient did have her psychiatric medication dose increased prior to coming to the hospital, questionable could be any side effect of that.  I would recommend Psychiatry followup.  For history of migraines, continue home medications.  For bipolar disorder, adjustment of psychiatric medications as needed.    Attempted to contact motherAna, at [phone], went to voicemail.     59 minutes of time was spent with the patient, plan of care, reviewing data, with greater than 50% of the visit was spent counseling and/or coordinating care with multidisciplinary healthcare team   Neurology follow up note    ABELARDO [id number]      Interval History:    Patient feels ok no new complaints.    Allergies    latex (Urticaria; Rash)  penicillin (Unknown)  sulfa drugs (Unknown)  avocado (Unknown)    Intolerances        MEDICATIONS    acetaminophen     Tablet .. 650 milliGRAM(s) Oral every 6 hours PRN  atorvastatin 20 milliGRAM(s) Oral at bedtime  benztropine 2 milliGRAM(s) Oral two times a day  brexpiprazole 2 milliGRAM(s) Oral daily  buPROPion XL (24-Hour) . 300 milliGRAM(s) Oral daily  clonazePAM  Tablet 1 milliGRAM(s) Oral four times a day PRN  ethinyl  estradiol-norethindrone (ALYACEN) 1 Tablet(s) Oral <User Schedule>  gabapentin 1200 milliGRAM(s) Oral <User Schedule>  lamoTRIgine 100 milliGRAM(s) Oral two times a day  levothyroxine 88 MICROGram(s) Oral daily  montelukast 10 milliGRAM(s) Oral at bedtime  pantoprazole    Tablet 40 milliGRAM(s) Oral before breakfast  risperiDONE   Tablet 6 milliGRAM(s) Oral at bedtime  topiramate 100 milliGRAM(s) Oral two times a day  traZODone 200 milliGRAM(s) Oral at bedtime  vilazodone 40 milliGRAM(s) Oral <User Schedule>              Vital Signs Last 24 Hrs  T(C): 36.9 (16 Jan 2024 05:30), Max: 37.1 (15 Rafita 2024 20:53)  T(F): 98.4 (16 Jan 2024 05:30), Max: 98.7 (15 Rafita 2024 20:53)  HR: 99 (16 Jan 2024 05:30) (72 - 99)  BP: 96/60 (16 Jan 2024 05:30) (89/56 - 108/69)  BP(mean): --  RR: 17 (16 Jan 2024 05:30) (17 - 17)  SpO2: 95% (16 Jan 2024 05:30) (95% - 97%)    Parameters below as of 16 Jan 2024 05:30  Patient On (Oxygen Delivery Method): room air      REVIEW OF SYSTEMS:  Constitutional:  The patient denies fever, chills, or night sweats.  Head:  No headache.  Eyes:  No double vision or blurry vision.  Ears:  No ringing in the ears.  Neck:  No neck pain.  Respiratory:  No shortness of breath.  Cardiovascular:  No chest pain.  Abdomen:  No nausea, vomiting, or abdominal pain.  Extremities/Neurological:  No numbness or tingling.  Musculoskeletal:  No joint pain.    PHYSICAL EXAMINATION:  HEENT:  Head:  Normocephalic and atraumatic.  Eyes:  No scleral icterus.  Ears:  Hearing bilaterally intact.  NECK:  Supple.  RESPIRATORY:  Air entry bilaterally.  CARDIOVASCULAR:  S1 and S2 heard.  ABDOMEN:  Soft and nontender.  EXTREMITIES:  No clubbing or cyanosis was noted.      NEUROLOGIC:  The patient is awake and alert.  Location was [facility], year was 2024, month was January.  The patient had difficulty with math but this is not her strong suit.  Recall was 3/3 within 3 minutes.  Was able to name simple objects.  Was able to tell correct time on the clock.  Extraocular movements were intact.  Full visual fields.  Speech was fluent.  Smile symmetric.  Motor:  Bilateral upper 5/5.  Right lower and her calf had a fasciotomy from compartment syndrome, decreased range of motion of her foot, overall proximal strength was 4/5.  Left lower was 4/5.  Slight movement of dorsi and plantar flexation of the right foot.  Unable to feel on her right foot, but this appeared new.      LABS:  CBC Full  -  ( 15 Rafita 2024 05:52 )  WBC Count : 11.75 K/uL  RBC Count : 4.10 M/uL  Hemoglobin : 13.1 g/dL  Hematocrit : 37.9 %  Platelet Count - Automated : 172 K/uL  Mean Cell Volume : 92.4 fl  Mean Cell Hemoglobin : 32.0 pg  Mean Cell Hemoglobin Concentration : 34.6 gm/dL  Auto Neutrophil # : 7.43 K/uL  Auto Lymphocyte # : 3.31 K/uL  Auto Monocyte # : 0.80 K/uL  Auto Eosinophil # : 0.13 K/uL  Auto Basophil # : 0.03 K/uL  Auto Neutrophil % : 63.2 %  Auto Lymphocyte % : 28.2 %  Auto Monocyte % : 6.8 %  Auto Eosinophil % : 1.1 %  Auto Basophil % : 0.3 %    Urinalysis Basic - ( 15 Rafita 2024 05:52 )    Color: x / Appearance: x / SG: x / pH: x  Gluc: 88 mg/dL / Ketone: x  / Bili: x / Urobili: x   Blood: x / Protein: x / Nitrite: x   Leuk Esterase: x / RBC: x / WBC x   Sq Epi: x / Non Sq Epi: x / Bacteria: x      01-15    141  |  112<H>  |  16  ----------------------------<  88  3.7   |  24  |  0.90    Ca    8.9      15 Rafita 2024 05:52  Phos  3.4     01-14  Mg     2.1     01-14    TPro  6.5  /  Alb  3.4  /  TBili  0.3  /  DBili  x   /  AST  13<L>  /  ALT  23  /  AlkPhos  31<L>  01-15    Hemoglobin A1C:     LIVER FUNCTIONS - ( 15 Rafita 2024 05:52 )  Alb: 3.4 g/dL / Pro: 6.5 g/dL / ALK PHOS: 31 U/L / ALT: 23 U/L / AST: 13 U/L / GGT: x           Vitamin B12         RADIOLOGY    CAT scan of the head shows no intracerebral hemorrhage or cerebrovascular accident.    ANALYSIS AND PLAN:  This is a 38-year-old with episode of altered mental status.  For episode of altered mental status, suspect most likely secondary to underlying psychiatric issues, suspect less likely this is a primary central nervous system event.  The patient did have her psychiatric medication dose increased prior to coming to the hospital, questionable could be any side effect of that.  I would recommend Psychiatry followup.  For history of migraines, continue home medications.  For bipolar disorder, adjustment of psychiatric medications as needed.    Attempted to contact motherAna, at [phone], went to voicemail.     59 minutes of time was spent with the patient, plan of care, reviewing data, with greater than 50% of the visit was spent counseling and/or coordinating care with multidisciplinary healthcare team   Neurology follow up note    ABELARDO [id number]      Interval History:    Patient feels ok no new complaints.    Allergies    latex (Urticaria; Rash)  penicillin (Unknown)  sulfa drugs (Unknown)  avocado (Unknown)    Intolerances        MEDICATIONS    acetaminophen     Tablet .. 650 milliGRAM(s) Oral every 6 hours PRN  atorvastatin 20 milliGRAM(s) Oral at bedtime  benztropine 2 milliGRAM(s) Oral two times a day  brexpiprazole 2 milliGRAM(s) Oral daily  buPROPion XL (24-Hour) . 300 milliGRAM(s) Oral daily  clonazePAM  Tablet 1 milliGRAM(s) Oral four times a day PRN  ethinyl  estradiol-norethindrone (ALYACEN) 1 Tablet(s) Oral <User Schedule>  gabapentin 1200 milliGRAM(s) Oral <User Schedule>  lamoTRIgine 100 milliGRAM(s) Oral two times a day  levothyroxine 88 MICROGram(s) Oral daily  montelukast 10 milliGRAM(s) Oral at bedtime  pantoprazole    Tablet 40 milliGRAM(s) Oral before breakfast  risperiDONE   Tablet 6 milliGRAM(s) Oral at bedtime  topiramate 100 milliGRAM(s) Oral two times a day  traZODone 200 milliGRAM(s) Oral at bedtime  vilazodone 40 milliGRAM(s) Oral <User Schedule>              Vital Signs Last 24 Hrs  T(C): 36.9 (16 Jan 2024 05:30), Max: 37.1 (15 Rafita 2024 20:53)  T(F): 98.4 (16 Jan 2024 05:30), Max: 98.7 (15 Rafita 2024 20:53)  HR: 99 (16 Jan 2024 05:30) (72 - 99)  BP: 96/60 (16 Jan 2024 05:30) (89/56 - 108/69)  BP(mean): --  RR: 17 (16 Jan 2024 05:30) (17 - 17)  SpO2: 95% (16 Jan 2024 05:30) (95% - 97%)    Parameters below as of 16 Jan 2024 05:30  Patient On (Oxygen Delivery Method): room air      REVIEW OF SYSTEMS:  Constitutional:  The patient denies fever, chills, or night sweats.  Head:  No headache.  Eyes:  No double vision or blurry vision.  Ears:  No ringing in the ears.  Neck:  No neck pain.  Respiratory:  No shortness of breath.  Cardiovascular:  No chest pain.  Abdomen:  No nausea, vomiting, or abdominal pain.  Extremities/Neurological:  No numbness or tingling.  Musculoskeletal:  No joint pain.    PHYSICAL EXAMINATION:  HEENT:  Head:  Normocephalic and atraumatic.  Eyes:  No scleral icterus.  Ears:  Hearing bilaterally intact.  NECK:  Supple.  RESPIRATORY:  Air entry bilaterally.  CARDIOVASCULAR:  S1 and S2 heard.  ABDOMEN:  Soft and nontender.  EXTREMITIES:  No clubbing or cyanosis was noted.      NEUROLOGIC:  The patient is awake and alert.  Location was [facility], year was 2024, month was January.  The patient had difficulty with math but this is not her strong suit.  Was able to name simple objects.  Was able to tell correct time on the clock.  Extraocular movements were intact.  Full visual fields.  Speech was fluent.  Smile symmetric.  Motor:  Bilateral upper 5/5.  Right lower and her calf had a fasciotomy from compartment syndrome, decreased range of motion of her foot, overall proximal strength was 4/5.  Left lower was 4/5.  Slight movement of dorsi and plantar flexation of the right foot.  Unable to feel on her right foot, but this appeared new.      LABS:  CBC Full  -  ( 15 Rafita 2024 05:52 )  WBC Count : 11.75 K/uL  RBC Count : 4.10 M/uL  Hemoglobin : 13.1 g/dL  Hematocrit : 37.9 %  Platelet Count - Automated : 172 K/uL  Mean Cell Volume : 92.4 fl  Mean Cell Hemoglobin : 32.0 pg  Mean Cell Hemoglobin Concentration : 34.6 gm/dL  Auto Neutrophil # : 7.43 K/uL  Auto Lymphocyte # : 3.31 K/uL  Auto Monocyte # : 0.80 K/uL  Auto Eosinophil # : 0.13 K/uL  Auto Basophil # : 0.03 K/uL  Auto Neutrophil % : 63.2 %  Auto Lymphocyte % : 28.2 %  Auto Monocyte % : 6.8 %  Auto Eosinophil % : 1.1 %  Auto Basophil % : 0.3 %    Urinalysis Basic - ( 15 Rafita 2024 05:52 )    Color: x / Appearance: x / SG: x / pH: x  Gluc: 88 mg/dL / Ketone: x  / Bili: x / Urobili: x   Blood: x / Protein: x / Nitrite: x   Leuk Esterase: x / RBC: x / WBC x   Sq Epi: x / Non Sq Epi: x / Bacteria: x      01-15    141  |  112<H>  |  16  ----------------------------<  88  3.7   |  24  |  0.90    Ca    8.9      15 Rafita 2024 05:52  Phos  3.4     01-14  Mg     2.1     01-14    TPro  6.5  /  Alb  3.4  /  TBili  0.3  /  DBili  x   /  AST  13<L>  /  ALT  23  /  AlkPhos  31<L>  01-15    Hemoglobin A1C:     LIVER FUNCTIONS - ( 15 Rafita 2024 05:52 )  Alb: 3.4 g/dL / Pro: 6.5 g/dL / ALK PHOS: 31 U/L / ALT: 23 U/L / AST: 13 U/L / GGT: x           Vitamin B12         RADIOLOGY    CAT scan of the head shows no intracerebral hemorrhage or cerebrovascular accident.    ANALYSIS AND PLAN:  This is a 38-year-old with episode of altered mental status.  For episode of altered mental status, suspect most likely secondary to underlying psychiatric issues, suspect less likely this is a primary central nervous system event.  The patient did have her psychiatric medication dose increased prior to coming to the hospital, questionable could be any side effect of that.  I would recommend Psychiatry followup.  For history of migraines, continue home medications.  For bipolar disorder, adjustment of psychiatric medications as needed.    Attempted to contact motherAna, at [phone], went to voicemail.     59 minutes of time was spent with the patient, plan of care, reviewing data, with greater than 50% of the visit was spent counseling and/or coordinating care with multidisciplinary healthcare team   Neurology follow up note    ABELARDO [id number]      Interval History:    Patient feels ok no new complaints.    Allergies    latex (Urticaria; Rash)  penicillin (Unknown)  sulfa drugs (Unknown)  avocado (Unknown)    Intolerances        MEDICATIONS    acetaminophen     Tablet .. 650 milliGRAM(s) Oral every 6 hours PRN  atorvastatin 20 milliGRAM(s) Oral at bedtime  benztropine 2 milliGRAM(s) Oral two times a day  brexpiprazole 2 milliGRAM(s) Oral daily  buPROPion XL (24-Hour) . 300 milliGRAM(s) Oral daily  clonazePAM  Tablet 1 milliGRAM(s) Oral four times a day PRN  ethinyl  estradiol-norethindrone (ALYACEN) 1 Tablet(s) Oral <User Schedule>  gabapentin 1200 milliGRAM(s) Oral <User Schedule>  lamoTRIgine 100 milliGRAM(s) Oral two times a day  levothyroxine 88 MICROGram(s) Oral daily  montelukast 10 milliGRAM(s) Oral at bedtime  pantoprazole    Tablet 40 milliGRAM(s) Oral before breakfast  risperiDONE   Tablet 6 milliGRAM(s) Oral at bedtime  topiramate 100 milliGRAM(s) Oral two times a day  traZODone 200 milliGRAM(s) Oral at bedtime  vilazodone 40 milliGRAM(s) Oral <User Schedule>              Vital Signs Last 24 Hrs  T(C): 36.9 (16 Jan 2024 05:30), Max: 37.1 (15 Rafita 2024 20:53)  T(F): 98.4 (16 Jan 2024 05:30), Max: 98.7 (15 Rafita 2024 20:53)  HR: 99 (16 Jan 2024 05:30) (72 - 99)  BP: 96/60 (16 Jan 2024 05:30) (89/56 - 108/69)  BP(mean): --  RR: 17 (16 Jan 2024 05:30) (17 - 17)  SpO2: 95% (16 Jan 2024 05:30) (95% - 97%)    Parameters below as of 16 Jan 2024 05:30  Patient On (Oxygen Delivery Method): room air      REVIEW OF SYSTEMS:  Constitutional:  The patient denies fever, chills, or night sweats.  Head:  No headache.  Eyes:  No double vision or blurry vision.  Ears:  No ringing in the ears.  Neck:  No neck pain.  Respiratory:  No shortness of breath.  Cardiovascular:  No chest pain.  Abdomen:  No nausea, vomiting, or abdominal pain.  Extremities/Neurological:  No numbness or tingling.  Musculoskeletal:  No joint pain.    PHYSICAL EXAMINATION:  HEENT:  Head:  Normocephalic and atraumatic.  Eyes:  No scleral icterus.  Ears:  Hearing bilaterally intact.  NECK:  Supple.  RESPIRATORY:  Air entry bilaterally.  CARDIOVASCULAR:  S1 and S2 heard.  ABDOMEN:  Soft and nontender.  EXTREMITIES:  No clubbing or cyanosis was noted.      NEUROLOGIC:  The patient is awake and alert.  Location was [facility], year was 2024, month was January.  The patient had difficulty with math but this is not her strong suit.  Was able to name simple objects.  Was able to tell correct time on the clock.  Extraocular movements were intact.  Full visual fields.  Speech was fluent.  Smile symmetric.  Motor:  Bilateral upper 5/5.  Right lower and her calf had a fasciotomy from compartment syndrome, decreased range of motion of her foot, overall proximal strength was 4/5.  Left lower was 4/5.  Slight movement of dorsi and plantar flexation of the right foot.  Unable to feel on her right foot, but this appeared new.      LABS:  CBC Full  -  ( 15 Rafita 2024 05:52 )  WBC Count : 11.75 K/uL  RBC Count : 4.10 M/uL  Hemoglobin : 13.1 g/dL  Hematocrit : 37.9 %  Platelet Count - Automated : 172 K/uL  Mean Cell Volume : 92.4 fl  Mean Cell Hemoglobin : 32.0 pg  Mean Cell Hemoglobin Concentration : 34.6 gm/dL  Auto Neutrophil # : 7.43 K/uL  Auto Lymphocyte # : 3.31 K/uL  Auto Monocyte # : 0.80 K/uL  Auto Eosinophil # : 0.13 K/uL  Auto Basophil # : 0.03 K/uL  Auto Neutrophil % : 63.2 %  Auto Lymphocyte % : 28.2 %  Auto Monocyte % : 6.8 %  Auto Eosinophil % : 1.1 %  Auto Basophil % : 0.3 %    Urinalysis Basic - ( 15 Rafita 2024 05:52 )    Color: x / Appearance: x / SG: x / pH: x  Gluc: 88 mg/dL / Ketone: x  / Bili: x / Urobili: x   Blood: x / Protein: x / Nitrite: x   Leuk Esterase: x / RBC: x / WBC x   Sq Epi: x / Non Sq Epi: x / Bacteria: x      01-15    141  |  112<H>  |  16  ----------------------------<  88  3.7   |  24  |  0.90    Ca    8.9      15 Rafita 2024 05:52  Phos  3.4     01-14  Mg     2.1     01-14    TPro  6.5  /  Alb  3.4  /  TBili  0.3  /  DBili  x   /  AST  13<L>  /  ALT  23  /  AlkPhos  31<L>  01-15    Hemoglobin A1C:     LIVER FUNCTIONS - ( 15 Rafita 2024 05:52 )  Alb: 3.4 g/dL / Pro: 6.5 g/dL / ALK PHOS: 31 U/L / ALT: 23 U/L / AST: 13 U/L / GGT: x           Vitamin B12         RADIOLOGY    CAT scan of the head shows no intracerebral hemorrhage or cerebrovascular accident.    ANALYSIS AND PLAN:  This is a 38-year-old with episode of altered mental status.  For episode of altered mental status, suspect most likely secondary to underlying psychiatric issues, suspect less likely this is a primary central nervous system event.  The patient did have her psychiatric medication dose increased prior to coming to the hospital, questionable could be any side effect of that.  I would recommend Psychiatry followup.  For history of migraines, continue home medications.  For bipolar disorder, adjustment of psychiatric medications as needed.    Attempted to contact motherAna, at [phone], went to voicemail.     59 minutes of time was spent with the patient, plan of care, reviewing data, with greater than 50% of the visit was spent counseling and/or coordinating care with multidisciplinary healthcare team   Neurology follow up note    ABELARDO [id number]      Interval History:    Patient feels ok no new complaints.    Allergies    latex (Urticaria; Rash)  penicillin (Unknown)  sulfa drugs (Unknown)  avocado (Unknown)    Intolerances        MEDICATIONS    acetaminophen     Tablet .. 650 milliGRAM(s) Oral every 6 hours PRN  atorvastatin 20 milliGRAM(s) Oral at bedtime  benztropine 2 milliGRAM(s) Oral two times a day  brexpiprazole 2 milliGRAM(s) Oral daily  buPROPion XL (24-Hour) . 300 milliGRAM(s) Oral daily  clonazePAM  Tablet 1 milliGRAM(s) Oral four times a day PRN  ethinyl  estradiol-norethindrone (ALYACEN) 1 Tablet(s) Oral <User Schedule>  gabapentin 1200 milliGRAM(s) Oral <User Schedule>  lamoTRIgine 100 milliGRAM(s) Oral two times a day  levothyroxine 88 MICROGram(s) Oral daily  montelukast 10 milliGRAM(s) Oral at bedtime  pantoprazole    Tablet 40 milliGRAM(s) Oral before breakfast  risperiDONE   Tablet 6 milliGRAM(s) Oral at bedtime  topiramate 100 milliGRAM(s) Oral two times a day  traZODone 200 milliGRAM(s) Oral at bedtime  vilazodone 40 milliGRAM(s) Oral <User Schedule>              Vital Signs Last 24 Hrs  T(C): 36.9 (16 Jan 2024 05:30), Max: 37.1 (15 Rafita 2024 20:53)  T(F): 98.4 (16 Jan 2024 05:30), Max: 98.7 (15 Rafita 2024 20:53)  HR: 99 (16 Jan 2024 05:30) (72 - 99)  BP: 96/60 (16 Jan 2024 05:30) (89/56 - 108/69)  BP(mean): --  RR: 17 (16 Jan 2024 05:30) (17 - 17)  SpO2: 95% (16 Jan 2024 05:30) (95% - 97%)    Parameters below as of 16 Jan 2024 05:30  Patient On (Oxygen Delivery Method): room air      REVIEW OF SYSTEMS:  Constitutional:  The patient denies fever, chills, or night sweats.  Head:  No headache.  Eyes:  No double vision or blurry vision.  Ears:  No ringing in the ears.  Neck:  No neck pain.  Respiratory:  No shortness of breath.  Cardiovascular:  No chest pain.  Abdomen:  No nausea, vomiting, or abdominal pain.  Extremities/Neurological:  No numbness or tingling.  Musculoskeletal:  No joint pain.    PHYSICAL EXAMINATION:  HEENT:  Head:  Normocephalic and atraumatic.  Eyes:  No scleral icterus.  Ears:  Hearing bilaterally intact.  NECK:  Supple.  RESPIRATORY:  Air entry bilaterally.  CARDIOVASCULAR:  S1 and S2 heard.  ABDOMEN:  Soft and nontender.  EXTREMITIES:  No clubbing or cyanosis was noted.      NEUROLOGIC:  The patient is awake and alert.  Location was [facility], year was 2024, month was January.  The patient had difficulty with math but this is not her strong suit.  Was able to name simple objects.  Was able to tell correct time on the clock.  Extraocular movements were intact.  Full visual fields.  Speech was fluent.  Smile symmetric.  Motor:  Bilateral upper 5/5.  Right lower and her calf had a fasciotomy from compartment syndrome, decreased range of motion of her foot, overall proximal strength was 4/5.  Left lower was 4/5.  Slight movement of dorsi and plantar flexation of the right foot.  Unable to feel on her right foot, but this appeared new.      LABS:  CBC Full  -  ( 15 Rafita 2024 05:52 )  WBC Count : 11.75 K/uL  RBC Count : 4.10 M/uL  Hemoglobin : 13.1 g/dL  Hematocrit : 37.9 %  Platelet Count - Automated : 172 K/uL  Mean Cell Volume : 92.4 fl  Mean Cell Hemoglobin : 32.0 pg  Mean Cell Hemoglobin Concentration : 34.6 gm/dL  Auto Neutrophil # : 7.43 K/uL  Auto Lymphocyte # : 3.31 K/uL  Auto Monocyte # : 0.80 K/uL  Auto Eosinophil # : 0.13 K/uL  Auto Basophil # : 0.03 K/uL  Auto Neutrophil % : 63.2 %  Auto Lymphocyte % : 28.2 %  Auto Monocyte % : 6.8 %  Auto Eosinophil % : 1.1 %  Auto Basophil % : 0.3 %    Urinalysis Basic - ( 15 Rafita 2024 05:52 )    Color: x / Appearance: x / SG: x / pH: x  Gluc: 88 mg/dL / Ketone: x  / Bili: x / Urobili: x   Blood: x / Protein: x / Nitrite: x   Leuk Esterase: x / RBC: x / WBC x   Sq Epi: x / Non Sq Epi: x / Bacteria: x      01-15    141  |  112<H>  |  16  ----------------------------<  88  3.7   |  24  |  0.90    Ca    8.9      15 Rafita 2024 05:52  Phos  3.4     01-14  Mg     2.1     01-14    TPro  6.5  /  Alb  3.4  /  TBili  0.3  /  DBili  x   /  AST  13<L>  /  ALT  23  /  AlkPhos  31<L>  01-15    Hemoglobin A1C:     LIVER FUNCTIONS - ( 15 Rafita 2024 05:52 )  Alb: 3.4 g/dL / Pro: 6.5 g/dL / ALK PHOS: 31 U/L / ALT: 23 U/L / AST: 13 U/L / GGT: x           Vitamin B12         RADIOLOGY    CAT scan of the head shows no intracerebral hemorrhage or cerebrovascular accident.    ANALYSIS AND PLAN:  This is a 38-year-old with episode of altered mental status.  For episode of altered mental status, suspect most likely secondary to underlying psychiatric issues, suspect less likely this is a primary central nervous system event.  The patient did have her psychiatric medication dose increased prior to coming to the hospital, questionable could be any side effect of that.  I would recommend Psychiatry followup.  For history of migraines, continue home medications.  For bipolar disorder, adjustment of psychiatric medications as needed.  neurologic wise stable dc planning     Attempted to contact mother, Ana, at [phone], went to voicemail yesterday      53 minutes of time was spent with the patient, plan of care, reviewing data, with greater than 50% of the visit was spent counseling and/or coordinating care with multidisciplinary healthcare team   Neurology follow up note    ABELARDO [id number]      Interval History:    Patient feels ok no new complaints.    Allergies    latex (Urticaria; Rash)  penicillin (Unknown)  sulfa drugs (Unknown)  avocado (Unknown)    Intolerances        MEDICATIONS    acetaminophen     Tablet .. 650 milliGRAM(s) Oral every 6 hours PRN  atorvastatin 20 milliGRAM(s) Oral at bedtime  benztropine 2 milliGRAM(s) Oral two times a day  brexpiprazole 2 milliGRAM(s) Oral daily  buPROPion XL (24-Hour) . 300 milliGRAM(s) Oral daily  clonazePAM  Tablet 1 milliGRAM(s) Oral four times a day PRN  ethinyl  estradiol-norethindrone (ALYACEN) 1 Tablet(s) Oral <User Schedule>  gabapentin 1200 milliGRAM(s) Oral <User Schedule>  lamoTRIgine 100 milliGRAM(s) Oral two times a day  levothyroxine 88 MICROGram(s) Oral daily  montelukast 10 milliGRAM(s) Oral at bedtime  pantoprazole    Tablet 40 milliGRAM(s) Oral before breakfast  risperiDONE   Tablet 6 milliGRAM(s) Oral at bedtime  topiramate 100 milliGRAM(s) Oral two times a day  traZODone 200 milliGRAM(s) Oral at bedtime  vilazodone 40 milliGRAM(s) Oral <User Schedule>              Vital Signs Last 24 Hrs  T(C): 36.9 (16 Jan 2024 05:30), Max: 37.1 (15 Rafita 2024 20:53)  T(F): 98.4 (16 Jan 2024 05:30), Max: 98.7 (15 Rafita 2024 20:53)  HR: 99 (16 Jan 2024 05:30) (72 - 99)  BP: 96/60 (16 Jan 2024 05:30) (89/56 - 108/69)  BP(mean): --  RR: 17 (16 Jan 2024 05:30) (17 - 17)  SpO2: 95% (16 Jan 2024 05:30) (95% - 97%)    Parameters below as of 16 Jan 2024 05:30  Patient On (Oxygen Delivery Method): room air      REVIEW OF SYSTEMS:  Constitutional:  The patient denies fever, chills, or night sweats.  Head:  No headache.  Eyes:  No double vision or blurry vision.  Ears:  No ringing in the ears.  Neck:  No neck pain.  Respiratory:  No shortness of breath.  Cardiovascular:  No chest pain.  Abdomen:  No nausea, vomiting, or abdominal pain.  Extremities/Neurological:  No numbness or tingling.  Musculoskeletal:  No joint pain.    PHYSICAL EXAMINATION:  HEENT:  Head:  Normocephalic and atraumatic.  Eyes:  No scleral icterus.  Ears:  Hearing bilaterally intact.  NECK:  Supple.  RESPIRATORY:  Air entry bilaterally.  CARDIOVASCULAR:  S1 and S2 heard.  ABDOMEN:  Soft and nontender.  EXTREMITIES:  No clubbing or cyanosis was noted.      NEUROLOGIC:  The patient is awake and alert.  Location was [facility], year was 2024, month was January.  The patient had difficulty with math but this is not her strong suit.  Was able to name simple objects.  Was able to tell correct time on the clock.  Extraocular movements were intact.  Full visual fields.  Speech was fluent.  Smile symmetric.  Motor:  Bilateral upper 5/5.  Right lower and her calf had a fasciotomy from compartment syndrome, decreased range of motion of her foot, overall proximal strength was 4/5.  Left lower was 4/5.  Slight movement of dorsi and plantar flexation of the right foot.  Unable to feel on her right foot, but this appeared new.      LABS:  CBC Full  -  ( 15 Rafita 2024 05:52 )  WBC Count : 11.75 K/uL  RBC Count : 4.10 M/uL  Hemoglobin : 13.1 g/dL  Hematocrit : 37.9 %  Platelet Count - Automated : 172 K/uL  Mean Cell Volume : 92.4 fl  Mean Cell Hemoglobin : 32.0 pg  Mean Cell Hemoglobin Concentration : 34.6 gm/dL  Auto Neutrophil # : 7.43 K/uL  Auto Lymphocyte # : 3.31 K/uL  Auto Monocyte # : 0.80 K/uL  Auto Eosinophil # : 0.13 K/uL  Auto Basophil # : 0.03 K/uL  Auto Neutrophil % : 63.2 %  Auto Lymphocyte % : 28.2 %  Auto Monocyte % : 6.8 %  Auto Eosinophil % : 1.1 %  Auto Basophil % : 0.3 %    Urinalysis Basic - ( 15 Rafita 2024 05:52 )    Color: x / Appearance: x / SG: x / pH: x  Gluc: 88 mg/dL / Ketone: x  / Bili: x / Urobili: x   Blood: x / Protein: x / Nitrite: x   Leuk Esterase: x / RBC: x / WBC x   Sq Epi: x / Non Sq Epi: x / Bacteria: x      01-15    141  |  112<H>  |  16  ----------------------------<  88  3.7   |  24  |  0.90    Ca    8.9      15 Rafita 2024 05:52  Phos  3.4     01-14  Mg     2.1     01-14    TPro  6.5  /  Alb  3.4  /  TBili  0.3  /  DBili  x   /  AST  13<L>  /  ALT  23  /  AlkPhos  31<L>  01-15    Hemoglobin A1C:     LIVER FUNCTIONS - ( 15 Rafita 2024 05:52 )  Alb: 3.4 g/dL / Pro: 6.5 g/dL / ALK PHOS: 31 U/L / ALT: 23 U/L / AST: 13 U/L / GGT: x           Vitamin B12         RADIOLOGY    CAT scan of the head shows no intracerebral hemorrhage or cerebrovascular accident.    ANALYSIS AND PLAN:  This is a 38-year-old with episode of altered mental status.  For episode of altered mental status, suspect most likely secondary to underlying psychiatric issues, suspect less likely this is a primary central nervous system event.  The patient did have her psychiatric medication dose increased prior to coming to the hospital, questionable could be any side effect of that.  I would recommend Psychiatry followup.  For history of migraines, continue home medications.  For bipolar disorder, adjustment of psychiatric medications as needed.  neurologic wise stable dc planning     Attempted to contact mother, Ana, at [phone], went to voicemail yesterday      53 minutes of time was spent with the patient, plan of care, reviewing data, with greater than 50% of the visit was spent counseling and/or coordinating care with multidisciplinary healthcare team

## 2024-01-16 NOTE — DISCHARGE NOTE PROVIDER - CARE PROVIDER_API CALL
Outpatient Providers	PCP - Dr. Dinesh Thomas,   Phone: (   )    -  Fax: (   )    -  Follow Up Time:     dr hanson, psych  Phone: (   )    -  Fax: (   )    -  Follow Up Time:

## 2024-01-16 NOTE — DISCHARGE NOTE NURSING/CASE MANAGEMENT/SOCIAL WORK - NSDCVIVACCINE_GEN_ALL_CORE_FT
influenza, injectable, quadrivalent, preservative free; 10-Nov-2017 15:55; Junior Haile (RN); [employer]; 572kt; IntraMuscular; Deltoid Left.; 0.5 milliLiter(s); VIS (VIS Published: 07-Aug-2015, VIS Presented: 10-Nov-2017);

## 2024-01-16 NOTE — DISCHARGE NOTE PROVIDER - PROVIDER TOKENS
FREE:[LAST:[Outpatient Providers	PCP - Dr. Dinesh Thomas],PHONE:[(   )    -],FAX:[(   )    -]],FREE:[LAST:[dr hanson],FIRST:[psych],PHONE:[(   )    -],FAX:[(   )    -]]

## 2024-01-16 NOTE — BH CONSULTATION LIAISON ASSESSMENT NOTE - NSBHCHARTREVIEWLAB_PSY_A_CORE FT
13.1   11.75 )-----------( 172      ( 15 Rafita 2024 05:52 )             37.9   01-16    136  |  105  |  20  ----------------------------<  150<H>  3.8   |  22  |  1.10    Ca    9.3      16 Jan 2024 08:30  Phos  3.4     01-14  Mg     2.1     01-14    TPro  6.5  /  Alb  3.4  /  TBili  0.3  /  DBili  x   /  AST  13<L>  /  ALT  23  /  AlkPhos  31<L>  01-15

## 2024-01-16 NOTE — DISCHARGE NOTE PROVIDER - ATTENDING ATTESTATION STATEMENT
I have personally seen and examined the patient. I have collaborated with and supervised the
Alert and oriented to person, place, time/situation. normal mood and affect. no apparent risk to self or others.

## 2024-01-16 NOTE — BH CONSULTATION LIAISON ASSESSMENT NOTE - CURRENT MEDICATION
MEDICATIONS  (STANDING):  atorvastatin 20 milliGRAM(s) Oral at bedtime  benztropine 2 milliGRAM(s) Oral two times a day  brexpiprazole 2 milliGRAM(s) Oral daily  buPROPion XL (24-Hour) . 300 milliGRAM(s) Oral daily  ethinyl  estradiol-norethindrone (ALYACEN) 1 Tablet(s) Oral <User Schedule>  gabapentin 1200 milliGRAM(s) Oral <User Schedule>  lamoTRIgine 100 milliGRAM(s) Oral two times a day  levothyroxine 88 MICROGram(s) Oral daily  montelukast 10 milliGRAM(s) Oral at bedtime  pantoprazole    Tablet 40 milliGRAM(s) Oral before breakfast  risperiDONE   Tablet 6 milliGRAM(s) Oral at bedtime  topiramate 100 milliGRAM(s) Oral two times a day  traZODone 200 milliGRAM(s) Oral at bedtime  vilazodone 40 milliGRAM(s) Oral <User Schedule>    MEDICATIONS  (PRN):  acetaminophen     Tablet .. 650 milliGRAM(s) Oral every 6 hours PRN Temp greater or equal to 38C (100.4F), Mild Pain (1 - 3)  clonazePAM  Tablet 1 milliGRAM(s) Oral four times a day PRN anxiety

## 2024-01-16 NOTE — BH CONSULTATION LIAISON ASSESSMENT NOTE - HPI (INCLUDE ILLNESS QUALITY, SEVERITY, DURATION, TIMING, CONTEXT, MODIFYING FACTORS, ASSOCIATED SIGNS AND SYMPTOMS)
Patient seen, evaluated and chart reviewed. Patient is a 38-year-old SWF, disabled, domiciled with parents, three prior psychiatric hospitalizations, history of bipolar disorder, with past medical history of  migraines, hypothyroidism, chronic back pain presents with altered mental status. Mother reports approximately 6 days ago patient saw her psychiatrist Dr. Serna who increased dose of Rexulti daily.  Patient takes multiple medications for bipolar daily. Mother noted change in mental status the past day or so - becoming worse today. Pts mother states she is rambling, repeating herself, super agitated, not able to find words/making up words, not sleeping well.   Patient possibly fell this morning - no known headstrike.  Patient is without complaints however not making sense and is confused. Patient admits to taking several psychotropic medications from several different sources. Currently lucid, but labile affect. No evidence of psychosis, candie or depression.

## 2024-01-16 NOTE — BH CONSULTATION LIAISON ASSESSMENT NOTE - NSBHCHARTREVIEWVS_PSY_A_CORE FT
Vital Signs Last 24 Hrs  T(C): 36.9 (16 Jan 2024 05:30), Max: 37.1 (15 Rafita 2024 20:53)  T(F): 98.4 (16 Jan 2024 05:30), Max: 98.7 (15 Rafita 2024 20:53)  HR: 99 (16 Jan 2024 05:30) (72 - 99)  BP: 96/60 (16 Jan 2024 05:30) (89/56 - 108/69)  BP(mean): --  RR: 17 (16 Jan 2024 05:30) (17 - 17)  SpO2: 95% (16 Jan 2024 05:30) (95% - 97%)    Parameters below as of 16 Jan 2024 05:30  Patient On (Oxygen Delivery Method): room air

## 2024-01-16 NOTE — DISCHARGE NOTE PROVIDER - NSDCMRMEDTOKEN_GEN_ALL_CORE_FT
Alyacen 1/35 oral tablet: 1 tab(s) orally once a day  ascorbic acid 500 mg oral tablet: 1 tab(s) orally 2 times a day  benztropine 2 mg oral tablet: 1 tab(s) orally once a day (at bedtime)  clonazePAM 2 mg oral tablet: 0.5 tab(s) orally 5 times a day as needed for  agitation  gabapentin 600 mg oral tablet: 2 tab(s) orally 2 times a day and 1 in the afternoon  Hiprex 1 g oral tablet: 1 tab(s) orally 2 times a day  Imitrex 20 mg/inh nasal spray: 20 milligram(s) intranasally once a day as needed for  headache  LaMICtal 100 mg oral tablet: 1 tab(s) orally 2 times a day  Lipitor 20 mg oral tablet: 1 tab(s) orally once a day (at bedtime)  meloxicam 15 mg oral tablet: 1 tab(s) orally once a day  montelukast 10 mg oral tablet: 1 tab(s) orally once a day (at bedtime)  omeprazole 40 mg oral delayed release capsule: 1 cap(s) orally once a day  risperiDONE 3 mg oral tablet: 2 tab(s) orally once a day (at bedtime)  Synthroid 88 mcg (0.088 mg) oral tablet: 1 tab(s) orally once a day in the AM  Topamax 100 mg oral tablet: 1 tab(s) orally 2 times a day  traZODone 100 mg oral tablet: 2 tab(s) orally 2 times a day at bedtime  Viibryd 40 mg oral tablet: 1 tab(s) orally once a day  Wellbutrin  mg/24 hours oral tablet, extended release: 1 tab(s) orally once a day in the AM

## 2024-01-16 NOTE — DISCHARGE NOTE PROVIDER - NSDCCPCAREPLAN_GEN_ALL_CORE_FT
PRINCIPAL DISCHARGE DIAGNOSIS  Diagnosis: AMS (altered mental status)  Assessment and Plan of Treatment: POSSIBLE SEC TO MEDS INDUCE - psych dr herr  suggested to  stop your  psych med  rexutli  this time also decrease dose of benzotropin to once day . follow up your own psych dr hanson office with in 1wk / for  further meds adjustment and evaluation .      SECONDARY DISCHARGE DIAGNOSES  Diagnosis: Hypothyroidism  Assessment and Plan of Treatment: continue home meds    Diagnosis: Neuropathy  Assessment and Plan of Treatment: continue home meds    Diagnosis: Bipolar 1 disorder  Assessment and Plan of Treatment: continue home meds   except Rexulti stopped  and benzotropine [ cogentin] decrease dose to once day instead of twice day.    Diagnosis: Migraine  Assessment and Plan of Treatment: continue home meds

## 2024-01-16 NOTE — CARE COORDINATION ASSESSMENT. - OTHER PERTINENT DISCHARGE PLANNING INFORMATION:
pt appears to be coping well with hospitalization. pta pt living w parents and not working at this time. Pt states she sees her private psychiatrist and psychologist in the community. Upon dc pt to ret home w parents whom are at bedside and supportive and to follow up w psychologist and psychiatrist in the community. sw remains available if needed.

## 2024-02-18 NOTE — BH CONSULTATION LIAISON ASSESSMENT NOTE - MSE OPTIONS
HR=67 bpm, IJEF=072/95 mmhg, SpO2=99.0 %, Resp=12 B/min, EtCO2=36 mmHg, Apnea=1 Seconds Structured MSE

## 2024-09-16 NOTE — PATIENT PROFILE ADULT. - CAREGIVER ADDRESS
What Type Of Note Output Would You Prefer (Optional)?: Bullet Format
How Severe Is Your Rash?: mild
Is This A New Presentation, Or A Follow-Up?: Rash
[address] [place] [id number]

## 2025-07-16 NOTE — ASU PREOP CHECKLIST - LATEX ALLERGY
----- Message from JUNIE Pace sent at 7/16/2025 10:07 AM CDT -----  Can you assist this patient with scheduling an IUD removal and replacement w/ Dr Crespo. Strings were not seen on exam but US shows IUD is in place. IUD present since 2018.    Thanks!!   ----- Message -----  From: Interface, Rad Results In  Sent: 7/15/2025   8:43 AM CDT  To: JUNIE Trejo     yes